# Patient Record
Sex: FEMALE | Race: WHITE | Employment: OTHER | URBAN - METROPOLITAN AREA
[De-identification: names, ages, dates, MRNs, and addresses within clinical notes are randomized per-mention and may not be internally consistent; named-entity substitution may affect disease eponyms.]

---

## 2017-01-11 ENCOUNTER — TELEPHONE (OUTPATIENT)
Dept: INTERNAL MEDICINE | Facility: CLINIC | Age: 77
End: 2017-01-11

## 2017-01-11 DIAGNOSIS — E78.5 HYPERLIPIDEMIA LDL GOAL <100: Primary | ICD-10-CM

## 2017-01-11 NOTE — TELEPHONE ENCOUNTER
Per 12/05/16 OV note: f/u with labs and appt in 3 months    Pt recently had all 3 medication rxs sent with refills recently. Doxazosin rx sent 12/05/16 to [facility], simvastatin sent 10/20/16 to [facility], Risperdal sent 10/17/16 to [facility].    Called pt, request she speak to [facility] and ask they transfer rxs. Pt frustrated by this response and anxious about it.     Called [facility], gave pharmacy contact information and pt contact information (pt new there). Pharm asks that pt bring in insurance card when picking up med.    Called pt, [facility] working on [facility] rx and to bring insurance card when picking up med.

## 2017-01-11 NOTE — TELEPHONE ENCOUNTER
Reason for Call:  Medication or medication refill:    Do you use a [facility]?  Name of the pharmacy and phone number for the current request: [facility]     Name of the medication requested: cymbastatin, doxazosin, risperidone    Other request: none    Can we leave a detailed message on this number? Yes     Phone number patient can be reached at: [phone]    Best Time: asap    Call taken on 1/11/2017 at 2:19 PM by Zena Soliman

## 2017-01-12 ENCOUNTER — TELEPHONE (OUTPATIENT)
Dept: INTERNAL MEDICINE | Facility: CLINIC | Age: 77
End: 2017-01-12

## 2017-01-12 DIAGNOSIS — F22: Primary | ICD-10-CM

## 2017-01-12 NOTE — TELEPHONE ENCOUNTER
Reason for Call:  Medication or medication refill:    Do you use a [facility]?  Name of the pharmacy and phone number for the current request:  [facility]    Name of the medication requested:respiridrone 90 day supply    Other request: has 2 left    Can we leave a detailed message on this number? YES    Phone number patient can be reached at: [phone]     Best Time: any    Call taken on 1/12/2017 at 1:31 PM by Poonam Vo

## 2017-01-12 NOTE — TELEPHONE ENCOUNTER
risperdal     Last Written Prescription Date: 10/17/16  Last Fill Quantity: 60, # refills: 1  Last Office Visit with [facility], [facility] or  [facility] prescribing provider: 12/5/16   Next 5 appointments (look out 90 days)     Mar 10, 2017  1:00 PM   SHORT with Nataele Owens MD   [facility] ([facility])    [address]  [facility] [address]   [phone]                   BP Readings from Last 3 Encounters:   12/05/16 156/87   11/22/16 140/70   10/25/16 158/68     Pulse Readings from Last 2 Encounters:   12/05/16 56   11/22/16 64     GLC       83   9/29/2016  WBC      6.0   9/27/2016  RBC     4.49   9/27/2016  HGB     13.7   9/27/2016  HCT     41.0   9/27/2016  No components found with this name: mct  MCV       91   9/27/2016  MCH     30.5   9/27/2016  MCHC     33.4   9/27/2016  RDW     12.3   9/27/2016  PLT      260   9/27/2016  CHOL      197   9/21/2016  HDL       66   9/21/2016  LDL      110   9/21/2016  TRIG      104   9/21/2016  CHOLHDLRATIO      2.8   7/15/2014    Labs showing if normal/abnormal  Lab Results   Component Value Date    GLC 83 09/29/2016     Lab Results   Component Value Date    CHOL 197 09/21/2016    TRIG 104 09/21/2016    HDL 66 09/21/2016    * 09/21/2016    VLDL 22 07/15/2014    CHOLHDLRATIO 2.8 07/15/2014

## 2017-01-13 RX ORDER — RISPERIDONE 0.5 MG/1
0.5 TABLET ORAL 2 TIMES DAILY
Qty: 60 TABLET | Refills: 1 | Status: SHIPPED | OUTPATIENT
Start: 2017-01-13 | End: 2017-03-08

## 2017-01-13 NOTE — TELEPHONE ENCOUNTER
Routing refill request to provider for review/approval because:  Med refilled by ER provider.    Please advise, thanks.

## 2017-01-31 ENCOUNTER — TELEPHONE (OUTPATIENT)
Dept: INTERNAL MEDICINE | Facility: CLINIC | Age: 77
End: 2017-01-31

## 2017-01-31 DIAGNOSIS — K26.5 PERFORATED DUODENAL ULCER (H): Primary | ICD-10-CM

## 2017-01-31 RX ORDER — PANTOPRAZOLE SODIUM 40 MG/1
40 TABLET, DELAYED RELEASE ORAL DAILY
Qty: 90 TABLET | Refills: 3 | Status: SHIPPED | OUTPATIENT
Start: 2017-01-31 | End: 2018-01-19

## 2017-01-31 NOTE — TELEPHONE ENCOUNTER
Reason for Call:  Medication or medication refill:    Do you use a [facility]?  Name of the pharmacy and phone number for the current request:  [facility] [phone]    Name of the medication requested: pantoprazole    Other request: This is [facility] time for [facility].  Pt only has a few pills left.    Can we leave a detailed message on this number? YES    Phone number patient can be reached at: Home number on file [phone] (home)    Best Time: any    Call taken on 1/31/2017 at 10:52 AM by Fariba Vallejo

## 2017-01-31 NOTE — TELEPHONE ENCOUNTER
Protonix      Last Written Prescription Date: 9/19/16  Last Fill Quantity: 90,  # refills: 0   Last Office Visit with [facility], [facility] or [facility] prescribing provider: 12/5/16                                         Next 5 appointments (look out 90 days)     Mar 10, 2017  1:00 PM   SHORT with Natalee Owens MD   [facility] ([facility]    [address]  [facility] [id number] [phone]

## 2017-02-28 ENCOUNTER — TELEPHONE (OUTPATIENT)
Dept: INTERNAL MEDICINE | Facility: CLINIC | Age: 77
End: 2017-02-28

## 2017-02-28 DIAGNOSIS — I73.9 PVD (PERIPHERAL VASCULAR DISEASE) (H): ICD-10-CM

## 2017-02-28 DIAGNOSIS — I77.810 AORTIC ROOT DILATATION (H): ICD-10-CM

## 2017-02-28 DIAGNOSIS — I10 ESSENTIAL HYPERTENSION WITH GOAL BLOOD PRESSURE LESS THAN 140/90: Chronic | ICD-10-CM

## 2017-02-28 DIAGNOSIS — I70.1 RENAL ARTERY STENOSIS (H): Chronic | ICD-10-CM

## 2017-02-28 NOTE — TELEPHONE ENCOUNTER
Reason for Call:  Medication or medication refill:    Do you use a [facility]?  Name of the pharmacy and phone number for the current request:  [facility]    Name of the medication requested: metropolol    Other request: Pt has 6 days left    Can we leave a detailed message on this number? YES    Phone number patient can be reached at: Home number on file [phone] (home)    Best Time: any    Call taken on 2/28/2017 at 10:36 AM by Fariba Vallejo

## 2017-03-01 RX ORDER — METOPROLOL TARTRATE 50 MG
50 TABLET ORAL 2 TIMES DAILY
Qty: 180 TABLET | Refills: 0 | Status: SHIPPED | OUTPATIENT
Start: 2017-03-01 | End: 2017-06-03

## 2017-03-01 NOTE — TELEPHONE ENCOUNTER
Metoprolol      Last Written Prescription Date: 12/05/16  Last Fill Quantity: 180, # refills: 1    Last Office Visit with [facility], [facility] or [facility] prescribing provider:  12/05/16   Future Office Visit:    Next 5 appointments (look out 90 days)     Mar 21, 2017  3:00 PM CDT   SHORT with Natalee Owens MD   [facility]    [address] [place]  [facility] [id number] [phone]                    BP Readings from Last 3 Encounters:   12/05/16 156/87   11/22/16 140/70   10/25/16 158/68     Medication is being filled for 1 time refill only due to:  upcoming appt     Called pt, notified rx sent.

## 2017-03-07 ENCOUNTER — TELEPHONE (OUTPATIENT)
Dept: INTERNAL MEDICINE | Facility: CLINIC | Age: 77
End: 2017-03-07

## 2017-03-07 DIAGNOSIS — F22: ICD-10-CM

## 2017-03-07 NOTE — TELEPHONE ENCOUNTER
Reason for Call:  Medication or medication refill:    Do you use a [facility]?  Name of the pharmacy and phone number for the current request:  [facility]    Name of the medication requested: risperidone    Other request: none    Can we leave a detailed message on this number? YES    Phone number patient can be reached at: Home number on file [phone] (home)    Best Time: any    Call taken on 3/7/2017 at 9:40 AM by Fariba Vallejo

## 2017-03-07 NOTE — TELEPHONE ENCOUNTER
Risperidone  Routing refill request to provider for review/approval because:  Drug not on the [facility] refill protocol

## 2017-03-08 RX ORDER — RISPERIDONE 0.5 MG/1
0.5 TABLET ORAL 2 TIMES DAILY
Qty: 60 TABLET | Refills: 1 | Status: SHIPPED | OUTPATIENT
Start: 2017-03-08 | End: 2017-05-08

## 2017-03-15 ENCOUNTER — TELEPHONE (OUTPATIENT)
Dept: INTERNAL MEDICINE | Facility: CLINIC | Age: 77
End: 2017-03-15

## 2017-03-15 NOTE — TELEPHONE ENCOUNTER
Reason for Call:  Medication or medication refill:    Do you use a [facility]?  Name of the pharmacy and phone number for the current request:  [facility]    Name of the medication requested: amlodipine, clopidogrel    Other request:     Can we leave a detailed message on this number? YES    Phone number patient can be reached at: Home number on file [phone] (home)    Best Time: any    Call taken on 3/15/2017 at 2:03 PM by Poonam Vo

## 2017-03-20 DIAGNOSIS — I73.9 PVD (PERIPHERAL VASCULAR DISEASE) (H): ICD-10-CM

## 2017-03-20 DIAGNOSIS — I77.810 AORTIC ROOT DILATATION (H): ICD-10-CM

## 2017-03-20 DIAGNOSIS — E78.5 HYPERLIPIDEMIA LDL GOAL <100: ICD-10-CM

## 2017-03-20 DIAGNOSIS — I10 ESSENTIAL HYPERTENSION WITH GOAL BLOOD PRESSURE LESS THAN 140/90: Primary | Chronic | ICD-10-CM

## 2017-03-20 DIAGNOSIS — I70.1 RENAL ARTERY STENOSIS (H): Chronic | ICD-10-CM

## 2017-03-20 DIAGNOSIS — F22: ICD-10-CM

## 2017-03-20 RX ORDER — AMLODIPINE BESYLATE 10 MG/1
10 TABLET ORAL DAILY
Qty: 90 TABLET | Refills: 0 | Status: SHIPPED | OUTPATIENT
Start: 2017-03-20 | End: 2017-05-30

## 2017-03-20 RX ORDER — SIMVASTATIN 40 MG
40 TABLET ORAL AT BEDTIME
Qty: 90 TABLET | Refills: 1 | Status: CANCELLED | OUTPATIENT
Start: 2017-03-20

## 2017-03-20 RX ORDER — CLOPIDOGREL BISULFATE 75 MG/1
75 TABLET ORAL DAILY
Qty: 90 TABLET | Refills: 0 | Status: SHIPPED | OUTPATIENT
Start: 2017-03-20 | End: 2017-06-19

## 2017-03-20 NOTE — TELEPHONE ENCOUNTER
Clopidogrel           Last Written Prescription Date: 12/5/16  Last Fill Quantity: 90, # refills: 1    Last Office Visit with [facility], [facility] or [facility] prescribing provider:  12/5/16   Future Office Visit:    Next 5 appointments (look out 90 days)     Mar 21, 2017  3:00 PM CDT   SHORT with Natalee Owens MD   [facility]    [address]  [facility] [address]   [phone]                   Lab Results   Component Value Date    WBC 6.0 09/27/2016     Lab Results   Component Value Date    RBC 4.49 09/27/2016     Lab Results   Component Value Date    HGB 13.7 09/27/2016     Lab Results   Component Value Date    HCT 41.0 09/27/2016     No components found for: MCT  Lab Results   Component Value Date    MCV 91 09/27/2016     Lab Results   Component Value Date    MCH 30.5 09/27/2016     Lab Results   Component Value Date    MCHC 33.4 09/27/2016     Lab Results   Component Value Date    RDW 12.3 09/27/2016     Lab Results   Component Value Date     09/27/2016     Lab Results   Component Value Date    AST 25 09/27/2016     Lab Results   Component Value Date    ALT 15 09/27/2016     Creatinine   Date Value Ref Range Status   09/29/2016 1.63 (H) 0.52 - 1.04 mg/dL Final   ]  Prescription approved per [facility] Refill Protocol. SUE Mallory R.N.

## 2017-03-20 NOTE — TELEPHONE ENCOUNTER
simvastin     Last Written Prescription Date: 10/20/16  Last Fill Quantity: 90, # refills: 1  Last Office Visit with [facility], [facility] or  [facility] prescribing provider: 12/5/16  Next 5 appointments (look out 90 days)     Mar 21, 2017  3:00 PM CDT   SHORT with Natalee Owens MD   [facility] ([facility])    [address]  [facility] [address]   [phone]                   Lab Results   Component Value Date    CHOL 197 09/21/2016     Lab Results   Component Value Date    HDL 66 09/21/2016     Lab Results   Component Value Date     09/21/2016     Lab Results   Component Value Date    TRIG 104 09/21/2016     Lab Results   Component Value Date    CHOLHDLRATIO 2.8 07/15/2014       Labs showing if normal/abnormal  Lab Results   Component Value Date    CHOL 197 09/21/2016    TRIG 104 09/21/2016    HDL 66 09/21/2016     (H) 09/21/2016    VLDL 22 07/15/2014    CHOLHDLRATIO 2.8 07/15/2014

## 2017-03-21 ENCOUNTER — OFFICE VISIT (OUTPATIENT)
Dept: INTERNAL MEDICINE | Facility: CLINIC | Age: 77
End: 2017-03-21
Payer: COMMERCIAL

## 2017-03-21 VITALS
OXYGEN SATURATION: 96 % | SYSTOLIC BLOOD PRESSURE: 136 MMHG | WEIGHT: 164 LBS | DIASTOLIC BLOOD PRESSURE: 72 MMHG | HEART RATE: 69 BPM | TEMPERATURE: 97.7 F | BODY MASS INDEX: 29.06 KG/M2 | HEIGHT: 63 IN | RESPIRATION RATE: 14 BRPM

## 2017-03-21 DIAGNOSIS — F33.3 SEVERE EPISODE OF RECURRENT MAJOR DEPRESSIVE DISORDER, WITH PSYCHOTIC FEATURES (H): Chronic | ICD-10-CM

## 2017-03-21 DIAGNOSIS — N18.30 CKD (CHRONIC KIDNEY DISEASE) STAGE 3, GFR 30-59 ML/MIN (H): ICD-10-CM

## 2017-03-21 DIAGNOSIS — I10 ESSENTIAL HYPERTENSION WITH GOAL BLOOD PRESSURE LESS THAN 140/90: Chronic | ICD-10-CM

## 2017-03-21 DIAGNOSIS — I70.1 RENAL ARTERY STENOSIS (H): Chronic | ICD-10-CM

## 2017-03-21 DIAGNOSIS — G47.00 PERSISTENT INSOMNIA: Primary | ICD-10-CM

## 2017-03-21 LAB
ALBUMIN SERPL-MCNC: 4 G/DL (ref 3.4–5)
ALP SERPL-CCNC: 54 U/L (ref 40–150)
ALT SERPL W P-5'-P-CCNC: 15 U/L (ref 0–50)
ANION GAP SERPL CALCULATED.3IONS-SCNC: 9 MMOL/L (ref 3–14)
AST SERPL W P-5'-P-CCNC: 17 U/L (ref 0–45)
BILIRUB SERPL-MCNC: 0.6 MG/DL (ref 0.2–1.3)
BUN SERPL-MCNC: 31 MG/DL (ref 7–30)
CALCIUM SERPL-MCNC: 9.5 MG/DL (ref 8.5–10.1)
CHLORIDE SERPL-SCNC: 107 MMOL/L (ref 94–109)
CO2 SERPL-SCNC: 26 MMOL/L (ref 20–32)
CREAT SERPL-MCNC: 1.52 MG/DL (ref 0.52–1.04)
GFR SERPL CREATININE-BSD FRML MDRD: 33 ML/MIN/1.7M2
GLUCOSE SERPL-MCNC: 117 MG/DL (ref 70–99)
POTASSIUM SERPL-SCNC: 4.4 MMOL/L (ref 3.4–5.3)
PROT SERPL-MCNC: 7.8 G/DL (ref 6.8–8.8)
SODIUM SERPL-SCNC: 142 MMOL/L (ref 133–144)

## 2017-03-21 PROCEDURE — 36415 COLL VENOUS BLD VENIPUNCTURE: CPT | Performed by: INTERNAL MEDICINE

## 2017-03-21 PROCEDURE — 80053 COMPREHEN METABOLIC PANEL: CPT | Performed by: INTERNAL MEDICINE

## 2017-03-21 PROCEDURE — 99214 OFFICE O/P EST MOD 30 MIN: CPT | Performed by: INTERNAL MEDICINE

## 2017-03-21 RX ORDER — TRAZODONE HYDROCHLORIDE 50 MG/1
50 TABLET, FILM COATED ORAL
Qty: 30 TABLET | Refills: 1 | Status: ON HOLD | OUTPATIENT
Start: 2017-03-21 | End: 2019-01-01

## 2017-03-21 NOTE — PROGRESS NOTES
Dr Greenberg's note      Patient's instructions / PLAN:                                                        Plan:  1. Trazodone 50 mg at bed time - for sleep  2. Continue the other meds, same doses for now.  3. Labs today   4. Follow up in 3 months         ASSESSMENT & PLAN:                                                      (G47.00) Persistent insomnia  (primary encounter diagnosis)  Comment:   Plan: traZODone (DESYREL) 50 MG tablet            (F33.3) DEPRESSION, Severe, recurrent, with psychotic features (H)  Comment: seems stable  Plan: Continue same meds, same doses for now     (I10) HTN, goal < 140/90 (H)  Comment: Controlled    Plan: Continue same meds, same doses for now     (I70.1) Renal artery stenosis (H)^^  Comment:   Plan: f/u with vascular clinic    (N18.3) CKD (chronic kidney disease) stage 3, GFR 30-59 ml/min  Comment:   Plan: Comprehensive metabolic panel               Chief complaint:                                                      Follow up chronic medical problems        SUBJECTIVE:                                                    Wendy Rocha is a 77 year old female who presents to clinic today for the following health issues:      She states she feels really well. She is back to her apartment.   She denies hearing voices.   Can't sleep very well. Can't stay asleep.   She feels bad about herself because she sees herself old. We discussed about this. Everybody gets old and a lot of people don't make it to her age.     Hypertension Follow-up      Outpatient blood pressures are not being checked.    Low Salt Diet: no added salt       Amount of exercise or physical activity: daily activities    Problems taking medications regularly: No    Medication side effects: none    Diet: low salt    Review Medications      Review of Systems:                                                      ROS: negative for fever, chills, cough, wheezes, chest pain, shortness of breath, vomiting, abdominal pain,  "leg swelling     A 10-point review of systems was obtained.  Those pertinent are above and in the in the Subjective section.  The rest of the systems are negative.           OBJECTIVE:             Physical exam:  Blood pressure 136/72, pulse 69, temperature 97.7  F (36.5  C), temperature source Oral, resp. rate 14, height 5' 3\" (1.6 m), weight 164 lb (74.4 kg), SpO2 96 %, not currently breastfeeding.   NAD, appears comfortable  Skin: no rashes   Chest: clear to auscultation bilaterally, good respiratory effort  Heart: S1 S2, RRR, no mgr appreciated  Abdomen: soft, not tender,   Extremities: no edema,   Neurologic: A, Ox3, no focal signs appreciated    PMHx: reviewed  Past Medical History   Diagnosis Date     Abnormal ECG      ARF (acute renal failure) (H) Aug 2010     Lisinopril was stopped at that time     Carotid stenosis      Carotid stenosis, bilateral      Carotid stenosis, bilateral      Vs Surgeon: dr Kush Mcmillan, Phone: [phone] fax: [phone]     CKD (chronic kidney disease) stage 3, GFR 30-59 ml/min      GERD (gastroesophageal reflux disease)      HTN, goal below 140/90      Hyperlipidemia LDL goal <100      Lung nodule may 2013     needs f/u may 2014     Muscle aches      Perforated duodenal ulcer (H) Aug 2010     Proteinuria      PVD (peripheral vascular disease) (H)      Renal artery stenosis (H)  April 2011     Right side     Vitamin D deficiency disease       PSHx: reviewed  Past Surgical History   Procedure Laterality Date     Arthroplasty knee       left     Laparoscopic tubal ligation       Eye surgery       cataracts        Meds: reviewed  Current Outpatient Prescriptions   Medication Sig Dispense Refill     clopidogrel (PLAVIX) 75 MG tablet Take 1 tablet (75 mg) by mouth daily 90 tablet 0     amLODIPine (NORVASC) 10 MG tablet Take 1 tablet (10 mg) by mouth daily 90 tablet 0     risperiDONE (RISPERDAL) 0.5 MG tablet Take 1 tablet (0.5 mg) by mouth 2 times daily 60 tablet 1     " metoprolol (LOPRESSOR) 50 MG tablet Take 1 tablet (50 mg) by mouth 2 times daily 180 tablet 0     pantoprazole (PROTONIX) 40 MG EC tablet Take 1 tablet (40 mg) by mouth daily Take 30-60 minutes before a meal. 90 tablet 3     doxazosin (CARDURA) 4 MG tablet Take 1 tablet (4 mg) by mouth At Bedtime 90 tablet 1     simvastatin (ZOCOR) 40 MG tablet Take 1 tablet (40 mg) by mouth At Bedtime 90 tablet 1     risperiDONE (RISPERDAL) 0.5 MG tablet Take 0.5-1 tablets (0.25-0.5 mg) by mouth 2 times daily as needed (delusional thinking) 30 tablet 1     cholecalciferol (VITAMIN D) 1000 UNIT tablet Take 6,000 Units by mouth daily        ASPIRIN NOT PRESCRIBED (INTENTIONAL) Antiplatelet medication not prescribed intentionally due to Plavix (Patient not taking: Reported on 3/21/2017) 0 each 0     ACE/ARB NOT PRESCRIBED, INTENTIONAL, ACE & ARB not prescribed due to Worsening renal function on ACE/ARB therapy (Patient not taking: Reported on 3/21/2017)       ASPIRIN NOT PRESCRIBED, INTENTIONAL, by Other route continuous prn Reported on 3/21/2017  0       Soc Hx: reviewed  Fam Hx: reviewed          Natalee Greenberg MD  Internal Medicine

## 2017-03-21 NOTE — PATIENT INSTRUCTIONS
Plan:  1. Trazodone 50 mg at bed time - for sleep  2. Continue the other meds, same doses for now.  3. Labs today   4. Follow up in 3 months

## 2017-03-21 NOTE — MR AVS SNAPSHOT
"              After Visit Summary   3/21/2017    Wendy Rocha    MRN: [id number]           Patient Information     Date Of Birth          1940        Visit Information        Provider Department      3/21/2017 3:00 PM Natalee Owens MD [facility]        Today's Diagnoses     Persistent insomnia    -  1    DEPRESSION, Severe, recurrent, with psychotic features (H)        CKD (chronic kidney disease) stage 3, GFR 30-59 ml/min        Renal artery stenosis (H)^^          Care Instructions    Plan:  1. Trazodone 50 mg at bed time - for sleep  2. Continue the other meds, same doses for now.  3. Labs today   4. Follow up in 3 months           Follow-ups after your visit        Who to contact     If you have questions or need follow up information about today's clinic visit or your schedule please contact [facility] directly at [phone].  Normal or non-critical lab and imaging results will be communicated to you by MyChart, letter or phone within 4 business days after the clinic has received the results. If you do not hear from us within 7 days, please contact the clinic through [employer] or phone. If you have a critical or abnormal lab result, we will notify you by phone as soon as possible.  Submit refill requests through [employer] or call your pharmacy and they will forward the refill request to us. Please allow 3 business days for your refill to be completed.          Additional Information About Your Visit        MyChart Information     [employer] lets you send messages to your doctor, view your test results, renew your prescriptions, schedule appointments and more. To sign up, go to [email or web address] . Click on \"Log in\" on the left side of the screen, which will take you to the Welcome page. Then click on \"Sign up Now\" on the right side of the page.     You will be asked to enter the access code listed below, as well as some personal information. Please " "follow the directions to create your username and password.     Your access code is: [email or web address]  Expires: 2017  3:29 PM     Your access code will  in 90 days. If you need help or a new code, please call your [facility] or [phone].        Care EveryWhere ID     This is your Care EveryWhere ID. This could be used by other organizations to access your [facility] medical records  [id number]        Your Vitals Were     Pulse Temperature Respirations Height Pulse Oximetry Breastfeeding?    69 97.7  F (36.5  C) (Oral) 14 5' 3\" (1.6 m) 96% No    BMI (Body Mass Index)                   29.05 kg/m2            Blood Pressure from Last 3 Encounters:   17 136/72   16 156/87   16 140/70    Weight from Last 3 Encounters:   17 164 lb (74.4 kg)   16 167 lb 14.4 oz (76.2 kg)   16 173 lb (78.5 kg)              We Performed the Following     Comprehensive metabolic panel          Today's Medication Changes          These changes are accurate as of: 3/21/17  3:29 PM.  If you have any questions, ask your nurse or doctor.               Start taking these medicines.        Dose/Directions    traZODone 50 MG tablet   Commonly known as:  DESYREL   Used for:  Persistent insomnia   Started by:  Natalee Owens MD        Dose:  50 mg   Take 1 tablet (50 mg) by mouth nightly as needed for sleep   Quantity:  30 tablet   Refills:  1            Where to get your medicines      These medications were sent to [facility] [address] [place] [address]     Phone:  [phone]     traZODone 50 MG tablet                Primary Care Provider Office Phone # Fax #    Natalee Owens [phone]       [facility] [address] [place] [address]        Thank you!     Thank you for choosing [facility]  for your care. Our goal is always to provide you " with excellent care. Hearing back from our patients is one way we can continue to improve our services. Please take a few minutes to complete the written survey that you may receive in the mail after your visit with us. Thank you!             Your Updated Medication List - Protect others around you: Learn how to safely use, store and throw away your medicines at www.disposemymeds.org.          This list is accurate as of: 3/21/17  3:29 PM.  Always use your most recent med list.                   Brand Name Dispense Instructions for use    ACE/ARB NOT PRESCRIBED (INTENTIONAL)      ACE & ARB not prescribed due to Worsening renal function on ACE/ARB therapy       amLODIPine 10 MG tablet    NORVASC    90 tablet    Take 1 tablet (10 mg) by mouth daily       ASPIRIN NOT PRESCRIBED    INTENTIONAL     by Other route continuous prn Reported on 3/21/2017       ASPIRIN NOT PRESCRIBED    INTENTIONAL    0 each    Antiplatelet medication not prescribed intentionally due to Plavix       cholecalciferol 1000 UNIT tablet    vitamin D     Take 6,000 Units by mouth daily       clopidogrel 75 MG tablet    PLAVIX    90 tablet    Take 1 tablet (75 mg) by mouth daily       doxazosin 4 MG tablet    CARDURA    90 tablet    Take 1 tablet (4 mg) by mouth At Bedtime       metoprolol 50 MG tablet    LOPRESSOR    180 tablet    Take 1 tablet (50 mg) by mouth 2 times daily       pantoprazole 40 MG EC tablet    PROTONIX    90 tablet    Take 1 tablet (40 mg) by mouth daily Take 30-60 minutes before a meal.       risperiDONE 0.5 MG tablet    risperDAL    60 tablet    Take 1 tablet (0.5 mg) by mouth 2 times daily       simvastatin 40 MG tablet    ZOCOR    90 tablet    Take 1 tablet (40 mg) by mouth At Bedtime       traZODone 50 MG tablet    DESYREL    30 tablet    Take 1 tablet (50 mg) by mouth nightly as needed for sleep

## 2017-03-21 NOTE — LETTER
[facility]  [address], [address]  [place] [address]  [phone]        March 23, 2017    Wendy Rocha  [address] [place] [address]            Dear Ms. Wendy Rocha:    The recent labs showed that the kidney numbers are in the same range as before.    Sincerely,    Natalee Greenberg MD  Internal Medicine

## 2017-03-21 NOTE — NURSING NOTE
"Chief Complaint   Patient presents with     Hypertension     Recheck Medication       Initial /72 (BP Location: Right arm, Patient Position: Chair, Cuff Size: Adult Large)  Pulse 69  Temp 97.7  F (36.5  C) (Oral)  Resp 14  Ht 5' 3\" (1.6 m)  Wt 164 lb (74.4 kg)  SpO2 96%  Breastfeeding? No  BMI 29.05 kg/m2 Estimated body mass index is 29.05 kg/(m^2) as calculated from the following:    Height as of this encounter: 5' 3\" (1.6 m).    Weight as of this encounter: 164 lb (74.4 kg).  Medication Reconciliation: complete   Mary BANUELOS      "

## 2017-04-10 DIAGNOSIS — E78.5 HYPERLIPIDEMIA LDL GOAL <100: ICD-10-CM

## 2017-04-10 RX ORDER — SIMVASTATIN 40 MG
TABLET ORAL
Qty: 90 TABLET | Refills: 0 | Status: SHIPPED | OUTPATIENT
Start: 2017-04-10 | End: 2017-06-22

## 2017-04-10 NOTE — TELEPHONE ENCOUNTER
Reason for Call:  Medication or medication refill:    Do you use a [facility]?  Name of the pharmacy and [facility] the [facility]    Name of the medication requested: simvastatin    Other request: none    Can we leave a detailed message on this number? YES    Phone number patient can be reached at: Home number on file [phone] (home)    Best Time: any    Call taken on 4/10/2017 at 9:54 AM by Poonam Vo

## 2017-04-10 NOTE — TELEPHONE ENCOUNTER
Simvastatin     Last Written Prescription Date: 10/20/16  Last Fill Quantity: 90, # refills: 1  [facility] or [facility] prescribing provider: 3/21/17       Lab Results   Component Value Date    CHOL 197 09/21/2016     Lab Results   Component Value Date    HDL 66 09/21/2016     Lab Results   Component Value Date     09/21/2016     Lab Results   Component Value Date    TRIG 104 09/21/2016     Lab Results   Component Value Date    CHOLHDLRATIO 2.8 07/15/2014       Labs showing if normal/abnormal  Lab Results   Component Value Date    CHOL 197 09/21/2016    TRIG 104 09/21/2016    HDL 66 09/21/2016     (H) 09/21/2016    VLDL 22 07/15/2014    CHOLHDLRATIO 2.8 07/15/2014     Routing refill request to provider for review/approval because:  Labs out of range:  LDL

## 2017-04-11 RX ORDER — SIMVASTATIN 40 MG
40 TABLET ORAL AT BEDTIME
Qty: 90 TABLET | Refills: 0 | Status: SHIPPED | OUTPATIENT
Start: 2017-04-11 | End: 2017-07-05

## 2017-05-08 DIAGNOSIS — F22: ICD-10-CM

## 2017-05-08 RX ORDER — RISPERIDONE 0.5 MG/1
0.5 TABLET ORAL 2 TIMES DAILY
Qty: 60 TABLET | Refills: 1 | Status: SHIPPED | OUTPATIENT
Start: 2017-05-08 | End: 2017-06-23

## 2017-05-08 NOTE — TELEPHONE ENCOUNTER
RISPERDAL      Last Written Prescription Date:  03/08/17  Last Fill Quantity: 60,   # refills: 1  Last Office Visit with [facility], [facility] or  [facility] prescribing provider: 03/21/17  Future Office visit:       Routing refill request to provider for review/approval because:  Drug not on the [facility] or [facility] refill protocol or controlled substance

## 2017-05-30 ENCOUNTER — TELEPHONE (OUTPATIENT)
Dept: INTERNAL MEDICINE | Facility: CLINIC | Age: 77
End: 2017-05-30

## 2017-05-30 DIAGNOSIS — I10 ESSENTIAL HYPERTENSION WITH GOAL BLOOD PRESSURE LESS THAN 140/90: Chronic | ICD-10-CM

## 2017-05-30 RX ORDER — AMLODIPINE BESYLATE 10 MG/1
10 TABLET ORAL DAILY
Qty: 90 TABLET | Refills: 0 | Status: SHIPPED | OUTPATIENT
Start: 2017-05-30 | End: 2017-09-15

## 2017-05-30 NOTE — TELEPHONE ENCOUNTER
Prescription approved per [facility] Refill Protocol. Left message on patient's voicemail that rx had been approved.  SUE Mallory R.N.

## 2017-05-30 NOTE — TELEPHONE ENCOUNTER
Reason for Call:  Medication or medication refill:Med Refill    Do you use a [facility]?  Name of the pharmacy and phone number for the [facility]    Name of the medication requested: amLODIPine (NORVASC) 10 MG tablet    Other request: Has enough pills until 6/4    Can we leave a detailed message on this number? YES    Phone number patient can be reached at: Home number on file [phone] (home)    Best Time: anytime    Call taken on 5/30/2017 at 10:57 AM by SHARMAINE MCFADDEN

## 2017-06-03 DIAGNOSIS — I77.810 AORTIC ROOT DILATATION (H): ICD-10-CM

## 2017-06-03 DIAGNOSIS — I10 ESSENTIAL HYPERTENSION WITH GOAL BLOOD PRESSURE LESS THAN 140/90: Chronic | ICD-10-CM

## 2017-06-03 DIAGNOSIS — I73.9 PVD (PERIPHERAL VASCULAR DISEASE) (H): ICD-10-CM

## 2017-06-03 DIAGNOSIS — I70.1 RENAL ARTERY STENOSIS (H): Chronic | ICD-10-CM

## 2017-06-05 RX ORDER — METOPROLOL TARTRATE 50 MG
TABLET ORAL
Qty: 180 TABLET | Refills: 0 | Status: SHIPPED | OUTPATIENT
Start: 2017-06-05 | End: 2017-08-24

## 2017-06-05 NOTE — TELEPHONE ENCOUNTER
Metoprolol      Last Written Prescription Date: 03/01/17  Last Fill Quantity: 180, # refills: 0    Last Office Visit with G, P or [facility] prescribing provider:  03/21/17   Future Office Visit:        BP Readings from Last 3 Encounters:   03/21/17 136/72   12/05/16 156/87   11/22/16 140/70

## 2017-06-06 ENCOUNTER — TELEPHONE (OUTPATIENT)
Dept: INTERNAL MEDICINE | Facility: CLINIC | Age: 77
End: 2017-06-06

## 2017-06-06 NOTE — TELEPHONE ENCOUNTER
Rx for Risperdal was sent to pharm on 5/8 for #60 x1 refill       Called pt-left message relaying above info and to call pharm

## 2017-06-06 NOTE — TELEPHONE ENCOUNTER
Reason for Call:  Medication or medication refill:    Do you use a [facility]?  Name of the pharmacy and phone number for the current request:  [facility]    Name of the medication requested: risperdal    Other request: none    Can we leave a detailed message on this number? YES    Phone number patient can be reached at: Home number on file [phone] (home)    Best Time: any    Call taken on 6/6/2017 at 8:08 AM by Fariba Vallejo

## 2017-06-19 ENCOUNTER — TELEPHONE (OUTPATIENT)
Dept: INTERNAL MEDICINE | Facility: CLINIC | Age: 77
End: 2017-06-19

## 2017-06-19 DIAGNOSIS — I73.9 PVD (PERIPHERAL VASCULAR DISEASE) (H): ICD-10-CM

## 2017-06-19 DIAGNOSIS — I77.810 AORTIC ROOT DILATATION (H): ICD-10-CM

## 2017-06-19 DIAGNOSIS — I70.1 RENAL ARTERY STENOSIS (H): Chronic | ICD-10-CM

## 2017-06-19 RX ORDER — CLOPIDOGREL BISULFATE 75 MG/1
75 TABLET ORAL DAILY
Qty: 90 TABLET | Refills: 0 | Status: SHIPPED | OUTPATIENT
Start: 2017-06-19 | End: 2017-09-10

## 2017-06-19 NOTE — TELEPHONE ENCOUNTER
Reason for Call:  Medication or medication refill:    Do you use a [facility]?  Name of the pharmacy and phone number for the current request: [facility] [address] [place] [address]    Name of the medication requested: clopidogrel (PLAVIX) 75 MG tablet    Other request:     Can we leave a detailed message on this number? YES    Phone number patient can be reached at: [phone]    Best Time:     Call taken on 6/19/2017 at 8:02 AM by Maria Isabel Landin

## 2017-06-22 ENCOUNTER — OFFICE VISIT (OUTPATIENT)
Dept: INTERNAL MEDICINE | Facility: CLINIC | Age: 77
End: 2017-06-22
Payer: COMMERCIAL

## 2017-06-22 VITALS
WEIGHT: 156.8 LBS | OXYGEN SATURATION: 96 % | TEMPERATURE: 98 F | SYSTOLIC BLOOD PRESSURE: 128 MMHG | BODY MASS INDEX: 27.78 KG/M2 | DIASTOLIC BLOOD PRESSURE: 74 MMHG | HEIGHT: 63 IN | HEART RATE: 68 BPM

## 2017-06-22 DIAGNOSIS — R41.3 MEMORY LOSS: ICD-10-CM

## 2017-06-22 DIAGNOSIS — F22: ICD-10-CM

## 2017-06-22 DIAGNOSIS — F33.3 SEVERE EPISODE OF RECURRENT MAJOR DEPRESSIVE DISORDER, WITH PSYCHOTIC FEATURES (H): Primary | Chronic | ICD-10-CM

## 2017-06-22 DIAGNOSIS — Z71.89 ADVANCED DIRECTIVES, COUNSELING/DISCUSSION: ICD-10-CM

## 2017-06-22 PROCEDURE — 99214 OFFICE O/P EST MOD 30 MIN: CPT | Performed by: INTERNAL MEDICINE

## 2017-06-22 RX ORDER — RISPERIDONE 0.25 MG/1
TABLET ORAL
Qty: 90 TABLET | Refills: 1 | Status: SHIPPED | OUTPATIENT
Start: 2017-06-22 | End: 2017-06-23

## 2017-06-22 RX ORDER — ACETAMINOPHEN 325 MG/1
650 TABLET ORAL EVERY 6 HOURS PRN
Qty: 100 TABLET | Refills: 0 | Status: ON HOLD | COMMUNITY
Start: 2017-06-22 | End: 2019-01-01

## 2017-06-22 ASSESSMENT — ANXIETY QUESTIONNAIRES
3. WORRYING TOO MUCH ABOUT DIFFERENT THINGS: NOT AT ALL
GAD7 TOTAL SCORE: 1
IF YOU CHECKED OFF ANY PROBLEMS ON THIS QUESTIONNAIRE, HOW DIFFICULT HAVE THESE PROBLEMS MADE IT FOR YOU TO DO YOUR WORK, TAKE CARE OF THINGS AT HOME, OR GET ALONG WITH OTHER PEOPLE: NOT DIFFICULT AT ALL
5. BEING SO RESTLESS THAT IT IS HARD TO SIT STILL: NOT AT ALL
2. NOT BEING ABLE TO STOP OR CONTROL WORRYING: NOT AT ALL
1. FEELING NERVOUS, ANXIOUS, OR ON EDGE: SEVERAL DAYS
6. BECOMING EASILY ANNOYED OR IRRITABLE: NOT AT ALL
7. FEELING AFRAID AS IF SOMETHING AWFUL MIGHT HAPPEN: NOT AT ALL

## 2017-06-22 ASSESSMENT — PATIENT HEALTH QUESTIONNAIRE - PHQ9: 5. POOR APPETITE OR OVEREATING: NOT AT ALL

## 2017-06-22 NOTE — MR AVS SNAPSHOT
After Visit Summary   6/22/2017    Wendy Rocha    MRN: [id number]           Patient Information     Date Of Birth          1940        Visit Information        Provider Department      6/22/2017 1:00 PM Natalee Owens MD [facility]        Today's Diagnoses     DEPRESSION, Severe, recurrent, with psychotic features (H)    -  1    Memory loss          Care Instructions    Plan:  1. Decrease Risperdal to 0.25 mg in am and 0.5 mg in pm   2. Make an appointment with Leslie [facility] - [phone]   3. Make an appointment with memory clinic    [facility] [phone]     4. Continue the other meds, same doses for now.  5. Brain mri - To schedule this test you may call Scheduling center at [phone]            Follow-ups after your visit        Additional Services     GERIATRICS REFERRAL       Your provider has referred you to: FMG: [facility] [phone]   [email or web address]    Please be aware that coverage of these services is subject to the terms and limitations of your health insurance plan.  Call member services at your health plan with any benefit or coverage questions.      Please bring the following with you to your appointment:    (1) Any X-Rays, CTs or MRIs which have been performed.  Contact the facility where they were done to arrange for  prior to your scheduled appointment.  Any new CT, MRI or other procedures ordered by your specialist must be performed at a [facility] facility or coordinated by your clinic's referral office.    (2) List of current medications   (3) This referral request   (4) Any documents/labs given to you for this referral                  Future tests that were ordered for you today     Open Future Orders        Priority Expected Expires Ordered    MR Brain w/o Contrast Routine  6/22/2018 6/22/2017            Who to contact     If you have  "questions or need follow up information about today's clinic visit or your schedule please contact [facility] directly at [phone].  Normal or non-critical lab and imaging results will be communicated to you by [employer], letter or phone within 4 business days after the clinic has received the results. If you do not hear from us within 7 days, please contact the clinic through [employer] or phone. If you have a critical or abnormal lab result, we will notify you by phone as soon as possible.  Submit refill requests through [employer] or call your pharmacy and they will forward the refill request to us. Please allow 3 business days for your refill to be completed.          Additional Information About Your Visit        [employer] Information     [employer] lets you send messages to your doctor, view your test results, renew your prescriptions, schedule appointments and more. To sign up, go to [email or web address] . Click on \"Log in\" on the left side of the screen, which will take you to the Welcome page. Then click on \"Sign up Now\" on the right side of the page.     You will be asked to enter the access code listed below, as well as some personal information. Please follow the directions to create your username and password.     Your access code is: [email or web address]  Expires: 2017  1:59 PM     Your access code will  in 90 days. If you need help or a new code, please call your [place] clinic or [phone].        Care EveryWhere ID     This is your Care EveryWhere ID. This could be used by other organizations to access your [place] medical records  [id number]        Your Vitals Were     Pulse Temperature Height Pulse Oximetry Breastfeeding? BMI (Body Mass Index)    68 98  F (36.7  C) (Oral) 5' 3\" (1.6 m) 96% No 27.78 kg/m2       Blood Pressure from Last 3 Encounters:   17 128/74   17 136/72   16 156/87    Weight from Last 3 Encounters:   17 156 lb 12.8 oz (71.1 kg) "   03/21/17 164 lb (74.4 kg)   12/05/16 167 lb 14.4 oz (76.2 kg)              We Performed the Following     GERIATRICS REFERRAL          Today's Medication Changes          These changes are accurate as of: 6/22/17  1:59 PM.  If you have any questions, ask your nurse or doctor.               Start taking these medicines.        Dose/Directions    order for DME   Used for:  Severe episode of recurrent major depressive disorder, with psychotic features (H), Memory loss   Started by:  Natalee Owens MD        Machine to dispense the medication   Quantity:  1 Device   Refills:  0         These medicines have changed or have updated prescriptions.        Dose/Directions    * risperiDONE 0.5 MG tablet   Commonly known as:  risperDAL   This may have changed:  Another medication with the same name was added. Make sure you understand how and when to take each.   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 Make sure you understand how and when to take each.   Used for:  Severe episode of recurrent major depressive disorder, with psychotic features (H)   Changed by:  Natalee Owens MD        1 tablet in am and 2 tabs in pm   Quantity:  90 tablet   Refills:  1       simvastatin 40 MG tablet   Commonly known as:  ZOCOR   This may have changed:  Another medication with the same name was removed. Continue taking this medication, and follow the directions you see here.   Used for:  Hyperlipidemia LDL goal <100   Changed by:  Natalee Owens MD        Dose:  40 mg   Take 1 tablet (40 mg) by  mouth At Bedtime   Quantity:  90 tablet   Refills:  0       * Notice:  This list has 2 medication(s) that are the same as other medications prescribed for you. Read the directions carefully, and ask your doctor or other care provider to review them with you.         Where to get your medicines      These medications were sent to [id number] [address] [facility]  [address] [facility] [address]     Phone:  [phone]     risperiDONE 0.25 MG tablet         Some of these will need a paper prescription and others can be bought over the counter.  Ask your nurse if you have questions.     Bring a paper prescription for each of these medications     order for DME                Primary Care Provider Office Phone # Fax #    Natalee Owens [phone]       [facility] [address] [facility] [address]        Equal Access to Services     FRANCIS SALAZAR : Hadii lara ku hadasho Soomaali, waaxda luqadaha, qaybta kaalmada adeegyada, waxay familiain hayaan jalyn nagel . So [facility] [phone].    ATENCIÓN: Si habla español, tiene a valencia disposición servicios gratuitos de asistencia lingüística. Marques al [phone].    We comply with applicable federal civil rights laws and [place] laws. We do not discriminate on the basis of race, color, national origin, age, disability sex, sexual orientation or gender identity.            Thank you!     Thank you for choosing [facility]  for your care. Our goal is always to provide you with excellent care. Hearing back from our patients is one way we can continue to improve our services. Please take a few minutes to complete the written survey that you may receive in the mail after your visit with us. Thank you!             Your Updated Medication List - Protect others around you: Learn how to safely use, store and throw away your medicines at www.disposemymeds.org.          This  list is accurate as of: 6/22/17  1:59 PM.  Always use your most recent med list.                   Brand Name Dispense Instructions for use Diagnosis    ACE/ARB NOT PRESCRIBED (INTENTIONAL)      ACE & ARB not prescribed due to Worsening renal function on ACE/ARB therapy    Renal artery stenosis (H), Essential hypertension with goal blood pressure less than 140/90       amLODIPine 10 MG tablet    NORVASC    90 tablet    Take 1 tablet (10 mg) by mouth daily    Essential hypertension with goal blood pressure less than 140/90       ASPIRIN NOT PRESCRIBED    INTENTIONAL     by Other route continuous prn Reported on 3/21/2017    HTN (hypertension), Hyperlipidemia LDL goal < 130       ASPIRIN NOT PRESCRIBED    INTENTIONAL    0 each    Antiplatelet medication not prescribed intentionally due to Plavix    Essential hypertension with goal blood pressure less than 140/90, PVD (peripheral vascular disease) (H), Aortic root dilatation (H), Renal artery stenosis (H)       cholecalciferol 1000 UNIT tablet    vitamin D     Take 6,000 Units by mouth daily        clopidogrel 75 MG tablet    PLAVIX    90 tablet    Take 1 tablet (75 mg) by mouth daily    PVD (peripheral vascular disease) (H), Aortic root dilatation (H), Renal artery stenosis (H)       doxazosin 4 MG tablet    CARDURA    90 tablet    Take 1 tablet (4 mg) by mouth At Bedtime    Essential hypertension with goal blood pressure less than 140/90, PVD (peripheral vascular disease) (H), Aortic root dilatation (H), Renal artery stenosis (H)       metoprolol 50 MG tablet    LOPRESSOR    180 tablet    TAKE ONE TABLET (50 MG) BY MOUTH TWICE DAILY    Essential hypertension with goal blood pressure less than 140/90, PVD (peripheral vascular disease) (H), Aortic root dilatation (H), Renal artery stenosis (H)       order for DME     1 Device    Machine to dispense the medication    Severe episode of recurrent major depressive disorder, with psychotic features (H), Memory loss        pantoprazole 40 MG EC tablet    PROTONIX    90 tablet    Take 1 tablet (40 mg) by mouth daily Take 30-60 minutes before a meal.    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)       * risperiDONE 0.25 MG tablet    risperDAL    90 tablet    1 tablet in am and 2 tabs in pm    Severe episode of recurrent major depressive disorder, with psychotic features (H)       simvastatin 40 MG tablet    ZOCOR    90 tablet    Take 1 tablet (40 mg) by mouth At Bedtime    Hyperlipidemia LDL goal <100       traZODone 50 MG tablet    DESYREL    30 tablet    Take 1 tablet (50 mg) by mouth nightly as needed for sleep    Persistent insomnia       TYLENOL 325 MG tablet   Generic drug:  acetaminophen     100 tablet    Take 2 tablets (650 mg) by mouth every 6 hours as needed for mild pain    Severe episode of recurrent major depressive disorder, with psychotic features (H), Memory loss       * Notice:  This list has 2 medication(s) that are the same as other medications prescribed for you. Read the directions carefully, and ask your doctor or other care provider to review them with you.

## 2017-06-22 NOTE — NURSING NOTE
"Chief Complaint   Patient presents with     Memory Loss     Daughter in room.        Initial There were no vitals taken for this visit. Estimated body mass index is 29.05 kg/(m^2) as calculated from the following:    Height as of 3/21/17: 5' 3\" (1.6 m).    Weight as of 3/21/17: 164 lb (74.4 kg).  Medication Reconciliation: complete    "

## 2017-06-22 NOTE — PROGRESS NOTES
Dr Greenberg's note      Patient's instructions / PLAN:                                                        Plan:  1. Decrease Risperdal to 0.25 mg in am and 0.5 mg in pm   2. Make an appointment with Leslie De La Torre - [phone]   3. Make an appointment with memory clinic  [facility] [phone]     4. Continue the other meds, same doses for now.  5. Brain mri - To schedule this test you may call Scheduling center at [phone]        ASSESSMENT & PLAN:                                                      Wendy had an episode of depression with psychosis fall 2016. She was given Risperdal since. She looks sedated to me today. 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)  Comment:   Plan: risperiDONE (RISPERDAL) 0.5 MG tablet            (R41.3) Memory loss  Comment:   Plan: acetaminophen (TYLENOL) 325 MG tablet, order         for DME, GERIATRICS REFERRAL, MR Brain w/o         Contrast            (Z71.89) Advanced directives, counseling/discussion  Comment:   Plan:        Chief complaint:                                                      Memory  Daughter is present     SUBJECTIVE:                                                    Wendy Rocha is a 77 year old female who presents to clinic today for the following health issues:    Patient and daughter are concerned that her memory is fading. She lives alone.     Daughter: Wendy has trouble figuring out what days or what time it is.  The daughter calls her every day to remind her to take the meds. It worked for a while. But 2 weeks ago she was confused and  "didn't know if she took the meds or not. She has a 30 day pill compartment. The daughter noticed the pills were not right for the few days. She couldn't tell if she missed the pills or she took more. Now the daughter waits on the phone while she takes the pills.     Usually Wendy has energy, talks fast, makes jokes. TOday she looks sedated.     Discussed advance directives      Review of Systems:                                                      ROS: negative for fever, chills, cough, wheezes, chest pain, shortness of breath, vomiting, abdominal pain, leg swelling     A 10-point review of systems was obtained.  Those pertinent are above and in the in the Subjective section.  The rest of the systems are negative.           OBJECTIVE:             Physical exam:  Blood pressure 128/74, pulse 68, temperature 98  F (36.7  C), temperature source Oral, height 5' 3\" (1.6 m), weight 156 lb 12.8 oz (71.1 kg), SpO2 96 %, not currently breastfeeding.   NAD, appears comfortable  Skin: no rashes   Neck: supple, no JVD, No thyroidmegaly. Lymph nodes nonpalpable cervical and supraclavicular.  Chest: clear to auscultation bilaterally, good respiratory effort  Heart: S1 S2, RRR, 2/6 systolic murmur  Abdomen: soft, not tender,  Extremities: no edema,  Neurologic: A, Ox3, no focal signs appreciated. Appears sedated     PMHx: reviewed  Past Medical History:   Diagnosis Date     Abnormal ECG      ARF (acute renal failure) (H) Aug 2010    Lisinopril was stopped at that time     Carotid stenosis      Carotid stenosis, bilateral      Carotid stenosis, bilateral     Vs Surgeon: dr Kush Mcmillan, Phone: [phone] fax: [phone]     CKD (chronic kidney disease) stage 3, GFR 30-59 ml/min      GERD (gastroesophageal reflux disease)      HTN, goal below 140/90      Hyperlipidemia LDL goal <100      Lung nodule may 2013    needs f/u may 2014     Muscle aches      Perforated duodenal ulcer (H) Aug 2010     Proteinuria      PVD " (peripheral vascular disease) (H)      Renal artery stenosis (H) US April 2011    Right side     Vitamin D deficiency disease       PSHx: reviewed  Past Surgical History:   Procedure Laterality Date     ARTHROPLASTY KNEE      left     EYE SURGERY      cataracts     LAPAROSCOPIC TUBAL LIGATION          Meds: reviewed  Current Outpatient Prescriptions   Medication Sig Dispense Refill     acetaminophen (TYLENOL) 325 MG tablet Take 2 tablets (650 mg) by mouth every 6 hours as needed for mild pain 100 tablet 0     clopidogrel (PLAVIX) 75 MG tablet Take 1 tablet (75 mg) by mouth daily 90 tablet 0     metoprolol (LOPRESSOR) 50 MG tablet TAKE ONE TABLET (50 MG) BY MOUTH TWICE DAILY 180 tablet 0     amLODIPine (NORVASC) 10 MG tablet Take 1 tablet (10 mg) by mouth daily 90 tablet 0     risperiDONE (RISPERDAL) 0.5 MG tablet Take 1 tablet (0.5 mg) by mouth 2 times daily 60 tablet 1     simvastatin (ZOCOR) 40 MG tablet Take 1 tablet (40 mg) by mouth At Bedtime 90 tablet 0     traZODone (DESYREL) 50 MG tablet Take 1 tablet (50 mg) by mouth nightly as needed for sleep 30 tablet 1     pantoprazole (PROTONIX) 40 MG EC tablet Take 1 tablet (40 mg) by mouth daily Take 30-60 minutes before a meal. 90 tablet 3     ASPIRIN NOT PRESCRIBED (INTENTIONAL) Antiplatelet medication not prescribed intentionally due to Plavix 0 each 0     ACE/ARB NOT PRESCRIBED, INTENTIONAL, ACE & ARB not prescribed due to Worsening renal function on ACE/ARB therapy       doxazosin (CARDURA) 4 MG tablet Take 1 tablet (4 mg) by mouth At Bedtime 90 tablet 1     cholecalciferol (VITAMIN D) 1000 UNIT tablet Take 6,000 Units by mouth daily        ASPIRIN NOT PRESCRIBED, INTENTIONAL, by Other route continuous prn Reported on 3/21/2017  0     [DISCONTINUED] simvastatin (ZOCOR) 40 MG tablet TAKE ONE TABLET BY MOUTH AT BEDTIME 90 tablet 0       Soc Hx: reviewed  Fam Hx: reviewed          Natalee Greenberg MD  Internal Medicine

## 2017-06-22 NOTE — PATIENT INSTRUCTIONS
Plan:  1. Decrease Risperdal to 0.25 mg in am and 0.5 mg in pm   2. Make an appointment with Leslie [facility] - [phone]   3. Make an appointment with memory clinic    [facility] [phone]     4. Continue the other meds, same doses for now.  5. Brain mri - To schedule this test you may call Scheduling center at [phone]

## 2017-06-23 ENCOUNTER — TELEPHONE (OUTPATIENT)
Dept: INTERNAL MEDICINE | Facility: CLINIC | Age: 77
End: 2017-06-23

## 2017-06-23 RX ORDER — RISPERIDONE 0.25 MG/1
TABLET ORAL
Qty: 30 TABLET | Refills: 1 | Status: SHIPPED | OUTPATIENT
Start: 2017-06-23 | End: 2017-08-08 | Stop reason: SINTOL

## 2017-06-23 RX ORDER — RISPERIDONE 0.5 MG/1
TABLET ORAL
Qty: 30 TABLET | Refills: 1 | Status: SHIPPED | OUTPATIENT
Start: 2017-06-23 | End: 2017-08-24 | Stop reason: DRUGHIGH

## 2017-06-23 ASSESSMENT — PATIENT HEALTH QUESTIONNAIRE - PHQ9: SUM OF ALL RESPONSES TO PHQ QUESTIONS 1-9: 2

## 2017-06-23 ASSESSMENT — ANXIETY QUESTIONNAIRES: GAD7 TOTAL SCORE: 1

## 2017-06-23 NOTE — TELEPHONE ENCOUNTER
"[facility] mg tab order.    \"Qty limits 60 per fill only.  Please send new rx for 0.5 mg with instructions to take 1/2 AM, 1 PM\".  SUE Mallory R.N.    "

## 2017-07-05 ENCOUNTER — TELEPHONE (OUTPATIENT)
Dept: INTERNAL MEDICINE | Facility: CLINIC | Age: 77
End: 2017-07-05

## 2017-07-05 DIAGNOSIS — I70.1 RENAL ARTERY STENOSIS (H): Chronic | ICD-10-CM

## 2017-07-05 DIAGNOSIS — Z86.39 HISTORY OF ELEVATED GLUCOSE: Primary | ICD-10-CM

## 2017-07-05 DIAGNOSIS — I77.810 AORTIC ROOT DILATATION (H): ICD-10-CM

## 2017-07-05 DIAGNOSIS — E78.5 HYPERLIPIDEMIA LDL GOAL <100: ICD-10-CM

## 2017-07-05 DIAGNOSIS — I73.9 PVD (PERIPHERAL VASCULAR DISEASE) (H): ICD-10-CM

## 2017-07-05 DIAGNOSIS — I10 ESSENTIAL HYPERTENSION WITH GOAL BLOOD PRESSURE LESS THAN 140/90: Chronic | ICD-10-CM

## 2017-07-05 RX ORDER — DOXAZOSIN 4 MG/1
4 TABLET ORAL AT BEDTIME
Qty: 90 TABLET | Refills: 0 | Status: SHIPPED | OUTPATIENT
Start: 2017-07-05 | End: 2017-10-10

## 2017-07-05 RX ORDER — SIMVASTATIN 40 MG
40 TABLET ORAL AT BEDTIME
Qty: 90 TABLET | Refills: 0 | Status: SHIPPED | OUTPATIENT
Start: 2017-07-05 | End: 2017-10-10

## 2017-07-05 NOTE — TELEPHONE ENCOUNTER
Reason for Call:  Medication or medication refill:    Do you use a [facility]?  Name of the pharmacy and phone number for the current request:  [facility]     Name of the medication requested: Simvastatin and doxazosin    Other request: none    Can we leave a detailed message on this number? YES    Phone number patient can be reached at: Other phone number: [phone]    Best Time: Has meds until Friday     Call taken on 7/5/2017 at 8:39 AM by Ana Lagunas

## 2017-07-05 NOTE — TELEPHONE ENCOUNTER
Simvastatin 40 mg       Last Written Prescription Date: 4/11/17  Last Fill Quantity: 90, # refills: 0    Last Office Visit with [facility] or [facility] prescribing provider:  6/22/17   Future Office Visit:    Next 5 appointments (look out 90 days)     Aug 08, 2017  9:45 AM CDT   [facility] with Leslie [facility]   [facility]    [address]  [facility] [address]   [phone]                  Cholesterol   Date Value Ref Range Status   09/21/2016 197 <200 mg/dL Final     HDL Cholesterol   Date Value Ref Range Status   09/21/2016 66 >49 mg/dL Final     LDL Cholesterol Calculated   Date Value Ref Range Status   09/21/2016 110 (H) <100 mg/dL Final     Comment:     Above desirable:  100-129 mg/dl   Borderline High:  130-159 mg/dL   High:             160-189 mg/dL   Very high:       >189 mg/dl       Triglycerides   Date Value Ref Range Status   09/21/2016 104 <150 mg/dL Final     Cholesterol/HDL Ratio   Date Value Ref Range Status   07/15/2014 2.8 0.0 - 5.0 Final     ALT   Date Value Ref Range Status   03/21/2017 15 0 - 50 U/L Final      Doxazosin 4 mg      Last Written Prescription Date: 12/5/16  Last Fill Quantity: 90, # refills: 1    Last Office Visit with [facility] or [facility] prescribing provider:  6/22/17   Future Office Visit:    Next 5 appointments (look out 90 days)     Aug 08, 2017  9:45 AM CDT   [facility] Visit with Leslie De [facility]re NP   [facility]    [address]  [facility] [address] [phone]                    BP Readings from Last 3 Encounters:   06/22/17 128/74   03/21/17 136/72   12/05/16 156/87       Medication is being filled for 1 time refill only due to:  Patient needs labs (lipids) around the last week of Sept. 2017.  SUE Mallory R.N.    Prescription approved per [facility] Refill Protocol. SUE Mallory R.N.

## 2017-07-06 ENCOUNTER — TELEPHONE (OUTPATIENT)
Dept: INTERNAL MEDICINE | Facility: CLINIC | Age: 77
End: 2017-07-06

## 2017-07-12 ENCOUNTER — TELEPHONE (OUTPATIENT)
Dept: INTERNAL MEDICINE | Facility: CLINIC | Age: 77
End: 2017-07-12

## 2017-07-12 ENCOUNTER — HOSPITAL ENCOUNTER (OUTPATIENT)
Dept: MRI IMAGING | Facility: CLINIC | Age: 77
Discharge: HOME | End: 2017-07-12
Attending: INTERNAL MEDICINE | Admitting: INTERNAL MEDICINE
Payer: MEDICARE

## 2017-07-12 DIAGNOSIS — R41.3 MEMORY LOSS: ICD-10-CM

## 2017-07-12 DIAGNOSIS — R41.3 MEMORY CHANGES: Primary | ICD-10-CM

## 2017-07-12 PROCEDURE — 70551 MRI BRAIN STEM W/O DYE: CPT

## 2017-07-12 NOTE — TELEPHONE ENCOUNTER
Tried calling daughter and unable to leave a message because the voicemail has not yet been set up.  Called patient's primary number and I got a busy signal.  Yancy Kline, VIN

## 2017-07-12 NOTE — TELEPHONE ENCOUNTER
Patient is to have MRI brain today and would like to get another referral to neurology as she was referred over a year ago for memory issues and pt never went.  At that time patient did not feel she needed to see neurology.     Call daughter (Eva Rocha [phone]) we have signed consent to communicate but the number listed is not longer her number.  Will come in to have mother sign new one.  SUE Mallory R.N.

## 2017-07-13 NOTE — TELEPHONE ENCOUNTER
NA on daughter's cell number and mailbox has not yet been set up to take messages.  SUE Mallory R.N.

## 2017-07-17 NOTE — TELEPHONE ENCOUNTER
Daughter Eva advised and given contact information for \[facility]"" [facility].  She will call back with appt information so that we can send pertinent records.  [facility] fax number is [phone].  SUE Mallory R.N.

## 2017-07-24 ENCOUNTER — TELEPHONE (OUTPATIENT)
Dept: FAMILY MEDICINE | Facility: CLINIC | Age: 77
End: 2017-07-24

## 2017-07-24 ENCOUNTER — OFFICE VISIT (OUTPATIENT)
Dept: NURSING | Facility: CLINIC | Age: 77
End: 2017-07-24

## 2017-07-24 DIAGNOSIS — Z53.9 DIAGNOSIS FOR ++++ WALK IN CLINIC ++++: Primary | ICD-10-CM

## 2017-07-24 NOTE — TELEPHONE ENCOUNTER
Patient has scheduled an appt to see Dr. Rosales in [place] office.  Fax pertinent records to them at [phone].  What records do you want faxed?  SUE Mallory R.N.

## 2017-07-24 NOTE — TELEPHONE ENCOUNTER
Pt's ex-, Prosper (see consent to communicate) walks-in. Requesting Neuro Referral and information for referral appt in 2 weeks. Gave him Neuro Referral. Relay information will shortly be faxed to Neuro Clinic for referral.    Faxed information Dr. Greenberg printed off to provided fax number.

## 2017-07-24 NOTE — TELEPHONE ENCOUNTER
Reason for Call:  Other mail    Detailed comments: the pt will be seeing Dr Plascencia for Memory care in Nov  And the daughter-in law Yancy Rocha needs a TENZIN mailed to her so she can sign it   And mail back. And please add Health Care Directive.    Mail to :  Evabeatrice Rocha  [address] [facility], [address]      Phone Number Patient can be reached at: [phone] Yancy    Best Time: any    Can we leave a detailed message on this number? YES    Call taken on 7/24/2017 at 3:06 PM by Sarahi Salas

## 2017-07-24 NOTE — MR AVS SNAPSHOT
After Visit Summary   7/24/2017    Wendy Rocha    MRN: [id number]           Patient Information     Date Of Birth          1940        Visit Information        Provider Department      7/24/2017 3:00 PM Rn, Ri [facility]        Today's Diagnoses     DIAGNOSIS FOR ++++ WALK IN CLINIC ++++    -  1       Follow-ups after your visit        Your next 10 appointments already scheduled     Aug 08, 2017  9:45 AM CDT   Return Visit with Leslie De La Torre NP   [facility] ([facility])    [address]  [address]  [facility] [id number] [phone]            Nov 01, 2017  1:45 PM CDT   Office Visit with Cesilia Plascencia,    [facility]    [address] [place] [id number] [phone]           Bring a current list of meds and any records pertaining to this visit.  For Physicals, please bring immunization records and any forms needing to be filled out.  Please arrive 10 minutes early to complete paperwork.              Who to contact     If you have questions or need follow up information about today's clinic visit or your schedule please contact [facility] directly at [phone].  Normal or non-critical lab and imaging results will be communicated to you by [employer], letter or phone within 4 business days after the clinic has received the results. If you do not hear from us within 7 days, please contact the clinic through [employer] or phone. If you have a critical or abnormal lab result, we will notify you by phone as soon as possible.  Submit refill requests through [employer] or call your pharmacy and they will forward the refill request to us. Please allow 3 business days for your refill to be completed.          Additional Information About Your Visit        [employer] Information     [employer] lets you send messages to your doctor, view your test results, renew your  "prescriptions, schedule appointments and more. To sign up, go to [email or web address] . Click on \"Log in\" on the left side of the screen, which will take you to the Welcome page. Then click on \"Sign up Now\" on the right side of the page.     You will be asked to enter the access code listed below, as well as some personal information. Please follow the directions to create your username and password.     Your access code is: [email or web address]  Expires: 2017  1:59 PM     Your access code will  in 90 days. If you need help or a new code, please call your [facility] clinic or [phone].        Care EveryWhere ID     This is your Care EveryWhere ID. This could be used by other organizations to access your [facility] medical records  [id number]         Blood Pressure from Last 3 Encounters:   17 128/74   17 136/72   16 156/87    Weight from Last 3 Encounters:   17 156 lb 12.8 oz (71.1 kg)   17 164 lb (74.4 kg)   16 167 lb 14.4 oz (76.2 kg)              Today, you had the following     No orders found for display       Primary Care Provider Office Phone # Fax #    Natalee Owens [phone]       [facility] [address] [place] [address]        Equal Access to Services     FRANCIS SALAZAR : Hadii lara ku hadasho Soomaali, waaxda luqadaha, qaybta kaalmada adeegyada, flash nagel . So [facility] [phone].    ATENCIÓN: Si habla español, tiene a valencia disposición servicios gratuitos de asistencia lingüística. Llzulay al [phone].    We comply with applicable federal civil rights laws and [place] laws. We do not discriminate on the basis of race, color, national origin, age, disability sex, sexual orientation or gender identity.            Thank you!     Thank you for choosing [facility]  for your care. Our goal is always to provide you with excellent care. Hearing back from our patients " is one way we can continue to improve our services. Please take a few minutes to complete the written survey that you may receive in the mail after your visit with us. Thank you!             Your Updated Medication List - Protect others around you: Learn how to safely use, store and throw away your medicines at www.disposemymeds.org.          This list is accurate as of: 7/24/17  3:14 PM.  Always use your most recent med list.                   Brand Name Dispense Instructions for use Diagnosis    ACE/ARB NOT PRESCRIBED (INTENTIONAL)      ACE & ARB not prescribed due to Worsening renal function on ACE/ARB therapy    Renal artery stenosis (H), Essential hypertension with goal blood pressure less than 140/90       amLODIPine 10 MG tablet    NORVASC    90 tablet    Take 1 tablet (10 mg) by mouth daily    Essential hypertension with goal blood pressure less than 140/90       ASPIRIN NOT PRESCRIBED    INTENTIONAL     by Other route continuous prn Reported on 3/21/2017    HTN (hypertension), Hyperlipidemia LDL goal < 130       ASPIRIN NOT PRESCRIBED    INTENTIONAL    0 each    Antiplatelet medication not prescribed intentionally due to Plavix    Essential hypertension with goal blood pressure less than 140/90, PVD (peripheral vascular disease) (H), Aortic root dilatation (H), Renal artery stenosis (H)       cholecalciferol 1000 UNIT tablet    vitamin D     Take 6,000 Units by mouth daily        clopidogrel 75 MG tablet    PLAVIX    90 tablet    Take 1 tablet (75 mg) by mouth daily    PVD (peripheral vascular disease) (H), Aortic root dilatation (H), Renal artery stenosis (H)       doxazosin 4 MG tablet    CARDURA    90 tablet    Take 1 tablet (4 mg) by mouth At Bedtime    Essential hypertension with goal blood pressure less than 140/90, PVD (peripheral vascular disease) (H), Aortic root dilatation (H), Renal artery stenosis (H)       metoprolol 50 MG tablet    LOPRESSOR    180 tablet    TAKE ONE TABLET (50 MG) BY MOUTH  TWICE DAILY    Essential hypertension with goal blood pressure less than 140/90, PVD (peripheral vascular disease) (H), Aortic root dilatation (H), Renal artery stenosis (H)       order for DME     1 Device    Machine to dispense the medication    Severe episode of recurrent major depressive disorder, with psychotic features (H), Memory loss       pantoprazole 40 MG EC tablet    PROTONIX    90 tablet    Take 1 tablet (40 mg) by mouth daily Take 30-60 minutes before a meal.    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)       simvastatin 40 MG tablet    ZOCOR    90 tablet    Take 1 tablet (40 mg) by mouth At Bedtime    Hyperlipidemia LDL goal <100       traZODone 50 MG tablet    DESYREL    30 tablet    Take 1 tablet (50 mg) by mouth nightly as needed for sleep    Persistent insomnia       TYLENOL 325 MG tablet   Generic drug:  acetaminophen     100 tablet    Take 2 tablets (650 mg) by mouth every 6 hours as needed for mild pain    Severe episode of recurrent major depressive disorder, with psychotic features (H), Memory loss       * Notice:  This list has 2 medication(s) that are the same as other medications prescribed for you. Read the directions carefully, and ask your doctor or other care provider to review them with you.

## 2017-07-25 NOTE — TELEPHONE ENCOUNTER
I mailed a TENZIN and a Health Care Directive to Eva Rocha at the address given.   Leslie Shirley MA

## 2017-07-26 ENCOUNTER — CARE COORDINATION (OUTPATIENT)
Dept: CARE COORDINATION | Facility: CLINIC | Age: 77
End: 2017-07-26

## 2017-07-26 NOTE — PROGRESS NOTES
Clinic Care Coordination Contact  [facility]/Voicemail    Referral Source: Care Team  Clinical Data: Care Coordinator Outreach  Outreach attempted x 1.  SW colleague had updated SW that pt's daughter-in-law (Yancy) wants to be part of pt's health information. TENZIN and HCD was mailed to pt's daughter (Eva). SW attempted to reach pt and Eva to discuss. Will await the information to be sent and received in the mail.    Plan: Care Coordinator will try to reach patient again in 3-5 business days.    ANA Bhandari, [facility]  Social Work - Care Coordinator  [facility] [place], [place], and [place]  Phone: [phone]

## 2017-08-02 ENCOUNTER — CARE COORDINATION (OUTPATIENT)
Dept: CARE COORDINATION | Facility: CLINIC | Age: 77
End: 2017-08-02

## 2017-08-02 NOTE — PROGRESS NOTES
Clinic Care Coordination Contact  [facility]/Voicemail    Referral Source: Care Team  Clinical Data: Care Coordinator Outreach  Outreach attempted x 2.  SW attempted to reach pt's daughter (Eva) to determine if she got the information that was sent for HCD and TENZIN for pt's daughter-in-law.    Plan:  Care Coordinator will chart review in 3-5 business days.    ANA Bhandari, [facility]  Social Work - Care Coordinator  [facility] [place], [place], and [place]  Phone: [phone]

## 2017-08-08 ENCOUNTER — OFFICE VISIT (OUTPATIENT)
Dept: PSYCHIATRY | Facility: CLINIC | Age: 77
End: 2017-08-08
Payer: COMMERCIAL

## 2017-08-08 VITALS
WEIGHT: 154 LBS | SYSTOLIC BLOOD PRESSURE: 90 MMHG | BODY MASS INDEX: 27.28 KG/M2 | DIASTOLIC BLOOD PRESSURE: 60 MMHG | OXYGEN SATURATION: 95 % | HEART RATE: 69 BPM

## 2017-08-08 DIAGNOSIS — F09 COGNITIVE DISORDER: Primary | ICD-10-CM

## 2017-08-08 PROCEDURE — 99214 OFFICE O/P EST MOD 30 MIN: CPT | Performed by: NURSE PRACTITIONER

## 2017-08-08 ASSESSMENT — PATIENT HEALTH QUESTIONNAIRE - PHQ9
SUM OF ALL RESPONSES TO PHQ QUESTIONS 1-9: 12
5. POOR APPETITE OR OVEREATING: NOT AT ALL

## 2017-08-08 ASSESSMENT — ANXIETY QUESTIONNAIRES
5. BEING SO RESTLESS THAT IT IS HARD TO SIT STILL: NOT AT ALL
3. WORRYING TOO MUCH ABOUT DIFFERENT THINGS: NOT AT ALL
6. BECOMING EASILY ANNOYED OR IRRITABLE: NOT AT ALL
2. NOT BEING ABLE TO STOP OR CONTROL WORRYING: SEVERAL DAYS
1. FEELING NERVOUS, ANXIOUS, OR ON EDGE: NOT AT ALL
7. FEELING AFRAID AS IF SOMETHING AWFUL MIGHT HAPPEN: NOT AT ALL
IF YOU CHECKED OFF ANY PROBLEMS ON THIS QUESTIONNAIRE, HOW DIFFICULT HAVE THESE PROBLEMS MADE IT FOR YOU TO DO YOUR WORK, TAKE CARE OF THINGS AT HOME, OR GET ALONG WITH OTHER PEOPLE: NOT DIFFICULT AT ALL
GAD7 TOTAL SCORE: 1

## 2017-08-08 NOTE — Clinical Note
Patient will be seeing neurology next week for memory concerns which seem to be her main concern. I reduced Risperdal (risperidone) more. More details in my note. Leslie

## 2017-08-08 NOTE — PROGRESS NOTES
"    Outpatient Psychiatric Progress Note    Name: Wendy Rocha   : 1940                    Primary Care Provider: Natalee Owens MD - last visit 2017  Therapist: None    PHQ-9 scores:  PHQ-9 SCORE 10/25/2016 2017 2017   Total Score 7 2 12       ALEX-7 scores:  ALEX-7 SCORE 2016   Total Score 1 1 1       Patient Identification:  Patient is a 77 year old year old,   White American female  who presents for return visit with me.  Patient is currently retired. Patient attended the session alone. Patient prefers to be called: \"Wendy\"    Interim History:    I last saw Wendy Rocha for outpatient psychiatry Consultation on 2016.     During that appointment, we Continue Risperdal (risperidone) 0.5 mg by mouth in AM and 0.5 mg by mouth in PM.    Continue Risperdal (risperidone) 0.25-0.5 mg up to 2 times per day as needed for delusional thinking.    Current medications include:   Current Outpatient Prescriptions   Medication Sig     simvastatin (ZOCOR) 40 MG tablet Take 1 tablet (40 mg) by mouth At Bedtime     doxazosin (CARDURA) 4 MG tablet Take 1 tablet (4 mg) by mouth At Bedtime     risperiDONE (RISPERDAL) 0.25 MG tablet 1 tab daily in am     risperiDONE (RISPERDAL) 0.5 MG tablet 1 tab daily in the evening     acetaminophen (TYLENOL) 325 MG tablet Take 2 tablets (650 mg) by mouth every 6 hours as needed for mild pain     order for DME Machine to dispense the medication     clopidogrel (PLAVIX) 75 MG tablet Take 1 tablet (75 mg) by mouth daily     metoprolol (LOPRESSOR) 50 MG tablet TAKE ONE TABLET (50 MG) BY MOUTH TWICE DAILY     amLODIPine (NORVASC) 10 MG tablet Take 1 tablet (10 mg) by mouth daily     traZODone (DESYREL) 50 MG tablet Take 1 tablet (50 mg) by mouth nightly as needed for sleep     pantoprazole (PROTONIX) 40 MG EC tablet Take 1 tablet (40 mg) by mouth daily Take 30-60 minutes before a meal.     ASPIRIN NOT PRESCRIBED (INTENTIONAL) " "Antiplatelet medication not prescribed intentionally due to Plavix     ACE/ARB NOT PRESCRIBED, INTENTIONAL, ACE & ARB not prescribed due to Worsening renal function on ACE/ARB therapy     cholecalciferol (VITAMIN D) 1000 UNIT tablet Take 6,000 Units by mouth daily      ASPIRIN NOT PRESCRIBED, INTENTIONAL, by Other route continuous prn Reported on 3/21/2017     No current facility-administered medications for this visit.        The [place] Prescription Monitoring Program has been reviewed and there are no concerns about diversionary activity for controlled substances at this time.      I was able to review most recent Primary Care Provider, specialty provider, and therapy visit notes that I have access to.     Patient met with her PCP recently due to memory loss and fatigue. PCP reduced her Risperdal (risperidone).     Wendy Rocha reports mood has been: \"I don't know why I am here... I have trouble with my memory\" reports mood has been \"good\"  Anxiety has been: \"no worries\"  Sleep has been: \"do not sleep well\" denies nightmares. Naps during the day. Patient reports she stopped taking the Desyrel/Olepto (trazodone) even though it helped  PHQ9 and GAD7 scores were reviewed today.   Medication side effects: Denies- feels Risperdal (risperidone) is \"working fine for me\".  Current stressors include: Financial Difficulties, Symptoms and Relationship Difficulties  Coping mechanisms and supports include: Family     Previous medication trials include but not limited to:  Risperdal (risperidone)  Seroquel (quetiapine)   Desyrel/Olepto (trazodone)     Past Medical History:   Diagnosis Date     Abnormal ECG      ARF (acute renal failure) (H) Aug 2010    Lisinopril was stopped at that time     Carotid stenosis      Carotid stenosis, bilateral      Carotid stenosis, bilateral     Vs Surgeon: dr Kush Mcmillan, Phone: [phone] fax: [phone]     CKD (chronic kidney disease) stage 3, GFR 30-59 ml/min      GERD " (gastroesophageal reflux disease)      HTN, goal below 140/90      Hyperlipidemia LDL goal <100      Lung nodule may 2013    needs f/u may 2014     Muscle aches      Perforated duodenal ulcer (H) Aug 2010     Proteinuria      PVD (peripheral vascular disease) (H)      Renal artery stenosis (H) [place] April 2011    Right side     Vitamin D deficiency disease       has a past medical history of Abnormal ECG; ARF (acute renal failure) (H) (Aug 2010); Carotid stenosis; Carotid stenosis, bilateral; Carotid stenosis, bilateral; CKD (chronic kidney disease) stage 3, GFR 30-59 ml/min; GERD (gastroesophageal reflux disease); HTN, goal below 140/90; Hyperlipidemia LDL goal <100; Lung nodule (may 2013); Muscle aches; Perforated duodenal ulcer (H) (Aug 2010); Proteinuria; PVD (peripheral vascular disease) (H); Renal artery stenosis (H) ( April 2011); and Vitamin D deficiency disease.    Social History:  Current Living situation: [place], [place] alone. Feels safe at home.  Current use of drugs or alcohol: Denies   Tobacco use: No  Caffeine: No  Recovery Programming Involvement: Not Applicable    Vital Signs:   BP 90/60 (BP Location: Right arm, Patient Position: Chair, Cuff Size: Adult Regular)  Pulse 69  Wt 154 lb (69.9 kg)  SpO2 95%  BMI 27.28 kg/m2    Labs:  Most recent laboratory results reviewed and the pertinent results include:   Office Visit on 03/21/2017   Component Date Value Ref Range Status     Sodium 03/21/2017 142  133 - 144 mmol/L Final     Potassium 03/21/2017 4.4  3.4 - 5.3 mmol/L Final     Chloride 03/21/2017 107  94 - 109 mmol/L Final     Carbon Dioxide 03/21/2017 26  20 - 32 mmol/L Final     Anion Gap 03/21/2017 9  3 - 14 mmol/L Final     Glucose 03/21/2017 117* 70 - 99 mg/dL Final     Urea Nitrogen 03/21/2017 31* 7 - 30 mg/dL Final     Creatinine 03/21/2017 1.52* 0.52 - 1.04 mg/dL Final     GFR Estimate 03/21/2017 33* >60 mL/min/1.7m2 Final    Non  GFR Calc     GFR Estimate If Black  "03/21/2017 40* >60 mL/min/1.7m2 Final    African American GFR Calc     Calcium 03/21/2017 9.5  8.5 - 10.1 mg/dL Final     Bilirubin Total 03/21/2017 0.6  0.2 - 1.3 mg/dL Final     Albumin 03/21/2017 4.0  3.4 - 5.0 g/dL Final     Protein Total 03/21/2017 7.8  6.8 - 8.8 g/dL Final     Alkaline Phosphatase 03/21/2017 54  40 - 150 U/L Final     ALT 03/21/2017 15  0 - 50 U/L Final     AST 03/21/2017 17  0 - 45 U/L Final          Review of Systems:  10 systems (general, cardiovascular, respiratory, eyes, ENT, endocrine, GI, , M/S, neurological) were reviewed. Most pertinent finding(s) is/are: dry mouth, fatigue, hard of hearing. The remaining systems are all unremarkable.    Mental Status Examination:  Appearance:  adequately groomed, normal weight, hard of hearing, wears makeup, presents to appointment alone  Attitude:  somewhat cooperative   Eye Contact:  fair and wears glasses  Gait and Station: Normal, No assistive Devices used and No dizziness or falls  Psychomotor Behavior:  no evidence of tardive dyskinesia, dystonia, or tics  Oriented to:  time, person, and place  Attention Span and Concentration:  Fair. requires redirection  Speech:  rambling, regular rate and regular rhythm  Mood:  \"good\"   Affect:  intensity is blunted  Associations:  no loose associations  Thought Process:  logical, clear, concrete  Thought Content:  no evidence of suicidal ideation or homicidal ideation, no evidence of psychotic thought, no auditory hallucinations present, no visual hallucinations present and Appropriate to Interview  Recent and Remote Memory:  Fair to poor. Not formally assessed. Low MOCA score of 22/30 during hospital stay last Fall.   Fund of Knowledge: low-normal  Insight:  limited  Judgment:  adequate for safety  Impulse Control:  adequate for safety     Suicide Risk Assessment:  Today Wendy Rocha reports memory concerns. In addition, there are notable risk factors for self-harm, including age and single status and " history of psychosis. However, risk is mitigated by commitment to family, sobriety, absence of past attempts, ability to volunteer a safety plan, future oriented, no access to firearms or weapons, denies suicidal intent or plan, no family history of suicide and denies homicidal ideation, intent, or plan. Therefore, based on all available evidence including the factors cited above, Wendy Rocha does not appear to be at imminent risk for self-harm, does not meet criteria for a 72-hr hold, and therefore remains appropriate for ongoing outpatient level of care.  A thorough assessment of risk factors related to suicide and self-harm have been reviewed and are noted above. Local community safety resources printed and reviewed for patient to use if needed. There was no deceit detected, and the patient presented in a manner that was believable.      DSM5  Diagnosis:  296.34 Major Depressive Disorder, Recurrent Episode, With psychotic features With anxious distress                        Rule out 300.02 (F41.1) Generalized Anxiety Disorder  Neurocognitive Disorders: Cognitive Disorder unspecified.                         Rule out Dementia.                         Rule out Delusional Disorder.    Medical comorbidities include:   Patient Active Problem List    Diagnosis Date Noted     CKD (chronic kidney disease) stage 3, GFR 30-59 ml/min      Priority: High     HTN, goal < 140/90 (H) 12/20/2016     Priority: Medium     Hallucinations 09/28/2016     Priority: Medium     Hyperparathyroidism (H) 09/22/2016     Priority: Medium     DEPRESSION, Severe, recurrent, with psychotic features (H) 07/21/2016     Priority: Medium     Carotid disease, bilateral (H)^^ 06/29/2016     Priority: Medium     Hyperlipidemia LDL goal <100      Priority: Medium     GERD (gastroesophageal reflux disease)      Priority: Medium     Vitamin D deficiency disease      Priority: Medium     Aspiration pneumonia (H) 05/27/2013     Priority: Medium      Fever 05/21/2013     Priority: Medium     Vitamin D deficiency      Priority: Medium     (Problem list name updated by automated process. Provider to review and confirm.)       Abnormal ECG      Priority: Medium     PVD (peripheral vascular disease) (H)      Priority: Medium     Muscle aches      Priority: Medium     Problem list name updated by automated process. Provider to review and confirm       Proteinuria      Priority: Medium     Renal artery stenosis (H)^^      Priority: Medium     Right side       Anxiety 07/08/2011     Priority: Low     Advanced directives, counseling/discussion 03/24/2011     Priority: Low     Discussed Advance Directive planning with patient; however, patient declined at this time.          Perforated duodenal ulcer (H) 08/01/2010     Priority: Low       Psychosocial & Contextual Factors:  Parent/Child Relationship Difficulties, Financial Difficulties, Relationship Difficulties, Phase of Life Difficulties and Medical Comorbidites       Assessment:  Wendy Rocha reports stable mood and reports fatigue.   Medication side effects and alternatives were reviewed.   Health promotion activities recommended and reviewed today.     Patient reports that she will be seeing a neurologist next week for memory concerns.   Recent MRI from Primary Care Provider.   Will reduce Risperdal (risperidone).   Poor historian today again.     Risks and benefits of medication reviewed today. The Black Box Warning for use of antipsychotics for older patients was discussed. The use of antipsychotics in elderly patients with dementia may lead to increased risk of cerebrovascular adverse events such as heart attack or stroke.    Treatment Plan:    Continue Risperdal (risperidone) 0.5 mg at bedtime.     Discontinue Risperdal (risperidone) in the AM.     Continue Desyrel/Olepto (trazodone) 50 mg by mouth daily at bedtime for sleep.     Continue all other medications as reviewed per electronic medical record today.      All questions addressed. Education and counseling completed regarding risks and benefits of medications and psychotherapy options.    Safety plan was reviewed. To the Emergency Department as needed or call after hours crisis line at [phone] or [phone].     To schedule individual or family therapy, call [facility] at [phone].     Schedule an appointment with me as needed.     Follow up with primary care provider as planned or for acute medical concerns.    My Practice Policy was reviewed and signed: YES     [employer] may be used to communicate with your provider, but this is not intended to be used for emergencies.    Administrative Billing:   Time spent with patient was 30 minutes and greater than 50% of time or 20 minutes was spent in counseling and coordination of care.    Patient Status:  The patient is being returned to the referring provider for ongoing care and medication prescribing.  The patient can be referred back to this service for further consultation as needed.    Signed:   Leslie De La Torre, PhD, APRN, CNP   Psychiatry

## 2017-08-08 NOTE — PATIENT INSTRUCTIONS
Treatment Plan:    Continue Risperdal (risperidone) 0.5 mg at bedtime.     Discontinue Risperdal (risperidone) in the AM.     Continue Desyrel/Olepto (trazodone) 50 mg by mouth daily at bedtime for sleep.     Continue all other medications as reviewed per electronic medical record today.     All questions addressed. Education and counseling completed regarding risks and benefits of medications and psychotherapy options.    Safety plan was reviewed. To the Emergency Department as needed or call after hours crisis line at [phone] or [phone].     To schedule individual or family therapy, call [facility] at [phone].     Schedule an appointment with me as needed.     Follow up with primary care provider as planned or for acute medical concerns.    My Practice Policy was reviewed and signed: YES     MyChart may be used to communicate with your provider, but this is not intended to be used for emergencies.

## 2017-08-08 NOTE — NURSING NOTE
"Chief Complaint   Patient presents with     RECHECK     pt concerned with not remembering things       Initial BP 90/60 (BP Location: Right arm, Patient Position: Chair, Cuff Size: Adult Regular)  Pulse 69  Wt 154 lb (69.9 kg)  SpO2 95%  BMI 27.28 kg/m2 Estimated body mass index is 27.28 kg/(m^2) as calculated from the following:    Height as of 6/22/17: 5' 3\" (1.6 m).    Weight as of this encounter: 154 lb (69.9 kg).  Medication Reconciliation: complete    "

## 2017-08-08 NOTE — MR AVS SNAPSHOT
After Visit Summary   8/8/2017    Wendy Rocha    MRN: [id number]           Patient Information     Date Of Birth          1940        Visit Information        Provider Department      8/8/2017 9:45 AM Leslie De La Torre NP [facility]        Care Instructions    Treatment Plan:    Continue Risperdal (risperidone) 0.5 mg at bedtime.     Discontinue Risperdal (risperidone) in the AM.     Continue Desyrel/Olepto (trazodone) 50 mg by mouth daily at bedtime for sleep.     Continue all other medications as reviewed per electronic medical record today.     All questions addressed. Education and counseling completed regarding risks and benefits of medications and psychotherapy options.    Safety plan was reviewed. To the Emergency Department as needed or call after hours crisis line at [phone] or [phone].     To schedule individual or family therapy, call [facility] at [phone].     Schedule an appointment with me as needed.     Follow up with primary care provider as planned or for acute medical concerns.    My Practice Policy was reviewed and signed: YES     [employer] may be used to communicate with your provider, but this is not intended to be used for emergencies.          Follow-ups after your visit        Follow-up notes from your care team     Return if symptoms worsen or fail to improve.      Your next 10 appointments already scheduled     Nov 01, 2017  1:45 PM CDT   [facility]encia[facility]    [address] [id number] [phone]           Bring a current list of meds and any records pertaining to this visit. For Physicals, please bring immunization records and any forms needing to be filled out. Please arrive 10 minutes early to complete paperwork.              Who to contact     If you have questions or need follow up information about today's clinic visit or your  "schedule please contact [facility] directly at [phone].  Normal or non-critical lab and imaging results will be communicated to you by MyChart, letter or phone within 4 business days after the clinic has received the results. If you do not hear from us within 7 days, please contact the clinic through [employer] or phone. If you have a critical or abnormal lab result, we will notify you by phone as soon as possible.  Submit refill requests through Hello[employer] or call your pharmacy and they will forward the refill request to us. Please allow 3 business days for your refill to be completed.          Additional Information About Your Visit        [employer] Information     Hello[employer]let lets you send messages to your doctor, view your test results, renew your prescriptions, schedule appointments and more. To sign up, go to [email or web address] . Click on \"Log in\" on the left side of the screen, which will take you to the Welcome page. Then click on \"Sign up Now\" on the right side of the page.     You will be asked to enter the access code listed below, as well as some personal information. Please follow the directions to create your username and password.     Your access code is: [email or web address]  Expires: 2017  1:59 PM     Your access code will  in 90 days. If you need help or a new code, please call your [facility] clinic or [phone].        Care EveryWhere ID     This is your Care EveryWhere ID. This could be used by other organizations to access your [facility] medical records  [id number]        Your Vitals Were     Pulse Pulse Oximetry BMI (Body Mass Index)             69 95% 27.28 kg/m2          Blood Pressure from Last 3 Encounters:   17 90/60   17 128/74   17 136/72    Weight from Last 3 Encounters:   17 154 lb (69.9 kg)   17 156 lb 12.8 oz (71.1 kg)   17 164 lb (74.4 kg)              Today, you had the following     No orders found for display       "   Today's Medication Changes          These changes are accurate as of: 8/8/17 10:15 AM.  If you have any questions, ask your nurse or doctor.               These medicines have changed or have updated prescriptions.        Dose/Directions    risperiDONE 0.5 MG tablet   Commonly known as:  risperDAL   This may have changed:  Another medication with the same name was removed. Continue taking this medication, and follow the directions you see here.   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 So [facility] [phone].    ATENCIÓN: Si habla español, tiene a valencia disposición servicios gratuitos de asistencia lingüística. Llame al [phone].    We comply with applicable federal civil rights laws and [place] laws. We do not discriminate on the basis of race, color, national origin, age, disability sex, sexual orientation or gender identity.            Thank you!     Thank you for choosing [facility]  for your care. Our goal is always to provide you with excellent care. Hearing back from our patients is one way we can continue to improve our services. Please take a few minutes to complete the written survey that you may receive in the mail after your visit with us. Thank you!             Your Updated Medication List - Protect others around you: Learn how to safely use, store and throw away your medicines at www.disposemymeds.org.          This list is accurate as of: 8/8/17  10:15 AM.  Always use your most recent med list.                   Brand Name Dispense Instructions for use Diagnosis    ACE/ARB NOT PRESCRIBED (INTENTIONAL)      ACE & ARB not prescribed due to Worsening renal function on ACE/ARB therapy    Renal artery stenosis (H), Essential hypertension with goal blood pressure less than 140/90       amLODIPine 10 MG tablet    NORVASC    90 tablet    Take 1 tablet (10 mg) by mouth daily    Essential hypertension with goal blood pressure less than 140/90       ASPIRIN NOT PRESCRIBED    INTENTIONAL     by Other route continuous prn Reported on 3/21/2017    HTN (hypertension), Hyperlipidemia LDL goal < 130       ASPIRIN NOT PRESCRIBED    INTENTIONAL    0 each    Antiplatelet medication not prescribed intentionally due to Plavix    Essential hypertension with goal blood pressure less than 140/90, PVD (peripheral vascular disease) (H), Aortic root dilatation (H), Renal artery stenosis (H)       cholecalciferol 1000 UNIT tablet    vitamin D     Take 6,000 Units by mouth daily        clopidogrel 75 MG tablet    PLAVIX    90 tablet    Take 1 tablet (75 mg) by mouth daily    PVD (peripheral vascular disease) (H), Aortic root dilatation (H), Renal artery stenosis (H)       doxazosin 4 MG tablet    CARDURA    90 tablet    Take 1 tablet (4 mg) by mouth At Bedtime    Essential hypertension with goal blood pressure less than 140/90, PVD (peripheral vascular disease) (H), Aortic root dilatation (H), Renal artery stenosis (H)       metoprolol 50 MG tablet    LOPRESSOR    180 tablet    TAKE ONE TABLET (50 MG) BY MOUTH TWICE DAILY    Essential hypertension with goal blood pressure less than 140/90, PVD (peripheral vascular disease) (H), Aortic root dilatation (H), Renal artery stenosis (H)       order for DME     1 Device    Machine to dispense the medication    Severe episode of recurrent major depressive disorder, with psychotic features (H), Memory loss       pantoprazole 40 MG EC tablet     PROTONIX    90 tablet    Take 1 tablet (40 mg) by mouth daily Take 30-60 minutes before a meal.    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

## 2017-08-09 ENCOUNTER — CARE COORDINATION (OUTPATIENT)
Dept: CARE COORDINATION | Facility: CLINIC | Age: 77
End: 2017-08-09

## 2017-08-09 ASSESSMENT — ANXIETY QUESTIONNAIRES: GAD7 TOTAL SCORE: 1

## 2017-08-09 NOTE — PROGRESS NOTES
Clinic Care Coordination Contact      Referral Source: Care Team  Clinical Data: Care Coordinator Outreach  Spoke to pt's daughter (Eva) who stated that it was not a good time to follow-up. Eva stated that she received the information from the memory clinic for the TENZIN and HCD. Eva stated that her brother (pt's son) called to let her know that they asked for the information. Eva stated that pt currently working on the document and that they hope to have it completed once family is able to help pt write it out. Eva indicated that they may be interested in a class to help them. SW will provide resources when Eva calls back.    Plan: Care Coordinator will try to reach patient again in two weeks.    ANA Bhandari, [facility]  Social Work - Care Coordinator  [facility]- [place], [place], and [place]  Phone: [phone]

## 2017-08-10 NOTE — PROGRESS NOTES
"Clinic Care Coordination Contact      Referral Source: Care Team  Clinical Data: Care Coordinator Outreach  Spoke to pt's daughter (Eva) who stated that she is \"busy\" and trying to figure things out. Eva stated that she wanted to know if pt's son and daughter-in-law are listed as decision makers. SW is not able to identify if they are as they were the ones who requested the information be sent to pt to complete. Eav stated that she would talk to pt about her HCD and listing them as contacts for her. SW indicated that pt could attend a HCD class (phone number given to Eva and SW indicated dates of the classes) and stated that the document has to be witnessed (by two individuals not listed as agents) or notarized.      Plan: Care Coordinator will try to reach patient again in two weeks.    ANA Bhandari, [facility]  Social Work - Care Coordinator  [place], [place], and [place]  Phone: [phone]          "

## 2017-08-13 DIAGNOSIS — I77.810 AORTIC ROOT DILATATION (H): ICD-10-CM

## 2017-08-13 DIAGNOSIS — I73.9 PVD (PERIPHERAL VASCULAR DISEASE) (H): ICD-10-CM

## 2017-08-13 DIAGNOSIS — I70.1 RENAL ARTERY STENOSIS (H): Chronic | ICD-10-CM

## 2017-08-13 DIAGNOSIS — I10 ESSENTIAL HYPERTENSION WITH GOAL BLOOD PRESSURE LESS THAN 140/90: Chronic | ICD-10-CM

## 2017-08-14 ENCOUNTER — TRANSFERRED RECORDS (OUTPATIENT)
Dept: HEALTH INFORMATION MANAGEMENT | Facility: CLINIC | Age: 77
End: 2017-08-14

## 2017-08-14 ENCOUNTER — NURSE TRIAGE (OUTPATIENT)
Dept: NURSING | Facility: CLINIC | Age: 77
End: 2017-08-14

## 2017-08-14 RX ORDER — METOPROLOL TARTRATE 50 MG
TABLET ORAL
Qty: 180 TABLET | Refills: 0 | OUTPATIENT
Start: 2017-08-14

## 2017-08-16 ENCOUNTER — TELEPHONE (OUTPATIENT)
Dept: INTERNAL MEDICINE | Facility: CLINIC | Age: 77
End: 2017-08-16

## 2017-08-16 NOTE — TELEPHONE ENCOUNTER
VIGNESHI:  Pts daughter, Eva calls.     She states pt saw Dr Henriquez yesterday, and was started on Aricept and Sinemet. The Sinemet is supposed to be TID. The Risperdal was decreased to half her dose.     She states the pt had some side effects today that she thinks are from one of the meds.     Had a loose stool in her pants. This is new. Has nausea, and also did not sleep last night, restless.     These can be side effects from the Aricept, and possibly the Sinemet.     Advised to call Neurology clinic and discuss with the Nurse there. Also advised to ask the Nurse if pt should taper up to TID on her Sinemet. Daughter agrees, as she didn't have the directions there with her.     Daughter is going to make sure that Neurology send records over.

## 2017-08-18 ENCOUNTER — TELEPHONE (OUTPATIENT)
Dept: NURSING | Facility: CLINIC | Age: 77
End: 2017-08-18

## 2017-08-18 ENCOUNTER — NURSE TRIAGE (OUTPATIENT)
Dept: NURSING | Facility: CLINIC | Age: 77
End: 2017-08-18

## 2017-08-18 DIAGNOSIS — Z79.899 POLYPHARMACY: Primary | ICD-10-CM

## 2017-08-18 NOTE — TELEPHONE ENCOUNTER
Wendy's Neurology clinic is:   [facility] (based in [place]), [place] clinic:  Fax:  [phone]. They need to get a list of her medications faxed over to them today. They prescribed some new meds yesterday.  Eva is concerned about compatibility of medications. I explained the pharmacy where she gets her meds from, as long as their list is up to date, will have warnings of compatabilities.   The neuro clinic wrote for:    Risperdal 0.25 mg for one month at bedtime then discontinue.  Eva is concerned about this because she thinks it's used for anxiety and Wendy needs something to treat anxiety.  Donepezil 10 mg at breakfast, 1/2 pill for one month then one pill in the evening.   Sinemet  three times a day, 8 a.m., 1 p.m., 5 p.m.  Please make the changes to the medication list in this chart.  Eva will be in and out today, taking her dad to appointments. Please leave a detailed message for Eva or her dad, Wendy's , about the status of this request.   Coleen Hanson RN-[facility]

## 2017-08-18 NOTE — TELEPHONE ENCOUNTER
Daughter Eva called again regarding med changes by neurologist and concern about decreasing Risperdal and stopping in one month.  Advised Eva that we do not yet have any neurology records as she had just been seen and that message would be forwarded to PCP.  SUE Mallory R.N.

## 2017-08-18 NOTE — TELEPHONE ENCOUNTER
Wendy's Neurology clinic is:   [facility] (based in [place]), [place] clinic:  Fax:  [phone]. They need to get a list of her medications faxed over to them today. They prescribed some new meds yesterday.  Eva is concerned about compatibility of medications. I explained the pharmacy where she gets her meds from, as long as their list is up to date, will have warnings of compatabilities.   The neuro clinic wrote for:    Risperdal 0.25 mg for one month at bedtime then discontinue.  Eva is concerned about this because she thinks it's used for anxiety and Wendy needs something to treat anxiety.  Donepezil 10 mg at breakfast, 1/2 pill for one month then one pill in the evening.   Sinemet  three times a day, 8 a.m., 1 p.m., 5 p.m.  Please make the changes to the medication list in this chart.  Eva will be in and out today, taking her dad to appointments. Please leave a detailed message for Eva or her dad, Wendy's , about the status of this request.   Coleen Hanson RN-[facility]

## 2017-08-22 ENCOUNTER — TRANSFERRED RECORDS (OUTPATIENT)
Dept: HEALTH INFORMATION MANAGEMENT | Facility: CLINIC | Age: 77
End: 2017-08-22

## 2017-08-23 ENCOUNTER — CARE COORDINATION (OUTPATIENT)
Dept: CARE COORDINATION | Facility: CLINIC | Age: 77
End: 2017-08-23

## 2017-08-23 RX ORDER — DONEPEZIL HYDROCHLORIDE 10 MG/1
10 TABLET, FILM COATED ORAL
COMMUNITY
Start: 2017-08-23 | End: 2017-10-16

## 2017-08-23 RX ORDER — BUSPIRONE HYDROCHLORIDE 5 MG/1
TABLET ORAL
COMMUNITY
Start: 2017-08-23 | End: 2017-10-16

## 2017-08-23 RX ORDER — CARBIDOPA AND LEVODOPA 25; 100 MG/1; MG/1
1 TABLET ORAL 3 TIMES DAILY
COMMUNITY
Start: 2017-08-23 | End: 2017-10-16

## 2017-08-23 NOTE — PROGRESS NOTES
Clinic Care Coordination Contact    Situation: Patient chart reviewed by care coordinator.    Background: SW had been asked to follow-up on HCD and TENZIN information that was mailed to pt.     Assessment: SW had spoken with pt's daughter (Eva) who indicated that she wanted to know why the pt's son and daughter-in-law were requesting the information. Eva was given HCD class dates and the phone number to call and get pt seen for a class. Eva stated that she would update SW as needed.    Plan/Recommendations: SW will plan to chart review in 3-5 business days.    ANA Bhandari, [facility]  Social Work - Care Coordinator  [facility]- [place], [place], and [place]  Phone: [phone]

## 2017-08-24 ENCOUNTER — OFFICE VISIT (OUTPATIENT)
Dept: PHARMACY | Facility: CLINIC | Age: 77
End: 2017-08-24
Payer: COMMERCIAL

## 2017-08-24 VITALS
WEIGHT: 145.5 LBS | SYSTOLIC BLOOD PRESSURE: 127 MMHG | DIASTOLIC BLOOD PRESSURE: 84 MMHG | HEART RATE: 51 BPM | BODY MASS INDEX: 25.77 KG/M2

## 2017-08-24 DIAGNOSIS — F41.9 ANXIETY: ICD-10-CM

## 2017-08-24 DIAGNOSIS — I73.9 PVD (PERIPHERAL VASCULAR DISEASE) (H): ICD-10-CM

## 2017-08-24 DIAGNOSIS — E78.5 HYPERLIPIDEMIA LDL GOAL <100: ICD-10-CM

## 2017-08-24 DIAGNOSIS — I10 ESSENTIAL HYPERTENSION WITH GOAL BLOOD PRESSURE LESS THAN 140/90: Chronic | ICD-10-CM

## 2017-08-24 DIAGNOSIS — I10 ESSENTIAL HYPERTENSION WITH GOAL BLOOD PRESSURE LESS THAN 140/90: ICD-10-CM

## 2017-08-24 DIAGNOSIS — I77.810 AORTIC ROOT DILATATION (H): ICD-10-CM

## 2017-08-24 DIAGNOSIS — I70.1 RENAL ARTERY STENOSIS (H): Chronic | ICD-10-CM

## 2017-08-24 DIAGNOSIS — G47.00 INSOMNIA, UNSPECIFIED TYPE: ICD-10-CM

## 2017-08-24 DIAGNOSIS — F33.3 SEVERE EPISODE OF RECURRENT MAJOR DEPRESSIVE DISORDER, WITH PSYCHOTIC FEATURES (H): Primary | Chronic | ICD-10-CM

## 2017-08-24 DIAGNOSIS — F09 COGNITIVE DISORDER: ICD-10-CM

## 2017-08-24 DIAGNOSIS — R25.1 TREMOR: ICD-10-CM

## 2017-08-24 DIAGNOSIS — K21.9 GASTROESOPHAGEAL REFLUX DISEASE WITHOUT ESOPHAGITIS: ICD-10-CM

## 2017-08-24 PROCEDURE — 99605 MTMS BY PHARM NP 15 MIN: CPT | Performed by: PHARMACIST

## 2017-08-24 PROCEDURE — 99607 MTMS BY PHARM ADDL 15 MIN: CPT | Performed by: PHARMACIST

## 2017-08-24 RX ORDER — METOPROLOL TARTRATE 50 MG
50 TABLET ORAL 2 TIMES DAILY
Qty: 180 TABLET | Refills: 0 | Status: SHIPPED | OUTPATIENT
Start: 2017-08-24 | End: 2017-09-12

## 2017-08-24 RX ORDER — RISPERIDONE 0.25 MG/1
0.25 TABLET ORAL DAILY
Start: 2017-08-24 | End: 2017-09-12

## 2017-08-24 RX ORDER — CHOLECALCIFEROL (VITAMIN D3) 50 MCG
3 TABLET ORAL DAILY
Qty: 30 TABLET | COMMUNITY
Start: 2017-08-24 | End: 2017-10-16

## 2017-08-24 NOTE — PROGRESS NOTES
SUBJECTIVE/OBJECTIVE:                                                    Wendy Rocha is a 76 year old female coming in for a follow-up visit for Medication Therapy Management.  She was referred to me from Dr. Greenberg.  Her daughter Eva joined us today  First visit in 2017     Chief Complaint: Follow up from our visit on 8/26/16.  Questions about recent med changes, challenges with schedule    Tobacco: No tobacco use   Alcohol: not currently using    Medication Adherence: no issues reported by patient and sets up own med boxes    Memory:  Staredt donepezil on 8/15 1/2 tablet at night after eating; with instructions to increase to 1 tablet after 1 month.  No side effects at this time; had some GI upset when first started but that has resolved.      Tremor:  Taking Sinemet 1/2 tablet TID until 8/28 with plan to increase to 1 tablet TID on the 29th.  Was instructed to take the Sinemet after food; having a hard time fitting this in with her other medications.  Recently started by Neurology.  No significant changes at this time.  No concerns with side effects.    Anxiety/Nighttime Hallucinations: Pt is currently taking buspirone 5 mg TID (started on 8/22).  Also new for patient.  She is also tapering down on risperidone, taking 0.25 mg HS until next Monday and then will discontinue as planned.  No significant change in symptoms with recent change.  Her daughter is concerned about her anxiety with change in risperidone.    Hypertension: Pt is current taking Metoprolol tartrate 50 mg BID, Amlodipine 10 mg daily and Doxazosin 4 mg 1/2 tablet HS.  Pt has not been checking blood pressure at home. Pt reports no side effects    GERD: Taking pantoprazole 40 mg daily. Pt reports no side effects. Medication has been working well.  H/o of ulcer per patient.    PVD:  Taking clopidogrel 75 mg daily.  No concerns with bruising or bleeding.      Insomnia: Current medications include: trazodone 50 mg. Pt reports no issues. Had  one night that she experienced nightmares but otherwise no concerns with sleep.    Hyperlipidemia: Current therapy includes simvastatin 40 mg once daily.  Pt reports no significant myalgias or other side effects.                Current labs include:  BP Readings from Last 3 Encounters:   08/08/17 90/60   06/22/17 128/74   03/21/17 136/72     No results found for this basename: a1c.  CHOL      170   7/15/2014  TRIG      112   7/15/2014  HDL       61   7/15/2014  LDL       86   7/15/2014    Liver Function Studies -   Recent Labs   Lab Test  01/05/16   1054   PROTTOTAL  7.3   ALBUMIN  4.0   BILITOTAL  0.6   ALKPHOS  63   AST  22   ALT  20       MICROL       66   1/27/2015  MICROALBUMIN    93.18   1/27/2015    Last Basic Metabolic Panel:  NA      141   1/5/2016   POTASSIUM      4.2   1/5/2016  CHLORIDE      106   1/5/2016  BUN       32   1/5/2016  CR     1.47   1/5/2016  GFR Estimate   Date Value Ref Range Status   03/21/2017 33 (L) >60 mL/min/1.7m2 Final     Comment:     Non  GFR Calc   09/29/2016 30 (L) >60 mL/min/1.7m2 Final     Comment:     Non  GFR Calc   09/27/2016 29 (L) >60 mL/min/1.7m2 Final     Comment:     Non  GFR Calc     GFR Estimate If Black   Date Value Ref Range Status   03/21/2017 40 (L) >60 mL/min/1.7m2 Final     Comment:      GFR Calc   09/29/2016 37 (L) >60 mL/min/1.7m2 Final     Comment:      GFR Calc   09/27/2016 35 (L) >60 mL/min/1.7m2 Final     Comment:      GFR Calc     TSH   Date Value Ref Range Status   09/21/2016 2.70 0.40 - 4.00 mU/L Final   ]  There were no vitals taken for this visit.    Most Recent Immunizations   Administered Date(s) Administered     Influenza (High Dose) 3 valent vaccine 09/11/2015     Influenza (IIV3) 11/19/2016     Pneumococcal (PCV 13) 09/11/2015     Pneumococcal 23 valent 09/03/2013     TD (ADULT, 7+) 08/14/2015     ASSESSMENT:                                                     Current medications were reviewed with her today.      Medication Adherence: no issues identified    Memory:  Donepezil may cause hypotension and pulse was low today.  Will recheck vitals prior to increasing to one tablet as instructed next month.    Tremor:  Improved.  Okay to take the Sinemet with the donepezil after evening meal/snack - this reduced administration times from QID to TID and helps simplify regimen.    Anxiety/Nighttime Hallucinations: reviewed risperidone taper/discontinue plan with patient today, all questions answered    Hypertension: see comment above regarding low pulse.  If pulse remains <60 at follow-up will decrease metoprolol before increasing donepezil dose    GERD: stable    PVD:  stable.      Insomnia: stable    Hyperlipidemia: stable       PLAN:                                                      1.  Risperdone 0.25 mg once daily, then stop on 8/27  2.  Okay to take both the Sinemet and donepezil after the evening meal/snack  3.  Recheck vitals before titrating donepezil      I spent 60 minutes with this patient today.  All changes were made via collaborative practice agreement with Natalee Owens. A copy of the visit note was provided to the patient's primary care provider.     Will follow up 9/12    The patient was given a summary of these recommendations as an after visit summary.    Emelyn Lott , Pharm D  [phone] (phone)  [phone] (pager)  Medication Therapy Management Pharmacist

## 2017-08-24 NOTE — PATIENT INSTRUCTIONS
Recommendations from today's MTM visit:                                                    MTM (medication therapy management) is a service provided by a clinical pharmacist designed to help you get the most of out of your medicines.   Today we reviewed what your medicines are for, how to know if they are working, that your medicines are safe and how to make your medicine regimen as easy as possible.     1. Recommended medication changes:  8/27 - last day of risperidone  8/28 - increase Sinemet to 1 tablet three times daily; move buspirone to bedtime dosing    2.  As of today, okay to take both the Sinemet and donepezil after the evening meal/snack      Next MTM visit: 9/12 at 10:30    To schedule another MTM appointment, please call the clinic directly or you may call the MTM scheduling line at [phone] or toll-free at [phone].     My Clinical Pharmacist's contact information:                                                      It was a pleasure seeing you today!  Please feel free to contact me with any questions or concerns you have.      Emelyn Lott , Pharm D  [phone] (phone)  [phone] (pager)  Medication Therapy Management Pharmacist     You may receive a survey about the MTM services you received.  I would appreciate your feedback to help me serve you better in the future. Please fill it out and return it when you can. Your comments will be anonymous.

## 2017-08-24 NOTE — MR AVS SNAPSHOT
After Visit Summary   8/24/2017    Wendy Rocha    MRN: [id number]           Patient Information     Date Of Birth          1940        Visit Information        Provider Department      8/24/2017 10:30 AM Emelyn Lott, [facility] MTM        Today's Diagnoses     DEPRESSION, Severe, recurrent, with psychotic features (H)    -  1    CKD (chronic kidney disease) stage 3, GFR 30-59 ml/min          Care Instructions    Recommendations from today's MTM visit:                                                    MTM (medication therapy management) is a service provided by a clinical pharmacist designed to help you get the most of out of your medicines.   Today we reviewed what your medicines are for, how to know if they are working, that your medicines are safe and how to make your medicine regimen as easy as possible.     1. Recommended medication changes:  8/27 - last day of risperidone  8/28 - increase Sinemet to 1 tablet three times daily; move buspirone to bedtime dosing    2.  As of today, okay to take both the Sinemet and donepezil after the evening meal/snack      Next MTM visit: 9/12 at 10:30    To schedule another MTM appointment, please call the clinic directly or you may call the MTM scheduling line at [phone] or toll-free at [phone].     My Clinical Pharmacist's contact information:                                                      It was a pleasure seeing you today!  Please feel free to contact me with any questions or concerns you have.      Emelyn Lott , Pharm D  [phone] (phone)  [phone] (pager)  Medication Therapy Management Pharmacist     You may receive a survey about the MTM services you received.  I would appreciate your feedback to help me serve you better in the future. Please fill it out and return it when you can. Your comments will be anonymous.                    Follow-ups after your visit        Your next 10 appointments already  "scheduled     Sep 12, 2017 10:30 AM CDT   SHORT with Emelyn Lott [facility] ([facility])    [address]  [address]  [facility] [address] [phone]            Nov 01, 2017  1:45 PM CDT   [facility]ilia [facility]ascencia[facility]    [address] [facility] [address] [phone]           Bring a current list of meds and any records pertaining to this visit. For Physicals, please bring immunization records and any forms needing to be filled out. Please arrive 10 minutes early to complete paperwork.              Who to contact     If you have questions or need follow up information about today's clinic visit or your schedule please contact [facility] directly at [phone].  Normal or non-critical lab and imaging results will be communicated to you by MyChart, letter or phone within 4 business days after the clinic has received the results. If you do not hear from us within 7 days, please contact the clinic through CDPhart or phone. If you have a critical or abnormal lab result, we will notify you by phone as soon as possible.  Submit refill requests through Blink or call your pharmacy and they will forward the refill request to us. Please allow 3 business days for your refill to be completed.          Additional Information About Your Visit        CDPharUsarium Information     Blink lets you send messages to your doctor, view your test results, renew your prescriptions, schedule appointments and more. To sign up, go to [email or web address] . Click on \"Log in\" on the left side of the screen, which will take you to the Welcome page. Then click on \"Sign up Now\" on the right side of the page.     You will be asked to enter the access code listed below, as well as some personal information. Please follow the directions to create your username and password.     Your access code is: " [email or web address]  Expires: 2017  1:59 PM     Your access code will  in 90 days. If you need help or a new code, please call your [place] clinic or [phone].        Care EveryWhere ID     This is your Care EveryWhere ID. This could be used by other organizations to access your [place] medical records  [id number]        Your Vitals Were     Pulse BMI (Body Mass Index)                51 25.77 kg/m2           Blood Pressure from Last 3 Encounters:   17 127/84   17 90/60   17 128/74    Weight from Last 3 Encounters:   17 145 lb 8 oz (66 kg)   17 154 lb (69.9 kg)   17 156 lb 12.8 oz (71.1 kg)              Today, you had the following     No orders found for display         Today's Medication Changes          These changes are accurate as of: 17 11:27 AM.  If you have any questions, ask your nurse or doctor.               These medicines have changed or have updated prescriptions.        Dose/Directions    risperiDONE 0.25 MG tablet   Commonly known as:  risperDAL   This may have changed:    - medication strength  - how much to take  - how to take this  - when to take this  - additional instructions   Used for:  Severe episode of recurrent major depressive disorder, with psychotic features (H)   Changed by:  Emelyn Lott RPH        Dose:  0.25 mg   Take 1 tablet (0.25 mg) by mouth daily   Refills:  0       vitamin D 2000 UNITS tablet   This may have changed:    - medication strength  - how much to take   Used for:  CKD (chronic kidney disease) stage 3, GFR 30-59 ml/min   Changed by:  Emelyn Lott RPH        Dose:  3 tablet   Take 3 tablets by mouth daily   Quantity:  30 tablet   Refills:  0            Where to get your medicines      Some of these will need a paper prescription and others can be bought over the counter.  Ask your nurse if you have questions.     You don't need a prescription for these medications     risperiDONE 0.25 MG tablet    vitamin D  2000 UNITS tablet                Primary Care Provider Office Phone # Fax #    Natalee Prachi Owens [phone]       [address] [facility] [address]        Equal Access to Services     FRANCIS SALAZAR : Hadsimone lara ku richardo Soallisonali, waaxda luqadaha, qaybta kaalmada adeegyada, flash sutherlandn jalyn spicer laAnnabellaperla garcia. So [facility] [phone].    ATENCIÓN: Si habla español, tiene a valencia disposición servicios gratuitos de asistencia lingüística. Llame al [phone].    We comply with applicable federal civil rights laws and [place] laws. We do not discriminate on the basis of race, color, national origin, age, disability sex, sexual orientation or gender identity.            Thank you!     Thank you for choosing [facility]  for your care. Our goal is always to provide you with excellent care. Hearing back from our patients is one way we can continue to improve our services. Please take a few minutes to complete the written survey that you may receive in the mail after your visit with us. Thank you!             Your Updated Medication List - Protect others around you: Learn how to safely use, store and throw away your medicines at www.disposemymeds.org.          This list is accurate as of: 8/24/17 11:27 AM.  Always use your most recent med list.                   Brand Name Dispense Instructions for use Diagnosis    ACE/ARB NOT PRESCRIBED (INTENTIONAL)      ACE & ARB not prescribed due to Worsening renal function on ACE/ARB therapy    Renal artery stenosis (H), Essential hypertension with goal blood pressure less than 140/90       amLODIPine 10 MG tablet    NORVASC    90 tablet    Take 1 tablet (10 mg) by mouth daily    Essential hypertension with goal blood pressure less than 140/90       ARICEPT 10 MG tablet   Generic drug:  donepezil      Take 5 mg by mouth        ASPIRIN NOT PRESCRIBED    INTENTIONAL    0 each    Antiplatelet medication not prescribed intentionally due to  Plavix    Essential hypertension with goal blood pressure less than 140/90, PVD (peripheral vascular disease) (H), Aortic root dilatation (H), Renal artery stenosis (H)       busPIRone 5 MG tablet    BUSPAR     5 mg three times daily for anxiety, per Dr. Rosales, neurology        clopidogrel 75 MG tablet    PLAVIX    90 tablet    Take 1 tablet (75 mg) by mouth daily    PVD (peripheral vascular disease) (H), Aortic root dilatation (H), Renal artery stenosis (H)       doxazosin 4 MG tablet    CARDURA    90 tablet    Take 1 tablet (4 mg) by mouth At Bedtime    Essential hypertension with goal blood pressure less than 140/90, PVD (peripheral vascular disease) (H), Aortic root dilatation (H), Renal artery stenosis (H)       metoprolol 50 MG tablet    LOPRESSOR    180 tablet    TAKE ONE TABLET (50 MG) BY MOUTH TWICE DAILY    Essential hypertension with goal blood pressure less than 140/90, PVD (peripheral vascular disease) (H), Aortic root dilatation (H), Renal artery stenosis (H)       order for DME     1 Device    Machine to dispense the medication    Severe episode of recurrent major depressive disorder, with psychotic features (H), Memory loss       pantoprazole 40 MG EC tablet    PROTONIX    90 tablet    Take 1 tablet (40 mg) by mouth daily Take 30-60 minutes before a meal.    Perforated duodenal ulcer (H)       risperiDONE 0.25 MG tablet    risperDAL     Take 1 tablet (0.25 mg) by mouth daily    Severe episode of recurrent major depressive disorder, with psychotic features (H)       simvastatin 40 MG tablet    ZOCOR    90 tablet    Take 1 tablet (40 mg) by mouth At Bedtime    Hyperlipidemia LDL goal <100       SINEMET  MG per tablet   Generic drug:  carbidopa-levodopa      Take 0.5 tablets by mouth 3 times daily Per neurology Dr. Rosales        traZODone 50 MG tablet    DESYREL    30 tablet    Take 1 tablet (50 mg) by mouth nightly as needed for sleep    Persistent insomnia       TYLENOL 325 MG tablet    Generic drug:  acetaminophen     100 tablet    Take 2 tablets (650 mg) by mouth every 6 hours as needed for mild pain    Severe episode of recurrent major depressive disorder, with psychotic features (H), Memory loss       vitamin D 2000 UNITS tablet     30 tablet    Take 3 tablets by mouth daily    CKD (chronic kidney disease) stage 3, GFR 30-59 ml/min

## 2017-08-24 NOTE — TELEPHONE ENCOUNTER
Metoprolol       Last Written Prescription Date: 6/5/17  Last Fill Quantity: 180, # refills: 0    Last Office Visit with [facility], [facility] or [facility] prescribing provider:  6/22/17   Future Office Visit:    Next 5 appointments (look out 90 days)     Sep 12, 2017 10:30 AM CDT   SHORT with Emelyn Lott RPH   [facility] ([facility])    [address]  [address]  [facility] [address] [phone]            Nov 01, 2017  1:45 PM CDT   Office Visit with Cesilia Plascencia DO   [facility]    [address] [facility] [id number] [phone]                    BP Readings from Last 3 Encounters:   08/24/17 127/84   08/08/17 90/60   06/22/17 128/74

## 2017-08-28 NOTE — PROGRESS NOTES
Clinic Care Coordination Contact    Situation: Patient chart reviewed by care coordinator.    Background: SW has been continuing to follow for potential needs.    Assessment: Pt has support from her daughter (Eva) who helps set-up pt's medications. SW spoke to MTM who saw pt and Eva last week. Pt has good family support.    Plan/Recommendations: SW will plan to chart review around 09/12/17 when pt will be seen next by MTM. Eva has  contact information for immediate concerns.    ANA Bhandari, [facility]  Social Work - Care Coordinator  [facility] [place], and [place]  Phone: [phone]

## 2017-09-10 DIAGNOSIS — I77.810 AORTIC ROOT DILATATION (H): ICD-10-CM

## 2017-09-10 DIAGNOSIS — I70.1 RENAL ARTERY STENOSIS (H): Chronic | ICD-10-CM

## 2017-09-10 DIAGNOSIS — I73.9 PVD (PERIPHERAL VASCULAR DISEASE) (H): ICD-10-CM

## 2017-09-11 RX ORDER — CLOPIDOGREL BISULFATE 75 MG/1
TABLET ORAL
Qty: 90 TABLET | Refills: 0 | Status: SHIPPED | OUTPATIENT
Start: 2017-09-11 | End: 2017-12-05

## 2017-09-11 NOTE — TELEPHONE ENCOUNTER
PLAVIX           Last Written Prescription Date: 06/19/17  Last Fill Quantity: 90, # refills: 0    Last Office Visit with G, P or  Health prescribing provider:  06/22/17   Future Office Visit:    Next 5 appointments (look out 90 days)     Sep 12, 2017 10:30 AM CDT   SHORT with Emelyn Lott [facility] ([facility])    [address]  [address]  [facility] [address] [phone]            Nov 01, 2017  1:45 PM CDT   [facility] Visit with Cesilia Plascencia,    [facility]    [address] [facility] [address] [phone]                   Lab Results   Component Value Date    WBC 6.0 09/27/2016     Lab Results   Component Value Date    RBC 4.49 09/27/2016     Lab Results   Component Value Date    HGB 13.7 09/27/2016     Lab Results   Component Value Date    HCT 41.0 09/27/2016     No components found for: MCT  Lab Results   Component Value Date    MCV 91 09/27/2016     Lab Results   Component Value Date    MCH 30.5 09/27/2016     Lab Results   Component Value Date    MCHC 33.4 09/27/2016     Lab Results   Component Value Date    RDW 12.3 09/27/2016     Lab Results   Component Value Date     09/27/2016     Lab Results   Component Value Date    AST 17 03/21/2017     Lab Results   Component Value Date    ALT 15 03/21/2017     Creatinine   Date Value Ref Range Status   03/21/2017 1.52 (H) 0.52 - 1.04 mg/dL Final   ]    Labs showing if normal/abnormal  Lab Results   Component Value Date    WBC 6.0 09/27/2016    RBC 4.49 09/27/2016    HGB 13.7 09/27/2016    HCT 41.0 09/27/2016    MCV 91 09/27/2016    MCH 30.5 09/27/2016    MCHC 33.4 09/27/2016    RDW 12.3 09/27/2016     09/27/2016    DTYP Automated Method 09/27/2016    NEUTROPHIL 64.1 09/27/2016    LYMPH 20.7 09/27/2016    MONOCYTE 10.4 09/27/2016    EOSINOPHIL 3.2 09/27/2016    BASOPHIL 1.3 09/27/2016    IG 0.3 09/27/2016    ANEU 3.8 09/27/2016    ALYM 1.2  09/27/2016    LYLA 0.6 09/27/2016    AEOS 0.2 09/27/2016    ABAS 0.1 09/27/2016    AIG 0.0 09/27/2016      Lab Results   Component Value Date    AST 17 03/21/2017    ALT 15 03/21/2017

## 2017-09-11 NOTE — TELEPHONE ENCOUNTER
"Last OV discussing med: 12/05/16    Per 03/23/17 letter: \"kidney numbers are in the same range as before\".    Medication is being filled for 1 time refill only due to:  due for labs         "

## 2017-09-12 ENCOUNTER — CARE COORDINATION (OUTPATIENT)
Dept: CARE COORDINATION | Facility: CLINIC | Age: 77
End: 2017-09-12

## 2017-09-12 ENCOUNTER — OFFICE VISIT (OUTPATIENT)
Dept: PHARMACY | Facility: CLINIC | Age: 77
End: 2017-09-12
Payer: COMMERCIAL

## 2017-09-12 VITALS — HEART RATE: 52 BPM | DIASTOLIC BLOOD PRESSURE: 73 MMHG | SYSTOLIC BLOOD PRESSURE: 123 MMHG

## 2017-09-12 DIAGNOSIS — F09 COGNITIVE DISORDER: Primary | ICD-10-CM

## 2017-09-12 DIAGNOSIS — G47.00 INSOMNIA, UNSPECIFIED TYPE: ICD-10-CM

## 2017-09-12 DIAGNOSIS — I10 ESSENTIAL HYPERTENSION WITH GOAL BLOOD PRESSURE LESS THAN 140/90: Chronic | ICD-10-CM

## 2017-09-12 DIAGNOSIS — F41.9 ANXIETY: ICD-10-CM

## 2017-09-12 DIAGNOSIS — R25.1 TREMOR: ICD-10-CM

## 2017-09-12 PROCEDURE — 99607 MTMS BY PHARM ADDL 15 MIN: CPT | Performed by: PHARMACIST

## 2017-09-12 PROCEDURE — 99606 MTMS BY PHARM EST 15 MIN: CPT | Performed by: PHARMACIST

## 2017-09-12 RX ORDER — METOPROLOL TARTRATE 50 MG
25 TABLET ORAL 2 TIMES DAILY
Qty: 90 TABLET | Refills: 0 | Status: SHIPPED | OUTPATIENT
Start: 2017-09-12 | End: 2018-02-20

## 2017-09-12 NOTE — PROGRESS NOTES
SUBJECTIVE/OBJECTIVE:                                                    Wendy Rocha is a 76 year old female coming in for a follow-up visit for Medication Therapy Management.  She was referred to me from Dr. Greenberg.  Her daughter Eva joined us today    Chief Complaint: Follow up from our visit on 8/24/17.  Had some GI upset - not lately.     Tobacco: No tobacco use   Alcohol: not currently using    Medication Adherence: no issues reported by patient and her daughter helps set up med boxes    Memory:  Stared donepezil on 8/15; daughter thinks she may have increased to 10 mg already, the plan was to continue on 5 mg until today so pulse could be rechecked.  No side effects at this time; had some GI upset when first started but that has resolved.  Reports memory has been stable.    Tremor:  Taking Sinemet 1 tablet TID. No visible tremor today. Wendy reports she is feeling well.  Daughter reports some shuffling yet when walking; she is doing PT to help.  No concerns with side effects.    Anxiety/Nighttime Hallucinations: Pt is currently taking buspirone 5 mg TID (started on 8/22).  Tapered off risperidone as recommended; last dose 8/27.   No significant change in symptoms with change.  Sleeping somewhat better.  A bad dream or two since then.      Hypertension: Pt is current taking Metoprolol tartrate 50 mg BID, Amlodipine 10 mg daily and Doxazosin 4 mg 1/2 tablet HS.  Pt has not been checking blood pressure at home. Pt reports no side effects    Insomnia: Current medications include: trazodone 50 mg - reports not taking recently. Pt reports no issues. Sleeping somewhat better.  A bad dream or two since then.      Current labs include:  BP Readings from Last 3 Encounters:   08/24/17 127/84   08/08/17 90/60   06/22/17 128/74     No results found for this basename: a1c.  CHOL      170   7/15/2014  TRIG      112   7/15/2014  HDL       61   7/15/2014  LDL       86   7/15/2014    Liver Function Studies -   Recent  Labs   Lab Test  01/05/16   1054   PROTTOTAL  7.3   ALBUMIN  4.0   BILITOTAL  0.6   ALKPHOS  63   AST  22   ALT  20       MICROL       66   1/27/2015  MICROALBUMIN    93.18   1/27/2015    Last Basic Metabolic Panel:  NA      141   1/5/2016   POTASSIUM      4.2   1/5/2016  CHLORIDE      106   1/5/2016  BUN       32   1/5/2016  CR     1.47   1/5/2016  GFR Estimate   Date Value Ref Range Status   03/21/2017 33 (L) >60 mL/min/1.7m2 Final     Comment:     Non  GFR Calc   09/29/2016 30 (L) >60 mL/min/1.7m2 Final     Comment:     Non  GFR Calc   09/27/2016 29 (L) >60 mL/min/1.7m2 Final     Comment:     Non  GFR Calc     GFR Estimate If Black   Date Value Ref Range Status   03/21/2017 40 (L) >60 mL/min/1.7m2 Final     Comment:      GFR Calc   09/29/2016 37 (L) >60 mL/min/1.7m2 Final     Comment:      GFR Calc   09/27/2016 35 (L) >60 mL/min/1.7m2 Final     Comment:      GFR Calc     TSH   Date Value Ref Range Status   09/21/2016 2.70 0.40 - 4.00 mU/L Final   ]  There were no vitals taken for this visit.    Most Recent Immunizations   Administered Date(s) Administered     Influenza (High Dose) 3 valent vaccine 09/11/2015     Influenza (IIV3) 11/19/2016     Pneumococcal (PCV 13) 09/11/2015     Pneumococcal 23 valent 09/03/2013     TD (ADULT, 7+) 08/14/2015     ASSESSMENT:                                                    Current medications were reviewed with her today.      Medication Adherence: no issues identified    Memory:  Donepezil 10 mg daily as prescribed.  She is scheduled to see Dr. Plascencia next month.    Tremor:  stable    Anxiety/Nighttime Hallucinations: stable    Hypertension: will decrease metoprolol to 25 mg twice daily due to bradycardia; increase in donepezil may increase that risk as well.    Insomnia: stable     PLAN:                                                      1.  Metoprolol 25 mg twice daily      I  spent 30 minutes with this patient today.  All changes were made via collaborative practice agreement with Natalee Owens. A copy of the visit note was provided to the patient's primary care provider.     Will follow up in 2 months    The patient was given a summary of these recommendations as an after visit summary.    Emelyn Lott , Pharm D  [phone] (phone)  [phone] (pager)  Medication Therapy Management Pharmacist

## 2017-09-12 NOTE — PROGRESS NOTES
Clinic Care Coordination Contact  [facility]/Voicemail    Referral Source: Care Team  Clinical Data: Care Coordinator Outreach  Outreach attempted x 1.  SW attempted to reach pt's daughter (Eva) without luck. Eva brought pt to her appointment with MTM today.    Plan: Care Coordinator will try to reach patient again in 3-4 weeks.    ANA Bhandari, [facility]  Social Work - Care Coordinator  [facility] [place] and [place]  Phone: [phone]

## 2017-09-12 NOTE — MR AVS SNAPSHOT
After Visit Summary   9/12/2017    Wendy Rocha    MRN: [id number]           Patient Information     Date Of Birth          1940        Visit Information        Provider Department      9/12/2017 10:30 AM Emelyn Lott, [facility]        Today's Diagnoses     HTN, goal < 140/90 (H)        PVD (peripheral vascular disease) (H)        Aortic root dilatation (H)        Renal artery stenosis (H)^^          Care Instructions    Recommendations from today's MTM visit:                                                      1. Metoprolol 25 mg (1/2 tablet) twice daily      Next MTM visit: 2 months    To schedule another MTM appointment, please call the clinic directly or you may call the MTM scheduling line at [phone] or toll-free at [phone].     My Clinical Pharmacist's contact information:                                                      It was a pleasure seeing you today!  Please feel free to contact me with any questions or concerns you have.      Emelyn Lott , Pharm D  [phone] (phone)  [phone] (pager)  Medication Therapy Management Pharmacist     You may receive a survey about the MTM services you received.  I would appreciate your feedback to help me serve you better in the future. Please fill it out and return it when you can. Your comments will be anonymous.                      Follow-ups after your visit        Your next 10 appointments already scheduled     Nov 01, 2017  1:45 PM CDT   Office Visit with Cesilia Plascencia,    [facility]    [address] [facility] [address] [address] [phone]           Bring a current list of meds and any records pertaining to this visit. For Physicals, please bring immunization records and any forms needing to be filled out. Please arrive 10 minutes early to complete paperwork.              Who to contact     If you have questions or need follow up information about today's  "clinic visit or your schedule please contact [facility] directly at [phone].  Normal or non-critical lab and imaging results will be communicated to you by MyChart, letter or phone within 4 business days after the clinic has received the results. If you do not hear from us within 7 days, please contact the clinic through [facility] or phone. If you have a critical or abnormal lab result, we will notify you by phone as soon as possible.  Submit refill requests through [facility] or call your pharmacy and they will forward the refill request to us. Please allow 3 business days for your refill to be completed.          Additional Information About Your Visit        MyChart Information     [employer] lets you send messages to your doctor, view your test results, renew your prescriptions, schedule appointments and more. To sign up, go to [email or web address] . Click on \"Log in\" on the left side of the screen, which will take you to the Welcome page. Then click on \"Sign up Now\" on the right side of the page.     You will be asked to enter the access code listed below, as well as some personal information. Please follow the directions to create your username and password.     Your access code is: [email or web address]  Expires: 2017  1:59 PM     Your access code will  in 90 days. If you need help or a new code, please call your [facility] clinic or [phone].        Care EveryWhere ID     This is your Care EveryWhere ID. This could be used by other organizations to access your [facility] medical records  [id number]        Your Vitals Were     Pulse                   52            Blood Pressure from Last 3 Encounters:   17 123/73   17 127/84   17 90/60    Weight from Last 3 Encounters:   17 145 lb 8 oz (66 kg)   17 154 lb (69.9 kg)   17 156 lb 12.8 oz (71.1 kg)              Today, you had the following     No orders found for display         Today's Medication Changes        "   These changes are accurate as of: 9/12/17 11:02 AM.  If you have any questions, ask your nurse or doctor.               These medicines have changed or have updated prescriptions.        Dose/Directions    metoprolol 50 MG tablet   Commonly known as:  LOPRESSOR   This may have changed:  how much to take   Used for:  Essential hypertension with goal blood pressure less than 140/90, PVD (peripheral vascular disease) (H), Aortic root dilatation (H), Renal artery stenosis (H)   Changed by:  Emelyn Lott, [facility]        Dose:  25 mg   Take 0.5 tablets (25 mg) by mouth 2 times daily   Quantity:  90 tablet   Refills:  0            Where to get your medicines      These medications were sent to [facility] [address] [place] [address]     Phone:  [phone]     metoprolol 50 MG tablet                Primary Care Provider Office Phone # Fax #    Natalee Owens [phone]       [address] [facility] [address]        Equal Access to Services     [facility]: Hadii aad ku hadasho Soomaali, waaxda luqadaha, qaybta kaalmada adeegyaclaire, flash nagel . So [facility] [phone].    ATENCIÓN: Si habla español, tiene a valencia disposición servicios gratuitos de asistencia lingüística. Marques al [phone].    We comply with applicable federal civil rights laws and [place] laws. We do not discriminate on the basis of race, color, national origin, age, disability sex, sexual orientation or gender identity.            Thank you!     Thank you for choosing [facility]  for your care. Our goal is always to provide you with excellent care. Hearing back from our patients is one way we can continue to improve our services. Please take a few minutes to complete the written survey that you may receive in the mail after your visit with us. Thank you!             Your Updated Medication List - Protect  others around you: Learn how to safely use, store and throw away your medicines at www.disposemymeds.org.          This list is accurate as of: 9/12/17 11:02 AM.  Always use your most recent med list.                   Brand Name Dispense Instructions for use Diagnosis    ACE/ARB NOT PRESCRIBED (INTENTIONAL)      ACE & ARB not prescribed due to Worsening renal function on ACE/ARB therapy    Renal artery stenosis (H), Essential hypertension with goal blood pressure less than 140/90       amLODIPine 10 MG tablet    NORVASC    90 tablet    Take 1 tablet (10 mg) by mouth daily    Essential hypertension with goal blood pressure less than 140/90       ARICEPT 10 MG tablet   Generic drug:  donepezil      Take 10 mg by mouth        ASPIRIN NOT PRESCRIBED    INTENTIONAL    0 each    Antiplatelet medication not prescribed intentionally due to Plavix    Essential hypertension with goal blood pressure less than 140/90, PVD (peripheral vascular disease) (H), Aortic root dilatation (H), Renal artery stenosis (H)       busPIRone 5 MG tablet    BUSPAR     5 mg three times daily for anxiety, per Dr. Rosales, neurology        clopidogrel 75 MG tablet    PLAVIX    90 tablet    TAKE ONE TABLET BY MOUTH ONCE DAILY    PVD (peripheral vascular disease) (H), Aortic root dilatation (H), Renal artery stenosis (H)       doxazosin 4 MG tablet    CARDURA    90 tablet    Take 1 tablet (4 mg) by mouth At Bedtime    Essential hypertension with goal blood pressure less than 140/90, PVD (peripheral vascular disease) (H), Aortic root dilatation (H), Renal artery stenosis (H)       metoprolol 50 MG tablet    LOPRESSOR    90 tablet    Take 0.5 tablets (25 mg) by mouth 2 times daily    Essential hypertension with goal blood pressure less than 140/90, PVD (peripheral vascular disease) (H), Aortic root dilatation (H), Renal artery stenosis (H)       order for DME     1 Device    Machine to dispense the medication    Severe episode of recurrent major  depressive disorder, with psychotic features (H), Memory loss       pantoprazole 40 MG EC tablet    PROTONIX    90 tablet    Take 1 tablet (40 mg) by mouth daily Take 30-60 minutes before a meal.    Perforated duodenal ulcer (H)       simvastatin 40 MG tablet    ZOCOR    90 tablet    Take 1 tablet (40 mg) by mouth At Bedtime    Hyperlipidemia LDL goal <100       SINEMET  MG per tablet   Generic drug:  carbidopa-levodopa      Take 1 tablet by mouth 3 times daily Per neurology Dr. Rosales        traZODone 50 MG tablet    DESYREL    30 tablet    Take 1 tablet (50 mg) by mouth nightly as needed for sleep    Persistent insomnia       TYLENOL 325 MG tablet   Generic drug:  acetaminophen     100 tablet    Take 2 tablets (650 mg) by mouth every 6 hours as needed for mild pain    Severe episode of recurrent major depressive disorder, with psychotic features (H), Memory loss       vitamin D 2000 UNITS tablet     30 tablet    Take 3 tablets by mouth daily

## 2017-09-15 DIAGNOSIS — I10 ESSENTIAL HYPERTENSION WITH GOAL BLOOD PRESSURE LESS THAN 140/90: Chronic | ICD-10-CM

## 2017-09-16 RX ORDER — AMLODIPINE BESYLATE 10 MG/1
TABLET ORAL
Qty: 90 TABLET | Refills: 0 | Status: SHIPPED | OUTPATIENT
Start: 2017-09-16 | End: 2017-12-05

## 2017-09-16 NOTE — TELEPHONE ENCOUNTER
amLODIPine (NORVASC) 10 MG tablet      Last Written Prescription Date: 05/30/17  Last Fill Quantity: 90, # refills: 0    Last Office Visit with [facility], [facility] or [facility] prescribing provider:  06/22/17   Future Office Visit:    Next 5 appointments (look out 90 days)     Nov 01, 2017  1:45 PM CDT   [facility] Cesilia [facility]ascencia DO   [facility]    [address] [facility] [address] [phone]            Nov 14, 2017 10:30 AM CST   SHORT with Emelyn Lott [facility] ([facility])    [address]  [address]  [facility] [address] [phone]                    BP Readings from Last 3 Encounters:   09/12/17 123/73   08/24/17 127/84   08/08/17 90/60

## 2017-09-18 ENCOUNTER — HOSPITAL ENCOUNTER (EMERGENCY)
Facility: CLINIC | Age: 77
Discharge: HOME | End: 2017-09-18
Attending: EMERGENCY MEDICINE | Admitting: EMERGENCY MEDICINE
Payer: MEDICARE

## 2017-09-18 VITALS
DIASTOLIC BLOOD PRESSURE: 89 MMHG | RESPIRATION RATE: 18 BRPM | WEIGHT: 140 LBS | HEART RATE: 75 BPM | SYSTOLIC BLOOD PRESSURE: 159 MMHG | OXYGEN SATURATION: 96 % | TEMPERATURE: 97.5 F | BODY MASS INDEX: 24.8 KG/M2

## 2017-09-18 DIAGNOSIS — F51.5 NIGHTMARES: ICD-10-CM

## 2017-09-18 DIAGNOSIS — N39.0 URINARY TRACT INFECTION IN ELDERLY PATIENT: Primary | ICD-10-CM

## 2017-09-18 LAB
ALBUMIN SERPL-MCNC: 3.7 G/DL (ref 3.4–5)
ALBUMIN UR-MCNC: 30 MG/DL
ALP SERPL-CCNC: 48 U/L (ref 40–150)
ALT SERPL W P-5'-P-CCNC: 7 U/L (ref 0–50)
ANION GAP SERPL CALCULATED.3IONS-SCNC: 11 MMOL/L (ref 3–14)
APPEARANCE UR: ABNORMAL
AST SERPL W P-5'-P-CCNC: 14 U/L (ref 0–45)
BACTERIA #/AREA URNS HPF: ABNORMAL /HPF
BASOPHILS # BLD AUTO: 0.1 10E9/L (ref 0–0.2)
BASOPHILS NFR BLD AUTO: 1.5 %
BILIRUB SERPL-MCNC: 1 MG/DL (ref 0.2–1.3)
BILIRUB UR QL STRIP: NEGATIVE
BUN SERPL-MCNC: 17 MG/DL (ref 7–30)
CALCIUM SERPL-MCNC: 9.2 MG/DL (ref 8.5–10.1)
CHLORIDE SERPL-SCNC: 105 MMOL/L (ref 94–109)
CO2 SERPL-SCNC: 23 MMOL/L (ref 20–32)
COLOR UR AUTO: YELLOW
CREAT SERPL-MCNC: 1.41 MG/DL (ref 0.52–1.04)
DIFFERENTIAL METHOD BLD: NORMAL
EOSINOPHIL # BLD AUTO: 0.2 10E9/L (ref 0–0.7)
EOSINOPHIL NFR BLD AUTO: 3.3 %
ERYTHROCYTE [DISTWIDTH] IN BLOOD BY AUTOMATED COUNT: 12.2 % (ref 10–15)
GFR SERPL CREATININE-BSD FRML MDRD: 36 ML/MIN/1.7M2
GLUCOSE SERPL-MCNC: 85 MG/DL (ref 70–99)
GLUCOSE UR STRIP-MCNC: NEGATIVE MG/DL
HCT VFR BLD AUTO: 39.3 % (ref 35–47)
HGB BLD-MCNC: 13.2 G/DL (ref 11.7–15.7)
HGB UR QL STRIP: ABNORMAL
IMM GRANULOCYTES # BLD: 0 10E9/L (ref 0–0.4)
IMM GRANULOCYTES NFR BLD: 0.2 %
KETONES UR STRIP-MCNC: NEGATIVE MG/DL
LEUKOCYTE ESTERASE UR QL STRIP: ABNORMAL
LYMPHOCYTES # BLD AUTO: 0.8 10E9/L (ref 0.8–5.3)
LYMPHOCYTES NFR BLD AUTO: 14.8 %
MCH RBC QN AUTO: 30.5 PG (ref 26.5–33)
MCHC RBC AUTO-ENTMCNC: 33.6 G/DL (ref 31.5–36.5)
MCV RBC AUTO: 91 FL (ref 78–100)
MONOCYTES # BLD AUTO: 0.6 10E9/L (ref 0–1.3)
MONOCYTES NFR BLD AUTO: 11 %
MUCOUS THREADS #/AREA URNS LPF: PRESENT /LPF
NEUTROPHILS # BLD AUTO: 3.6 10E9/L (ref 1.6–8.3)
NEUTROPHILS NFR BLD AUTO: 69.2 %
NITRATE UR QL: POSITIVE
NRBC # BLD AUTO: 0 10*3/UL
NRBC BLD AUTO-RTO: 0 /100
PH UR STRIP: 6 PH (ref 5–7)
PLATELET # BLD AUTO: 266 10E9/L (ref 150–450)
POTASSIUM SERPL-SCNC: 3.9 MMOL/L (ref 3.4–5.3)
PROT SERPL-MCNC: 7.2 G/DL (ref 6.8–8.8)
RBC # BLD AUTO: 4.33 10E12/L (ref 3.8–5.2)
RBC #/AREA URNS AUTO: 178 /HPF (ref 0–2)
SODIUM SERPL-SCNC: 139 MMOL/L (ref 133–144)
SOURCE: ABNORMAL
SP GR UR STRIP: 1.01 (ref 1–1.03)
SQUAMOUS #/AREA URNS AUTO: <1 /HPF (ref 0–1)
TSH SERPL DL<=0.005 MIU/L-ACNC: 2.67 MU/L (ref 0.4–4)
UROBILINOGEN UR STRIP-MCNC: 0 MG/DL (ref 0–2)
WBC # BLD AUTO: 5.2 10E9/L (ref 4–11)
WBC #/AREA URNS AUTO: 29 /HPF (ref 0–2)
WBC CLUMPS #/AREA URNS HPF: PRESENT /HPF

## 2017-09-18 PROCEDURE — 87186 SC STD MICRODIL/AGAR DIL: CPT | Performed by: EMERGENCY MEDICINE

## 2017-09-18 PROCEDURE — 85025 COMPLETE CBC W/AUTO DIFF WBC: CPT | Performed by: EMERGENCY MEDICINE

## 2017-09-18 PROCEDURE — 81001 URINALYSIS AUTO W/SCOPE: CPT | Performed by: EMERGENCY MEDICINE

## 2017-09-18 PROCEDURE — 84443 ASSAY THYROID STIM HORMONE: CPT | Performed by: EMERGENCY MEDICINE

## 2017-09-18 PROCEDURE — 80053 COMPREHEN METABOLIC PANEL: CPT | Performed by: EMERGENCY MEDICINE

## 2017-09-18 PROCEDURE — 87086 URINE CULTURE/COLONY COUNT: CPT | Performed by: EMERGENCY MEDICINE

## 2017-09-18 PROCEDURE — 36415 COLL VENOUS BLD VENIPUNCTURE: CPT | Performed by: EMERGENCY MEDICINE

## 2017-09-18 PROCEDURE — 99283 EMERGENCY DEPT VISIT LOW MDM: CPT

## 2017-09-18 PROCEDURE — 87088 URINE BACTERIA CULTURE: CPT | Performed by: EMERGENCY MEDICINE

## 2017-09-18 RX ORDER — CEPHALEXIN 500 MG/1
500 CAPSULE ORAL 3 TIMES DAILY
Qty: 30 CAPSULE | Refills: 0 | Status: SHIPPED | OUTPATIENT
Start: 2017-09-18 | End: 2017-09-19

## 2017-09-18 ASSESSMENT — ENCOUNTER SYMPTOMS
ABDOMINAL PAIN: 0
SLEEP DISTURBANCE: 1
HEADACHES: 0
BACK PAIN: 0
HALLUCINATIONS: 0
FEVER: 0
DYSURIA: 0
SHORTNESS OF BREATH: 0

## 2017-09-18 NOTE — ED PROVIDER NOTES
History     Chief Complaint:  Nightmares    HPI   Wendy Rocha is a 77 year old female who presents to the emergency department today for evaluation of nightmares. The patient reports terrible nightmares for the past 2 weeks that make it difficult for her to sleep. She reports several medication changes including halving her metoprolol dose and doubling her Aricept. Her daughter states that the Buspirone, Sinemet and Donepezil were first prescribed one month ago. Her daughter also notes issues with incontinence and is not sure how often the patient changes her pads, therefore she is concerned for a UTI as well. The patient denies any recent travel, fevers, chest pain, shortness of breath, abdominal pain, dysuria, back pain, headaches, vision changes or hallucinations.    Allergies:  Lisinopril      Medications:    amLODIPine (NORVASC) 10 MG tablet  metoprolol (LOPRESSOR) 50 MG tablet  clopidogrel (PLAVIX) 75 MG tablet  donepezil (ARICEPT) 10 MG tablet  carbidopa-levodopa (SINEMET)  MG per tablet  busPIRone (BUSPAR) 5 MG tablet  simvastatin (ZOCOR) 40 MG tablet  doxazosin (CARDURA) 4 MG tablet  traZODone (DESYREL) 50 MG tablet  pantoprazole (PROTONIX) 40 MG EC tablet    Past Medical History:    Abnormal ECG   ARF (acute renal failure) (H)   Carotid stenosis, bilateral   CKD (chronic kidney disease) stage 3, GFR 30-59 ml/min   GERD (gastroesophageal reflux disease)   HTN, goal below 140/90   Hyperlipidemia LDL goal <100   Lung nodule   Muscle aches   Perforated duodenal ulcer (H)   Proteinuria   PVD (peripheral vascular disease) (H)   Renal artery stenosis (H)   Vitamin D deficiency disease     Past Surgical History:    Left arthroplasty knee  Eye surgery for cataracts  Laparoscopic tubal ligation    Family History:    Parkinson's Disease  Heart Disease  Hypertension  Cerebrovascular Disease    Social History:  The patient was accompanied to the ED by her daughter.  Smoking Status: Former Smoker  Smokeless  Tobacco: Never Used  Alcohol Use: Yes   Marital Status:        Review of Systems   Constitutional: Negative for fever.   Eyes: Negative for visual disturbance.   Respiratory: Negative for shortness of breath.    Cardiovascular: Negative for chest pain.   Gastrointestinal: Negative for abdominal pain.   Genitourinary: Negative for dysuria.   Musculoskeletal: Negative for back pain.   Neurological: Negative for headaches.   Psychiatric/Behavioral: Positive for sleep disturbance (nightmares). Negative for hallucinations.   All other systems reviewed and are negative.    Physical Exam   First Vitals:  BP: 127/85  Pulse: 75  Temp: 97.5  F (36.4  C)  Resp: 18  Weight: 63.5 kg (140 lb)  SpO2: 97 %    Physical Exam  General: Alert, appears well-developed and well-nourished. Cooperative.     In no distress  HEENT:  Head:  Atraumatic  Ears:  External ears are normal  Mouth/Throat:  Oropharynx is without erythema or exudate and mucous membranes are dry.   Eyes:   Conjunctivae normal and EOM are normal. No scleral icterus.    Pupils are equal, round, and reactive to light.   Neck:   Normal range of motion. Neck supple.  CV:  Normal rate, regular rhythm, normal heart sounds and radial and dorsalis pedis pulses are 2+ and symmetric.  Systolic murmur 3/6.  Resp:  Breath sounds are clear bilaterally    Non-labored, no retractions or accessory muscle use  GI:  Abdomen is soft, no distension, no tenderness. No rebound or guarding.  MS:  Normal range of motion. No edema.    Normal strength in all 4 extremities.     Back atraumatic.    No CVA tenderness.  Skin:  Warm and dry.  No rash or lesions noted.  Neuro:   Alert. Normal strength.  Sensation intact in all 4 extremities. GCS: 15  Psych: Normal mood and affect.    Emergency Department Course     Laboratory:  Laboratory findings were communicated with the patient who voiced understanding of the findings.  CMP: Creatinine 1.41 (H), GFR 36 (L) o/w WNL  TSH: 2.67  CBC: AWNL.  (WBC 5.2, HGB 13.2, )   UA with Microscopic: slightly cloudy yellow urine, large blood, protein albumin 30, positive nitrite, small leukocyte esterase, WBC/HPF 29 (H), RBC/ H), WBC clumps present, many bacteria, mucous present o/w WNL  Urine Culture Aerobic Bacterial: Pending     Emergency Department Course:  Nursing notes and vitals reviewed.  0632: I performed an exam of the patient as documented above.  IV was inserted and blood was drawn for laboratory testing, results above.  The patient provided a urine sample here in the emergency department. This was sent for laboratory testing, findings above.   0816: I spoke with Pharmacy service regarding patient's presentation, findings, and plan of care.   0823: Patient rechecked and updated.   I discussed the treatment plan with the patient. They expressed understanding of this plan and consented to discharge. They will be discharged home with instructions for care and follow up. In addition, the patient will return to the emergency department if their symptoms persist, worsen, if new symptoms arise or if there is any concern.  All questions were answered.   I personally reviewed the laboratory results with the Patient and her daughter and answered all related questions prior to discharge.     Impression & Plan      Medical Decision Making:  Wendy Rocha is a 77 year old female with a complex past medical history who presents with concern for recurrent nightmares over the past 2 weeks. The patient has had recent changes in her medications over the last one month. She started on Donepezil on August 15 and has had that subsequently increased to 10 mg per day. She also started taking Buspirone for anxiety and night time hallucinations on August 22. She was previously on Resperidone with her last dose on August 27. There is a great note by Emelyn Foy in the patient's chart documenting these changes in her medications. On my review of her medication list,  it appears that Donepezil could have a slight effect or adverse reaction of hallucinations both visual and auditory and/or CNS effects. This recent increase from 5 mg to 10 mg could certainly be a cause of the patient's new onset nightmares over the last 2 weeks. I would like the patient to follow up with her primary care provider though we did do a small work up here to evaluate for renal dysfunction or liver dysfunction as well as possible infection as the cause of her nightmares. This work up has been significant for UTI. In discussion with pharmacist, we decided to treat with Keflex based on her prior history of UTIs.     Pt will f/u with PCP regarding urine culture results and sensitivities. Patient will follow up with primary care in the next 2 days for a recheck and discussion of her medication list and whether this could be contributing to his nightmares. The patient will return to the emergency department if she develops a fever or other concerning symptoms. The patient is vitally stable and afebrile. Both patient and daughter understood return precautions and follow up instructions. Discharged to home.     Critical Care time:  none    Diagnosis:    ICD-10-CM    1. Urinary tract infection in elderly patient N39.0    2. Nightmares F51.5      Disposition:  discharged to home with below prescription    Discharge Medications:  New Prescriptions    CEPHALEXIN (KEFLEX) 500 MG CAPSULE    Take 1 capsule (500 mg) by mouth 3 times daily for 10 days     Scribe Disclosure:  Lita HUTTON, am serving as a scribe at 6:37 AM on 9/18/2017 to document services personally performed by Arnol Birch MD based on my observations and the provider's statements to me.    9/18/2017   [facility] EMERGENCY DEPARTMENT       Arnol Birch MD  09/18/17 2822

## 2017-09-18 NOTE — ED AVS SNAPSHOT
[facility] Emergency Department    [address]    [facility] [address]    Phone:  [phone]    Fax:  [phone]                                       Wendy Rocha   MRN: [id number]    Department:  [facility] Emergency Department   Date of Visit:  9/18/2017           Patient Information     Date Of Birth          1940        Your diagnoses for this visit were:     Urinary tract infection in elderly patient     Nightmares        You were seen by Arnol Birch MD.      Follow-up Information     Follow up with Natalee Owens MD. Schedule an appointment as soon as possible for a visit in 2 days.    Specialty:  Internal Medicine    Why:  For re-check from today's ED visit    Contact information:    [address] [facility] [address]  [phone]          Follow up with [facility] Emergency Department.    Specialty:  EMERGENCY MEDICINE    Why:  If symptoms worsen    Contact information:    [facility] [id number] [phone]        Discharge Instructions       Discharge Instructions  Urinary Tract Infection  You or your child have been diagnosed with a urinary tract infection, or UTI. The urinary tract includes the kidneys (which make urine/pee), ureters (the tubes that carry urine/pee from the kidneys to the bladder), the bladder (which stores urine/pee), and urethra (the tube that carries urine/pee out of the bladder). Urinary tract infections occur when bacteria travel up the urethra into the bladder (bladder infection) and, in some cases, from there into the kidneys (kidney infection).  Generally, every Emergency Department visit should have a follow-up clinic visit with either a primary or a specialty clinic/provider. Please follow-up as instructed by your emergency provider today.  Return to the Emergency Department if:    You or your child have severe back pain.    You or your child are vomiting  (throwing up) so that you cannot take your medicine.    You or your child have a new fever (had not previously had a fever) over 101 F.    You or your child have confusion or are very weak, or feel very ill.    Your child seems much more ill, will not wake up, will not respond right, or is crying for a long time and will not calm down.    You or your child are showing signs of dehydration. These signs may include decreased urination (pee), dry mouth/gums/tongue, or decreased activity.    Follow-up with your provider:     Children under 24 months need to be seen by their regular provider within one week after a diagnosis of a UTI. It may be necessary to do some more tests to look at the child s kidney or bladder.    You should begin to feel better within 24 - 48 hours of starting your antibiotic; follow-up with your regular clinic/doctor/provider if this is not the case.    Treatment:     You will be treated with an antibiotic to kill the bacteria. We have to make an educated guess, based on what we know about common bacteria and antibiotics, as to which antibiotic will work for your infection. We will be correct most times but there will be some cases where the antibiotic chosen is not correct (see urine cultures below).    Take a pain medication such as acetaminophen (Tylenol ) or ibuprofen (Advil , Motrin , Nuprin ).    Phenazopyridine (Pyridium , Uristat ) is a prescription medication that numbs the bladder to reduce the burning pain of some UTIs.  The same medication is available in a non-prescription version (Azo-Standard , Urodol ). This medication will change the color of the urine and tears (usually blue or orange). If you wear contacts, do not wear them while taking this medication as they may be stained by the medication.    Urine Cultures:    If indicated, a urine culture may have been performed today. This test generally takes 24-48 hours to complete so the results are not known at this time. The results  "can confirm that an infection is present but also determine which antibiotic is effective for the specific bacteria that is causing the infection. If your urine culture shows that the antibiotic you were given today will not work to treat your infection, we will attempt to contact you to make arrangements to change the antibiotic. If the culture confirms that the antibiotic is effective for your infection, you will not be contacted. We often recommend follow-up with your regular physician/provider on the culture results regardless of this process.    Antibiotic Warning:     If you have been placed on antibiotics - watch for signs of allergic reaction.  These include rash, lip swelling, difficulty breathing, wheezing, and dizziness.  If you develop any of these symptoms, stop the antibiotic immediately and go to an emergency room or urgent care for evaluation.    Probiotics: If you have been given an antibiotic, you may want to also take a probiotic pill or eat yogurt with live cultures. Probiotics have \"good bacteria\" to help your intestines stay healthy. Studies have shown that probiotics help prevent diarrhea and other intestine problems (including C. diff infection) when you take antibiotics. You can buy these without a prescription in the pharmacy section of the store.   If you were given a prescription for medicine here today, be sure to read all of the information (including the package insert) that comes with your prescription.  This will include important information about the medicine, its side effects, and any warnings that you need to know about.  The pharmacist who fills the prescription can provide more information and answer questions you may have about the medicine.  If you have questions or concerns that the pharmacist cannot address, please call or return to the Emergency Department.   Remember that you can always come back to the Emergency Department if you are not able to see your regular provider " in the amount of time listed above, if you get any new symptoms, or if there is anything that worries you.      Future Appointments        Provider Department Dept Phone Center    11/1/2017 1:45 PM Cesilia Plascencia, [facility] [phone]     11/14/2017 10:30 AM Emelyn Lott [facility] [phone] [place]      24 Hour Appointment Hotline       To make an appointment at any [facility], call [phone] ([phone]). If you don't have a family doctor or clinic, we will help you find one. [facility] are conveniently located to serve the needs of you and your family.             Review of your medicines      START taking        Dose / Directions Last dose taken    cephALEXin 500 MG capsule   Commonly known as:  KEFLEX   Dose:  500 mg   Quantity:  30 capsule        Take 1 capsule (500 mg) by mouth 3 times daily for 10 days   Refills:  0          Our records show that you are taking the medicines listed below. If these are incorrect, please call your family doctor or clinic.        Dose / Directions Last dose taken    ACE/ARB NOT PRESCRIBED (INTENTIONAL)        ACE & ARB not prescribed due to Worsening renal function on ACE/ARB therapy   Refills:  0        amLODIPine 10 MG tablet   Commonly known as:  NORVASC   Quantity:  90 tablet        TAKE ONE TABLET BY MOUTH ONCE DAILY   Refills:  0        ARICEPT 10 MG tablet   Dose:  10 mg   Generic drug:  donepezil        Take 10 mg by mouth   Refills:  0        ASPIRIN NOT PRESCRIBED   Commonly known as:  INTENTIONAL   Quantity:  0 each        Antiplatelet medication not prescribed intentionally due to Plavix   Refills:  0        busPIRone 5 MG tablet   Commonly known as:  BUSPAR        5 mg three times daily for anxiety, per Dr. Rosales, neurology   Refills:  0        clopidogrel 75 MG tablet   Commonly known as:  PLAVIX   Quantity:  90 tablet        TAKE ONE TABLET BY MOUTH ONCE DAILY   Refills:  0        doxazosin 4 MG tablet    Commonly known as:  CARDURA   Dose:  4 mg   Quantity:  90 tablet        Take 1 tablet (4 mg) by mouth At Bedtime   Refills:  0        metoprolol 50 MG tablet   Commonly known as:  LOPRESSOR   Dose:  25 mg   Quantity:  90 tablet        Take 0.5 tablets (25 mg) by mouth 2 times daily   Refills:  0        order for DME   Quantity:  1 Device        Machine to dispense the medication   Refills:  0        pantoprazole 40 MG EC tablet   Commonly known as:  PROTONIX   Dose:  40 mg   Quantity:  90 tablet        Take 1 tablet (40 mg) by mouth daily Take 30-60 minutes before a meal.   Refills:  3        simvastatin 40 MG tablet   Commonly known as:  ZOCOR   Dose:  40 mg   Quantity:  90 tablet        Take 1 tablet (40 mg) by mouth At Bedtime   Refills:  0        SINEMET  MG per tablet   Dose:  1 tablet   Generic drug:  carbidopa-levodopa        Take 1 tablet by mouth 3 times daily Per neurology Dr. Rosales   Refills:  0        traZODone 50 MG tablet   Commonly known as:  DESYREL   Dose:  50 mg   Quantity:  30 tablet        Take 1 tablet (50 mg) by mouth nightly as needed for sleep   Refills:  1        TYLENOL 325 MG tablet   Dose:  650 mg   Quantity:  100 tablet   Generic drug:  acetaminophen        Take 2 tablets (650 mg) by mouth every 6 hours as needed for mild pain   Refills:  0        vitamin D 2000 UNITS tablet   Dose:  3 tablet   Quantity:  30 tablet        Take 3 tablets by mouth daily   Refills:  0                Prescriptions were sent or printed at these locations (1 Prescription)                   Other Prescriptions                Printed at Department/Unit printer (1 of 1)         cephALEXin (KEFLEX) 500 MG capsule                Procedures and tests performed during your visit     CBC with platelets differential    Comprehensive metabolic panel    TSH    UA with Microscopic    Urine Culture      Orders Needing Specimen Collection     None      Pending Results     Date and Time Order Name Status  Description    9/18/2017 0641 Urine Culture In process             Pending Culture Results     Date and Time Order Name Status Description    9/18/2017 0641 Urine Culture In process             Pending Results Instructions     If you had any lab results that were not finalized at the time of your Discharge, you can call the ED Lab Result RN at [phone]. You will be contacted by this team for any positive Lab results or changes in treatment. The nurses are available 7 days a week from 10A to 6:30P.  You can leave a message 24 hours per day and they will return your call.        Test Results From Your Hospital Stay        9/18/2017  7:20 AM      Component Results     Component Value Ref Range & Units Status    Color Urine Yellow  Final    Appearance Urine Slightly Cloudy  Final    Glucose Urine Negative NEG^Negative mg/dL Final    Bilirubin Urine Negative NEG^Negative Final    Ketones Urine Negative NEG^Negative mg/dL Final    Specific Gravity Urine 1.011 1.003 - 1.035 Final    Blood Urine Large (A) NEG^Negative Final    pH Urine 6.0 5.0 - 7.0 pH Final    Protein Albumin Urine 30 (A) NEG^Negative mg/dL Final    Urobilinogen mg/dL 0.0 0.0 - 2.0 mg/dL Final    Nitrite Urine Positive (A) NEG^Negative Final    Leukocyte Esterase Urine Small (A) NEG^Negative Final    Source Catheterized Urine  Final    WBC Urine 29 (H) 0 - 2 /HPF Final    RBC Urine 178 (H) 0 - 2 /HPF Final    WBC Clumps Present (A) NEG^Negative /HPF Final    Bacteria Urine Many (A) NEG^Negative /HPF Final    Squamous Epithelial /HPF Urine <1 0 - 1 /HPF Final    Mucous Urine Present (A) NEG^Negative /LPF Final         9/18/2017  6:59 AM         9/18/2017  7:30 AM      Component Results     Component Value Ref Range & Units Status    WBC 5.2 4.0 - 11.0 10e9/L Final    RBC Count 4.33 3.8 - 5.2 10e12/L Final    Hemoglobin 13.2 11.7 - 15.7 g/dL Final    Hematocrit 39.3 35.0 - 47.0 % Final    MCV 91 78 - 100 fl Final    MCH 30.5 26.5 - 33.0 pg Final    MCHC  33.6 31.5 - 36.5 g/dL Final    RDW 12.2 10.0 - 15.0 % Final    Platelet Count 266 150 - 450 10e9/L Final    Diff Method Automated Method  Final    % Neutrophils 69.2 % Final    % Lymphocytes 14.8 % Final    % Monocytes 11.0 % Final    % Eosinophils 3.3 % Final    % Basophils 1.5 % Final    % Immature Granulocytes 0.2 % Final    Nucleated RBCs 0 0 /100 Final    Absolute Neutrophil 3.6 1.6 - 8.3 10e9/L Final    Absolute Lymphocytes 0.8 0.8 - 5.3 10e9/L Final    Absolute Monocytes 0.6 0.0 - 1.3 10e9/L Final    Absolute Eosinophils 0.2 0.0 - 0.7 10e9/L Final    Absolute Basophils 0.1 0.0 - 0.2 10e9/L Final    Abs Immature Granulocytes 0.0 0 - 0.4 10e9/L Final    Absolute Nucleated RBC 0.0  Final         9/18/2017  7:56 AM      Component Results     Component Value Ref Range & Units Status    Sodium 139 133 - 144 mmol/L Final    Potassium 3.9 3.4 - 5.3 mmol/L Final    Chloride 105 94 - 109 mmol/L Final    Carbon Dioxide 23 20 - 32 mmol/L Final    Anion Gap 11 3 - 14 mmol/L Final    Glucose 85 70 - 99 mg/dL Final    Urea Nitrogen 17 7 - 30 mg/dL Final    Creatinine 1.41 (H) 0.52 - 1.04 mg/dL Final    GFR Estimate 36 (L) >60 mL/min/1.7m2 Final    Non  GFR Calc    GFR Estimate If Black 43 (L) >60 mL/min/1.7m2 Final    African American GFR Calc    Calcium 9.2 8.5 - 10.1 mg/dL Final    Bilirubin Total 1.0 0.2 - 1.3 mg/dL Final    Albumin 3.7 3.4 - 5.0 g/dL Final    Protein Total 7.2 6.8 - 8.8 g/dL Final    Alkaline Phosphatase 48 40 - 150 U/L Final    ALT 7 0 - 50 U/L Final    AST 14 0 - 45 U/L Final         9/18/2017  8:04 AM      Component Results     Component Value Ref Range & Units Status    TSH 2.67 0.40 - 4.00 mU/L Final                Clinical Quality Measure: Blood Pressure Screening     Your blood pressure was checked while you were in the emergency department today. The last reading we obtained was  BP: 155/84 . Please read the guidelines below about what these numbers mean and what you should do  "about them.  If your systolic blood pressure (the top number) is less than 120 and your diastolic blood pressure (the bottom number) is less than 80, then your blood pressure is normal. There is nothing more that you need to do about it.  If your systolic blood pressure (the top number) is 120-139 or your diastolic blood pressure (the bottom number) is 80-89, your blood pressure may be higher than it should be. You should have your blood pressure rechecked within a year by a primary care provider.  If your systolic blood pressure (the top number) is 140 or greater or your diastolic blood pressure (the bottom number) is 90 or greater, you may have high blood pressure. High blood pressure is treatable, but if left untreated over time it can put you at risk for heart attack, stroke, or kidney failure. You should have your blood pressure rechecked by a primary care provider within the next 4 weeks.  If your provider in the emergency department today gave you specific instructions to follow-up with your doctor or provider even sooner than that, you should follow that instruction and not wait for up to 4 weeks for your follow-up visit.        Thank you for choosing [facility]       Thank you for choosing [facility] for your care. Our goal is always to provide you with excellent care. Hearing back from our patients is one way we can continue to improve our services. Please take a few minutes to complete the written survey that you may receive in the mail after you visit with us. Thank you!        [employer] Information     Alexis Bittar lets you send messages to your doctor, view your test results, renew your prescriptions, schedule appointments and more. To sign up, go to [email or web address] . Click on \"Log in\" on the left side of the screen, which will take you to the Welcome page. Then click on \"Sign up Now\" on the right side of the page.     You will be asked to enter the access code listed below, as well as some personal " information. Please follow the directions to create your username and password.     Your access code is: [email or web address]  Expires: 2017  1:59 PM     Your access code will  in 90 days. If you need help or a new code, please call your [facility] clinic or [phone].        Care EveryWhere ID     This is your Care EveryWhere ID. This could be used by other organizations to access your [facility] medical records  [id number]        Equal Access to Services     BEVERLY SALAZAR : Hadsimone ramoso Soleidy, waaxda luqadaha, qaybta kaalmada adenaida, flash garcia. So [facility] [phone].    ATENCIÓN: Si habla español, tiene a valencia disposición servicios gratuitos de asistencia lingüística. Llame al [phone].    We comply with applicable federal civil rights laws and [place] laws. We do not discriminate on the basis of race, color, national origin, age, disability sex, sexual orientation or gender identity.            After Visit Summary       This is your record. Keep this with you and show to your community pharmacist(s) and doctor(s) at your next visit.

## 2017-09-18 NOTE — DISCHARGE INSTRUCTIONS
Discharge Instructions  Urinary Tract Infection  You or your child have been diagnosed with a urinary tract infection, or UTI. The urinary tract includes the kidneys (which make urine/pee), ureters (the tubes that carry urine/pee from the kidneys to the bladder), the bladder (which stores urine/pee), and urethra (the tube that carries urine/pee out of the bladder). Urinary tract infections occur when bacteria travel up the urethra into the bladder (bladder infection) and, in some cases, from there into the kidneys (kidney infection).  Generally, every Emergency Department visit should have a follow-up clinic visit with either a primary or a specialty clinic/provider. Please follow-up as instructed by your emergency provider today.  Return to the Emergency Department if:    You or your child have severe back pain.    You or your child are vomiting (throwing up) so that you cannot take your medicine.    You or your child have a new fever (had not previously had a fever) over 101 F.    You or your child have confusion or are very weak, or feel very ill.    Your child seems much more ill, will not wake up, will not respond right, or is crying for a long time and will not calm down.    You or your child are showing signs of dehydration. These signs may include decreased urination (pee), dry mouth/gums/tongue, or decreased activity.    Follow-up with your provider:     Children under 24 months need to be seen by their regular provider within one week after a diagnosis of a UTI. It may be necessary to do some more tests to look at the child s kidney or bladder.    You should begin to feel better within 24 - 48 hours of starting your antibiotic; follow-up with your regular clinic/doctor/provider if this is not the case.    Treatment:     You will be treated with an antibiotic to kill the bacteria. We have to make an educated guess, based on what we know about common bacteria and antibiotics, as to which antibiotic will work  "for your infection. We will be correct most times but there will be some cases where the antibiotic chosen is not correct (see urine cultures below).    Take a pain medication such as acetaminophen (Tylenol ) or ibuprofen (Advil , Motrin , Nuprin ).    Phenazopyridine (Pyridium , Uristat ) is a prescription medication that numbs the bladder to reduce the burning pain of some UTIs.  The same medication is available in a non-prescription version (Azo-Standard , Urodol ). This medication will change the color of the urine and tears (usually blue or orange). If you wear contacts, do not wear them while taking this medication as they may be stained by the medication.    Urine Cultures:    If indicated, a urine culture may have been performed today. This test generally takes 24-48 hours to complete so the results are not known at this time. The results can confirm that an infection is present but also determine which antibiotic is effective for the specific bacteria that is causing the infection. If your urine culture shows that the antibiotic you were given today will not work to treat your infection, we will attempt to contact you to make arrangements to change the antibiotic. If the culture confirms that the antibiotic is effective for your infection, you will not be contacted. We often recommend follow-up with your regular physician/provider on the culture results regardless of this process.    Antibiotic Warning:     If you have been placed on antibiotics - watch for signs of allergic reaction.  These include rash, lip swelling, difficulty breathing, wheezing, and dizziness.  If you develop any of these symptoms, stop the antibiotic immediately and go to an emergency room or urgent care for evaluation.    Probiotics: If you have been given an antibiotic, you may want to also take a probiotic pill or eat yogurt with live cultures. Probiotics have \"good bacteria\" to help your intestines stay healthy. Studies have shown " that probiotics help prevent diarrhea and other intestine problems (including C. diff infection) when you take antibiotics. You can buy these without a prescription in the pharmacy section of the store.   If you were given a prescription for medicine here today, be sure to read all of the information (including the package insert) that comes with your prescription.  This will include important information about the medicine, its side effects, and any warnings that you need to know about.  The pharmacist who fills the prescription can provide more information and answer questions you may have about the medicine.  If you have questions or concerns that the pharmacist cannot address, please call or return to the Emergency Department.   Remember that you can always come back to the Emergency Department if you are not able to see your regular provider in the amount of time listed above, if you get any new symptoms, or if there is anything that worries you.

## 2017-09-18 NOTE — ED NOTES
77-year-old female presents to the ER with complaints of nightmares that have been repeated over and over. Thinks it all started when she started on several new prescriptions.

## 2017-09-18 NOTE — ED AVS SNAPSHOT
[facility] Emergency Department    [address]    [facility] [address]    Phone:  [phone]    Fax:  [phone]                                       Wendy Rocha   MRN: [id number]    Department:  [facility] Emergency Department   Date of Visit:  9/18/2017           After Visit Summary Signature Page     I have received my discharge instructions, and my questions have been answered. I have discussed any challenges I see with this plan with the nurse or doctor.    ..........................................................................................................................................  Patient/Patient Representative Signature      ..........................................................................................................................................  Patient Representative Print Name and Relationship to Patient    ..................................................               ................................................  Date                                            Time    ..........................................................................................................................................  Reviewed by Signature/Title    ...................................................              ..............................................  Date                                                            Time

## 2017-09-19 ENCOUNTER — TELEPHONE (OUTPATIENT)
Dept: INTERNAL MEDICINE | Facility: CLINIC | Age: 77
End: 2017-09-19

## 2017-09-19 ENCOUNTER — OFFICE VISIT (OUTPATIENT)
Dept: INTERNAL MEDICINE | Facility: CLINIC | Age: 77
End: 2017-09-19
Payer: COMMERCIAL

## 2017-09-19 VITALS
BODY MASS INDEX: 25.23 KG/M2 | TEMPERATURE: 97.9 F | HEART RATE: 70 BPM | SYSTOLIC BLOOD PRESSURE: 134 MMHG | WEIGHT: 142.4 LBS | OXYGEN SATURATION: 95 % | HEIGHT: 63 IN | DIASTOLIC BLOOD PRESSURE: 76 MMHG

## 2017-09-19 DIAGNOSIS — R30.0 DYSURIA: Primary | ICD-10-CM

## 2017-09-19 DIAGNOSIS — F33.3 SEVERE EPISODE OF RECURRENT MAJOR DEPRESSIVE DISORDER, WITH PSYCHOTIC FEATURES (H): Chronic | ICD-10-CM

## 2017-09-19 DIAGNOSIS — N18.30 CKD (CHRONIC KIDNEY DISEASE) STAGE 3, GFR 30-59 ML/MIN (H): ICD-10-CM

## 2017-09-19 PROCEDURE — 99496 TRANSJ CARE MGMT HIGH F2F 7D: CPT | Performed by: NURSE PRACTITIONER

## 2017-09-19 RX ORDER — CIPROFLOXACIN 250 MG/1
250 TABLET, FILM COATED ORAL 2 TIMES DAILY
Qty: 14 TABLET | Refills: 0 | Status: SHIPPED | OUTPATIENT
Start: 2017-09-19 | End: 2017-09-21

## 2017-09-19 NOTE — MR AVS SNAPSHOT
After Visit Summary   9/19/2017    Wendy Rocha    MRN: [id number]           Patient Information     Date Of Birth          1940        Visit Information        Provider Department      9/19/2017 3:40 PM Callie Calderón APRN CNP [facility]        Today's Diagnoses     Dysuria    -  1    DEPRESSION, Severe, recurrent, with psychotic features (H)        CKD (chronic kidney disease) stage 3, GFR 30-59 ml/min          Care Instructions    Stop keflex     Cipro twice daily for 7 days     Keep fluids up   Try to aim for 8 ounces in 2 hours for a few days     Follow up with any questions or concerns.             Follow-ups after your visit        Your next 10 appointments already scheduled     Nov 01, 2017  1:45 PM CDT   Office Visit with Cesilia Plascencia,    [facility]    [address] [facility] [facility] [address] [phone]           Bring a current list of meds and any records pertaining to this visit. For Physicals, please bring immunization records and any forms needing to be filled out. Please arrive 10 minutes early to complete paperwork.            Nov 14, 2017 10:30 AM CST   SHORT with Emelyn Lott [facility] ([facility])    [address]  [address]  [facility] [address] [phone]              Who to contact     If you have questions or need follow up information about today's clinic visit or your schedule please contact [facility] directly at [phone].  Normal or non-critical lab and imaging results will be communicated to you by MyChart, letter or phone within 4 business days after the clinic has received the results. If you do not hear from us within 7 days, please contact the clinic through [employer] crowdmarketing[employer] or phone. If you have a critical or abnormal lab result, we will notify you by phone as soon as possible.  Submit refill requests through [employer] or  "call your pharmacy and they will forward the refill request to us. Please allow 3 business days for your refill to be completed.          Additional Information About Your Visit        MyChart Information     [employer] lets you send messages to your doctor, view your test results, renew your prescriptions, schedule appointments and more. To sign up, go to [email or web address] . Click on \"Log in\" on the left side of the screen, which will take you to the Welcome page. Then click on \"Sign up Now\" on the right side of the page.     You will be asked to enter the access code listed below, as well as some personal information. Please follow the directions to create your username and password.     Your access code is: [email or web address]  Expires: 2017  1:59 PM     Your access code will  in 90 days. If you need help or a new code, please call your [facility] clinic or [phone].        Care EveryWhere ID     This is your Care EveryWhere ID. This could be used by other organizations to access your [facility] medical records  [id number]        Your Vitals Were     Pulse Temperature Height Pulse Oximetry Breastfeeding? BMI (Body Mass Index)    70 97.9  F (36.6  C) (Oral) 5' 3\" (1.6 m) 95% No 25.23 kg/m2       Blood Pressure from Last 3 Encounters:   17 134/76   17 159/89   17 123/73    Weight from Last 3 Encounters:   17 142 lb 6.4 oz (64.6 kg)   17 140 lb (63.5 kg)   17 145 lb 8 oz (66 kg)              We Performed the Following     DEPRESSION ACTION PLAN (DAP)          Today's Medication Changes          These changes are accurate as of: 17  4:14 PM.  If you have any questions, ask your nurse or doctor.               Start taking these medicines.        Dose/Directions    ciprofloxacin 250 MG tablet   Commonly known as:  CIPRO   Used for:  Dysuria, CKD (chronic kidney disease) stage 3, GFR 30-59 ml/min   Started by:  Callie Calderón APRN CNP        Dose:  250 mg   Take 1 " tablet (250 mg) by mouth 2 times daily   Quantity:  14 tablet   Refills:  0         Stop taking these medicines if you haven't already. Please contact your care team if you have questions.     cephALEXin 500 MG capsule   Commonly known as:  KEFLEX   Stopped by:  Callie Calderón APRN CNP                Where to get your medicines      These medications were sent to [facility] [address] [place], [place] [id number] [address] [place] [address]  [address] [place] [address]     Phone:  [phone]     ciprofloxacin 250 MG tablet                Primary Care Provider Office Phone # Fax #    Natalee Owens [phone]       [address] [facility] [address]        Equal Access to Services     [facility] : Hadii aad ku hadasho Soallisonali, waaxda luqadaha, qaybta kaalmada adeegyada, waxay familiain geoavnni nagel . So [facility] [phone].    ATENCIÓN: Si habla español, tiene a valencia disposición servicios gratuitos de asistencia lingüística. [facility] [phone].    We comply with applicable federal civil rights laws and [place] laws. We do not discriminate on the basis of race, color, national origin, age, disability sex, sexual orientation or gender identity.            Thank you!     Thank you for choosing [facility]  for your care. Our goal is always to provide you with excellent care. Hearing back from our patients is one way we can continue to improve our services. Please take a few minutes to complete the written survey that you may receive in the mail after your visit with us. Thank you!             Your Updated Medication List - Protect others around you: Learn how to safely use, store and throw away your medicines at www.disposemymeds.org.          This list is accurate as of: 9/19/17  4:14 PM.  Always use your most recent med list.                   Brand Name Dispense Instructions for use Diagnosis    ACE/ARB NOT PRESCRIBED (INTENTIONAL)       ACE & ARB not prescribed due to Worsening renal function on ACE/ARB therapy    Renal artery stenosis (H), Essential hypertension with goal blood pressure less than 140/90       amLODIPine 10 MG tablet    NORVASC    90 tablet    TAKE ONE TABLET BY MOUTH ONCE DAILY    Essential hypertension with goal blood pressure less than 140/90       ARICEPT 10 MG tablet   Generic drug:  donepezil      Take 10 mg by mouth        ASPIRIN NOT PRESCRIBED    INTENTIONAL    0 each    Antiplatelet medication not prescribed intentionally due to Plavix    Essential hypertension with goal blood pressure less than 140/90, PVD (peripheral vascular disease) (H), Aortic root dilatation (H), Renal artery stenosis (H)       busPIRone 5 MG tablet    BUSPAR     5 mg three times daily for anxiety, per Dr. Rosales, neurology        ciprofloxacin 250 MG tablet    CIPRO    14 tablet    Take 1 tablet (250 mg) by mouth 2 times daily    Dysuria, CKD (chronic kidney disease) stage 3, GFR 30-59 ml/min       clopidogrel 75 MG tablet    PLAVIX    90 tablet    TAKE ONE TABLET BY MOUTH ONCE DAILY    PVD (peripheral vascular disease) (H), Aortic root dilatation (H), Renal artery stenosis (H)       doxazosin 4 MG tablet    CARDURA    90 tablet    Take 1 tablet (4 mg) by mouth At Bedtime    Essential hypertension with goal blood pressure less than 140/90, PVD (peripheral vascular disease) (H), Aortic root dilatation (H), Renal artery stenosis (H)       metoprolol 50 MG tablet    LOPRESSOR    90 tablet    Take 0.5 tablets (25 mg) by mouth 2 times daily    Essential hypertension with goal blood pressure less than 140/90       order for DME     1 Device    Machine to dispense the medication    Severe episode of recurrent major depressive disorder, with psychotic features (H), Memory loss       pantoprazole 40 MG EC tablet    PROTONIX    90 tablet    Take 1 tablet (40 mg) by mouth daily Take 30-60 minutes before a meal.    Perforated duodenal ulcer (H)        simvastatin 40 MG tablet    ZOCOR    90 tablet    Take 1 tablet (40 mg) by mouth At Bedtime    Hyperlipidemia LDL goal <100       SINEMET  MG per tablet   Generic drug:  carbidopa-levodopa      Take 1 tablet by mouth 3 times daily Per neurology Dr. Rosales        traZODone 50 MG tablet    DESYREL    30 tablet    Take 1 tablet (50 mg) by mouth nightly as needed for sleep    Persistent insomnia       TYLENOL 325 MG tablet   Generic drug:  acetaminophen     100 tablet    Take 2 tablets (650 mg) by mouth every 6 hours as needed for mild pain    Severe episode of recurrent major depressive disorder, with psychotic features (H), Memory loss       vitamin D 2000 UNITS tablet     30 tablet    Take 3 tablets by mouth daily

## 2017-09-19 NOTE — NURSING NOTE
"Chief Complaint   Patient presents with     ER F/U     Anorexia       Initial /76 (BP Location: Left arm, Patient Position: Chair, Cuff Size: Adult Large)  Pulse 70  Temp 97.9  F (36.6  C) (Oral)  Ht 5' 3\" (1.6 m)  Wt 142 lb 6.4 oz (64.6 kg)  SpO2 95%  Breastfeeding? No  BMI 25.23 kg/m2 Estimated body mass index is 25.23 kg/(m^2) as calculated from the following:    Height as of this encounter: 5' 3\" (1.6 m).    Weight as of this encounter: 142 lb 6.4 oz (64.6 kg).  Medication Reconciliation: complete   Klaudia Holguin CMA      "

## 2017-09-19 NOTE — PATIENT INSTRUCTIONS
Stop keflex     Cipro twice daily for 7 days     Keep fluids up   Try to aim for 8 ounces in 2 hours for a few days     Follow up with any questions or concerns.

## 2017-09-19 NOTE — TELEPHONE ENCOUNTER
Call from daughter stating that her Mom was seen in ED for a UTI. States she has taken 3 doses of Keflex and gets severe diarrhea every time she takes the medication. Pt has not taken it yet today due to this. Advised to continue to hold medication until seen for f/u this afternoon.

## 2017-09-19 NOTE — PROGRESS NOTES
Clinic Care Coordination Contact  [facility]/Voicemail    Referral Source: Care Team  Clinical Data: Care Coordinator Outreach  Outreach attempted x 2.  Left message on voicemail for pt with call back information and requested return call. Pt was seen by provider at the [facility]. No current clinic care coordination needs indicated.    Plan:  Care Coordinator will try to reach patient again in 2-3 weeks.    ANA Bhandari, [facility]  Social Work - Care Coordinator  [facility], [place]onaldo Bojorquez  Phone: [phone]

## 2017-09-19 NOTE — PROGRESS NOTES
*ER F/U-Pt is somewhat confused about the nightmares. When she was in the ED yesterday, she reported that she was having them very frequently and that it was a very big problem for her. When asked today, she reports she has not had them for a while since d/c one of her medications (the only one she has d/c recently was the Risperdal when she met with Emelyn Lott. Aricept was also increased at that same OV).  *Anorexia-Daughter reports that her mother has not been eating very well for a while (not sure if this was since before or after her recent med changes).      SUBJECTIVE:   Wendy Rocha is a 77 year old female who presents to clinic today for the following health issues:      ED/UC Followup:    Facility:  [facility]   Date of visit: 9/18/17  Reason for visit: UTI and nightmares  Current Status: Has been getting very severe diarrhea after every dose of the Cephalexin, has not taken any more today. Would like to try something different?     In past culture was sensitive to cephalosporin, macrobid and cipro   She ahs had cipro in past   Instructed on side effect - if pain in foot ankle or any new pain to stop medication and call   Daughter and patient agreed to this                Problem list and histories reviewed & adjusted, as indicated.  Additional history:     Patient Active Problem List   Diagnosis     Advanced directives, counseling/discussion     Perforated duodenal ulcer (H)     Renal artery stenosis (H)^^     Proteinuria     Anxiety     Muscle aches     CKD (chronic kidney disease) stage 3, GFR 30-59 ml/min     Abnormal ECG     PVD (peripheral vascular disease) (H)     Vitamin D deficiency     Fever     Aspiration pneumonia (H)     Vitamin D deficiency disease     GERD (gastroesophageal reflux disease)     Hyperlipidemia LDL goal <100     Carotid disease, bilateral (H)^^     DEPRESSION, Severe, recurrent, with psychotic features (H)     Hyperparathyroidism (H)     Hallucinations     HTN,  "goal < 140/90 (H)     Cognitive disorder     Past Surgical History:   Procedure Laterality Date     ARTHROPLASTY KNEE      left     EYE SURGERY      cataracts     LAPAROSCOPIC TUBAL LIGATION         Social History   Substance Use Topics     Smoking status: Former Smoker     Quit date: 2010     Smokeless tobacco: Never Used     Alcohol use Yes      Comment: 1 x per year on a holidy     Family History   Problem Relation Age of Onset     Neurologic Disorder Brother 70     Parkinson's     HEART DISEASE Mother       90yo      Hypertension Father       48yo MVA     Family History Negative Sister      CEREBROVASCULAR DISEASE Sister      Has had 2      Family History Negative Sister      Family History Negative Son      Family History Negative Son      Family History Negative Daughter      Family History Negative Daughter      Family History Negative Daughter              Reviewed and updated as needed this visit by clinical staffTobacco  Med Hx  Surg Hx  Fam Hx  Soc Hx      Reviewed and updated as needed this visit by Provider         ROS:  Constitutional, HEENT, cardiovascular, pulmonary, GI, , musculoskeletal, neuro, skin, endocrine and psych systems are negative, except as otherwise noted.      OBJECTIVE:   /76 (BP Location: Left arm, Patient Position: Chair, Cuff Size: Adult Large)  Pulse 70  Temp 97.9  F (36.6  C) (Oral)  Ht 5' 3\" (1.6 m)  Wt 142 lb 6.4 oz (64.6 kg)  SpO2 95%  Breastfeeding? No  BMI 25.23 kg/m2  Body mass index is 25.23 kg/(m^2).  GENERAL: alert and no distress  RESP: lungs clear to auscultation - no rales, rhonchi or wheezes  CV: regular rate and rhythm  ABDOMEN: soft, nontender, no hepatosplenomegaly, no masses and bowel sounds normal  No CVA tenderness   PSYCH: mentation appears normal, affect normal/bright  Daughter answers questions for her - states she forgets things     Diagnostic Test Results:  Reviewed ER notes and results     ASSESSMENT/PLAN: "             1. Dysuria    - ciprofloxacin (CIPRO) 250 MG tablet; Take 1 tablet (250 mg) by mouth 2 times daily  Dispense: 14 tablet; Refill: 0    2. DEPRESSION, Severe, recurrent, with psychotic features (H)    - DEPRESSION ACTION PLAN (DAP)    3. CKD (chronic kidney disease) stage 3, GFR 30-59 ml/min    - ciprofloxacin (CIPRO) 250 MG tablet; Take 1 tablet (250 mg) by mouth 2 times daily  Dispense: 14 tablet; Refill: 0    Patient Instructions   Stop keflex     Cipro twice daily for 7 days     Keep fluids up   Try to aim for 8 ounces in 2 hours for a few days     Follow up with any questions or concerns.           YAZAN Elliott [facility]

## 2017-09-19 NOTE — LETTER
My Depression Action Plan  Name: Wendy Rocha   Date of Birth 1940  Date: 9/19/2017    My doctor: Natalee Owens   My clinic: [place] [address]  [facility] [address]  [phone]          GREEN    ZONE   Good Control    What it looks like:     Things are going generally well. You have normal up s and down s. You may even feel depressed from time to time, but bad moods usually last less than a day.   What you need to do:  1. Continue to care for yourself (see self care plan)  2. Check your depression survival kit and update it as needed  3. Follow your physician s recommendations including any medication.  4. Do not stop taking medication unless you consult with your physician first.           YELLOW         ZONE Getting Worse    What it looks like:     Depression is starting to interfere with your life.     It may be hard to get out of bed; you may be starting to isolate yourself from others.    Symptoms of depression are starting to last most all day and this has happened for several days.     You may have suicidal thoughts but they are not constant.   What you need to do:     1. Call your care team, your response to treatment will improve if you keep your care team informed of your progress. Yellow periods are signs an adjustment may need to be made.     2. Continue your self-care, even if you have to fake it!    3. Talk to someone in your support network    4. Open up your depression survival kit           RED    ZONE Medical Alert - Get Help    What it looks like:     Depression is seriously interfering with your life.     You may experience these or other symptoms: You can t get out of bed most days, can t work or engage in other necessary activities, you have trouble taking care of basic hygiene, or basic responsibilities, thoughts of suicide or death that will not go away, self-injurious behavior.     What you need to do:  1. Call your  care team and request a same-day appointment. If they are not available (weekends or after hours) call your local crisis line, emergency room or 911.      Electronically signed by: Klaudia Holguin, September 19, 2017    Depression Self Care Plan / Survival Kit    Self-Care for Depression  Here s the deal. Your body and mind are really not as separate as most people think.  What you do and think affects how you feel and how you feel influences what you do and think. This means if you do things that people who feel good do, it will help you feel better.  Sometimes this is all it takes.  There is also a place for medication and therapy depending on how severe your depression is, so be sure to consult with your medical provider and/ or Behavioral Health Consultant if your symptoms are worsening or not improving.     In order to better manage my stress, I will:    Exercise  Get some form of exercise, every day. This will help reduce pain and release endorphins, the  feel good  chemicals in your brain. This is almost as good as taking antidepressants!  This is not the same as joining a gym and then never going! (they count on that by the way ) It can be as simple as just going for a walk or doing some gardening, anything that will get you moving.      Hygiene   Maintain good hygiene (Get out of bed in the morning, Make your bed, Brush your teeth, Take a shower, and Get dressed like you were going to work, even if you are unemployed).  If your clothes don't fit try to get ones that do.    Diet  I will strive to eat foods that are good for me, drink plenty of water, and avoid excessive sugar, caffeine, alcohol, and other mood-altering substances.  Some foods that are helpful in depression are: complex carbohydrates, B vitamins, flaxseed, fish or fish oil, fresh fruits and vegetables.    Psychotherapy  I agree to participate in Individual Therapy (if recommended).    Medication  If prescribed medications, I agree to take  them.  Missing doses can result in serious side effects.  I understand that drinking alcohol, or other illicit drug use, may cause potential side effects.  I will not stop my medication abruptly without first discussing it with my provider.    Staying Connected With Others  I will stay in touch with my friends, family members, and my primary care provider/team.    Use your imagination  Be creative.  We all have a creative side; it doesn t matter if it s oil painting, sand castles, or mud pies! This will also kick up the endorphins.    Witness Beauty  (AKA stop and smell the roses) Take a look outside, even in mid-winter. Notice colors, textures. Watch the squirrels and birds.     Service to others  Be of service to others.  There is always someone else in need.  By helping others we can  get out of ourselves  and remember the really important things.  This also provides opportunities for practicing all the other parts of the program.    Humor  Laugh and be silly!  Adjust your TV habits for less news and crime-drama and more comedy.    Control your stress  Try breathing deep, massage therapy, biofeedback, and meditation. Find time to relax each day.     My support system    Clinic Contact:  Phone number:    Contact 1:  Phone number:    Contact 2:  Phone number:    Mormon/:  Phone number:    Therapist:  Phone number:    Local crisis center:    Phone number:    Other community support:  Phone number:

## 2017-09-21 ENCOUNTER — TELEPHONE (OUTPATIENT)
Dept: EMERGENCY MEDICINE | Facility: CLINIC | Age: 77
End: 2017-09-21

## 2017-09-21 DIAGNOSIS — R30.0 DYSURIA: Primary | ICD-10-CM

## 2017-09-21 LAB
BACTERIA SPEC CULT: ABNORMAL
BACTERIA SPEC CULT: ABNORMAL
Lab: ABNORMAL
SPECIMEN SOURCE: ABNORMAL

## 2017-09-21 RX ORDER — CEFDINIR 300 MG/1
300 CAPSULE ORAL DAILY
Qty: 10 CAPSULE | Refills: 0 | Status: SHIPPED | OUTPATIENT
Start: 2017-09-21 | End: 2017-10-03

## 2017-09-21 NOTE — TELEPHONE ENCOUNTER
[facility] Department Lab result notification:    [place] ED lab result protocol used  Urine Culture    Reason for call  Notify of lab results, assess symptoms,  review ED providers recommendations/discharge instructions (if necessary) and advise per ED lab result f/u protocol    Lab Result  Final Urine Culture Report on 9/21/17.  [place] ED discharge antibiotic's: Cephalexin (Keflex) 500 mg capsule was change because of diarrhea to ciprofloxacin (CIPRO) 250 MG tablet, BID for 7 days by clinic provider Callie Calderón APRN CNP.  Bacteria #1: >100,000 colonies/mL Escherichia coli is [RESISTANT] to Ciprofloxacin   Bacteria #2: Aerococcus urinae  is [NOT TESTED] to Ciprofloxacin   Recommendations in treatment per  ED Lab result protocol.  Information table from ED Provider visit on 9/18/17  Symptoms reported at ED visit (Chief complaint, HPI) Chief Complaint:  Nightmares     HPI   Wendy Rocha is a 77 year old female who presents to the emergency department today for evaluation of nightmares. The patient reports terrible nightmares for the past 2 weeks that make it difficult for her to sleep. She reports several medication changes including halving her metoprolol dose and doubling her Aricept. Her daughter states that the Buspirone, Sinemet and Donepezil were first prescribed one month ago. Her daughter also notes issues with incontinence and is not sure how often the patient changes her pads, therefore she is concerned for a UTI as well. The patient denies any recent travel, fevers, chest pain, shortness of breath, abdominal pain, dysuria, back pain, headaches, vision changes or hallucinations.   ED providers Impression and Plan (applicable information) Medical Decision Making:  Wendy Rocha is a 77 year old female with a complex past medical history who presents with concern for recurrent nightmares over the past 2 weeks. The patient has had recent changes in her medications over the last  one month. She started on Donepezil on August 15 and has had that subsequently increased to 10 mg per day. She also started taking Buspirone for anxiety and night time hallucinations on August 22. She was previously on Resperidone with her last dose on August 27. There is a great note by Emelyn Foy in the patient's chart documenting these changes in her medications. On my review of her medication list, it appears that Donepezil could have a slight effect or adverse reaction of hallucinations both visual and auditory and/or CNS effects. This recent increase from 5 mg to 10 mg could certainly be a cause of the patient's new onset nightmares over the last 2 weeks. I would like the patient to follow up with her primary care provider though we did do a small work up here to evaluate for renal dysfunction or liver dysfunction as well as possible infection as the cause of her nightmares. This work up has been significant for UTI. In discussion with pharmacist, we decided to treat with Keflex based on her prior history of UTIs.      Pt will f/u with PCP regarding urine culture results and sensitivities. Patient will follow up with primary care in the next 2 days for a recheck and discussion of her medication list and whether this could be contributing to his nightmares. The patient will return to the emergency department if she develops a fever or other concerning symptoms. The patient is vitally stable and afebrile. Both patient and daughter understood return precautions and follow up instructions. Discharged to home.    Miscellaneous information 1. Urinary tract infection in elderly patient N39.0     2. Nightmares F51.5         RN Assessment (Patient s current Symptoms), include time called.  [Insert Left message here if message left]  2:35 pm Patient says she really likes the new antibiotic Cipro, as all the diarrhea has gone away and she slept well last night.    RN Recommendations/Instructions per [facility] lab  result protocol  I told the patient that Cipro was not able to treat the bacteria in her final urine culture and it would need to be switched. Patient would like her clinic providers to decide which antibiotic to switch her to at this point. I sent FYI to the Care Team for both Callie Calderón CNP and Dr Greenberg and will also called the clinic to make sure they get the information to the providers today.   Please Contact your PCP clinic or return to the Emergency department if your:    Symptoms return.    Symptoms do not improve after 3 days on antibiotic.    Symptoms do not resolve after completing antibiotic.    Symptoms worsen or other concerning symptom's.    PCP follow-up Questions asked: YES       [RN Name]  Maria Eugenia Booker RN  [facility] RN  Lung Nodule and ED Lab Result F/u RN  Epic pool (ED late result f/u RN): [id number]  # [phone]    Copy of Lab result   Component Collected Lab   Specimen Description 09/18/2017  6:55    Catheterized Urine   Special Requests 09/18/2017  6:55 AM 73   Specimen received in preservative   Culture Micro (Abnormal) 09/18/2017  6:55    >100,000 colonies/mL   Escherichia coli      Culture Micro (Abnormal) 09/18/2017  6:55    50,000 to 100,000 colonies/mL   Aerococcus urinae   Identification obtained by MALDI-TOF mass spectrometry research use only database. Test   characteristics determined and verified by the Infect[facility] Diagnostic Laboratory   ([facility]) [place], [place].      Culture & Susceptibility   AEROCOCCUS URINAE   Antibiotic Interpretation Sensitivity Unit Method Status   CEFOTAXIME Sensitive <=0.25 ug/mL ANDREAS Final   CEFTRIAXONE Sensitive <=0.25 ug/mL ANDREAS Final   LEVOFLOXACIN Sensitive <=0.25 ug/mL ANDREAS Final   PENICILLIN Sensitive <=0.03 ug/mL ANDREAS Final   TETRACYCLINE Sensitive <=0.5 ug/mL ANDREAS Final   Trimethoprim/Sulfa Sensitive <0.25/4.75 ug/mL ANDREAS Final   VANCOMYCIN Sensitive 0.25 ug/mL ANDREAS Final         ESCHERICHIA COLI   Antibiotic  Interpretation Sensitivity Unit Method Status   AMPICILLIN Resistant >=32 ug/mL ANDREAS Final   AMPICILLIN/SULBACTAM Sensitive 4 ug/mL ANDREAS Final   CEFAZOLIN Sensitive <=4 ug/mL ANDREAS Final   Comment: Cefazolin ANDREAS breakpoints are for the treatment of uncomplicated urinary tract   infections.  For the treatment of systemic infections, please contact the   laboratory for additional testing.   CEFEPIME Sensitive <=1 ug/mL ANDREAS Final   CEFOXITIN Sensitive <=4 ug/mL ANDREAS Final   CEFTAZIDIME Sensitive <=1 ug/mL ANDREAS Final   CEFTRIAXONE Sensitive <=1 ug/mL ANDREAS Final   CIPROFLOXACIN Resistant >=4 ug/mL ANDREAS Final   GENTAMICIN Sensitive <=1 ug/mL ANDREAS Final   LEVOFLOXACIN Resistant >=8 ug/mL ANDREAS Final   NITROFURANTOIN Sensitive <=16 ug/mL ANDREAS Final   Piperacillin/Tazo Sensitive <=4 ug/mL ANDREAS Final   TOBRAMYCIN Sensitive <=1 ug/mL ANDREAS Final   Trimethoprim/Sulfa Resistant >=16/304 ug/mL ANDREAS Final

## 2017-09-21 NOTE — TELEPHONE ENCOUNTER
Let's have her stop cipro and try omnicef once daily for 10 days as one bacteria in urine resistant to cipro

## 2017-09-22 NOTE — TELEPHONE ENCOUNTER
"Pt's daughter (Eva) called-Stated someone called her yesterday regarding pt's UA results, and about changing abx. Relayed below info. Eva stated \"when was someone going to call her mother about this?\" Relayed we received info late in the day yesterday, but I would have called her this am (when I saw message). Eva wanted to know if Callie left anymore information besides below info. Relayed no, but relayed that many people are resistant to certain abx's, and abx have to be changed. Eva verbalized understanding.    Also Eva stated pt took her Cipro this am, so when should she pt start the new abx? Tomorrow?   "

## 2017-09-22 NOTE — TELEPHONE ENCOUNTER
One bacteria was sensitive to cipro, so that she took today not a problem  Other was resistant  Both bacteria sensitive to new antibiotic   She can start new one when she gets it   It is also once a day because of her age and kidney function

## 2017-09-25 ENCOUNTER — TELEPHONE (OUTPATIENT)
Dept: INTERNAL MEDICINE | Facility: CLINIC | Age: 77
End: 2017-09-25

## 2017-09-25 NOTE — TELEPHONE ENCOUNTER
Daughter Eva left message on triage voicemail asking for a return call regarding mother's recent UTI and decreased appetite.  Didn't give any more information so called and left message for Eva to return call for more information.  SUE Mallory R.N.

## 2017-09-25 NOTE — TELEPHONE ENCOUNTER
Spoke with Eva, pt is on her 3rd antibiotic to treat UTI.  Advised Eva that infection and certainly antibiotics can cause a loss of appetite.  Encouraged Eva to ask pt what sounds good to her and try to offer her foods she likes or requests.  Also could try [place] Instant Breakfast or Ensure/Boost type products.  Eva will try to encourage pt to eat better and will make appt for pt to be seen if still has poor appetite after completing antibiotics.  SUE Mallory R.N.

## 2017-09-28 ENCOUNTER — HOSPITAL ENCOUNTER (EMERGENCY)
Facility: CLINIC | Age: 77
Discharge: HOME | End: 2017-09-29
Attending: EMERGENCY MEDICINE | Admitting: EMERGENCY MEDICINE
Payer: MEDICARE

## 2017-09-28 DIAGNOSIS — R53.1 WEAKNESS GENERALIZED: ICD-10-CM

## 2017-09-28 DIAGNOSIS — N39.0 ACUTE UTI: ICD-10-CM

## 2017-09-28 LAB
ALBUMIN SERPL-MCNC: 3.8 G/DL (ref 3.4–5)
ALP SERPL-CCNC: 47 U/L (ref 40–150)
ALT SERPL W P-5'-P-CCNC: <6 U/L (ref 0–50)
ANION GAP SERPL CALCULATED.3IONS-SCNC: 9 MMOL/L (ref 3–14)
AST SERPL W P-5'-P-CCNC: 13 U/L (ref 0–45)
BASOPHILS # BLD AUTO: 0.1 10E9/L (ref 0–0.2)
BASOPHILS NFR BLD AUTO: 0.8 %
BILIRUB SERPL-MCNC: 0.8 MG/DL (ref 0.2–1.3)
BUN SERPL-MCNC: 23 MG/DL (ref 7–30)
CALCIUM SERPL-MCNC: 9 MG/DL (ref 8.5–10.1)
CHLORIDE SERPL-SCNC: 103 MMOL/L (ref 94–109)
CO2 SERPL-SCNC: 25 MMOL/L (ref 20–32)
CREAT SERPL-MCNC: 1.48 MG/DL (ref 0.52–1.04)
DIFFERENTIAL METHOD BLD: ABNORMAL
EOSINOPHIL # BLD AUTO: 0.1 10E9/L (ref 0–0.7)
EOSINOPHIL NFR BLD AUTO: 1.2 %
ERYTHROCYTE [DISTWIDTH] IN BLOOD BY AUTOMATED COUNT: 12.5 % (ref 10–15)
GFR SERPL CREATININE-BSD FRML MDRD: 34 ML/MIN/1.7M2
GLUCOSE SERPL-MCNC: 112 MG/DL (ref 70–99)
HCT VFR BLD AUTO: 40.5 % (ref 35–47)
HGB BLD-MCNC: 13.8 G/DL (ref 11.7–15.7)
IMM GRANULOCYTES # BLD: 0 10E9/L (ref 0–0.4)
IMM GRANULOCYTES NFR BLD: 0.6 %
LACTATE BLD-SCNC: 1.2 MMOL/L (ref 0.7–2)
LYMPHOCYTES # BLD AUTO: 0.7 10E9/L (ref 0.8–5.3)
LYMPHOCYTES NFR BLD AUTO: 10.6 %
MCH RBC QN AUTO: 30.7 PG (ref 26.5–33)
MCHC RBC AUTO-ENTMCNC: 34.1 G/DL (ref 31.5–36.5)
MCV RBC AUTO: 90 FL (ref 78–100)
MONOCYTES # BLD AUTO: 0.5 10E9/L (ref 0–1.3)
MONOCYTES NFR BLD AUTO: 8.1 %
NEUTROPHILS # BLD AUTO: 5.2 10E9/L (ref 1.6–8.3)
NEUTROPHILS NFR BLD AUTO: 78.7 %
NRBC # BLD AUTO: 0 10*3/UL
NRBC BLD AUTO-RTO: 0 /100
PLATELET # BLD AUTO: 245 10E9/L (ref 150–450)
POTASSIUM SERPL-SCNC: 3.4 MMOL/L (ref 3.4–5.3)
PROT SERPL-MCNC: 7.3 G/DL (ref 6.8–8.8)
RBC # BLD AUTO: 4.49 10E12/L (ref 3.8–5.2)
SODIUM SERPL-SCNC: 137 MMOL/L (ref 133–144)
TROPONIN I SERPL-MCNC: <0.015 UG/L (ref 0–0.04)
WBC # BLD AUTO: 6.6 10E9/L (ref 4–11)

## 2017-09-28 PROCEDURE — 93005 ELECTROCARDIOGRAM TRACING: CPT

## 2017-09-28 PROCEDURE — 83605 ASSAY OF LACTIC ACID: CPT | Performed by: EMERGENCY MEDICINE

## 2017-09-28 PROCEDURE — 99284 EMERGENCY DEPT VISIT MOD MDM: CPT | Mod: 25

## 2017-09-28 PROCEDURE — 85025 COMPLETE CBC W/AUTO DIFF WBC: CPT | Performed by: EMERGENCY MEDICINE

## 2017-09-28 PROCEDURE — 80053 COMPREHEN METABOLIC PANEL: CPT | Performed by: EMERGENCY MEDICINE

## 2017-09-28 PROCEDURE — 84484 ASSAY OF TROPONIN QUANT: CPT | Performed by: EMERGENCY MEDICINE

## 2017-09-28 PROCEDURE — 25000128 H RX IP 250 OP 636: Performed by: EMERGENCY MEDICINE

## 2017-09-28 PROCEDURE — 96361 HYDRATE IV INFUSION ADD-ON: CPT

## 2017-09-28 PROCEDURE — 96374 THER/PROPH/DIAG INJ IV PUSH: CPT

## 2017-09-28 RX ORDER — ONDANSETRON 2 MG/ML
4 INJECTION INTRAMUSCULAR; INTRAVENOUS EVERY 30 MIN PRN
Status: DISCONTINUED | OUTPATIENT
Start: 2017-09-28 | End: 2017-09-29 | Stop reason: HOSPADM

## 2017-09-28 RX ORDER — SODIUM CHLORIDE 9 MG/ML
1000 INJECTION, SOLUTION INTRAVENOUS CONTINUOUS
Status: DISCONTINUED | OUTPATIENT
Start: 2017-09-28 | End: 2017-09-29 | Stop reason: HOSPADM

## 2017-09-28 RX ADMIN — SODIUM CHLORIDE 1000 ML: 9 INJECTION, SOLUTION INTRAVENOUS at 23:08

## 2017-09-28 RX ADMIN — ONDANSETRON 4 MG: 2 INJECTION INTRAMUSCULAR; INTRAVENOUS at 23:08

## 2017-09-28 ASSESSMENT — ENCOUNTER SYMPTOMS
FREQUENCY: 0
FEVER: 0
WEAKNESS: 1
DYSURIA: 0
FATIGUE: 1
DIARRHEA: 0
NAUSEA: 1

## 2017-09-28 NOTE — ED AVS SNAPSHOT
[facility] Emergency Department    [address]    [facility] [address]    Phone:  [phone]    Fax:  [phone]                                       Wendy Rocha   MRN: [id number]    Department:  [facility] Emergency Department   Date of Visit:  9/28/2017           After Visit Summary Signature Page     I have received my discharge instructions, and my questions have been answered. I have discussed any challenges I see with this plan with the nurse or doctor.    ..........................................................................................................................................  Patient/Patient Representative Signature      ..........................................................................................................................................  Patient Representative Print Name and Relationship to Patient    ..................................................               ................................................  Date                                            Time    ..........................................................................................................................................  Reviewed by Signature/Title    ...................................................              ..............................................  Date                                                            Time

## 2017-09-28 NOTE — ED AVS SNAPSHOT
[facility] Emergency Department    [address]    [facility] [address]    Phone:  [phone]    Fax:  [phone]                                       Wendy Rocha   MRN: [id number]    Department:  [facility] Emergency Department   Date of Visit:  9/28/2017           Patient Information     Date Of Birth          1940        Your diagnoses for this visit were:     Weakness generalized     Acute UTI        You were seen by Jose Fuchs MD.      Follow-up Information     Follow up with Natalee Owens MD. Call in 2 days.    Specialty:  Internal Medicine    Contact information:    [address]  [facility] [address]  [phone]          Discharge Instructions         Weakness (Uncertain Cause)  Based on your exam today, the exact cause of your weakness is not certain. However, your weakness does not seem to be a sign of a serious illness at this time. Keep an eye on your symptoms and get medical advice as instructed below.  Home care    Rest at home today. Do not over-exert yourself.    Take any medicine as prescribed.    For the next few days, drink extra fluids (unless your healthcare provider wants you to restrict fluids for other reasons). Do not skip meals.  Follow-up care  Follow up with your healthcare provider or as advised.  When to seek medical advice  Call your healthcare provider for any of the following    Worsening of your symptoms    Symptoms don't start getting better within 2 days    Fever of 100.4  F (38  C) or higher, or as directed by your healthcare provider     Call 911  Get emergency medical care for any of these:    Chest, arm, neck, jaw or upper back pain    Trouble breathing    Numbness or weakness of the face, one arm or one leg    Slurred speech, confusion, trouble speaking, walking or seeing    Blood in vomit or stool (black or red color)    Loss of consciousness  Date Last Reviewed: 6/10/2015    [id number] [employer]. [address] [place] [address], [place] [address]. All rights reserved. This information is not intended as a substitute for professional medical care. Always follow your healthcare professional's instructions.      Discharge Instructions  Urinary Tract Infection  You have urinary tract infection, or UTI. The urinary tract includes the kidneys (which make urine), ureters (the tubes that carry urine from the kidneys to the bladder), the bladder (which stores urine), and urethra (the tube that carries urine out of the bladder).  Urinary tract infections occur when bacteria travel up the urethra into the bladder. We suspect a UTI based on chemical and microscopic findings in your urine, but if there is a question about your findings, we will do a culture to see if bacteria grow. A urine culture takes several days. You should always follow-up with your primary physician to find out about results of your culture if one was done.   Return to the Emergency Department if:    You have severe back pain.    You are vomiting so that you can t take your medicine, or have signs of dehydration (such as urinating less than 3 times per day).    You have fever over 101.5 degrees F.    You have significant confusion or are very weak, or feel very ill.    Your child seems much more ill, won t wake up, won t respond right, or is crying for a long time and won t calm down.    Your child is showing signs of dehydration, Signs of dehydration can be:  o Your infant has had no wet diapers in 4-5 hours.  o Your older child has not passed urine in 6-8 hours.  o Your infant or child starts to have dry mouth and lips, or no saliva or tears.    Follow-up with your doctor:     Children under 24 months need to be seen by their regular doctor within one week after a diagnosis of a UTI. It may be necessary to do some imaging tests to look at the child s kidney or bladder.    You should begin to feel better within 24 - 48 hours of  "starting your antibiotic.  If you do not, you need to be seen again.      Treatment:     You will be treated with an antibiotic to kill the bacteria. We have to make an educated guess as to which antibiotic will work for your infection. In most healthy people, we can guess right almost all of the time. Sometimes a culture is done to show which antibiotics will work. This usually takes 2-3 days. When the culture is done, we may have to contact you to put you on a different antibiotic.    Take a pain medication such as Tylenol  (acetaminophen), Advil  (ibuprofen), Nuprin  (ibuprofen), or Aleve  (naproxen). If you have been given a narcotic such as Vicodin  (hydrocodone with acetaminophen), Percocet  (oxycodone with acetaminophen), or codeine, do not drive for four hours after you have taken it. If the narcotic contains Tylenol  (acetaminophen), do not take Tylenol  with it. All narcotics will cause constipation, so eat a high fiber diet.      Pyridium  (phenazopyridine) or Uristat  (phenazopyridine) is a prescription medication that numbs the bladder to reduce the burning pain of some UTIs.  The same medication is available in a non-prescription version called Azo-Standard  (phenazopyridine), Urodol  (phenazopyridine), or other brand names. This medication will change the color of the urine and tears (usually blue or orange). If you wear contacts, do not wear them while taking this medication as they may be stained by the medication.    Antibiotic Warning:     If you have been placed on antibiotics - watch for signs of allergic reaction.  These include rash, lip swelling, difficulty breathing, wheezing, and dizziness.  If you develop any of these symptoms, stop the antibiotic immediately and go to an emergency room or urgent care for evaluation.    Probiotics: If you have been given an antibiotic, you may want to also take a probiotic pill or eat yogurt with live cultures. Probiotics have \"good bacteria\" to help your " intestines stay healthy. Studies have shown that probiotics help prevent diarrhea and other intestine problems (including C. diff infection) when you take antibiotics. You can buy these without a prescription in the pharmacy section of the store.   If you were given a prescription for medicine here today, be sure to read all of the information (including the package insert) that comes with your prescription.  This will include important information about the medicine, its side effects, and any warnings that you need to know about.  The pharmacist who fills the prescription can provide more information and answer questions you may have about the medicine.  If you have questions or concerns that the pharmacist cannot address, please call or return to the Emergency Department.   Opioid Medication Information    Pain medications are among the most commonly prescribed medicines, so we are including this information for all our patients. If you did not receive pain medication or get a prescription for pain medicine, you can ignore it.     You may have been given a prescription for an opioid (narcotic) pain medicine and/or have received a pain medicine while here in the Emergency Department. These medicines can make you drowsy or impaired. You must not drive, operate dangerous equipment, or engage in any other dangerous activities while taking these medications. If you drive while taking these medications, you could be arrested for DUI, or driving under the influence. Do not drink any alcohol while you are taking these medications.     Opioid pain medications can cause addiction. If you have a history of chemical dependency of any type, you are at a higher risk of becoming addicted to pain medications.  Only take these prescribed medications to treat your pain when all other options have been tried. Take it for as short a time and as few doses as possible. Store your pain pills in a secure place, as they are frequently  stolen and provide a dangerous opportunity for children or visitors in your house to start abusing these powerful medications. We will not replace any lost or stolen medicine.  As soon as your pain is better, you should flush all your remaining medication.     Many prescription pain medications contain Tylenol  (acetaminophen), including Vicodin , Tylenol #3 , Norco , Lortab , and Percocet .  You should not take any extra pills of Tylenol  if you are using these prescription medications or you can get very sick.  Do not ever take more than 3000 mg of acetaminophen in any 24 hour period.    All opioids tend to cause constipation. Drink plenty of water and eat foods that have a lot of fiber, such as fruits, vegetables, prune juice, apple juice and high fiber cereal.  Take a laxative if you don t move your bowels at least every other day. Miralax , Milk of Magnesia, Colace , or Senna  can be used to keep you regular.      Remember that you can always come back to the Emergency Department if you are not able to see your regular doctor in the amount of time listed above, if you get any new symptoms, or if there is anything that worries you.        Future Appointments        Provider Department Dept Phone Center    10/2/2017 10:00 AM Emelyn Lott [facility] [phone] [place]    10/3/2017 10:40 AM Natalee Owens MD [facility] [phone] [place]    11/1/2017 1:45 PM Cesilia Plascencia DO [facility] [phone]     11/14/2017 10:30 AM Emelyn Lott [facility] [phone] [place]      24 Hour Appointment Hotline       To make an appointment at any [facility], call [phone] ([phone]). If you don't have a family doctor or clinic, we will help you find one. [facility] are conveniently located to serve the needs of you and your family.             Review of your medicines      START taking        Dose / Directions Last dose taken     ondansetron 4 MG ODT tab   Commonly known as:  ZOFRAN ODT   Dose:  4 mg   Quantity:  10 tablet        Take 1 tablet (4 mg) by mouth every 8 hours as needed for nausea   Refills:  0          Our records show that you are taking the medicines listed below. If these are incorrect, please call your family doctor or clinic.        Dose / Directions Last dose taken    ACE/ARB NOT PRESCRIBED (INTENTIONAL)        ACE & ARB not prescribed due to Worsening renal function on ACE/ARB therapy   Refills:  0        amLODIPine 10 MG tablet   Commonly known as:  NORVASC   Quantity:  90 tablet        TAKE ONE TABLET BY MOUTH ONCE DAILY   Refills:  0        ARICEPT 10 MG tablet   Dose:  10 mg   Generic drug:  donepezil        Take 10 mg by mouth   Refills:  0        ASPIRIN NOT PRESCRIBED   Commonly known as:  INTENTIONAL   Quantity:  0 each        Antiplatelet medication not prescribed intentionally due to Plavix   Refills:  0        busPIRone 5 MG tablet   Commonly known as:  BUSPAR        5 mg three times daily for anxiety, per Dr. Rosales, neurology   Refills:  0        clopidogrel 75 MG tablet   Commonly known as:  PLAVIX   Quantity:  90 tablet        TAKE ONE TABLET BY MOUTH ONCE DAILY   Refills:  0        doxazosin 4 MG tablet   Commonly known as:  CARDURA   Dose:  4 mg   Quantity:  90 tablet        Take 1 tablet (4 mg) by mouth At Bedtime   Refills:  0        metoprolol 50 MG tablet   Commonly known as:  LOPRESSOR   Dose:  25 mg   Quantity:  90 tablet        Take 0.5 tablets (25 mg) by mouth 2 times daily   Refills:  0        order for DME   Quantity:  1 Device        Machine to dispense the medication   Refills:  0        pantoprazole 40 MG EC tablet   Commonly known as:  PROTONIX   Dose:  40 mg   Quantity:  90 tablet        Take 1 tablet (40 mg) by mouth daily Take 30-60 minutes before a meal.   Refills:  3        simvastatin 40 MG tablet   Commonly known as:  ZOCOR   Dose:  40 mg   Quantity:  90 tablet        Take 1 tablet  (40 mg) by mouth At Bedtime   Refills:  0        SINEMET  MG per tablet   Dose:  1 tablet   Generic drug:  carbidopa-levodopa        Take 1 tablet by mouth 3 times daily Per neurology Dr. Rosales   Refills:  0        traZODone 50 MG tablet   Commonly known as:  DESYREL   Dose:  50 mg   Quantity:  30 tablet        Take 1 tablet (50 mg) by mouth nightly as needed for sleep   Refills:  1        TYLENOL 325 MG tablet   Dose:  650 mg   Quantity:  100 tablet   Generic drug:  acetaminophen        Take 2 tablets (650 mg) by mouth every 6 hours as needed for mild pain   Refills:  0        vitamin D 2000 UNITS tablet   Dose:  3 tablet   Quantity:  30 tablet        Take 3 tablets by mouth daily   Refills:  0          ASK your doctor about these medications        Dose / Directions Last dose taken    * cefdinir 300 MG capsule   Commonly known as:  OMNICEF   Dose:  300 mg   What changed:  Another medication with the same name was added. Make sure you understand how and when to take each.   Quantity:  10 capsule   Ask about: Which instructions should I use?        Take 1 capsule (300 mg) by mouth daily   Refills:  0        * cefdinir 300 MG capsule   Commonly known as:  OMNICEF   Dose:  300 mg   What changed:  You were already taking a medication with the same name, and this prescription was added. Make sure you understand how and when to take each.   Quantity:  14 capsule   Ask about: Which instructions should I use?        Take 1 capsule (300 mg) by mouth 2 times daily for 7 days   Refills:  0        * Notice:  This list has 2 medication(s) that are the same as other medications prescribed for you. Read the directions carefully, and ask your doctor or other care provider to review them with you.            Prescriptions were sent or printed at these locations (2 Prescriptions)                   Other Prescriptions                Printed at Department/Unit printer (2 of 2)         ondansetron (ZOFRAN ODT) 4 MG ODT tab                cefdinir (OMNICEF) 300 MG capsule                Procedures and tests performed during your visit     CBC with platelets differential    Comprehensive metabolic panel    EKG 12-lead, tracing only    Lactic acid whole blood    Non indwelling bladder catheter (Quick cath)    Troponin I    UA with Microscopic    Urine Culture Aerobic Bacterial      Orders Needing Specimen Collection     None      Pending Results     Date and Time Order Name Status Description    9/29/2017 0158 Urine Culture Aerobic Bacterial In process     9/28/2017 2240 EKG 12-lead, tracing only Preliminary             Pending Culture Results     Date and Time Order Name Status Description    9/29/2017 0158 Urine Culture Aerobic Bacterial In process             Pending Results Instructions     If you had any lab results that were not finalized at the time of your Discharge, you can call the ED Lab Result RN at [phone]. You will be contacted by this team for any positive Lab results or changes in treatment. The nurses are available 7 days a week from 10A to 6:30P.  You can leave a message 24 hours per day and they will return your call.        Test Results From Your Hospital Stay        9/28/2017 10:51 PM      Component Results     Component Value Ref Range & Units Status    WBC 6.6 4.0 - 11.0 10e9/L Final    RBC Count 4.49 3.8 - 5.2 10e12/L Final    Hemoglobin 13.8 11.7 - 15.7 g/dL Final    Hematocrit 40.5 35.0 - 47.0 % Final    MCV 90 78 - 100 fl Final    MCH 30.7 26.5 - 33.0 pg Final    MCHC 34.1 31.5 - 36.5 g/dL Final    RDW 12.5 10.0 - 15.0 % Final    Platelet Count 245 150 - 450 10e9/L Final    Diff Method Automated Method  Final    % Neutrophils 78.7 % Final    % Lymphocytes 10.6 % Final    % Monocytes 8.1 % Final    % Eosinophils 1.2 % Final    % Basophils 0.8 % Final    % Immature Granulocytes 0.6 % Final    Nucleated RBCs 0 0 /100 Final    Absolute Neutrophil 5.2 1.6 - 8.3 10e9/L Final    Absolute Lymphocytes 0.7 (L) 0.8 - 5.3  10e9/L Final    Absolute Monocytes 0.5 0.0 - 1.3 10e9/L Final    Absolute Eosinophils 0.1 0.0 - 0.7 10e9/L Final    Absolute Basophils 0.1 0.0 - 0.2 10e9/L Final    Abs Immature Granulocytes 0.0 0 - 0.4 10e9/L Final    Absolute Nucleated RBC 0.0  Final         9/28/2017 10:51 PM      Component Results     Component Value Ref Range & Units Status    Lactic Acid 1.2 0.7 - 2.0 mmol/L Final         9/28/2017 11:25 PM      Component Results     Component Value Ref Range & Units Status    Sodium 137 133 - 144 mmol/L Final    Potassium 3.4 3.4 - 5.3 mmol/L Final    Chloride 103 94 - 109 mmol/L Final    Carbon Dioxide 25 20 - 32 mmol/L Final    Anion Gap 9 3 - 14 mmol/L Final    Glucose 112 (H) 70 - 99 mg/dL Final    Urea Nitrogen 23 7 - 30 mg/dL Final    Creatinine 1.48 (H) 0.52 - 1.04 mg/dL Final    GFR Estimate 34 (L) >60 mL/min/1.7m2 Final    Non  GFR Calc    GFR Estimate If Black 41 (L) >60 mL/min/1.7m2 Final    African American GFR Calc    Calcium 9.0 8.5 - 10.1 mg/dL Final    Bilirubin Total 0.8 0.2 - 1.3 mg/dL Final    Albumin 3.8 3.4 - 5.0 g/dL Final    Protein Total 7.3 6.8 - 8.8 g/dL Final    Alkaline Phosphatase 47 40 - 150 U/L Final    ALT <6 0 - 50 U/L Final    AST 13 0 - 45 U/L Final         9/28/2017 11:14 PM      Component Results     Component Value Ref Range & Units Status    Troponin I ES <0.015 0.000 - 0.045 ug/L Final    The 99th percentile for upper reference range is 0.045 ug/L.  Troponin values   in the range of 0.045 - 0.120 ug/L may be associated with risks of adverse   clinical events.           9/29/2017  1:53 AM      Component Results     Component Value Ref Range & Units Status    Color Urine Yellow  Final    Appearance Urine Slightly Cloudy  Final    Glucose Urine Negative NEG^Negative mg/dL Final    Bilirubin Urine Negative NEG^Negative Final    Ketones Urine 5 (A) NEG^Negative mg/dL Final    Specific Gravity Urine 1.012 1.003 - 1.035 Final    Blood Urine Small (A)  NEG^Negative Final    pH Urine 6.0 5.0 - 7.0 pH Final    Protein Albumin Urine 30 (A) NEG^Negative mg/dL Final    Urobilinogen mg/dL 0.0 0.0 - 2.0 mg/dL Final    Nitrite Urine Positive (A) NEG^Negative Final    Leukocyte Esterase Urine Moderate (A) NEG^Negative Final    Source Catheterized Urine  Final    WBC Urine 90 (H) 0 - 2 /HPF Final    RBC Urine 4 (H) 0 - 2 /HPF Final    Bacteria Urine Many (A) NEG^Negative /HPF Final    Mucous Urine Present (A) NEG^Negative /LPF Final         9/29/2017  2:08 AM                Clinical Quality Measure: Blood Pressure Screening     Your blood pressure was checked while you were in the emergency department today. The last reading we obtained was  BP: 150/84 . Please read the guidelines below about what these numbers mean and what you should do about them.  If your systolic blood pressure (the top number) is less than 120 and your diastolic blood pressure (the bottom number) is less than 80, then your blood pressure is normal. There is nothing more that you need to do about it.  If your systolic blood pressure (the top number) is 120-139 or your diastolic blood pressure (the bottom number) is 80-89, your blood pressure may be higher than it should be. You should have your blood pressure rechecked within a year by a primary care provider.  If your systolic blood pressure (the top number) is 140 or greater or your diastolic blood pressure (the bottom number) is 90 or greater, you may have high blood pressure. High blood pressure is treatable, but if left untreated over time it can put you at risk for heart attack, stroke, or kidney failure. You should have your blood pressure rechecked by a primary care provider within the next 4 weeks.  If your provider in the emergency department today gave you specific instructions to follow-up with your doctor or provider even sooner than that, you should follow that instruction and not wait for up to 4 weeks for your follow-up visit.       "  Thank you for choosing [facility]       Thank you for choosing [facility] for your care. Our goal is always to provide you with excellent care. Hearing back from our patients is one way we can continue to improve our services. Please take a few minutes to complete the written survey that you may receive in the mail after you visit with us. Thank you!        [employer] Information     [employer] lets you send messages to your doctor, view your test results, renew your prescriptions, schedule appointments and more. To sign up, go to [email or web address] . Click on \"Log in\" on the left side of the screen, which will take you to the Welcome page. Then click on \"Sign up Now\" on the right side of the page.     You will be asked to enter the access code listed below, as well as some personal information. Please follow the directions to create your username and password.     Your access code is: [email or web address]  Expires: 2017  2:22 AM     Your access code will  in 90 days. If you need help or a new code, please call your [facility] clinic or [phone].        Care EveryWhere ID     This is your Care EveryWhere ID. This could be used by other organizations to access your [facility] medical records  [id number]        Equal Access to Services     FRANCIS SALAZAR : Charlie ramoso Trell, waaxda luqadaha, qaybta kaalmada ademaxwellyada, flash garcia. So [facility] [phone].    ATENCIÓN: Si habla español, tiene a valencia disposición servicios gratuitos de asistencia lingüística. Llame al [phone].    We comply with applicable federal civil rights laws and [place] laws. We do not discriminate on the basis of race, color, national origin, age, disability sex, sexual orientation or gender identity.            After Visit Summary       This is your record. Keep this with you and show to your community pharmacist(s) and doctor(s) at your next visit.                  "

## 2017-09-29 ENCOUNTER — CARE COORDINATION (OUTPATIENT)
Dept: CARE COORDINATION | Facility: CLINIC | Age: 77
End: 2017-09-29

## 2017-09-29 VITALS
RESPIRATION RATE: 16 BRPM | WEIGHT: 142 LBS | TEMPERATURE: 97.6 F | BODY MASS INDEX: 25.16 KG/M2 | DIASTOLIC BLOOD PRESSURE: 87 MMHG | OXYGEN SATURATION: 95 % | HEIGHT: 63 IN | SYSTOLIC BLOOD PRESSURE: 141 MMHG

## 2017-09-29 LAB
ALBUMIN UR-MCNC: 30 MG/DL
APPEARANCE UR: ABNORMAL
BACTERIA #/AREA URNS HPF: ABNORMAL /HPF
BILIRUB UR QL STRIP: NEGATIVE
COLOR UR AUTO: YELLOW
GLUCOSE UR STRIP-MCNC: NEGATIVE MG/DL
HGB UR QL STRIP: ABNORMAL
INTERPRETATION ECG - MUSE: NORMAL
KETONES UR STRIP-MCNC: 5 MG/DL
LEUKOCYTE ESTERASE UR QL STRIP: ABNORMAL
MUCOUS THREADS #/AREA URNS LPF: PRESENT /LPF
NITRATE UR QL: POSITIVE
PH UR STRIP: 6 PH (ref 5–7)
RBC #/AREA URNS AUTO: 4 /HPF (ref 0–2)
SOURCE: ABNORMAL
SP GR UR STRIP: 1.01 (ref 1–1.03)
UROBILINOGEN UR STRIP-MCNC: 0 MG/DL (ref 0–2)
WBC #/AREA URNS AUTO: 90 /HPF (ref 0–2)

## 2017-09-29 PROCEDURE — 87088 URINE BACTERIA CULTURE: CPT | Performed by: EMERGENCY MEDICINE

## 2017-09-29 PROCEDURE — 87086 URINE CULTURE/COLONY COUNT: CPT | Performed by: EMERGENCY MEDICINE

## 2017-09-29 PROCEDURE — 81001 URINALYSIS AUTO W/SCOPE: CPT | Performed by: EMERGENCY MEDICINE

## 2017-09-29 PROCEDURE — 87186 SC STD MICRODIL/AGAR DIL: CPT | Performed by: EMERGENCY MEDICINE

## 2017-09-29 RX ORDER — CEFDINIR 300 MG/1
300 CAPSULE ORAL 2 TIMES DAILY
Qty: 14 CAPSULE | Refills: 0 | Status: SHIPPED | OUTPATIENT
Start: 2017-09-29 | End: 2017-10-03

## 2017-09-29 RX ORDER — ONDANSETRON 4 MG/1
4 TABLET, ORALLY DISINTEGRATING ORAL EVERY 8 HOURS PRN
Qty: 10 TABLET | Refills: 0 | Status: SHIPPED | OUTPATIENT
Start: 2017-09-29 | End: 2017-10-02

## 2017-09-29 NOTE — ED PROVIDER NOTES
History     Chief Complaint:  Generalized weakness     HPI   Wendy Rocha is a 77 year old female with a complicated past medical who presents with generalized weakness and loss of appetite for the past few days. The patient is currently dealing with a UTI that was diagnosed on 9/18, and according to her daughter who is with her she is currently trying her third antibiotic to cure it (Cefdinir day 7). The patient reports a loss of appetite that is secondary to nausea. She complains of weakness and fatigue as well. She denies any pain, fever, chest pain, shortness of breath, diarrhea.     Allergies:  Lisinopril     Medications:    Cefdinir (Day 7)   Amlodipine   Metoprolol  Plavix  Donepezil   Sinemet   Buspirone   Zocor   Trazodone   Protonix     Past Medical History:    Acute renal failure   Anxiety   Carotid stenosis   Chronic kidney disease  GERD  Hyperlipidemia  Hypertension   Lung nodule   Muscle aches  Perforated duodenal ulcer   Proteinuria  Peripheral vascular disease  Renal artery stenosis     Past Surgical History:    Left knee arthroplasty   Cataract surgery   Tubal ligation     Family History:    Parkinson's disease--Brother   Heart disease--Mother   Hypertension--Father   Cerebrovascular disease--Sister     Social History:  Marital Status:    Presents to the ED with daughters   Tobacco Use: Former smoker, quit 2010  Alcohol Use: Rare   PCP: Natalee Owens     Review of Systems   Constitutional: Positive for fatigue. Negative for fever.   Gastrointestinal: Positive for nausea. Negative for diarrhea.   Genitourinary: Negative for dysuria, frequency and urgency.   Neurological: Positive for weakness.   All other systems reviewed and are negative.    Physical Exam     Patient Vitals for the past 24 hrs:   BP Temp Temp src Heart Rate Resp SpO2 Height Weight   09/29/17 0233 - - - - - 95 % - -   09/29/17 0232 141/87 - - - - - - -   09/29/17 0200 150/84 - - - - 97 % - -   09/29/17  "0140 160/78 - - - - 93 % - -   09/29/17 0000 154/86 - - - - 98 % - -   09/28/17 2337 - - - - - 98 % - -   09/28/17 2320 - - - - - 96 % - -   09/28/17 2319 (!) 166/98 - - - - - - -   09/28/17 2224 159/87 - - - - - - -   09/28/17 2213 - 97.6  F (36.4  C) Temporal 59 16 96 % 1.6 m (5' 3\") 64.4 kg (142 lb)     Physical Exam  Constitutional:  Oriented to person, place, and time. Well-appearing.  HENT:   Head:    Normocephalic.   Mouth/Throat:   Oropharynx is clear and moist.   Eyes:    EOM are normal. Pupils are equal, round, and reactive to light.   Neck:    Neck supple.   Cardiovascular:  Normal rate, regular rhythm and normal heart sounds.      Exam reveals no gallop and no friction rub.       No murmur heard.  Pulmonary/Chest:  Effort normal and breath sounds normal.      No respiratory distress. No wheezes. No rales.      No reproducible chest wall pain.  Abdominal:   Soft. No distension. No tenderness. No rebound and no guarding.   Musculoskeletal:  Normal range of motion. 2+ distal equal pulses. No leg calf tenderness, swelling or edema.   Neurological:   Alert and oriented to person, place, and time.           Moves all 4 extremities spontaneously.  Cranial nerves grossly intact.  Skin:    No rash noted. No pallor.      Emergency Department Course   ECG:  Indication: Weakness   Time: 2255  Rate 53 bpm. NM interval 164. QRS duration 104. QT/QTc 494/463.   Sinus bradycardia. Inferior infarct, age undetermined.   No acute ST changes.  Read time: 2257     Laboratory:  Blood:  CMP: Creatinine 1.48, GFR 34, otherwise WNL  CBC:  WBC 6.6, HGB 13.8, , otherwise WNL  Lactic acid: 1.2  Troponin: <0.015    Urine:  Color Urine (no units)   Date Value   09/29/2017 Yellow     Appearance Urine (no units)   Date Value   09/29/2017 Slightly Cloudy     Glucose Urine (mg/dL)   Date Value   09/29/2017 Negative     Bilirubin Urine (no units)   Date Value   09/29/2017 Negative     Ketones Urine (mg/dL)   Date Value   09/29/2017 " 5 (A)     Specific Gravity Urine (no units)   Date Value   09/29/2017 1.012     pH Urine (pH)   Date Value   09/29/2017 6.0     Protein Albumin Urine (mg/dL)   Date Value   09/29/2017 30 (A)     Nitrite Urine (no units)   Date Value   09/29/2017 Positive (A)     Leukocyte Esterase Urine (no units)   Date Value   09/29/2017 Moderate (A)   Urine Culture: pending     Interventions:  (2308) Normal Saline, 1 liter, IV bolus  (2308) Zofran, 4 mg, IV injection    Emergency Department Course:  Nursing notes and vitals reviewed.  (2232) I entered the room with my scribe.   I performed an exam of the patient as documented above.  A peripheral IV was established. Blood was drawn from the patient. This was sent for laboratory testing, findings above.    EKG was done, interpretation as above.   Urine sample was obtained via straight catheterization and sent for laboratory analysis, findings above.   Findings and plan explained to the patient. Patient discharged home with instructions regarding supportive care, medications, and reasons to return. The importance of close follow-up was reviewed. The patient was prescribed Zofran, Omnicef.    I personally reviewed the laboratory results with the patient and daughters and answered all related questions prior to discharge.      Impression & Plan      Medical Decision Making:  Wendy Rocha is a 77 year old female who presents with generalized weakness. Differential includes urinary tract infection, ACS equivalent, dehydration, electrolyte abnormality, sepsis, or other causes. She did have the above workup as seen. The workup thus far is non-specific. UA unfortunately does show continued urinary tract infection. I did review her urine culture which was sensitive to Omnicef and unfortunately there is not a good alternative antibiotic to place the patient on. Urine culture has been repeated. She is currently on a 10 day course and I will add on another week of this at which point she  was told to follow up with her primary care physician and will get a call back if her culture be not sensitive to this. I see no reason for admission and/or IV antibiotics at this time as she has no elevated white blood cell count, lactic acidosis, or fever. She was told to return for any worsening symptoms or concerns.     Critical Care time:  none    Diagnosis:    ICD-10-CM   1. Weakness generalized R53.1   2. Acute UTI N39.0     Disposition:  discharged to home    Discharge Medications:  Discharge Medication List as of 9/29/2017  2:26 AM      START taking these medications    Details   ondansetron (ZOFRAN ODT) 4 MG ODT tab Take 1 tablet (4 mg) by mouth every 8 hours as needed for nausea, Disp-10 tablet, R-0, Local Print      !! cefdinir (OMNICEF) 300 MG capsule Take 1 capsule (300 mg) by mouth 2 times daily for 7 days, Disp-14 capsule, R-0, Local Print       !! - Potential duplicate medications found. Please discuss with provider.        Scribe disclosure:  I, Yeyo Alegria, am serving as a scribe on 9/28/2017 at 10:32 PM to personally document services performed by Dr. Fuchs based on my observations and the provider's statements to me.   [facility] EMERGENCY DEPARTMENT       Jose Fuchs MD  09/29/17 0502

## 2017-09-29 NOTE — PROGRESS NOTES
"Clinic Care Coordination Contact      Referral Source: Care Team  Clinical Data: Care Coordinator Outreach  Spoke to pt's daughter (Eva) who stated that pt was resting after being in the ED most of the night. Eva stated that pt is not eating much or drinking much for fluids. Eva is concerned that the medications are causing this as a side effect. Eva stated that pt ate a whole piece of white bread, Cheerios, and Activa yogurt this morning. Eva stated that pt not drinking much water or 7up. Eva stated that last evening pt drank a whole glass of water in the ED and that she \"has not done that in awhile\". Eva would like PCP and MTM to be aware of her concerns. SW sent a message to both of them.    Plan: Care Coordinator will try to reach patient again in 5-7 business days.    ANA Bhandari, [facility]  Social Work - Care Coordinator  [facility] [place], [place], and [place]  Phone: [phone]        "

## 2017-09-29 NOTE — ED NOTES
Patient with history of incontinence. Patient attempted to void on own at bedside commode but was unable to. Daughter at bedside.

## 2017-09-29 NOTE — ED NOTES
"A&Ox4. ABC's intact. Pt c/o generalized weakness, decreased appetite and feeling \"sick to my stomach.\" Pt denies pain. Is on Omnicef for UTI.    "

## 2017-09-29 NOTE — DISCHARGE INSTRUCTIONS
Weakness (Uncertain Cause)  Based on your exam today, the exact cause of your weakness is not certain. However, your weakness does not seem to be a sign of a serious illness at this time. Keep an eye on your symptoms and get medical advice as instructed below.  Home care    Rest at home today. Do not over-exert yourself.    Take any medicine as prescribed.    For the next few days, drink extra fluids (unless your healthcare provider wants you to restrict fluids for other reasons). Do not skip meals.  Follow-up care  Follow up with your healthcare provider or as advised.  When to seek medical advice  Call your healthcare provider for any of the following    Worsening of your symptoms    Symptoms don't start getting better within 2 days    Fever of 100.4  F (38  C) or higher, or as directed by your healthcare provider     Call 911  Get emergency medical care for any of these:    Chest, arm, neck, jaw or upper back pain    Trouble breathing    Numbness or weakness of the face, one arm or one leg    Slurred speech, confusion, trouble speaking, walking or seeing    Blood in vomit or stool (black or red color)    Loss of consciousness  Date Last Reviewed: 6/10/2015    [id number] [employer]. [address] [place] [address]. All rights reserved. This information is not intended as a substitute for professional medical care. Always follow your healthcare professional's instructions.      Discharge Instructions  Urinary Tract Infection  You have urinary tract infection, or UTI. The urinary tract includes the kidneys (which make urine), ureters (the tubes that carry urine from the kidneys to the bladder), the bladder (which stores urine), and urethra (the tube that carries urine out of the bladder).  Urinary tract infections occur when bacteria travel up the urethra into the bladder. We suspect a UTI based on chemical and microscopic findings in your urine, but if there is a question about your  findings, we will do a culture to see if bacteria grow. A urine culture takes several days. You should always follow-up with your primary physician to find out about results of your culture if one was done.   Return to the Emergency Department if:    You have severe back pain.    You are vomiting so that you can t take your medicine, or have signs of dehydration (such as urinating less than 3 times per day).    You have fever over 101.5 degrees F.    You have significant confusion or are very weak, or feel very ill.    Your child seems much more ill, won t wake up, won t respond right, or is crying for a long time and won t calm down.    Your child is showing signs of dehydration, Signs of dehydration can be:  o Your infant has had no wet diapers in 4-5 hours.  o Your older child has not passed urine in 6-8 hours.  o Your infant or child starts to have dry mouth and lips, or no saliva or tears.    Follow-up with your doctor:     Children under 24 months need to be seen by their regular doctor within one week after a diagnosis of a UTI. It may be necessary to do some imaging tests to look at the child s kidney or bladder.    You should begin to feel better within 24 - 48 hours of starting your antibiotic.  If you do not, you need to be seen again.      Treatment:     You will be treated with an antibiotic to kill the bacteria. We have to make an educated guess as to which antibiotic will work for your infection. In most healthy people, we can guess right almost all of the time. Sometimes a culture is done to show which antibiotics will work. This usually takes 2-3 days. When the culture is done, we may have to contact you to put you on a different antibiotic.    Take a pain medication such as Tylenol  (acetaminophen), Advil  (ibuprofen), Nuprin  (ibuprofen), or Aleve  (naproxen). If you have been given a narcotic such as Vicodin  (hydrocodone with acetaminophen), Percocet  (oxycodone with acetaminophen), or codeine,  "do not drive for four hours after you have taken it. If the narcotic contains Tylenol  (acetaminophen), do not take Tylenol  with it. All narcotics will cause constipation, so eat a high fiber diet.      Pyridium  (phenazopyridine) or Uristat  (phenazopyridine) is a prescription medication that numbs the bladder to reduce the burning pain of some UTIs.  The same medication is available in a non-prescription version called Azo-Standard  (phenazopyridine), Urodol  (phenazopyridine), or other brand names. This medication will change the color of the urine and tears (usually blue or orange). If you wear contacts, do not wear them while taking this medication as they may be stained by the medication.    Antibiotic Warning:     If you have been placed on antibiotics - watch for signs of allergic reaction.  These include rash, lip swelling, difficulty breathing, wheezing, and dizziness.  If you develop any of these symptoms, stop the antibiotic immediately and go to an emergency room or urgent care for evaluation.    Probiotics: If you have been given an antibiotic, you may want to also take a probiotic pill or eat yogurt with live cultures. Probiotics have \"good bacteria\" to help your intestines stay healthy. Studies have shown that probiotics help prevent diarrhea and other intestine problems (including C. diff infection) when you take antibiotics. You can buy these without a prescription in the pharmacy section of the store.   If you were given a prescription for medicine here today, be sure to read all of the information (including the package insert) that comes with your prescription.  This will include important information about the medicine, its side effects, and any warnings that you need to know about.  The pharmacist who fills the prescription can provide more information and answer questions you may have about the medicine.  If you have questions or concerns that the pharmacist cannot address, please call or " return to the Emergency Department.   Opioid Medication Information    Pain medications are among the most commonly prescribed medicines, so we are including this information for all our patients. If you did not receive pain medication or get a prescription for pain medicine, you can ignore it.     You may have been given a prescription for an opioid (narcotic) pain medicine and/or have received a pain medicine while here in the Emergency Department. These medicines can make you drowsy or impaired. You must not drive, operate dangerous equipment, or engage in any other dangerous activities while taking these medications. If you drive while taking these medications, you could be arrested for DUI, or driving under the influence. Do not drink any alcohol while you are taking these medications.     Opioid pain medications can cause addiction. If you have a history of chemical dependency of any type, you are at a higher risk of becoming addicted to pain medications.  Only take these prescribed medications to treat your pain when all other options have been tried. Take it for as short a time and as few doses as possible. Store your pain pills in a secure place, as they are frequently stolen and provide a dangerous opportunity for children or visitors in your house to start abusing these powerful medications. We will not replace any lost or stolen medicine.  As soon as your pain is better, you should flush all your remaining medication.     Many prescription pain medications contain Tylenol  (acetaminophen), including Vicodin , Tylenol #3 , Norco , Lortab , and Percocet .  You should not take any extra pills of Tylenol  if you are using these prescription medications or you can get very sick.  Do not ever take more than 3000 mg of acetaminophen in any 24 hour period.    All opioids tend to cause constipation. Drink plenty of water and eat foods that have a lot of fiber, such as fruits, vegetables, prune juice, apple juice  and high fiber cereal.  Take a laxative if you don t move your bowels at least every other day. Miralax , Milk of Magnesia, Colace , or Senna  can be used to keep you regular.      Remember that you can always come back to the Emergency Department if you are not able to see your regular doctor in the amount of time listed above, if you get any new symptoms, or if there is anything that worries you.

## 2017-10-02 ENCOUNTER — TELEPHONE (OUTPATIENT)
Dept: EMERGENCY MEDICINE | Facility: CLINIC | Age: 77
End: 2017-10-02

## 2017-10-02 ENCOUNTER — TELEPHONE (OUTPATIENT)
Dept: INTERNAL MEDICINE | Facility: CLINIC | Age: 77
End: 2017-10-02

## 2017-10-02 DIAGNOSIS — N30.01 ACUTE CYSTITIS WITH HEMATURIA: Primary | ICD-10-CM

## 2017-10-02 LAB
BACTERIA SPEC CULT: ABNORMAL
Lab: ABNORMAL
SPECIMEN SOURCE: ABNORMAL

## 2017-10-02 RX ORDER — SULFAMETHOXAZOLE AND TRIMETHOPRIM 400; 80 MG/1; MG/1
1 TABLET ORAL 2 TIMES DAILY
Qty: 14 TABLET | Refills: 0 | Status: SHIPPED | OUTPATIENT
Start: 2017-10-02 | End: 2017-10-09

## 2017-10-02 NOTE — TELEPHONE ENCOUNTER
[facility] Emergency Department Lab result notification [Adult-Female]    [facility] ED lab result protocol used  Urine Culture Protcol    Reason for call  Notify of lab results, assess symptoms,  review ED providers recommendations/discharge instructions (if necessary) and advise per ED lab result f/u protocol    Lab Result (including Rx patient on, if applicable)  Final Urine Culture Report on 10/02/2017  [facility] ED discharge antibiotic: Cefdinir (Omnicef) 300 mg capsule, 1 capsule (300 mg) by mouth 2 times daily for 7 days  #1. Bacteria, >100,000 colonies/mL Escherichia coli ESBL, is RESISTANT to ED discharge antibiotic.   Change in treatment as per [facility] ED Lab result protocol.  Information table from ED Provider visit on 09/28/2017  ED diagnosis Weakness generalized, Acute UTI   ED provider Jose Fuchs MD   Symptoms reported at ED visit (Chief complaint, HPI)  a 77 year old female with a complicated past medical who presents with generalized weakness and loss of appetite for the past few days. The patient is currently dealing with a UTI that was diagnosed on 9/18, and according to her daughter who is with her she is currently trying her third antibiotic to cure it (Cefdinir day 7). The patient reports a loss of appetite that is secondary to nausea. She complains of weakness and fatigue as well. She denies any pain, fever, chest pain, shortness of breath, diarrhea   ED providers Impression and Plan (applicable information) a 77 year old female who presents with generalized weakness. Differential includes urinary tract infection, ACS equivalent, dehydration, electrolyte abnormality, sepsis, or other causes. She did have the above workup as seen. The workup thus far is non-specific. UA unfortunately does show continued urinary tract infection. I did review her urine culture which was sensitive to Omnicef and unfortunately there is not a good alternative antibiotic to place the patient on. Urine culture  has been repeated. She is currently on a 10 day course and I will add on another week of this at which point she was told to follow up with her primary care physician and will get a call back if her culture be not sensitive to this. I see no reason for admission and/or IV antibiotics at this time as she has no elevated white blood cell count, lactic acidosis, or fever. She was told to return for any worsening symptoms or concerns   Significant Medical hx, if applicable Reviewed   Coumadin/Warfarin [Yes /No] no   Creatinine Level (mg/dl) 1.48   Creatinine clearance (ml/min), if applicable 31.05   Allergies Lisinopril   Weight, if applicable 64.4 kg      RN Assessment (Patient s current Symptoms), include time called.  [Insert Left message here if message left]  At 1338 Pt requests RN discuss with Dr. Greenberg's office and have them deal with the infection.  Per request RN contacted [facility] to discuss with Dr. Dionicio Esquivel's RN.      [facility] Provider Name & Recommendations (included time consulted)  At 1300 consulted Dr. Roberts,  ED provider, who recommended after asking what Cr/CL is based on calculator 31, okay to go with Bactrim DS BID for 7 days Bactrim DS and pt can discontinue the Cefdinir.    Cr/Cl calculations copied from the calculator in EPIC: (([phone])*64.4)*(0.70+(1 *0.15))/(1.48*72)*(1 *(3-1 )/2) = 31.049  At 1320 consulted pharmacist at [facility] who recommended Not use bactrim every few days to have cr, checked and potassium,  I would go with lower dose. Monitor K+ and Creatinine levels.   At 1330 discussed pharmacy recommendation with Dr. Roberts who decided to lower the dose with Bactrim DS QD for 7 days.     At  1345 discussed with Fariba Greenberg's nurse who agreed to assume responsibility for this result and have pcp address.        No medication was ordered by this RN and pt did not let this RN even get that far in our discussion.    PCP follow-up Questions asked:  YES       Sonam Seymour, RN  [facility] RN  Lung Nodule and ED Lab Result F/u RN  Epic pool (ED late result f/u RN): [id number]  [facility] INCIDENTAL RADIOLOGY F/U NURSES: [id number]  # [phone]    Copy of Lab result   Exam Information   Exam Date Exam Time Accession # Results    9/29/17  1:35 AM [id number]    Component Results   Component Collected Lab   Specimen Description 09/29/2017  1:35    Catheterized Urine   Special Requests 09/29/2017  1:35 AM 75   Specimen received in preservative   Culture Micro (Abnormal) 09/29/2017  1:35    >100,000 colonies/mL   Escherichia coli ESBL   ESBL (extended beta lactamase) producing organisms require contact precautions.      Culture & Susceptibility   ESCHERICHIA COLI ESBL   Antibiotic Interpretation Sensitivity Unit Method Status   AMIKACIN Sensitive <=2 ug/mL ANDREAS Final   AMPICILLIN Resistant >=32 ug/mL ANDREAS Final   AMPICILLIN/SULBACTAM Resistant >=32 ug/mL ANDREAS Final   CEFAZOLIN Resistant >=64 ug/mL ANDREAS Final   Comment: Cefazolin ANDREAS breakpoints are for the treatment of uncomplicated urinary tract   infections.  For the treatment of systemic infections, please contact the   laboratory for additional testing.   CEFEPIME Resistant >=64 ug/mL ANDREAS Final   CEFOXITIN Intermediate 16 ug/mL ANDREAS Final   CEFTAZIDIME Resistant >=64 ug/mL ANDREAS Final   CEFTRIAXONE Resistant >=64 ug/mL ANDREAS Final   CIPROFLOXACIN Resistant >=4 ug/mL ANDREAS Final   ERTAPENEM Sensitive 0.064 ug/mL ANDREAS Final   GENTAMICIN Sensitive <=1 ug/mL ANDREAS Final   LEVOFLOXACIN Resistant >=8 ug/mL ANDREAS Final   MEROPENEM Sensitive <=0.25 ug/mL ANDREAS Final   NITROFURANTOIN Sensitive 32 ug/mL ANDREAS Final   Piperacillin/Tazo Intermediate 64 ug/mL ANDREAS Final   TOBRAMYCIN Sensitive <=1 ug/mL ANDREAS Final   Trimethoprim/Sulfa Sensitive <=1/19 ug/mL ANDREAS Final

## 2017-10-02 NOTE — TELEPHONE ENCOUNTER
Call received from ED. States she called pt to change abx based on UC results. Pt only wants PCP to address. Please advise.

## 2017-10-02 NOTE — ED PROVIDER NOTES
Notified by micro lab of UCx resistant to the cefdinir the patient is taking for her UTI.     ESBL is sensitive to Bactrim. CrCl 31.     Will discontinue cefdinir and initiate 7 day course of Bactrim DS BID.     Sully Roberts MD  10/02/17 1302    ADDENDUM 1333    Patient's creatinine clearance actually 27. Will do 50% dose (single strength).     Sully Roberts MD  10/02/17 4412

## 2017-10-03 ENCOUNTER — OFFICE VISIT (OUTPATIENT)
Dept: INTERNAL MEDICINE | Facility: CLINIC | Age: 77
End: 2017-10-03
Payer: COMMERCIAL

## 2017-10-03 VITALS
WEIGHT: 140.9 LBS | SYSTOLIC BLOOD PRESSURE: 118 MMHG | TEMPERATURE: 97.7 F | HEART RATE: 67 BPM | HEIGHT: 63 IN | DIASTOLIC BLOOD PRESSURE: 76 MMHG | BODY MASS INDEX: 24.96 KG/M2 | OXYGEN SATURATION: 96 %

## 2017-10-03 DIAGNOSIS — F33.3 SEVERE EPISODE OF RECURRENT MAJOR DEPRESSIVE DISORDER, WITH PSYCHOTIC FEATURES (H): Chronic | ICD-10-CM

## 2017-10-03 DIAGNOSIS — N30.01 ACUTE CYSTITIS WITH HEMATURIA: Primary | ICD-10-CM

## 2017-10-03 PROCEDURE — 99214 OFFICE O/P EST MOD 30 MIN: CPT | Performed by: INTERNAL MEDICINE

## 2017-10-03 NOTE — PATIENT INSTRUCTIONS
Plan:  1. Take the antibiotic I prescribed yesterday  2. Follow up in about 2 weeks  3. Make sure you drink plenty of water  4. Continue same meds, same doses for now

## 2017-10-03 NOTE — TELEPHONE ENCOUNTER
Called pt, left detailed vm (per consent to communicate) relaying MD message below. Instructed pt to call clinic if questions/concerns.

## 2017-10-03 NOTE — MR AVS SNAPSHOT
After Visit Summary   10/3/2017    Wendy Rocha    MRN: [id number]           Patient Information     Date Of Birth          1940        Visit Information        Provider Department      10/3/2017 10:40 AM Natalee Owens MD [facility]        Care Instructions    Plan:  1. Take the antibiotic I prescribed yesterday  2. Follow up in about 2 weeks  3. Make sure you drink plenty of water  4. Continue same meds, same doses for now          Follow-ups after your visit        Your next 10 appointments already scheduled     Oct 10, 2017 10:30 AM CDT   SHORT with Emelyn Lott [facility] ([facility])    [address] [place] [address] [phone]            Nov 01, 2017  1:45 PM CDT   Office Visit with Cesilia Plascencia,    [facility]    [address] [place] [address] [phone]           Bring a current list of meds and any records pertaining to this visit. For Physicals, please bring immunization records and any forms needing to be filled out. Please arrive 10 minutes early to complete paperwork.            Nov 14, 2017 10:30 AM CST   SHORT with Emelyn Lott [facility] ([facility])    [address] [place] [id number] [phone]              Who to contact     If you have questions or need follow up information about today's clinic visit or your schedule please contact [facility] directly at [phone].  Normal or non-critical lab and imaging results will be communicated to you by MyChart, letter or phone within 4 business days after the clinic has received the results. If you do not hear from us within 7 days, please contact the clinic through MyChart or phone. If you have a critical or abnormal lab result, we will notify you by phone as soon as  "possible.  Submit refill requests through [employer] or call your pharmacy and they will forward the refill request to us. Please allow 3 business days for your refill to be completed.          Additional Information About Your Visit        [email or web address] Information     [employer] lets you send messages to your doctor, view your test results, renew your prescriptions, schedule appointments and more. To sign up, go to [email or web address] . Click on \"Log in\" on the left side of the screen, which will take you to the Welcome page. Then click on \"Sign up Now\" on the right side of the page.     You will be asked to enter the access code listed below, as well as some personal information. Please follow the directions to create your username and password.     Your access code is: [email or web address]  Expires: 2017  2:22 AM     Your access code will  in 90 days. If you need help or a new code, please call your [place] clinic or [phone].        Care EveryWhere ID     This is your Care EveryWhere ID. This could be used by other organizations to access your [place] medical records  [id number]        Your Vitals Were     Pulse Temperature Height Pulse Oximetry Breastfeeding? BMI (Body Mass Index)    67 97.7  F (36.5  C) (Oral) 5' 3\" (1.6 m) 96% No 24.96 kg/m2       Blood Pressure from Last 3 Encounters:   10/03/17 118/76   17 141/87   17 134/76    Weight from Last 3 Encounters:   10/03/17 140 lb 14.4 oz (63.9 kg)   17 142 lb (64.4 kg)   17 142 lb 6.4 oz (64.6 kg)              Today, you had the following     No orders found for display       Primary Care Provider Office Phone # Fax #    Natalee Owens [phone]       [address] [facility] [address]        Equal Access to Services     FRANCIS SALAZAR AH: Charlie Cai, cindy gilmore, flash chacon la'aan ah. [facility] [phone].    ATENCIÓN: Si " deborah spencer, tiene a valencia disposición servicios gratuitos de asistencia lingüística. Marques laboy [phone].    We comply with applicable federal civil rights laws and [place] laws. We do not discriminate on the basis of race, color, national origin, age, disability, sex, sexual orientation, or gender identity.            Thank you!     Thank you for choosing [facility]  for your care. Our goal is always to provide you with excellent care. Hearing back from our patients is one way we can continue to improve our services. Please take a few minutes to complete the written survey that you may receive in the mail after your visit with us. Thank you!             Your Updated Medication List - Protect others around you: Learn how to safely use, store and throw away your medicines at www.disposemymeds.org.          This list is accurate as of: 10/3/17 11:23 AM.  Always use your most recent med list.                   Brand Name Dispense Instructions for use Diagnosis    ACE/ARB NOT PRESCRIBED (INTENTIONAL)      ACE & ARB not prescribed due to Worsening renal function on ACE/ARB therapy    Renal artery stenosis (H), Essential hypertension with goal blood pressure less than 140/90       amLODIPine 10 MG tablet    NORVASC    90 tablet    TAKE ONE TABLET BY MOUTH ONCE DAILY    Essential hypertension with goal blood pressure less than 140/90       ARICEPT 10 MG tablet   Generic drug:  donepezil      Take 10 mg by mouth        ASPIRIN NOT PRESCRIBED    INTENTIONAL    0 each    Antiplatelet medication not prescribed intentionally due to Plavix    Essential hypertension with goal blood pressure less than 140/90, PVD (peripheral vascular disease) (H), Aortic root dilatation (H), Renal artery stenosis (H)       busPIRone 5 MG tablet    BUSPAR     5 mg three times daily for anxiety, per Dr. Rosales, neurology        clopidogrel 75 MG tablet    PLAVIX    90 tablet    TAKE ONE TABLET BY MOUTH ONCE DAILY    PVD  (peripheral vascular disease) (H), Aortic root dilatation (H), Renal artery stenosis (H)       doxazosin 4 MG tablet    CARDURA    90 tablet    Take 1 tablet (4 mg) by mouth At Bedtime    Essential hypertension with goal blood pressure less than 140/90, PVD (peripheral vascular disease) (H), Aortic root dilatation (H), Renal artery stenosis (H)       metoprolol 50 MG tablet    LOPRESSOR    90 tablet    Take 0.5 tablets (25 mg) by mouth 2 times daily    Essential hypertension with goal blood pressure less than 140/90       order for [facility]     1 Device    Machine to dispense the medication    Severe episode of recurrent major depressive disorder, with psychotic features (H), Memory loss       pantoprazole 40 MG EC tablet    PROTONIX    90 tablet    Take 1 tablet (40 mg) by mouth daily Take 30-60 minutes before a meal.    Perforated duodenal ulcer (H)       simvastatin 40 MG tablet    ZOCOR    90 tablet    Take 1 tablet (40 mg) by mouth At Bedtime    Hyperlipidemia LDL goal <100       SINEMET  MG per tablet   Generic drug:  carbidopa-levodopa      Take 1 tablet by mouth 3 times daily Per neurology Dr. Rosales        sulfamethoxazole-trimethoprim 400-80 MG per tablet    BACTRIM/SEPTRA    14 tablet    Take 1 tablet by mouth 2 times daily    Acute cystitis with hematuria       traZODone 50 MG tablet    DESYREL    30 tablet    Take 1 tablet (50 mg) by mouth nightly as needed for sleep    Persistent insomnia       TYLENOL 325 MG tablet   Generic drug:  acetaminophen     100 tablet    Take 2 tablets (650 mg) by mouth every 6 hours as needed for mild pain    Severe episode of recurrent major depressive disorder, with psychotic features (H), Memory loss       vitamin D 2000 UNITS tablet     30 tablet    Take 3 tablets by mouth daily

## 2017-10-03 NOTE — NURSING NOTE
"Chief Complaint   Patient presents with     ER F/U       Initial /76 (BP Location: Right arm, Patient Position: Chair, Cuff Size: Adult Regular)  Pulse 67  Temp 97.7  F (36.5  C) (Oral)  Ht 5' 3\" (1.6 m)  Wt 140 lb 14.4 oz (63.9 kg)  SpO2 96%  Breastfeeding? No  BMI 24.96 kg/m2 Estimated body mass index is 24.96 kg/(m^2) as calculated from the following:    Height as of this encounter: 5' 3\" (1.6 m).    Weight as of this encounter: 140 lb 14.4 oz (63.9 kg).  Medication Reconciliation: complete   Klaudia Holguin CMA      "

## 2017-10-03 NOTE — PROGRESS NOTES
Dr Greenberg's note      Patient's instructions / PLAN:                                                        Plan:  1. Take the antibiotic I prescribed yesterday  2. Follow up in about 2 weeks  3. Make sure you drink plenty of water  4. Continue same meds, same doses for now  5.      ASSESSMENT & PLAN:                                                      (N30.01) Acute cystitis with hematuria  (primary encounter diagnosis)  Comment:   Plan: Antibiotics are changed to Bactrim    (F33.3) DEPRESSION, Severe, recurrent, with psychotic features (H)  Comment: Worse, I possible in the infection context  Plan: f/u        Chief complaint:                                                      UTI     SUBJECTIVE:   Wendy Rocha is a 77 year old female who presents to clinic today for the following health issues:    She was evaluated in the emergency room, diagnosed with UTI and she was prescribed Omnicef twice a day.  The emergency room, hard that the recent urine culture is resistant to Omnicef and she needs a different antibiotic.  The patient did not trust their recommendation and asked my opinion.  I reviewed the urine culture.  I sent Bactrim prescription yesterday to the pharmacy, adjusted for the CKD.  She did not have time to fill the prescription.     Hard to drink plenty of water because of urine incontinence     Daughter with a lot of questions about why different antibiotics. The UC Sept 18 was diff from Sept 29.  So far she had two urine infections this year.    Mrs Nguyen doesn't like that her daughter asks so many questions.  Her daughter questions are appropriate and her daughter is helping with her care.    Depression with psychotic features    Voices : the day before yesterday heard voices with some HA. She was able to lay on the L side and the HA and voices disappear. She has different stories about voices. Told MA different version.       ED/UC Followup:    Facility:  [facility]  Date of  "visit: 9/28/17  Reason for visit:   1. Weakness generalized R53.1   2. Acute UTI N39.0    Current Status: See above.         Review of Systems:                                                      ROS: negative for fever, chills, cough, wheezes, chest pain, shortness of breath, vomiting, abdominal pain, leg swelling     A 10-point review of systems was obtained.  Those pertinent are above and in the in the Subjective section.  The rest of the systems are negative.           OBJECTIVE:             Physical exam:  Blood pressure 118/76, pulse 67, temperature 97.7  F (36.5  C), temperature source Oral, height 5' 3\" (1.6 m), weight 140 lb 14.4 oz (63.9 kg), SpO2 96 %, not currently breastfeeding.   NAD, appears comfortable, she looks depressed or sedated  Skin: no rashes   Neck: supple, no JVD, No thyroidmegaly. Lymph nodes nonpalpable cervical and supraclavicular.  Chest: clear to auscultation bilaterally, good respiratory effort  Heart: S1 S2, RRR, no mgr appreciated  Abdomen: soft, not tender,   Extremities: no edema,   Neurologic: A, Ox3, no focal signs appreciated  Good eye contact. Speech with low volume, pattern, tone.  Standard dressed appearance. Mood  Depressed. Mrs Nguyen used to be very jovial, talking a lot.  Today she is quiet    PMHx: reviewed  Past Medical History:   Diagnosis Date     Abnormal ECG      ARF (acute renal failure) (H) Aug 2010    Lisinopril was stopped at that time     Carotid stenosis      Carotid stenosis, bilateral      Carotid stenosis, bilateral     Vs Surgeon: dr Kush Mcmillan, Phone: [phone] fax: [phone]     CKD (chronic kidney disease) stage 3, GFR 30-59 ml/min      GERD (gastroesophageal reflux disease)      HTN, goal below 140/90      Hyperlipidemia LDL goal <100      Lung nodule may 2013    needs f/u may 2014     Muscle aches      Perforated duodenal ulcer (H) Aug 2010     Proteinuria      PVD (peripheral vascular disease) (H)      Renal artery stenosis (H) US " April 2011    Right side     Vitamin D deficiency disease       PSHx: reviewed  Past Surgical History:   Procedure Laterality Date     ARTHROPLASTY KNEE      left     EYE SURGERY      cataracts     LAPAROSCOPIC TUBAL LIGATION          Meds: reviewed  Current Outpatient Prescriptions   Medication Sig Dispense Refill     cefdinir (OMNICEF) 300 MG capsule Take 1 capsule (300 mg) by mouth 2 times daily for 7 days 14 capsule 0     amLODIPine (NORVASC) 10 MG tablet TAKE ONE TABLET BY MOUTH ONCE DAILY 90 tablet 0     metoprolol (LOPRESSOR) 50 MG tablet Take 0.5 tablets (25 mg) by mouth 2 times daily 90 tablet 0     clopidogrel (PLAVIX) 75 MG tablet TAKE ONE TABLET BY MOUTH ONCE DAILY 90 tablet 0     Cholecalciferol (VITAMIN D) 2000 UNITS tablet Take 3 tablets by mouth daily 30 tablet      donepezil (ARICEPT) 10 MG tablet Take 10 mg by mouth        carbidopa-levodopa (SINEMET)  MG per tablet Take 1 tablet by mouth 3 times daily Per neurology Dr. Rosales       busPIRone (BUSPAR) 5 MG tablet 5 mg three times daily for anxiety, per Dr. Rosales, neurology       simvastatin (ZOCOR) 40 MG tablet Take 1 tablet (40 mg) by mouth At Bedtime 90 tablet 0     doxazosin (CARDURA) 4 MG tablet Take 1 tablet (4 mg) by mouth At Bedtime 90 tablet 0     acetaminophen (TYLENOL) 325 MG tablet Take 2 tablets (650 mg) by mouth every 6 hours as needed for mild pain 100 tablet 0     order for DME Machine to dispense the medication 1 Device 0     traZODone (DESYREL) 50 MG tablet Take 1 tablet (50 mg) by mouth nightly as needed for sleep 30 tablet 1     pantoprazole (PROTONIX) 40 MG EC tablet Take 1 tablet (40 mg) by mouth daily Take 30-60 minutes before a meal. 90 tablet 3     ASPIRIN NOT PRESCRIBED (INTENTIONAL) Antiplatelet medication not prescribed intentionally due to Plavix 0 each 0     ACE/ARB NOT PRESCRIBED, INTENTIONAL, ACE & ARB not prescribed due to Worsening renal function on ACE/ARB therapy       sulfamethoxazole-trimethoprim  (BACTRIM/SEPTRA) 400-80 MG per tablet Take 1 tablet by mouth 2 times daily 14 tablet 0       Soc Hx: reviewed  Fam Hx: reviewed          Natalee Greenberg MD  Internal Medicine

## 2017-10-04 ENCOUNTER — TELEPHONE (OUTPATIENT)
Dept: INTERNAL MEDICINE | Facility: CLINIC | Age: 77
End: 2017-10-04

## 2017-10-04 NOTE — TELEPHONE ENCOUNTER
Try home number first as Eva is at patient's apartment for the day.  If Eva is not there then call the mobile number which is Eva's home number. OK to leave detailed message on mobile voicemail only.      Wendy Rocha is a 77 year old female whose daughter Eva calls with report of patient developing nausea, vomiting and diarrhea.    NURSING ASSESSMENT:  The nausea began 8-9  hours ago then early this morning she developed vomiting and diarrhea but is unable to state how many episodes she has had..    Pain scale (0-10): 0/10  Other symptoms associated with the nausea: vomiting and diarrhea. (For females ask about pregnancy) Pregnant DOES NOT APPLY.  Allergies:   Allergies   Allergen Reactions     Lisinopril      She developed ARF       MEDICATIONS;   Taking medication(s) as prescribed? Yes, she has been but is refusing to take any more of the Bactrim.  Taking over the counter medication(s)? No  Any medication side effects? GI  Upset, vomiting and diarrhea with Bactrim   Any barriers to taking medication(s) as prescribed?  No  Medication(s) improving/managing symptoms?  no  Medication reconciliation completed: No    NURSING PLAN: Routed to provider Yes    RECOMMENDED DISPOSITION:  Home care advice - Will hold Bactrim until hearing back from clinic.  Daughter will stay with pt today and monitor number of stools/emesis.  BRAT diet if vomiting and diarrhea continue after stopping Bactrim.  Liquids/electrolyte beverage  Will comply with recommendation: Yes  If further questions/concerns or if symptoms do not improve, worsen or new symptoms develop, call your PCP or [facility] as soon as possible.      Guideline used:  Home care recommendations provided per nursing knowledge      Mariangel Mallory RN

## 2017-10-04 NOTE — TELEPHONE ENCOUNTER
"Daughter calling with update:  States the vomiting is more \"mucousy\".  Has anti-nausea med at home (unsure of name) that pt has been taking today.  "

## 2017-10-05 ENCOUNTER — HOSPITAL ENCOUNTER (EMERGENCY)
Facility: CLINIC | Age: 77
Discharge: HOME | End: 2017-10-05
Attending: EMERGENCY MEDICINE | Admitting: EMERGENCY MEDICINE
Payer: MEDICARE

## 2017-10-05 ENCOUNTER — NURSE TRIAGE (OUTPATIENT)
Dept: NURSING | Facility: CLINIC | Age: 77
End: 2017-10-05

## 2017-10-05 VITALS
HEART RATE: 62 BPM | TEMPERATURE: 98 F | BODY MASS INDEX: 24.8 KG/M2 | RESPIRATION RATE: 16 BRPM | DIASTOLIC BLOOD PRESSURE: 84 MMHG | SYSTOLIC BLOOD PRESSURE: 149 MMHG | WEIGHT: 140 LBS | OXYGEN SATURATION: 92 % | HEIGHT: 63 IN

## 2017-10-05 DIAGNOSIS — A49.9 ESBL (EXTENDED SPECTRUM BETA-LACTAMASE) PRODUCING BACTERIA INFECTION: ICD-10-CM

## 2017-10-05 DIAGNOSIS — Z16.12 ESBL (EXTENDED SPECTRUM BETA-LACTAMASE) PRODUCING BACTERIA INFECTION: ICD-10-CM

## 2017-10-05 DIAGNOSIS — R19.7 DIARRHEA, UNSPECIFIED TYPE: ICD-10-CM

## 2017-10-05 DIAGNOSIS — N39.0 URINARY TRACT INFECTION: Primary | ICD-10-CM

## 2017-10-05 LAB
ALBUMIN UR-MCNC: 30 MG/DL
ANION GAP SERPL CALCULATED.3IONS-SCNC: 6 MMOL/L (ref 3–14)
APPEARANCE UR: ABNORMAL
BACTERIA #/AREA URNS HPF: ABNORMAL /HPF
BASOPHILS # BLD AUTO: 0 10E9/L (ref 0–0.2)
BASOPHILS NFR BLD AUTO: 0.7 %
BILIRUB UR QL STRIP: NEGATIVE
BUN SERPL-MCNC: 21 MG/DL (ref 7–30)
CALCIUM SERPL-MCNC: 9.2 MG/DL (ref 8.5–10.1)
CHLORIDE SERPL-SCNC: 102 MMOL/L (ref 94–109)
CO2 SERPL-SCNC: 25 MMOL/L (ref 20–32)
COLOR UR AUTO: YELLOW
CREAT SERPL-MCNC: 1.52 MG/DL (ref 0.52–1.04)
DIFFERENTIAL METHOD BLD: ABNORMAL
EOSINOPHIL # BLD AUTO: 0 10E9/L (ref 0–0.7)
EOSINOPHIL NFR BLD AUTO: 0.9 %
ERYTHROCYTE [DISTWIDTH] IN BLOOD BY AUTOMATED COUNT: 13 % (ref 10–15)
GFR SERPL CREATININE-BSD FRML MDRD: 33 ML/MIN/1.7M2
GLUCOSE SERPL-MCNC: 105 MG/DL (ref 70–99)
GLUCOSE UR STRIP-MCNC: NEGATIVE MG/DL
HCT VFR BLD AUTO: 38.5 % (ref 35–47)
HGB BLD-MCNC: 13.1 G/DL (ref 11.7–15.7)
HGB UR QL STRIP: ABNORMAL
IMM GRANULOCYTES # BLD: 0.1 10E9/L (ref 0–0.4)
IMM GRANULOCYTES NFR BLD: 1.1 %
KETONES UR STRIP-MCNC: 5 MG/DL
LEUKOCYTE ESTERASE UR QL STRIP: ABNORMAL
LYMPHOCYTES # BLD AUTO: 0.2 10E9/L (ref 0.8–5.3)
LYMPHOCYTES NFR BLD AUTO: 5.1 %
MCH RBC QN AUTO: 30.8 PG (ref 26.5–33)
MCHC RBC AUTO-ENTMCNC: 34 G/DL (ref 31.5–36.5)
MCV RBC AUTO: 91 FL (ref 78–100)
MONOCYTES # BLD AUTO: 0.3 10E9/L (ref 0–1.3)
MONOCYTES NFR BLD AUTO: 7.2 %
MUCOUS THREADS #/AREA URNS LPF: PRESENT /LPF
NEUTROPHILS # BLD AUTO: 3.8 10E9/L (ref 1.6–8.3)
NEUTROPHILS NFR BLD AUTO: 85 %
NITRATE UR QL: POSITIVE
NRBC # BLD AUTO: 0 10*3/UL
NRBC BLD AUTO-RTO: 0 /100
PH UR STRIP: 5 PH (ref 5–7)
PLATELET # BLD AUTO: 200 10E9/L (ref 150–450)
POTASSIUM SERPL-SCNC: 3.8 MMOL/L (ref 3.4–5.3)
RBC # BLD AUTO: 4.25 10E12/L (ref 3.8–5.2)
RBC #/AREA URNS AUTO: 72 /HPF (ref 0–2)
SODIUM SERPL-SCNC: 133 MMOL/L (ref 133–144)
SOURCE: ABNORMAL
SP GR UR STRIP: 1.01 (ref 1–1.03)
SQUAMOUS #/AREA URNS AUTO: 3 /HPF (ref 0–1)
UROBILINOGEN UR STRIP-MCNC: 0 MG/DL (ref 0–2)
WBC # BLD AUTO: 4.5 10E9/L (ref 4–11)
WBC #/AREA URNS AUTO: 5 /HPF (ref 0–2)

## 2017-10-05 PROCEDURE — 25000128 H RX IP 250 OP 636: Performed by: EMERGENCY MEDICINE

## 2017-10-05 PROCEDURE — 81001 URINALYSIS AUTO W/SCOPE: CPT | Performed by: PHYSICIAN ASSISTANT

## 2017-10-05 PROCEDURE — 85025 COMPLETE CBC W/AUTO DIFF WBC: CPT | Performed by: EMERGENCY MEDICINE

## 2017-10-05 PROCEDURE — 99283 EMERGENCY DEPT VISIT LOW MDM: CPT | Mod: 25

## 2017-10-05 PROCEDURE — 80048 BASIC METABOLIC PNL TOTAL CA: CPT | Performed by: EMERGENCY MEDICINE

## 2017-10-05 PROCEDURE — 96360 HYDRATION IV INFUSION INIT: CPT

## 2017-10-05 PROCEDURE — 96361 HYDRATE IV INFUSION ADD-ON: CPT

## 2017-10-05 PROCEDURE — 25000132 ZZH RX MED GY IP 250 OP 250 PS 637: Mod: GY | Performed by: EMERGENCY MEDICINE

## 2017-10-05 RX ORDER — SACCHAROMYCES BOULARDII 250 MG
250 CAPSULE ORAL 2 TIMES DAILY
Qty: 14 CAPSULE | Refills: 0 | Status: SHIPPED | OUTPATIENT
Start: 2017-10-05 | End: 2017-10-12

## 2017-10-05 RX ORDER — GRANULES FOR ORAL 3 G/1
3 POWDER ORAL ONCE
Status: COMPLETED | OUTPATIENT
Start: 2017-10-05 | End: 2017-10-05

## 2017-10-05 RX ORDER — GRANULES FOR ORAL 3 G/1
3 POWDER ORAL ONCE
Qty: 2 PACKET | Refills: 0 | Status: SHIPPED | OUTPATIENT
Start: 2017-10-05 | End: 2017-10-05

## 2017-10-05 RX ADMIN — SODIUM CHLORIDE 1000 ML: 9 INJECTION, SOLUTION INTRAVENOUS at 14:36

## 2017-10-05 RX ADMIN — SODIUM CHLORIDE 500 ML: 9 INJECTION, SOLUTION INTRAVENOUS at 16:04

## 2017-10-05 RX ADMIN — FOSFOMYCIN TROMETHAMINE 3 G: 3 POWDER ORAL at 16:04

## 2017-10-05 ASSESSMENT — ENCOUNTER SYMPTOMS
BLOOD IN STOOL: 0
DIARRHEA: 1
FEVER: 1

## 2017-10-05 NOTE — ED NOTES
Pt attempted to leave stool sample, but only able to leave ua. Pt and pt's daughter instructed on proper stool collection and given take home kit. Verbalized understanding. No further questions.

## 2017-10-05 NOTE — ED NOTES
Diarrhea for the last 3 days.  Is currently on Bactrim for UTI which is her 4th antibiotic for the UTI.  Patient alert and oriented x3.  Airway, breathing and circulation intact.

## 2017-10-05 NOTE — ED AVS SNAPSHOT
[facility] Emergency Department    [address]    [facility] [address]    Phone:  [phone]    Fax:  [phone]                                       Wendy Rocha   MRN: [id number]    Department:  [facility] Emergency Department   Date of Visit:  10/5/2017           After Visit Summary Signature Page     I have received my discharge instructions, and my questions have been answered. I have discussed any challenges I see with this plan with the nurse or doctor.    ..........................................................................................................................................  Patient/Patient Representative Signature      ..........................................................................................................................................  Patient Representative Print Name and Relationship to Patient    ..................................................               ................................................  Date                                            Time    ..........................................................................................................................................  Reviewed by Signature/Title    ...................................................              ..............................................  Date                                                            Time

## 2017-10-05 NOTE — TELEPHONE ENCOUNTER
Pt's daughter called again-thought she was suppose to hear back yesterday. Relayed [employer] is out on Wednesdays, but is in today. Daughter is at pt's home

## 2017-10-05 NOTE — ED AVS SNAPSHOT
" [facility] Emergency Department    [address] [facility] [place] [address]    Phone:  [phone]    Fax:  [phone]                                       Wendy Rocha   MRN: [id number]    Department:  [facility] Emergency Department   Date of Visit:  10/5/2017           Patient Information     Date Of Birth          1940        Your diagnoses for this visit were:     Urinary tract infection     ESBL (extended spectrum beta-lactamase) producing bacteria infection UTI    Diarrhea, unspecified type        You were seen by Moustapha Murrell MD.      Follow-up Information     Follow up with Natalee Owens MD In 3 days.    Specialty:  Internal Medicine    Contact information:    [address] [facility] [address]  [phone]          Follow up with [facility] Emergency Department.    Specialty:  EMERGENCY MEDICINE    Why:  If symptoms worsen    Contact information:    [address] [facility] [id number] [phone]        Discharge Instructions          * BLADDER INFECTION,Female (Adult)    A bladder infection (\"cystitis\" or \"UTI\") usually causes a constant urge to urinate and a burning when passing urine. Urine may be cloudy, smelly or dark. There may be pain in the lower abdomen. A bladder infection occurs when bacteria from the vaginal area enter the bladder opening (urethra). This can occur from sexual intercourse, wearing tight clothing, dehydration and other factors.  HOME CARE:  1. Drink lots of fluids (at least 6-8 glasses a day, unless you must restrict fluids for other medical reasons). This will force the medicine into your urinary system and flush the bacteria out of your body. Cranberry juice has been shown to help clear out the bacteria.  2. Avoid sexual intercourse until your symptoms are gone.  3. A bladder infection is treated with antibiotics. You may also be given Pyridium (generic = " phenazopyridine) to reduce the burning sensation. This medicine will cause your urine to become a bright orange color. The orange urine may stain clothing. You may wear a pad or panty-liner to protect clothing.  PREVENTING FUTURE INFECTIONS:  1. Always wipe from front to back after a bowel movement.  2. Keep the genital area clean and dry.  3. Drink plenty of fluids each day to avoid dehydration.  4. Urinate right after intercourse to flush out the bladder.  5. Wear cotton underwear and cotton-lined panty hose; avoid tight-fitting pants.  6. If you are on birth control pills and are having frequent bladder infections, discuss with your doctor.  FOLLOW UP: Return to this facility or see your doctor if ALL symptoms are not gone after three days of treatment.  GET PROMPT MEDICAL ATTENTION if any of the following occur:    Fever over 101 F (38.3 C)    No improvement by the third day of treatment    Increasing back or abdominal pain    Repeated vomiting; unable to keep medicine down    Weakness, dizziness or fainting    Vaginal discharge    Pain, redness or swelling in the labia (outer vaginal area)    7493-8962 [employer], [address] [place] [address]. All rights reserved. This information is not intended as a substitute for professional medical care. Always follow your healthcare professional's instructions.      Treating Diarrhea    Diarrhea happens when you have loose, watery, or frequent bowel movements. It is a common problem with many causes. Most cases of diarrhea clear up on their own. But certain cases may need treatment. Be sure to see your healthcare provider if your symptoms do not improve within a few days.  Getting relief  Treatment of diarrhea depends on its cause. Diarrhea caused by bacterial or parasite infection is often treated with antibiotics. Diarrhea caused by other factors, such as a stomach virus, often improves with simple home treatment. The tips below may also help  relieve your symptoms.    Drink plenty of fluids. This helps prevent too much fluid loss (dehydration). Water, clear soups, and electrolyte solutions are good choices. Avoid alcohol, coffee, tea, and milk. These can irritate your intestines and make symptoms worse.    Suck on ice chips if drinking makes you queasy.    Return to your normal diet slowly. You may want to eat bland foods at first, such as rice and toast. Also, you may need to avoid certain foods for a while, such as dairy products. These can make symptoms worse. Ask your healthcare provider if there are any other foods you should avoid.    If you were prescribed antibiotics, take them as directed.    Do not take anti-diarrhea medicines without asking your healthcare provider first.  Call your healthcare provider   Call your healthcare provider if you have any of the following:     A fever of 100.4 F (38.0 C) or higher, or as directed by your healthcare provider    Severe pain    Worsening diarrhea or diarrhea for more than 2 days    Bloody vomit or stool    Signs of dehydration (dizziness, dry mouth and tongue, rapid pulse, dark urine)  Date Last Reviewed: 7/1/2016 2000-2017 [employer]. [address] [place] [address]. All rights reserved. This information is not intended as a substitute for professional medical care. Always follow your healthcare professional's instructions.          Future Appointments        Provider Department Dept Phone Center    10/10/2017 10:30 AM Emelyn Lott [facility] [phone] [place]    10/19/2017 2:20 PM Natalee Owens MD [facility] [phone] [place]    11/1/2017 1:45 PM Cesilia Plascencia DO [facility] [phone]     11/14/2017 10:30 AM Emelyn Lott [facility] [phone] [place]      24 Hour Appointment Hotline       To make an appointment at any [facility], call [phone] ([phone]). If you don't have a  family doctor or clinic, we will help you find one. [facility] are conveniently located to serve the needs of you and your family.          ED Discharge Orders     Clostridium difficile toxin B PCR           Enteric Bacteria and Virus Panel by [facility] Stool                    Review of your medicines      START taking        Dose / Directions Last dose taken    fosfomycin 3 G Packet   Commonly known as:  MONUROL   Dose:  3 g   Quantity:  2 packet        Take 1 packet (3 g) by mouth once for 1 dose Dissolve the contents of 1 packet in 3-4 ounces of water and take on 10/7, repeat on 10/9.   Refills:  0        saccharomyces boulardii 250 MG capsule   Commonly known as:  FLORASTOR   Dose:  250 mg   Quantity:  14 capsule        Take 1 capsule (250 mg) by mouth 2 times daily for 7 days   Refills:  0          Our records show that you are taking the medicines listed below. If these are incorrect, please call your family doctor or clinic.        Dose / Directions Last dose taken    ACE/ARB NOT PRESCRIBED (INTENTIONAL)        ACE & ARB not prescribed due to Worsening renal function on ACE/ARB therapy   Refills:  0        amLODIPine 10 MG tablet   Commonly known as:  NORVASC   Quantity:  90 tablet        TAKE ONE TABLET BY MOUTH ONCE DAILY   Refills:  0        ARICEPT 10 MG tablet   Dose:  10 mg   Generic drug:  donepezil        Take 10 mg by mouth   Refills:  0        ASPIRIN NOT PRESCRIBED   Commonly known as:  INTENTIONAL   Quantity:  0 each        Antiplatelet medication not prescribed intentionally due to Plavix   Refills:  0        busPIRone 5 MG tablet   Commonly known as:  BUSPAR        5 mg three times daily for anxiety, per Dr. Rosales, neurology   Refills:  0        clopidogrel 75 MG tablet   Commonly known as:  PLAVIX   Quantity:  90 tablet        TAKE ONE TABLET BY MOUTH ONCE DAILY   Refills:  0        doxazosin 4 MG tablet   Commonly known as:  CARDURA   Dose:  4 mg   Quantity:  90 tablet        Take 1 tablet  (4 mg) by mouth At Bedtime   Refills:  0        metoprolol 50 MG tablet   Commonly known as:  LOPRESSOR   Dose:  25 mg   Quantity:  90 tablet        Take 0.5 tablets (25 mg) by mouth 2 times daily   Refills:  0        order for DME   Quantity:  1 Device        Machine to dispense the medication   Refills:  0        pantoprazole 40 MG EC tablet   Commonly known as:  PROTONIX   Dose:  40 mg   Quantity:  90 tablet        Take 1 tablet (40 mg) by mouth daily Take 30-60 minutes before a meal.   Refills:  3        simvastatin 40 MG tablet   Commonly known as:  ZOCOR   Dose:  40 mg   Quantity:  90 tablet        Take 1 tablet (40 mg) by mouth At Bedtime   Refills:  0        SINEMET  MG per tablet   Dose:  1 tablet   Generic drug:  carbidopa-levodopa        Take 1 tablet by mouth 3 times daily Per neurology Dr. Rosales   Refills:  0        sulfamethoxazole-trimethoprim 400-80 MG per tablet   Commonly known as:  BACTRIM/SEPTRA   Dose:  1 tablet   Quantity:  14 tablet        Take 1 tablet by mouth 2 times daily   Refills:  0        traZODone 50 MG tablet   Commonly known as:  DESYREL   Dose:  50 mg   Quantity:  30 tablet        Take 1 tablet (50 mg) by mouth nightly as needed for sleep   Refills:  1        TYLENOL 325 MG tablet   Dose:  650 mg   Quantity:  100 tablet   Generic drug:  acetaminophen        Take 2 tablets (650 mg) by mouth every 6 hours as needed for mild pain   Refills:  0        vitamin D 2000 UNITS tablet   Dose:  3 tablet   Quantity:  30 tablet        Take 3 tablets by mouth daily   Refills:  0                Prescriptions were sent or printed at these locations (2 Prescriptions)                   Other Prescriptions                Printed at Department/Unit printer (2 of 2)         saccharomyces boulardii (FLORASTOR) 250 MG capsule               fosfomycin (MONUROL) 3 G Packet                Procedures and tests performed during your visit     Basic metabolic panel (BMP)    CBC + differential    IV  access      Orders Needing Specimen Collection     Ordered          10/05/17 1334  UA with Microscopic - STAT, Prio: STAT, Needs to be Collected     Scheduled Task Status   10/05/17 1335 Collect UA with Microscopic Open   Order Class:  PCU Collect                10/05/17 1400  Enteric Bacteria and Virus Panel by [facility] Stool - STAT, Prio: STAT, Needs to be Collected     Scheduled Task Status   10/05/17 1401 Collect Enteric Bacteria and Virus Panel by [facility]AD[facility] Stool Open   Order Class:  PCU Collect                10/05/17 1400  Clostridium difficile toxin B PCR - STAT, Prio: STAT, Needs to be Collected     Scheduled Task Status   10/05/17 1401 Collect Clostridium difficile toxin B PCR Open   Order Class:  PCU Collect                  Pending Results     No orders found from 10/3/2017 to 10/6/2017.            Pending Culture Results     No orders found from 10/3/2017 to 10/6/2017.            Pending Results Instructions     If you had any lab results that were not finalized at the time of your Discharge, you can call the ED Lab Result RN at [phone]. You will be contacted by this team for any positive Lab results or changes in treatment. The nurses are available 7 days a week from 10A to 6:30P.  You can leave a message 24 hours per day and they will return your call.        Test Results From Your Hospital Stay        10/5/2017  2:33 PM      Component Results     Component Value Ref Range & Units Status    WBC 4.5 4.0 - 11.0 10e9/L Final    RBC Count 4.25 3.8 - 5.2 10e12/L Final    Hemoglobin 13.1 11.7 - 15.7 g/dL Final    Hematocrit 38.5 35.0 - 47.0 % Final    MCV 91 78 - 100 fl Final    MCH 30.8 26.5 - 33.0 pg Final    MCHC 34.0 31.5 - 36.5 g/dL Final    RDW 13.0 10.0 - 15.0 % Final    Platelet Count 200 150 - 450 10e9/L Final    Diff Method Automated Method  Final    % Neutrophils 85.0 % Final    % Lymphocytes 5.1 % Final    % Monocytes 7.2 % Final    % Eosinophils 0.9 % Final    % Basophils 0.7 % Final    % Immature  Granulocytes 1.1 % Final    Nucleated RBCs 0 0 /100 Final    Absolute Neutrophil 3.8 1.6 - 8.3 10e9/L Final    Absolute Lymphocytes 0.2 (L) 0.8 - 5.3 10e9/L Final    Absolute Monocytes 0.3 0.0 - 1.3 10e9/L Final    Absolute Eosinophils 0.0 0.0 - 0.7 10e9/L Final    Absolute Basophils 0.0 0.0 - 0.2 10e9/L Final    Abs Immature Granulocytes 0.1 0 - 0.4 10e9/L Final    Absolute Nucleated RBC 0.0  Final         10/5/2017  2:54 PM      Component Results     Component Value Ref Range & Units Status    Sodium 133 133 - 144 mmol/L Final    Potassium 3.8 3.4 - 5.3 mmol/L Final    Chloride 102 94 - 109 mmol/L Final    Carbon Dioxide 25 20 - 32 mmol/L Final    Anion Gap 6 3 - 14 mmol/L Final    Glucose 105 (H) 70 - 99 mg/dL Final    Urea Nitrogen 21 7 - 30 mg/dL Final    Creatinine 1.52 (H) 0.52 - 1.04 mg/dL Final    GFR Estimate 33 (L) >60 mL/min/1.7m2 Final    Non  GFR Calc    GFR Estimate If Black 40 (L) >60 mL/min/1.7m2 Final    African American GFR Calc    Calcium 9.2 8.5 - 10.1 mg/dL Final                Clinical Quality Measure: Blood Pressure Screening     Your blood pressure was checked while you were in the emergency department today. The last reading we obtained was  BP: 156/88 . Please read the guidelines below about what these numbers mean and what you should do about them.  If your systolic blood pressure (the top number) is less than 120 and your diastolic blood pressure (the bottom number) is less than 80, then your blood pressure is normal. There is nothing more that you need to do about it.  If your systolic blood pressure (the top number) is 120-139 or your diastolic blood pressure (the bottom number) is 80-89, your blood pressure may be higher than it should be. You should have your blood pressure rechecked within a year by a primary care provider.  If your systolic blood pressure (the top number) is 140 or greater or your diastolic blood pressure (the bottom number) is 90 or greater, you  "may have high blood pressure. High blood pressure is treatable, but if left untreated over time it can put you at risk for heart attack, stroke, or kidney failure. You should have your blood pressure rechecked by a primary care provider within the next 4 weeks.  If your provider in the emergency department today gave you specific instructions to follow-up with your doctor or provider even sooner than that, you should follow that instruction and not wait for up to 4 weeks for your follow-up visit.        Thank you for choosing [facility]       Thank you for choosing [facility] for your care. Our goal is always to provide you with excellent care. Hearing back from our patients is one way we can continue to improve our services. Please take a few minutes to complete the written survey that you may receive in the mail after you visit with us. Thank you!        [employer] Information     The [employer] lets you send messages to your doctor, view your test results, renew your prescriptions, schedule appointments and more. To sign up, go to [email or web address] . Click on \"Log in\" on the left side of the screen, which will take you to the Welcome page. Then click on \"Sign up Now\" on the right side of the page.     You will be asked to enter the access code listed below, as well as some personal information. Please follow the directions to create your username and password.     Your access code is: [email or web address]  Expires: 2017  2:22 AM     Your access code will  in 90 days. If you need help or a new code, please call your [facility] clinic or [phone].        Care EveryWhere ID     This is your Care EveryWhere ID. This could be used by other organizations to access your [facility] medical records  [id number]        Equal Access to Services     FRANCIS SALAZAR : cindy Mittal qaybta kaalmada adeegyada, waxay idiin hayaan adeeg kharash la'aan ah. So [facility] [phone].    ATENCIÓN: Si habla " español, tiene a valencia disposición servicios gratuitos de asistencia lingüística. Marques al [phone].    We comply with applicable federal civil rights laws and [place] laws. We do not discriminate on the basis of race, color, national origin, age, disability, sex, sexual orientation, or gender identity.            After Visit Summary       This is your record. Keep this with you and show to your community pharmacist(s) and doctor(s) at your next visit.

## 2017-10-05 NOTE — TELEPHONE ENCOUNTER
Sorry for the symptoms.  Probably related with meds for UTI    The patient in ER while I am typing this.

## 2017-10-05 NOTE — DISCHARGE INSTRUCTIONS
"   * BLADDER INFECTION,Female (Adult)    A bladder infection (\"cystitis\" or \"UTI\") usually causes a constant urge to urinate and a burning when passing urine. Urine may be cloudy, smelly or dark. There may be pain in the lower abdomen. A bladder infection occurs when bacteria from the vaginal area enter the bladder opening (urethra). This can occur from sexual intercourse, wearing tight clothing, dehydration and other factors.  HOME CARE:  1. Drink lots of fluids (at least 6-8 glasses a day, unless you must restrict fluids for other medical reasons). This will force the medicine into your urinary system and flush the bacteria out of your body. Cranberry juice has been shown to help clear out the bacteria.  2. Avoid sexual intercourse until your symptoms are gone.  3. A bladder infection is treated with antibiotics. You may also be given Pyridium (generic = phenazopyridine) to reduce the burning sensation. This medicine will cause your urine to become a bright orange color. The orange urine may stain clothing. You may wear a pad or panty-liner to protect clothing.  PREVENTING FUTURE INFECTIONS:  1. Always wipe from front to back after a bowel movement.  2. Keep the genital area clean and dry.  3. Drink plenty of fluids each day to avoid dehydration.  4. Urinate right after intercourse to flush out the bladder.  5. Wear cotton underwear and cotton-lined panty hose; avoid tight-fitting pants.  6. If you are on birth control pills and are having frequent bladder infections, discuss with your doctor.  FOLLOW UP: Return to this facility or see your doctor if ALL symptoms are not gone after three days of treatment.  GET PROMPT MEDICAL ATTENTION if any of the following occur:    Fever over 101 F (38.3 C)    No improvement by the third day of treatment    Increasing back or abdominal pain    Repeated vomiting; unable to keep medicine down    Weakness, dizziness or fainting    Vaginal discharge    Pain, redness or swelling in " the labia (outer vaginal area)    5037-4435 [employer]), [address] [place] [address], [place] [address]. All rights reserved. This information is not intended as a substitute for professional medical care. Always follow your healthcare professional's instructions.      Treating Diarrhea    Diarrhea happens when you have loose, watery, or frequent bowel movements. It is a common problem with many causes. Most cases of diarrhea clear up on their own. But certain cases may need treatment. Be sure to see your healthcare provider if your symptoms do not improve within a few days.  Getting relief  Treatment of diarrhea depends on its cause. Diarrhea caused by bacterial or parasite infection is often treated with antibiotics. Diarrhea caused by other factors, such as a stomach virus, often improves with simple home treatment. The tips below may also help relieve your symptoms.    Drink plenty of fluids. This helps prevent too much fluid loss (dehydration). Water, clear soups, and electrolyte solutions are good choices. Avoid alcohol, coffee, tea, and milk. These can irritate your intestines and make symptoms worse.    Suck on ice chips if drinking makes you queasy.    Return to your normal diet slowly. You may want to eat bland foods at first, such as rice and toast. Also, you may need to avoid certain foods for a while, such as dairy products. These can make symptoms worse. Ask your healthcare provider if there are any other foods you should avoid.    If you were prescribed antibiotics, take them as directed.    Do not take anti-diarrhea medicines without asking your healthcare provider first.  Call your healthcare provider   Call your healthcare provider if you have any of the following:     A fever of 100.4 F (38.0 C) or higher, or as directed by your healthcare provider    Severe pain    Worsening diarrhea or diarrhea for more than 2 days    Bloody vomit or stool    Signs of dehydration (dizziness, dry mouth and  tongue, rapid pulse, dark urine)  Date Last Reviewed: 7/1/2016 2000-2017 [employer], [employer]. [address] [place] [address], [place] [address]. All rights reserved. This information is not intended as a substitute for professional medical care. Always follow your healthcare professional's instructions.

## 2017-10-05 NOTE — ED PROVIDER NOTES
History     Chief Complaint:  Diarrhea    HPI   Wendy Rocha is a 77 year old female, currently being treated for a UTI and on Bactrim with a complicated medical history as noted below, who presents with her daughter to the emergency department for evaluation of diarrhea for the last three days. The patient was recently diagnosed on 9/28 with a UTI, and initially was given cephalexin, which unfortunately gave the patient diarrhea, so she was switched to Cipro, which according to the patient's nurse practitioner the bacteria was resistant to and attempted to use Omnicef, however the bacteria was also resistant to the medication as well. The patient was then prescribed Bactrim 2 days ago, which she was taking until she was beginning to experience diarrhea once again. The patient's daughter had called her PCP clinic which they recommended that she stop the medication, but the patient continued to experience diarrhea. The patient's daughter noted the patient has had a decrease in appetite over the last month and all she had to eat today was toast and orange juice. The patient notes her diarrhea is a combination of watery substance and looser stool. The patient reports experiencing a subjective fever, but denies any blood in the stool.    Allergies:  Lisinopril     Medications:    Bactrim  Norvasc  Metoprolol  Plavix  Vitamin D  Aricept  Sinemet  Buspar  Zocor  Cardura  Tylenol  Desyrel  Protonix  Aspirin     Past Medical History:    Abnormal ECG  ARF  Carotid stenosis, bilateral  CKD  GERD  Hypertension  Hyperlipidemia  Lung nodule  Muscle aches  Perforated duodenal ulcer  Proteinuria  Peripheral vascular disease  Renal artery stenosis  Vitamin D deficiency disease    Past Surgical History:    Arthoplasty knee - left  Eye surgery - cataracts  Laparoscopic tubal ligation    Family History:    Parkinson's disease  Heart disease  Hypertension  Cerebrovascular disease    Social History:  The patient was accompanied to  "the ED by daughter.  Smoking Status: Former  Smokeless Tobacco: No  Alcohol Use: Yes   Marital Status:   [4]     Review of Systems   Constitutional: Positive for fever (Subjective).   Gastrointestinal: Positive for diarrhea. Negative for blood in stool.   All other systems reviewed and are negative.    Physical Exam   Vitals:  Patient Vitals for the past 24 hrs:   BP Temp Temp src Pulse Heart Rate Resp SpO2 Height Weight   10/05/17 1700 149/84 - - - - - 92 % - -   10/05/17 1645 163/63 - - - - - 92 % - -   10/05/17 1630 162/88 - - - - - 90 % - -   10/05/17 1515 156/88 - - 62 - 16 95 % - -   10/05/17 1500 157/81 - - - - - 95 % - -   10/05/17 1437 146/83 - - 56 56 - 96 % - -   10/05/17 1336 147/76 98  F (36.7  C) Oral 62 - 20 96 % 1.6 m (5' 3\") 63.5 kg (140 lb)      Physical Exam  Vital signs and nursing notes reviewed.     Constitutional: laying on gurney appears comfortable  HENT: Oropharynx is clear and mildly dry.   Eyes: Conjunctivae are normal bilaterally. Pupils equal  Neck: normal range of motion  Cardiovascular: Normal rate, regular rhythm, normal heart sounds.   Pulmonary/Chest: Effort normal and breath sounds normal. No respiratory distress.   Abdominal: Soft. Bowel sounds are normal. No tenderness to palpation. No rebound or guarding. No distension.   Musculoskeletal: No joint swelling or edema.   Neurological: Alert and oriented. No focal weakness  Skin: Skin is warm and dry. No rash noted.   Psych: normal affect   Emergency Department Course     Laboratory:  Laboratory findings were communicated with the patient and family who voiced understanding of the findings.  CBC: AWNL (WBC 4.5, HGB 13.1, )  BMP: Glucose 105 (H), Creatinine 1.52 (H), GFR Estimate 33 (L) o/w WNL    UA with Microscopic: In process    Interventions:  1436 0.9% NaCl Bolus 1000 mL IV  1604 0.9% NaCl Bolus 500 mL IV  1604 Monurol 3 g PO     Emergency Department Course:  Nursing notes and vitals reviewed.  I performed an " exam of the patient as documented above.   IV was inserted and blood was drawn for laboratory testing, results above.   The patient provided a urine sample here in the emergency department. This was sent for laboratory testing, findings above.     I discussed the treatment plan with the patient. They expressed understanding of this plan and consented to discharge. They will be discharged home with instructions for care and follow up. In addition, the patient will return to the emergency department if their symptoms persist, worsen, if new symptoms arise or if there is any concern.  All questions were answered.     I personally reviewed the laboratory results with the Patient and daughter and answered all related questions prior to discharge.    Impression & Plan      Medical Decision Making:  Patient is a 77 year old female who presents with symptoms of diarrhea and being currently treated for a UTI. Patient states that she thinks her medication she is taking is causing more diarrhea and she has poor appetite. She has had no vomiting or obvious fevers. Her abdominal exam unremarkable here in the emergency department. Lab tests were obtained which showed no abnormalities, specifically worsening renal function or signs of dehydration. She was given IV fluids. She was not able to provide a stool sample here in the emergency department, therefore I recommended collecting a sample at home and bringing it back to lab for evaluation for other possible infectious causes of her diarrhea. The diarrhea could be very well be side effect of her antibiotic medication. I gave her a dose of Fosfomycin here in the emergency department as she is shown to have a ESBL UTI.  She will continue this on two more doses as an outpatient and will follow up with her primary care physician as needed. Patient and family member understand the plan and she will be discharged home.      Diagnosis:    ICD-10-CM   1. Urinary tract infection N39.0    2. ESBL (extended spectrum beta-lactamase) producing bacteria infection A49.9    Z16.12      3. Diarrhea, unspecified type R19.7            Disposition:   Discharged.    Discharge Medications:  New Prescriptions    FOSFOMYCIN (MONUROL) 3 G PACKET    Take 1 packet (3 g) by mouth once for 1 dose Dissolve the contents of 1 packet in 3-4 ounces of water and take on 10/7, repeat on 10/9.    SACCHAROMYCES BOULARDII (FLORASTOR) 250 MG CAPSULE    Take 1 capsule (250 mg) by mouth 2 times daily for 7 days       Scribe Disclosure:  IAmelie, am serving as a scribe at 2:02 PM on 10/5/2017 to document services personally performed by Moustapha Murrell MD, based on my observations and the provider's statements to me.    10/5/2017   [facility] EMERGENCY DEPARTMENT       Moustapha Murrell MD  10/05/17 3567

## 2017-10-06 ENCOUNTER — CARE COORDINATION (OUTPATIENT)
Dept: CARE COORDINATION | Facility: CLINIC | Age: 77
End: 2017-10-06

## 2017-10-06 ENCOUNTER — TELEPHONE (OUTPATIENT)
Dept: INTERNAL MEDICINE | Facility: CLINIC | Age: 77
End: 2017-10-06

## 2017-10-06 ENCOUNTER — NURSE TRIAGE (OUTPATIENT)
Dept: NURSING | Facility: CLINIC | Age: 77
End: 2017-10-06

## 2017-10-06 ENCOUNTER — HOSPITAL ENCOUNTER (OUTPATIENT)
Dept: LAB | Facility: CLINIC | Age: 77
Discharge: HOME | End: 2017-10-06
Attending: EMERGENCY MEDICINE | Admitting: EMERGENCY MEDICINE
Payer: MEDICARE

## 2017-10-06 DIAGNOSIS — R19.7 DIARRHEA, UNSPECIFIED TYPE: ICD-10-CM

## 2017-10-06 LAB
C DIFF TOX B STL QL: NEGATIVE
SPECIMEN SOURCE: NORMAL

## 2017-10-06 PROCEDURE — 87493 C DIFF AMPLIFIED PROBE: CPT | Performed by: EMERGENCY MEDICINE

## 2017-10-06 NOTE — TELEPHONE ENCOUNTER
"Caller is daughter  With  Questions following ED visit or diarrhea secondary to UTI; Asks where to take stool sample and advised,   as it is labeled to take to  [facility] ED .  Has questions regarding \"diaper rash\" where patient's skin is red and irritated from frequent stools;Advised  In hygiene and use of barrier cream like \"butt past\"  obtainable  OTC based on prior nursing knowledge  Advised to call if develops skin breakdown or any other new or worsening symptoms.   Additional Information    Negative: Mild localized rash (all triage questions negative)    Negative: Pimples (all triage questions negative)    Protocols used: RASH OR REDNESS - LOCALIZED-ADULT-  Reema Olsen RN  [facility]    "

## 2017-10-06 NOTE — TELEPHONE ENCOUNTER
Pt's daughter Eva poole.  Took pt to ER 10-5-17 (see report).  Gave pt 1 dose of Fosfomycin (for UTI) in ER.    C/o diarrhea last noc after home from ER.  Daughter thinking could be from Fosfomycin.  And states Fosfomycin too expensive and not covered by insur.  Asking if pt should go back on Bactrim.    Only has 3 days of Bactrim left (10/6, 10/7, and 10/8).  Does pt need to start over?  Either way, daughter states pt needs Bactrim faxed to pharm.     (Pt has a f/u appt scheduled 10-9-17.)    Please advise, thanks.

## 2017-10-06 NOTE — TELEPHONE ENCOUNTER
Daughter Eva was the caller. Two issues:  1) Florastor could not be filled at the pharmacy Eva took the Rx to as they do not carry that. Eva doesn't want to address this any further at this time.  She will call back later to tell us which pharmacy she'd like the Rx sent to.  2) The Monurol prescribed, costs too much. Wondering if something different can be prescribed.  I called  ER. Per Dr Murrell, Wendy can either fill the Monurol or go back to Bactrim.  I gave this information to Eva.  Angie CONRAD RN [facility]

## 2017-10-06 NOTE — TELEPHONE ENCOUNTER
"Daughter, Eva called back again.  Her concerns were, if Wendy is going to restart the Bactrim, when, since she was given a dose of the Monural at the hospital earlier today, when should the Bactrim be given again.  Unable to get a stool sample even though Wendy has had three watery stools since she returned home around 6pm tonight. Wendy isn't making it to the toilet on time.  Eva will continue to try to get a stool sample. She was going to give up on this and had already thrown the \"hat\" away. Once I explained the importance of getting the sample she stated she'd pull the \"hat\" still in the plastic bag out of the garbage.   Angie CONRAD RN [facility]     "

## 2017-10-06 NOTE — PROGRESS NOTES
Clinic Care Coordination Contact    Situation: Patient chart reviewed by care coordinator.    Background: SW following for potential needs.     Assessment: Pt has been seen in the ED twice in the past week. Pt's daughter (Eva) has been involved with pt's cares and has been communicating with PCP clinic. Pt has support from Eva at home.     Plan/Recommendations: SW will plan to chart review after pt's visit with PCP on 10/09/17.    ANA Bhandari, [facility]  Social Work - Care Coordinator  [facility]- [place], [place], and [place]  Phone: [phone]

## 2017-10-09 ENCOUNTER — OFFICE VISIT (OUTPATIENT)
Dept: INTERNAL MEDICINE | Facility: CLINIC | Age: 77
End: 2017-10-09
Payer: COMMERCIAL

## 2017-10-09 VITALS
WEIGHT: 139.8 LBS | TEMPERATURE: 98.2 F | OXYGEN SATURATION: 96 % | SYSTOLIC BLOOD PRESSURE: 108 MMHG | HEIGHT: 63 IN | HEART RATE: 76 BPM | DIASTOLIC BLOOD PRESSURE: 70 MMHG | BODY MASS INDEX: 24.77 KG/M2

## 2017-10-09 DIAGNOSIS — N30.01 ACUTE CYSTITIS WITH HEMATURIA: Primary | ICD-10-CM

## 2017-10-09 DIAGNOSIS — K52.9 GASTROENTERITIS: ICD-10-CM

## 2017-10-09 PROCEDURE — 99214 OFFICE O/P EST MOD 30 MIN: CPT | Performed by: INTERNAL MEDICINE

## 2017-10-09 RX ORDER — SULFAMETHOXAZOLE AND TRIMETHOPRIM 400; 80 MG/1; MG/1
1 TABLET ORAL 2 TIMES DAILY
Qty: 10 TABLET | Refills: 0 | Status: SHIPPED | OUTPATIENT
Start: 2017-10-09 | End: 2017-10-16

## 2017-10-09 NOTE — PROGRESS NOTES
Dr Greenberg's note      Patient's instructions / PLAN:                                                        Plan:  1. Continue Bactrim and Florastor  2. Keep the follow appointment on Oct 19  3.        ASSESSMENT & PLAN:                                                      Complex PMHx: PVD ( renal artery and carotid - decided against surgery), HTN, CKD, HLipidemia, Hyperparathyroidism developed Depression with psychotic features in 2016   Since she developed the depression her general health declined and now she is depended on her daughter help.     (K52.9) Gastroenteritis  (primary encounter diagnosis)  Comment: I think she had gastroenteritis and the symptoms were not related with Bactrim since she tolerates it well now   Plan: encourage for fluids: gatorade, brat diet     (N30.01) Acute cystitis with hematuria  Comment: finish 7-8 days of bactrim   Plan: sulfamethoxazole-trimethoprim (BACTRIM/SEPTRA)         400-80 MG per tablet               Chief complaint:                                                      N/V/D  UTI    SUBJECTIVE:   Wendy Rocha is a 77 year old female who presents to clinic today for the following health issues:    9/28/2017 in ER for UTI - Omnicef was prescribed. UC came back resistant.   10/3/2017 - dr Greenberg OV - changed abx to Bactrim   10/4/2017 - she started vomiting + diarrhea  10/5/2017 - in ER she was changed from Bactrim to Fosfomycin and probiotics. She couldn't afford the Rx ( $ 200)  10/7/2017 she resumed the Bactrim. She feels better, no vomiting or Diarrhea. Mild nausea   Today: she feels better, less tired   .C Diff negative.       ED/UC Followup:    [facility]  Date of visit: 10/05/17  Reason for visit: UTI  Current Status: better            Review of Systems:                                                      ROS: negative for fever, chills, cough, wheezes, chest pain, shortness of breath, vomiting, abdominal pain, leg swelling     A 10-point review of systems  "was obtained.  Those pertinent are above and in the in the Subjective section.  The rest of the systems are negative.           OBJECTIVE:             Physical exam:  Blood pressure 108/70, pulse 76, temperature 98.2  F (36.8  C), temperature source Oral, height 5' 3\" (1.6 m), weight 139 lb 12.8 oz (63.4 kg), SpO2 96 %, not currently breastfeeding.   NAD, appears comfortable. She looks much better today with more energy and she talks more  Skin: no rashes   Chest: clear to auscultation bilaterally, good respiratory effort  Heart: S1 S2, RRR, no mgr appreciated  Abdomen: soft, not tender, no hepatosplenomegaly or masses appreciated, no abdominal bruit, present bowel sounds  Extremities: no edema,  Neurologic: A, Ox3, no focal signs appreciated    PMHx: reviewed  Past Medical History:   Diagnosis Date     Abnormal ECG      ARF (acute renal failure) (H) Aug 2010    Lisinopril was stopped at that time     Carotid stenosis      Carotid stenosis, bilateral      Carotid stenosis, bilateral     Vs Surgeon: dr Kush Mcmillan, Phone: [phone] fax: [phone]     CKD (chronic kidney disease) stage 3, GFR 30-59 ml/min      GERD (gastroesophageal reflux disease)      HTN, goal below 140/90      Hyperlipidemia LDL goal <100      Lung nodule may 2013    needs f/u may 2014     Muscle aches      Perforated duodenal ulcer (H) Aug 2010     Proteinuria      PVD (peripheral vascular disease) (H)      Renal artery stenosis (H)  April 2011    Right side     Vitamin D deficiency disease       PSHx: reviewed  Past Surgical History:   Procedure Laterality Date     ARTHROPLASTY KNEE      left     EYE SURGERY      cataracts     LAPAROSCOPIC TUBAL LIGATION          Meds: reviewed  Current Outpatient Prescriptions   Medication Sig Dispense Refill     saccharomyces boulardii (FLORASTOR) 250 MG capsule Take 1 capsule (250 mg) by mouth 2 times daily for 7 days 14 capsule 0     sulfamethoxazole-trimethoprim (BACTRIM/SEPTRA) 400-80 MG per " tablet Take 1 tablet by mouth 2 times daily 14 tablet 0     amLODIPine (NORVASC) 10 MG tablet TAKE ONE TABLET BY MOUTH ONCE DAILY 90 tablet 0     metoprolol (LOPRESSOR) 50 MG tablet Take 0.5 tablets (25 mg) by mouth 2 times daily 90 tablet 0     clopidogrel (PLAVIX) 75 MG tablet TAKE ONE TABLET BY MOUTH ONCE DAILY 90 tablet 0     Cholecalciferol (VITAMIN D) 2000 UNITS tablet Take 3 tablets by mouth daily 30 tablet      donepezil (ARICEPT) 10 MG tablet Take 10 mg by mouth        carbidopa-levodopa (SINEMET)  MG per tablet Take 1 tablet by mouth 3 times daily Per neurology Dr. Rosales       busPIRone (BUSPAR) 5 MG tablet 5 mg three times daily for anxiety, per Dr. Rosales, neurology       simvastatin (ZOCOR) 40 MG tablet Take 1 tablet (40 mg) by mouth At Bedtime 90 tablet 0     doxazosin (CARDURA) 4 MG tablet Take 1 tablet (4 mg) by mouth At Bedtime 90 tablet 0     pantoprazole (PROTONIX) 40 MG EC tablet Take 1 tablet (40 mg) by mouth daily Take 30-60 minutes before a meal. 90 tablet 3     acetaminophen (TYLENOL) 325 MG tablet Take 2 tablets (650 mg) by mouth every 6 hours as needed for mild pain 100 tablet 0     order for DME Machine to dispense the medication 1 Device 0     traZODone (DESYREL) 50 MG tablet Take 1 tablet (50 mg) by mouth nightly as needed for sleep 30 tablet 1     ASPIRIN NOT PRESCRIBED (INTENTIONAL) Antiplatelet medication not prescribed intentionally due to Plavix 0 each 0     ACE/ARB NOT PRESCRIBED, INTENTIONAL, ACE & ARB not prescribed due to Worsening renal function on ACE/ARB therapy         Soc Hx: reviewed  Fam Hx: reviewed          Natalee Greenberg MD  Internal Medicine

## 2017-10-09 NOTE — PROGRESS NOTES
Clinic Care Coordination Contact    Situation: Patient chart reviewed by care coordinator.    Background: SW following for potential needs.    Assessment: Pt was seen by PCP today. No current clinic care coordination needs indicated. Pt has support from her family.     Plan/Recommendations: SW will chart review again in 7-10 business days. Pt's daughter (Eva) has SW contact information for immediate concerns.     ANA Bhandari, [facility]  Social Work - Care Coordinator  [facility]- [place], [place], and [place]  Phone: [phone]

## 2017-10-09 NOTE — MR AVS SNAPSHOT
After Visit Summary   10/9/2017    Wendy Rocha    MRN: [id number]           Patient Information     Date Of Birth          1940        Visit Information        Provider Department      10/9/2017 12:00 PM Natalee Owens MD [facility]        Today's Diagnoses     Acute cystitis with hematuria          Care Instructions    Plan:  1. Continue Bactrim and Florastor  2. Keep the follow appointment on Oct 19          Follow-ups after your visit        Your next 10 appointments already scheduled     Oct 19, 2017  2:20 PM CDT   SHORT with Natalee Owens MD   [facility] ([facility])    [address] [place] [id number] [phone]            Nov 01, 2017  1:45 PM CDT   Office Visit with Cesilia Plascencia DO   [facility]    [address] [facility] [address] [phone]           Bring a current list of meds and any records pertaining to this visit. For Physicals, please bring immunization records and any forms needing to be filled out. Please arrive 10 minutes early to complete paperwork.            Nov 14, 2017 10:30 AM CST   SHORT with Emelyn Lott [facility] ([facility]    [address]  [address]  [facility] [address] [phone]              Who to contact     If you have questions or need follow up information about today's clinic visit or your schedule please contact [facility] directly at [phone].  Normal or non-critical lab and imaging results will be communicated to you by MyChart, letter or phone within 4 business days after the clinic has received the results. If you do not hear from us within 7 days, please contact the clinic through MyChart or phone. If you have a critical or abnormal lab result, we will notify you by phone as soon as possible.  Submit refill  "requests through [employer] or call your pharmacy and they will forward the refill request to us. Please allow 3 business days for your refill to be completed.          Additional Information About Your Visit        [employer] Information     [employer] lets you send messages to your doctor, view your test results, renew your prescriptions, schedule appointments and more. To sign up, go to [email or web address] . Click on \"Log in\" on the left side of the screen, which will take you to the Welcome page. Then click on \"Sign up Now\" on the right side of the page.     You will be asked to enter the access code listed below, as well as some personal information. Please follow the directions to create your username and password.     Your access code is: [email or web address]  Expires: 2017  2:22 AM     Your access code will  in 90 days. If you need help or a new code, please call your [place] clinic or [phone].        Care EveryWhere ID     This is your Care EveryWhere ID. This could be used by other organizations to access your [place] medical records  [id number]        Your Vitals Were     Pulse Temperature Height Pulse Oximetry BMI (Body Mass Index)       76 98.2  F (36.8  C) (Oral) 5' 3\" (1.6 m) 96% 24.76 kg/m2        Blood Pressure from Last 3 Encounters:   10/09/17 108/70   10/05/17 149/84   10/03/17 118/76    Weight from Last 3 Encounters:   10/09/17 139 lb 12.8 oz (63.4 kg)   10/05/17 140 lb (63.5 kg)   10/03/17 140 lb 14.4 oz (63.9 kg)              Today, you had the following     No orders found for display         Where to get your medicines      These medications were sent to [facility] [address] [place] [address] [address] [place] [id number]     Phone:  [phone]     sulfamethoxazole-trimethoprim 400-80 MG per tablet          Primary Care Provider Office Phone # Fax #    Natalee Owens [phone]       [address] " [facility] [address]        Equal Access to Services     FRANCIS SALAZAR : Hadii aad ku hadmichaelgalo Soleidy, waaxda luqadaha, qaybta kaalmaflash mendoza. So [facility] [phone].    ATENCIÓN: Si habla español, tiene a valencia disposición servicios gratuitos de asistencia lingüística. Llame al [phone].    We comply with applicable federal civil rights laws and [place] laws. We do not discriminate on the basis of race, color, national origin, age, disability, sex, sexual orientation, or gender identity.            Thank you!     Thank you for choosing [facility]  for your care. Our goal is always to provide you with excellent care. Hearing back from our patients is one way we can continue to improve our services. Please take a few minutes to complete the written survey that you may receive in the mail after your visit with us. Thank you!             Your Updated Medication List - Protect others around you: Learn how to safely use, store and throw away your medicines at www.disposemymeds.org.          This list is accurate as of: 10/9/17 12:46 PM.  Always use your most recent med list.                   Brand Name Dispense Instructions for use Diagnosis    ACE/ARB NOT PRESCRIBED (INTENTIONAL)      ACE & ARB not prescribed due to Worsening renal function on ACE/ARB therapy    Renal artery stenosis (H), Essential hypertension with goal blood pressure less than 140/90       amLODIPine 10 MG tablet    NORVASC    90 tablet    TAKE ONE TABLET BY MOUTH ONCE DAILY    Essential hypertension with goal blood pressure less than 140/90       ARICEPT 10 MG tablet   Generic drug:  donepezil      Take 10 mg by mouth        ASPIRIN NOT PRESCRIBED    INTENTIONAL    0 each    Antiplatelet medication not prescribed intentionally due to Plavix    Essential hypertension with goal blood pressure less than 140/90, PVD (peripheral vascular disease) (H), Aortic root dilatation (H), Renal  artery stenosis (H)       busPIRone 5 MG tablet    BUSPAR     5 mg three times daily for anxiety, per Dr. Rosales, neurology        clopidogrel 75 MG tablet    PLAVIX    90 tablet    TAKE ONE TABLET BY MOUTH ONCE DAILY    PVD (peripheral vascular disease) (H), Aortic root dilatation (H), Renal artery stenosis (H)       doxazosin 4 MG tablet    CARDURA    90 tablet    Take 1 tablet (4 mg) by mouth At Bedtime    Essential hypertension with goal blood pressure less than 140/90, PVD (peripheral vascular disease) (H), Aortic root dilatation (H), Renal artery stenosis (H)       metoprolol 50 MG tablet    LOPRESSOR    90 tablet    Take 0.5 tablets (25 mg) by mouth 2 times daily    Essential hypertension with goal blood pressure less than 140/90       order for DME     1 Device    Machine to dispense the medication    Severe episode of recurrent major depressive disorder, with psychotic features (H), Memory loss       pantoprazole 40 MG EC tablet    PROTONIX    90 tablet    Take 1 tablet (40 mg) by mouth daily Take 30-60 minutes before a meal.    Perforated duodenal ulcer (H)       saccharomyces boulardii 250 MG capsule    FLORASTOR    14 capsule    Take 1 capsule (250 mg) by mouth 2 times daily for 7 days        simvastatin 40 MG tablet    ZOCOR    90 tablet    Take 1 tablet (40 mg) by mouth At Bedtime    Hyperlipidemia LDL goal <100       SINEMET  MG per tablet   Generic drug:  carbidopa-levodopa      Take 1 tablet by mouth 3 times daily Per neurology Dr. Rosales        sulfamethoxazole-trimethoprim 400-80 MG per tablet    BACTRIM/SEPTRA    10 tablet    Take 1 tablet by mouth 2 times daily    Acute cystitis with hematuria       traZODone 50 MG tablet    DESYREL    30 tablet    Take 1 tablet (50 mg) by mouth nightly as needed for sleep    Persistent insomnia       TYLENOL 325 MG tablet   Generic drug:  acetaminophen     100 tablet    Take 2 tablets (650 mg) by mouth every 6 hours as needed for mild pain    Severe  episode of recurrent major depressive disorder, with psychotic features (H), Memory loss       vitamin D 2000 UNITS tablet     30 tablet    Take 3 tablets by mouth daily

## 2017-10-09 NOTE — NURSING NOTE
"Chief Complaint   Patient presents with     Hospital F/U       Initial /70 (BP Location: Right arm, Patient Position: Sitting, Cuff Size: Adult Small)  Pulse 76  Temp 98.2  F (36.8  C) (Oral)  Ht 5' 3\" (1.6 m)  Wt 139 lb 12.8 oz (63.4 kg)  SpO2 96%  BMI 24.76 kg/m2 Estimated body mass index is 24.76 kg/(m^2) as calculated from the following:    Height as of this encounter: 5' 3\" (1.6 m).    Weight as of this encounter: 139 lb 12.8 oz (63.4 kg).  Medication Reconciliation: complete    "

## 2017-10-10 DIAGNOSIS — I10 ESSENTIAL HYPERTENSION WITH GOAL BLOOD PRESSURE LESS THAN 140/90: Chronic | ICD-10-CM

## 2017-10-10 DIAGNOSIS — I77.810 AORTIC ROOT DILATATION (H): ICD-10-CM

## 2017-10-10 DIAGNOSIS — I70.1 RENAL ARTERY STENOSIS (H): Chronic | ICD-10-CM

## 2017-10-10 DIAGNOSIS — I73.9 PVD (PERIPHERAL VASCULAR DISEASE) (H): ICD-10-CM

## 2017-10-10 DIAGNOSIS — E78.5 HYPERLIPIDEMIA LDL GOAL <100: ICD-10-CM

## 2017-10-10 RX ORDER — SIMVASTATIN 40 MG
40 TABLET ORAL AT BEDTIME
Qty: 90 TABLET | Refills: 0 | Status: SHIPPED | OUTPATIENT
Start: 2017-10-10 | End: 2018-01-08

## 2017-10-10 RX ORDER — DOXAZOSIN 4 MG/1
4 TABLET ORAL AT BEDTIME
Qty: 90 TABLET | Refills: 0 | Status: SHIPPED | OUTPATIENT
Start: 2017-10-10 | End: 2018-01-01

## 2017-10-10 NOTE — TELEPHONE ENCOUNTER
Dgtr calls with questions about Metoprolol dosing.  The bottle they have at home says to take a whole 50mg tablet and Emelyn Lott decreased her dose on 9/12/17 to 1/2 dose two times daily.  She says the bottle was actually picked up in August, so it was before the dose was decreased.      She was advised that a new rx has been sent in for the new dose, but to get the correct refill when they need it she should call the pharmacy in person.    She is going to be bringing Wendy in for labs on 10/13/17 @ 9am so she'll be up to date on labs for her Simvastatin.

## 2017-10-10 NOTE — TELEPHONE ENCOUNTER
Simvastatin refilled after speaking with daughter.  She will bring pt in for fasting labs in the next 6 days.  SUE Mallory R.N.

## 2017-10-10 NOTE — TELEPHONE ENCOUNTER
Doxazosin       Last Written Prescription Date: 7/5/17  Last Fill Quantity: 90, # refills: 0    Last Office Visit with G, P or [facility] prescribing provider:  10/9/17   Future Office Visit:    Next 5 appointments (look out 90 days)     Oct 19, 2017  2:20 PM CDT   [facility]    [address] [place] [id number] [phone]            Nov 01, 2017  1:45 PM CDT   Office Visit with Cesilia Plascencia DO   [facility]    [address] [address] [phone]            Nov 14, 2017 10:30 AM CST   SHORT with Emelyn Lott RPH   [facility] ([facility])    [address]  [address]  [facility] [id number] [phone]                    BP Readings from Last 3 Encounters:   10/09/17 108/70   10/05/17 149/84   10/03/17 118/76

## 2017-10-11 ENCOUNTER — TELEPHONE (OUTPATIENT)
Dept: INTERNAL MEDICINE | Facility: CLINIC | Age: 77
End: 2017-10-11

## 2017-10-11 ENCOUNTER — NURSE TRIAGE (OUTPATIENT)
Dept: NURSING | Facility: CLINIC | Age: 77
End: 2017-10-11

## 2017-10-11 DIAGNOSIS — R53.81 PHYSICAL DECONDITIONING: Primary | ICD-10-CM

## 2017-10-11 NOTE — TELEPHONE ENCOUNTER
Patient's daughter called. Was asking if patient could use Tums or Ant-acids when patient is done with the Zofran. Writer educated on all three medications. Daughter stated that if patient can not tolerate with out Zofran then she will call back for further refills.

## 2017-10-12 ENCOUNTER — CARE COORDINATION (OUTPATIENT)
Dept: CARE COORDINATION | Facility: CLINIC | Age: 77
End: 2017-10-12

## 2017-10-12 ENCOUNTER — TELEPHONE (OUTPATIENT)
Dept: PHARMACY | Facility: CLINIC | Age: 77
End: 2017-10-12

## 2017-10-12 NOTE — TELEPHONE ENCOUNTER
Home Health care would not help, because they can't provide 24 h care  Needs referral to  and look for placement     Please ask  to review this

## 2017-10-12 NOTE — TELEPHONE ENCOUNTER
"Please call to see if patient can be worked in tomorrow to see Dr. Greenberg or Friday with Dr. Davison. Patient is scheduled to see Dr. Greenberg on 10/19 but daughter doesn't think they can wait any longer. Per Eva, the daughter, \"at 730 this evening, I called to see how she was doing and to make sure she had a snack before her 8pm pills. She has a lot of pills to take. It s getting worse. She has memory problems. She spilled her pills this morning. She may have taken more than what she needed to. I only found out about it a little over an hr ago. She was afraid to tell me and afraid that I would be furious. I wouldn't have been furious but I could have fixed matters this morning when it happened. We re dealing with something, we need to consult with one of those two doctors (Dionicio or Laney) asap. She still has medical issues. She's not eating while trying to fight off her UTI. We need to figure out how to pare down these pills. It s just getting to a serious level. This poor women can t go on with this. What can we do to do to get her better physically and what are our options to handle pills better? There s an order open for a pill dispensing machine. We want to get in in the next 2 days. Can we get another health care directive form? Me and my family keep forgetting about it, but don t want to let it go too much longer. We need to cover as much ground as we can and figure out what s going on with her and what to do. I did not feel right directing her to take pills tonight because she could have taken them already and she doesn't remember if she took them or not. The pill box is labelled 1-31. The number 11 pills - box slipped out of her hands and it s possible that two compartments fell open. She said they fell all over. She may have taken too many pills at one time today. She's supposed to take her pills at 8am, 1pm, 5pm and 8pm.\"    Sent to [facility] for triage. Please call to discuss if she can be worked " in tomorrow or Friday.     Ainsley GARCIA  Central Scheduler

## 2017-10-12 NOTE — TELEPHONE ENCOUNTER
Daughter concerned that patient had taken all her pills before she should have today; had no pills left for 2000 ; is fine at present; review of medications reveals that most are  once daily and the patient I alert at present.  Daughter  has concerns that she is unable to care for self and has requested an appointment earlier that scheduled has been routed to   Triage protocol reviewed with daughter  Advised to have family meeting with   arranged through  PCP to explore sources of assistance with care for mother in home.     Additional Information    Caller has medication question, adult has minor symptoms, caller declines triage, and triager answers question    Protocols used: MEDICATION QUESTION CALL-ADULT-

## 2017-10-12 NOTE — TELEPHONE ENCOUNTER
Clinic Action Needed:Yes;  referral  Reason for Call: family assistance needed in planning care  Patient Recommendations/Teaching:Referred to clinic - within 24 hours  Teaching per [facility] guidelines.  Routed to: PCP and clinic staff    Daughter  Eva  expressed family's fears for patien'ts safety and ability to remain in home  and care for self; resides with her  who is fine and independent but does not  do wife's cares. . Daughter feels patient is more confused and not capable of ADL's and family needs  assistance with coping and decision making.      Thank you  Reema Olsen RN  [facility]

## 2017-10-12 NOTE — PROGRESS NOTES
Clinic Care Coordination Contact      Referral Source: Care Team  Clinical Data: Care Coordinator Outreach  Spoke to pt's daughter (Eva) who stated that she is staying with pt and plans to stay there until 10/16/17 when pt will see PCP and MTM. Eva stated that pt is not sure if she took her medications or spilled them. Eva stated that she fills the medications box for pt. Eva stated that pt had a PT at the Neurologist appointment and spit up there. Eva stated that pt has two other daughters that Eva has asked them to attend the appointments on 10/16/17. Eva stated that she would like to wait to make decisions on next steps for pt until 10/16/17. SW asked Eva numerous times if placement needs to take place prior to Monday (10/16/17). Eva stated that she would like her sister's and pt's ex-spouse involved with decision making. Eva stated that she is worried about finances and private paying for services. Eva has the contact information for [facility]  ([phone]) to ask about getting an elderly waiver assessment. Eva stated that pt does not wash her hair (monthly when she get it done). Eva stated that she wants pt to complete the HCD.     Plan: Care Coordinator will follow-up on 10/16/17. Eva has SW contact information.     ANA Bhandari, [facility]  Social Work - Care Coordinator  [facility] [place], and [place]  Phone: [phone]

## 2017-10-12 NOTE — TELEPHONE ENCOUNTER
Eva called concerned about Mom's adherence to medications at home.  She will be here Monday to see Dr. Greenberg, will schedule to see her after that apt and see if Betty can join us.  She will ask her mom to stay with her for the weekend.    Emelyn Lott , Pharm D  [phone] (phone)  [phone] (pager)  Medication Therapy Management Pharmacist

## 2017-10-16 ENCOUNTER — OFFICE VISIT (OUTPATIENT)
Dept: PHARMACY | Facility: CLINIC | Age: 77
End: 2017-10-16
Payer: COMMERCIAL

## 2017-10-16 ENCOUNTER — OFFICE VISIT (OUTPATIENT)
Dept: INTERNAL MEDICINE | Facility: CLINIC | Age: 77
End: 2017-10-16
Payer: COMMERCIAL

## 2017-10-16 ENCOUNTER — CARE COORDINATION (OUTPATIENT)
Dept: CARE COORDINATION | Facility: CLINIC | Age: 77
End: 2017-10-16

## 2017-10-16 VITALS
SYSTOLIC BLOOD PRESSURE: 110 MMHG | DIASTOLIC BLOOD PRESSURE: 78 MMHG | HEIGHT: 63 IN | HEART RATE: 75 BPM | BODY MASS INDEX: 24.24 KG/M2 | TEMPERATURE: 98.2 F | WEIGHT: 136.8 LBS | OXYGEN SATURATION: 96 %

## 2017-10-16 DIAGNOSIS — R11.0 NAUSEA: ICD-10-CM

## 2017-10-16 DIAGNOSIS — E55.9 VITAMIN D DEFICIENCY: ICD-10-CM

## 2017-10-16 DIAGNOSIS — F41.9 ANXIETY: ICD-10-CM

## 2017-10-16 DIAGNOSIS — R25.1 TREMOR: ICD-10-CM

## 2017-10-16 DIAGNOSIS — R11.0 NAUSEA: Primary | ICD-10-CM

## 2017-10-16 DIAGNOSIS — F33.3 SEVERE EPISODE OF RECURRENT MAJOR DEPRESSIVE DISORDER, WITH PSYCHOTIC FEATURES (H): Primary | Chronic | ICD-10-CM

## 2017-10-16 DIAGNOSIS — F09 COGNITIVE DISORDER: ICD-10-CM

## 2017-10-16 DIAGNOSIS — G47.00 INSOMNIA, UNSPECIFIED TYPE: ICD-10-CM

## 2017-10-16 DIAGNOSIS — I10 ESSENTIAL HYPERTENSION WITH GOAL BLOOD PRESSURE LESS THAN 140/90: ICD-10-CM

## 2017-10-16 PROCEDURE — 99607 MTMS BY PHARM ADDL 15 MIN: CPT | Performed by: PHARMACIST

## 2017-10-16 PROCEDURE — 99606 MTMS BY PHARM EST 15 MIN: CPT | Performed by: PHARMACIST

## 2017-10-16 PROCEDURE — 99214 OFFICE O/P EST MOD 30 MIN: CPT | Performed by: INTERNAL MEDICINE

## 2017-10-16 RX ORDER — BUSPIRONE HYDROCHLORIDE 5 MG/1
TABLET ORAL
Qty: 90 TABLET
Start: 2017-10-16 | End: 2018-01-19

## 2017-10-16 RX ORDER — ONDANSETRON 8 MG/1
8 TABLET, FILM COATED ORAL EVERY 8 HOURS PRN
Qty: 20 TABLET | Refills: 0 | Status: SHIPPED | OUTPATIENT
Start: 2017-10-16

## 2017-10-16 NOTE — MR AVS SNAPSHOT
After Visit Summary   10/16/2017    Wendy Rocha    MRN: [id number]           Patient Information     Date Of Birth          1940        Visit Information        Provider Department      10/16/2017 12:20 PM Natalee Owens MD [facility]        Today's Diagnoses     Nausea    -  1       Follow-ups after your visit        Your next 10 appointments already scheduled     Nov 01, 2017  1:45 PM CDT   [facility]    [address] [facility] [id number] [phone]           Bring a current list of meds and any records pertaining to this visit. For Physicals, please bring immunization records and any forms needing to be filled out. Please arrive 10 minutes early to complete paperwork.            Nov 14, 2017 10:30 AM CST   SHORT with Emelyn Lott RPH   [facility] ([facility])    [address]  [address]  [facility] [address] [phone]              Who to contact     If you have questions or need follow up information about today's clinic visit or your schedule please contact [facility] directly at [phone].  Normal or non-critical lab and imaging results will be communicated to you by BeeBillionhart, letter or phone within 4 business days after the clinic has received the results. If you do not hear from us within 7 days, please contact the clinic through BeeBillionhart or phone. If you have a critical or abnormal lab result, we will notify you by phone as soon as possible.  Submit refill requests through Optovue or call your pharmacy and they will forward the refill request to us. Please allow 3 business days for your refill to be completed.          Additional Information About Your Visit        BeeBillionharNinsight Broadcast Information     Optovue lets you send messages to your doctor, view your test results, renew your prescriptions, schedule  "appointments and more. To sign up, go to [email or web address] . Click on \"Log in\" on the left side of the screen, which will take you to the Welcome page. Then click on \"Sign up Now\" on the right side of the page.     You will be asked to enter the access code listed below, as well as some personal information. Please follow the directions to create your username and password.     Your access code is: [email or web address]  Expires: 2017  2:22 AM     Your access code will  in 90 days. If you need help or a new code, please call your [place] clinic or [phone].        Care EveryWhere ID     This is your Care EveryWhere ID. This could be used by other organizations to access your [place] medical records  [id number]        Your Vitals Were     Pulse Temperature Height Pulse Oximetry Breastfeeding? BMI (Body Mass Index)    75 98.2  F (36.8  C) (Oral) 5' 3\" (1.6 m) 96% No 24.23 kg/m2       Blood Pressure from Last 3 Encounters:   10/19/17 130/88   10/16/17 110/78   10/09/17 108/70    Weight from Last 3 Encounters:   10/19/17 135 lb 3.2 oz (61.3 kg)   10/16/17 136 lb 12.8 oz (62.1 kg)   10/09/17 139 lb 12.8 oz (63.4 kg)              Today, you had the following     No orders found for display         Today's Medication Changes          These changes are accurate as of: 10/16/17 11:59 PM.  If you have any questions, ask your nurse or doctor.               Start taking these medicines.        Dose/Directions    ondansetron 8 MG tablet   Commonly known as:  ZOFRAN   Used for:  Nausea   Started by:  Natalee Owens MD        Dose:  8 mg   Take 1 tablet (8 mg) by mouth every 8 hours as needed for nausea   Quantity:  20 tablet   Refills:  0         These medicines have changed or have updated prescriptions.        Dose/Directions    busPIRone 5 MG tablet   Commonly known as:  BUSPAR   This may have changed:  additional instructions   Used for:  Severe episode of recurrent major depressive disorder, " with psychotic features (H)   Changed by:  Emelyn Lott, RPH        5 mg two times daily for anxiety, per Dr. Rosales, neurology   Quantity:  90 tablet   Refills:  0            Where to get your medicines      These medications were sent to [id number] [address] [facility]  [address] [facility] [address]     Phone:  [phone]     ondansetron 8 MG tablet         Some of these will need a paper prescription and others can be bought over the counter.  Ask your nurse if you have questions.     You don't need a prescription for these medications     busPIRone 5 MG tablet                Primary Care Provider Office Phone # Fax #    Natalee Owens [phone]       [address]  [facility] [address]        Equal Access to Services     FRANCIS SALAZAR : Hadii lara angel hadasho Soomaali, waaxda luqadaha, qaybta kaalmada adeegyada, flash nagel . So [facility] [phone].    ATENCIÓN: Si habla español, tiene a valencia disposición servicios gratuitos de asistencia lingüística. Marques al [phone].    We comply with applicable federal civil rights laws and [place] laws. We do not discriminate on the basis of race, color, national origin, age, disability, sex, sexual orientation, or gender identity.            Thank you!     Thank you for choosing [facility]  for your care. Our goal is always to provide you with excellent care. Hearing back from our patients is one way we can continue to improve our services. Please take a few minutes to complete the written survey that you may receive in the mail after your visit with us. Thank you!             Your Updated Medication List - Protect others around you: Learn how to safely use, store and throw away your medicines at www.disposemymeds.org.          This list is accurate as of: 10/16/17 11:59 PM.  Always use your most recent med list.                   Brand  Name Dispense Instructions for use Diagnosis    ACE/ARB/ARNI NOT PRESCRIBED (INTENTIONAL)      ACE & ARB not prescribed due to Worsening renal function on ACE/ARB therapy    Renal artery stenosis (H), Essential hypertension with goal blood pressure less than 140/90       amLODIPine 10 MG tablet    NORVASC    90 tablet    TAKE ONE TABLET BY MOUTH ONCE DAILY    Essential hypertension with goal blood pressure less than 140/90       ASPIRIN NOT PRESCRIBED    INTENTIONAL    0 each    Antiplatelet medication not prescribed intentionally due to Plavix    Essential hypertension with goal blood pressure less than 140/90, PVD (peripheral vascular disease) (H), Aortic root dilatation (H), Renal artery stenosis (H)       busPIRone 5 MG tablet    BUSPAR    90 tablet    5 mg two times daily for anxiety, per Dr. Rosales, neurology    Severe episode of recurrent major depressive disorder, with psychotic features (H)       clopidogrel 75 MG tablet    PLAVIX    90 tablet    TAKE ONE TABLET BY MOUTH ONCE DAILY    PVD (peripheral vascular disease) (H), Aortic root dilatation (H), Renal artery stenosis (H)       doxazosin 4 MG tablet    CARDURA    90 tablet    Take 1 tablet (4 mg) by mouth At Bedtime    Essential hypertension with goal blood pressure less than 140/90, PVD (peripheral vascular disease) (H), Aortic root dilatation (H), Renal artery stenosis (H)       metoprolol 50 MG tablet    LOPRESSOR    90 tablet    Take 0.5 tablets (25 mg) by mouth 2 times daily    Essential hypertension with goal blood pressure less than 140/90       ondansetron 8 MG tablet    ZOFRAN    20 tablet    Take 1 tablet (8 mg) by mouth every 8 hours as needed for nausea    Nausea       order for DME     1 Device    Machine to dispense the medication    Severe episode of recurrent major depressive disorder, with psychotic features (H), Memory loss       pantoprazole 40 MG EC tablet    PROTONIX    90 tablet    Take 1 tablet (40 mg) by mouth daily Take 30-60  minutes before a meal.    Perforated duodenal ulcer (H)       simvastatin 40 MG tablet    ZOCOR    90 tablet    Take 1 tablet (40 mg) by mouth At Bedtime    Hyperlipidemia LDL goal <100       traZODone 50 MG tablet    DESYREL    30 tablet    Take 1 tablet (50 mg) by mouth nightly as needed for sleep    Persistent insomnia       TYLENOL 325 MG tablet   Generic drug:  acetaminophen     100 tablet    Take 2 tablets (650 mg) by mouth every 6 hours as needed for mild pain    Severe episode of recurrent major depressive disorder, with psychotic features (H), Memory loss

## 2017-10-16 NOTE — PATIENT INSTRUCTIONS
Recommendations from today's MTM visit:                                                      1. Reduce buspirone to 1 tablet in the morning and 1 tablet at bedtime    2. Stop taking vitamin D, Sinemet (carbidopa-levodopa) and donepezil    3. Follow-up with Dr. Greenberg on Thursday as planned      Next MTM visit: 1 month    To schedule another MTM appointment, please call the clinic directly or you may call the MTM scheduling line at [phone] or toll-free at [phone].     My Clinical Pharmacist's contact information:                                                      It was a pleasure seeing you today!  Please feel free to contact me with any questions or concerns you have.      Emelyn Lott , Pharm D  [phone] (phone)  [phone] (pager)  Medication Therapy Management Pharmacist     You may receive a survey about the MTM services you received.  I would appreciate your feedback to help me serve you better in the future. Please fill it out and return it when you can. Your comments will be anonymous.

## 2017-10-16 NOTE — NURSING NOTE
"Chief Complaint   Patient presents with     RECHECK     Imm/Inj     Recheck Medication       Initial /78 (BP Location: Right arm, Patient Position: Chair, Cuff Size: Adult Regular)  Pulse 75  Temp 98.2  F (36.8  C) (Oral)  Ht 5' 3\" (1.6 m)  Wt 136 lb 12.8 oz (62.1 kg)  SpO2 96%  Breastfeeding? No  BMI 24.23 kg/m2 Estimated body mass index is 24.23 kg/(m^2) as calculated from the following:    Height as of this encounter: 5' 3\" (1.6 m).    Weight as of this encounter: 136 lb 12.8 oz (62.1 kg).  Medication Reconciliation: complete   Klaudia Holguin CMA      "

## 2017-10-16 NOTE — MR AVS SNAPSHOT
After Visit Summary   10/16/2017    Wendy Rocha    MRN: [id number]           Patient Information     Date Of Birth          1940        Visit Information        Provider Department      10/16/2017 1:00 PM Emelyn Lott, [facility]        Today's Diagnoses     DEPRESSION, Severe, recurrent, with psychotic features (H)    -  1      Care Instructions    Recommendations from today's MTM visit:                                                      1. Reduce buspirone to 1 tablet in the morning and 1 tablet at bedtime    2. Stop taking vitamin D, Sinemet (carbidopa-levodopa) and donepezil    3. Follow-up with Dr. Greenberg on Thursday as planned      Next MTM visit: 1 month    To schedule another MTM appointment, please call the clinic directly or you may call the MTM scheduling line at [phone] or toll-free at [phone].     My Clinical Pharmacist's contact information:                                                      It was a pleasure seeing you today!  Please feel free to contact me with any questions or concerns you have.      Emelyn Lott , Pharm D  [phone] (phone)  [phone] (pager)  Medication Therapy Management Pharmacist     You may receive a survey about the MTM services you received.  I would appreciate your feedback to help me serve you better in the future. Please fill it out and return it when you can. Your comments will be anonymous.                      Follow-ups after your visit        Your next 10 appointments already scheduled     Oct 19, 2017  2:20 PM CDT   SHORT with Natalee Owens MD   [facility]    [address]  [facility] [address] [phone]            Nov 01, 2017  1:45 PM CDT   Office Visit with Cesilia Plascencia,    [facility] ([facility])    [address]  [facility] [address] [phone]           Bring a current list of  "meds and any records pertaining to this visit. For Physicals, please bring immunization records and any forms needing to be filled out. Please arrive 10 minutes early to complete paperwork.            2017 10:30 AM JUANA   SHORT with Emelyn Lott [facility] ([facility]    [address]  [address]  [facility] [id number] [phone]              Who to contact     If you have questions or need follow up information about today's clinic visit or your schedule please contact [facility] directly at [phone].  Normal or non-critical lab and imaging results will be communicated to you by PlayyOnhart, letter or phone within 4 business days after the clinic has received the results. If you do not hear from us within 7 days, please contact the clinic through PlayyOnhart or phone. If you have a critical or abnormal lab result, we will notify you by phone as soon as possible.  Submit refill requests through NoFlo or call your pharmacy and they will forward the refill request to us. Please allow 3 business days for your refill to be completed.          Additional Information About Your Visit        MyChar"[employer] Information     NoFlo lets you send messages to your doctor, view your test results, renew your prescriptions, schedule appointments and more. To sign up, go to [email or web address] . Click on \"Log in\" on the left side of the screen, which will take you to the Welcome page. Then click on \"Sign up Now\" on the right side of the page.     You will be asked to enter the access code listed below, as well as some personal information. Please follow the directions to create your username and password.     Your access code is: [email or web address]  Expires: 2017  2:22 AM     Your access code will  in 90 days. If you need help or a new code, please call your [place] clinic or [phone].        Care EveryWhere ID     This is your Care EveryWhere ID. This " could be used by other organizations to access your [place] medical records  [id number]         Blood Pressure from Last 3 Encounters:   10/16/17 110/78   10/09/17 108/70   10/05/17 149/84    Weight from Last 3 Encounters:   10/16/17 136 lb 12.8 oz (62.1 kg)   10/09/17 139 lb 12.8 oz (63.4 kg)   10/05/17 140 lb (63.5 kg)              Today, you had the following     No orders found for display         Today's Medication Changes          These changes are accurate as of: 10/16/17  1:36 PM.  If you have any questions, ask your nurse or doctor.               Start taking these medicines.        Dose/Directions    ondansetron 8 MG tablet   Commonly known as:  ZOFRAN   Used for:  Nausea   Started by:  Natalee Owens MD        Dose:  8 mg   Take 1 tablet (8 mg) by mouth every 8 hours as needed for nausea   Quantity:  20 tablet   Refills:  0         These medicines have changed or have updated prescriptions.        Dose/Directions    busPIRone 5 MG tablet   Commonly known as:  BUSPAR   This may have changed:  additional instructions   Used for:  Severe episode of recurrent major depressive disorder, with psychotic features (H)   Changed by:  Emelyn Lott, RPH        5 mg two times daily for anxiety, per Dr. Rosales, neurology   Quantity:  90 tablet   Refills:  0            Where to get your medicines      These medications were sent to [facility] [address] [place] [address]     Phone:  [phone]     ondansetron 8 MG tablet         Some of these will need a paper prescription and others can be bought over the counter.  Ask your nurse if you have questions.     You don't need a prescription for these medications     busPIRone 5 MG tablet                Primary Care Provider Office Phone # Fax #    Natalee Owens [phone]       [address]  [facility] [address]        Equal Access to  Services     [facility]: Hadii aad ku hadmichaelgalo Soleidy, waaxda luqadaha, qaybta kaalmada adeegkeri, flash nagel . So [facility] [phone].    ATENCIÓN: Si habla español, tiene a valencia disposición servicios gratuitos de asistencia lingüística. Llame al [phone].    We comply with applicable federal civil rights laws and [place] laws. We do not discriminate on the basis of race, color, national origin, age, disability, sex, sexual orientation, or gender identity.            Thank you!     Thank you for choosing [facility]  for your care. Our goal is always to provide you with excellent care. Hearing back from our patients is one way we can continue to improve our services. Please take a few minutes to complete the written survey that you may receive in the mail after your visit with us. Thank you!             Your Updated Medication List - Protect others around you: Learn how to safely use, store and throw away your medicines at www.disposemymeds.org.          This list is accurate as of: 10/16/17  1:36 PM.  Always use your most recent med list.                   Brand Name Dispense Instructions for use Diagnosis    ACE/ARB NOT PRESCRIBED (INTENTIONAL)      ACE & ARB not prescribed due to Worsening renal function on ACE/ARB therapy    Renal artery stenosis (H), Essential hypertension with goal blood pressure less than 140/90       amLODIPine 10 MG tablet    NORVASC    90 tablet    TAKE ONE TABLET BY MOUTH ONCE DAILY    Essential hypertension with goal blood pressure less than 140/90       ASPIRIN NOT PRESCRIBED    INTENTIONAL    0 each    Antiplatelet medication not prescribed intentionally due to Plavix    Essential hypertension with goal blood pressure less than 140/90, PVD (peripheral vascular disease) (H), Aortic root dilatation (H), Renal artery stenosis (H)       busPIRone 5 MG tablet    BUSPAR    90 tablet    5 mg two times daily for anxiety, per Dr. Rosales, neurology     Severe episode of recurrent major depressive disorder, with psychotic features (H)       clopidogrel 75 MG tablet    PLAVIX    90 tablet    TAKE ONE TABLET BY MOUTH ONCE DAILY    PVD (peripheral vascular disease) (H), Aortic root dilatation (H), Renal artery stenosis (H)       doxazosin 4 MG tablet    CARDURA    90 tablet    Take 1 tablet (4 mg) by mouth At Bedtime    Essential hypertension with goal blood pressure less than 140/90, PVD (peripheral vascular disease) (H), Aortic root dilatation (H), Renal artery stenosis (H)       metoprolol 50 MG tablet    LOPRESSOR    90 tablet    Take 0.5 tablets (25 mg) by mouth 2 times daily    Essential hypertension with goal blood pressure less than 140/90       ondansetron 8 MG tablet    ZOFRAN    20 tablet    Take 1 tablet (8 mg) by mouth every 8 hours as needed for nausea    Nausea       order for [employer]     1 Device    Machine to dispense the medication    Severe episode of recurrent major depressive disorder, with psychotic features (H), Memory loss       pantoprazole 40 MG EC tablet    PROTONIX    90 tablet    Take 1 tablet (40 mg) by mouth daily Take 30-60 minutes before a meal.    Perforated duodenal ulcer (H)       simvastatin 40 MG tablet    ZOCOR    90 tablet    Take 1 tablet (40 mg) by mouth At Bedtime    Hyperlipidemia LDL goal <100       traZODone 50 MG tablet    DESYREL    30 tablet    Take 1 tablet (50 mg) by mouth nightly as needed for sleep    Persistent insomnia       TYLENOL 325 MG tablet   Generic drug:  acetaminophen     100 tablet    Take 2 tablets (650 mg) by mouth every 6 hours as needed for mild pain    Severe episode of recurrent major depressive disorder, with psychotic features (H), Memory loss

## 2017-10-16 NOTE — PROGRESS NOTES
SUBJECTIVE/OBJECTIVE:                                                    Wendy Rocha is a 76 year old female coming in for a follow-up visit for Medication Therapy Management.  She was referred to me from Dr. Greenberg.  Her three daughters joined us today.    Chief Complaint: Follow up from our visit on 8/24/17.  Had some GI upset - not today though.     Tobacco: No tobacco use   Alcohol: not currently using    Medication Adherence: No issues reported by patient and her daughter Eva helps set up daily med boxes.    Memory:  Started donepezil on 8/15 and taking 10 mg daily.  Pt is experiencing GI upset (see below).    Tremor:  Taking Sinemet 1 tablet TID. No visible tremor today. Concerns with GI upset.      Anxiety/Nighttime Hallucinations: Pt is currently taking buspirone 5 mg TID (started on 8/22).  Tapered off risperidone as recommended; last dose 8/27.   No significant change in symptoms with change.      Hypertension: Pt is current taking Metoprolol tartrate 25 mg BID, Amlodipine 10 mg daily and Doxazosin 4 mg tablet HS.  Pt has not been checking blood pressure at home. Pt reports no side effects    Insomnia: Current medications include: trazodone 50 mg - reports not taking recently. . Daughter Eva is reluctant to having the Pt take trazodone each night as she feels like that may make things worse.    Nausea:  Having BM twice daily that she reports as regular stools.  Feeling full so not eating much. Only ate a small amount this morning as she was no longer hungry.  No real GI pain.  Wakes up in the middle of the night with dry heaves.  Met with Dr. Greenberg prior to our visit today.    Supplements:  Taking vitamin D 6000 units daily.  Vit D level 59 from 2015.    Current labs include:  BP Readings from Last 3 Encounters:   10/16/17 110/78   10/09/17 108/70   10/05/17 149/84     No results found for this basename: a1c.  CHOL      170   7/15/2014  TRIG      112   7/15/2014  HDL       61    7/15/2014  LDL       86   7/15/2014    Liver Function Studies -   Recent Labs   Lab Test  01/05/16   1054   PROTTOTAL  7.3   ALBUMIN  4.0   BILITOTAL  0.6   ALKPHOS  63   AST  22   ALT  20       MICROL       66   1/27/2015  MICROALBUMIN    93.18   1/27/2015    Last Basic Metabolic Panel:  NA      141   1/5/2016   POTASSIUM      4.2   1/5/2016  CHLORIDE      106   1/5/2016  BUN       32   1/5/2016  CR     1.47   1/5/2016  GFR Estimate   Date Value Ref Range Status   10/05/2017 33 (L) >60 mL/min/1.7m2 Final     Comment:     Non  GFR Calc   09/28/2017 34 (L) >60 mL/min/1.7m2 Final     Comment:     Non  GFR Calc   09/18/2017 36 (L) >60 mL/min/1.7m2 Final     Comment:     Non  GFR Calc     GFR Estimate If Black   Date Value Ref Range Status   10/05/2017 40 (L) >60 mL/min/1.7m2 Final     Comment:      GFR Calc   09/28/2017 41 (L) >60 mL/min/1.7m2 Final     Comment:      GFR Calc   09/18/2017 43 (L) >60 mL/min/1.7m2 Final     Comment:      GFR Calc     TSH   Date Value Ref Range Status   09/18/2017 2.67 0.40 - 4.00 mU/L Final   ]      Most Recent Immunizations   Administered Date(s) Administered     Influenza (High Dose) 3 valent vaccine 09/11/2015     Influenza (IIV3) 11/19/2016     Pneumococcal (PCV 13) 09/11/2015     Pneumococcal 23 valent 09/03/2013     TD (ADULT, 7+) 08/14/2015     ASSESSMENT:                                                    Current medications were reviewed with her today.      Medication Adherence: No issues identified.    Memory:  Will hold donepezil as discussed with Dr. Greenberg    Tremor:  Hold Sinemet as discussed with Dr. Greenberg    Anxiety/Nighttime Hallucinations: reduced buspirone to BID.    Hypertension: stable    Insomnia: agree with prn use of trazodone    Supplements: hold vitamin D    Nausea:  Per Dr. Greenberg, will hold carbidopa-levodopa, donepezil and vitamin D.  Reduce buspirone to BID.   Follow-up on Thurs.  Pt given Zofran to use as needed.     PLAN:                                                      1. Hold Sinemet, vitamin D, and donepezil for the next three days per Dr. Greenberg  2. Reduce buspirone to 5 mg by mouth twice daily  3. Initiate ondansetron 8 mg by mouth every eight hours as needed for nausea      I spent 30 minutes with this patient today.  All changes were made via collaborative practice agreement with Natalee Owens. A copy of the visit note was provided to the patient's primary care provider.     Will follow up in 1 months    The patient was given a summary of these recommendations as an after visit summary.    Emelyn Lott , Pharm D  [phone] (phone)  [phone] (pager)  Medication Therapy Management Pharmacist

## 2017-10-16 NOTE — PROGRESS NOTES
"Dr Greenberg's note      Patient's instructions / PLAN:                                                        Plan:  1. Ondansetron 8 mg 3 times a day - take it 1/2 hour before meals for 3 days then take it as needed  2.  Stop Sinemet, Aricept, decreas BuSpar  I talked to Emelyn Lott, Pharm D,  and she will review the medications again with the patient and the family      ASSESSMENT & PLAN:                                                      (R11.0) Nausea  (primary encounter diagnosis)  Comment: Also possibly related with the new medications: Sinemet, Aricept, BuSpar  Plan: ondansetron (ZOFRAN) 8 MG tablet               Chief complaint:                                                      Not feeling well   3 daughters are here today     SUBJECTIVE:   Wendy Rocha is a 77 year old female who presents to clinic today for the following health issues:        *RECHECK-LOV 10/9/17. Will be meeting with Emelyn Lott and possibly Betty (ANA) after today's appointment. She finished Bactrim and Florastor yesterday. Still has frequent upset stomach, feels like burping a lot. Not throwing up, but she often just wants to lay down and is still not eating much \"depends on the day\". Pt's family is also wondering if they can have any needed labs done today? Otherwise she has another lab appointment on 10/19/17.  *Imm/Inj-Flu shot?   *Recheck Medication-Pt's family is wondering if it's possible that her Sinemet could be contributing to her stomach issues?     Daughter calls her and wait until Wendy takes her pill. When she called at 8:00 pm she didn't have any meds to take.     Confusion about the meds.     Appetite is poor   Nausea, dry heaves,   She feels full after 1/2 sandwich        Review of Systems:                                                      ROS: negative for fever, chills, cough, wheezes, chest pain, shortness of breath, vomiting, abdominal pain, leg swelling     A 10-point review of systems was obtained.  " "Those pertinent are above and in the in the Subjective section.  The rest of the systems are negative.           OBJECTIVE:             Physical exam:  Blood pressure 110/78, pulse 75, temperature 98.2  F (36.8  C), temperature source Oral, height 5' 3\" (1.6 m), weight 136 lb 12.8 oz (62.1 kg), SpO2 96 %, not currently breastfeeding.   NAD, appears comfortable.  She looks much better compared with last appointment October 9  Skin: no rashes   Chest: clear to auscultation bilaterally, good respiratory effort  Heart: S1 S2, RRR, no mgr appreciated  Abdomen: soft, not tender,  Extremities: no edema,   Neurologic: A, Ox3, no focal signs appreciated    PMHx: reviewed  Past Medical History:   Diagnosis Date     Abnormal ECG      ARF (acute renal failure) (H) Aug 2010    Lisinopril was stopped at that time     Carotid stenosis      Carotid stenosis, bilateral      Carotid stenosis, bilateral     Vs Surgeon: dr Kush Mcmillan, Phone: [phone] fax: [phone]     CKD (chronic kidney disease) stage 3, GFR 30-59 ml/min      GERD (gastroesophageal reflux disease)      HTN, goal below 140/90      Hyperlipidemia LDL goal <100      Lung nodule may 2013    needs f/u may 2014     Muscle aches      Perforated duodenal ulcer (H) Aug 2010     Proteinuria      PVD (peripheral vascular disease) (H)      Renal artery stenosis (H)  April 2011    Right side     Vitamin D deficiency disease       PSHx: reviewed  Past Surgical History:   Procedure Laterality Date     ARTHROPLASTY KNEE      left     EYE SURGERY      cataracts     LAPAROSCOPIC TUBAL LIGATION          Meds: reviewed  Current Outpatient Prescriptions   Medication Sig Dispense Refill     doxazosin (CARDURA) 4 MG tablet Take 1 tablet (4 mg) by mouth At Bedtime 90 tablet 0     simvastatin (ZOCOR) 40 MG tablet Take 1 tablet (40 mg) by mouth At Bedtime 90 tablet 0     amLODIPine (NORVASC) 10 MG tablet TAKE ONE TABLET BY MOUTH ONCE DAILY 90 tablet 0     metoprolol " (LOPRESSOR) 50 MG tablet Take 0.5 tablets (25 mg) by mouth 2 times daily 90 tablet 0     clopidogrel (PLAVIX) 75 MG tablet TAKE ONE TABLET BY MOUTH ONCE DAILY 90 tablet 0     Cholecalciferol (VITAMIN D) 2000 UNITS tablet Take 3 tablets by mouth daily 30 tablet      donepezil (ARICEPT) 10 MG tablet Take 10 mg by mouth        carbidopa-levodopa (SINEMET)  MG per tablet Take 1 tablet by mouth 3 times daily Per neurology Dr. Rosales       busPIRone (BUSPAR) 5 MG tablet 5 mg three times daily for anxiety, per Dr. Rosales, neurology       acetaminophen (TYLENOL) 325 MG tablet Take 2 tablets (650 mg) by mouth every 6 hours as needed for mild pain 100 tablet 0     order for DME Machine to dispense the medication 1 Device 0     traZODone (DESYREL) 50 MG tablet Take 1 tablet (50 mg) by mouth nightly as needed for sleep 30 tablet 1     pantoprazole (PROTONIX) 40 MG EC tablet Take 1 tablet (40 mg) by mouth daily Take 30-60 minutes before a meal. 90 tablet 3     ASPIRIN NOT PRESCRIBED (INTENTIONAL) Antiplatelet medication not prescribed intentionally due to Plavix 0 each 0     ACE/ARB NOT PRESCRIBED, INTENTIONAL, ACE & ARB not prescribed due to Worsening renal function on ACE/ARB therapy         Soc Hx: reviewed  Fam Hx: reviewed          Natalee Greenberg MD  Internal Medicine

## 2017-10-16 NOTE — PROGRESS NOTES
Clinic Care Coordination Contact      Referral Source: Care Team  Clinical Data: Care Coordinator Outreach  Met with pt and pt's daughters (Eva, Manju, and Elizabeth) who stated that they are concerned about pt needing services at home. Pt stated that she would be open to getting homemaking services. Eva spoke about feeling overwhelmed with having to stay with pt. SW mentioned that pt should get an Elderly Waiver assessment. Manju stated that she would call to get an assessment scheduled for pt. SW provided pt's family with the following resources: Senior Linkage Line, [facility], [facility], and the [facility] Housing Guide. Pt signed a consent to communicate for Manju Velasquez Ginny, and Prosper which will be scanned into pt's chart.     Plan:  Care Coordinator will try to reach patient again in 5-7 business days. Eva Nunes have  contact information.    ANA Bhandari, [facility]  Social Work - Care Coordinator  [facility]- [place], [place], and [place]  Phone: [phone]

## 2017-10-19 ENCOUNTER — OFFICE VISIT (OUTPATIENT)
Dept: INTERNAL MEDICINE | Facility: CLINIC | Age: 77
End: 2017-10-19
Payer: COMMERCIAL

## 2017-10-19 VITALS
OXYGEN SATURATION: 96 % | DIASTOLIC BLOOD PRESSURE: 88 MMHG | HEIGHT: 63 IN | SYSTOLIC BLOOD PRESSURE: 130 MMHG | HEART RATE: 78 BPM | TEMPERATURE: 97.8 F | WEIGHT: 135.2 LBS | BODY MASS INDEX: 23.96 KG/M2

## 2017-10-19 DIAGNOSIS — Z23 NEED FOR PROPHYLACTIC VACCINATION AND INOCULATION AGAINST INFLUENZA: Primary | ICD-10-CM

## 2017-10-19 DIAGNOSIS — R11.0 NAUSEA: ICD-10-CM

## 2017-10-19 PROCEDURE — 90662 IIV NO PRSV INCREASED AG IM: CPT | Performed by: INTERNAL MEDICINE

## 2017-10-19 PROCEDURE — G0008 ADMIN INFLUENZA VIRUS VAC: HCPCS | Performed by: INTERNAL MEDICINE

## 2017-10-19 PROCEDURE — 99213 OFFICE O/P EST LOW 20 MIN: CPT | Mod: 25 | Performed by: INTERNAL MEDICINE

## 2017-10-19 NOTE — PROGRESS NOTES
"Dr Greenberg's note        ASSESSMENT & PLAN:                                                      (R11.0) Nausea  Comment: likely sec meds. Improving   Plan: obs     (Z23) Need for prophylactic vaccination and inoculation against influenza  (primary encounter diagnosis)  Comment:   Plan: FLU VACCINE, INCREASED ANTIGEN, PRESV FREE, AGE        65+ [66358], Vaccine Administration, Initial         [06176], ADMIN INFLUENZA (For MEDICARE Patients        ONLY) []                   Chief complaint:                                                        Follow up chronic medical problems     SUBJECTIVE:   Wendy Rocha is a 77 year old female who presents to clinic today for the following health issues:    Nausea/poor appetite  Last week we stopped Sinemet, IV septic and we reduced the BuSpar dose.  She feels much better, though nausea improved and now she has appetite.  On exam she looks much better, hip..    Daughters working with SS for placement.     Recent UTI. Finished Abx on 10/14. She doesn't want to repeat UA today    Exam: she looks much better    Doesn't want to schedule f/u appointment. We will see her when she needs      Review of Systems:                                                      ROS: negative for fever, chills, cough, wheezes, chest pain, shortness of breath, vomiting, abdominal pain, leg swelling     A 10-point review of systems was obtained.  Those pertinent are above and in the in the Subjective section.  The rest of the systems are negative.           OBJECTIVE:             Physical exam:  Blood pressure 130/88, pulse 78, temperature 97.8  F (36.6  C), temperature source Oral, height 5' 3\" (1.6 m), weight 135 lb 3.2 oz (61.3 kg), SpO2 96 %, not currently breastfeeding.   NAD, appears comfortable  Skin: no rashes   Chest: clear to auscultation bilaterally, good respiratory effort  Heart: S1 S2, RRR, no mgr appreciated  Abdomen: soft, not tender,  Extremities: no edema,   Neurologic: A, Ox3, " no focal signs appreciated    PMHx: reviewed  Past Medical History:   Diagnosis Date     Abnormal ECG      ARF (acute renal failure) (H) Aug 2010    Lisinopril was stopped at that time     Carotid stenosis      Carotid stenosis, bilateral      Carotid stenosis, bilateral     Vs Surgeon: dr Kush Mcmillan, Phone: [phone] fax: [phone]     CKD (chronic kidney disease) stage 3, GFR 30-59 ml/min      GERD (gastroesophageal reflux disease)      HTN, goal below 140/90      Hyperlipidemia LDL goal <100      Lung nodule may 2013    needs f/u may 2014     Muscle aches      Perforated duodenal ulcer (H) Aug 2010     Proteinuria      PVD (peripheral vascular disease) (H)      Renal artery stenosis (H)  April 2011    Right side     Vitamin D deficiency disease       PSHx: reviewed  Past Surgical History:   Procedure Laterality Date     ARTHROPLASTY KNEE      left     EYE SURGERY      cataracts     LAPAROSCOPIC TUBAL LIGATION          Meds: reviewed  Current Outpatient Prescriptions   Medication Sig Dispense Refill     ondansetron (ZOFRAN) 8 MG tablet Take 1 tablet (8 mg) by mouth every 8 hours as needed for nausea 20 tablet 0     busPIRone (BUSPAR) 5 MG tablet 5 mg two times daily for anxiety, per Dr. Rosales, neurology 90 tablet      doxazosin (CARDURA) 4 MG tablet Take 1 tablet (4 mg) by mouth At Bedtime 90 tablet 0     simvastatin (ZOCOR) 40 MG tablet Take 1 tablet (40 mg) by mouth At Bedtime 90 tablet 0     amLODIPine (NORVASC) 10 MG tablet TAKE ONE TABLET BY MOUTH ONCE DAILY 90 tablet 0     metoprolol (LOPRESSOR) 50 MG tablet Take 0.5 tablets (25 mg) by mouth 2 times daily 90 tablet 0     clopidogrel (PLAVIX) 75 MG tablet TAKE ONE TABLET BY MOUTH ONCE DAILY 90 tablet 0     acetaminophen (TYLENOL) 325 MG tablet Take 2 tablets (650 mg) by mouth every 6 hours as needed for mild pain 100 tablet 0     order for DME Machine to dispense the medication 1 Device 0     traZODone (DESYREL) 50 MG tablet Take 1 tablet  (50 mg) by mouth nightly as needed for sleep 30 tablet 1     pantoprazole (PROTONIX) 40 MG EC tablet Take 1 tablet (40 mg) by mouth daily Take 30-60 minutes before a meal. 90 tablet 3     ASPIRIN NOT PRESCRIBED (INTENTIONAL) Antiplatelet medication not prescribed intentionally due to Plavix 0 each 0     ACE/ARB NOT PRESCRIBED, INTENTIONAL, ACE & ARB not prescribed due to Worsening renal function on ACE/ARB therapy         Soc Hx: reviewed  Fam Hx: reviewed          Natalee Greenberg MD  Internal Medicine

## 2017-10-19 NOTE — MR AVS SNAPSHOT
After Visit Summary   10/19/2017    Wendy Rocha    MRN: [id number]           Patient Information     Date Of Birth          1940        Visit Information        Provider Department      10/19/2017 2:20 PM Natalee Owens MD [facility]        Today's Diagnoses     Need for prophylactic vaccination and inoculation against influenza    -  1    Nausea           Follow-ups after your visit        Your next 10 appointments already scheduled     Nov 01, 2017  1:45 PM CDT   Office Visit with Cesilia Plascencia DO   [facility])    [address] [facility] [address] [facility] [id number] [phone]           Bring a current list of meds and any records pertaining to this visit. For Physicals, please bring immunization records and any forms needing to be filled out. Please arrive 10 minutes early to complete paperwork.            Nov 14, 2017 10:30 AM CST   SHORT with Emelyn Lott RPH   [facility] ([facility])    [address]  [address]  [facility] [address] [phone]              Who to contact     If you have questions or need follow up information about today's clinic visit or your schedule please contact [facility] directly at [phone].  Normal or non-critical lab and imaging results will be communicated to you by CITIAhart, letter or phone within 4 business days after the clinic has received the results. If you do not hear from us within 7 days, please contact the clinic through CITIAhart or phone. If you have a critical or abnormal lab result, we will notify you by phone as soon as possible.  Submit refill requests through [facility]House or call your pharmacy and they will forward the refill request to us. Please allow 3 business days for your refill to be completed.          Additional Information About Your Visit        CrowdyHouse Information     [facility]House lets you send messages to  "your doctor, view your test results, renew your prescriptions, schedule appointments and more. To sign up, go to [email or web address] . Click on \"Log in\" on the left side of the screen, which will take you to the Welcome page. Then click on \"Sign up Now\" on the right side of the page.     You will be asked to enter the access code listed below, as well as some personal information. Please follow the directions to create your username and password.     Your access code is: [email or web address]  Expires: 2017  2:22 AM     Your access code will  in 90 days. If you need help or a new code, please call your [facility] clinic or [phone].        Care EveryWhere ID     This is your Care EveryWhere ID. This could be used by other organizations to access your [facility] medical records  [id number]        Your Vitals Were     Pulse Temperature Height Pulse Oximetry Breastfeeding? BMI (Body Mass Index)    78 97.8  F (36.6  C) (Oral) 5' 3\" (1.6 m) 96% No 23.95 kg/m2       Blood Pressure from Last 3 Encounters:   10/19/17 130/88   10/16/17 110/78   10/09/17 108/70    Weight from Last 3 Encounters:   10/19/17 135 lb 3.2 oz (61.3 kg)   10/16/17 136 lb 12.8 oz (62.1 kg)   10/09/17 139 lb 12.8 oz (63.4 kg)              We Performed the Following     ADMIN INFLUENZA (For MEDICARE Patients ONLY) []     FLU VACCINE, INCREASED ANTIGEN, PRESV FREE, AGE 65+ [71251]     Vaccine Administration, Initial [[id number]]        Primary Care Provider Office Phone # Fax #    Natalee Owens [phone]       [address] [facility] [address] [address]        Equal Access to Services     FRANCIS SALAZAR : Charlie Cai, cindy gilmore, flash chacon. [facility] [phone].    ATENCIÓN: Si habla español, tiene a valencia disposición servicios gratuitos de asistencia lingüística. Llame al [phone].    We comply with applicable federal civil rights " laws and [place] laws. We do not discriminate on the basis of race, color, national origin, age, disability, sex, sexual orientation, or gender identity.            Thank you!     Thank you for choosing [facility]  for your care. Our goal is always to provide you with excellent care. Hearing back from our patients is one way we can continue to improve our services. Please take a few minutes to complete the written survey that you may receive in the mail after your visit with us. Thank you!             Your Updated Medication List - Protect others around you: Learn how to safely use, store and throw away your medicines at www.disposemymeds.org.          This list is accurate as of: 10/19/17 11:59 PM.  Always use your most recent med list.                   Brand Name Dispense Instructions for use Diagnosis    ACE/ARB/ARNI NOT PRESCRIBED (INTENTIONAL)      ACE & ARB not prescribed due to Worsening renal function on ACE/ARB therapy    Renal artery stenosis (H), Essential hypertension with goal blood pressure less than 140/90       amLODIPine 10 MG tablet    NORVASC    90 tablet    TAKE ONE TABLET BY MOUTH ONCE DAILY    Essential hypertension with goal blood pressure less than 140/90       ASPIRIN NOT PRESCRIBED    INTENTIONAL    0 each    Antiplatelet medication not prescribed intentionally due to Plavix    Essential hypertension with goal blood pressure less than 140/90, PVD (peripheral vascular disease) (H), Aortic root dilatation (H), Renal artery stenosis (H)       busPIRone 5 MG tablet    BUSPAR    90 tablet    5 mg two times daily for anxiety, per Dr. Rosales, neurology    Severe episode of recurrent major depressive disorder, with psychotic features (H)       clopidogrel 75 MG tablet    PLAVIX    90 tablet    TAKE ONE TABLET BY MOUTH ONCE DAILY    PVD (peripheral vascular disease) (H), Aortic root dilatation (H), Renal artery stenosis (H)       doxazosin 4 MG tablet    CARDURA    90 tablet     Take 1 tablet (4 mg) by mouth At Bedtime    Essential hypertension with goal blood pressure less than 140/90, PVD (peripheral vascular disease) (H), Aortic root dilatation (H), Renal artery stenosis (H)       metoprolol 50 MG tablet    LOPRESSOR    90 tablet    Take 0.5 tablets (25 mg) by mouth 2 times daily    Essential hypertension with goal blood pressure less than 140/90       ondansetron 8 MG tablet    ZOFRAN    20 tablet    Take 1 tablet (8 mg) by mouth every 8 hours as needed for nausea    Nausea       order for [employer]     1 Device    Machine to dispense the medication    Severe episode of recurrent major depressive disorder, with psychotic features (H), Memory loss       pantoprazole 40 MG EC tablet    PROTONIX    90 tablet    Take 1 tablet (40 mg) by mouth daily Take 30-60 minutes before a meal.    Perforated duodenal ulcer (H)       simvastatin 40 MG tablet    ZOCOR    90 tablet    Take 1 tablet (40 mg) by mouth At Bedtime    Hyperlipidemia LDL goal <100       traZODone 50 MG tablet    DESYREL    30 tablet    Take 1 tablet (50 mg) by mouth nightly as needed for sleep    Persistent insomnia       TYLENOL 325 MG tablet   Generic drug:  acetaminophen     100 tablet    Take 2 tablets (650 mg) by mouth every 6 hours as needed for mild pain    Severe episode of recurrent major depressive disorder, with psychotic features (H), Memory loss

## 2017-10-19 NOTE — NURSING NOTE
"Chief Complaint   Patient presents with     RECHECK     Imm/Inj       Initial /88 (BP Location: Left arm, Patient Position: Chair, Cuff Size: Adult Regular)  Pulse 78  Temp 97.8  F (36.6  C) (Oral)  Ht 5' 3\" (1.6 m)  Wt 135 lb 3.2 oz (61.3 kg)  SpO2 96%  Breastfeeding? No  BMI 23.95 kg/m2 Estimated body mass index is 23.95 kg/(m^2) as calculated from the following:    Height as of this encounter: 5' 3\" (1.6 m).    Weight as of this encounter: 135 lb 3.2 oz (61.3 kg).  Medication Reconciliation: complete   Klaudia Holguin CMA      "

## 2017-10-19 NOTE — PROGRESS NOTES

## 2017-10-25 ENCOUNTER — CARE COORDINATION (OUTPATIENT)
Dept: CARE COORDINATION | Facility: CLINIC | Age: 77
End: 2017-10-25

## 2017-10-25 NOTE — PROGRESS NOTES
Clinic Care Coordination Contact  RUST/Voicemail    Referral Source: Care Team  Clinical Data: Care Coordinator Outreach  Outreach attempted x 1.  Left message on voicemail for pt's daughter (Manju) with call back information and requested return call.    Plan:  Care Coordinator will try to reach patient again in 7-10 business days.    ANA Bhandari, [facility]  Social Work - Care Coordinator  [facility] [place], [place], and [place]  Phone: [phone]

## 2017-11-03 NOTE — PROGRESS NOTES
Clinic Care Coordination Contact  [facility]/Voicemail    Referral Source: Care Team  Clinical Data: Care Coordinator Outreach  Outreach attempted x 2.  Left message on voicemail for pt's daughter (Manju) with call back information and requested return call.    Plan: Care Coordinator will try to reach Manju or Eva again in 7-10 business days.    ANA Bhandari, [facility]  Social Work - Care Coordinator  [facility]- [place] [facility] [place]  Phone: [phone]

## 2017-11-14 ENCOUNTER — OFFICE VISIT (OUTPATIENT)
Dept: PHARMACY | Facility: CLINIC | Age: 77
End: 2017-11-14
Payer: COMMERCIAL

## 2017-11-14 VITALS
HEART RATE: 56 BPM | BODY MASS INDEX: 24.96 KG/M2 | DIASTOLIC BLOOD PRESSURE: 66 MMHG | SYSTOLIC BLOOD PRESSURE: 110 MMHG | WEIGHT: 140.9 LBS

## 2017-11-14 DIAGNOSIS — R11.0 NAUSEA: ICD-10-CM

## 2017-11-14 DIAGNOSIS — E78.5 HYPERLIPIDEMIA LDL GOAL <100: ICD-10-CM

## 2017-11-14 DIAGNOSIS — R25.1 TREMOR: ICD-10-CM

## 2017-11-14 DIAGNOSIS — I10 ESSENTIAL HYPERTENSION WITH GOAL BLOOD PRESSURE LESS THAN 140/90: ICD-10-CM

## 2017-11-14 DIAGNOSIS — F09 COGNITIVE DISORDER: Primary | ICD-10-CM

## 2017-11-14 DIAGNOSIS — F41.9 ANXIETY: ICD-10-CM

## 2017-11-14 DIAGNOSIS — E55.9 VITAMIN D DEFICIENCY: ICD-10-CM

## 2017-11-14 DIAGNOSIS — G47.00 INSOMNIA, UNSPECIFIED TYPE: ICD-10-CM

## 2017-11-14 PROCEDURE — 99606 MTMS BY PHARM EST 15 MIN: CPT | Performed by: PHARMACIST

## 2017-11-14 PROCEDURE — 99607 MTMS BY PHARM ADDL 15 MIN: CPT | Performed by: PHARMACIST

## 2017-11-14 NOTE — MR AVS SNAPSHOT
After Visit Summary   11/14/2017    Wendy Rocha    MRN: [id number]           Patient Information     Date Of Birth          1940        Visit Information        Provider Department      11/14/2017 10:30 AM Emelyn Lott, [facility]        Care Instructions    Recommendations from today's MTM visit:                                                      1. Continue current medications - no changes today    2.  Due for labs - schedule appointment in the morning so you can come without eating    Next MTM visit: 6 months    To schedule another MTM appointment, please call the clinic directly or you may call the MTM scheduling line at [phone] or toll-free at [phone].     My Clinical Pharmacist's contact information:                                                      It was a pleasure seeing you today!  Please feel free to contact me with any questions or concerns you have.      Emelyn Lott , Pharm D  [phone] (phone)  [phone] (pager)  Medication Therapy Management Pharmacist     You may receive a survey about the MT services you received.  I would appreciate your feedback to help me serve you better in the future. Please fill it out and return it when you can. Your comments will be anonymous.                      Follow-ups after your visit        Who to contact     If you have questions or need follow up information about today's clinic visit or your schedule please contact [facility] directly at [phone].  Normal or non-critical lab and imaging results will be communicated to you by MyChart, letter or phone within 4 business days after the clinic has received the results. If you do not hear from us within 7 days, please contact the clinic through MyChart or phone. If you have a critical or abnormal lab result, we will notify you by phone as soon as possible.  Submit refill requests through [employer] or call your pharmacy and they will forward the  "refill request to us. Please allow 3 business days for your refill to be completed.          Additional Information About Your Visit        MyChart Information     [employer] lets you send messages to your doctor, view your test results, renew your prescriptions, schedule appointments and more. To sign up, go to [email or web address] . Click on \"Log in\" on the left side of the screen, which will take you to the Welcome page. Then click on \"Sign up Now\" on the right side of the page.     You will be asked to enter the access code listed below, as well as some personal information. Please follow the directions to create your username and password.     Your access code is: [email or web address]  Expires: 2017  1:22 AM     Your access code will  in 90 days. If you need help or a new code, please call your [place] clinic or [phone].        Care EveryWhere ID     This is your Care EveryWhere ID. This could be used by other organizations to access your [place] medical records  [id number]        Your Vitals Were     Pulse BMI (Body Mass Index)                56 24.96 kg/m2           Blood Pressure from Last 3 Encounters:   17 110/66   10/19/17 130/88   10/16/17 110/78    Weight from Last 3 Encounters:   17 140 lb 14.4 oz (63.9 kg)   10/19/17 135 lb 3.2 oz (61.3 kg)   10/16/17 136 lb 12.8 oz (62.1 kg)              Today, you had the following     No orders found for display       Primary Care Provider Office Phone # Fax #    Natalee Owens [phone]       [address] [facility] [address]        Equal Access to Services     [facility] : Charlie Cai, cindy gilmore, minoo stephenson, flash garcia. So [facility] [phone].    ATENCIÓN: Si habla español, tiene a valencia disposición servicios gratuitos de asistencia lingüística. Llzulay al [phone].    We comply with applicable federal civil rights laws and " [place] laws. We do not discriminate on the basis of race, color, national origin, age, disability, sex, sexual orientation, or gender identity.            Thank you!     Thank you for choosing [facility]  for your care. Our goal is always to provide you with excellent care. Hearing back from our patients is one way we can continue to improve our services. Please take a few minutes to complete the written survey that you may receive in the mail after your visit with us. Thank you!             Your Updated Medication List - Protect others around you: Learn how to safely use, store and throw away your medicines at www.disposemymeds.org.          This list is accurate as of: 11/14/17 11:04 AM.  Always use your most recent med list.                   Brand Name Dispense Instructions for use Diagnosis    ACE/ARB/ARNI NOT PRESCRIBED (INTENTIONAL)      ACE & ARB not prescribed due to Worsening renal function on ACE/ARB therapy    Renal artery stenosis (H), Essential hypertension with goal blood pressure less than 140/90       amLODIPine 10 MG tablet    NORVASC    90 tablet    TAKE ONE TABLET BY MOUTH ONCE DAILY    Essential hypertension with goal blood pressure less than 140/90       ASPIRIN NOT PRESCRIBED    INTENTIONAL    0 each    Antiplatelet medication not prescribed intentionally due to Plavix    Essential hypertension with goal blood pressure less than 140/90, PVD (peripheral vascular disease) (H), Aortic root dilatation (H), Renal artery stenosis (H)       busPIRone 5 MG tablet    BUSPAR    90 tablet    5 mg two times daily for anxiety, per Dr. Rosales, neurology    Severe episode of recurrent major depressive disorder, with psychotic features (H)       clopidogrel 75 MG tablet    PLAVIX    90 tablet    TAKE ONE TABLET BY MOUTH ONCE DAILY    PVD (peripheral vascular disease) (H), Aortic root dilatation (H), Renal artery stenosis (H)       doxazosin 4 MG tablet    CARDURA    90 tablet    Take 1 tablet (4  mg) by mouth At Bedtime    Essential hypertension with goal blood pressure less than 140/90, PVD (peripheral vascular disease) (H), Aortic root dilatation (H), Renal artery stenosis (H)       metoprolol 50 MG tablet    LOPRESSOR    90 tablet    Take 0.5 tablets (25 mg) by mouth 2 times daily    Essential hypertension with goal blood pressure less than 140/90       ondansetron 8 MG tablet    ZOFRAN    20 tablet    Take 1 tablet (8 mg) by mouth every 8 hours as needed for nausea    Nausea       order for DME     1 Device    Machine to dispense the medication    Severe episode of recurrent major depressive disorder, with psychotic features (H), Memory loss       pantoprazole 40 MG EC tablet    PROTONIX    90 tablet    Take 1 tablet (40 mg) by mouth daily Take 30-60 minutes before a meal.    Perforated duodenal ulcer (H)       simvastatin 40 MG tablet    ZOCOR    90 tablet    Take 1 tablet (40 mg) by mouth At Bedtime    Hyperlipidemia LDL goal <100       traZODone 50 MG tablet    DESYREL    30 tablet    Take 1 tablet (50 mg) by mouth nightly as needed for sleep    Persistent insomnia       TYLENOL 325 MG tablet   Generic drug:  acetaminophen     100 tablet    Take 2 tablets (650 mg) by mouth every 6 hours as needed for mild pain    Severe episode of recurrent major depressive disorder, with psychotic features (H), Memory loss

## 2017-11-14 NOTE — PROGRESS NOTES
SUBJECTIVE/OBJECTIVE:                                                    Wendy Rocha is a 76 year old female coming in for a follow-up visit for Medication Therapy Management.  She was referred to me from Dr. Greenberg.  Her daughter joined us today.    Chief Complaint: Follow up from our visit on 10/16/17.  Follow-up on med changes    Tobacco: No tobacco use   Alcohol: not currently using  Coffee: maybe 2 cups per day    Medication Adherence: Her daughter Eva helps set up daily med boxes.    Memory:  Stopped donepezil 10/16/17 - Gi symptoms resolved, sleeping better, appetite has improved.  Patient and daughter has not noticed any change in memory since stopping.      Tremor:  Stopped Sinemet  - as noted above, improvement in GI symptoms, sleep and appetite.  No concerns with shakiness/tremor at this time.       Anxiety/Nighttime Hallucinations: Pt is currently taking buspirone 5 mg BID.  Tapered off risperidone as recommended; last dose 8/27.   No significant change in symptoms with change.      Hypertension: Pt is current taking Metoprolol tartrate 25 mg BID, Amlodipine 10 mg daily and Doxazosin 4 mg tablet HS.  Pt has not been checking blood pressure at home. Pt reports no side effects    Hyperlipidemia: Current therapy includes simvastatin 40 mg once daily.  Pt reports no significant myalgias or other side effects.     Insomnia: Current medications include: trazodone 50 mg - reports not taking recently. Reports sleeping really well.      Nausea:  Has ondansetron to use as needed. Not needed often.       Supplements:  No vitamins at this time. Vit D level 59 from 2015.    Current labs include:  BP Readings from Last 3 Encounters:   11/14/17 110/66   10/19/17 130/88   10/16/17 110/78     Liver Function Studies -   Recent Labs   Lab Test  01/05/16   1054   PROTTOTAL  7.3   ALBUMIN  4.0   BILITOTAL  0.6   ALKPHOS  63   AST  22   ALT  20       GFR Estimate   Date Value Ref Range Status   10/05/2017 33 (L) >60  mL/min/1.7m2 Final     Comment:     Non  GFR Calc   09/28/2017 34 (L) >60 mL/min/1.7m2 Final     Comment:     Non  GFR Calc   09/18/2017 36 (L) >60 mL/min/1.7m2 Final     Comment:     Non  GFR Calc     GFR Estimate If Black   Date Value Ref Range Status   10/05/2017 40 (L) >60 mL/min/1.7m2 Final     Comment:      GFR Calc   09/28/2017 41 (L) >60 mL/min/1.7m2 Final     Comment:      GFR Calc   09/18/2017 43 (L) >60 mL/min/1.7m2 Final     Comment:      GFR Calc     TSH   Date Value Ref Range Status   09/18/2017 2.67 0.40 - 4.00 mU/L Final   ]      Most Recent Immunizations   Administered Date(s) Administered     Influenza (High Dose) 3 valent vaccine 10/19/2017     Influenza (IIV3) 11/19/2016     Pneumococcal (PCV 13) 09/11/2015     Pneumococcal 23 valent 09/03/2013     TD (ADULT, 7+) 08/14/2015     ASSESSMENT:                                                    Current medications were reviewed with her today.      Medication Adherence: No issues identified.    Memory:  stable    Tremor:  stable    Anxiety/Nighttime Hallucinations: improved.  Continue with current regimen    Hypertension: stable    Hyperlipidemia: due for labs    Insomnia: stable    Nausea:  resolved    Supplements:  stable    PLAN:                                                      1. Continue current regimen  2.  Due for labs      I spent 30 minutes with this patient today.  All changes were made via collaborative practice agreement with Natalee Owens. A copy of the visit note was provided to the patient's primary care provider.     Will follow up in 6 months    The patient was given a summary of these recommendations as an after visit summary.    Emelyn Lott , Pharm D  [phone] (phone)  [phone] (pager)  Medication Therapy Management Pharmacist

## 2017-11-14 NOTE — PATIENT INSTRUCTIONS
Recommendations from today's MTM visit:                                                      1. Continue current medications - no changes today    2.  Due for labs - schedule appointment in the morning so you can come without eating    Next MTM visit: 6 months    To schedule another MTM appointment, please call the clinic directly or you may call the MTM scheduling line at [phone] or toll-free at [phone].     My Clinical Pharmacist's contact information:                                                      It was a pleasure seeing you today!  Please feel free to contact me with any questions or concerns you have.      Emelyn Lott , Pharm D  [phone] (phone)  [phone] (pager)  Medication Therapy Management Pharmacist     You may receive a survey about the MTM services you received.  I would appreciate your feedback to help me serve you better in the future. Please fill it out and return it when you can. Your comments will be anonymous.

## 2017-11-15 ENCOUNTER — CARE COORDINATION (OUTPATIENT)
Dept: CARE COORDINATION | Facility: CLINIC | Age: 77
End: 2017-11-15

## 2017-11-15 NOTE — PROGRESS NOTES
Clinic Care Coordination Contact  [facility]/Voicemail    Referral Source: Care Team  Clinical Data: Care Coordinator Outreach  Outreach attempted x 3.  Left message on voicemail for pt's daughter (Eva) with call back information and requested return call.    Plan: Care Coordinator will sent a [facility] letter in the next 1-2 business days.    ANA Bhandari, [facility]  Social Work - Care Coordinator  [facility] [place] [facility] [place]  Phone: [phone]

## 2017-11-20 NOTE — PROGRESS NOTES
"Clinic Care Coordination Contact      Referral Source: Care Team  Clinical Data: Care Coordinator Outreach  Spoke to pt's daughter (Eva) who stated that her sister (Manju) has the information that SW provided to family. Eva stated that pt is \"doing well\" since her medication changes about 2 months ago. Eva stated that pt gets reminders from family to take her medications. Eva stated that she will talk with her family on Thanksgiving (11/23/17) to determine if they need other resources for pt. Eva stated that Manju talked to [facility] and they were looking into an elderly waiver assessment.    Plan:  Care Coordinator will try to reach patient again in 10-14 business days. Eva and Manju have SW contact information for immediate concerns.     ANA Bhandari, [facility]  Social Work - Care Coordinator  [facility] [place], [place], and [place]  Phone: [phone]          "

## 2017-12-05 DIAGNOSIS — I10 ESSENTIAL HYPERTENSION WITH GOAL BLOOD PRESSURE LESS THAN 140/90: Chronic | ICD-10-CM

## 2017-12-05 DIAGNOSIS — I73.9 PVD (PERIPHERAL VASCULAR DISEASE) (H): ICD-10-CM

## 2017-12-05 DIAGNOSIS — I70.1 RENAL ARTERY STENOSIS (H): Chronic | ICD-10-CM

## 2017-12-05 DIAGNOSIS — I77.810 AORTIC ROOT DILATATION (H): ICD-10-CM

## 2017-12-06 ENCOUNTER — CARE COORDINATION (OUTPATIENT)
Dept: CARE COORDINATION | Facility: CLINIC | Age: 77
End: 2017-12-06

## 2017-12-06 NOTE — PROGRESS NOTES
Clinic Care Coordination Contact  [facility]/Voicemail    Referral Source: Care Team  Clinical Data: Care Coordinator Outreach  Outreach attempted x 1.  Left message on voicemail for pt's daughter (Eva) with call back information and requested return call.    Plan:  Care Coordinator will try to reach patient again in 3-4 weeks.    ANA Bhandari, [facility]  Social Work - Care Coordinator  [facility] [place], [place], and [place]  Phone: [phone]

## 2017-12-09 RX ORDER — AMLODIPINE BESYLATE 10 MG/1
TABLET ORAL
Qty: 90 TABLET | Refills: 0 | Status: SHIPPED | OUTPATIENT
Start: 2017-12-09 | End: 2018-02-26

## 2017-12-09 RX ORDER — CLOPIDOGREL BISULFATE 75 MG/1
75 TABLET ORAL DAILY
Qty: 90 TABLET | Refills: 0 | Status: SHIPPED | OUTPATIENT
Start: 2017-12-09 | End: 2018-02-26

## 2017-12-15 ENCOUNTER — TELEPHONE (OUTPATIENT)
Dept: INTERNAL MEDICINE | Facility: CLINIC | Age: 77
End: 2017-12-15

## 2017-12-15 NOTE — TELEPHONE ENCOUNTER
Call received from daughter stating that they picked up 2 rxs for pt and there was a note on them for pt to call office. Per note, pt due for labs. Updated daughter. She will speak with her Mom and call back to schedule.

## 2017-12-15 NOTE — TELEPHONE ENCOUNTER
Requested Prescriptions   Signed Prescriptions Disp Refills     amLODIPine (NORVASC) 10 MG tablet 90 tablet 0     Sig: TAKE ONE TABLET BY MOUTH ONCE DAILY    Calcium Channel Blockers Protocol  Failed    12/5/2017  8:08 AM       Failed - Normal serum creatinine on file in past 12 months    Recent Labs   Lab Test  10/05/17   1420   CR  1.52*            Passed - Blood pressure under 140/90    BP Readings from Last 3 Encounters:   11/14/17 110/66   10/19/17 130/88   10/16/17 110/78                Passed - Recent or future visit with authorizing provider    Patient had office visit in the last year or has a visit in the next 30 days with authorizing provider.  See chart review.              Passed - Patient is age 18 or older       Passed - No active pregnancy on record       Passed - No positive pregnancy test in past 12 months        clopidogrel (PLAVIX) 75 MG tablet 90 tablet 0     Sig: Take 1 tablet (75 mg) by mouth daily    Plavix Passed    12/5/2017  8:08 AM       Passed - No active PPI on record unless is Protonix       Passed - Normal HGB on file in past 12 months    Recent Labs   Lab Test  10/05/17   1420   HGB  13.1              Passed - Normal Platelets on file in past 12 months    Recent Labs   Lab Test  10/05/17   1420   PLT  200              Passed - Recent or future visit with authorizing provider's specialty    Patient had office visit in the last year or has a visit in the next 30 days with authorizing provider.  See chart review.              Passed - Patient is age 18 or older       Passed - No active pregnancy on record       Passed - No positive pregnancy test in past 12 months

## 2017-12-27 ENCOUNTER — CARE COORDINATION (OUTPATIENT)
Dept: CARE COORDINATION | Facility: CLINIC | Age: 77
End: 2017-12-27

## 2017-12-27 NOTE — PROGRESS NOTES
"Clinic Care Coordination Contact      Referral Source: Care Team  Clinical Data: Care Coordinator Outreach  Spoke to pt's daughter (Eva) who stated that pt is \"doing better\". Eva stated that with pt's medication changes that pt is cognitively doing better. Eva stated that she will call the [facility] to schedule lab work for pt. vEa indicated that ps is more coherent, not driving (family drives her), and is not needing RN or aide follow-up.    Plan: Care Coordinator will try to reach patient again in one month.    ANA Bhandari, [facility]  Social Work - Care Coordinator  [facility], [place]  Phone: [phone]      "

## 2018-01-01 ENCOUNTER — TELEPHONE (OUTPATIENT)
Dept: PHARMACY | Facility: CLINIC | Age: 78
End: 2018-01-01

## 2018-01-01 ENCOUNTER — OFFICE VISIT (OUTPATIENT)
Dept: INTERNAL MEDICINE | Facility: CLINIC | Age: 78
End: 2018-01-01
Payer: COMMERCIAL

## 2018-01-01 ENCOUNTER — NURSE TRIAGE (OUTPATIENT)
Dept: NURSING | Facility: CLINIC | Age: 78
End: 2018-01-01

## 2018-01-01 VITALS
BODY MASS INDEX: 28.16 KG/M2 | HEART RATE: 72 BPM | OXYGEN SATURATION: 89 % | RESPIRATION RATE: 16 BRPM | TEMPERATURE: 97.5 F | DIASTOLIC BLOOD PRESSURE: 78 MMHG | HEIGHT: 63 IN | WEIGHT: 158.9 LBS | SYSTOLIC BLOOD PRESSURE: 144 MMHG

## 2018-01-01 DIAGNOSIS — I73.9 PVD (PERIPHERAL VASCULAR DISEASE) (H): ICD-10-CM

## 2018-01-01 DIAGNOSIS — I77.810 AORTIC ROOT DILATATION (H): ICD-10-CM

## 2018-01-01 DIAGNOSIS — I10 ESSENTIAL HYPERTENSION WITH GOAL BLOOD PRESSURE LESS THAN 140/90: Chronic | ICD-10-CM

## 2018-01-01 DIAGNOSIS — I70.1 RENAL ARTERY STENOSIS (H): Chronic | ICD-10-CM

## 2018-01-01 DIAGNOSIS — F33.3 SEVERE EPISODE OF RECURRENT MAJOR DEPRESSIVE DISORDER, WITH PSYCHOTIC FEATURES (H): Chronic | ICD-10-CM

## 2018-01-01 DIAGNOSIS — K26.5 PERFORATED DUODENAL ULCER (H): ICD-10-CM

## 2018-01-01 DIAGNOSIS — L30.8 OTHER ECZEMA: Primary | ICD-10-CM

## 2018-01-01 PROCEDURE — 99214 OFFICE O/P EST MOD 30 MIN: CPT | Performed by: INTERNAL MEDICINE

## 2018-01-01 RX ORDER — CLOPIDOGREL BISULFATE 75 MG/1
TABLET ORAL
Qty: 90 TABLET | Refills: 0 | Status: SHIPPED | OUTPATIENT
Start: 2018-01-01

## 2018-01-01 RX ORDER — CLOPIDOGREL BISULFATE 75 MG/1
TABLET ORAL
Qty: 90 TABLET | Refills: 0 | Status: SHIPPED | OUTPATIENT
Start: 2018-01-01 | End: 2018-01-01

## 2018-01-01 RX ORDER — AMLODIPINE BESYLATE 10 MG/1
TABLET ORAL
Qty: 90 TABLET | Refills: 0 | Status: ON HOLD | OUTPATIENT
Start: 2018-01-01 | End: 2019-01-01

## 2018-01-01 RX ORDER — BUSPIRONE HYDROCHLORIDE 5 MG/1
TABLET ORAL
Qty: 180 TABLET | Refills: 0 | Status: ON HOLD | OUTPATIENT
Start: 2018-01-01 | End: 2019-01-01

## 2018-01-01 RX ORDER — BUSPIRONE HYDROCHLORIDE 5 MG/1
TABLET ORAL
Qty: 180 TABLET | Refills: 0 | Status: SHIPPED | OUTPATIENT
Start: 2018-01-01 | End: 2018-01-01

## 2018-01-01 RX ORDER — AMLODIPINE BESYLATE 10 MG/1
TABLET ORAL
Qty: 90 TABLET | Refills: 0 | Status: SHIPPED | OUTPATIENT
Start: 2018-01-01 | End: 2018-01-01

## 2018-01-01 RX ORDER — DOXAZOSIN 4 MG/1
TABLET ORAL
Qty: 90 TABLET | Refills: 0 | Status: ON HOLD | OUTPATIENT
Start: 2018-01-01 | End: 2019-01-01

## 2018-01-01 RX ORDER — PANTOPRAZOLE SODIUM 40 MG/1
TABLET, DELAYED RELEASE ORAL
Qty: 90 TABLET | Refills: 2 | Status: SHIPPED | OUTPATIENT
Start: 2018-01-01

## 2018-01-01 RX ORDER — DOXAZOSIN 4 MG/1
TABLET ORAL
Qty: 90 TABLET | Refills: 0 | Status: SHIPPED | OUTPATIENT
Start: 2018-01-01 | End: 2018-01-01

## 2018-01-01 RX ORDER — METOPROLOL TARTRATE 50 MG
TABLET ORAL
Qty: 180 TABLET | Refills: 0 | Status: ON HOLD | OUTPATIENT
Start: 2018-01-01 | End: 2019-01-01

## 2018-01-01 ASSESSMENT — PATIENT HEALTH QUESTIONNAIRE - PHQ9: SUM OF ALL RESPONSES TO PHQ QUESTIONS 1-9: 0

## 2018-01-01 NOTE — TELEPHONE ENCOUNTER
"Daughter has general questions regarding upcoming  Fasting labs needed; Advised of labs needed per  EMR.  Advised that  Notation added to lab appointment to accommodate patient's need for a commode \"hat\" to  Collect urine specimen.  Additional Information    Question about upcoming scheduled test, no triage required and triager able to answer question    Protocols used: INFORMATION ONLY CALL-ADULT-  Reema Olsen RN  [facility]    "

## 2018-01-05 ENCOUNTER — NURSE TRIAGE (OUTPATIENT)
Dept: NURSING | Facility: CLINIC | Age: 78
End: 2018-01-05

## 2018-01-05 DIAGNOSIS — I10 ESSENTIAL HYPERTENSION WITH GOAL BLOOD PRESSURE LESS THAN 140/90: Chronic | ICD-10-CM

## 2018-01-05 DIAGNOSIS — Z86.39 HISTORY OF ELEVATED GLUCOSE: ICD-10-CM

## 2018-01-05 DIAGNOSIS — E78.5 HYPERLIPIDEMIA LDL GOAL <100: ICD-10-CM

## 2018-01-05 LAB — HBA1C MFR BLD: 5.3 % (ref 4.3–6)

## 2018-01-05 PROCEDURE — 80061 LIPID PANEL: CPT | Performed by: INTERNAL MEDICINE

## 2018-01-05 PROCEDURE — 80053 COMPREHEN METABOLIC PANEL: CPT | Performed by: INTERNAL MEDICINE

## 2018-01-05 PROCEDURE — 83036 HEMOGLOBIN GLYCOSYLATED A1C: CPT | Performed by: INTERNAL MEDICINE

## 2018-01-05 PROCEDURE — 82043 UR ALBUMIN QUANTITATIVE: CPT | Performed by: INTERNAL MEDICINE

## 2018-01-05 PROCEDURE — 36415 COLL VENOUS BLD VENIPUNCTURE: CPT | Performed by: INTERNAL MEDICINE

## 2018-01-05 RX ORDER — DOXAZOSIN 4 MG/1
TABLET ORAL
Qty: 90 TABLET | Refills: 2 | Status: SHIPPED | OUTPATIENT
Start: 2018-01-05 | End: 2018-01-01

## 2018-01-06 LAB
ALBUMIN SERPL-MCNC: 4 G/DL (ref 3.4–5)
ALP SERPL-CCNC: 67 U/L (ref 40–150)
ALT SERPL W P-5'-P-CCNC: 26 U/L (ref 0–50)
ANION GAP SERPL CALCULATED.3IONS-SCNC: 10 MMOL/L (ref 3–14)
AST SERPL W P-5'-P-CCNC: 22 U/L (ref 0–45)
BILIRUB SERPL-MCNC: 0.7 MG/DL (ref 0.2–1.3)
BUN SERPL-MCNC: 28 MG/DL (ref 7–30)
CALCIUM SERPL-MCNC: 9 MG/DL (ref 8.5–10.1)
CHLORIDE SERPL-SCNC: 108 MMOL/L (ref 94–109)
CHOLEST SERPL-MCNC: 152 MG/DL
CO2 SERPL-SCNC: 21 MMOL/L (ref 20–32)
CREAT SERPL-MCNC: 1.41 MG/DL (ref 0.52–1.04)
CREAT UR-MCNC: 58 MG/DL
GFR SERPL CREATININE-BSD FRML MDRD: 36 ML/MIN/1.7M2
GLUCOSE SERPL-MCNC: 83 MG/DL (ref 70–99)
HDLC SERPL-MCNC: 64 MG/DL
LDLC SERPL CALC-MCNC: 60 MG/DL
MICROALBUMIN UR-MCNC: 315 MG/L
MICROALBUMIN/CREAT UR: 545.93 MG/G CR (ref 0–25)
NONHDLC SERPL-MCNC: 88 MG/DL
POTASSIUM SERPL-SCNC: 4.2 MMOL/L (ref 3.4–5.3)
PROT SERPL-MCNC: 7.2 G/DL (ref 6.8–8.8)
SODIUM SERPL-SCNC: 139 MMOL/L (ref 133–144)
TRIGL SERPL-MCNC: 141 MG/DL

## 2018-01-07 ENCOUNTER — NURSE TRIAGE (OUTPATIENT)
Dept: NURSING | Facility: CLINIC | Age: 78
End: 2018-01-07

## 2018-01-07 NOTE — TELEPHONE ENCOUNTER
Daughter Eva called asking if Wendy should be continuing her simvastatin as Eva understands that lab results may indicate simvastatin is no longer appropriate. Kidney function has stayed stable over the past nine months. Eva will call the clinic tomorrow about Wendy's lab results and continuing with her current meds.  Angie CONRAD RN [facility]

## 2018-01-08 DIAGNOSIS — E78.5 HYPERLIPIDEMIA LDL GOAL <100: ICD-10-CM

## 2018-01-08 RX ORDER — SIMVASTATIN 40 MG
40 TABLET ORAL AT BEDTIME
Qty: 90 TABLET | Refills: 3 | Status: ON HOLD | OUTPATIENT
Start: 2018-01-08 | End: 2019-01-01

## 2018-01-08 NOTE — TELEPHONE ENCOUNTER
Requested Prescriptions   Pending Prescriptions Disp Refills     simvastatin (ZOCOR) 40 MG tablet 90 tablet 0     Sig: Take 1 tablet (40 mg) by mouth At Bedtime    Statins Protocol Passed    1/8/2018  9:26 AM       Passed - LDL on file in past 12 months    Recent Labs   Lab Test  01/05/18   0915   LDL  60            Passed - No abnormal creatine kinase in past 12 months    No lab results found.         Passed - Recent or future visit with authorizing provider    Patient had office visit in the last year or has a visit in the next 30 days with authorizing provider.  See chart review.              Passed - Patient is age 18 or older       Passed - No active pregnancy on record       Passed - No positive pregnancy test in past 12 months        Prescription approved per [facility] Refill Protocol. SUE Mallory R.N.

## 2018-01-19 DIAGNOSIS — K26.5 PERFORATED DUODENAL ULCER (H): ICD-10-CM

## 2018-01-19 DIAGNOSIS — F33.3 SEVERE EPISODE OF RECURRENT MAJOR DEPRESSIVE DISORDER, WITH PSYCHOTIC FEATURES (H): Chronic | ICD-10-CM

## 2018-01-19 RX ORDER — PANTOPRAZOLE SODIUM 40 MG/1
TABLET, DELAYED RELEASE ORAL
Qty: 90 TABLET | Refills: 2 | Status: SHIPPED | OUTPATIENT
Start: 2018-01-19 | End: 2018-01-01

## 2018-01-19 NOTE — TELEPHONE ENCOUNTER
"Daughter asking for refill on     Requested Prescriptions   Pending Prescriptions Disp Refills     pantoprazole (PROTONIX) 40 MG EC tablet [Pharmacy Med Name: PANTOPRAZOLE SOD 40MG TAB] 90 tablet 3     Sig: TAKE ONE TABLET (40 MG) BY MOUTH ONCE DAILY TAKE  30-60  MINUTES  BEFORE  A  MEAL    PPI Protocol Passed    2018  7:53 AM       Passed - Not on Clopidogrel (unless Pantoprazole ordered)       Passed - No diagnosis of osteoporosis on record       Passed - Recent or future visit with authorizing provider's specialty    Patient had office visit in the last year or has a visit in the next 30 days with authorizing provider.  See \"Patient Info\" tab in inbasket, or \"Choose Columns\" in Meds & Orders section of the refill encounter.            Passed - Patient is age 18 or older       Passed - No active pregnacy on record       Passed - No positive pregnancy test in past 12 months   Prescription approved per [facility] Refill Protocol.         busPIRone (BUSPAR) 5 MG tablet 90 tablet      Si mg two times daily for anxiety, per Dr. Rosales, neurology    There is no refill protocol information for this order      Routing refill request to provider for review/approval because:  Prescribed by Dr. Rosales.   Patient does not intend to go back to Neurologist. Asking if provider can take on RX for patient. Patient will be out of this medication on .             "

## 2018-01-19 NOTE — TELEPHONE ENCOUNTER
Unable to fill as RN as there is an alert regarding a contraindication with use of Buspirone in this patient.  See .      Patient will be out of this medication over the weekend.  SUE Mallory R.N.

## 2018-01-20 RX ORDER — BUSPIRONE HYDROCHLORIDE 5 MG/1
TABLET ORAL
Qty: 60 TABLET | Refills: 0 | Status: SHIPPED | OUTPATIENT
Start: 2018-01-20 | End: 2018-02-18

## 2018-01-20 NOTE — TELEPHONE ENCOUNTER
Clinic Action Needed:Yes, please return call    Reason for Call: Daughter Eva is calling to check on status of refill of Buspar.  Please return call on Monday to discuss further, thank you.     Routed to: DEXTER Sidhu, RN  [facility]

## 2018-01-23 ENCOUNTER — TRANSFERRED RECORDS (OUTPATIENT)
Dept: HEALTH INFORMATION MANAGEMENT | Facility: CLINIC | Age: 78
End: 2018-01-23

## 2018-01-24 ENCOUNTER — CARE COORDINATION (OUTPATIENT)
Dept: CARE COORDINATION | Facility: CLINIC | Age: 78
End: 2018-01-24

## 2018-01-24 NOTE — PROGRESS NOTES
Clinic Care Coordination Contact  [facility]/Voicemail    Referral Source: Care Team  Clinical Data: Care Coordinator Outreach  Outreach attempted x 1.  Left message on voicemail with call back information and requested return call. SW has spoken to pt's daughter (Eva) in the recent past who had indicated that pt was doing better cognitively.    Plan: Care Coordinator will try to reach patient again in 10-14 business days.    ANA Bhandari, [facility]  Social Work - Care Coordinator  [facility] [place], [place], and [place]  Phone: [phone]

## 2018-02-05 DIAGNOSIS — I70.1 RENAL ARTERY STENOSIS (H): Chronic | ICD-10-CM

## 2018-02-05 DIAGNOSIS — I77.810 AORTIC ROOT DILATATION (H): ICD-10-CM

## 2018-02-05 DIAGNOSIS — I73.9 PVD (PERIPHERAL VASCULAR DISEASE) (H): ICD-10-CM

## 2018-02-05 DIAGNOSIS — I10 ESSENTIAL HYPERTENSION WITH GOAL BLOOD PRESSURE LESS THAN 140/90: Chronic | ICD-10-CM

## 2018-02-06 RX ORDER — METOPROLOL TARTRATE 50 MG
TABLET ORAL
Qty: 180 TABLET | Refills: 0 | Status: SHIPPED | OUTPATIENT
Start: 2018-02-06 | End: 2018-02-20

## 2018-02-06 NOTE — TELEPHONE ENCOUNTER
"Requested Prescriptions   Pending Prescriptions Disp Refills     metoprolol tartrate (LOPRESSOR) 50 MG tablet [Pharmacy Med Name: METOPROLOL TART 50MG TAB] 180 tablet 0     Sig: TAKE ONE TABLET BY MOUTH TWICE DAILY    Beta-Blockers Protocol Passed    2/5/2018  5:30 AM       Passed - Blood pressure under 140/90    BP Readings from Last 3 Encounters:   11/14/17 110/66   10/19/17 130/88   10/16/17 110/78                Passed - Patient is age 6 or older       Passed - Recent or future visit with authorizing provider's specialty    Patient had office visit in the last year or has a visit in the next 30 days with authorizing provider.  See \"Patient Info\" tab in inbasket, or \"Choose Columns\" in Meds & Orders section of the refill encounter.             Routing refill request to provider for review/approval because:  A break in medication  Last written 9/12/17 for 90 tablets.          "

## 2018-02-12 ENCOUNTER — CARE COORDINATION (OUTPATIENT)
Dept: CARE COORDINATION | Facility: CLINIC | Age: 78
End: 2018-02-12

## 2018-02-12 NOTE — PROGRESS NOTES
Clinic Care Coordination Contact  [facility]/Voicemail    Referral Source: Care Team  Clinical Data: Care Coordinator Outreach  Outreach attempted x 2.  Left message on voicemail for pt's daughter (Eva) with call back information and requested return call.    Plan: Care Coordinator will try to reach patient again in 3-4 weeks.    ANA Bhandari, [facility]  Social Work - Care Coordinator  [facility] [place] [place], [place], and [place]  Phone: [phone]

## 2018-02-13 ENCOUNTER — TELEPHONE (OUTPATIENT)
Dept: INTERNAL MEDICINE | Facility: CLINIC | Age: 78
End: 2018-02-13

## 2018-02-18 DIAGNOSIS — F33.3 SEVERE EPISODE OF RECURRENT MAJOR DEPRESSIVE DISORDER, WITH PSYCHOTIC FEATURES (H): Chronic | ICD-10-CM

## 2018-02-20 ENCOUNTER — TELEPHONE (OUTPATIENT)
Dept: INTERNAL MEDICINE | Facility: CLINIC | Age: 78
End: 2018-02-20

## 2018-02-20 DIAGNOSIS — I73.9 PVD (PERIPHERAL VASCULAR DISEASE) (H): ICD-10-CM

## 2018-02-20 DIAGNOSIS — I70.1 RENAL ARTERY STENOSIS (H): Chronic | ICD-10-CM

## 2018-02-20 DIAGNOSIS — I77.810 AORTIC ROOT DILATATION (H): ICD-10-CM

## 2018-02-20 DIAGNOSIS — I10 ESSENTIAL HYPERTENSION WITH GOAL BLOOD PRESSURE LESS THAN 140/90: Chronic | ICD-10-CM

## 2018-02-20 RX ORDER — METOPROLOL TARTRATE 50 MG
25 TABLET ORAL 2 TIMES DAILY
Qty: 90 TABLET | Refills: 0 | Status: ON HOLD | COMMUNITY
Start: 2018-02-20 | End: 2019-01-01

## 2018-02-20 RX ORDER — BUSPIRONE HYDROCHLORIDE 5 MG/1
TABLET ORAL
Qty: 180 TABLET | Refills: 0 | Status: SHIPPED | OUTPATIENT
Start: 2018-02-20 | End: 2018-01-01

## 2018-02-20 NOTE — TELEPHONE ENCOUNTER
Pt's daughter Eva calls. States she picked up prescription for Metoprolol 50mg and noticed the dose is back to 1 tab daily. She states dose was changed to 25mg BID after seeing FELIX Dunaway last fall. She does not mind splitting the pills in half, but wants to make sure we have the correct dose listed for pt.     Reviewed chart. Confirmed dose was updated following appt with Emelyn on 10/16/17. Informed Eva that when pharmacy requested refill this month, they sent the request for old dose of Metoprolol. As a result, the old dose of Metoprolol 50 mg BID was ordered for pt instead of the lower dose of Metoprolol 25 mg BID. Will update order in EPIC for pt, recommended she write on the bottle to call our office when due for next refill to request the lower dose instead of calling prescription number to pharmacy.

## 2018-02-20 NOTE — TELEPHONE ENCOUNTER
Refill request for Buspirone.  RN unable to refill because of an alert regarding contraindication with use of Buspirone in this pt.  Last OV 10/19/17, last filled 1/20/18.    Daughter Eva asking to fill asap as she is going over this morning to line up pills and pt is out.  Routing to Callie Greenberg out of office today.

## 2018-02-26 DIAGNOSIS — I10 ESSENTIAL HYPERTENSION WITH GOAL BLOOD PRESSURE LESS THAN 140/90: Chronic | ICD-10-CM

## 2018-02-26 DIAGNOSIS — I70.1 RENAL ARTERY STENOSIS (H): Chronic | ICD-10-CM

## 2018-02-26 DIAGNOSIS — I73.9 PVD (PERIPHERAL VASCULAR DISEASE) (H): ICD-10-CM

## 2018-02-26 DIAGNOSIS — I77.810 AORTIC ROOT DILATATION (H): ICD-10-CM

## 2018-03-01 RX ORDER — CLOPIDOGREL BISULFATE 75 MG/1
TABLET ORAL
Qty: 90 TABLET | Refills: 0 | Status: SHIPPED | OUTPATIENT
Start: 2018-03-01 | End: 2018-01-01

## 2018-03-02 RX ORDER — AMLODIPINE BESYLATE 10 MG/1
TABLET ORAL
Qty: 90 TABLET | Refills: 0 | Status: SHIPPED | OUTPATIENT
Start: 2018-03-02 | End: 2018-01-01

## 2018-03-02 NOTE — TELEPHONE ENCOUNTER
"Requested Prescriptions   Pending Prescriptions Disp Refills     clopidogrel (PLAVIX) 75 MG tablet [Pharmacy Med Name: CLOPIDOGREL 75MG    TAB] 90 tablet 0     Sig: TAKE ONE TABLET BY MOUTH ONCE DAILY    Plavix Passed    3/1/2018  6:05 PM       Passed - No active PPI on record unless is Protonix       Passed - Normal HGB on file in past 12 months    Recent Labs   Lab Test  10/05/17   1420   HGB  13.1              Passed - Normal Platelets on file in past 12 months    Recent Labs   Lab Test  10/05/17   1420   PLT  200              Passed - Recent or future visit with authorizing provider's specialty    Patient had office visit in the last year or has a visit in the next 30 days with authorizing provider.  See \"Patient Info\" tab in inbasket, or \"Choose Columns\" in Meds & Orders section of the refill encounter.            Passed - Patient is age 18 or older       Passed - No active pregnancy on record       Passed - No positive pregnancy test in past 12 months        amLODIPine (NORVASC) 10 MG tablet [Pharmacy Med Name: AMLODIPINE 10MG TAB] 90 tablet 0     Sig: TAKE ONE TABLET BY MOUTH ONCE DAILY    Calcium Channel Blockers Protocol  Failed    3/1/2018  6:05 PM       Failed - Normal serum creatinine on file in past 12 months    Recent Labs   Lab Test  01/05/18   0915   CR  1.41*            Passed - Blood pressure under 140/90 in past 12 months    BP Readings from Last 3 Encounters:   11/14/17 110/66   10/19/17 130/88   10/16/17 110/78                Passed - Recent or future visit with authorizing provider    Patient had office visit in the last year or has a visit in the next 30 days with authorizing provider.  See \"Patient Info\" tab in inbasket, or \"Choose Columns\" in Meds & Orders section of the refill encounter.            Passed - Patient is age 18 or older       Passed - No active pregnancy on record       Passed - No positive pregnancy test in past 12 months        Plavix-Prescription approved per [facility] Refill " Protocol.  Amlodipine-Routing refill request to provider for review/approval because:  Labs out of range:

## 2018-03-05 ENCOUNTER — CARE COORDINATION (OUTPATIENT)
Dept: CARE COORDINATION | Facility: CLINIC | Age: 78
End: 2018-03-05

## 2018-03-05 NOTE — PROGRESS NOTES
Clinic Care Coordination Contact    Situation: Patient chart reviewed by care coordinator.    Background: SW has been following for potential needs.    Assessment: SW has attempted to reach pt and/or pt's family multiple times without luck.     Plan/Recommendations: SW will close pt to clinic care coordination. Please re-consult for future needs.    ANA Bhandari, [facility]  Social Work - Care Coordinator  [facility] [place]  Phone: [phone]

## 2018-05-08 NOTE — TELEPHONE ENCOUNTER
"  Past due for appt       Requested Prescriptions   Pending Prescriptions Disp Refills     busPIRone (BUSPAR) 5 MG tablet [Pharmacy Med Name: BusPIRone 5MG       TAB] 180 tablet 0     Sig: TAKE 1 TABLET BY MOUTH TWICE DAILY FOR  ANXIETY    Atypical Antidepressants Protocol Failed    5/6/2018  7:49 AM       Failed - Patient has PHQ-9 score less than 5 in past 6 months.    Please review last PHQ-9 score.          Failed - Recent (6 mo) or future (30 days) visit within the authorizing provider's specialty    Patient had office visit in the last 6 months or has a visit in the next 30 days with authorizing provider or within the authorizing provider's specialty.  See \"Patient Info\" tab in inbasket, or \"Choose Columns\" in Meds & Orders section of the refill encounter.           Passed - Patient is age 18 or older       Passed - No active pregnancy on record       Passed - No positive pregnancy test in past 12 mos          "

## 2018-05-08 NOTE — TELEPHONE ENCOUNTER
Called patient to schedule MTM f/u - pt did not answer. Left VM and CB#.     Tri Burk, PharmD  Pharmaceutical Care Resident   Pager: [phone]

## 2018-06-05 NOTE — TELEPHONE ENCOUNTER
"Requested Prescriptions   Pending Prescriptions Disp Refills     clopidogrel (PLAVIX) 75 MG tablet [Pharmacy Med Name: CLOPIDOGREL 75MG    TAB] 90 tablet 0     Sig: TAKE 1 TABLET BY MOUTH ONCE DAILY    Plavix Passed    5/31/2018  7:16 PM       Passed - No active PPI on record unless is Protonix       Passed - Normal HGB on file in past 12 months    Recent Labs   Lab Test  10/05/17   1420   HGB  13.1              Passed - Normal Platelets on file in past 12 months    Recent Labs   Lab Test  10/05/17   1420   PLT  200              Passed - Recent (12 mo) or future (30 days) visit within the authorizing provider's specialty    Patient had office visit in the last 12 months or has a visit in the next 30 days with authorizing provider or within the authorizing provider's specialty.  See \"Patient Info\" tab in inbasket, or \"Choose Columns\" in Meds & Orders section of the refill encounter.           Passed - Patient is age 18 or older       Passed - No active pregnancy on record       Passed - No positive pregnancy test in past 12 months     Prescription approved per [facility] Refill Protocol.       amLODIPine (NORVASC) 10 MG tablet [Pharmacy Med Name: AMLODIPINE 10MG TAB] 90 tablet 0     Sig: TAKE 1 TABLET BY MOUTH ONCE DAILY    Calcium Channel Blockers Protocol  Failed    5/31/2018  7:16 PM       Failed - Normal serum creatinine on file in past 12 months    Recent Labs   Lab Test  01/05/18   0915   CR  1.41*            Passed - Blood pressure under 140/90 in past 12 months    BP Readings from Last 3 Encounters:   11/14/17 110/66   10/19/17 130/88   10/16/17 110/78                Passed - Recent (12 mo) or future (30 days) visit within the authorizing provider's specialty    Patient had office visit in the last 12 months or has a visit in the next 30 days with authorizing provider or within the authorizing provider's specialty.  See \"Patient Info\" tab in inbasket, or \"Choose Columns\" in Meds & Orders section of the refill " encounter.           Passed - Patient is age 18 or older       Passed - No active pregnancy on record       Passed - No positive pregnancy test in past 12 months      Routing refill request to provider for review/approval because:  Labs out of range:  Cr

## 2018-06-18 NOTE — PATIENT INSTRUCTIONS
Plan:  1. cerave cream twice a day - over the counter  2. 10 min after the cerave cream apply hydrocortisone cream   3. At night cover the forearm with plastic folia as we talked.

## 2018-06-18 NOTE — NURSING NOTE
"/78 (BP Location: Right arm, Patient Position: Sitting, Cuff Size: Adult Large)  Pulse 72  Temp 97.5  F (36.4  C) (Oral)  Resp 16  Ht 5' 3\" (1.6 m)  Wt 158 lb 14.4 oz (72.1 kg)  SpO2 (!) 89%  BMI 28.15 kg/m2     SOLA Kelley  "

## 2018-06-18 NOTE — MR AVS SNAPSHOT
"              After Visit Summary   6/18/2018    Wendy Rocha    MRN: [id number]           Patient Information     Date Of Birth          1940        Visit Information        Provider Department      6/18/2018 12:20 PM Natalee Owens MD [facility]        Today's Diagnoses     Other eczema    -  1      Care Instructions    Plan:  1. cerave cream twice a day - over the counter  2. 10 min after the cerave cream apply hydrocortisone cream   3. At night cover the forearm with plastic folia as we talked.           Follow-ups after your visit        Who to contact     If you have questions or need follow up information about today's clinic visit or your schedule please contact [facility] directly at [phone].  Normal or non-critical lab and imaging results will be communicated to you by MyChart, letter or phone within 4 business days after the clinic has received the results. If you do not hear from us within 7 days, please contact the clinic through MyChart or phone. If you have a critical or abnormal lab result, we will notify you by phone as soon as possible.  Submit refill requests through [employer]ymotion or call your pharmacy and they will forward the refill request to us. Please allow 3 business days for your refill to be completed.          Additional Information About Your Visit        [employer] Information     [employer] lets you send messages to your doctor, view your test results, renew your prescriptions, schedule appointments and more. To sign up, go to [email or web address] . Click on \"Log in\" on the left side of the screen, which will take you to the Welcome page. Then click on \"Sign up Now\" on the right side of the page.     You will be asked to enter the access code listed below, as well as some personal information. Please follow the directions to create your username and password.     Your access code is: [email or web address]  Expires: 9/16/2018 12:39 PM   " "  Your access code will  in 90 days. If you need help or a new code, please call your [facility] clinic or [phone].        Care EveryWhere ID     This is your Care EveryWhere ID. This could be used by other organizations to access your [facility] medical records  [id number]        Your Vitals Were     Pulse Temperature Respirations Height Pulse Oximetry BMI (Body Mass Index)    72 97.5  F (36.4  C) (Oral) 16 5' 3\" (1.6 m) 89% 28.15 kg/m2       Blood Pressure from Last 3 Encounters:   18 144/78   17 110/66   10/19/17 130/88    Weight from Last 3 Encounters:   18 158 lb 14.4 oz (72.1 kg)   17 140 lb 14.4 oz (63.9 kg)   10/19/17 135 lb 3.2 oz (61.3 kg)              Today, you had the following     No orders found for display         Today's Medication Changes          These changes are accurate as of 18 12:39 PM.  If you have any questions, ask your nurse or doctor.               Start taking these medicines.        Dose/Directions    Hydrocortisone Acetate 2.5 % Crea   Used for:  Other eczema   Started by:  Natalee Owens MD        Dose:  1 Application   Externally apply 1 Application topically 2 times daily Apply to both arms twice a day   Quantity:  28.4 g   Refills:  1            Where to get your medicines      These medications were sent to [facility] [address] [place] [address]     Phone:  [phone]     Hydrocortisone Acetate 2.5 % Crea                Primary Care Provider Office Phone # Fax #    Natalee Owens [phone]       [address] [place] [address]        Equal Access to Services     [facility] MARIE AH: Charlie ramoso Soleidy, waaxda luqadaha, qaybta kaalmada adeegyada, flash garcia. So [facility] [phone].    ATENCIÓN: Si habla español, tiene a valencia disposición servicios gratuitos de asistencia lingüística. " Marques laboy [phone].    We comply with applicable federal civil rights laws and [place] laws. We do not discriminate on the basis of race, color, national origin, age, disability, sex, sexual orientation, or gender identity.            Thank you!     Thank you for choosing [facility]  for your care. Our goal is always to provide you with excellent care. Hearing back from our patients is one way we can continue to improve our services. Please take a few minutes to complete the written survey that you may receive in the mail after your visit with us. Thank you!             Your Updated Medication List - Protect others around you: Learn how to safely use, store and throw away your medicines at www.disposemymeds.org.          This list is accurate as of 6/18/18 12:39 PM.  Always use your most recent med list.                   Brand Name Dispense Instructions for use Diagnosis    ACE/ARB/ARNI NOT PRESCRIBED (INTENTIONAL)      ACE & ARB not prescribed due to Worsening renal function on ACE/ARB therapy    Renal artery stenosis (H), Essential hypertension with goal blood pressure less than 140/90       amLODIPine 10 MG tablet    NORVASC    90 tablet    TAKE 1 TABLET BY MOUTH ONCE DAILY    Essential hypertension with goal blood pressure less than 140/90       ASPIRIN NOT PRESCRIBED    INTENTIONAL    0 each    Antiplatelet medication not prescribed intentionally due to Plavix    Essential hypertension with goal blood pressure less than 140/90, PVD (peripheral vascular disease) (H), Aortic root dilatation (H), Renal artery stenosis (H)       busPIRone 5 MG tablet    BUSPAR    180 tablet    TAKE 1 TABLET BY MOUTH TWICE DAILY FOR  ANXIETY    Severe episode of recurrent major depressive disorder, with psychotic features (H)       clopidogrel 75 MG tablet    PLAVIX    90 tablet    TAKE 1 TABLET BY MOUTH ONCE DAILY    PVD (peripheral vascular disease) (H), Aortic root dilatation (H), Renal artery stenosis (H)        doxazosin 4 MG tablet    CARDURA    90 tablet    TAKE ONE TABLET BY MOUTH AT BEDTIME    Essential hypertension with goal blood pressure less than 140/90, PVD (peripheral vascular disease) (H), Aortic root dilatation (H), Renal artery stenosis (H)       Hydrocortisone Acetate 2.5 % Crea     28.4 g    Externally apply 1 Application topically 2 times daily Apply to both arms twice a day    Other eczema       metoprolol tartrate 50 MG tablet    LOPRESSOR    90 tablet    Take 0.5 tablets (25 mg) by mouth 2 times daily    Essential hypertension with goal blood pressure less than 140/90, PVD (peripheral vascular disease) (H), Aortic root dilatation (H), Renal artery stenosis (H)       ondansetron 8 MG tablet    ZOFRAN    20 tablet    Take 1 tablet (8 mg) by mouth every 8 hours as needed for nausea    Nausea       order for [employer]     1 Device    Machine to dispense the medication    Severe episode of recurrent major depressive disorder, with psychotic features (H), Memory loss       pantoprazole 40 MG EC tablet    PROTONIX    90 tablet    TAKE ONE TABLET (40 MG) BY MOUTH ONCE DAILY TAKE  30-60  MINUTES  BEFORE  A  MEAL    Perforated duodenal ulcer (H)       simvastatin 40 MG tablet    ZOCOR    90 tablet    Take 1 tablet (40 mg) by mouth At Bedtime    Hyperlipidemia LDL goal <100       traZODone 50 MG tablet    DESYREL    30 tablet    Take 1 tablet (50 mg) by mouth nightly as needed for sleep    Persistent insomnia       TYLENOL 325 MG tablet   Generic drug:  acetaminophen     100 tablet    Take 2 tablets (650 mg) by mouth every 6 hours as needed for mild pain    Severe episode of recurrent major depressive disorder, with psychotic features (H), Memory loss

## 2018-06-18 NOTE — PROGRESS NOTES
"Patient's instructions / PLAN:                                                        Plan:  1. cerave cream twice a day - over the counter  2. 10 min after the cerave cream apply hydrocortisone cream   3. At night cover the forearm with plastic folia as we talked.       ASSESSMENT & PLAN:                                                      (L30.8) Other eczema  (primary encounter diagnosis)  Comment:   Plan: Hydrocortisone Acetate 2.5 % CREA               Chief Complaint:                                                      Rash on both forearms      SUBJECTIVE:                                                    History of present illness     --For a week or more, getting worse  --Itching, but she does not scratch it during the day  --She believes she has crutches at night  --No rashes in a different part of her body  -- Denies no new detergents, new soaps, new lotion        ROS:                                                      ROS: negative for fever, chills, cough, wheezes, chest pain, shortness of breath, vomiting, abdominal pain, leg swelling     OBJECTIVE:                                                    Physical Exam :    Blood pressure 144/78, pulse 72, temperature 97.5  F (36.4  C), temperature source Oral, resp. rate 16, height 5' 3\" (1.6 m), weight 158 lb 14.4 oz (72.1 kg), SpO2 (!) 89 %, not currently breastfeeding.   NAD, appears comfortable  Skin: Both forearms with continuous rash, some scratch marks and some fine cracks.  It looks like eczema   Chest: clear to auscultation bilaterally, good respiratory effort  Heart: S1 S2, RRR, no mgr appreciated  Abdomen: soft, not tender,   Extremities: no edema,   Neurologic: A, Ox3, no focal signs appreciated    PMHx: reviewed  Past Medical History:   Diagnosis Date     Abnormal ECG      ARF (acute renal failure) (H) Aug 2010    Lisinopril was stopped at that time     Carotid stenosis      Carotid stenosis, bilateral      Carotid stenosis, bilateral     " Vs Surgeon: dr Kush Mcmillan, Phone: [phone] fax: [phone]     CKD (chronic kidney disease) stage 3, GFR 30-59 ml/min      GERD (gastroesophageal reflux disease)      HTN, goal below 140/90      Hyperlipidemia LDL goal <100      Lung nodule may 2013    needs f/u may 2014     Muscle aches      Perforated duodenal ulcer (H) Aug 2010     Proteinuria      PVD (peripheral vascular disease) (H)      Renal artery stenosis (H)  April 2011    Right side     Vitamin D deficiency disease       PSHx: reviewed  Past Surgical History:   Procedure Laterality Date     ARTHROPLASTY KNEE      left     EYE SURGERY      cataracts     LAPAROSCOPIC TUBAL LIGATION          Meds: reviewed  Current Outpatient Prescriptions   Medication Sig Dispense Refill     ACE/ARB NOT PRESCRIBED, INTENTIONAL, ACE & ARB not prescribed due to Worsening renal function on ACE/ARB therapy       acetaminophen (TYLENOL) 325 MG tablet Take 2 tablets (650 mg) by mouth every 6 hours as needed for mild pain 100 tablet 0     amLODIPine (NORVASC) 10 MG tablet TAKE 1 TABLET BY MOUTH ONCE DAILY 90 tablet 0     ASPIRIN NOT PRESCRIBED (INTENTIONAL) Antiplatelet medication not prescribed intentionally due to Plavix 0 each 0     busPIRone (BUSPAR) 5 MG tablet TAKE 1 TABLET BY MOUTH TWICE DAILY FOR  ANXIETY 180 tablet 0     clopidogrel (PLAVIX) 75 MG tablet TAKE 1 TABLET BY MOUTH ONCE DAILY 90 tablet 0     doxazosin (CARDURA) 4 MG tablet TAKE ONE TABLET BY MOUTH AT BEDTIME 90 tablet 2     metoprolol tartrate (LOPRESSOR) 50 MG tablet Take 0.5 tablets (25 mg) by mouth 2 times daily 90 tablet 0     ondansetron (ZOFRAN) 8 MG tablet Take 1 tablet (8 mg) by mouth every 8 hours as needed for nausea 20 tablet 0     order for DME Machine to dispense the medication 1 Device 0     pantoprazole (PROTONIX) 40 MG EC tablet TAKE ONE TABLET (40 MG) BY MOUTH ONCE DAILY TAKE  30-60  MINUTES  BEFORE  A  MEAL 90 tablet 2     simvastatin (ZOCOR) 40 MG tablet Take 1 tablet (40  mg) by mouth At Bedtime 90 tablet 3     traZODone (DESYREL) 50 MG tablet Take 1 tablet (50 mg) by mouth nightly as needed for sleep 30 tablet 1       Soc Hx: reviewed  Fam Hx: reviewed          Natalee Greenberg MD  Internal Medicine

## 2018-07-19 NOTE — TELEPHONE ENCOUNTER
Called patient to follow-up. Left message for patient to call back.    Have not been able to connect with patient after multiple attempts.  No further attempts will be made at this time.  I would be happy to assist in the care of this patient in the future if needed.    Emelyn Lott , Pharm D  [phone] (phone)  [phone] (pager)  Medication Therapy Management Pharmacist

## 2018-07-31 NOTE — TELEPHONE ENCOUNTER
"Requested Prescriptions   Pending Prescriptions Disp Refills     busPIRone (BUSPAR) 5 MG tablet [Pharmacy Med Name: BusPIRone 5MG       TAB]  Last Written Prescription Date:  5/8/2018  Last Fill Quantity: 180,  # refills: 0   Last office visit: 6/18/2018 with prescribing provider:     Future Office Visit:   180 tablet 0     Sig: TAKE 1 TABLET BY MOUTH TWICE DAILY FOR ANXIETY    Atypical Antidepressants Protocol Passed    7/29/2018  2:27 PM       Passed - Patient has PHQ-9 score less than 5 in past 6 months.    Please review last PHQ-9 score.          Passed - Patient is age 18 or older       Passed - No active pregnancy on record       Passed - No positive pregnancy test in past 12 mos       Passed - Recent (6 mo) or future (30 days) visit within the authorizing provider's specialty    Patient had office visit in the last 6 months or has a visit in the next 30 days with authorizing provider or within the authorizing provider's specialty.  See \"Patient Info\" tab in inbasket, or \"Choose Columns\" in Meds & Orders section of the refill encounter.            metoprolol tartrate (LOPRESSOR) 50 MG tablet [Pharmacy Med Name: METOPROLOL TART 50MG TAB] 180 tablet 0     Sig: TAKE ONE TABLET BY MOUTH TWICE DAILY    Beta-Blockers Protocol Failed    7/29/2018  2:27 PM       Failed - Blood pressure under 140/90 in past 12 months    BP Readings from Last 3 Encounters:   06/18/18 144/78   11/14/17 110/66   10/19/17 130/88                Passed - Patient is age 6 or older       Passed - Recent (12 mo) or future (30 days) visit within the authorizing provider's specialty    Patient had office visit in the last 12 months or has a visit in the next 30 days with authorizing provider or within the authorizing provider's specialty.  See \"Patient Info\" tab in inbasket, or \"Choose Columns\" in Meds & Orders section of the refill encounter.            "

## 2018-08-27 NOTE — TELEPHONE ENCOUNTER
"Requested Prescriptions   Pending Prescriptions Disp Refills     amLODIPine (NORVASC) 10 MG tablet [Pharmacy Med Name: AMLODIPINE 10MG TAB] 90 tablet 0    Last Written Prescription Date:  06/05/2018  Last Fill Quantity: 90  # refills: 0   Last office visit: 6/18/2018 with prescribing provider:    Future Office Visit:    Sig: TAKE 1 TABLET BY MOUTH ONCE DAILY    Calcium Channel Blockers Protocol  Failed    8/26/2018  3:26 PM       Failed - Blood pressure under 140/90 in past 12 months    BP Readings from Last 3 Encounters:   06/18/18 144/78   11/14/17 110/66   10/19/17 130/88                Failed - Normal serum creatinine on file in past 12 months    Recent Labs   Lab Test  01/05/18   0915   06/17/16   1440   CR  1.41*   < >   --    CREAT   --    --   1.8*    < > = values in this interval not displayed.            Passed - Recent (12 mo) or future (30 days) visit within the authorizing provider's specialty    Patient had office visit in the last 12 months or has a visit in the next 30 days with authorizing provider or within the authorizing provider's specialty.  See \"Patient Info\" tab in inbasket, or \"Choose Columns\" in Meds & Orders section of the refill encounter.           Passed - Patient is age 18 or older       Passed - No active pregnancy on record       Passed - No positive pregnancy test in past 12 months        clopidogrel (PLAVIX) 75 MG tablet [Pharmacy Med Name: CLOPIDOGREL 75MG    TAB] 90 tablet 0    Last Written Prescription Date:  06/05/2018  Last Fill Quantity: 90,  # refills: 0   Last office visit: 6/18/2018 with prescribing provider:     Future Office Visit:   Sig: TAKE 1 TABLET BY MOUTH ONCE DAILY    Plavix Passed    8/26/2018  3:26 PM       Passed - No active PPI on record unless is Protonix       Passed - Normal HGB on file in past 12 months    Recent Labs   Lab Test  10/05/17   1420   HGB  13.1              Passed - Normal Platelets on file in past 12 months    Recent Labs   Lab Test  " "10/05/17   1420   PLT  200              Passed - Recent (12 mo) or future (30 days) visit within the authorizing provider's specialty    Patient had office visit in the last 12 months or has a visit in the next 30 days with authorizing provider or within the authorizing provider's specialty.  See \"Patient Info\" tab in inbasket, or \"Choose Columns\" in Meds & Orders section of the refill encounter.           Passed - Patient is age 18 or older       Passed - No active pregnancy on record       Passed - No positive pregnancy test in past 12 months        "

## 2018-08-29 NOTE — TELEPHONE ENCOUNTER
Routing refill request to provider for review/approval because:  Labs out of range:  BP  Riya Ashley RN, BSN

## 2018-09-24 NOTE — TELEPHONE ENCOUNTER
"Requested Prescriptions   Pending Prescriptions Disp Refills     doxazosin (CARDURA) 4 MG tablet [Pharmacy Med Name: DOXAZOSIN 4MG       TAB] 90 tablet 2    Last Written Prescription Date:  01/05/2018  Last Fill Quantity: 90,  # refills: 2   Last office visit: 6/18/2018 with prescribing provider:     Future Office Visit:   Sig: TAKE ONE TABLET BY MOUTH AT BEDTIME    Alpha Blockers Failed    9/23/2018  6:45 AM       Failed - Blood pressure under 140/90 in past 12 months    BP Readings from Last 3 Encounters:   06/18/18 144/78   11/14/17 110/66   10/19/17 130/88                Passed - Recent (12 mo) or future (30 days) visit within the authorizing provider's specialty    Patient had office visit in the last 12 months or has a visit in the next 30 days with authorizing provider or within the authorizing provider's specialty.  See \"Patient Info\" tab in inbasket, or \"Choose Columns\" in Meds & Orders section of the refill encounter.           Passed - Patient does not have Tadalafil, Vardenafil, or Sildenafil on their medication list       Passed - Patient is 18 years of age or older       Passed - No active pregnancy on record       Passed - No positive pregnancy test in past 12 months        "

## 2018-10-16 NOTE — TELEPHONE ENCOUNTER
"Requested Prescriptions   Pending Prescriptions Disp Refills     pantoprazole (PROTONIX) 40 MG EC tablet [Pharmacy Med Name: PANTOPRAZOLE SOD 40MG TAB]  Last Written Prescription Date:  1/19/2018  Last Fill Quantity: 90,  # refills: 2   Last office visit: 6/18/2018 with prescribing provider:     Future Office Visit:   90 tablet 2     Sig: TAKE ONE TABLET BY MOUTH ONCE DAILY 30-60  MINUTES  BEFORE  A  MEAL    PPI Protocol Passed    10/16/2018  5:38 AM       Passed - Not on Clopidogrel (unless Pantoprazole ordered)       Passed - No diagnosis of osteoporosis on record       Passed - Recent (12 mo) or future (30 days) visit within the authorizing provider's specialty    Patient had office visit in the last 12 months or has a visit in the next 30 days with authorizing provider or within the authorizing provider's specialty.  See \"Patient Info\" tab in inbasket, or \"Choose Columns\" in Meds & Orders section of the refill encounter.           Passed - Patient is age 18 or older       Passed - No active pregnacy on record       Passed - No positive pregnancy test in past 12 months        "

## 2018-10-29 NOTE — TELEPHONE ENCOUNTER
"Requested Prescriptions   Pending Prescriptions Disp Refills     busPIRone (BUSPAR) 5 MG tablet [Pharmacy Med Name: BusPIRone 5MG       TAB]  Last Written Prescription Date:  8/2/2018  Last Fill Quantity: 180,  # refills: 0   Last office visit: 6/18/2018 with prescribing provider:     Future Office Visit:   180 tablet 0     Sig: TAKE 1 TABLET BY MOUTH TWICE DAILY FOR ANXIETY    Atypical Antidepressants Protocol Passed    10/28/2018  8:56 AM       Passed - Patient has PHQ-9 score less than 5 in past 6 months.    Please review last PHQ-9 score.          Passed - Patient is age 18 or older       Passed - No active pregnancy on record       Passed - No positive pregnancy test in past 12 mos       Passed - Recent (6 mo) or future (30 days) visit within the authorizing provider's specialty    Patient had office visit in the last 6 months or has a visit in the next 30 days with authorizing provider or within the authorizing provider's specialty.  See \"Patient Info\" tab in inbasket, or \"Choose Columns\" in Meds & Orders section of the refill encounter.            "

## 2018-10-30 NOTE — TELEPHONE ENCOUNTER
Routing refill request to provider for review/approval because:  Patient needs to be seen because:  Overdue for depression follow up/Annual exam

## 2018-12-04 NOTE — TELEPHONE ENCOUNTER
"Requested Prescriptions   Pending Prescriptions Disp Refills     doxazosin (CARDURA) 4 MG tablet [Pharmacy Med Name: DOXAZOSIN 4MG       TAB]  Last Written Prescription Date:  8/30/2018  Last Fill Quantity: 90,  # refills: 0   Last office visit: 6/18/2018 with prescribing provider:     Future Office Visit:   90 tablet 0     Sig: TAKE 1 TABLET BY MOUTH AT BEDTIME    Alpha Blockers Failed    12/2/2018 10:29 AM       Failed - Blood pressure under 140/90 in past 12 months    BP Readings from Last 3 Encounters:   06/18/18 144/78   11/14/17 110/66   10/19/17 130/88                Passed - Recent (12 mo) or future (30 days) visit within the authorizing provider's specialty    Patient had office visit in the last 12 months or has a visit in the next 30 days with authorizing provider or within the authorizing provider's specialty.  See \"Patient Info\" tab in inbasket, or \"Choose Columns\" in Meds & Orders section of the refill encounter.             Passed - Patient does not have Tadalafil, Vardenafil, or Sildenafil on their medication list       Passed - Patient is 18 years of age or older       Passed - No active pregnancy on record       Passed - No positive pregnancy test in past 12 months        clopidogrel (PLAVIX) 75 MG tablet [Pharmacy Med Name: CLOPIDOGREL 75MG    TAB]  Last Written Prescription Date:  8/30/2018  Last Fill Quantity: 90,  # refills: 0   Last office visit: 6/18/2018 with prescribing provider:     Future Office Visit:   90 tablet 0     Sig: TAKE 1 TABLET BY MOUTH ONCE DAILY    Plavix Failed    12/2/2018 10:29 AM       Failed - Normal HGB on file in past 12 months    Recent Labs   Lab Test  10/05/17   1420   HGB  13.1              Failed - Normal Platelets on file in past 12 months    Recent Labs   Lab Test  10/05/17   1420   PLT  200              Passed - No active PPI on record unless is Protonix       Passed - Recent (12 mo) or future (30 days) visit within the authorizing provider's specialty    " "Patient had office visit in the last 12 months or has a visit in the next 30 days with authorizing provider or within the authorizing provider's specialty.  See \"Patient Info\" tab in inbasket, or \"Choose Columns\" in Meds & Orders section of the refill encounter.             Passed - Patient is age 18 or older       Passed - No active pregnancy on record       Passed - No positive pregnancy test in past 12 months        amLODIPine (NORVASC) 10 MG tablet [Pharmacy Med Name: AMLODIPINE 10MG TAB]  Last Written Prescription Date:  8/30/2018  Last Fill Quantity: 90,  # refills: 0   Last office visit: 6/18/2018 with prescribing provider:     Future Office Visit:   90 tablet 0     Sig: TAKE 1 TABLET BY MOUTH ONCE DAILY    Calcium Channel Blockers Protocol  Failed    12/2/2018 10:29 AM       Failed - Blood pressure under 140/90 in past 12 months    BP Readings from Last 3 Encounters:   06/18/18 144/78   11/14/17 110/66   10/19/17 130/88                Failed - Normal serum creatinine on file in past 12 months    Recent Labs   Lab Test  01/05/18   0915   06/17/16   1440   CR  1.41*   < >   --    CREAT   --    --   1.8*    < > = values in this interval not displayed.            Passed - Recent (12 mo) or future (30 days) visit within the authorizing provider's specialty    Patient had office visit in the last 12 months or has a visit in the next 30 days with authorizing provider or within the authorizing provider's specialty.  See \"Patient Info\" tab in inbasket, or \"Choose Columns\" in Meds & Orders section of the refill encounter.             Passed - Patient is age 18 or older       Passed - No active pregnancy on record       Passed - No positive pregnancy test in past 12 months        "

## 2019-01-01 ENCOUNTER — APPOINTMENT (OUTPATIENT)
Dept: GENERAL RADIOLOGY | Facility: CLINIC | Age: 79
DRG: 003 | End: 2019-01-01
Attending: THORACIC SURGERY (CARDIOTHORACIC VASCULAR SURGERY)
Payer: COMMERCIAL

## 2019-01-01 ENCOUNTER — ANESTHESIA EVENT (OUTPATIENT)
Dept: SURGERY | Facility: CLINIC | Age: 79
DRG: 003 | End: 2019-01-01
Payer: COMMERCIAL

## 2019-01-01 ENCOUNTER — APPOINTMENT (OUTPATIENT)
Dept: CARDIOLOGY | Facility: CLINIC | Age: 79
DRG: 003 | End: 2019-01-01
Attending: THORACIC SURGERY (CARDIOTHORACIC VASCULAR SURGERY)
Payer: COMMERCIAL

## 2019-01-01 ENCOUNTER — ANESTHESIA (OUTPATIENT)
Dept: SURGERY | Facility: CLINIC | Age: 79
DRG: 003 | End: 2019-01-01
Payer: COMMERCIAL

## 2019-01-01 ENCOUNTER — APPOINTMENT (OUTPATIENT)
Dept: ULTRASOUND IMAGING | Facility: CLINIC | Age: 79
DRG: 003 | End: 2019-01-01
Attending: THORACIC SURGERY (CARDIOTHORACIC VASCULAR SURGERY)
Payer: COMMERCIAL

## 2019-01-01 ENCOUNTER — APPOINTMENT (OUTPATIENT)
Dept: GENERAL RADIOLOGY | Facility: CLINIC | Age: 79
End: 2019-01-01
Payer: COMMERCIAL

## 2019-01-01 ENCOUNTER — APPOINTMENT (OUTPATIENT)
Dept: CT IMAGING | Facility: CLINIC | Age: 79
DRG: 003 | End: 2019-01-01
Attending: THORACIC SURGERY (CARDIOTHORACIC VASCULAR SURGERY)
Payer: COMMERCIAL

## 2019-01-01 ENCOUNTER — ALLIED HEALTH/NURSE VISIT (OUTPATIENT)
Dept: NEUROLOGY | Facility: CLINIC | Age: 79
DRG: 003 | End: 2019-01-01
Attending: PSYCHIATRY & NEUROLOGY
Payer: COMMERCIAL

## 2019-01-01 ENCOUNTER — APPOINTMENT (OUTPATIENT)
Dept: OCCUPATIONAL THERAPY | Facility: CLINIC | Age: 79
DRG: 003 | End: 2019-01-01
Attending: THORACIC SURGERY (CARDIOTHORACIC VASCULAR SURGERY)
Payer: COMMERCIAL

## 2019-01-01 ENCOUNTER — HOSPITAL ENCOUNTER (EMERGENCY)
Facility: CLINIC | Age: 79
Discharge: ANOTHER HOSPITAL | End: 2019-01-12
Attending: EMERGENCY MEDICINE | Admitting: EMERGENCY MEDICINE
Payer: COMMERCIAL

## 2019-01-01 ENCOUNTER — APPOINTMENT (OUTPATIENT)
Dept: INTERVENTIONAL RADIOLOGY/VASCULAR | Facility: CLINIC | Age: 79
DRG: 003 | End: 2019-01-01
Attending: THORACIC SURGERY (CARDIOTHORACIC VASCULAR SURGERY)
Payer: COMMERCIAL

## 2019-01-01 ENCOUNTER — APPOINTMENT (OUTPATIENT)
Dept: MRI IMAGING | Facility: CLINIC | Age: 79
DRG: 003 | End: 2019-01-01
Attending: THORACIC SURGERY (CARDIOTHORACIC VASCULAR SURGERY)
Payer: COMMERCIAL

## 2019-01-01 ENCOUNTER — CARE COORDINATION (OUTPATIENT)
Dept: CARDIOLOGY | Facility: CLINIC | Age: 79
End: 2019-01-01

## 2019-01-01 ENCOUNTER — HOSPITAL ENCOUNTER (INPATIENT)
Facility: CLINIC | Age: 79
LOS: 23 days | Discharge: NURSING, REHAB OR LONG-TERM CARE | DRG: 003 | End: 2019-02-07
Attending: THORACIC SURGERY (CARDIOTHORACIC VASCULAR SURGERY) | Admitting: SURGERY
Payer: COMMERCIAL

## 2019-01-01 ENCOUNTER — APPOINTMENT (OUTPATIENT)
Dept: CT IMAGING | Facility: CLINIC | Age: 79
End: 2019-01-01
Payer: COMMERCIAL

## 2019-01-01 VITALS
SYSTOLIC BLOOD PRESSURE: 151 MMHG | OXYGEN SATURATION: 93 % | BODY MASS INDEX: 29.37 KG/M2 | TEMPERATURE: 97.8 F | WEIGHT: 165.79 LBS | RESPIRATION RATE: 15 BRPM | HEART RATE: 83 BPM | DIASTOLIC BLOOD PRESSURE: 96 MMHG

## 2019-01-01 VITALS
SYSTOLIC BLOOD PRESSURE: 161 MMHG | OXYGEN SATURATION: 94 % | DIASTOLIC BLOOD PRESSURE: 134 MMHG | TEMPERATURE: 98.5 F | WEIGHT: 162.2 LBS | HEIGHT: 63 IN | BODY MASS INDEX: 28.74 KG/M2 | RESPIRATION RATE: 17 BRPM | HEART RATE: 62 BPM

## 2019-01-01 DIAGNOSIS — J96.01 ACUTE RESPIRATORY FAILURE WITH HYPOXIA (H): ICD-10-CM

## 2019-01-01 DIAGNOSIS — N17.9 ACUTE RENAL FAILURE SUPERIMPOSED ON CHRONIC KIDNEY DISEASE, UNSPECIFIED CKD STAGE, UNSPECIFIED ACUTE RENAL FAILURE TYPE: ICD-10-CM

## 2019-01-01 DIAGNOSIS — N17.9 ACUTE KIDNEY INJURY (H): Primary | ICD-10-CM

## 2019-01-01 DIAGNOSIS — R06.02 SOB (SHORTNESS OF BREATH): ICD-10-CM

## 2019-01-01 DIAGNOSIS — N18.9 ACUTE RENAL FAILURE SUPERIMPOSED ON CHRONIC KIDNEY DISEASE, UNSPECIFIED CKD STAGE, UNSPECIFIED ACUTE RENAL FAILURE TYPE: ICD-10-CM

## 2019-01-01 DIAGNOSIS — N18.30 CKD (CHRONIC KIDNEY DISEASE) STAGE 3, GFR 30-59 ML/MIN (H): ICD-10-CM

## 2019-01-01 DIAGNOSIS — I48.91 ATRIAL FIBRILLATION, UNSPECIFIED TYPE (H): ICD-10-CM

## 2019-01-01 DIAGNOSIS — I24.9 ACUTE CORONARY SYNDROME (H): ICD-10-CM

## 2019-01-01 DIAGNOSIS — R41.82 ALTERED MENTAL STATUS: Primary | ICD-10-CM

## 2019-01-01 DIAGNOSIS — R40.1 STUPOR: Primary | ICD-10-CM

## 2019-01-01 DIAGNOSIS — I21.4 NSTEMI (NON-ST ELEVATED MYOCARDIAL INFARCTION) (H): ICD-10-CM

## 2019-01-01 DIAGNOSIS — I50.9 CONGESTIVE HEART FAILURE, UNSPECIFIED HF CHRONICITY, UNSPECIFIED HEART FAILURE TYPE (H): ICD-10-CM

## 2019-01-01 DIAGNOSIS — R25.9 ABNORMAL INVOLUNTARY MOVEMENT: ICD-10-CM

## 2019-01-01 DIAGNOSIS — N39.0 URINARY TRACT INFECTION WITHOUT HEMATURIA, SITE UNSPECIFIED: ICD-10-CM

## 2019-01-01 LAB
ABO + RH BLD: NORMAL
ACETAMINOPHEN QUAL: ABNORMAL
ALBUMIN SERPL-MCNC: 1.6 G/DL (ref 3.4–5)
ALBUMIN SERPL-MCNC: 1.8 G/DL (ref 3.4–5)
ALBUMIN SERPL-MCNC: 1.9 G/DL (ref 3.4–5)
ALBUMIN SERPL-MCNC: 1.9 G/DL (ref 3.4–5)
ALBUMIN SERPL-MCNC: 2 G/DL (ref 3.4–5)
ALBUMIN SERPL-MCNC: 2 G/DL (ref 3.4–5)
ALBUMIN SERPL-MCNC: 2.1 G/DL (ref 3.4–5)
ALBUMIN SERPL-MCNC: 2.2 G/DL (ref 3.4–5)
ALBUMIN SERPL-MCNC: 2.3 G/DL (ref 3.4–5)
ALBUMIN SERPL-MCNC: 2.7 G/DL (ref 3.4–5)
ALBUMIN SERPL-MCNC: 2.7 G/DL (ref 3.4–5)
ALBUMIN SERPL-MCNC: 2.8 G/DL (ref 3.4–5)
ALBUMIN SERPL-MCNC: 2.9 G/DL (ref 3.4–5)
ALBUMIN SERPL-MCNC: 3.6 G/DL (ref 3.4–5)
ALBUMIN UR-MCNC: 10 MG/DL
ALBUMIN UR-MCNC: 100 MG/DL
ALBUMIN UR-MCNC: 100 MG/DL
ALP SERPL-CCNC: 33 U/L (ref 40–150)
ALP SERPL-CCNC: 40 U/L (ref 40–150)
ALP SERPL-CCNC: 41 U/L (ref 40–150)
ALP SERPL-CCNC: 44 U/L (ref 40–150)
ALP SERPL-CCNC: 49 U/L (ref 40–150)
ALP SERPL-CCNC: 49 U/L (ref 40–150)
ALP SERPL-CCNC: 55 U/L (ref 40–150)
ALP SERPL-CCNC: 55 U/L (ref 40–150)
ALP SERPL-CCNC: 56 U/L (ref 40–150)
ALP SERPL-CCNC: 57 U/L (ref 40–150)
ALP SERPL-CCNC: 58 U/L (ref 40–150)
ALP SERPL-CCNC: 58 U/L (ref 40–150)
ALP SERPL-CCNC: 71 U/L (ref 40–150)
ALP SERPL-CCNC: 76 U/L (ref 40–150)
ALP SERPL-CCNC: 76 U/L (ref 40–150)
ALT SERPL W P-5'-P-CCNC: 10 U/L (ref 0–50)
ALT SERPL W P-5'-P-CCNC: 14 U/L (ref 0–50)
ALT SERPL W P-5'-P-CCNC: 19 U/L (ref 0–50)
ALT SERPL W P-5'-P-CCNC: 20 U/L (ref 0–50)
ALT SERPL W P-5'-P-CCNC: 21 U/L (ref 0–50)
ALT SERPL W P-5'-P-CCNC: 22 U/L (ref 0–50)
ALT SERPL W P-5'-P-CCNC: 23 U/L (ref 0–50)
ALT SERPL W P-5'-P-CCNC: 23 U/L (ref 0–50)
ALT SERPL W P-5'-P-CCNC: 24 U/L (ref 0–50)
ALT SERPL W P-5'-P-CCNC: 25 U/L (ref 0–50)
ALT SERPL W P-5'-P-CCNC: 25 U/L (ref 0–50)
ALT SERPL W P-5'-P-CCNC: 28 U/L (ref 0–50)
ALT SERPL W P-5'-P-CCNC: 30 U/L (ref 0–50)
ALT SERPL W P-5'-P-CCNC: 35 U/L (ref 0–50)
ALT SERPL W P-5'-P-CCNC: 38 U/L (ref 0–50)
ALT SERPL W P-5'-P-CCNC: 41 U/L (ref 0–50)
ALT SERPL W P-5'-P-CCNC: 44 U/L (ref 0–50)
ALT SERPL W P-5'-P-CCNC: 49 U/L (ref 0–50)
ALT SERPL W P-5'-P-CCNC: 58 U/L (ref 0–50)
ALT SERPL W P-5'-P-CCNC: 63 U/L (ref 0–50)
ALT SERPL W P-5'-P-CCNC: 63 U/L (ref 0–50)
AMANTADINE: ABNORMAL
AMITRIPTYLINE QUAL: ABNORMAL
AMOXAPINE: ABNORMAL
AMPHETAMINES QUAL: ABNORMAL
ANGLE RATE OF CLOT GROWTH: 75.2 DEG (ref 59–74)
ANGLE RATE OF CLOT STRENGTH: 69.8 DEGREES (ref 53–72)
ANION GAP SERPL CALCULATED.3IONS-SCNC: 10 MMOL/L (ref 3–14)
ANION GAP SERPL CALCULATED.3IONS-SCNC: 11 MMOL/L (ref 3–14)
ANION GAP SERPL CALCULATED.3IONS-SCNC: 12 MMOL/L (ref 3–14)
ANION GAP SERPL CALCULATED.3IONS-SCNC: 13 MMOL/L (ref 3–14)
ANION GAP SERPL CALCULATED.3IONS-SCNC: 14 MMOL/L (ref 3–14)
ANION GAP SERPL CALCULATED.3IONS-SCNC: 15 MMOL/L (ref 3–14)
ANION GAP SERPL CALCULATED.3IONS-SCNC: 16 MMOL/L (ref 3–14)
ANION GAP SERPL CALCULATED.3IONS-SCNC: 17 MMOL/L (ref 3–14)
ANION GAP SERPL CALCULATED.3IONS-SCNC: 18 MMOL/L (ref 3–14)
ANION GAP SERPL CALCULATED.3IONS-SCNC: 18 MMOL/L (ref 3–14)
ANION GAP SERPL CALCULATED.3IONS-SCNC: 20 MMOL/L (ref 3–14)
ANION GAP SERPL CALCULATED.3IONS-SCNC: 7 MMOL/L (ref 3–14)
ANION GAP SERPL CALCULATED.3IONS-SCNC: 8 MMOL/L (ref 3–14)
ANION GAP SERPL CALCULATED.3IONS-SCNC: 9 MMOL/L (ref 3–14)
APPEARANCE UR: ABNORMAL
APPEARANCE UR: CLEAR
APPEARANCE UR: CLEAR
APTT PPP: 102 SEC (ref 22–37)
APTT PPP: 29 SEC (ref 22–37)
APTT PPP: 31 SEC (ref 22–37)
APTT PPP: 33 SEC (ref 22–37)
APTT PPP: 36 SEC (ref 22–37)
APTT PPP: 36 SEC (ref 22–37)
APTT PPP: 39 SEC (ref 22–37)
APTT PPP: 39 SEC (ref 22–37)
APTT PPP: 57 SEC (ref 22–37)
AST SERPL W P-5'-P-CCNC: 103 U/L (ref 0–45)
AST SERPL W P-5'-P-CCNC: 118 U/L (ref 0–45)
AST SERPL W P-5'-P-CCNC: 155 U/L (ref 0–45)
AST SERPL W P-5'-P-CCNC: 208 U/L (ref 0–45)
AST SERPL W P-5'-P-CCNC: 260 U/L (ref 0–45)
AST SERPL W P-5'-P-CCNC: 269 U/L (ref 0–45)
AST SERPL W P-5'-P-CCNC: 278 U/L (ref 0–45)
AST SERPL W P-5'-P-CCNC: 30 U/L (ref 0–45)
AST SERPL W P-5'-P-CCNC: 34 U/L (ref 0–45)
AST SERPL W P-5'-P-CCNC: 39 U/L (ref 0–45)
AST SERPL W P-5'-P-CCNC: 42 U/L (ref 0–45)
AST SERPL W P-5'-P-CCNC: 46 U/L (ref 0–45)
AST SERPL W P-5'-P-CCNC: 50 U/L (ref 0–45)
AST SERPL W P-5'-P-CCNC: 52 U/L (ref 0–45)
AST SERPL W P-5'-P-CCNC: 55 U/L (ref 0–45)
AST SERPL W P-5'-P-CCNC: 57 U/L (ref 0–45)
AST SERPL W P-5'-P-CCNC: 61 U/L (ref 0–45)
AST SERPL W P-5'-P-CCNC: 62 U/L (ref 0–45)
AST SERPL W P-5'-P-CCNC: 73 U/L (ref 0–45)
AST SERPL W P-5'-P-CCNC: 82 U/L (ref 0–45)
AST SERPL W P-5'-P-CCNC: 83 U/L (ref 0–45)
ATROPINE: ABNORMAL
BACTERIA #/AREA URNS HPF: ABNORMAL /HPF
BACTERIA #/AREA URNS HPF: ABNORMAL /HPF
BACTERIA SPEC CULT: ABNORMAL
BACTERIA SPEC CULT: NO GROWTH
BACTERIA SPEC CULT: NO GROWTH
BASE DEFICIT BLDA-SCNC: 0.1 MMOL/L
BASE DEFICIT BLDA-SCNC: 0.1 MMOL/L
BASE DEFICIT BLDA-SCNC: 0.6 MMOL/L
BASE DEFICIT BLDA-SCNC: 0.7 MMOL/L
BASE DEFICIT BLDA-SCNC: 1.2 MMOL/L
BASE DEFICIT BLDA-SCNC: 1.4 MMOL/L
BASE DEFICIT BLDA-SCNC: 1.7 MMOL/L
BASE DEFICIT BLDA-SCNC: 2.3 MMOL/L
BASE DEFICIT BLDA-SCNC: 2.4 MMOL/L
BASE DEFICIT BLDA-SCNC: 2.5 MMOL/L
BASE DEFICIT BLDA-SCNC: 2.6 MMOL/L
BASE DEFICIT BLDA-SCNC: 3.2 MMOL/L
BASE DEFICIT BLDA-SCNC: 3.4 MMOL/L
BASE DEFICIT BLDA-SCNC: 3.4 MMOL/L
BASE DEFICIT BLDA-SCNC: 3.5 MMOL/L
BASE DEFICIT BLDA-SCNC: 4.1 MMOL/L
BASE DEFICIT BLDA-SCNC: 4.3 MMOL/L
BASE DEFICIT BLDA-SCNC: 4.3 MMOL/L
BASE DEFICIT BLDA-SCNC: 4.4 MMOL/L
BASE DEFICIT BLDA-SCNC: 5.6 MMOL/L
BASE DEFICIT BLDA-SCNC: 5.9 MMOL/L
BASE DEFICIT BLDA-SCNC: 6 MMOL/L
BASE DEFICIT BLDA-SCNC: 6.9 MMOL/L
BASE DEFICIT BLDA-SCNC: 9.8 MMOL/L
BASE DEFICIT BLDV-SCNC: 0.3 MMOL/L
BASE DEFICIT BLDV-SCNC: 0.9 MMOL/L
BASE DEFICIT BLDV-SCNC: 1 MMOL/L
BASE DEFICIT BLDV-SCNC: 1 MMOL/L
BASE DEFICIT BLDV-SCNC: 1.1 MMOL/L
BASE DEFICIT BLDV-SCNC: 1.7 MMOL/L
BASE DEFICIT BLDV-SCNC: 2.1 MMOL/L
BASE DEFICIT BLDV-SCNC: 2.2 MMOL/L
BASE DEFICIT BLDV-SCNC: 2.4 MMOL/L
BASE DEFICIT BLDV-SCNC: 2.6 MMOL/L
BASE DEFICIT BLDV-SCNC: 3.4 MMOL/L
BASE DEFICIT BLDV-SCNC: 3.8 MMOL/L
BASE DEFICIT BLDV-SCNC: 4.3 MMOL/L
BASE DEFICIT BLDV-SCNC: 4.3 MMOL/L
BASE DEFICIT BLDV-SCNC: 4.5 MMOL/L
BASE DEFICIT BLDV-SCNC: 5 MMOL/L
BASE DEFICIT BLDV-SCNC: 5.1 MMOL/L
BASE DEFICIT BLDV-SCNC: 5.2 MMOL/L
BASE DEFICIT BLDV-SCNC: 5.7 MMOL/L
BASE DEFICIT BLDV-SCNC: 8.5 MMOL/L
BASE EXCESS BLDA CALC-SCNC: 0 MMOL/L
BASE EXCESS BLDA CALC-SCNC: 0.2 MMOL/L
BASE EXCESS BLDA CALC-SCNC: 0.5 MMOL/L
BASE EXCESS BLDA CALC-SCNC: 0.9 MMOL/L
BASE EXCESS BLDA CALC-SCNC: 1.1 MMOL/L
BASE EXCESS BLDA CALC-SCNC: 1.1 MMOL/L
BASE EXCESS BLDA CALC-SCNC: 1.8 MMOL/L
BASE EXCESS BLDA CALC-SCNC: 2.2 MMOL/L
BASE EXCESS BLDA CALC-SCNC: 4.1 MMOL/L
BASE EXCESS BLDA CALC-SCNC: 4.5 MMOL/L
BASE EXCESS BLDV CALC-SCNC: 0 MMOL/L
BASE EXCESS BLDV CALC-SCNC: 0.4 MMOL/L
BASE EXCESS BLDV CALC-SCNC: 0.7 MMOL/L
BASE EXCESS BLDV CALC-SCNC: 1.4 MMOL/L
BASOPHILS # BLD AUTO: 0 10E9/L (ref 0–0.2)
BASOPHILS # BLD AUTO: 0.1 10E9/L (ref 0–0.2)
BASOPHILS # BLD AUTO: 0.1 10E9/L (ref 0–0.2)
BASOPHILS NFR BLD AUTO: 0.1 %
BASOPHILS NFR BLD AUTO: 0.2 %
BASOPHILS NFR BLD AUTO: 0.3 %
BASOPHILS NFR BLD AUTO: 0.8 %
BASOPHILS NFR BLD AUTO: 0.9 %
BENZODIAZ UR QL: POSITIVE
BILIRUB DIRECT SERPL-MCNC: 0.1 MG/DL (ref 0–0.2)
BILIRUB SERPL-MCNC: 0.3 MG/DL (ref 0.2–1.3)
BILIRUB SERPL-MCNC: 0.5 MG/DL (ref 0.2–1.3)
BILIRUB SERPL-MCNC: 0.6 MG/DL (ref 0.2–1.3)
BILIRUB SERPL-MCNC: 0.6 MG/DL (ref 0.2–1.3)
BILIRUB SERPL-MCNC: 0.7 MG/DL (ref 0.2–1.3)
BILIRUB SERPL-MCNC: 0.8 MG/DL (ref 0.2–1.3)
BILIRUB SERPL-MCNC: 0.9 MG/DL (ref 0.2–1.3)
BILIRUB SERPL-MCNC: 1 MG/DL (ref 0.2–1.3)
BILIRUB SERPL-MCNC: 1 MG/DL (ref 0.2–1.3)
BILIRUB SERPL-MCNC: 1.2 MG/DL (ref 0.2–1.3)
BILIRUB SERPL-MCNC: 1.2 MG/DL (ref 0.2–1.3)
BILIRUB UR QL STRIP: NEGATIVE
BLD GP AB SCN SERPL QL: NORMAL
BLD PROD TYP BPU: NORMAL
BLD UNIT ID BPU: 0
BLOOD BANK CMNT PATIENT-IMP: NORMAL
BLOOD PRODUCT CODE: NORMAL
BPU ID: NORMAL
BUN SERPL-MCNC: 101 MG/DL (ref 7–30)
BUN SERPL-MCNC: 110 MG/DL (ref 7–30)
BUN SERPL-MCNC: 118 MG/DL (ref 7–30)
BUN SERPL-MCNC: 128 MG/DL (ref 7–30)
BUN SERPL-MCNC: 26 MG/DL (ref 7–30)
BUN SERPL-MCNC: 27 MG/DL (ref 7–30)
BUN SERPL-MCNC: 29 MG/DL (ref 7–30)
BUN SERPL-MCNC: 30 MG/DL (ref 7–30)
BUN SERPL-MCNC: 32 MG/DL (ref 7–30)
BUN SERPL-MCNC: 33 MG/DL (ref 7–30)
BUN SERPL-MCNC: 34 MG/DL (ref 7–30)
BUN SERPL-MCNC: 35 MG/DL (ref 7–30)
BUN SERPL-MCNC: 37 MG/DL (ref 7–30)
BUN SERPL-MCNC: 38 MG/DL (ref 7–30)
BUN SERPL-MCNC: 42 MG/DL (ref 7–30)
BUN SERPL-MCNC: 43 MG/DL (ref 7–30)
BUN SERPL-MCNC: 45 MG/DL (ref 7–30)
BUN SERPL-MCNC: 45 MG/DL (ref 7–30)
BUN SERPL-MCNC: 46 MG/DL (ref 7–30)
BUN SERPL-MCNC: 47 MG/DL (ref 7–30)
BUN SERPL-MCNC: 48 MG/DL (ref 7–30)
BUN SERPL-MCNC: 49 MG/DL (ref 7–30)
BUN SERPL-MCNC: 50 MG/DL (ref 7–30)
BUN SERPL-MCNC: 51 MG/DL (ref 7–30)
BUN SERPL-MCNC: 51 MG/DL (ref 7–30)
BUN SERPL-MCNC: 52 MG/DL (ref 7–30)
BUN SERPL-MCNC: 54 MG/DL (ref 7–30)
BUN SERPL-MCNC: 56 MG/DL (ref 7–30)
BUN SERPL-MCNC: 59 MG/DL (ref 7–30)
BUN SERPL-MCNC: 60 MG/DL (ref 7–30)
BUN SERPL-MCNC: 60 MG/DL (ref 7–30)
BUN SERPL-MCNC: 64 MG/DL (ref 7–30)
BUN SERPL-MCNC: 69 MG/DL (ref 7–30)
BUN SERPL-MCNC: 69 MG/DL (ref 7–30)
BUN SERPL-MCNC: 71 MG/DL (ref 7–30)
BUN SERPL-MCNC: 73 MG/DL (ref 7–30)
BUN SERPL-MCNC: 74 MG/DL (ref 7–30)
BUN SERPL-MCNC: 80 MG/DL (ref 7–30)
BUN SERPL-MCNC: 80 MG/DL (ref 7–30)
BUN SERPL-MCNC: 85 MG/DL (ref 7–30)
BUN SERPL-MCNC: 86 MG/DL (ref 7–30)
BUN SERPL-MCNC: 90 MG/DL (ref 7–30)
BUN SERPL-MCNC: 92 MG/DL (ref 7–30)
BUN SERPL-MCNC: 96 MG/DL (ref 7–30)
CA-I BLD-MCNC: 3.8 MG/DL (ref 4.4–5.2)
CA-I BLD-MCNC: 3.9 MG/DL (ref 4.4–5.2)
CA-I BLD-MCNC: 4 MG/DL (ref 4.4–5.2)
CA-I BLD-MCNC: 4.2 MG/DL (ref 4.4–5.2)
CA-I BLD-MCNC: 4.3 MG/DL (ref 4.4–5.2)
CA-I BLD-MCNC: 4.4 MG/DL (ref 4.4–5.2)
CA-I BLD-MCNC: 4.5 MG/DL (ref 4.4–5.2)
CA-I BLD-MCNC: 4.6 MG/DL (ref 4.4–5.2)
CA-I BLD-MCNC: 4.8 MG/DL (ref 4.4–5.2)
CA-I BLD-MCNC: 4.8 MG/DL (ref 4.4–5.2)
CA-I BLD-MCNC: 4.9 MG/DL (ref 4.4–5.2)
CA-I BLD-MCNC: 4.9 MG/DL (ref 4.4–5.2)
CA-I BLD-MCNC: 5.6 MG/DL (ref 4.4–5.2)
CA-I BLD-MCNC: 6.7 MG/DL (ref 4.4–5.2)
CA-I SERPL ISE-MCNC: 4.2 MG/DL (ref 4.4–5.2)
CA-I SERPL ISE-MCNC: 4.3 MG/DL (ref 4.4–5.2)
CA-I SERPL ISE-MCNC: 4.7 MG/DL (ref 4.4–5.2)
CAFFEINE QUAL: ABNORMAL
CALCIUM SERPL-MCNC: 11.5 MG/DL (ref 8.5–10.1)
CALCIUM SERPL-MCNC: 7 MG/DL (ref 8.5–10.1)
CALCIUM SERPL-MCNC: 7 MG/DL (ref 8.5–10.1)
CALCIUM SERPL-MCNC: 7.1 MG/DL (ref 8.5–10.1)
CALCIUM SERPL-MCNC: 7.2 MG/DL (ref 8.5–10.1)
CALCIUM SERPL-MCNC: 7.3 MG/DL (ref 8.5–10.1)
CALCIUM SERPL-MCNC: 7.3 MG/DL (ref 8.5–10.1)
CALCIUM SERPL-MCNC: 7.4 MG/DL (ref 8.5–10.1)
CALCIUM SERPL-MCNC: 7.5 MG/DL (ref 8.5–10.1)
CALCIUM SERPL-MCNC: 7.5 MG/DL (ref 8.5–10.1)
CALCIUM SERPL-MCNC: 7.6 MG/DL (ref 8.5–10.1)
CALCIUM SERPL-MCNC: 7.7 MG/DL (ref 8.5–10.1)
CALCIUM SERPL-MCNC: 7.8 MG/DL (ref 8.5–10.1)
CALCIUM SERPL-MCNC: 7.9 MG/DL (ref 8.5–10.1)
CALCIUM SERPL-MCNC: 8.1 MG/DL (ref 8.5–10.1)
CALCIUM SERPL-MCNC: 8.1 MG/DL (ref 8.5–10.1)
CALCIUM SERPL-MCNC: 8.2 MG/DL (ref 8.5–10.1)
CALCIUM SERPL-MCNC: 8.4 MG/DL (ref 8.5–10.1)
CALCIUM SERPL-MCNC: 8.4 MG/DL (ref 8.5–10.1)
CALCIUM SERPL-MCNC: 8.8 MG/DL (ref 8.5–10.1)
CALCIUM SERPL-MCNC: 8.8 MG/DL (ref 8.5–10.1)
CALCIUM SERPL-MCNC: 9.1 MG/DL (ref 8.5–10.1)
CALCIUM SERPL-MCNC: 9.3 MG/DL (ref 8.5–10.1)
CANNABINOIDS UR QL SCN: NEGATIVE
CARBAMAZEPINE QUAL: ABNORMAL
CHLORIDE SERPL-SCNC: 100 MMOL/L (ref 94–109)
CHLORIDE SERPL-SCNC: 100 MMOL/L (ref 94–109)
CHLORIDE SERPL-SCNC: 101 MMOL/L (ref 94–109)
CHLORIDE SERPL-SCNC: 102 MMOL/L (ref 94–109)
CHLORIDE SERPL-SCNC: 103 MMOL/L (ref 94–109)
CHLORIDE SERPL-SCNC: 104 MMOL/L (ref 94–109)
CHLORIDE SERPL-SCNC: 105 MMOL/L (ref 94–109)
CHLORIDE SERPL-SCNC: 106 MMOL/L (ref 94–109)
CHLORIDE SERPL-SCNC: 107 MMOL/L (ref 94–109)
CHLORIDE SERPL-SCNC: 108 MMOL/L (ref 94–109)
CHLORIDE SERPL-SCNC: 109 MMOL/L (ref 94–109)
CHLORIDE SERPL-SCNC: 110 MMOL/L (ref 94–109)
CHLORIDE SERPL-SCNC: 112 MMOL/L (ref 94–109)
CHLORIDE SERPL-SCNC: 112 MMOL/L (ref 94–109)
CHLORIDE SERPL-SCNC: 94 MMOL/L (ref 94–109)
CHLORIDE SERPL-SCNC: 96 MMOL/L (ref 94–109)
CHLORIDE SERPL-SCNC: 97 MMOL/L (ref 94–109)
CHLORIDE SERPL-SCNC: 97 MMOL/L (ref 94–109)
CHLORIDE SERPL-SCNC: 98 MMOL/L (ref 94–109)
CHLORPHENIRAMINE: ABNORMAL
CHLORPROMAZINE: ABNORMAL
CI HYPERCOAGULATION INDEX: 3.4 RATIO (ref 0–3)
CI HYPOCOAGULATION INDEX: 0.7 RATIO (ref 0–3)
CITALOPRAM QUAL: ABNORMAL
CK SERPL-CCNC: 50 U/L (ref 30–225)
CLOMIPRAMINE QUAL: ABNORMAL
CLOT LYSIS 30M P MA LENFR BLD TEG: 0 % (ref 0–8)
CLOT STRENGTH BLD TEG: 16.8 KD/SC (ref 5.3–13.2)
CO2 BLDCOV-SCNC: 18 MMOL/L (ref 21–28)
CO2 SERPL-SCNC: 18 MMOL/L (ref 20–32)
CO2 SERPL-SCNC: 18 MMOL/L (ref 20–32)
CO2 SERPL-SCNC: 19 MMOL/L (ref 20–32)
CO2 SERPL-SCNC: 20 MMOL/L (ref 20–32)
CO2 SERPL-SCNC: 21 MMOL/L (ref 20–32)
CO2 SERPL-SCNC: 22 MMOL/L (ref 20–32)
CO2 SERPL-SCNC: 23 MMOL/L (ref 20–32)
CO2 SERPL-SCNC: 24 MMOL/L (ref 20–32)
CO2 SERPL-SCNC: 25 MMOL/L (ref 20–32)
CO2 SERPL-SCNC: 26 MMOL/L (ref 20–32)
CO2 SERPL-SCNC: 26 MMOL/L (ref 20–32)
COCAINE QUAL: ABNORMAL
COCAINE UR QL: NEGATIVE
CODEINE QUAL: ABNORMAL
COLOR UR AUTO: ABNORMAL
COLOR UR AUTO: YELLOW
COLOR UR AUTO: YELLOW
CREAT BLD-MCNC: 2.1 MG/DL (ref 0.52–1.04)
CREAT SERPL-MCNC: 1.42 MG/DL (ref 0.52–1.04)
CREAT SERPL-MCNC: 1.56 MG/DL (ref 0.52–1.04)
CREAT SERPL-MCNC: 1.6 MG/DL (ref 0.52–1.04)
CREAT SERPL-MCNC: 1.7 MG/DL (ref 0.52–1.04)
CREAT SERPL-MCNC: 1.74 MG/DL (ref 0.52–1.04)
CREAT SERPL-MCNC: 1.78 MG/DL (ref 0.52–1.04)
CREAT SERPL-MCNC: 1.87 MG/DL (ref 0.52–1.04)
CREAT SERPL-MCNC: 2.07 MG/DL (ref 0.52–1.04)
CREAT SERPL-MCNC: 2.11 MG/DL (ref 0.52–1.04)
CREAT SERPL-MCNC: 2.12 MG/DL (ref 0.52–1.04)
CREAT SERPL-MCNC: 2.18 MG/DL (ref 0.52–1.04)
CREAT SERPL-MCNC: 2.2 MG/DL (ref 0.52–1.04)
CREAT SERPL-MCNC: 2.21 MG/DL (ref 0.52–1.04)
CREAT SERPL-MCNC: 2.24 MG/DL (ref 0.52–1.04)
CREAT SERPL-MCNC: 2.26 MG/DL (ref 0.52–1.04)
CREAT SERPL-MCNC: 2.28 MG/DL (ref 0.52–1.04)
CREAT SERPL-MCNC: 2.41 MG/DL (ref 0.52–1.04)
CREAT SERPL-MCNC: 2.47 MG/DL (ref 0.52–1.04)
CREAT SERPL-MCNC: 2.5 MG/DL (ref 0.52–1.04)
CREAT SERPL-MCNC: 2.55 MG/DL (ref 0.52–1.04)
CREAT SERPL-MCNC: 2.58 MG/DL (ref 0.52–1.04)
CREAT SERPL-MCNC: 2.69 MG/DL (ref 0.52–1.04)
CREAT SERPL-MCNC: 2.87 MG/DL (ref 0.52–1.04)
CREAT SERPL-MCNC: 2.92 MG/DL (ref 0.52–1.04)
CREAT SERPL-MCNC: 3.06 MG/DL (ref 0.52–1.04)
CREAT SERPL-MCNC: 3.1 MG/DL (ref 0.52–1.04)
CREAT SERPL-MCNC: 3.27 MG/DL (ref 0.52–1.04)
CREAT SERPL-MCNC: 3.37 MG/DL (ref 0.52–1.04)
CREAT SERPL-MCNC: 3.41 MG/DL (ref 0.52–1.04)
CREAT SERPL-MCNC: 3.42 MG/DL (ref 0.52–1.04)
CREAT SERPL-MCNC: 3.53 MG/DL (ref 0.52–1.04)
CREAT SERPL-MCNC: 3.81 MG/DL (ref 0.52–1.04)
CREAT SERPL-MCNC: 3.86 MG/DL (ref 0.52–1.04)
CREAT SERPL-MCNC: 4.03 MG/DL (ref 0.52–1.04)
CREAT SERPL-MCNC: 4.07 MG/DL (ref 0.52–1.04)
CREAT SERPL-MCNC: 4.35 MG/DL (ref 0.52–1.04)
CREAT SERPL-MCNC: 4.45 MG/DL (ref 0.52–1.04)
CREAT SERPL-MCNC: 4.5 MG/DL (ref 0.52–1.04)
CREAT SERPL-MCNC: 4.5 MG/DL (ref 0.52–1.04)
CREAT SERPL-MCNC: 4.63 MG/DL (ref 0.52–1.04)
CREAT SERPL-MCNC: 4.64 MG/DL (ref 0.52–1.04)
CREAT SERPL-MCNC: 4.66 MG/DL (ref 0.52–1.04)
CREAT SERPL-MCNC: 5.17 MG/DL (ref 0.52–1.04)
CREAT SERPL-MCNC: 5.55 MG/DL (ref 0.52–1.04)
CREAT SERPL-MCNC: 5.64 MG/DL (ref 0.52–1.04)
CREAT SERPL-MCNC: 5.82 MG/DL (ref 0.52–1.04)
CREAT SERPL-MCNC: 5.92 MG/DL (ref 0.52–1.04)
CREAT SERPL-MCNC: 6.11 MG/DL (ref 0.52–1.04)
CREAT SERPL-MCNC: 6.78 MG/DL (ref 0.52–1.04)
CREAT SERPL-MCNC: 6.93 MG/DL (ref 0.52–1.04)
CREAT SERPL-MCNC: 7.17 MG/DL (ref 0.52–1.04)
CREAT SERPL-MCNC: 7.67 MG/DL (ref 0.52–1.04)
D DIMER PPP FEU-MCNC: 1.3 UG/ML FEU (ref 0–0.5)
DESIPRAMINE QUAL: ABNORMAL
DEXTROMETHORPHAN: ABNORMAL
DIFFERENTIAL METHOD BLD: ABNORMAL
DIPHENHYDRAMINE: ABNORMAL
DOXEPIN/METABOLITE: ABNORMAL
DOXYLAMINE: ABNORMAL
EOSINOPHIL # BLD AUTO: 0 10E9/L (ref 0–0.7)
EOSINOPHIL # BLD AUTO: 0.1 10E9/L (ref 0–0.7)
EOSINOPHIL # BLD AUTO: 0.3 10E9/L (ref 0–0.7)
EOSINOPHIL # BLD AUTO: 0.3 10E9/L (ref 0–0.7)
EOSINOPHIL # BLD AUTO: 0.4 10E9/L (ref 0–0.7)
EOSINOPHIL # BLD AUTO: 0.5 10E9/L (ref 0–0.7)
EOSINOPHIL NFR BLD AUTO: 0.1 %
EOSINOPHIL NFR BLD AUTO: 0.1 %
EOSINOPHIL NFR BLD AUTO: 0.3 %
EOSINOPHIL NFR BLD AUTO: 0.4 %
EOSINOPHIL NFR BLD AUTO: 0.5 %
EOSINOPHIL NFR BLD AUTO: 0.7 %
EOSINOPHIL NFR BLD AUTO: 1.2 %
EOSINOPHIL NFR BLD AUTO: 2.2 %
EOSINOPHIL NFR BLD AUTO: 2.5 %
EOSINOPHIL NFR BLD AUTO: 2.7 %
EOSINOPHIL NFR BLD AUTO: 3.3 %
EOSINOPHIL NFR BLD AUTO: 3.3 %
EOSINOPHIL NFR BLD AUTO: 3.4 %
EOSINOPHIL NFR BLD AUTO: 3.5 %
EOSINOPHIL NFR BLD AUTO: 3.7 %
EOSINOPHIL NFR BLD AUTO: 4.2 %
EPHEDRINE OR PSEUDO: ABNORMAL
ERYTHROCYTE [DISTWIDTH] IN BLOOD BY AUTOMATED COUNT: 13 % (ref 10–15)
ERYTHROCYTE [DISTWIDTH] IN BLOOD BY AUTOMATED COUNT: 13.1 % (ref 10–15)
ERYTHROCYTE [DISTWIDTH] IN BLOOD BY AUTOMATED COUNT: 13.1 % (ref 10–15)
ERYTHROCYTE [DISTWIDTH] IN BLOOD BY AUTOMATED COUNT: 13.2 % (ref 10–15)
ERYTHROCYTE [DISTWIDTH] IN BLOOD BY AUTOMATED COUNT: 14.2 % (ref 10–15)
ERYTHROCYTE [DISTWIDTH] IN BLOOD BY AUTOMATED COUNT: 14.4 % (ref 10–15)
ERYTHROCYTE [DISTWIDTH] IN BLOOD BY AUTOMATED COUNT: 14.4 % (ref 10–15)
ERYTHROCYTE [DISTWIDTH] IN BLOOD BY AUTOMATED COUNT: 14.5 % (ref 10–15)
ERYTHROCYTE [DISTWIDTH] IN BLOOD BY AUTOMATED COUNT: 15 % (ref 10–15)
ERYTHROCYTE [DISTWIDTH] IN BLOOD BY AUTOMATED COUNT: 15 % (ref 10–15)
ERYTHROCYTE [DISTWIDTH] IN BLOOD BY AUTOMATED COUNT: 15.1 % (ref 10–15)
ERYTHROCYTE [DISTWIDTH] IN BLOOD BY AUTOMATED COUNT: 15.3 % (ref 10–15)
ERYTHROCYTE [DISTWIDTH] IN BLOOD BY AUTOMATED COUNT: 15.3 % (ref 10–15)
ERYTHROCYTE [DISTWIDTH] IN BLOOD BY AUTOMATED COUNT: 15.4 % (ref 10–15)
ERYTHROCYTE [DISTWIDTH] IN BLOOD BY AUTOMATED COUNT: 15.6 % (ref 10–15)
ERYTHROCYTE [DISTWIDTH] IN BLOOD BY AUTOMATED COUNT: 15.7 % (ref 10–15)
ERYTHROCYTE [DISTWIDTH] IN BLOOD BY AUTOMATED COUNT: 15.7 % (ref 10–15)
ERYTHROCYTE [DISTWIDTH] IN BLOOD BY AUTOMATED COUNT: 15.8 % (ref 10–15)
ERYTHROCYTE [DISTWIDTH] IN BLOOD BY AUTOMATED COUNT: 15.9 % (ref 10–15)
ERYTHROCYTE [DISTWIDTH] IN BLOOD BY AUTOMATED COUNT: 16 % (ref 10–15)
ERYTHROCYTE [DISTWIDTH] IN BLOOD BY AUTOMATED COUNT: 16 % (ref 10–15)
ERYTHROCYTE [DISTWIDTH] IN BLOOD BY AUTOMATED COUNT: 16.1 % (ref 10–15)
ERYTHROCYTE [DISTWIDTH] IN BLOOD BY AUTOMATED COUNT: 16.1 % (ref 10–15)
ERYTHROCYTE [DISTWIDTH] IN BLOOD BY AUTOMATED COUNT: 16.2 % (ref 10–15)
ERYTHROCYTE [DISTWIDTH] IN BLOOD BY AUTOMATED COUNT: 16.3 % (ref 10–15)
ERYTHROCYTE [DISTWIDTH] IN BLOOD BY AUTOMATED COUNT: 16.4 % (ref 10–15)
ERYTHROCYTE [DISTWIDTH] IN BLOOD BY AUTOMATED COUNT: 16.5 % (ref 10–15)
ERYTHROCYTE [DISTWIDTH] IN BLOOD BY AUTOMATED COUNT: 16.6 % (ref 10–15)
ERYTHROCYTE [DISTWIDTH] IN BLOOD BY AUTOMATED COUNT: 16.6 % (ref 10–15)
ERYTHROCYTE [DISTWIDTH] IN BLOOD BY AUTOMATED COUNT: 16.9 % (ref 10–15)
ERYTHROCYTE [DISTWIDTH] IN BLOOD BY AUTOMATED COUNT: 17.5 % (ref 10–15)
ERYTHROCYTE [DISTWIDTH] IN BLOOD BY AUTOMATED COUNT: 17.7 % (ref 10–15)
FENTANYL QUAL: ABNORMAL
FERRITIN SERPL-MCNC: 1283 NG/ML (ref 8–252)
FIBRINOGEN PPP-MCNC: 286 MG/DL (ref 200–420)
FIBRINOGEN PPP-MCNC: 286 MG/DL (ref 200–420)
FIBRINOGEN PPP-MCNC: 374 MG/DL (ref 200–420)
FIBRINOGEN PPP-MCNC: 379 MG/DL (ref 200–420)
FLUOXETINE AND METAB: ABNORMAL
G ACTUAL CLOT STRENGTH: 9.8 KD/SC (ref 4.5–11)
GFR SERPL CREATININE-BSD FRML MDRD: 10 ML/MIN/{1.73_M2}
GFR SERPL CREATININE-BSD FRML MDRD: 12 ML/MIN/{1.73_M2}
GFR SERPL CREATININE-BSD FRML MDRD: 13 ML/MIN/{1.73_M2}
GFR SERPL CREATININE-BSD FRML MDRD: 13 ML/MIN/{1.73_M2}
GFR SERPL CREATININE-BSD FRML MDRD: 14 ML/MIN/{1.73_M2}
GFR SERPL CREATININE-BSD FRML MDRD: 14 ML/MIN/{1.73_M2}
GFR SERPL CREATININE-BSD FRML MDRD: 15 ML/MIN/{1.73_M2}
GFR SERPL CREATININE-BSD FRML MDRD: 16 ML/MIN/{1.73_M2}
GFR SERPL CREATININE-BSD FRML MDRD: 17 ML/MIN/{1.73_M2}
GFR SERPL CREATININE-BSD FRML MDRD: 18 ML/MIN/{1.73_M2}
GFR SERPL CREATININE-BSD FRML MDRD: 18 ML/MIN/{1.73_M2}
GFR SERPL CREATININE-BSD FRML MDRD: 20 ML/MIN/{1.73_M2}
GFR SERPL CREATININE-BSD FRML MDRD: 21 ML/MIN/{1.73_M2}
GFR SERPL CREATININE-BSD FRML MDRD: 22 ML/MIN/{1.73_M2}
GFR SERPL CREATININE-BSD FRML MDRD: 23 ML/MIN/{1.73_M2}
GFR SERPL CREATININE-BSD FRML MDRD: 25 ML/MIN/{1.73_M2}
GFR SERPL CREATININE-BSD FRML MDRD: 26 ML/MIN/{1.73_M2}
GFR SERPL CREATININE-BSD FRML MDRD: 27 ML/MIN/{1.73_M2}
GFR SERPL CREATININE-BSD FRML MDRD: 28 ML/MIN/{1.73_M2}
GFR SERPL CREATININE-BSD FRML MDRD: 30 ML/MIN/{1.73_M2}
GFR SERPL CREATININE-BSD FRML MDRD: 31 ML/MIN/{1.73_M2}
GFR SERPL CREATININE-BSD FRML MDRD: 35 ML/MIN/{1.73_M2}
GFR SERPL CREATININE-BSD FRML MDRD: 5 ML/MIN/{1.73_M2}
GFR SERPL CREATININE-BSD FRML MDRD: 6 ML/MIN/{1.73_M2}
GFR SERPL CREATININE-BSD FRML MDRD: 7 ML/MIN/{1.73_M2}
GFR SERPL CREATININE-BSD FRML MDRD: 8 ML/MIN/{1.73_M2}
GFR SERPL CREATININE-BSD FRML MDRD: 9 ML/MIN/{1.73_M2}
GLUCOSE BLD-MCNC: 103 MG/DL (ref 70–99)
GLUCOSE BLD-MCNC: 106 MG/DL (ref 70–99)
GLUCOSE BLD-MCNC: 107 MG/DL (ref 70–99)
GLUCOSE BLD-MCNC: 108 MG/DL (ref 70–99)
GLUCOSE BLD-MCNC: 113 MG/DL (ref 70–99)
GLUCOSE BLD-MCNC: 132 MG/DL (ref 70–99)
GLUCOSE BLD-MCNC: 158 MG/DL (ref 70–99)
GLUCOSE BLD-MCNC: 159 MG/DL (ref 70–99)
GLUCOSE BLDC GLUCOMTR-MCNC: 100 MG/DL (ref 70–99)
GLUCOSE BLDC GLUCOMTR-MCNC: 101 MG/DL (ref 70–99)
GLUCOSE BLDC GLUCOMTR-MCNC: 104 MG/DL (ref 70–99)
GLUCOSE BLDC GLUCOMTR-MCNC: 105 MG/DL (ref 70–99)
GLUCOSE BLDC GLUCOMTR-MCNC: 106 MG/DL (ref 70–99)
GLUCOSE BLDC GLUCOMTR-MCNC: 107 MG/DL (ref 70–99)
GLUCOSE BLDC GLUCOMTR-MCNC: 108 MG/DL (ref 70–99)
GLUCOSE BLDC GLUCOMTR-MCNC: 109 MG/DL (ref 70–99)
GLUCOSE BLDC GLUCOMTR-MCNC: 110 MG/DL (ref 70–99)
GLUCOSE BLDC GLUCOMTR-MCNC: 111 MG/DL (ref 70–99)
GLUCOSE BLDC GLUCOMTR-MCNC: 112 MG/DL (ref 70–99)
GLUCOSE BLDC GLUCOMTR-MCNC: 113 MG/DL (ref 70–99)
GLUCOSE BLDC GLUCOMTR-MCNC: 116 MG/DL (ref 70–99)
GLUCOSE BLDC GLUCOMTR-MCNC: 117 MG/DL (ref 70–99)
GLUCOSE BLDC GLUCOMTR-MCNC: 118 MG/DL (ref 70–99)
GLUCOSE BLDC GLUCOMTR-MCNC: 119 MG/DL (ref 70–99)
GLUCOSE BLDC GLUCOMTR-MCNC: 120 MG/DL (ref 70–99)
GLUCOSE BLDC GLUCOMTR-MCNC: 122 MG/DL (ref 70–99)
GLUCOSE BLDC GLUCOMTR-MCNC: 123 MG/DL (ref 70–99)
GLUCOSE BLDC GLUCOMTR-MCNC: 124 MG/DL (ref 70–99)
GLUCOSE BLDC GLUCOMTR-MCNC: 125 MG/DL (ref 70–99)
GLUCOSE BLDC GLUCOMTR-MCNC: 126 MG/DL (ref 70–99)
GLUCOSE BLDC GLUCOMTR-MCNC: 127 MG/DL (ref 70–99)
GLUCOSE BLDC GLUCOMTR-MCNC: 128 MG/DL (ref 70–99)
GLUCOSE BLDC GLUCOMTR-MCNC: 129 MG/DL (ref 70–99)
GLUCOSE BLDC GLUCOMTR-MCNC: 130 MG/DL (ref 70–99)
GLUCOSE BLDC GLUCOMTR-MCNC: 131 MG/DL (ref 70–99)
GLUCOSE BLDC GLUCOMTR-MCNC: 131 MG/DL (ref 70–99)
GLUCOSE BLDC GLUCOMTR-MCNC: 132 MG/DL (ref 70–99)
GLUCOSE BLDC GLUCOMTR-MCNC: 133 MG/DL (ref 70–99)
GLUCOSE BLDC GLUCOMTR-MCNC: 133 MG/DL (ref 70–99)
GLUCOSE BLDC GLUCOMTR-MCNC: 134 MG/DL (ref 70–99)
GLUCOSE BLDC GLUCOMTR-MCNC: 135 MG/DL (ref 70–99)
GLUCOSE BLDC GLUCOMTR-MCNC: 136 MG/DL (ref 70–99)
GLUCOSE BLDC GLUCOMTR-MCNC: 137 MG/DL (ref 70–99)
GLUCOSE BLDC GLUCOMTR-MCNC: 137 MG/DL (ref 70–99)
GLUCOSE BLDC GLUCOMTR-MCNC: 138 MG/DL (ref 70–99)
GLUCOSE BLDC GLUCOMTR-MCNC: 138 MG/DL (ref 70–99)
GLUCOSE BLDC GLUCOMTR-MCNC: 139 MG/DL (ref 70–99)
GLUCOSE BLDC GLUCOMTR-MCNC: 140 MG/DL (ref 70–99)
GLUCOSE BLDC GLUCOMTR-MCNC: 141 MG/DL (ref 70–99)
GLUCOSE BLDC GLUCOMTR-MCNC: 142 MG/DL (ref 70–99)
GLUCOSE BLDC GLUCOMTR-MCNC: 143 MG/DL (ref 70–99)
GLUCOSE BLDC GLUCOMTR-MCNC: 144 MG/DL (ref 70–99)
GLUCOSE BLDC GLUCOMTR-MCNC: 145 MG/DL (ref 70–99)
GLUCOSE BLDC GLUCOMTR-MCNC: 146 MG/DL (ref 70–99)
GLUCOSE BLDC GLUCOMTR-MCNC: 147 MG/DL (ref 70–99)
GLUCOSE BLDC GLUCOMTR-MCNC: 150 MG/DL (ref 70–99)
GLUCOSE BLDC GLUCOMTR-MCNC: 154 MG/DL (ref 70–99)
GLUCOSE BLDC GLUCOMTR-MCNC: 158 MG/DL (ref 70–99)
GLUCOSE BLDC GLUCOMTR-MCNC: 159 MG/DL (ref 70–99)
GLUCOSE BLDC GLUCOMTR-MCNC: 160 MG/DL (ref 70–99)
GLUCOSE BLDC GLUCOMTR-MCNC: 162 MG/DL (ref 70–99)
GLUCOSE BLDC GLUCOMTR-MCNC: 163 MG/DL (ref 70–99)
GLUCOSE BLDC GLUCOMTR-MCNC: 168 MG/DL (ref 70–99)
GLUCOSE BLDC GLUCOMTR-MCNC: 169 MG/DL (ref 70–99)
GLUCOSE BLDC GLUCOMTR-MCNC: 172 MG/DL (ref 70–99)
GLUCOSE BLDC GLUCOMTR-MCNC: 176 MG/DL (ref 70–99)
GLUCOSE BLDC GLUCOMTR-MCNC: 177 MG/DL (ref 70–99)
GLUCOSE BLDC GLUCOMTR-MCNC: 184 MG/DL (ref 70–99)
GLUCOSE BLDC GLUCOMTR-MCNC: 185 MG/DL (ref 70–99)
GLUCOSE BLDC GLUCOMTR-MCNC: 193 MG/DL (ref 70–99)
GLUCOSE BLDC GLUCOMTR-MCNC: 242 MG/DL (ref 70–99)
GLUCOSE BLDC GLUCOMTR-MCNC: 257 MG/DL (ref 70–99)
GLUCOSE BLDC GLUCOMTR-MCNC: 73 MG/DL (ref 70–99)
GLUCOSE BLDC GLUCOMTR-MCNC: 78 MG/DL (ref 70–99)
GLUCOSE BLDC GLUCOMTR-MCNC: 80 MG/DL (ref 70–99)
GLUCOSE BLDC GLUCOMTR-MCNC: 81 MG/DL (ref 70–99)
GLUCOSE BLDC GLUCOMTR-MCNC: 83 MG/DL (ref 70–99)
GLUCOSE BLDC GLUCOMTR-MCNC: 85 MG/DL (ref 70–99)
GLUCOSE BLDC GLUCOMTR-MCNC: 87 MG/DL (ref 70–99)
GLUCOSE BLDC GLUCOMTR-MCNC: 88 MG/DL (ref 70–99)
GLUCOSE BLDC GLUCOMTR-MCNC: 89 MG/DL (ref 70–99)
GLUCOSE BLDC GLUCOMTR-MCNC: 89 MG/DL (ref 70–99)
GLUCOSE BLDC GLUCOMTR-MCNC: 91 MG/DL (ref 70–99)
GLUCOSE BLDC GLUCOMTR-MCNC: 93 MG/DL (ref 70–99)
GLUCOSE BLDC GLUCOMTR-MCNC: 94 MG/DL (ref 70–99)
GLUCOSE BLDC GLUCOMTR-MCNC: 95 MG/DL (ref 70–99)
GLUCOSE BLDC GLUCOMTR-MCNC: 96 MG/DL (ref 70–99)
GLUCOSE BLDC GLUCOMTR-MCNC: 97 MG/DL (ref 70–99)
GLUCOSE BLDC GLUCOMTR-MCNC: 97 MG/DL (ref 70–99)
GLUCOSE BLDC GLUCOMTR-MCNC: 98 MG/DL (ref 70–99)
GLUCOSE BLDC GLUCOMTR-MCNC: 98 MG/DL (ref 70–99)
GLUCOSE BLDC GLUCOMTR-MCNC: 99 MG/DL (ref 70–99)
GLUCOSE BLDC GLUCOMTR-MCNC: 99 MG/DL (ref 70–99)
GLUCOSE SERPL-MCNC: 100 MG/DL (ref 70–99)
GLUCOSE SERPL-MCNC: 102 MG/DL (ref 70–99)
GLUCOSE SERPL-MCNC: 103 MG/DL (ref 70–99)
GLUCOSE SERPL-MCNC: 103 MG/DL (ref 70–99)
GLUCOSE SERPL-MCNC: 104 MG/DL (ref 70–99)
GLUCOSE SERPL-MCNC: 105 MG/DL (ref 70–99)
GLUCOSE SERPL-MCNC: 106 MG/DL (ref 70–99)
GLUCOSE SERPL-MCNC: 108 MG/DL (ref 70–99)
GLUCOSE SERPL-MCNC: 108 MG/DL (ref 70–99)
GLUCOSE SERPL-MCNC: 111 MG/DL (ref 70–99)
GLUCOSE SERPL-MCNC: 111 MG/DL (ref 70–99)
GLUCOSE SERPL-MCNC: 114 MG/DL (ref 70–99)
GLUCOSE SERPL-MCNC: 115 MG/DL (ref 70–99)
GLUCOSE SERPL-MCNC: 117 MG/DL (ref 70–99)
GLUCOSE SERPL-MCNC: 119 MG/DL (ref 70–99)
GLUCOSE SERPL-MCNC: 121 MG/DL (ref 70–99)
GLUCOSE SERPL-MCNC: 122 MG/DL (ref 70–99)
GLUCOSE SERPL-MCNC: 123 MG/DL (ref 70–99)
GLUCOSE SERPL-MCNC: 124 MG/DL (ref 70–99)
GLUCOSE SERPL-MCNC: 125 MG/DL (ref 70–99)
GLUCOSE SERPL-MCNC: 126 MG/DL (ref 70–99)
GLUCOSE SERPL-MCNC: 127 MG/DL (ref 70–99)
GLUCOSE SERPL-MCNC: 127 MG/DL (ref 70–99)
GLUCOSE SERPL-MCNC: 128 MG/DL (ref 70–99)
GLUCOSE SERPL-MCNC: 128 MG/DL (ref 70–99)
GLUCOSE SERPL-MCNC: 129 MG/DL (ref 70–99)
GLUCOSE SERPL-MCNC: 130 MG/DL (ref 70–99)
GLUCOSE SERPL-MCNC: 134 MG/DL (ref 70–99)
GLUCOSE SERPL-MCNC: 134 MG/DL (ref 70–99)
GLUCOSE SERPL-MCNC: 135 MG/DL (ref 70–99)
GLUCOSE SERPL-MCNC: 135 MG/DL (ref 70–99)
GLUCOSE SERPL-MCNC: 136 MG/DL (ref 70–99)
GLUCOSE SERPL-MCNC: 137 MG/DL (ref 70–99)
GLUCOSE SERPL-MCNC: 139 MG/DL (ref 70–99)
GLUCOSE SERPL-MCNC: 144 MG/DL (ref 70–99)
GLUCOSE SERPL-MCNC: 146 MG/DL (ref 70–99)
GLUCOSE SERPL-MCNC: 147 MG/DL (ref 70–99)
GLUCOSE SERPL-MCNC: 150 MG/DL (ref 70–99)
GLUCOSE SERPL-MCNC: 150 MG/DL (ref 70–99)
GLUCOSE SERPL-MCNC: 154 MG/DL (ref 70–99)
GLUCOSE SERPL-MCNC: 166 MG/DL (ref 70–99)
GLUCOSE SERPL-MCNC: 184 MG/DL (ref 70–99)
GLUCOSE SERPL-MCNC: 236 MG/DL (ref 70–99)
GLUCOSE SERPL-MCNC: 253 MG/DL (ref 70–99)
GLUCOSE SERPL-MCNC: 76 MG/DL (ref 70–99)
GLUCOSE SERPL-MCNC: 99 MG/DL (ref 70–99)
GLUCOSE UR STRIP-MCNC: 30 MG/DL
GLUCOSE UR STRIP-MCNC: NEGATIVE MG/DL
GLUCOSE UR STRIP-MCNC: NEGATIVE MG/DL
GRAM STN SPEC: ABNORMAL
GRAM STN SPEC: ABNORMAL
GRAM STN SPEC: NORMAL
GRAM STN SPEC: NORMAL
HBA1C MFR BLD: 6.5 % (ref 0–5.6)
HBA1C MFR BLD: 7.1 % (ref 0–5.6)
HBV SURFACE AB SERPL IA-ACNC: 2.51 M[IU]/ML
HBV SURFACE AG SERPL QL IA: NONREACTIVE
HCO3 BLD-SCNC: 18 MMOL/L (ref 21–28)
HCO3 BLD-SCNC: 19 MMOL/L (ref 21–28)
HCO3 BLD-SCNC: 20 MMOL/L (ref 21–28)
HCO3 BLD-SCNC: 22 MMOL/L (ref 21–28)
HCO3 BLD-SCNC: 23 MMOL/L (ref 21–28)
HCO3 BLD-SCNC: 24 MMOL/L (ref 21–28)
HCO3 BLD-SCNC: 25 MMOL/L (ref 21–28)
HCO3 BLD-SCNC: 26 MMOL/L (ref 21–28)
HCO3 BLD-SCNC: 27 MMOL/L (ref 21–28)
HCO3 BLDV-SCNC: 16 MMOL/L (ref 21–28)
HCO3 BLDV-SCNC: 19 MMOL/L (ref 21–28)
HCO3 BLDV-SCNC: 21 MMOL/L (ref 21–28)
HCO3 BLDV-SCNC: 22 MMOL/L (ref 21–28)
HCO3 BLDV-SCNC: 23 MMOL/L (ref 21–28)
HCO3 BLDV-SCNC: 24 MMOL/L (ref 21–28)
HCO3 BLDV-SCNC: 25 MMOL/L (ref 21–28)
HCO3 BLDV-SCNC: 26 MMOL/L (ref 21–28)
HCO3 BLDV-SCNC: 26 MMOL/L (ref 21–28)
HCO3 BLDV-SCNC: 27 MMOL/L (ref 21–28)
HCT VFR BLD AUTO: 17.8 % (ref 35–47)
HCT VFR BLD AUTO: 20.2 % (ref 35–47)
HCT VFR BLD AUTO: 20.5 % (ref 35–47)
HCT VFR BLD AUTO: 22.1 % (ref 35–47)
HCT VFR BLD AUTO: 22.3 % (ref 35–47)
HCT VFR BLD AUTO: 22.5 % (ref 35–47)
HCT VFR BLD AUTO: 23.9 % (ref 35–47)
HCT VFR BLD AUTO: 24 % (ref 35–47)
HCT VFR BLD AUTO: 24.3 % (ref 35–47)
HCT VFR BLD AUTO: 24.8 % (ref 35–47)
HCT VFR BLD AUTO: 25 % (ref 35–47)
HCT VFR BLD AUTO: 25.2 % (ref 35–47)
HCT VFR BLD AUTO: 25.3 % (ref 35–47)
HCT VFR BLD AUTO: 25.9 % (ref 35–47)
HCT VFR BLD AUTO: 26.1 % (ref 35–47)
HCT VFR BLD AUTO: 26.1 % (ref 35–47)
HCT VFR BLD AUTO: 26.2 % (ref 35–47)
HCT VFR BLD AUTO: 26.2 % (ref 35–47)
HCT VFR BLD AUTO: 26.5 % (ref 35–47)
HCT VFR BLD AUTO: 26.6 % (ref 35–47)
HCT VFR BLD AUTO: 26.8 % (ref 35–47)
HCT VFR BLD AUTO: 26.9 % (ref 35–47)
HCT VFR BLD AUTO: 27 % (ref 35–47)
HCT VFR BLD AUTO: 27 % (ref 35–47)
HCT VFR BLD AUTO: 27.1 % (ref 35–47)
HCT VFR BLD AUTO: 27.3 % (ref 35–47)
HCT VFR BLD AUTO: 27.4 % (ref 35–47)
HCT VFR BLD AUTO: 27.4 % (ref 35–47)
HCT VFR BLD AUTO: 27.5 % (ref 35–47)
HCT VFR BLD AUTO: 27.6 % (ref 35–47)
HCT VFR BLD AUTO: 27.8 % (ref 35–47)
HCT VFR BLD AUTO: 27.9 % (ref 35–47)
HCT VFR BLD AUTO: 28.1 % (ref 35–47)
HCT VFR BLD AUTO: 28.4 % (ref 35–47)
HCT VFR BLD AUTO: 28.8 % (ref 35–47)
HCT VFR BLD AUTO: 29 % (ref 35–47)
HCT VFR BLD AUTO: 29.7 % (ref 35–47)
HCT VFR BLD AUTO: 30 % (ref 35–47)
HCT VFR BLD AUTO: 30.1 % (ref 35–47)
HCT VFR BLD AUTO: 30.7 % (ref 35–47)
HCT VFR BLD AUTO: 31.9 % (ref 35–47)
HCT VFR BLD AUTO: 36.3 % (ref 35–47)
HCT VFR BLD AUTO: 36.8 % (ref 35–47)
HGB BLD-MCNC: 10 G/DL (ref 11.7–15.7)
HGB BLD-MCNC: 10.2 G/DL (ref 11.7–15.7)
HGB BLD-MCNC: 10.3 G/DL (ref 11.7–15.7)
HGB BLD-MCNC: 10.4 G/DL (ref 11.7–15.7)
HGB BLD-MCNC: 11.9 G/DL (ref 11.7–15.7)
HGB BLD-MCNC: 12.1 G/DL (ref 11.7–15.7)
HGB BLD-MCNC: 5.7 G/DL (ref 11.7–15.7)
HGB BLD-MCNC: 6.2 G/DL (ref 11.7–15.7)
HGB BLD-MCNC: 6.5 G/DL (ref 11.7–15.7)
HGB BLD-MCNC: 6.6 G/DL (ref 11.7–15.7)
HGB BLD-MCNC: 6.7 G/DL (ref 11.7–15.7)
HGB BLD-MCNC: 7.1 G/DL (ref 11.7–15.7)
HGB BLD-MCNC: 7.3 G/DL (ref 11.7–15.7)
HGB BLD-MCNC: 7.4 G/DL (ref 11.7–15.7)
HGB BLD-MCNC: 7.5 G/DL (ref 11.7–15.7)
HGB BLD-MCNC: 7.6 G/DL (ref 11.7–15.7)
HGB BLD-MCNC: 7.7 G/DL (ref 11.7–15.7)
HGB BLD-MCNC: 7.8 G/DL (ref 11.7–15.7)
HGB BLD-MCNC: 7.9 G/DL (ref 11.7–15.7)
HGB BLD-MCNC: 7.9 G/DL (ref 11.7–15.7)
HGB BLD-MCNC: 8 G/DL (ref 11.7–15.7)
HGB BLD-MCNC: 8.1 G/DL (ref 11.7–15.7)
HGB BLD-MCNC: 8.2 G/DL (ref 11.7–15.7)
HGB BLD-MCNC: 8.4 G/DL (ref 11.7–15.7)
HGB BLD-MCNC: 8.5 G/DL (ref 11.7–15.7)
HGB BLD-MCNC: 8.6 G/DL (ref 11.7–15.7)
HGB BLD-MCNC: 8.7 G/DL (ref 11.7–15.7)
HGB BLD-MCNC: 8.8 G/DL (ref 11.7–15.7)
HGB BLD-MCNC: 8.9 G/DL (ref 11.7–15.7)
HGB BLD-MCNC: 9 G/DL (ref 11.7–15.7)
HGB BLD-MCNC: 9.3 G/DL (ref 11.7–15.7)
HGB BLD-MCNC: 9.3 G/DL (ref 11.7–15.7)
HGB BLD-MCNC: 9.4 G/DL (ref 11.7–15.7)
HGB BLD-MCNC: 9.4 G/DL (ref 11.7–15.7)
HGB BLD-MCNC: 9.6 G/DL (ref 11.7–15.7)
HGB BLD-MCNC: 9.6 G/DL (ref 11.7–15.7)
HGB BLD-MCNC: 9.7 G/DL (ref 11.7–15.7)
HGB BLD-MCNC: 9.9 G/DL (ref 11.7–15.7)
HGB UR QL STRIP: ABNORMAL
HGB UR QL STRIP: NEGATIVE
HGB UR QL STRIP: NEGATIVE
HYALINE CASTS #/AREA URNS LPF: 12 /LPF (ref 0–2)
HYALINE CASTS #/AREA URNS LPF: 8 /LPF (ref 0–2)
HYDROCODONE QUAL: ABNORMAL
HYDROMORPHONE QUAL: ABNORMAL
IBUPROFEN QUAL: ABNORMAL
IMIPRAMINE QUAL: ABNORMAL
IMM GRANULOCYTES # BLD: 0 10E9/L (ref 0–0.4)
IMM GRANULOCYTES # BLD: 0 10E9/L (ref 0–0.4)
IMM GRANULOCYTES # BLD: 0.2 10E9/L (ref 0–0.4)
IMM GRANULOCYTES # BLD: 0.3 10E9/L (ref 0–0.4)
IMM GRANULOCYTES # BLD: 0.4 10E9/L (ref 0–0.4)
IMM GRANULOCYTES # BLD: 0.5 10E9/L (ref 0–0.4)
IMM GRANULOCYTES # BLD: 0.5 10E9/L (ref 0–0.4)
IMM GRANULOCYTES NFR BLD: 0.3 %
IMM GRANULOCYTES NFR BLD: 0.3 %
IMM GRANULOCYTES NFR BLD: 1.5 %
IMM GRANULOCYTES NFR BLD: 1.7 %
IMM GRANULOCYTES NFR BLD: 1.9 %
IMM GRANULOCYTES NFR BLD: 2 %
IMM GRANULOCYTES NFR BLD: 2.1 %
IMM GRANULOCYTES NFR BLD: 2.2 %
IMM GRANULOCYTES NFR BLD: 2.2 %
IMM GRANULOCYTES NFR BLD: 2.4 %
IMM GRANULOCYTES NFR BLD: 2.5 %
IMM GRANULOCYTES NFR BLD: 2.7 %
IMM GRANULOCYTES NFR BLD: 2.7 %
IMM GRANULOCYTES NFR BLD: 3.6 %
INR PPP: 0.93 (ref 0.86–1.14)
INR PPP: 1.27 (ref 0.86–1.14)
INR PPP: 1.47 (ref 0.86–1.14)
INR PPP: 1.55 (ref 0.86–1.14)
INR PPP: 1.58 (ref 0.86–1.14)
INR PPP: 1.61 (ref 0.86–1.14)
INR PPP: 1.64 (ref 0.86–1.14)
INR PPP: 1.85 (ref 0.86–1.14)
INR PPP: 3.63 (ref 0.86–1.14)
INR PPP: 4 (ref 0.86–1.14)
INTERPRETATION ECG - MUSE: NORMAL
IRON SATN MFR SERPL: 16 % (ref 15–46)
IRON SERPL-MCNC: 41 UG/DL (ref 35–180)
K TIME TO SPEC CLOT STRENGTH: 1.1 MIN (ref 1–3)
K TIME TO SPEC CLOT STRENGTH: 1.2 MINUTE (ref 1–3)
KETONES UR STRIP-MCNC: 5 MG/DL
KETONES UR STRIP-MCNC: 5 MG/DL
KETONES UR STRIP-MCNC: NEGATIVE MG/DL
LACTATE BLD-SCNC: 0.6 MMOL/L (ref 0.7–2)
LACTATE BLD-SCNC: 0.7 MMOL/L (ref 0.7–2)
LACTATE BLD-SCNC: 0.8 MMOL/L (ref 0.7–2)
LACTATE BLD-SCNC: 1 MMOL/L (ref 0.7–2)
LACTATE BLD-SCNC: 1.2 MMOL/L (ref 0.7–2)
LACTATE BLD-SCNC: 1.2 MMOL/L (ref 0.7–2)
LACTATE BLD-SCNC: 1.2 MMOL/L (ref 0.7–2.1)
LACTATE BLD-SCNC: 1.3 MMOL/L (ref 0.7–2)
LACTATE BLD-SCNC: 1.6 MMOL/L (ref 0.7–2)
LACTATE BLD-SCNC: 1.7 MMOL/L (ref 0.7–2)
LACTATE BLD-SCNC: 10 MMOL/L (ref 0.7–2)
LACTATE BLD-SCNC: 2.1 MMOL/L (ref 0.7–2)
LACTATE BLD-SCNC: 2.2 MMOL/L (ref 0.7–2)
LACTATE BLD-SCNC: 2.3 MMOL/L (ref 0.7–2)
LACTATE BLD-SCNC: 2.7 MMOL/L (ref 0.7–2)
LACTATE BLD-SCNC: 2.7 MMOL/L (ref 0.7–2)
LACTATE BLD-SCNC: 2.9 MMOL/L (ref 0.7–2)
LACTATE BLD-SCNC: 4.5 MMOL/L (ref 0.7–2)
LACTATE BLD-SCNC: 4.8 MMOL/L (ref 0.7–2)
LACTATE BLD-SCNC: 4.9 MMOL/L (ref 0.7–2)
LACTATE BLD-SCNC: 5.4 MMOL/L (ref 0.7–2)
LACTATE BLD-SCNC: 6.3 MMOL/L (ref 0.7–2)
LACTATE BLD-SCNC: 9.9 MMOL/L (ref 0.7–2)
LAMOTRIGINE QUAL: ABNORMAL
LEUKOCYTE ESTERASE UR QL STRIP: ABNORMAL
LEUKOCYTE ESTERASE UR QL STRIP: ABNORMAL
LEUKOCYTE ESTERASE UR QL STRIP: NEGATIVE
LIPASE SERPL-CCNC: 74 U/L (ref 73–393)
LMWH PPP CHRO-ACNC: 0.21 IU/ML
LMWH PPP CHRO-ACNC: 0.21 IU/ML
LMWH PPP CHRO-ACNC: 0.33 IU/ML
LMWH PPP CHRO-ACNC: 0.55 IU/ML
LMWH PPP CHRO-ACNC: 0.58 IU/ML
LMWH PPP CHRO-ACNC: <0.1 IU/ML
LOXAPINE: ABNORMAL
LY30 LYSIS AT 30 MINUTES: 0 % (ref 0–8)
LY60 LYSIS AT 60 MINUTES: 0.6 % (ref 0–15)
LY60 LYSIS AT 60 MINUTES: 0.8 % (ref 0–15)
LYMPHOCYTES # BLD AUTO: 0.5 10E9/L (ref 0.8–5.3)
LYMPHOCYTES # BLD AUTO: 0.6 10E9/L (ref 0.8–5.3)
LYMPHOCYTES # BLD AUTO: 0.7 10E9/L (ref 0.8–5.3)
LYMPHOCYTES # BLD AUTO: 0.8 10E9/L (ref 0.8–5.3)
LYMPHOCYTES # BLD AUTO: 0.9 10E9/L (ref 0.8–5.3)
LYMPHOCYTES # BLD AUTO: 0.9 10E9/L (ref 0.8–5.3)
LYMPHOCYTES # BLD AUTO: 1 10E9/L (ref 0.8–5.3)
LYMPHOCYTES # BLD AUTO: 1.7 10E9/L (ref 0.8–5.3)
LYMPHOCYTES NFR BLD AUTO: 11.2 %
LYMPHOCYTES NFR BLD AUTO: 4.9 %
LYMPHOCYTES NFR BLD AUTO: 5 %
LYMPHOCYTES NFR BLD AUTO: 5 %
LYMPHOCYTES NFR BLD AUTO: 5.1 %
LYMPHOCYTES NFR BLD AUTO: 5.9 %
LYMPHOCYTES NFR BLD AUTO: 6.2 %
LYMPHOCYTES NFR BLD AUTO: 6.2 %
LYMPHOCYTES NFR BLD AUTO: 6.4 %
LYMPHOCYTES NFR BLD AUTO: 6.5 %
LYMPHOCYTES NFR BLD AUTO: 6.5 %
LYMPHOCYTES NFR BLD AUTO: 6.7 %
LYMPHOCYTES NFR BLD AUTO: 7.1 %
LYMPHOCYTES NFR BLD AUTO: 7.8 %
LYMPHOCYTES NFR BLD AUTO: 8.2 %
LYMPHOCYTES NFR BLD AUTO: 8.2 %
Lab: ABNORMAL
Lab: ABNORMAL
Lab: NORMAL
Lab: NORMAL
MA MAXIMUM CLOT STRENGTH: 66.1 MM (ref 50–70)
MA MAXIMUM CLOT STRENGTH: 77.1 MM (ref 55–74)
MAGNESIUM SERPL-MCNC: 1.7 MG/DL (ref 1.6–2.3)
MAGNESIUM SERPL-MCNC: 1.9 MG/DL (ref 1.6–2.3)
MAGNESIUM SERPL-MCNC: 2 MG/DL (ref 1.6–2.3)
MAGNESIUM SERPL-MCNC: 2 MG/DL (ref 1.6–2.3)
MAGNESIUM SERPL-MCNC: 2.1 MG/DL (ref 1.6–2.3)
MAGNESIUM SERPL-MCNC: 2.2 MG/DL (ref 1.6–2.3)
MAGNESIUM SERPL-MCNC: 2.3 MG/DL (ref 1.6–2.3)
MAGNESIUM SERPL-MCNC: 2.4 MG/DL (ref 1.6–2.3)
MAGNESIUM SERPL-MCNC: 2.4 MG/DL (ref 1.6–2.3)
MAGNESIUM SERPL-MCNC: 2.5 MG/DL (ref 1.6–2.3)
MAGNESIUM SERPL-MCNC: 2.6 MG/DL (ref 1.6–2.3)
MAGNESIUM SERPL-MCNC: 2.6 MG/DL (ref 1.6–2.3)
MAGNESIUM SERPL-MCNC: 2.7 MG/DL (ref 1.6–2.3)
MAGNESIUM SERPL-MCNC: 2.8 MG/DL (ref 1.6–2.3)
MAGNESIUM SERPL-MCNC: 2.9 MG/DL (ref 1.6–2.3)
MAGNESIUM SERPL-MCNC: 2.9 MG/DL (ref 1.6–2.3)
MAGNESIUM SERPL-MCNC: 3 MG/DL (ref 1.6–2.3)
MAGNESIUM SERPL-MCNC: 3.1 MG/DL (ref 1.6–2.3)
MAGNESIUM SERPL-MCNC: 3.2 MG/DL (ref 1.6–2.3)
MAPROTYLINE: ABNORMAL
MCH RBC QN AUTO: 28.1 PG (ref 26.5–33)
MCH RBC QN AUTO: 28.2 PG (ref 26.5–33)
MCH RBC QN AUTO: 28.3 PG (ref 26.5–33)
MCH RBC QN AUTO: 28.5 PG (ref 26.5–33)
MCH RBC QN AUTO: 28.5 PG (ref 26.5–33)
MCH RBC QN AUTO: 28.6 PG (ref 26.5–33)
MCH RBC QN AUTO: 28.7 PG (ref 26.5–33)
MCH RBC QN AUTO: 28.8 PG (ref 26.5–33)
MCH RBC QN AUTO: 28.9 PG (ref 26.5–33)
MCH RBC QN AUTO: 29 PG (ref 26.5–33)
MCH RBC QN AUTO: 29.1 PG (ref 26.5–33)
MCH RBC QN AUTO: 29.2 PG (ref 26.5–33)
MCH RBC QN AUTO: 29.3 PG (ref 26.5–33)
MCH RBC QN AUTO: 29.5 PG (ref 26.5–33)
MCH RBC QN AUTO: 29.6 PG (ref 26.5–33)
MCH RBC QN AUTO: 29.7 PG (ref 26.5–33)
MCH RBC QN AUTO: 29.8 PG (ref 26.5–33)
MCH RBC QN AUTO: 29.9 PG (ref 26.5–33)
MCH RBC QN AUTO: 30 PG (ref 26.5–33)
MCH RBC QN AUTO: 30.6 PG (ref 26.5–33)
MCH RBC QN AUTO: 30.7 PG (ref 26.5–33)
MCHC RBC AUTO-ENTMCNC: 31.3 G/DL (ref 31.5–36.5)
MCHC RBC AUTO-ENTMCNC: 31.7 G/DL (ref 31.5–36.5)
MCHC RBC AUTO-ENTMCNC: 31.8 G/DL (ref 31.5–36.5)
MCHC RBC AUTO-ENTMCNC: 31.8 G/DL (ref 31.5–36.5)
MCHC RBC AUTO-ENTMCNC: 31.9 G/DL (ref 31.5–36.5)
MCHC RBC AUTO-ENTMCNC: 32 G/DL (ref 31.5–36.5)
MCHC RBC AUTO-ENTMCNC: 32.1 G/DL (ref 31.5–36.5)
MCHC RBC AUTO-ENTMCNC: 32.2 G/DL (ref 31.5–36.5)
MCHC RBC AUTO-ENTMCNC: 32.2 G/DL (ref 31.5–36.5)
MCHC RBC AUTO-ENTMCNC: 32.3 G/DL (ref 31.5–36.5)
MCHC RBC AUTO-ENTMCNC: 32.3 G/DL (ref 31.5–36.5)
MCHC RBC AUTO-ENTMCNC: 32.4 G/DL (ref 31.5–36.5)
MCHC RBC AUTO-ENTMCNC: 32.5 G/DL (ref 31.5–36.5)
MCHC RBC AUTO-ENTMCNC: 32.6 G/DL (ref 31.5–36.5)
MCHC RBC AUTO-ENTMCNC: 32.7 G/DL (ref 31.5–36.5)
MCHC RBC AUTO-ENTMCNC: 32.7 G/DL (ref 31.5–36.5)
MCHC RBC AUTO-ENTMCNC: 32.8 G/DL (ref 31.5–36.5)
MCHC RBC AUTO-ENTMCNC: 32.9 G/DL (ref 31.5–36.5)
MCHC RBC AUTO-ENTMCNC: 32.9 G/DL (ref 31.5–36.5)
MCHC RBC AUTO-ENTMCNC: 33 G/DL (ref 31.5–36.5)
MCHC RBC AUTO-ENTMCNC: 33 G/DL (ref 31.5–36.5)
MCHC RBC AUTO-ENTMCNC: 33.1 G/DL (ref 31.5–36.5)
MCHC RBC AUTO-ENTMCNC: 33.1 G/DL (ref 31.5–36.5)
MCHC RBC AUTO-ENTMCNC: 33.2 G/DL (ref 31.5–36.5)
MCHC RBC AUTO-ENTMCNC: 33.2 G/DL (ref 31.5–36.5)
MCHC RBC AUTO-ENTMCNC: 33.3 G/DL (ref 31.5–36.5)
MCHC RBC AUTO-ENTMCNC: 33.5 G/DL (ref 31.5–36.5)
MCHC RBC AUTO-ENTMCNC: 33.7 G/DL (ref 31.5–36.5)
MCHC RBC AUTO-ENTMCNC: 34 G/DL (ref 31.5–36.5)
MCHC RBC AUTO-ENTMCNC: 34 G/DL (ref 31.5–36.5)
MCV RBC AUTO: 86 FL (ref 78–100)
MCV RBC AUTO: 86 FL (ref 78–100)
MCV RBC AUTO: 87 FL (ref 78–100)
MCV RBC AUTO: 87 FL (ref 78–100)
MCV RBC AUTO: 88 FL (ref 78–100)
MCV RBC AUTO: 89 FL (ref 78–100)
MCV RBC AUTO: 90 FL (ref 78–100)
MCV RBC AUTO: 91 FL (ref 78–100)
MCV RBC AUTO: 92 FL (ref 78–100)
MCV RBC AUTO: 93 FL (ref 78–100)
MDMA QUAL: ABNORMAL
MEPERIDINE QUAL: ABNORMAL
METHAMPHETAMINE: ABNORMAL
METHODONE QUAL: ABNORMAL
MONOCYTES # BLD AUTO: 0.3 10E9/L (ref 0–1.3)
MONOCYTES # BLD AUTO: 0.4 10E9/L (ref 0–1.3)
MONOCYTES # BLD AUTO: 0.5 10E9/L (ref 0–1.3)
MONOCYTES # BLD AUTO: 0.6 10E9/L (ref 0–1.3)
MONOCYTES # BLD AUTO: 0.7 10E9/L (ref 0–1.3)
MONOCYTES # BLD AUTO: 0.8 10E9/L (ref 0–1.3)
MONOCYTES NFR BLD AUTO: 2.2 %
MONOCYTES NFR BLD AUTO: 4.4 %
MONOCYTES NFR BLD AUTO: 4.7 %
MONOCYTES NFR BLD AUTO: 5 %
MONOCYTES NFR BLD AUTO: 5 %
MONOCYTES NFR BLD AUTO: 5.3 %
MONOCYTES NFR BLD AUTO: 5.3 %
MONOCYTES NFR BLD AUTO: 5.4 %
MONOCYTES NFR BLD AUTO: 5.4 %
MONOCYTES NFR BLD AUTO: 5.6 %
MONOCYTES NFR BLD AUTO: 5.7 %
MONOCYTES NFR BLD AUTO: 5.8 %
MONOCYTES NFR BLD AUTO: 6.1 %
MONOCYTES NFR BLD AUTO: 6.2 %
MONOCYTES NFR BLD AUTO: 6.4 %
MONOCYTES NFR BLD AUTO: 6.5 %
MORPHINE QUAL: ABNORMAL
MRSA DNA SPEC QL NAA+PROBE: NEGATIVE
MUCOUS THREADS #/AREA URNS LPF: PRESENT /LPF
MUCOUS THREADS #/AREA URNS LPF: PRESENT /LPF
NEUTROPHILS # BLD AUTO: 10.3 10E9/L (ref 1.6–8.3)
NEUTROPHILS # BLD AUTO: 10.5 10E9/L (ref 1.6–8.3)
NEUTROPHILS # BLD AUTO: 10.8 10E9/L (ref 1.6–8.3)
NEUTROPHILS # BLD AUTO: 11.1 10E9/L (ref 1.6–8.3)
NEUTROPHILS # BLD AUTO: 11.2 10E9/L (ref 1.6–8.3)
NEUTROPHILS # BLD AUTO: 11.4 10E9/L (ref 1.6–8.3)
NEUTROPHILS # BLD AUTO: 11.9 10E9/L (ref 1.6–8.3)
NEUTROPHILS # BLD AUTO: 12.8 10E9/L (ref 1.6–8.3)
NEUTROPHILS # BLD AUTO: 14.9 10E9/L (ref 1.6–8.3)
NEUTROPHILS # BLD AUTO: 7.5 10E9/L (ref 1.6–8.3)
NEUTROPHILS # BLD AUTO: 8.2 10E9/L (ref 1.6–8.3)
NEUTROPHILS # BLD AUTO: 8.4 10E9/L (ref 1.6–8.3)
NEUTROPHILS # BLD AUTO: 9 10E9/L (ref 1.6–8.3)
NEUTROPHILS # BLD AUTO: 9.2 10E9/L (ref 1.6–8.3)
NEUTROPHILS # BLD AUTO: 9.5 10E9/L (ref 1.6–8.3)
NEUTROPHILS # BLD AUTO: 9.6 10E9/L (ref 1.6–8.3)
NEUTROPHILS NFR BLD AUTO: 80 %
NEUTROPHILS NFR BLD AUTO: 81.1 %
NEUTROPHILS NFR BLD AUTO: 81.1 %
NEUTROPHILS NFR BLD AUTO: 81.2 %
NEUTROPHILS NFR BLD AUTO: 82.5 %
NEUTROPHILS NFR BLD AUTO: 82.7 %
NEUTROPHILS NFR BLD AUTO: 82.8 %
NEUTROPHILS NFR BLD AUTO: 84 %
NEUTROPHILS NFR BLD AUTO: 84.2 %
NEUTROPHILS NFR BLD AUTO: 84.6 %
NEUTROPHILS NFR BLD AUTO: 84.7 %
NEUTROPHILS NFR BLD AUTO: 85 %
NEUTROPHILS NFR BLD AUTO: 85.3 %
NEUTROPHILS NFR BLD AUTO: 86.3 %
NEUTROPHILS NFR BLD AUTO: 86.5 %
NEUTROPHILS NFR BLD AUTO: 87 %
NICOTINE: ABNORMAL
NITRATE UR QL: NEGATIVE
NORTRIPTYLINE QUAL: ABNORMAL
NRBC # BLD AUTO: 0 10*3/UL
NRBC BLD AUTO-RTO: 0 /100
NT-PROBNP SERPL-MCNC: ABNORMAL PG/ML (ref 0–1800)
NUM BPU REQUESTED: 1
NUM BPU REQUESTED: 2
NUM BPU REQUESTED: 6
NUM BPU REQUESTED: 8
O2/TOTAL GAS SETTING VFR VENT: 100 %
O2/TOTAL GAS SETTING VFR VENT: 40 %
O2/TOTAL GAS SETTING VFR VENT: 50 %
O2/TOTAL GAS SETTING VFR VENT: 60 %
O2/TOTAL GAS SETTING VFR VENT: 65 %
O2/TOTAL GAS SETTING VFR VENT: 70 %
O2/TOTAL GAS SETTING VFR VENT: 80 %
O2/TOTAL GAS SETTING VFR VENT: ABNORMAL %
OLANZAPINE QUAL: ABNORMAL
OPIATES UR QL SCN: NEGATIVE
OXYCODONE QUAL: ABNORMAL
OXYHGB MFR BLD: 88 % (ref 92–100)
OXYHGB MFR BLD: 89 % (ref 92–100)
OXYHGB MFR BLD: 92 % (ref 92–100)
OXYHGB MFR BLD: 92 % (ref 92–100)
OXYHGB MFR BLD: 93 % (ref 92–100)
OXYHGB MFR BLD: 94 % (ref 92–100)
OXYHGB MFR BLD: 95 % (ref 92–100)
OXYHGB MFR BLD: 96 % (ref 92–100)
OXYHGB MFR BLD: 97 % (ref 92–100)
OXYHGB MFR BLD: 98 % (ref 92–100)
OXYHGB MFR BLDV: 10 %
OXYHGB MFR BLDV: 41 %
OXYHGB MFR BLDV: 46 %
OXYHGB MFR BLDV: 46 %
OXYHGB MFR BLDV: 49 %
OXYHGB MFR BLDV: 51 %
OXYHGB MFR BLDV: 54 %
OXYHGB MFR BLDV: 57 %
OXYHGB MFR BLDV: 58 %
OXYHGB MFR BLDV: 58 %
OXYHGB MFR BLDV: 60 %
OXYHGB MFR BLDV: 62 %
OXYHGB MFR BLDV: 62 %
OXYHGB MFR BLDV: 63 %
OXYHGB MFR BLDV: 63 %
OXYHGB MFR BLDV: 66 %
OXYHGB MFR BLDV: 73 %
OXYHGB MFR BLDV: 90 %
PCO2 BLD: 28 MM HG (ref 35–45)
PCO2 BLD: 29 MM HG (ref 35–45)
PCO2 BLD: 29 MM HG (ref 35–45)
PCO2 BLD: 31 MM HG (ref 35–45)
PCO2 BLD: 31 MM HG (ref 35–45)
PCO2 BLD: 32 MM HG (ref 35–45)
PCO2 BLD: 32 MM HG (ref 35–45)
PCO2 BLD: 33 MM HG (ref 35–45)
PCO2 BLD: 33 MM HG (ref 35–45)
PCO2 BLD: 34 MM HG (ref 35–45)
PCO2 BLD: 34 MM HG (ref 35–45)
PCO2 BLD: 35 MM HG (ref 35–45)
PCO2 BLD: 36 MM HG (ref 35–45)
PCO2 BLD: 36 MM HG (ref 35–45)
PCO2 BLD: 37 MM HG (ref 35–45)
PCO2 BLD: 37 MM HG (ref 35–45)
PCO2 BLD: 38 MM HG (ref 35–45)
PCO2 BLD: 39 MM HG (ref 35–45)
PCO2 BLD: 39 MM HG (ref 35–45)
PCO2 BLD: 40 MM HG (ref 35–45)
PCO2 BLD: 40 MM HG (ref 35–45)
PCO2 BLD: 41 MM HG (ref 35–45)
PCO2 BLD: 42 MM HG (ref 35–45)
PCO2 BLD: 43 MM HG (ref 35–45)
PCO2 BLD: 43 MM HG (ref 35–45)
PCO2 BLD: 44 MM HG (ref 35–45)
PCO2 BLD: 44 MM HG (ref 35–45)
PCO2 BLD: 47 MM HG (ref 35–45)
PCO2 BLD: 48 MM HG (ref 35–45)
PCO2 BLDV: 30 MM HG (ref 40–50)
PCO2 BLDV: 30 MM HG (ref 40–50)
PCO2 BLDV: 31 MM HG (ref 40–50)
PCO2 BLDV: 36 MM HG (ref 40–50)
PCO2 BLDV: 37 MM HG (ref 40–50)
PCO2 BLDV: 38 MM HG (ref 40–50)
PCO2 BLDV: 38 MM HG (ref 40–50)
PCO2 BLDV: 39 MM HG (ref 40–50)
PCO2 BLDV: 41 MM HG (ref 40–50)
PCO2 BLDV: 41 MM HG (ref 40–50)
PCO2 BLDV: 42 MM HG (ref 40–50)
PCO2 BLDV: 43 MM HG (ref 40–50)
PCO2 BLDV: 44 MM HG (ref 40–50)
PCO2 BLDV: 44 MM HG (ref 40–50)
PCO2 BLDV: 45 MM HG (ref 40–50)
PCO2 BLDV: 46 MM HG (ref 40–50)
PCO2 BLDV: 56 MM HG (ref 40–50)
PCO2 BLDV: 62 MM HG (ref 40–50)
PCO2 BLDV: 75 MM HG (ref 40–50)
PENTAZOCINE: ABNORMAL
PH BLD: 7.19 PH (ref 7.35–7.45)
PH BLD: 7.3 PH (ref 7.35–7.45)
PH BLD: 7.31 PH (ref 7.35–7.45)
PH BLD: 7.31 PH (ref 7.35–7.45)
PH BLD: 7.32 PH (ref 7.35–7.45)
PH BLD: 7.33 PH (ref 7.35–7.45)
PH BLD: 7.34 PH (ref 7.35–7.45)
PH BLD: 7.35 PH (ref 7.35–7.45)
PH BLD: 7.37 PH (ref 7.35–7.45)
PH BLD: 7.38 PH (ref 7.35–7.45)
PH BLD: 7.38 PH (ref 7.35–7.45)
PH BLD: 7.39 PH (ref 7.35–7.45)
PH BLD: 7.39 PH (ref 7.35–7.45)
PH BLD: 7.4 PH (ref 7.35–7.45)
PH BLD: 7.41 PH (ref 7.35–7.45)
PH BLD: 7.41 PH (ref 7.35–7.45)
PH BLD: 7.42 PH (ref 7.35–7.45)
PH BLD: 7.42 PH (ref 7.35–7.45)
PH BLD: 7.43 PH (ref 7.35–7.45)
PH BLD: 7.45 PH (ref 7.35–7.45)
PH BLD: 7.47 PH (ref 7.35–7.45)
PH BLD: 7.51 PH (ref 7.35–7.45)
PH BLD: 7.51 PH (ref 7.35–7.45)
PH BLD: 7.52 PH (ref 7.35–7.45)
PH BLD: 7.52 PH (ref 7.35–7.45)
PH BLDV: 7.1 PH (ref 7.32–7.43)
PH BLDV: 7.19 PH (ref 7.32–7.43)
PH BLDV: 7.26 PH (ref 7.32–7.43)
PH BLDV: 7.28 PH (ref 7.32–7.43)
PH BLDV: 7.28 PH (ref 7.32–7.43)
PH BLDV: 7.32 PH (ref 7.32–7.43)
PH BLDV: 7.32 PH (ref 7.32–7.43)
PH BLDV: 7.33 PH (ref 7.32–7.43)
PH BLDV: 7.34 PH (ref 7.32–7.43)
PH BLDV: 7.34 PH (ref 7.32–7.43)
PH BLDV: 7.36 PH (ref 7.32–7.43)
PH BLDV: 7.37 PH (ref 7.32–7.43)
PH BLDV: 7.38 PH (ref 7.32–7.43)
PH BLDV: 7.39 PH (ref 7.32–7.43)
PH BLDV: 7.39 PH (ref 7.32–7.43)
PH BLDV: 7.4 PH (ref 7.32–7.43)
PH BLDV: 7.41 PH (ref 7.32–7.43)
PH BLDV: 7.41 PH (ref 7.32–7.43)
PH UR STRIP: 5 PH (ref 5–7)
PH UR STRIP: 5.5 PH (ref 5–7)
PH UR STRIP: 6 PH (ref 5–7)
PHENCYCLIDINE QUAL: ABNORMAL
PHENMETRAZINE: ABNORMAL
PHENTERMINE: ABNORMAL
PHENYLBUTAZONE: ABNORMAL
PHENYLPROPANOLAMINE: ABNORMAL
PHOSPHATE SERPL-MCNC: 10.6 MG/DL (ref 2.5–4.5)
PHOSPHATE SERPL-MCNC: 4.4 MG/DL (ref 2.5–4.5)
PHOSPHATE SERPL-MCNC: 4.4 MG/DL (ref 2.5–4.5)
PHOSPHATE SERPL-MCNC: 4.5 MG/DL (ref 2.5–4.5)
PHOSPHATE SERPL-MCNC: 4.6 MG/DL (ref 2.5–4.5)
PHOSPHATE SERPL-MCNC: 4.8 MG/DL (ref 2.5–4.5)
PHOSPHATE SERPL-MCNC: 5 MG/DL (ref 2.5–4.5)
PHOSPHATE SERPL-MCNC: 5.1 MG/DL (ref 2.5–4.5)
PHOSPHATE SERPL-MCNC: 5.3 MG/DL (ref 2.5–4.5)
PHOSPHATE SERPL-MCNC: 5.5 MG/DL (ref 2.5–4.5)
PHOSPHATE SERPL-MCNC: 5.6 MG/DL (ref 2.5–4.5)
PHOSPHATE SERPL-MCNC: 5.7 MG/DL (ref 2.5–4.5)
PHOSPHATE SERPL-MCNC: 6 MG/DL (ref 2.5–4.5)
PHOSPHATE SERPL-MCNC: 6.2 MG/DL (ref 2.5–4.5)
PHOSPHATE SERPL-MCNC: 6.3 MG/DL (ref 2.5–4.5)
PHOSPHATE SERPL-MCNC: 6.4 MG/DL (ref 2.5–4.5)
PHOSPHATE SERPL-MCNC: 6.4 MG/DL (ref 2.5–4.5)
PHOSPHATE SERPL-MCNC: 6.5 MG/DL (ref 2.5–4.5)
PHOSPHATE SERPL-MCNC: 6.6 MG/DL (ref 2.5–4.5)
PHOSPHATE SERPL-MCNC: 6.7 MG/DL (ref 2.5–4.5)
PHOSPHATE SERPL-MCNC: 6.9 MG/DL (ref 2.5–4.5)
PHOSPHATE SERPL-MCNC: 7 MG/DL (ref 2.5–4.5)
PHOSPHATE SERPL-MCNC: 7.1 MG/DL (ref 2.5–4.5)
PHOSPHATE SERPL-MCNC: 7.1 MG/DL (ref 2.5–4.5)
PHOSPHATE SERPL-MCNC: 7.3 MG/DL (ref 2.5–4.5)
PHOSPHATE SERPL-MCNC: 7.4 MG/DL (ref 2.5–4.5)
PHOSPHATE SERPL-MCNC: 7.7 MG/DL (ref 2.5–4.5)
PHOSPHATE SERPL-MCNC: 7.7 MG/DL (ref 2.5–4.5)
PHOSPHATE SERPL-MCNC: 8 MG/DL (ref 2.5–4.5)
PHOSPHATE SERPL-MCNC: 8.3 MG/DL (ref 2.5–4.5)
PHOSPHATE SERPL-MCNC: 8.8 MG/DL (ref 2.5–4.5)
PHOSPHATE SERPL-MCNC: 8.8 MG/DL (ref 2.5–4.5)
PLATELET # BLD AUTO: 101 10E9/L (ref 150–450)
PLATELET # BLD AUTO: 102 10E9/L (ref 150–450)
PLATELET # BLD AUTO: 105 10E9/L (ref 150–450)
PLATELET # BLD AUTO: 106 10E9/L (ref 150–450)
PLATELET # BLD AUTO: 107 10E9/L (ref 150–450)
PLATELET # BLD AUTO: 110 10E9/L (ref 150–450)
PLATELET # BLD AUTO: 119 10E9/L (ref 150–450)
PLATELET # BLD AUTO: 121 10E9/L (ref 150–450)
PLATELET # BLD AUTO: 124 10E9/L (ref 150–450)
PLATELET # BLD AUTO: 130 10E9/L (ref 150–450)
PLATELET # BLD AUTO: 134 10E9/L (ref 150–450)
PLATELET # BLD AUTO: 142 10E9/L (ref 150–450)
PLATELET # BLD AUTO: 142 10E9/L (ref 150–450)
PLATELET # BLD AUTO: 145 10E9/L (ref 150–450)
PLATELET # BLD AUTO: 146 10E9/L (ref 150–450)
PLATELET # BLD AUTO: 148 10E9/L (ref 150–450)
PLATELET # BLD AUTO: 149 10E9/L (ref 150–450)
PLATELET # BLD AUTO: 151 10E9/L (ref 150–450)
PLATELET # BLD AUTO: 152 10E9/L (ref 150–450)
PLATELET # BLD AUTO: 155 10E9/L (ref 150–450)
PLATELET # BLD AUTO: 156 10E9/L (ref 150–450)
PLATELET # BLD AUTO: 156 10E9/L (ref 150–450)
PLATELET # BLD AUTO: 157 10E9/L (ref 150–450)
PLATELET # BLD AUTO: 158 10E9/L (ref 150–450)
PLATELET # BLD AUTO: 160 10E9/L (ref 150–450)
PLATELET # BLD AUTO: 161 10E9/L (ref 150–450)
PLATELET # BLD AUTO: 163 10E9/L (ref 150–450)
PLATELET # BLD AUTO: 165 10E9/L (ref 150–450)
PLATELET # BLD AUTO: 165 10E9/L (ref 150–450)
PLATELET # BLD AUTO: 168 10E9/L (ref 150–450)
PLATELET # BLD AUTO: 171 10E9/L (ref 150–450)
PLATELET # BLD AUTO: 172 10E9/L (ref 150–450)
PLATELET # BLD AUTO: 173 10E9/L (ref 150–450)
PLATELET # BLD AUTO: 173 10E9/L (ref 150–450)
PLATELET # BLD AUTO: 176 10E9/L (ref 150–450)
PLATELET # BLD AUTO: 179 10E9/L (ref 150–450)
PLATELET # BLD AUTO: 180 10E9/L (ref 150–450)
PLATELET # BLD AUTO: 180 10E9/L (ref 150–450)
PLATELET # BLD AUTO: 190 10E9/L (ref 150–450)
PLATELET # BLD AUTO: 198 10E9/L (ref 150–450)
PLATELET # BLD AUTO: 220 10E9/L (ref 150–450)
PLATELET # BLD AUTO: 232 10E9/L (ref 150–450)
PLATELET # BLD AUTO: 240 10E9/L (ref 150–450)
PLATELET # BLD AUTO: 246 10E9/L (ref 150–450)
PLATELET # BLD AUTO: 273 10E9/L (ref 150–450)
PLATELET # BLD AUTO: 347 10E9/L (ref 150–450)
PLATELET # BLD AUTO: 348 10E9/L (ref 150–450)
PLATELET # BLD AUTO: 352 10E9/L (ref 150–450)
PLATELET # BLD AUTO: 357 10E9/L (ref 150–450)
PLATELET # BLD AUTO: 362 10E9/L (ref 150–450)
PLATELET # BLD AUTO: 386 10E9/L (ref 150–450)
PLATELET # BLD AUTO: 388 10E9/L (ref 150–450)
PLATELET # BLD AUTO: 90 10E9/L (ref 150–450)
PLATELET # BLD AUTO: 99 10E9/L (ref 150–450)
PO2 BLD: 101 MM HG (ref 80–105)
PO2 BLD: 103 MM HG (ref 80–105)
PO2 BLD: 109 MM HG (ref 80–105)
PO2 BLD: 109 MM HG (ref 80–105)
PO2 BLD: 120 MM HG (ref 80–105)
PO2 BLD: 121 MM HG (ref 80–105)
PO2 BLD: 125 MM HG (ref 80–105)
PO2 BLD: 160 MM HG (ref 80–105)
PO2 BLD: 203 MM HG (ref 80–105)
PO2 BLD: 210 MM HG (ref 80–105)
PO2 BLD: 361 MM HG (ref 80–105)
PO2 BLD: 381 MM HG (ref 80–105)
PO2 BLD: 457 MM HG (ref 80–105)
PO2 BLD: 61 MM HG (ref 80–105)
PO2 BLD: 62 MM HG (ref 80–105)
PO2 BLD: 65 MM HG (ref 80–105)
PO2 BLD: 66 MM HG (ref 80–105)
PO2 BLD: 68 MM HG (ref 80–105)
PO2 BLD: 69 MM HG (ref 80–105)
PO2 BLD: 70 MM HG (ref 80–105)
PO2 BLD: 74 MM HG (ref 80–105)
PO2 BLD: 75 MM HG (ref 80–105)
PO2 BLD: 79 MM HG (ref 80–105)
PO2 BLD: 79 MM HG (ref 80–105)
PO2 BLD: 80 MM HG (ref 80–105)
PO2 BLD: 81 MM HG (ref 80–105)
PO2 BLD: 83 MM HG (ref 80–105)
PO2 BLD: 87 MM HG (ref 80–105)
PO2 BLD: 89 MM HG (ref 80–105)
PO2 BLD: 92 MM HG (ref 80–105)
PO2 BLD: 94 MM HG (ref 80–105)
PO2 BLD: 97 MM HG (ref 80–105)
PO2 BLDV: 15 MM HG (ref 25–47)
PO2 BLDV: 25 MM HG (ref 25–47)
PO2 BLDV: 26 MM HG (ref 25–47)
PO2 BLDV: 28 MM HG (ref 25–47)
PO2 BLDV: 29 MM HG (ref 25–47)
PO2 BLDV: 30 MM HG (ref 25–47)
PO2 BLDV: 33 MM HG (ref 25–47)
PO2 BLDV: 34 MM HG (ref 25–47)
PO2 BLDV: 34 MM HG (ref 25–47)
PO2 BLDV: 35 MM HG (ref 25–47)
PO2 BLDV: 36 MM HG (ref 25–47)
PO2 BLDV: 36 MM HG (ref 25–47)
PO2 BLDV: 37 MM HG (ref 25–47)
PO2 BLDV: 39 MM HG (ref 25–47)
PO2 BLDV: 43 MM HG (ref 25–47)
PO2 BLDV: 55 MM HG (ref 25–47)
PO2 BLDV: 68 MM HG (ref 25–47)
POTASSIUM BLD-SCNC: 3.1 MMOL/L (ref 3.4–5.3)
POTASSIUM BLD-SCNC: 3.4 MMOL/L (ref 3.4–5.3)
POTASSIUM BLD-SCNC: 3.5 MMOL/L (ref 3.4–5.3)
POTASSIUM BLD-SCNC: 3.5 MMOL/L (ref 3.4–5.3)
POTASSIUM BLD-SCNC: 3.8 MMOL/L (ref 3.4–5.3)
POTASSIUM BLD-SCNC: 3.8 MMOL/L (ref 3.4–5.3)
POTASSIUM BLD-SCNC: 3.9 MMOL/L (ref 3.4–5.3)
POTASSIUM BLD-SCNC: 3.9 MMOL/L (ref 3.4–5.3)
POTASSIUM BLD-SCNC: 5 MMOL/L (ref 3.4–5.3)
POTASSIUM BLD-SCNC: 5.2 MMOL/L (ref 3.4–5.3)
POTASSIUM SERPL-SCNC: 3.2 MMOL/L (ref 3.4–5.3)
POTASSIUM SERPL-SCNC: 3.3 MMOL/L (ref 3.4–5.3)
POTASSIUM SERPL-SCNC: 3.3 MMOL/L (ref 3.4–5.3)
POTASSIUM SERPL-SCNC: 3.4 MMOL/L (ref 3.4–5.3)
POTASSIUM SERPL-SCNC: 3.5 MMOL/L (ref 3.4–5.3)
POTASSIUM SERPL-SCNC: 3.5 MMOL/L (ref 3.4–5.3)
POTASSIUM SERPL-SCNC: 3.6 MMOL/L (ref 3.4–5.3)
POTASSIUM SERPL-SCNC: 3.7 MMOL/L (ref 3.4–5.3)
POTASSIUM SERPL-SCNC: 3.8 MMOL/L (ref 3.4–5.3)
POTASSIUM SERPL-SCNC: 3.9 MMOL/L (ref 3.4–5.3)
POTASSIUM SERPL-SCNC: 4 MMOL/L (ref 3.4–5.3)
POTASSIUM SERPL-SCNC: 4.1 MMOL/L (ref 3.4–5.3)
POTASSIUM SERPL-SCNC: 4.1 MMOL/L (ref 3.4–5.3)
POTASSIUM SERPL-SCNC: 4.2 MMOL/L (ref 3.4–5.3)
POTASSIUM SERPL-SCNC: 4.3 MMOL/L (ref 3.4–5.3)
POTASSIUM SERPL-SCNC: 4.4 MMOL/L (ref 3.4–5.3)
POTASSIUM SERPL-SCNC: 4.5 MMOL/L (ref 3.4–5.3)
POTASSIUM SERPL-SCNC: 4.5 MMOL/L (ref 3.4–5.3)
POTASSIUM SERPL-SCNC: 4.6 MMOL/L (ref 3.4–5.3)
POTASSIUM SERPL-SCNC: 4.7 MMOL/L (ref 3.4–5.3)
POTASSIUM SERPL-SCNC: 4.8 MMOL/L (ref 3.4–5.3)
POTASSIUM SERPL-SCNC: 4.8 MMOL/L (ref 3.4–5.3)
POTASSIUM SERPL-SCNC: 4.9 MMOL/L (ref 3.4–5.3)
PROPOXPHENE QUAL: ABNORMAL
PROPRANOLOL QUAL: ABNORMAL
PROT SERPL-MCNC: 3.5 G/DL (ref 6.8–8.8)
PROT SERPL-MCNC: 4.9 G/DL (ref 6.8–8.8)
PROT SERPL-MCNC: 5 G/DL (ref 6.8–8.8)
PROT SERPL-MCNC: 5.1 G/DL (ref 6.8–8.8)
PROT SERPL-MCNC: 5.2 G/DL (ref 6.8–8.8)
PROT SERPL-MCNC: 5.3 G/DL (ref 6.8–8.8)
PROT SERPL-MCNC: 5.6 G/DL (ref 6.8–8.8)
PROT SERPL-MCNC: 7.4 G/DL (ref 6.8–8.8)
PYRILAMINE: ABNORMAL
R TIME UNTIL CLOT FORMS: 5.5 MIN (ref 4–9)
R TIME UNTIL CLOT FORMS: 8.9 MINUTE (ref 5–10)
RADIOLOGIST FLAGS: ABNORMAL
RBC # BLD AUTO: 1.95 10E12/L (ref 3.8–5.2)
RBC # BLD AUTO: 2.23 10E12/L (ref 3.8–5.2)
RBC # BLD AUTO: 2.25 10E12/L (ref 3.8–5.2)
RBC # BLD AUTO: 2.46 10E12/L (ref 3.8–5.2)
RBC # BLD AUTO: 2.47 10E12/L (ref 3.8–5.2)
RBC # BLD AUTO: 2.53 10E12/L (ref 3.8–5.2)
RBC # BLD AUTO: 2.64 10E12/L (ref 3.8–5.2)
RBC # BLD AUTO: 2.67 10E12/L (ref 3.8–5.2)
RBC # BLD AUTO: 2.67 10E12/L (ref 3.8–5.2)
RBC # BLD AUTO: 2.73 10E12/L (ref 3.8–5.2)
RBC # BLD AUTO: 2.74 10E12/L (ref 3.8–5.2)
RBC # BLD AUTO: 2.75 10E12/L (ref 3.8–5.2)
RBC # BLD AUTO: 2.81 10E12/L (ref 3.8–5.2)
RBC # BLD AUTO: 2.81 10E12/L (ref 3.8–5.2)
RBC # BLD AUTO: 2.84 10E12/L (ref 3.8–5.2)
RBC # BLD AUTO: 2.85 10E12/L (ref 3.8–5.2)
RBC # BLD AUTO: 2.88 10E12/L (ref 3.8–5.2)
RBC # BLD AUTO: 2.92 10E12/L (ref 3.8–5.2)
RBC # BLD AUTO: 2.92 10E12/L (ref 3.8–5.2)
RBC # BLD AUTO: 2.94 10E12/L (ref 3.8–5.2)
RBC # BLD AUTO: 2.94 10E12/L (ref 3.8–5.2)
RBC # BLD AUTO: 2.97 10E12/L (ref 3.8–5.2)
RBC # BLD AUTO: 2.98 10E12/L (ref 3.8–5.2)
RBC # BLD AUTO: 3 10E12/L (ref 3.8–5.2)
RBC # BLD AUTO: 3.01 10E12/L (ref 3.8–5.2)
RBC # BLD AUTO: 3.04 10E12/L (ref 3.8–5.2)
RBC # BLD AUTO: 3.04 10E12/L (ref 3.8–5.2)
RBC # BLD AUTO: 3.05 10E12/L (ref 3.8–5.2)
RBC # BLD AUTO: 3.05 10E12/L (ref 3.8–5.2)
RBC # BLD AUTO: 3.06 10E12/L (ref 3.8–5.2)
RBC # BLD AUTO: 3.07 10E12/L (ref 3.8–5.2)
RBC # BLD AUTO: 3.08 10E12/L (ref 3.8–5.2)
RBC # BLD AUTO: 3.09 10E12/L (ref 3.8–5.2)
RBC # BLD AUTO: 3.11 10E12/L (ref 3.8–5.2)
RBC # BLD AUTO: 3.16 10E12/L (ref 3.8–5.2)
RBC # BLD AUTO: 3.17 10E12/L (ref 3.8–5.2)
RBC # BLD AUTO: 3.2 10E12/L (ref 3.8–5.2)
RBC # BLD AUTO: 3.23 10E12/L (ref 3.8–5.2)
RBC # BLD AUTO: 3.27 10E12/L (ref 3.8–5.2)
RBC # BLD AUTO: 3.31 10E12/L (ref 3.8–5.2)
RBC # BLD AUTO: 3.42 10E12/L (ref 3.8–5.2)
RBC # BLD AUTO: 3.44 10E12/L (ref 3.8–5.2)
RBC # BLD AUTO: 3.5 10E12/L (ref 3.8–5.2)
RBC # BLD AUTO: 3.5 10E12/L (ref 3.8–5.2)
RBC # BLD AUTO: 3.51 10E12/L (ref 3.8–5.2)
RBC # BLD AUTO: 3.54 10E12/L (ref 3.8–5.2)
RBC # BLD AUTO: 3.89 10E12/L (ref 3.8–5.2)
RBC # BLD AUTO: 3.94 10E12/L (ref 3.8–5.2)
RBC #/AREA URNS AUTO: 1 /HPF (ref 0–2)
RBC #/AREA URNS AUTO: 1 /HPF (ref 0–2)
RBC #/AREA URNS AUTO: 4 /HPF (ref 0–2)
SALICYLATE QUAL: ABNORMAL
SAO2 % BLDV FROM PO2: 67 %
SODIUM BLD-SCNC: 126 MMOL/L (ref 133–144)
SODIUM BLD-SCNC: 137 MMOL/L (ref 133–144)
SODIUM BLD-SCNC: 139 MMOL/L (ref 133–144)
SODIUM BLD-SCNC: 140 MMOL/L (ref 133–144)
SODIUM BLD-SCNC: 141 MMOL/L (ref 133–144)
SODIUM BLD-SCNC: 142 MMOL/L (ref 133–144)
SODIUM BLD-SCNC: 143 MMOL/L (ref 133–144)
SODIUM SERPL-SCNC: 130 MMOL/L (ref 133–144)
SODIUM SERPL-SCNC: 131 MMOL/L (ref 133–144)
SODIUM SERPL-SCNC: 131 MMOL/L (ref 133–144)
SODIUM SERPL-SCNC: 133 MMOL/L (ref 133–144)
SODIUM SERPL-SCNC: 134 MMOL/L (ref 133–144)
SODIUM SERPL-SCNC: 134 MMOL/L (ref 133–144)
SODIUM SERPL-SCNC: 135 MMOL/L (ref 133–144)
SODIUM SERPL-SCNC: 136 MMOL/L (ref 133–144)
SODIUM SERPL-SCNC: 137 MMOL/L (ref 133–144)
SODIUM SERPL-SCNC: 138 MMOL/L (ref 133–144)
SODIUM SERPL-SCNC: 138 MMOL/L (ref 133–144)
SODIUM SERPL-SCNC: 139 MMOL/L (ref 133–144)
SODIUM SERPL-SCNC: 140 MMOL/L (ref 133–144)
SODIUM SERPL-SCNC: 141 MMOL/L (ref 133–144)
SODIUM SERPL-SCNC: 141 MMOL/L (ref 133–144)
SODIUM SERPL-SCNC: 142 MMOL/L (ref 133–144)
SODIUM SERPL-SCNC: 143 MMOL/L (ref 133–144)
SODIUM SERPL-SCNC: 144 MMOL/L (ref 133–144)
SODIUM SERPL-SCNC: 147 MMOL/L (ref 133–144)
SOURCE: ABNORMAL
SP GR UR STRIP: 1.01 (ref 1–1.03)
SP GR UR STRIP: 1.02 (ref 1–1.03)
SP GR UR STRIP: 1.02 (ref 1–1.03)
SPECIMEN EXP DATE BLD: NORMAL
SPECIMEN SOURCE: ABNORMAL
SPECIMEN SOURCE: NORMAL
SQUAMOUS #/AREA URNS AUTO: 1 /HPF (ref 0–1)
SQUAMOUS #/AREA URNS AUTO: <1 /HPF (ref 0–1)
THEOBROMINE: ABNORMAL
TIBC SERPL-MCNC: 264 UG/DL (ref 240–430)
TOPIRAMATE QUAL: ABNORMAL
TRANS CELLS #/AREA URNS HPF: <1 /HPF (ref 0–1)
TRANS CELLS #/AREA URNS HPF: <1 /HPF (ref 0–1)
TRANSFUSION STATUS PATIENT QL: NORMAL
TRIGL SERPL-MCNC: 150 MG/DL
TRIGL SERPL-MCNC: 89 MG/DL
TRIMIPRAMINE QUAL: ABNORMAL
TROPONIN I BLD-MCNC: 0.11 UG/L (ref 0–0.08)
TROPONIN I SERPL-MCNC: 0.8 UG/L (ref 0–0.04)
TROPONIN I SERPL-MCNC: 1.59 UG/L (ref 0–0.04)
TROPONIN I SERPL-MCNC: 1.77 UG/L (ref 0–0.04)
TSH SERPL DL<=0.005 MIU/L-ACNC: 3.83 MU/L (ref 0.4–4)
UROBILINOGEN UR STRIP-MCNC: 0 MG/DL (ref 0–2)
UROBILINOGEN UR STRIP-MCNC: NORMAL MG/DL (ref 0–2)
UROBILINOGEN UR STRIP-MCNC: NORMAL MG/DL (ref 0–2)
VENLAFAXINE QUAL: ABNORMAL
WBC # BLD AUTO: 10 10E9/L (ref 4–11)
WBC # BLD AUTO: 10.1 10E9/L (ref 4–11)
WBC # BLD AUTO: 10.3 10E9/L (ref 4–11)
WBC # BLD AUTO: 10.4 10E9/L (ref 4–11)
WBC # BLD AUTO: 10.5 10E9/L (ref 4–11)
WBC # BLD AUTO: 10.5 10E9/L (ref 4–11)
WBC # BLD AUTO: 10.8 10E9/L (ref 4–11)
WBC # BLD AUTO: 10.9 10E9/L (ref 4–11)
WBC # BLD AUTO: 11.1 10E9/L (ref 4–11)
WBC # BLD AUTO: 11.1 10E9/L (ref 4–11)
WBC # BLD AUTO: 11.3 10E9/L (ref 4–11)
WBC # BLD AUTO: 11.5 10E9/L (ref 4–11)
WBC # BLD AUTO: 11.7 10E9/L (ref 4–11)
WBC # BLD AUTO: 11.7 10E9/L (ref 4–11)
WBC # BLD AUTO: 11.9 10E9/L (ref 4–11)
WBC # BLD AUTO: 12.1 10E9/L (ref 4–11)
WBC # BLD AUTO: 12.4 10E9/L (ref 4–11)
WBC # BLD AUTO: 12.5 10E9/L (ref 4–11)
WBC # BLD AUTO: 12.8 10E9/L (ref 4–11)
WBC # BLD AUTO: 12.9 10E9/L (ref 4–11)
WBC # BLD AUTO: 13.1 10E9/L (ref 4–11)
WBC # BLD AUTO: 13.2 10E9/L (ref 4–11)
WBC # BLD AUTO: 13.2 10E9/L (ref 4–11)
WBC # BLD AUTO: 13.4 10E9/L (ref 4–11)
WBC # BLD AUTO: 14.1 10E9/L (ref 4–11)
WBC # BLD AUTO: 15.1 10E9/L (ref 4–11)
WBC # BLD AUTO: 16.2 10E9/L (ref 4–11)
WBC # BLD AUTO: 17.2 10E9/L (ref 4–11)
WBC # BLD AUTO: 5.8 10E9/L (ref 4–11)
WBC # BLD AUTO: 6.2 10E9/L (ref 4–11)
WBC # BLD AUTO: 7.2 10E9/L (ref 4–11)
WBC # BLD AUTO: 7.5 10E9/L (ref 4–11)
WBC # BLD AUTO: 7.6 10E9/L (ref 4–11)
WBC # BLD AUTO: 7.7 10E9/L (ref 4–11)
WBC # BLD AUTO: 7.8 10E9/L (ref 4–11)
WBC # BLD AUTO: 7.9 10E9/L (ref 4–11)
WBC # BLD AUTO: 8 10E9/L (ref 4–11)
WBC # BLD AUTO: 8.3 10E9/L (ref 4–11)
WBC # BLD AUTO: 8.7 10E9/L (ref 4–11)
WBC # BLD AUTO: 8.9 10E9/L (ref 4–11)
WBC # BLD AUTO: 9 10E9/L (ref 4–11)
WBC # BLD AUTO: 9.1 10E9/L (ref 4–11)
WBC # BLD AUTO: 9.3 10E9/L (ref 4–11)
WBC # BLD AUTO: 9.4 10E9/L (ref 4–11)
WBC # BLD AUTO: 9.5 10E9/L (ref 4–11)
WBC # BLD AUTO: 9.5 10E9/L (ref 4–11)
WBC #/AREA URNS AUTO: 1 /HPF (ref 0–5)
WBC #/AREA URNS AUTO: 69 /HPF (ref 0–5)
WBC #/AREA URNS AUTO: 9 /HPF (ref 0–5)

## 2019-01-01 PROCEDURE — 84295 ASSAY OF SERUM SODIUM: CPT | Performed by: THORACIC SURGERY (CARDIOTHORACIC VASCULAR SURGERY)

## 2019-01-01 PROCEDURE — C9113 INJ PANTOPRAZOLE SODIUM, VIA: HCPCS | Performed by: ANESTHESIOLOGY

## 2019-01-01 PROCEDURE — 25000125 ZZHC RX 250: Performed by: STUDENT IN AN ORGANIZED HEALTH CARE EDUCATION/TRAINING PROGRAM

## 2019-01-01 PROCEDURE — 40000275 ZZH STATISTIC RCP TIME EA 10 MIN

## 2019-01-01 PROCEDURE — 86900 BLOOD TYPING SEROLOGIC ABO: CPT | Performed by: SURGERY

## 2019-01-01 PROCEDURE — 40000076 ZZH STATISTIC IABP MONITORING

## 2019-01-01 PROCEDURE — 84100 ASSAY OF PHOSPHORUS: CPT | Performed by: SURGERY

## 2019-01-01 PROCEDURE — 93005 ELECTROCARDIOGRAM TRACING: CPT

## 2019-01-01 PROCEDURE — 25000132 ZZH RX MED GY IP 250 OP 250 PS 637: Performed by: SURGERY

## 2019-01-01 PROCEDURE — 25000132 ZZH RX MED GY IP 250 OP 250 PS 637: Performed by: STUDENT IN AN ORGANIZED HEALTH CARE EDUCATION/TRAINING PROGRAM

## 2019-01-01 PROCEDURE — 25000128 H RX IP 250 OP 636: Performed by: SURGERY

## 2019-01-01 PROCEDURE — 86850 RBC ANTIBODY SCREEN: CPT | Performed by: SURGERY

## 2019-01-01 PROCEDURE — 85025 COMPLETE CBC W/AUTO DIFF WBC: CPT | Performed by: EMERGENCY MEDICINE

## 2019-01-01 PROCEDURE — 27210432 ZZH NUTRITION PRODUCT RENAL BASIC LITER

## 2019-01-01 PROCEDURE — 85027 COMPLETE CBC AUTOMATED: CPT | Performed by: PHYSICIAN ASSISTANT

## 2019-01-01 PROCEDURE — 40000133 ZZH STATISTIC OT WARD VISIT: Performed by: OCCUPATIONAL THERAPIST

## 2019-01-01 PROCEDURE — 80053 COMPREHEN METABOLIC PANEL: CPT | Performed by: INTERNAL MEDICINE

## 2019-01-01 PROCEDURE — 25000555 ZZHC RX FACTOR IP 250 OP 636: Performed by: STUDENT IN AN ORGANIZED HEALTH CARE EDUCATION/TRAINING PROGRAM

## 2019-01-01 PROCEDURE — 74018 RADEX ABDOMEN 1 VIEW: CPT

## 2019-01-01 PROCEDURE — 96375 TX/PRO/DX INJ NEW DRUG ADDON: CPT

## 2019-01-01 PROCEDURE — 85027 COMPLETE CBC AUTOMATED: CPT | Performed by: THORACIC SURGERY (CARDIOTHORACIC VASCULAR SURGERY)

## 2019-01-01 PROCEDURE — 83735 ASSAY OF MAGNESIUM: CPT | Performed by: SURGERY

## 2019-01-01 PROCEDURE — 82803 BLOOD GASES ANY COMBINATION: CPT | Performed by: SURGERY

## 2019-01-01 PROCEDURE — C9113 INJ PANTOPRAZOLE SODIUM, VIA: HCPCS | Performed by: STUDENT IN AN ORGANIZED HEALTH CARE EDUCATION/TRAINING PROGRAM

## 2019-01-01 PROCEDURE — 93010 ELECTROCARDIOGRAM REPORT: CPT | Performed by: INTERNAL MEDICINE

## 2019-01-01 PROCEDURE — 84100 ASSAY OF PHOSPHORUS: CPT | Performed by: STUDENT IN AN ORGANIZED HEALTH CARE EDUCATION/TRAINING PROGRAM

## 2019-01-01 PROCEDURE — 94002 VENT MGMT INPAT INIT DAY: CPT

## 2019-01-01 PROCEDURE — 20000004 ZZH R&B ICU UMMC

## 2019-01-01 PROCEDURE — 40000281 ZZH STATISTIC TRANSPORT TIME EA 15 MIN

## 2019-01-01 PROCEDURE — 93306 TTE W/DOPPLER COMPLETE: CPT | Mod: 26 | Performed by: INTERNAL MEDICINE

## 2019-01-01 PROCEDURE — 82805 BLOOD GASES W/O2 SATURATION: CPT | Performed by: THORACIC SURGERY (CARDIOTHORACIC VASCULAR SURGERY)

## 2019-01-01 PROCEDURE — 25000125 ZZHC RX 250: Performed by: PHYSICIAN ASSISTANT

## 2019-01-01 PROCEDURE — 82330 ASSAY OF CALCIUM: CPT | Performed by: THORACIC SURGERY (CARDIOTHORACIC VASCULAR SURGERY)

## 2019-01-01 PROCEDURE — 84132 ASSAY OF SERUM POTASSIUM: CPT | Performed by: SURGERY

## 2019-01-01 PROCEDURE — 82330 ASSAY OF CALCIUM: CPT | Performed by: SURGERY

## 2019-01-01 PROCEDURE — 87040 BLOOD CULTURE FOR BACTERIA: CPT | Performed by: EMERGENCY MEDICINE

## 2019-01-01 PROCEDURE — 84100 ASSAY OF PHOSPHORUS: CPT | Performed by: INTERNAL MEDICINE

## 2019-01-01 PROCEDURE — 85610 PROTHROMBIN TIME: CPT | Performed by: SURGERY

## 2019-01-01 PROCEDURE — 27210794 ZZH OR GENERAL SUPPLY STERILE: Performed by: SURGERY

## 2019-01-01 PROCEDURE — C9113 INJ PANTOPRAZOLE SODIUM, VIA: HCPCS | Performed by: THORACIC SURGERY (CARDIOTHORACIC VASCULAR SURGERY)

## 2019-01-01 PROCEDURE — 80048 BASIC METABOLIC PNL TOTAL CA: CPT | Performed by: STUDENT IN AN ORGANIZED HEALTH CARE EDUCATION/TRAINING PROGRAM

## 2019-01-01 PROCEDURE — 80053 COMPREHEN METABOLIC PANEL: CPT | Performed by: STUDENT IN AN ORGANIZED HEALTH CARE EDUCATION/TRAINING PROGRAM

## 2019-01-01 PROCEDURE — 25000125 ZZHC RX 250: Performed by: NURSE ANESTHETIST, CERTIFIED REGISTERED

## 2019-01-01 PROCEDURE — 83605 ASSAY OF LACTIC ACID: CPT

## 2019-01-01 PROCEDURE — 85027 COMPLETE CBC AUTOMATED: CPT | Performed by: STUDENT IN AN ORGANIZED HEALTH CARE EDUCATION/TRAINING PROGRAM

## 2019-01-01 PROCEDURE — 25000132 ZZH RX MED GY IP 250 OP 250 PS 637: Performed by: ANESTHESIOLOGY

## 2019-01-01 PROCEDURE — 84100 ASSAY OF PHOSPHORUS: CPT | Performed by: ANESTHESIOLOGY

## 2019-01-01 PROCEDURE — 90937 HEMODIALYSIS REPEATED EVAL: CPT

## 2019-01-01 PROCEDURE — 25000125 ZZHC RX 250

## 2019-01-01 PROCEDURE — 27210908 ZZH NEEDLE CR4

## 2019-01-01 PROCEDURE — 90947 DIALYSIS REPEATED EVAL: CPT

## 2019-01-01 PROCEDURE — 25000128 H RX IP 250 OP 636: Performed by: PHYSICIAN ASSISTANT

## 2019-01-01 PROCEDURE — 25000128 H RX IP 250 OP 636: Performed by: STUDENT IN AN ORGANIZED HEALTH CARE EDUCATION/TRAINING PROGRAM

## 2019-01-01 PROCEDURE — 85027 COMPLETE CBC AUTOMATED: CPT | Performed by: ANESTHESIOLOGY

## 2019-01-01 PROCEDURE — 99291 CRITICAL CARE FIRST HOUR: CPT | Mod: GC | Performed by: INTERNAL MEDICINE

## 2019-01-01 PROCEDURE — 85027 COMPLETE CBC AUTOMATED: CPT | Performed by: SURGERY

## 2019-01-01 PROCEDURE — 25000132 ZZH RX MED GY IP 250 OP 250 PS 637: Performed by: PHYSICIAN ASSISTANT

## 2019-01-01 PROCEDURE — 87186 SC STD MICRODIL/AGAR DIL: CPT | Performed by: EMERGENCY MEDICINE

## 2019-01-01 PROCEDURE — 97110 THERAPEUTIC EXERCISES: CPT | Mod: GO | Performed by: OCCUPATIONAL THERAPIST

## 2019-01-01 PROCEDURE — 00000146 ZZHCL STATISTIC GLUCOSE BY METER IP

## 2019-01-01 PROCEDURE — 25000125 ZZHC RX 250: Performed by: SURGERY

## 2019-01-01 PROCEDURE — 81001 URINALYSIS AUTO W/SCOPE: CPT | Performed by: STUDENT IN AN ORGANIZED HEALTH CARE EDUCATION/TRAINING PROGRAM

## 2019-01-01 PROCEDURE — 41000019 ZZH PERA-PERFUSION EACH ADDTL 15 MIN: Performed by: SURGERY

## 2019-01-01 PROCEDURE — 25000125 ZZHC RX 250: Performed by: THORACIC SURGERY (CARDIOTHORACIC VASCULAR SURGERY)

## 2019-01-01 PROCEDURE — 80048 BASIC METABOLIC PNL TOTAL CA: CPT | Performed by: THORACIC SURGERY (CARDIOTHORACIC VASCULAR SURGERY)

## 2019-01-01 PROCEDURE — 87641 MR-STAPH DNA AMP PROBE: CPT | Performed by: THORACIC SURGERY (CARDIOTHORACIC VASCULAR SURGERY)

## 2019-01-01 PROCEDURE — 99291 CRITICAL CARE FIRST HOUR: CPT | Mod: GC | Performed by: NURSE PRACTITIONER

## 2019-01-01 PROCEDURE — 25000128 H RX IP 250 OP 636: Performed by: NURSE ANESTHETIST, CERTIFIED REGISTERED

## 2019-01-01 PROCEDURE — 36415 COLL VENOUS BLD VENIPUNCTURE: CPT | Performed by: THORACIC SURGERY (CARDIOTHORACIC VASCULAR SURGERY)

## 2019-01-01 PROCEDURE — 87086 URINE CULTURE/COLONY COUNT: CPT | Performed by: THORACIC SURGERY (CARDIOTHORACIC VASCULAR SURGERY)

## 2019-01-01 PROCEDURE — 0DH64UZ INSERTION OF FEEDING DEVICE INTO STOMACH, PERCUTANEOUS ENDOSCOPIC APPROACH: ICD-10-PCS | Performed by: SURGERY

## 2019-01-01 PROCEDURE — 95951 ZZHC EEG VIDEO < 12 HR: CPT | Mod: 52,ZF

## 2019-01-01 PROCEDURE — 40000344 ZZHCL STATISTIC THAWING COMPONENT: Performed by: STUDENT IN AN ORGANIZED HEALTH CARE EDUCATION/TRAINING PROGRAM

## 2019-01-01 PROCEDURE — 99291 CRITICAL CARE FIRST HOUR: CPT | Mod: GC | Performed by: ANESTHESIOLOGY

## 2019-01-01 PROCEDURE — 83540 ASSAY OF IRON: CPT | Performed by: STUDENT IN AN ORGANIZED HEALTH CARE EDUCATION/TRAINING PROGRAM

## 2019-01-01 PROCEDURE — 94003 VENT MGMT INPAT SUBQ DAY: CPT

## 2019-01-01 PROCEDURE — 40000986 XR ABDOMEN PORT 1 VW

## 2019-01-01 PROCEDURE — 93005 ELECTROCARDIOGRAM TRACING: CPT | Mod: 76

## 2019-01-01 PROCEDURE — 27210447 ZZH PACK CELL SAVER CSP: Performed by: SURGERY

## 2019-01-01 PROCEDURE — 25000128 H RX IP 250 OP 636: Performed by: CLINICAL NURSE SPECIALIST

## 2019-01-01 PROCEDURE — 25000128 H RX IP 250 OP 636: Performed by: THORACIC SURGERY (CARDIOTHORACIC VASCULAR SURGERY)

## 2019-01-01 PROCEDURE — 71046 X-RAY EXAM CHEST 2 VIEWS: CPT

## 2019-01-01 PROCEDURE — 25000125 ZZHC RX 250: Performed by: INTERNAL MEDICINE

## 2019-01-01 PROCEDURE — 83735 ASSAY OF MAGNESIUM: CPT | Performed by: PHYSICIAN ASSISTANT

## 2019-01-01 PROCEDURE — 25000128 H RX IP 250 OP 636

## 2019-01-01 PROCEDURE — 86923 COMPATIBILITY TEST ELECTRIC: CPT | Performed by: SURGERY

## 2019-01-01 PROCEDURE — 85025 COMPLETE CBC W/AUTO DIFF WBC: CPT | Performed by: INTERNAL MEDICINE

## 2019-01-01 PROCEDURE — 36000051 ZZH SURGERY LEVEL 2 1ST 30 MIN - UMMC: Performed by: SURGERY

## 2019-01-01 PROCEDURE — 40000196 ZZH STATISTIC RAPCV CVP MONITORING

## 2019-01-01 PROCEDURE — 25000132 ZZH RX MED GY IP 250 OP 250 PS 637: Performed by: NURSE PRACTITIONER

## 2019-01-01 PROCEDURE — 83605 ASSAY OF LACTIC ACID: CPT | Performed by: SURGERY

## 2019-01-01 PROCEDURE — 97165 OT EVAL LOW COMPLEX 30 MIN: CPT | Mod: GO | Performed by: OCCUPATIONAL THERAPIST

## 2019-01-01 PROCEDURE — 40000561 ZZH STATISTIC CODE BLUE NO ACCESS REQUIRED

## 2019-01-01 PROCEDURE — 85379 FIBRIN DEGRADATION QUANT: CPT | Performed by: EMERGENCY MEDICINE

## 2019-01-01 PROCEDURE — 40000014 ZZH STATISTIC ARTERIAL MONITORING DAILY

## 2019-01-01 PROCEDURE — 84484 ASSAY OF TROPONIN QUANT: CPT

## 2019-01-01 PROCEDURE — 99233 SBSQ HOSP IP/OBS HIGH 50: CPT | Mod: GC | Performed by: ANESTHESIOLOGY

## 2019-01-01 PROCEDURE — 82565 ASSAY OF CREATININE: CPT | Mod: 91

## 2019-01-01 PROCEDURE — 87086 URINE CULTURE/COLONY COUNT: CPT | Performed by: EMERGENCY MEDICINE

## 2019-01-01 PROCEDURE — 3E043XZ INTRODUCTION OF VASOPRESSOR INTO CENTRAL VEIN, PERCUTANEOUS APPROACH: ICD-10-PCS | Performed by: THORACIC SURGERY (CARDIOTHORACIC VASCULAR SURGERY)

## 2019-01-01 PROCEDURE — 87106 FUNGI IDENTIFICATION YEAST: CPT | Performed by: SURGERY

## 2019-01-01 PROCEDURE — 93970 EXTREMITY STUDY: CPT

## 2019-01-01 PROCEDURE — 85730 THROMBOPLASTIN TIME PARTIAL: CPT | Performed by: SURGERY

## 2019-01-01 PROCEDURE — 0B110F4 BYPASS TRACHEA TO CUTANEOUS WITH TRACHEOSTOMY DEVICE, OPEN APPROACH: ICD-10-PCS | Performed by: SURGERY

## 2019-01-01 PROCEDURE — 87340 HEPATITIS B SURFACE AG IA: CPT | Performed by: GENERAL ACUTE CARE HOSPITAL

## 2019-01-01 PROCEDURE — 25800025 ZZH RX 258: Performed by: STUDENT IN AN ORGANIZED HEALTH CARE EDUCATION/TRAINING PROGRAM

## 2019-01-01 PROCEDURE — 80053 COMPREHEN METABOLIC PANEL: CPT | Performed by: SURGERY

## 2019-01-01 PROCEDURE — 70450 CT HEAD/BRAIN W/O DYE: CPT

## 2019-01-01 PROCEDURE — 85027 COMPLETE CBC AUTOMATED: CPT | Performed by: INTERNAL MEDICINE

## 2019-01-01 PROCEDURE — 5A1955Z RESPIRATORY VENTILATION, GREATER THAN 96 CONSECUTIVE HOURS: ICD-10-PCS | Performed by: THORACIC SURGERY (CARDIOTHORACIC VASCULAR SURGERY)

## 2019-01-01 PROCEDURE — 80307 DRUG TEST PRSMV CHEM ANLYZR: CPT | Performed by: STUDENT IN AN ORGANIZED HEALTH CARE EDUCATION/TRAINING PROGRAM

## 2019-01-01 PROCEDURE — 83735 ASSAY OF MAGNESIUM: CPT | Performed by: ANESTHESIOLOGY

## 2019-01-01 PROCEDURE — 93503 INSERT/PLACE HEART CATHETER: CPT

## 2019-01-01 PROCEDURE — 25000128 H RX IP 250 OP 636: Performed by: ANESTHESIOLOGY

## 2019-01-01 PROCEDURE — 85018 HEMOGLOBIN: CPT | Performed by: SURGERY

## 2019-01-01 PROCEDURE — 71045 X-RAY EXAM CHEST 1 VIEW: CPT

## 2019-01-01 PROCEDURE — 84132 ASSAY OF SERUM POTASSIUM: CPT

## 2019-01-01 PROCEDURE — 85610 PROTHROMBIN TIME: CPT | Performed by: INTERNAL MEDICINE

## 2019-01-01 PROCEDURE — 83605 ASSAY OF LACTIC ACID: CPT | Performed by: THORACIC SURGERY (CARDIOTHORACIC VASCULAR SURGERY)

## 2019-01-01 PROCEDURE — 99221 1ST HOSP IP/OBS SF/LOW 40: CPT | Mod: GC | Performed by: SURGERY

## 2019-01-01 PROCEDURE — 82805 BLOOD GASES W/O2 SATURATION: CPT | Performed by: SURGERY

## 2019-01-01 PROCEDURE — 84484 ASSAY OF TROPONIN QUANT: CPT | Performed by: SURGERY

## 2019-01-01 PROCEDURE — 85520 HEPARIN ASSAY: CPT | Performed by: THORACIC SURGERY (CARDIOTHORACIC VASCULAR SURGERY)

## 2019-01-01 PROCEDURE — 83735 ASSAY OF MAGNESIUM: CPT | Performed by: STUDENT IN AN ORGANIZED HEALTH CARE EDUCATION/TRAINING PROGRAM

## 2019-01-01 PROCEDURE — 80053 COMPREHEN METABOLIC PANEL: CPT | Performed by: THORACIC SURGERY (CARDIOTHORACIC VASCULAR SURGERY)

## 2019-01-01 PROCEDURE — 36415 COLL VENOUS BLD VENIPUNCTURE: CPT | Performed by: EMERGENCY MEDICINE

## 2019-01-01 PROCEDURE — 76770 US EXAM ABDO BACK WALL COMP: CPT

## 2019-01-01 PROCEDURE — 25000128 H RX IP 250 OP 636: Performed by: NURSE PRACTITIONER

## 2019-01-01 PROCEDURE — 84100 ASSAY OF PHOSPHORUS: CPT | Performed by: THORACIC SURGERY (CARDIOTHORACIC VASCULAR SURGERY)

## 2019-01-01 PROCEDURE — 83735 ASSAY OF MAGNESIUM: CPT | Performed by: INTERNAL MEDICINE

## 2019-01-01 PROCEDURE — 94660 CPAP INITIATION&MGMT: CPT

## 2019-01-01 PROCEDURE — 25000565 ZZH ISOFLURANE, EA 15 MIN: Performed by: SURGERY

## 2019-01-01 PROCEDURE — P9059 PLASMA, FRZ BETWEEN 8-24HOUR: HCPCS | Performed by: STUDENT IN AN ORGANIZED HEALTH CARE EDUCATION/TRAINING PROGRAM

## 2019-01-01 PROCEDURE — 83690 ASSAY OF LIPASE: CPT | Performed by: EMERGENCY MEDICINE

## 2019-01-01 PROCEDURE — 25000128 H RX IP 250 OP 636: Performed by: GENERAL ACUTE CARE HOSPITAL

## 2019-01-01 PROCEDURE — 82728 ASSAY OF FERRITIN: CPT | Performed by: STUDENT IN AN ORGANIZED HEALTH CARE EDUCATION/TRAINING PROGRAM

## 2019-01-01 PROCEDURE — 021109W BYPASS CORONARY ARTERY, TWO ARTERIES FROM AORTA WITH AUTOLOGOUS VENOUS TISSUE, OPEN APPROACH: ICD-10-PCS | Performed by: SURGERY

## 2019-01-01 PROCEDURE — 84100 ASSAY OF PHOSPHORUS: CPT | Performed by: PHYSICIAN ASSISTANT

## 2019-01-01 PROCEDURE — C1769 GUIDE WIRE: HCPCS

## 2019-01-01 PROCEDURE — 82803 BLOOD GASES ANY COMBINATION: CPT

## 2019-01-01 PROCEDURE — 40000047 ZZH STATISTIC CTO2 CONT OXYGEN TECH TIME EA 90 MIN

## 2019-01-01 PROCEDURE — 93880 EXTRACRANIAL BILAT STUDY: CPT

## 2019-01-01 PROCEDURE — 87205 SMEAR GRAM STAIN: CPT | Performed by: SURGERY

## 2019-01-01 PROCEDURE — 85610 PROTHROMBIN TIME: CPT | Performed by: THORACIC SURGERY (CARDIOTHORACIC VASCULAR SURGERY)

## 2019-01-01 PROCEDURE — 87205 SMEAR GRAM STAIN: CPT | Performed by: ANESTHESIOLOGY

## 2019-01-01 PROCEDURE — 83880 ASSAY OF NATRIURETIC PEPTIDE: CPT | Performed by: EMERGENCY MEDICINE

## 2019-01-01 PROCEDURE — 85396 CLOTTING ASSAY WHOLE BLOOD: CPT | Performed by: THORACIC SURGERY (CARDIOTHORACIC VASCULAR SURGERY)

## 2019-01-01 PROCEDURE — 83735 ASSAY OF MAGNESIUM: CPT | Performed by: THORACIC SURGERY (CARDIOTHORACIC VASCULAR SURGERY)

## 2019-01-01 PROCEDURE — C1750 CATH, HEMODIALYSIS,LONG-TERM: HCPCS

## 2019-01-01 PROCEDURE — 85730 THROMBOPLASTIN TIME PARTIAL: CPT | Performed by: THORACIC SURGERY (CARDIOTHORACIC VASCULAR SURGERY)

## 2019-01-01 PROCEDURE — 40000986 XR CHEST PORT 1 VW

## 2019-01-01 PROCEDURE — 82805 BLOOD GASES W/O2 SATURATION: CPT | Performed by: STUDENT IN AN ORGANIZED HEALTH CARE EDUCATION/TRAINING PROGRAM

## 2019-01-01 PROCEDURE — 25000555 ZZHC RX FACTOR IP 250 OP 636: Performed by: THORACIC SURGERY (CARDIOTHORACIC VASCULAR SURGERY)

## 2019-01-01 PROCEDURE — 25000132 ZZH RX MED GY IP 250 OP 250 PS 637: Performed by: THORACIC SURGERY (CARDIOTHORACIC VASCULAR SURGERY)

## 2019-01-01 PROCEDURE — 87088 URINE BACTERIA CULTURE: CPT | Performed by: THORACIC SURGERY (CARDIOTHORACIC VASCULAR SURGERY)

## 2019-01-01 PROCEDURE — 83550 IRON BINDING TEST: CPT | Performed by: STUDENT IN AN ORGANIZED HEALTH CARE EDUCATION/TRAINING PROGRAM

## 2019-01-01 PROCEDURE — 80048 BASIC METABOLIC PNL TOTAL CA: CPT | Performed by: SURGERY

## 2019-01-01 PROCEDURE — 5A1D70Z PERFORMANCE OF URINARY FILTRATION, INTERMITTENT, LESS THAN 6 HOURS PER DAY: ICD-10-PCS | Performed by: THORACIC SURGERY (CARDIOTHORACIC VASCULAR SURGERY)

## 2019-01-01 PROCEDURE — 86901 BLOOD TYPING SEROLOGIC RH(D): CPT | Performed by: THORACIC SURGERY (CARDIOTHORACIC VASCULAR SURGERY)

## 2019-01-01 PROCEDURE — 80048 BASIC METABOLIC PNL TOTAL CA: CPT | Performed by: NURSE PRACTITIONER

## 2019-01-01 PROCEDURE — 25000125 ZZHC RX 250: Performed by: ANESTHESIOLOGY

## 2019-01-01 PROCEDURE — 27210195 ZZH KIT POWER PICC DOUBLE LUMEN

## 2019-01-01 PROCEDURE — 83605 ASSAY OF LACTIC ACID: CPT | Performed by: STUDENT IN AN ORGANIZED HEALTH CARE EDUCATION/TRAINING PROGRAM

## 2019-01-01 PROCEDURE — 71045 X-RAY EXAM CHEST 1 VIEW: CPT | Mod: 76

## 2019-01-01 PROCEDURE — 95951 ZZHC EEG VIDEO EACH 24 HR: CPT | Mod: ZF

## 2019-01-01 PROCEDURE — 86901 BLOOD TYPING SEROLOGIC RH(D): CPT | Performed by: SURGERY

## 2019-01-01 PROCEDURE — 86900 BLOOD TYPING SEROLOGIC ABO: CPT | Performed by: THORACIC SURGERY (CARDIOTHORACIC VASCULAR SURGERY)

## 2019-01-01 PROCEDURE — 82803 BLOOD GASES ANY COMBINATION: CPT | Performed by: THORACIC SURGERY (CARDIOTHORACIC VASCULAR SURGERY)

## 2019-01-01 PROCEDURE — 96365 THER/PROPH/DIAG IV INF INIT: CPT

## 2019-01-01 PROCEDURE — 97530 THERAPEUTIC ACTIVITIES: CPT | Mod: GO | Performed by: OCCUPATIONAL THERAPIST

## 2019-01-01 PROCEDURE — 85384 FIBRINOGEN ACTIVITY: CPT | Performed by: THORACIC SURGERY (CARDIOTHORACIC VASCULAR SURGERY)

## 2019-01-01 PROCEDURE — 95822 EEG COMA OR SLEEP ONLY: CPT | Mod: ZF

## 2019-01-01 PROCEDURE — 36000076 ZZH SURGERY LEVEL 6 EA 15 ADDTL MIN - UMMC: Performed by: SURGERY

## 2019-01-01 PROCEDURE — 76937 US GUIDE VASCULAR ACCESS: CPT

## 2019-01-01 PROCEDURE — 85520 HEPARIN ASSAY: CPT | Performed by: STUDENT IN AN ORGANIZED HEALTH CARE EDUCATION/TRAINING PROGRAM

## 2019-01-01 PROCEDURE — 36000053 ZZH SURGERY LEVEL 2 EA 15 ADDTL MIN - UMMC: Performed by: SURGERY

## 2019-01-01 PROCEDURE — 37000008 ZZH ANESTHESIA TECHNICAL FEE, 1ST 30 MIN: Performed by: SURGERY

## 2019-01-01 PROCEDURE — 25000128 H RX IP 250 OP 636: Performed by: INTERNAL MEDICINE

## 2019-01-01 PROCEDURE — 87088 URINE BACTERIA CULTURE: CPT | Performed by: EMERGENCY MEDICINE

## 2019-01-01 PROCEDURE — 27210738 ZZH ACCESSORY CR2

## 2019-01-01 PROCEDURE — 81001 URINALYSIS AUTO W/SCOPE: CPT | Performed by: THORACIC SURGERY (CARDIOTHORACIC VASCULAR SURGERY)

## 2019-01-01 PROCEDURE — 82330 ASSAY OF CALCIUM: CPT | Performed by: INTERNAL MEDICINE

## 2019-01-01 PROCEDURE — 85384 FIBRINOGEN ACTIVITY: CPT | Performed by: SURGERY

## 2019-01-01 PROCEDURE — 85610 PROTHROMBIN TIME: CPT | Performed by: STUDENT IN AN ORGANIZED HEALTH CARE EDUCATION/TRAINING PROGRAM

## 2019-01-01 PROCEDURE — 84478 ASSAY OF TRIGLYCERIDES: CPT | Performed by: SURGERY

## 2019-01-01 PROCEDURE — 40000048 ZZH STATISTIC DAILY SWAN MONITORING

## 2019-01-01 PROCEDURE — 5A1221Z PERFORMANCE OF CARDIAC OUTPUT, CONTINUOUS: ICD-10-PCS | Performed by: SURGERY

## 2019-01-01 PROCEDURE — 80048 BASIC METABOLIC PNL TOTAL CA: CPT | Performed by: PHYSICIAN ASSISTANT

## 2019-01-01 PROCEDURE — P9041 ALBUMIN (HUMAN),5%, 50ML: HCPCS

## 2019-01-01 PROCEDURE — 99223 1ST HOSP IP/OBS HIGH 75: CPT | Performed by: NURSE PRACTITIONER

## 2019-01-01 PROCEDURE — 82803 BLOOD GASES ANY COMBINATION: CPT | Performed by: STUDENT IN AN ORGANIZED HEALTH CARE EDUCATION/TRAINING PROGRAM

## 2019-01-01 PROCEDURE — 84132 ASSAY OF SERUM POTASSIUM: CPT | Performed by: NURSE PRACTITIONER

## 2019-01-01 PROCEDURE — 84295 ASSAY OF SERUM SODIUM: CPT

## 2019-01-01 PROCEDURE — 27210904 ZZH KIT CR6

## 2019-01-01 PROCEDURE — 84132 ASSAY OF SERUM POTASSIUM: CPT | Performed by: ANESTHESIOLOGY

## 2019-01-01 PROCEDURE — 83605 ASSAY OF LACTIC ACID: CPT | Performed by: ANESTHESIOLOGY

## 2019-01-01 PROCEDURE — 36569 INSJ PICC 5 YR+ W/O IMAGING: CPT

## 2019-01-01 PROCEDURE — 70551 MRI BRAIN STEM W/O DYE: CPT

## 2019-01-01 PROCEDURE — 83036 HEMOGLOBIN GLYCOSYLATED A1C: CPT | Performed by: PHYSICIAN ASSISTANT

## 2019-01-01 PROCEDURE — 25000566 ZZH SEVOFLURANE, EA 15 MIN: Performed by: SURGERY

## 2019-01-01 PROCEDURE — P9016 RBC LEUKOCYTES REDUCED: HCPCS | Performed by: SURGERY

## 2019-01-01 PROCEDURE — 84132 ASSAY OF SERUM POTASSIUM: CPT | Performed by: THORACIC SURGERY (CARDIOTHORACIC VASCULAR SURGERY)

## 2019-01-01 PROCEDURE — 27210136 ZZH KIT CATH ARTERIAL EXT SUPPLY

## 2019-01-01 PROCEDURE — 71045 X-RAY EXAM CHEST 1 VIEW: CPT | Mod: 77

## 2019-01-01 PROCEDURE — 36558 INSERT TUNNELED CV CATH: CPT | Mod: LT

## 2019-01-01 PROCEDURE — 86706 HEP B SURFACE ANTIBODY: CPT | Performed by: GENERAL ACUTE CARE HOSPITAL

## 2019-01-01 PROCEDURE — 40000264 ECHOCARDIOGRAM COMPLETE

## 2019-01-01 PROCEDURE — 40000671 ZZH STATISTIC ANESTHESIA CASE

## 2019-01-01 PROCEDURE — 25000125 ZZHC RX 250: Performed by: EMERGENCY MEDICINE

## 2019-01-01 PROCEDURE — 5A1D90Z PERFORMANCE OF URINARY FILTRATION, CONTINUOUS, GREATER THAN 18 HOURS PER DAY: ICD-10-PCS | Performed by: THORACIC SURGERY (CARDIOTHORACIC VASCULAR SURGERY)

## 2019-01-01 PROCEDURE — 25000132 ZZH RX MED GY IP 250 OP 250 PS 637: Performed by: EMERGENCY MEDICINE

## 2019-01-01 PROCEDURE — 96376 TX/PRO/DX INJ SAME DRUG ADON: CPT

## 2019-01-01 PROCEDURE — 87640 STAPH A DNA AMP PROBE: CPT | Performed by: THORACIC SURGERY (CARDIOTHORACIC VASCULAR SURGERY)

## 2019-01-01 PROCEDURE — 84443 ASSAY THYROID STIM HORMONE: CPT | Performed by: EMERGENCY MEDICINE

## 2019-01-01 PROCEDURE — 25800025 ZZH RX 258: Performed by: THORACIC SURGERY (CARDIOTHORACIC VASCULAR SURGERY)

## 2019-01-01 PROCEDURE — 85730 THROMBOPLASTIN TIME PARTIAL: CPT | Performed by: STUDENT IN AN ORGANIZED HEALTH CARE EDUCATION/TRAINING PROGRAM

## 2019-01-01 PROCEDURE — 40000922 ZZH STATISTIC RCP TIME PACU VENT EA 10 MIN

## 2019-01-01 PROCEDURE — 87070 CULTURE OTHR SPECIMN AEROBIC: CPT | Performed by: ANESTHESIOLOGY

## 2019-01-01 PROCEDURE — 87077 CULTURE AEROBIC IDENTIFY: CPT | Performed by: SURGERY

## 2019-01-01 PROCEDURE — 25000125 ZZHC RX 250: Performed by: NURSE PRACTITIONER

## 2019-01-01 PROCEDURE — 44500 INTRO GASTROINTESTINAL TUBE: CPT | Performed by: DIETITIAN, REGISTERED

## 2019-01-01 PROCEDURE — 40000344 ZZHCL STATISTIC THAWING COMPONENT: Performed by: THORACIC SURGERY (CARDIOTHORACIC VASCULAR SURGERY)

## 2019-01-01 PROCEDURE — 25000131 ZZH RX MED GY IP 250 OP 636 PS 637: Performed by: SURGERY

## 2019-01-01 PROCEDURE — 84132 ASSAY OF SERUM POTASSIUM: CPT | Performed by: INTERNAL MEDICINE

## 2019-01-01 PROCEDURE — P9037 PLATE PHERES LEUKOREDU IRRAD: HCPCS | Performed by: THORACIC SURGERY (CARDIOTHORACIC VASCULAR SURGERY)

## 2019-01-01 PROCEDURE — 96368 THER/DIAG CONCURRENT INF: CPT

## 2019-01-01 PROCEDURE — 82550 ASSAY OF CK (CPK): CPT | Performed by: SURGERY

## 2019-01-01 PROCEDURE — 25000128 H RX IP 250 OP 636: Performed by: EMERGENCY MEDICINE

## 2019-01-01 PROCEDURE — 37000009 ZZH ANESTHESIA TECHNICAL FEE, EACH ADDTL 15 MIN: Performed by: SURGERY

## 2019-01-01 PROCEDURE — 40000358 ZZHCL STATISTIC DRUG SCREEN MULTIPLE (METRO): Performed by: STUDENT IN AN ORGANIZED HEALTH CARE EDUCATION/TRAINING PROGRAM

## 2019-01-01 PROCEDURE — 85018 HEMOGLOBIN: CPT | Performed by: THORACIC SURGERY (CARDIOTHORACIC VASCULAR SURGERY)

## 2019-01-01 PROCEDURE — 84478 ASSAY OF TRIGLYCERIDES: CPT | Performed by: PHYSICIAN ASSISTANT

## 2019-01-01 PROCEDURE — 82330 ASSAY OF CALCIUM: CPT

## 2019-01-01 PROCEDURE — 84295 ASSAY OF SERUM SODIUM: CPT | Performed by: STUDENT IN AN ORGANIZED HEALTH CARE EDUCATION/TRAINING PROGRAM

## 2019-01-01 PROCEDURE — 25000131 ZZH RX MED GY IP 250 OP 636 PS 637: Performed by: STUDENT IN AN ORGANIZED HEALTH CARE EDUCATION/TRAINING PROGRAM

## 2019-01-01 PROCEDURE — 82810 BLOOD GASES O2 SAT ONLY: CPT | Performed by: SURGERY

## 2019-01-01 PROCEDURE — 82947 ASSAY GLUCOSE BLOOD QUANT: CPT

## 2019-01-01 PROCEDURE — 80048 BASIC METABOLIC PNL TOTAL CA: CPT | Performed by: ANESTHESIOLOGY

## 2019-01-01 PROCEDURE — 33968 REMOVE AORTIC ASSIST DEVICE: CPT

## 2019-01-01 PROCEDURE — 80069 RENAL FUNCTION PANEL: CPT | Performed by: SURGERY

## 2019-01-01 PROCEDURE — 94681 O2 UPTK CO2 OUTP % O2 XTRC: CPT

## 2019-01-01 PROCEDURE — 87077 CULTURE AEROBIC IDENTIFY: CPT | Performed by: ANESTHESIOLOGY

## 2019-01-01 PROCEDURE — 85049 AUTOMATED PLATELET COUNT: CPT | Performed by: THORACIC SURGERY (CARDIOTHORACIC VASCULAR SURGERY)

## 2019-01-01 PROCEDURE — 80076 HEPATIC FUNCTION PANEL: CPT | Performed by: SURGERY

## 2019-01-01 PROCEDURE — 02100Z9 BYPASS CORONARY ARTERY, ONE ARTERY FROM LEFT INTERNAL MAMMARY, OPEN APPROACH: ICD-10-PCS | Performed by: SURGERY

## 2019-01-01 PROCEDURE — 82330 ASSAY OF CALCIUM: CPT | Performed by: STUDENT IN AN ORGANIZED HEALTH CARE EDUCATION/TRAINING PROGRAM

## 2019-01-01 PROCEDURE — 80053 COMPREHEN METABOLIC PANEL: CPT | Performed by: EMERGENCY MEDICINE

## 2019-01-01 PROCEDURE — 87186 SC STD MICRODIL/AGAR DIL: CPT | Performed by: THORACIC SURGERY (CARDIOTHORACIC VASCULAR SURGERY)

## 2019-01-01 PROCEDURE — 40000893 ZZH STATISTIC PT IP EVAL DEFER

## 2019-01-01 PROCEDURE — P9041 ALBUMIN (HUMAN),5%, 50ML: HCPCS | Performed by: THORACIC SURGERY (CARDIOTHORACIC VASCULAR SURGERY)

## 2019-01-01 PROCEDURE — 80048 BASIC METABOLIC PNL TOTAL CA: CPT | Performed by: EMERGENCY MEDICINE

## 2019-01-01 PROCEDURE — 5A1935Z RESPIRATORY VENTILATION, LESS THAN 24 CONSECUTIVE HOURS: ICD-10-PCS | Performed by: INTERNAL MEDICINE

## 2019-01-01 PROCEDURE — 84484 ASSAY OF TROPONIN QUANT: CPT | Performed by: EMERGENCY MEDICINE

## 2019-01-01 PROCEDURE — 06BQ0ZZ EXCISION OF LEFT SAPHENOUS VEIN, OPEN APPROACH: ICD-10-PCS | Performed by: SURGERY

## 2019-01-01 PROCEDURE — 85520 HEPARIN ASSAY: CPT | Performed by: SURGERY

## 2019-01-01 PROCEDURE — 99285 EMERGENCY DEPT VISIT HI MDM: CPT | Mod: 25

## 2019-01-01 PROCEDURE — 82947 ASSAY GLUCOSE BLOOD QUANT: CPT | Performed by: THORACIC SURGERY (CARDIOTHORACIC VASCULAR SURGERY)

## 2019-01-01 PROCEDURE — P9041 ALBUMIN (HUMAN),5%, 50ML: HCPCS | Performed by: SURGERY

## 2019-01-01 PROCEDURE — 87106 FUNGI IDENTIFICATION YEAST: CPT | Performed by: ANESTHESIOLOGY

## 2019-01-01 PROCEDURE — 36000074 ZZH SURGERY LEVEL 6 1ST 30 MIN - UMMC: Performed by: SURGERY

## 2019-01-01 PROCEDURE — 87070 CULTURE OTHR SPECIMN AEROBIC: CPT | Performed by: SURGERY

## 2019-01-01 PROCEDURE — 41000018 ZZH PER-PERFUSION 1ST 30 MIN: Performed by: SURGERY

## 2019-01-01 PROCEDURE — 25500064 ZZH RX 255 OP 636: Performed by: SURGERY

## 2019-01-01 PROCEDURE — 27210995 ZZH RX 272: Performed by: SURGERY

## 2019-01-01 PROCEDURE — P9041 ALBUMIN (HUMAN),5%, 50ML: HCPCS | Performed by: STUDENT IN AN ORGANIZED HEALTH CARE EDUCATION/TRAINING PROGRAM

## 2019-01-01 PROCEDURE — 02H633Z INSERTION OF INFUSION DEVICE INTO RIGHT ATRIUM, PERCUTANEOUS APPROACH: ICD-10-PCS | Performed by: PHYSICIAN ASSISTANT

## 2019-01-01 PROCEDURE — 81001 URINALYSIS AUTO W/SCOPE: CPT | Performed by: EMERGENCY MEDICINE

## 2019-01-01 PROCEDURE — 27210460 ZZH PUMP APP ADULT PERFUSION: Performed by: SURGERY

## 2019-01-01 PROCEDURE — 84295 ASSAY OF SERUM SODIUM: CPT | Performed by: ANESTHESIOLOGY

## 2019-01-01 PROCEDURE — 27210732 ZZH ACCESSORY CR1

## 2019-01-01 PROCEDURE — 74176 CT ABD & PELVIS W/O CONTRAST: CPT

## 2019-01-01 PROCEDURE — 40000556 ZZH STATISTIC PERIPHERAL IV START W US GUIDANCE

## 2019-01-01 PROCEDURE — P9037 PLATE PHERES LEUKOREDU IRRAD: HCPCS | Performed by: STUDENT IN AN ORGANIZED HEALTH CARE EDUCATION/TRAINING PROGRAM

## 2019-01-01 RX ORDER — DEXMEDETOMIDINE HYDROCHLORIDE 4 UG/ML
0.2-0.7 INJECTION, SOLUTION INTRAVENOUS CONTINUOUS
Status: DISCONTINUED | OUTPATIENT
Start: 2019-01-01 | End: 2019-01-01 | Stop reason: CLARIF

## 2019-01-01 RX ORDER — CALCIUM CARBONATE 500 MG/1
1000 TABLET, CHEWABLE ORAL 4 TIMES DAILY PRN
Status: DISCONTINUED | OUTPATIENT
Start: 2019-01-01 | End: 2019-01-01

## 2019-01-01 RX ORDER — DIPHENHYDRAMINE HCL 12.5MG/5ML
12.5 LIQUID (ML) ORAL EVERY 6 HOURS PRN
Status: DISCONTINUED | OUTPATIENT
Start: 2019-01-01 | End: 2019-01-01

## 2019-01-01 RX ORDER — CALCIUM CHLORIDE 100 MG/ML
INJECTION INTRAVENOUS; INTRAVENTRICULAR PRN
Status: DISCONTINUED | OUTPATIENT
Start: 2019-01-01 | End: 2019-01-01

## 2019-01-01 RX ORDER — DIPHENHYDRAMINE HYDROCHLORIDE 50 MG/ML
12.5 INJECTION INTRAMUSCULAR; INTRAVENOUS EVERY 6 HOURS PRN
Status: DISCONTINUED | OUTPATIENT
Start: 2019-01-01 | End: 2019-01-01 | Stop reason: HOSPADM

## 2019-01-01 RX ORDER — ASPIRIN 81 MG/1
81 TABLET, CHEWABLE ORAL DAILY
Status: DISCONTINUED | OUTPATIENT
Start: 2019-01-01 | End: 2019-01-01

## 2019-01-01 RX ORDER — METOPROLOL TARTRATE 1 MG/ML
5 INJECTION, SOLUTION INTRAVENOUS ONCE
Status: COMPLETED | OUTPATIENT
Start: 2019-01-01 | End: 2019-01-01

## 2019-01-01 RX ORDER — POLYETHYLENE GLYCOL 3350 17 G/17G
17 POWDER, FOR SOLUTION ORAL DAILY
Qty: 30 PACKET | Refills: 0 | DISCHARGE
Start: 2019-01-01 | End: 2019-01-01

## 2019-01-01 RX ORDER — PROPOFOL 10 MG/ML
5-75 INJECTION, EMULSION INTRAVENOUS CONTINUOUS
Status: DISCONTINUED | OUTPATIENT
Start: 2019-01-01 | End: 2019-01-01

## 2019-01-01 RX ORDER — ALBUMIN, HUMAN INJ 5% 5 %
25 SOLUTION INTRAVENOUS ONCE
Status: COMPLETED | OUTPATIENT
Start: 2019-01-01 | End: 2019-01-01

## 2019-01-01 RX ORDER — HYDRALAZINE HYDROCHLORIDE 25 MG/1
25 TABLET, FILM COATED ORAL EVERY 6 HOURS
Qty: 120 TABLET | Refills: 0 | DISCHARGE
Start: 2019-01-01 | End: 2019-01-01

## 2019-01-01 RX ORDER — ACETAMINOPHEN 325 MG/1
650 TABLET ORAL EVERY 4 HOURS PRN
DISCHARGE
Start: 2019-01-01 | End: 2019-01-01

## 2019-01-01 RX ORDER — HEPARIN SODIUM 5000 [USP'U]/.5ML
5000 INJECTION, SOLUTION INTRAVENOUS; SUBCUTANEOUS EVERY 8 HOURS SCHEDULED
Status: DISCONTINUED | OUTPATIENT
Start: 2019-01-01 | End: 2019-01-01

## 2019-01-01 RX ORDER — HYDRALAZINE HYDROCHLORIDE 20 MG/ML
10 INJECTION INTRAMUSCULAR; INTRAVENOUS EVERY 6 HOURS PRN
Status: DISCONTINUED | OUTPATIENT
Start: 2019-01-01 | End: 2019-01-01

## 2019-01-01 RX ORDER — LEVOFLOXACIN 5 MG/ML
500 INJECTION, SOLUTION INTRAVENOUS
Status: COMPLETED | OUTPATIENT
Start: 2019-01-01 | End: 2019-01-01

## 2019-01-01 RX ORDER — MUPIROCIN 20 MG/G
0.5 OINTMENT TOPICAL 2 TIMES DAILY
Status: DISCONTINUED | OUTPATIENT
Start: 2019-01-01 | End: 2019-01-01 | Stop reason: CLARIF

## 2019-01-01 RX ORDER — HYDRALAZINE HYDROCHLORIDE 25 MG/1
25 TABLET, FILM COATED ORAL EVERY 6 HOURS
Status: DISCONTINUED | OUTPATIENT
Start: 2019-01-01 | End: 2019-01-01 | Stop reason: HOSPADM

## 2019-01-01 RX ORDER — LABETALOL HYDROCHLORIDE 5 MG/ML
10-20 INJECTION, SOLUTION INTRAVENOUS EVERY 4 HOURS PRN
Status: DISCONTINUED | OUTPATIENT
Start: 2019-01-01 | End: 2019-01-01

## 2019-01-01 RX ORDER — ACETAMINOPHEN 325 MG/1
650 TABLET ORAL EVERY 4 HOURS PRN
Status: DISCONTINUED | OUTPATIENT
Start: 2019-01-01 | End: 2019-01-01 | Stop reason: HOSPADM

## 2019-01-01 RX ORDER — AMIODARONE HYDROCHLORIDE 200 MG/1
200 TABLET ORAL DAILY
Qty: 30 TABLET | Refills: 0 | Status: SHIPPED | OUTPATIENT
Start: 2019-01-01 | End: 2019-01-01

## 2019-01-01 RX ORDER — DEXMEDETOMIDINE HYDROCHLORIDE 4 UG/ML
.5-1.2 INJECTION, SOLUTION INTRAVENOUS CONTINUOUS
Status: DISCONTINUED | OUTPATIENT
Start: 2019-01-01 | End: 2019-01-01

## 2019-01-01 RX ORDER — MUPIROCIN 20 MG/G
0.5 OINTMENT TOPICAL 2 TIMES DAILY
Status: DISCONTINUED | OUTPATIENT
Start: 2019-01-01 | End: 2019-01-01

## 2019-01-01 RX ORDER — AMIODARONE HYDROCHLORIDE 200 MG/1
400 TABLET ORAL 2 TIMES DAILY
Status: DISCONTINUED | OUTPATIENT
Start: 2019-01-01 | End: 2019-01-01 | Stop reason: DRUGHIGH

## 2019-01-01 RX ORDER — PANTOPRAZOLE SODIUM 40 MG/1
40 TABLET, DELAYED RELEASE ORAL
Status: DISCONTINUED | OUTPATIENT
Start: 2019-01-01 | End: 2019-01-01

## 2019-01-01 RX ORDER — HYDRALAZINE HYDROCHLORIDE 20 MG/ML
5 INJECTION INTRAMUSCULAR; INTRAVENOUS EVERY 4 HOURS PRN
Status: DISCONTINUED | OUTPATIENT
Start: 2019-01-01 | End: 2019-01-01

## 2019-01-01 RX ORDER — NICOTINE POLACRILEX 4 MG
15-30 LOZENGE BUCCAL
Status: DISCONTINUED | OUTPATIENT
Start: 2019-01-01 | End: 2019-01-01

## 2019-01-01 RX ORDER — CEFAZOLIN SODIUM 500 MG/2.2ML
500 INJECTION, POWDER, FOR SOLUTION INTRAMUSCULAR; INTRAVENOUS
Status: COMPLETED | OUTPATIENT
Start: 2019-01-01 | End: 2019-01-01

## 2019-01-01 RX ORDER — DIPHENHYDRAMINE HYDROCHLORIDE 50 MG/ML
12.5 INJECTION INTRAMUSCULAR; INTRAVENOUS EVERY 6 HOURS PRN
Status: DISCONTINUED | OUTPATIENT
Start: 2019-01-01 | End: 2019-01-01

## 2019-01-01 RX ORDER — NITROGLYCERIN 10 MG/100ML
INJECTION INTRAVENOUS PRN
Status: DISCONTINUED | OUTPATIENT
Start: 2019-01-01 | End: 2019-01-01

## 2019-01-01 RX ORDER — FAMOTIDINE 10 MG
20 TABLET ORAL 2 TIMES DAILY
Status: DISCONTINUED | OUTPATIENT
Start: 2019-01-01 | End: 2019-01-01

## 2019-01-01 RX ORDER — BUMETANIDE 0.25 MG/ML
4 INJECTION INTRAMUSCULAR; INTRAVENOUS ONCE
Status: COMPLETED | OUTPATIENT
Start: 2019-01-01 | End: 2019-01-01

## 2019-01-01 RX ORDER — ASPIRIN 81 MG/1
81 TABLET, CHEWABLE ORAL DAILY
Qty: 30 TABLET | Refills: 0 | Status: SHIPPED | OUTPATIENT
Start: 2019-01-01 | End: 2019-01-01

## 2019-01-01 RX ORDER — CEFTRIAXONE 1 G/1
1 INJECTION, POWDER, FOR SOLUTION INTRAMUSCULAR; INTRAVENOUS ONCE
Status: COMPLETED | OUTPATIENT
Start: 2019-01-01 | End: 2019-01-01

## 2019-01-01 RX ORDER — IPRATROPIUM BROMIDE AND ALBUTEROL SULFATE 2.5; .5 MG/3ML; MG/3ML
3 SOLUTION RESPIRATORY (INHALATION) EVERY 4 HOURS PRN
DISCHARGE
Start: 2019-01-01 | End: 2019-01-01

## 2019-01-01 RX ORDER — HYDRALAZINE HYDROCHLORIDE 20 MG/ML
10 INJECTION INTRAMUSCULAR; INTRAVENOUS EVERY 30 MIN PRN
Status: DISCONTINUED | OUTPATIENT
Start: 2019-01-01 | End: 2019-01-01

## 2019-01-01 RX ORDER — QUETIAPINE FUMARATE 25 MG/1
25 TABLET, FILM COATED ORAL ONCE
Status: DISCONTINUED | OUTPATIENT
Start: 2019-01-01 | End: 2019-01-01

## 2019-01-01 RX ORDER — METOPROLOL TARTRATE 1 MG/ML
5 INJECTION, SOLUTION INTRAVENOUS EVERY 5 MIN PRN
Status: DISCONTINUED | OUTPATIENT
Start: 2019-01-01 | End: 2019-01-01

## 2019-01-01 RX ORDER — MAGNESIUM SULFATE HEPTAHYDRATE 40 MG/ML
2 INJECTION, SOLUTION INTRAVENOUS DAILY PRN
Status: DISCONTINUED | OUTPATIENT
Start: 2019-01-01 | End: 2019-01-01 | Stop reason: HOSPADM

## 2019-01-01 RX ORDER — ASPIRIN 81 MG/1
81 TABLET, CHEWABLE ORAL DAILY
Status: DISCONTINUED | OUTPATIENT
Start: 2019-01-01 | End: 2019-01-01 | Stop reason: HOSPADM

## 2019-01-01 RX ORDER — ALBUMIN, HUMAN INJ 5% 5 %
500-1000 SOLUTION INTRAVENOUS
Status: COMPLETED | OUTPATIENT
Start: 2019-01-01 | End: 2019-01-01

## 2019-01-01 RX ORDER — FUROSEMIDE 10 MG/ML
60 INJECTION INTRAMUSCULAR; INTRAVENOUS ONCE
Status: DISCONTINUED | OUTPATIENT
Start: 2019-01-01 | End: 2019-01-01

## 2019-01-01 RX ORDER — SODIUM CHLORIDE 9 MG/ML
INJECTION, SOLUTION INTRAVENOUS CONTINUOUS
Status: DISCONTINUED | OUTPATIENT
Start: 2019-01-01 | End: 2019-01-01

## 2019-01-01 RX ORDER — PROPOFOL 10 MG/ML
INJECTION, EMULSION INTRAVENOUS
Status: COMPLETED
Start: 2019-01-01 | End: 2019-01-01

## 2019-01-01 RX ORDER — FENTANYL CITRATE 50 UG/ML
INJECTION, SOLUTION INTRAMUSCULAR; INTRAVENOUS PRN
Status: DISCONTINUED | OUTPATIENT
Start: 2019-01-01 | End: 2019-01-01

## 2019-01-01 RX ORDER — ONDANSETRON 4 MG/1
4 TABLET, ORALLY DISINTEGRATING ORAL EVERY 6 HOURS PRN
Status: DISCONTINUED | OUTPATIENT
Start: 2019-01-01 | End: 2019-01-01

## 2019-01-01 RX ORDER — LABETALOL HYDROCHLORIDE 5 MG/ML
10 INJECTION, SOLUTION INTRAVENOUS
Status: DISCONTINUED | OUTPATIENT
Start: 2019-01-01 | End: 2019-01-01

## 2019-01-01 RX ORDER — MAGNESIUM SULFATE HEPTAHYDRATE 40 MG/ML
2 INJECTION, SOLUTION INTRAVENOUS EVERY 6 HOURS PRN
Status: DISCONTINUED | OUTPATIENT
Start: 2019-01-01 | End: 2019-01-01

## 2019-01-01 RX ORDER — METOPROLOL TARTRATE 25 MG/1
25 TABLET, FILM COATED ORAL 3 TIMES DAILY
DISCHARGE
Start: 2019-01-01 | End: 2019-01-01

## 2019-01-01 RX ORDER — ONDANSETRON 4 MG/1
4 TABLET, ORALLY DISINTEGRATING ORAL EVERY 6 HOURS PRN
Status: DISCONTINUED | OUTPATIENT
Start: 2019-01-01 | End: 2019-01-01 | Stop reason: HOSPADM

## 2019-01-01 RX ORDER — POTASSIUM CHLORIDE 29.8 MG/ML
20 INJECTION INTRAVENOUS
Status: DISCONTINUED | OUTPATIENT
Start: 2019-01-01 | End: 2019-01-01

## 2019-01-01 RX ORDER — QUETIAPINE FUMARATE 50 MG/1
50 TABLET, FILM COATED ORAL AT BEDTIME
Status: DISCONTINUED | OUTPATIENT
Start: 2019-01-01 | End: 2019-01-01

## 2019-01-01 RX ORDER — CEFTRIAXONE 1 G/1
1 INJECTION, POWDER, FOR SOLUTION INTRAMUSCULAR; INTRAVENOUS EVERY 24 HOURS
Status: DISCONTINUED | OUTPATIENT
Start: 2019-01-01 | End: 2019-01-01

## 2019-01-01 RX ORDER — LABETALOL HYDROCHLORIDE 5 MG/ML
10 INJECTION, SOLUTION INTRAVENOUS ONCE
Status: COMPLETED | OUTPATIENT
Start: 2019-01-01 | End: 2019-01-01

## 2019-01-01 RX ORDER — HEPARIN SODIUM,PORCINE 10 UNIT/ML
5-10 VIAL (ML) INTRAVENOUS
Status: DISCONTINUED | OUTPATIENT
Start: 2019-01-01 | End: 2019-01-01 | Stop reason: HOSPADM

## 2019-01-01 RX ORDER — HEPARIN SODIUM 1000 [USP'U]/ML
3 INJECTION, SOLUTION INTRAVENOUS; SUBCUTANEOUS ONCE
Status: COMPLETED | OUTPATIENT
Start: 2019-01-01 | End: 2019-01-01

## 2019-01-01 RX ORDER — ESMOLOL HYDROCHLORIDE 20 MG/ML
50-300 INJECTION, SOLUTION INTRAVENOUS CONTINUOUS
Status: DISCONTINUED | OUTPATIENT
Start: 2019-01-01 | End: 2019-01-01

## 2019-01-01 RX ORDER — POTASSIUM CL/LIDO/0.9 % NACL 10MEQ/0.1L
10 INTRAVENOUS SOLUTION, PIGGYBACK (ML) INTRAVENOUS
Status: DISCONTINUED | OUTPATIENT
Start: 2019-01-01 | End: 2019-01-01

## 2019-01-01 RX ORDER — NITROGLYCERIN 20 MG/100ML
INJECTION INTRAVENOUS CONTINUOUS PRN
Status: DISCONTINUED | OUTPATIENT
Start: 2019-01-01 | End: 2019-01-01

## 2019-01-01 RX ORDER — METOPROLOL TARTRATE 1 MG/ML
5 INJECTION, SOLUTION INTRAVENOUS EVERY 4 HOURS PRN
Status: DISCONTINUED | OUTPATIENT
Start: 2019-01-01 | End: 2019-01-01

## 2019-01-01 RX ORDER — AMIODARONE HYDROCHLORIDE 200 MG/1
400 TABLET ORAL 2 TIMES DAILY
Status: DISCONTINUED | OUTPATIENT
Start: 2019-01-01 | End: 2019-01-01

## 2019-01-01 RX ORDER — HEPARIN SODIUM,PORCINE 10 UNIT/ML
2-5 VIAL (ML) INTRAVENOUS
Status: DISCONTINUED | OUTPATIENT
Start: 2019-01-01 | End: 2019-01-01

## 2019-01-01 RX ORDER — LIDOCAINE HYDROCHLORIDE AND EPINEPHRINE 10; 10 MG/ML; UG/ML
INJECTION, SOLUTION INFILTRATION; PERINEURAL PRN
Status: DISCONTINUED | OUTPATIENT
Start: 2019-01-01 | End: 2019-01-01 | Stop reason: HOSPADM

## 2019-01-01 RX ORDER — AMINO AC/PROTEIN HYDR/WHEY PRO 10G-100/30
1 LIQUID (ML) ORAL DAILY
Status: DISCONTINUED | OUTPATIENT
Start: 2019-01-01 | End: 2019-01-01

## 2019-01-01 RX ORDER — HEPARIN SODIUM 1000 [USP'U]/ML
INJECTION, SOLUTION INTRAVENOUS; SUBCUTANEOUS PRN
Status: DISCONTINUED | OUTPATIENT
Start: 2019-01-01 | End: 2019-01-01

## 2019-01-01 RX ORDER — B COMPLEX C NO.10/FOLIC ACID 900MCG/5ML
5 LIQUID (ML) ORAL DAILY
Status: DISCONTINUED | OUTPATIENT
Start: 2019-01-01 | End: 2019-01-01 | Stop reason: HOSPADM

## 2019-01-01 RX ORDER — NALOXONE HYDROCHLORIDE 0.4 MG/ML
.1-.4 INJECTION, SOLUTION INTRAMUSCULAR; INTRAVENOUS; SUBCUTANEOUS
Status: DISCONTINUED | OUTPATIENT
Start: 2019-01-01 | End: 2019-01-01

## 2019-01-01 RX ORDER — CEFAZOLIN SODIUM 1 G/3ML
INJECTION, POWDER, FOR SOLUTION INTRAMUSCULAR; INTRAVENOUS PRN
Status: DISCONTINUED | OUTPATIENT
Start: 2019-01-01 | End: 2019-01-01

## 2019-01-01 RX ORDER — LIDOCAINE 40 MG/G
CREAM TOPICAL
Status: DISCONTINUED | OUTPATIENT
Start: 2019-01-01 | End: 2019-01-01

## 2019-01-01 RX ORDER — BUSPIRONE HYDROCHLORIDE 5 MG/1
5 TABLET ORAL 2 TIMES DAILY
Status: DISCONTINUED | OUTPATIENT
Start: 2019-01-01 | End: 2019-01-01

## 2019-01-01 RX ORDER — DEXTROSE MONOHYDRATE 25 G/50ML
25-50 INJECTION, SOLUTION INTRAVENOUS
Status: DISCONTINUED | OUTPATIENT
Start: 2019-01-01 | End: 2019-01-01

## 2019-01-01 RX ORDER — POTASSIUM CHLORIDE 29.8 MG/ML
20 INJECTION INTRAVENOUS EVERY 6 HOURS PRN
Status: DISCONTINUED | OUTPATIENT
Start: 2019-01-01 | End: 2019-01-01

## 2019-01-01 RX ORDER — CEFAZOLIN SODIUM 2 G/100ML
2 INJECTION, SOLUTION INTRAVENOUS EVERY 8 HOURS
Status: DISCONTINUED | OUTPATIENT
Start: 2019-01-01 | End: 2019-01-01

## 2019-01-01 RX ORDER — SODIUM CHLORIDE 9 MG/ML
INJECTION, SOLUTION INTRAVENOUS CONTINUOUS PRN
Status: DISCONTINUED | OUTPATIENT
Start: 2019-01-01 | End: 2019-01-01

## 2019-01-01 RX ORDER — POTASSIUM CHLORIDE 1.5 G/1.58G
20-40 POWDER, FOR SOLUTION ORAL
Status: DISCONTINUED | OUTPATIENT
Start: 2019-01-01 | End: 2019-01-01

## 2019-01-01 RX ORDER — FENTANYL CITRATE 50 UG/ML
INJECTION, SOLUTION INTRAMUSCULAR; INTRAVENOUS
Status: COMPLETED
Start: 2019-01-01 | End: 2019-01-01

## 2019-01-01 RX ORDER — PROPOFOL 10 MG/ML
5-75 INJECTION, EMULSION INTRAVENOUS CONTINUOUS
Status: DISCONTINUED | OUTPATIENT
Start: 2019-01-01 | End: 2019-01-01 | Stop reason: CLARIF

## 2019-01-01 RX ORDER — DIPHENHYDRAMINE HCL 12.5MG/5ML
12.5 LIQUID (ML) ORAL EVERY 6 HOURS PRN
Status: DISCONTINUED | OUTPATIENT
Start: 2019-01-01 | End: 2019-01-01 | Stop reason: HOSPADM

## 2019-01-01 RX ORDER — METOPROLOL TARTRATE 25 MG/1
25 TABLET, FILM COATED ORAL 3 TIMES DAILY
Status: DISCONTINUED | OUTPATIENT
Start: 2019-01-01 | End: 2019-01-01

## 2019-01-01 RX ORDER — QUETIAPINE FUMARATE 25 MG/1
25 TABLET, FILM COATED ORAL ONCE
Status: COMPLETED | OUTPATIENT
Start: 2019-01-01 | End: 2019-01-01

## 2019-01-01 RX ORDER — AMIODARONE HYDROCHLORIDE 200 MG/1
200 TABLET ORAL DAILY
Status: DISCONTINUED | OUTPATIENT
Start: 2019-01-01 | End: 2019-01-01 | Stop reason: HOSPADM

## 2019-01-01 RX ORDER — ATORVASTATIN CALCIUM 40 MG/1
40 TABLET, FILM COATED ORAL EVERY EVENING
Qty: 30 TABLET | Refills: 0 | DISCHARGE
Start: 2019-01-01 | End: 2019-01-01

## 2019-01-01 RX ORDER — IPRATROPIUM BROMIDE AND ALBUTEROL SULFATE 2.5; .5 MG/3ML; MG/3ML
3 SOLUTION RESPIRATORY (INHALATION) ONCE
Status: DISCONTINUED | OUTPATIENT
Start: 2019-01-01 | End: 2019-01-01

## 2019-01-01 RX ORDER — HYDRALAZINE HYDROCHLORIDE 20 MG/ML
5-10 INJECTION INTRAMUSCULAR; INTRAVENOUS EVERY 4 HOURS PRN
Status: DISCONTINUED | OUTPATIENT
Start: 2019-01-01 | End: 2019-01-01 | Stop reason: HOSPADM

## 2019-01-01 RX ORDER — ALBUTEROL SULFATE 90 UG/1
6 AEROSOL, METERED RESPIRATORY (INHALATION) EVERY 4 HOURS PRN
Status: DISCONTINUED | OUTPATIENT
Start: 2019-01-01 | End: 2019-01-01 | Stop reason: HOSPADM

## 2019-01-01 RX ORDER — DEXTROSE MONOHYDRATE, SODIUM CHLORIDE, AND POTASSIUM CHLORIDE 50; 1.49; 4.5 G/1000ML; G/1000ML; G/1000ML
INJECTION, SOLUTION INTRAVENOUS CONTINUOUS
Status: DISCONTINUED | OUTPATIENT
Start: 2019-01-01 | End: 2019-01-01

## 2019-01-01 RX ORDER — CEFAZOLIN SODIUM 1 G/3ML
1 INJECTION, POWDER, FOR SOLUTION INTRAMUSCULAR; INTRAVENOUS SEE ADMIN INSTRUCTIONS
Status: DISCONTINUED | OUTPATIENT
Start: 2019-01-01 | End: 2019-01-01 | Stop reason: HOSPADM

## 2019-01-01 RX ORDER — FENTANYL CITRATE 50 UG/ML
50 INJECTION, SOLUTION INTRAMUSCULAR; INTRAVENOUS
Status: COMPLETED | OUTPATIENT
Start: 2019-01-01 | End: 2019-01-01

## 2019-01-01 RX ORDER — HEPARIN SODIUM 1000 [USP'U]/ML
INJECTION, SOLUTION INTRAVENOUS; SUBCUTANEOUS PRN
Status: DISCONTINUED | OUTPATIENT
Start: 2019-01-01 | End: 2019-01-01 | Stop reason: HOSPADM

## 2019-01-01 RX ORDER — CEFAZOLIN SODIUM 2 G/100ML
2 INJECTION, SOLUTION INTRAVENOUS EVERY 12 HOURS
Status: DISCONTINUED | OUTPATIENT
Start: 2019-01-01 | End: 2019-01-01

## 2019-01-01 RX ORDER — LABETALOL HYDROCHLORIDE 5 MG/ML
10-20 INJECTION, SOLUTION INTRAVENOUS EVERY 4 HOURS PRN
Status: DISCONTINUED | OUTPATIENT
Start: 2019-01-01 | End: 2019-01-01 | Stop reason: HOSPADM

## 2019-01-01 RX ORDER — FUROSEMIDE 10 MG/ML
60 INJECTION INTRAMUSCULAR; INTRAVENOUS ONCE
Status: COMPLETED | OUTPATIENT
Start: 2019-01-01 | End: 2019-01-01

## 2019-01-01 RX ORDER — SIMVASTATIN 40 MG
40 TABLET ORAL AT BEDTIME
Status: DISCONTINUED | OUTPATIENT
Start: 2019-01-01 | End: 2019-01-01

## 2019-01-01 RX ORDER — PROTAMINE SULFATE 10 MG/ML
INJECTION, SOLUTION INTRAVENOUS PRN
Status: DISCONTINUED | OUTPATIENT
Start: 2019-01-01 | End: 2019-01-01

## 2019-01-01 RX ORDER — HEPARIN SODIUM 10000 [USP'U]/100ML
0-3500 INJECTION, SOLUTION INTRAVENOUS CONTINUOUS
Status: DISCONTINUED | OUTPATIENT
Start: 2019-01-01 | End: 2019-01-01 | Stop reason: CLARIF

## 2019-01-01 RX ORDER — FENTANYL CITRATE 50 UG/ML
50-100 INJECTION, SOLUTION INTRAMUSCULAR; INTRAVENOUS
Status: DISCONTINUED | OUTPATIENT
Start: 2019-01-01 | End: 2019-01-01 | Stop reason: CLARIF

## 2019-01-01 RX ORDER — AMINO AC/PROTEIN HYDR/WHEY PRO 10G-100/30
1 LIQUID (ML) ORAL 2 TIMES DAILY
Qty: 60 PACKET | Refills: 0 | Status: SHIPPED | OUTPATIENT
Start: 2019-01-01 | End: 2019-01-01

## 2019-01-01 RX ORDER — POLYETHYLENE GLYCOL 3350 17 G/17G
17 POWDER, FOR SOLUTION ORAL DAILY
Status: DISCONTINUED | OUTPATIENT
Start: 2019-01-01 | End: 2019-01-01 | Stop reason: HOSPADM

## 2019-01-01 RX ORDER — ALBUMIN, HUMAN INJ 5% 5 %
SOLUTION INTRAVENOUS
Status: COMPLETED
Start: 2019-01-01 | End: 2019-01-01

## 2019-01-01 RX ORDER — OXYCODONE HYDROCHLORIDE 5 MG/1
5 TABLET ORAL EVERY 4 HOURS PRN
Qty: 30 TABLET | Refills: 0 | Status: SHIPPED | OUTPATIENT
Start: 2019-01-01 | End: 2019-01-01

## 2019-01-01 RX ORDER — FENTANYL CITRATE 50 UG/ML
INJECTION, SOLUTION INTRAMUSCULAR; INTRAVENOUS
Status: DISCONTINUED
Start: 2019-01-01 | End: 2019-01-01

## 2019-01-01 RX ORDER — METOPROLOL TARTRATE 1 MG/ML
INJECTION, SOLUTION INTRAVENOUS
Status: COMPLETED
Start: 2019-01-01 | End: 2019-01-01

## 2019-01-01 RX ORDER — MAGNESIUM SULFATE HEPTAHYDRATE 40 MG/ML
4 INJECTION, SOLUTION INTRAVENOUS EVERY 4 HOURS PRN
Status: DISCONTINUED | OUTPATIENT
Start: 2019-01-01 | End: 2019-01-01 | Stop reason: HOSPADM

## 2019-01-01 RX ORDER — MEPERIDINE HYDROCHLORIDE 50 MG/ML
12.5-25 INJECTION INTRAMUSCULAR; INTRAVENOUS; SUBCUTANEOUS
Status: DISCONTINUED | OUTPATIENT
Start: 2019-01-01 | End: 2019-01-01 | Stop reason: CLARIF

## 2019-01-01 RX ORDER — LEVOFLOXACIN 5 MG/ML
750 INJECTION, SOLUTION INTRAVENOUS EVERY 24 HOURS
Status: COMPLETED | OUTPATIENT
Start: 2019-01-01 | End: 2019-01-01

## 2019-01-01 RX ORDER — CEFAZOLIN SODIUM 2 G/100ML
2 INJECTION, SOLUTION INTRAVENOUS
Status: DISCONTINUED | OUTPATIENT
Start: 2019-01-01 | End: 2019-01-01 | Stop reason: HOSPADM

## 2019-01-01 RX ORDER — PROTAMINE SULFATE 10 MG/ML
50 INJECTION, SOLUTION INTRAVENOUS ONCE
Status: COMPLETED | OUTPATIENT
Start: 2019-01-01 | End: 2019-01-01

## 2019-01-01 RX ORDER — ATORVASTATIN CALCIUM 40 MG/1
40 TABLET, FILM COATED ORAL AT BEDTIME
Status: DISCONTINUED | OUTPATIENT
Start: 2019-01-01 | End: 2019-01-01

## 2019-01-01 RX ORDER — METOPROLOL TARTRATE 25 MG/1
25 TABLET, FILM COATED ORAL
Status: DISCONTINUED | OUTPATIENT
Start: 2019-01-01 | End: 2019-01-01 | Stop reason: HOSPADM

## 2019-01-01 RX ORDER — NITROGLYCERIN 20 MG/100ML
.07-.1 INJECTION INTRAVENOUS CONTINUOUS
Status: DISCONTINUED | OUTPATIENT
Start: 2019-01-01 | End: 2019-01-01 | Stop reason: CLARIF

## 2019-01-01 RX ORDER — AMOXICILLIN 250 MG
2 CAPSULE ORAL 2 TIMES DAILY
Status: DISCONTINUED | OUTPATIENT
Start: 2019-01-01 | End: 2019-01-01 | Stop reason: HOSPADM

## 2019-01-01 RX ORDER — HEPARIN SODIUM 1000 [USP'U]/ML
3 INJECTION, SOLUTION INTRAVENOUS; SUBCUTANEOUS ONCE
Status: DISCONTINUED | OUTPATIENT
Start: 2019-01-01 | End: 2019-01-01

## 2019-01-01 RX ORDER — ALBUTEROL SULFATE 90 UG/1
6 AEROSOL, METERED RESPIRATORY (INHALATION) EVERY 4 HOURS PRN
DISCHARGE
Start: 2019-01-01 | End: 2019-01-01

## 2019-01-01 RX ORDER — HYDROMORPHONE HYDROCHLORIDE 1 MG/ML
.3-.5 INJECTION, SOLUTION INTRAMUSCULAR; INTRAVENOUS; SUBCUTANEOUS
Status: DISCONTINUED | OUTPATIENT
Start: 2019-01-01 | End: 2019-01-01

## 2019-01-01 RX ORDER — ONDANSETRON 2 MG/ML
4 INJECTION INTRAMUSCULAR; INTRAVENOUS EVERY 6 HOURS PRN
Status: DISCONTINUED | OUTPATIENT
Start: 2019-01-01 | End: 2019-01-01

## 2019-01-01 RX ORDER — LIDOCAINE HYDROCHLORIDE 40 MG/ML
SOLUTION TOPICAL
Status: COMPLETED
Start: 2019-01-01 | End: 2019-01-01

## 2019-01-01 RX ORDER — OXYCODONE HYDROCHLORIDE 5 MG/1
5 TABLET ORAL EVERY 4 HOURS PRN
Qty: 30 TABLET | Refills: 0 | Status: SHIPPED | DISCHARGE
Start: 2019-01-01 | End: 2019-01-01

## 2019-01-01 RX ORDER — CEFAZOLIN SODIUM 2 G/100ML
2 INJECTION, SOLUTION INTRAVENOUS
Status: COMPLETED | OUTPATIENT
Start: 2019-01-01 | End: 2019-01-01

## 2019-01-01 RX ORDER — ONDANSETRON 2 MG/ML
4 INJECTION INTRAMUSCULAR; INTRAVENOUS EVERY 6 HOURS PRN
Status: DISCONTINUED | OUTPATIENT
Start: 2019-01-01 | End: 2019-01-01 | Stop reason: HOSPADM

## 2019-01-01 RX ORDER — LABETALOL HYDROCHLORIDE 5 MG/ML
10 INJECTION, SOLUTION INTRAVENOUS EVERY 6 HOURS
Status: DISCONTINUED | OUTPATIENT
Start: 2019-01-01 | End: 2019-01-01

## 2019-01-01 RX ORDER — CEFAZOLIN SODIUM 1 G/3ML
1 INJECTION, POWDER, FOR SOLUTION INTRAMUSCULAR; INTRAVENOUS EVERY 24 HOURS
Status: DISCONTINUED | OUTPATIENT
Start: 2019-01-01 | End: 2019-01-01

## 2019-01-01 RX ORDER — POTASSIUM CHLORIDE 750 MG/1
20-40 TABLET, EXTENDED RELEASE ORAL
Status: DISCONTINUED | OUTPATIENT
Start: 2019-01-01 | End: 2019-01-01

## 2019-01-01 RX ORDER — BISACODYL 10 MG
10 SUPPOSITORY, RECTAL RECTAL DAILY PRN
Status: DISCONTINUED | OUTPATIENT
Start: 2019-01-01 | End: 2019-01-01 | Stop reason: HOSPADM

## 2019-01-01 RX ORDER — FUROSEMIDE 10 MG/ML
40 INJECTION INTRAMUSCULAR; INTRAVENOUS ONCE
Status: COMPLETED | OUTPATIENT
Start: 2019-01-01 | End: 2019-01-01

## 2019-01-01 RX ORDER — NALOXONE HYDROCHLORIDE 0.4 MG/ML
.1-.4 INJECTION, SOLUTION INTRAMUSCULAR; INTRAVENOUS; SUBCUTANEOUS
Status: DISCONTINUED | OUTPATIENT
Start: 2019-01-01 | End: 2019-01-01 | Stop reason: HOSPADM

## 2019-01-01 RX ORDER — METOPROLOL TARTRATE 25 MG/1
25 TABLET, FILM COATED ORAL
Status: DISCONTINUED | OUTPATIENT
Start: 2019-01-01 | End: 2019-01-01

## 2019-01-01 RX ORDER — POTASSIUM CHLORIDE 1.5 G/1.58G
20 POWDER, FOR SOLUTION ORAL ONCE
Status: COMPLETED | OUTPATIENT
Start: 2019-01-01 | End: 2019-01-01

## 2019-01-01 RX ORDER — POTASSIUM CHLORIDE 29.8 MG/ML
20 INJECTION INTRAVENOUS ONCE
Status: COMPLETED | OUTPATIENT
Start: 2019-01-01 | End: 2019-01-01

## 2019-01-01 RX ORDER — AMLODIPINE BESYLATE 5 MG/1
5 TABLET ORAL DAILY
Status: DISCONTINUED | OUTPATIENT
Start: 2019-01-01 | End: 2019-01-01

## 2019-01-01 RX ORDER — LIDOCAINE 40 MG/G
CREAM TOPICAL
Status: DISCONTINUED | OUTPATIENT
Start: 2019-01-01 | End: 2019-01-01 | Stop reason: HOSPADM

## 2019-01-01 RX ORDER — ASPIRIN 81 MG/1
324 TABLET, CHEWABLE ORAL ONCE
Status: COMPLETED | OUTPATIENT
Start: 2019-01-01 | End: 2019-01-01

## 2019-01-01 RX ORDER — IPRATROPIUM BROMIDE AND ALBUTEROL SULFATE 2.5; .5 MG/3ML; MG/3ML
3 SOLUTION RESPIRATORY (INHALATION) EVERY 4 HOURS PRN
Status: DISCONTINUED | OUTPATIENT
Start: 2019-01-01 | End: 2019-01-01 | Stop reason: HOSPADM

## 2019-01-01 RX ORDER — HEPARIN SODIUM 5000 [USP'U]/.5ML
5000 INJECTION, SOLUTION INTRAVENOUS; SUBCUTANEOUS EVERY 8 HOURS
Qty: 45 ML | Refills: 0 | DISCHARGE
Start: 2019-01-01 | End: 2019-01-01

## 2019-01-01 RX ORDER — POTASSIUM CHLORIDE 7.45 MG/ML
10 INJECTION INTRAVENOUS
Status: DISCONTINUED | OUTPATIENT
Start: 2019-01-01 | End: 2019-01-01

## 2019-01-01 RX ORDER — ASPIRIN 325 MG
325 TABLET ORAL DAILY
Status: DISCONTINUED | OUTPATIENT
Start: 2019-01-01 | End: 2019-01-01

## 2019-01-01 RX ORDER — BUMETANIDE 0.25 MG/ML
2 INJECTION INTRAMUSCULAR; INTRAVENOUS ONCE
Status: DISCONTINUED | OUTPATIENT
Start: 2019-01-01 | End: 2019-01-01

## 2019-01-01 RX ORDER — LIDOCAINE HYDROCHLORIDE 10 MG/ML
INJECTION, SOLUTION EPIDURAL; INFILTRATION; INTRACAUDAL; PERINEURAL
Status: COMPLETED
Start: 2019-01-01 | End: 2019-01-01

## 2019-01-01 RX ORDER — AMOXICILLIN 250 MG
1 CAPSULE ORAL 2 TIMES DAILY
Status: DISCONTINUED | OUTPATIENT
Start: 2019-01-01 | End: 2019-01-01 | Stop reason: HOSPADM

## 2019-01-01 RX ORDER — MUPIROCIN 20 MG/G
1 OINTMENT TOPICAL 2 TIMES DAILY
Status: DISCONTINUED | OUTPATIENT
Start: 2019-01-01 | End: 2019-01-01 | Stop reason: HOSPADM

## 2019-01-01 RX ORDER — HEPARIN SODIUM 5000 [USP'U]/.5ML
5000 INJECTION, SOLUTION INTRAVENOUS; SUBCUTANEOUS EVERY 8 HOURS SCHEDULED
Status: DISCONTINUED | OUTPATIENT
Start: 2019-01-01 | End: 2019-01-01 | Stop reason: HOSPADM

## 2019-01-01 RX ORDER — OXYCODONE HYDROCHLORIDE 5 MG/1
5 TABLET ORAL EVERY 4 HOURS PRN
Status: DISCONTINUED | OUTPATIENT
Start: 2019-01-01 | End: 2019-01-01 | Stop reason: HOSPADM

## 2019-01-01 RX ORDER — LIDOCAINE HYDROCHLORIDE 10 MG/ML
15 INJECTION, SOLUTION EPIDURAL; INFILTRATION; INTRACAUDAL; PERINEURAL ONCE
Status: COMPLETED | OUTPATIENT
Start: 2019-01-01 | End: 2019-01-01

## 2019-01-01 RX ORDER — HYDRALAZINE HYDROCHLORIDE 20 MG/ML
INJECTION INTRAMUSCULAR; INTRAVENOUS
Status: COMPLETED
Start: 2019-01-01 | End: 2019-01-01

## 2019-01-01 RX ORDER — LEVOFLOXACIN 5 MG/ML
500 INJECTION, SOLUTION INTRAVENOUS
Status: DISCONTINUED | OUTPATIENT
Start: 2019-01-01 | End: 2019-01-01

## 2019-01-01 RX ORDER — AMOXICILLIN 250 MG
1 CAPSULE ORAL 2 TIMES DAILY
Status: DISCONTINUED | OUTPATIENT
Start: 2019-01-01 | End: 2019-01-01

## 2019-01-01 RX ORDER — CEPHALEXIN 250 MG/5ML
250 POWDER, FOR SUSPENSION ORAL 2 TIMES DAILY
Status: DISCONTINUED | OUTPATIENT
Start: 2019-01-01 | End: 2019-01-01 | Stop reason: HOSPADM

## 2019-01-01 RX ORDER — HEPARIN SODIUM 10000 [USP'U]/100ML
0-3500 INJECTION, SOLUTION INTRAVENOUS CONTINUOUS
Status: DISCONTINUED | OUTPATIENT
Start: 2019-01-01 | End: 2019-01-01

## 2019-01-01 RX ORDER — METOPROLOL TARTRATE 25 MG/1
25 TABLET, FILM COATED ORAL 2 TIMES DAILY
Status: DISCONTINUED | OUTPATIENT
Start: 2019-01-01 | End: 2019-01-01

## 2019-01-01 RX ORDER — ESMOLOL HYDROCHLORIDE 20 MG/ML
0-250 INJECTION, SOLUTION INTRAVENOUS CONTINUOUS
Status: DISCONTINUED | OUTPATIENT
Start: 2019-01-01 | End: 2019-01-01

## 2019-01-01 RX ORDER — METOPROLOL TARTRATE 1 MG/ML
5 INJECTION, SOLUTION INTRAVENOUS EVERY 5 MIN PRN
Status: COMPLETED | OUTPATIENT
Start: 2019-01-01 | End: 2019-01-01

## 2019-01-01 RX ORDER — LIDOCAINE HYDROCHLORIDE 10 MG/ML
INJECTION, SOLUTION INFILTRATION; PERINEURAL PRN
Status: DISCONTINUED | OUTPATIENT
Start: 2019-01-01 | End: 2019-01-01 | Stop reason: HOSPADM

## 2019-01-01 RX ORDER — MAGNESIUM SULFATE HEPTAHYDRATE 40 MG/ML
4 INJECTION, SOLUTION INTRAVENOUS EVERY 4 HOURS PRN
Status: DISCONTINUED | OUTPATIENT
Start: 2019-01-01 | End: 2019-01-01

## 2019-01-01 RX ORDER — POLYETHYLENE GLYCOL 3350 17 G/17G
17 POWDER, FOR SOLUTION ORAL DAILY
Status: DISCONTINUED | OUTPATIENT
Start: 2019-01-01 | End: 2019-01-01

## 2019-01-01 RX ORDER — HEPARIN SODIUM,PORCINE 10 UNIT/ML
5-10 VIAL (ML) INTRAVENOUS EVERY 24 HOURS
Status: DISCONTINUED | OUTPATIENT
Start: 2019-01-01 | End: 2019-01-01 | Stop reason: HOSPADM

## 2019-01-01 RX ORDER — CEPHALEXIN 250 MG/5ML
250 POWDER, FOR SUSPENSION ORAL 2 TIMES DAILY
Qty: 70 ML | Refills: 0 | DISCHARGE
Start: 2019-01-01 | End: 2019-01-01

## 2019-01-01 RX ORDER — NICOTINE POLACRILEX 4 MG
15-30 LOZENGE BUCCAL
Status: DISCONTINUED | OUTPATIENT
Start: 2019-01-01 | End: 2019-01-01 | Stop reason: HOSPADM

## 2019-01-01 RX ORDER — AMLODIPINE BESYLATE 5 MG/1
10 TABLET ORAL DAILY
Status: DISCONTINUED | OUTPATIENT
Start: 2019-01-01 | End: 2019-01-01 | Stop reason: HOSPADM

## 2019-01-01 RX ORDER — AMOXICILLIN 250 MG
2 CAPSULE ORAL 2 TIMES DAILY
Status: DISCONTINUED | OUTPATIENT
Start: 2019-01-01 | End: 2019-01-01

## 2019-01-01 RX ORDER — HEPARIN SODIUM 10000 [USP'U]/100ML
0-3500 INJECTION, SOLUTION INTRAVENOUS
Status: DISCONTINUED | OUTPATIENT
Start: 2019-01-01 | End: 2019-01-01

## 2019-01-01 RX ORDER — PAPAVERINE HYDROCHLORIDE 30 MG/ML
INJECTION INTRAMUSCULAR; INTRAVENOUS PRN
Status: DISCONTINUED | OUTPATIENT
Start: 2019-01-01 | End: 2019-01-01 | Stop reason: HOSPADM

## 2019-01-01 RX ORDER — DEXTROSE MONOHYDRATE 25 G/50ML
25-50 INJECTION, SOLUTION INTRAVENOUS
Status: DISCONTINUED | OUTPATIENT
Start: 2019-01-01 | End: 2019-01-01 | Stop reason: HOSPADM

## 2019-01-01 RX ORDER — AMOXICILLIN 250 MG
1 CAPSULE ORAL 2 TIMES DAILY
Qty: 60 TABLET | Refills: 0 | DISCHARGE
Start: 2019-01-01 | End: 2019-01-01

## 2019-01-01 RX ORDER — ATORVASTATIN CALCIUM 40 MG/1
40 TABLET, FILM COATED ORAL EVERY EVENING
Status: DISCONTINUED | OUTPATIENT
Start: 2019-01-01 | End: 2019-01-01 | Stop reason: HOSPADM

## 2019-01-01 RX ORDER — AMINO AC/PROTEIN HYDR/WHEY PRO 10G-100/30
1 LIQUID (ML) ORAL 2 TIMES DAILY
Status: DISCONTINUED | OUTPATIENT
Start: 2019-01-01 | End: 2019-01-01 | Stop reason: HOSPADM

## 2019-01-01 RX ORDER — FENTANYL CITRATE 50 UG/ML
50 INJECTION, SOLUTION INTRAMUSCULAR; INTRAVENOUS 2 TIMES DAILY PRN
Status: COMPLETED | OUTPATIENT
Start: 2019-01-01 | End: 2019-01-01

## 2019-01-01 RX ADMIN — NOREPINEPHRINE BITARTRATE 0.03 MCG/KG/MIN: 1 INJECTION INTRAVENOUS at 23:47

## 2019-01-01 RX ADMIN — METOPROLOL TARTRATE 25 MG: 25 TABLET ORAL at 20:26

## 2019-01-01 RX ADMIN — PANTOPRAZOLE SODIUM 40 MG: 40 INJECTION, POWDER, FOR SOLUTION INTRAVENOUS at 19:44

## 2019-01-01 RX ADMIN — MAGNESIUM SULFATE HEPTAHYDRATE 2 G: 40 INJECTION, SOLUTION INTRAVENOUS at 06:06

## 2019-01-01 RX ADMIN — Medication 1 PACKET: at 09:03

## 2019-01-01 RX ADMIN — SENNOSIDES AND DOCUSATE SODIUM 1 TABLET: 8.6; 5 TABLET ORAL at 08:03

## 2019-01-01 RX ADMIN — HYDRALAZINE HYDROCHLORIDE 5 MG: 20 INJECTION INTRAMUSCULAR; INTRAVENOUS at 17:18

## 2019-01-01 RX ADMIN — Medication: at 14:49

## 2019-01-01 RX ADMIN — METOPROLOL TARTRATE 25 MG: 25 TABLET ORAL at 19:44

## 2019-01-01 RX ADMIN — AMLODIPINE BESYLATE 10 MG: 5 TABLET ORAL at 08:09

## 2019-01-01 RX ADMIN — HYDRALAZINE HYDROCHLORIDE 10 MG: 20 INJECTION INTRAMUSCULAR; INTRAVENOUS at 06:32

## 2019-01-01 RX ADMIN — Medication 5 ML: at 08:36

## 2019-01-01 RX ADMIN — ESMOLOL HYDROCHLORIDE IN SODIUM CHLORIDE 150 MCG/KG/MIN: 20 INJECTION INTRAVENOUS at 15:21

## 2019-01-01 RX ADMIN — ATORVASTATIN CALCIUM 40 MG: 40 TABLET, FILM COATED ORAL at 19:31

## 2019-01-01 RX ADMIN — Medication 1 MCG/KG/HR: at 15:14

## 2019-01-01 RX ADMIN — FUROSEMIDE 40 MG: 10 INJECTION, SOLUTION INTRAMUSCULAR; INTRAVENOUS at 23:16

## 2019-01-01 RX ADMIN — ATORVASTATIN CALCIUM 40 MG: 40 TABLET, FILM COATED ORAL at 19:45

## 2019-01-01 RX ADMIN — BUSPIRONE HYDROCHLORIDE 5 MG: 5 TABLET ORAL at 19:35

## 2019-01-01 RX ADMIN — SODIUM CHLORIDE: 9 INJECTION, SOLUTION INTRAVENOUS at 13:32

## 2019-01-01 RX ADMIN — MULTIVITAMIN 15 ML: LIQUID ORAL at 15:17

## 2019-01-01 RX ADMIN — HYDRALAZINE HYDROCHLORIDE 10 MG: 20 INJECTION INTRAMUSCULAR; INTRAVENOUS at 20:18

## 2019-01-01 RX ADMIN — Medication 0.8 MCG/KG/HR: at 23:23

## 2019-01-01 RX ADMIN — SENNOSIDES AND DOCUSATE SODIUM 2 TABLET: 8.6; 5 TABLET ORAL at 20:07

## 2019-01-01 RX ADMIN — ASPIRIN 81 MG CHEWABLE TABLET 81 MG: 81 TABLET CHEWABLE at 08:40

## 2019-01-01 RX ADMIN — BUSPIRONE HYDROCHLORIDE 5 MG: 5 TABLET ORAL at 19:51

## 2019-01-01 RX ADMIN — PANTOPRAZOLE SODIUM 40 MG: 40 INJECTION, POWDER, FOR SOLUTION INTRAVENOUS at 21:10

## 2019-01-01 RX ADMIN — Medication 0.8 MCG/KG/HR: at 05:39

## 2019-01-01 RX ADMIN — QUETIAPINE 50 MG: 50 TABLET ORAL at 22:07

## 2019-01-01 RX ADMIN — Medication 400 MG: at 08:21

## 2019-01-01 RX ADMIN — HYDRALAZINE HYDROCHLORIDE 10 MG: 20 INJECTION INTRAMUSCULAR; INTRAVENOUS at 07:55

## 2019-01-01 RX ADMIN — LEVOFLOXACIN 500 MG: 5 INJECTION, SOLUTION INTRAVENOUS at 16:17

## 2019-01-01 RX ADMIN — Medication 5 ML: at 08:19

## 2019-01-01 RX ADMIN — PANTOPRAZOLE SODIUM 40 MG: 40 INJECTION, POWDER, FOR SOLUTION INTRAVENOUS at 08:02

## 2019-01-01 RX ADMIN — Medication 10 ML: at 19:53

## 2019-01-01 RX ADMIN — Medication 5000 UNITS: at 05:44

## 2019-01-01 RX ADMIN — CALCIUM CHLORIDE, MAGNESIUM CHLORIDE, SODIUM CHLORIDE, SODIUM BICARBONATE, POTASSIUM CHLORIDE AND SODIUM PHOSPHATE DIBASIC DIHYDRATE 2.78 ML/KG/HR: 3.68; 3.05; 6.34; 3.09; .314; .187 INJECTION INTRAVENOUS at 16:56

## 2019-01-01 RX ADMIN — METOPROLOL TARTRATE 5 MG: 5 INJECTION, SOLUTION INTRAVENOUS at 06:17

## 2019-01-01 RX ADMIN — ACETAMINOPHEN 650 MG: 325 TABLET, FILM COATED ORAL at 02:27

## 2019-01-01 RX ADMIN — ASPIRIN 81 MG CHEWABLE TABLET 81 MG: 81 TABLET CHEWABLE at 08:32

## 2019-01-01 RX ADMIN — Medication 1 PACKET: at 08:26

## 2019-01-01 RX ADMIN — SENNOSIDES AND DOCUSATE SODIUM 1 TABLET: 8.6; 5 TABLET ORAL at 21:09

## 2019-01-01 RX ADMIN — ATORVASTATIN CALCIUM 40 MG: 40 TABLET, FILM COATED ORAL at 20:52

## 2019-01-01 RX ADMIN — INSULIN ASPART 1 UNITS: 100 INJECTION, SOLUTION INTRAVENOUS; SUBCUTANEOUS at 04:38

## 2019-01-01 RX ADMIN — ATORVASTATIN CALCIUM 40 MG: 40 TABLET, FILM COATED ORAL at 20:10

## 2019-01-01 RX ADMIN — AMIODARONE HYDROCHLORIDE 400 MG: 200 TABLET ORAL at 08:40

## 2019-01-01 RX ADMIN — INSULIN ASPART 1 UNITS: 100 INJECTION, SOLUTION INTRAVENOUS; SUBCUTANEOUS at 23:45

## 2019-01-01 RX ADMIN — POLYETHYLENE GLYCOL 3350 17 G: 17 POWDER, FOR SOLUTION ORAL at 07:40

## 2019-01-01 RX ADMIN — METOPROLOL TARTRATE 25 MG: 25 TABLET, FILM COATED ORAL at 09:22

## 2019-01-01 RX ADMIN — ATORVASTATIN CALCIUM 40 MG: 40 TABLET, FILM COATED ORAL at 19:38

## 2019-01-01 RX ADMIN — ESMOLOL HYDROCHLORIDE IN SODIUM CHLORIDE 300 MCG/KG/MIN: 20 INJECTION INTRAVENOUS at 14:04

## 2019-01-01 RX ADMIN — BUSPIRONE HYDROCHLORIDE 5 MG: 5 TABLET ORAL at 20:26

## 2019-01-01 RX ADMIN — PROPOFOL 10 MCG/KG/MIN: 10 INJECTION, EMULSION INTRAVENOUS at 13:17

## 2019-01-01 RX ADMIN — AMIODARONE HYDROCHLORIDE 400 MG: 200 TABLET ORAL at 07:40

## 2019-01-01 RX ADMIN — AMIODARONE HYDROCHLORIDE 1 MG/MIN: 50 INJECTION, SOLUTION INTRAVENOUS at 10:17

## 2019-01-01 RX ADMIN — CALCIUM CHLORIDE, MAGNESIUM CHLORIDE, SODIUM CHLORIDE, SODIUM BICARBONATE, POTASSIUM CHLORIDE AND SODIUM PHOSPHATE DIBASIC DIHYDRATE 2.78 ML/KG/HR: 3.68; 3.05; 6.34; 3.09; .314; .187 INJECTION INTRAVENOUS at 03:41

## 2019-01-01 RX ADMIN — BUSPIRONE HYDROCHLORIDE 5 MG: 5 TABLET ORAL at 20:51

## 2019-01-01 RX ADMIN — Medication 1 PACKET: at 07:56

## 2019-01-01 RX ADMIN — ESMOLOL HYDROCHLORIDE IN SODIUM CHLORIDE 250 MCG/KG/MIN: 20 INJECTION INTRAVENOUS at 00:43

## 2019-01-01 RX ADMIN — PROPOFOL 20 MCG/KG/MIN: 10 INJECTION, EMULSION INTRAVENOUS at 21:41

## 2019-01-01 RX ADMIN — Medication 5000 UNITS: at 14:05

## 2019-01-01 RX ADMIN — CALCIUM CHLORIDE, MAGNESIUM CHLORIDE, SODIUM CHLORIDE, SODIUM BICARBONATE, POTASSIUM CHLORIDE AND SODIUM PHOSPHATE DIBASIC DIHYDRATE 12.5 ML/KG/HR: 3.68; 3.05; 6.34; 3.09; .314; .187 INJECTION INTRAVENOUS at 03:41

## 2019-01-01 RX ADMIN — PANTOPRAZOLE SODIUM 40 MG: 40 TABLET, DELAYED RELEASE ORAL at 09:16

## 2019-01-01 RX ADMIN — Medication 10 MG: at 22:11

## 2019-01-01 RX ADMIN — ESMOLOL HYDROCHLORIDE IN SODIUM CHLORIDE 250 MCG/KG/MIN: 20 INJECTION INTRAVENOUS at 06:11

## 2019-01-01 RX ADMIN — FENTANYL CITRATE 100 MCG: 50 INJECTION INTRAMUSCULAR; INTRAVENOUS at 03:16

## 2019-01-01 RX ADMIN — OXYCODONE HYDROCHLORIDE 5 MG: 5 TABLET ORAL at 04:50

## 2019-01-01 RX ADMIN — Medication 1 PACKET: at 08:40

## 2019-01-01 RX ADMIN — Medication: at 16:45

## 2019-01-01 RX ADMIN — METOPROLOL TARTRATE 5 MG: 5 INJECTION, SOLUTION INTRAVENOUS at 15:24

## 2019-01-01 RX ADMIN — PANTOPRAZOLE SODIUM 40 MG: 40 INJECTION, POWDER, FOR SOLUTION INTRAVENOUS at 19:39

## 2019-01-01 RX ADMIN — CALCIUM CHLORIDE, MAGNESIUM CHLORIDE, SODIUM CHLORIDE, SODIUM BICARBONATE, POTASSIUM CHLORIDE AND SODIUM PHOSPHATE DIBASIC DIHYDRATE 12.5 ML/KG/HR: 3.68; 3.05; 6.34; 3.09; .314; .187 INJECTION INTRAVENOUS at 21:57

## 2019-01-01 RX ADMIN — ESMOLOL HYDROCHLORIDE 250 MCG/KG/MIN: 20 INJECTION INTRAVENOUS at 20:46

## 2019-01-01 RX ADMIN — HYDRALAZINE HYDROCHLORIDE 25 MG: 25 TABLET, FILM COATED ORAL at 17:51

## 2019-01-01 RX ADMIN — SODIUM CHLORIDE 250 ML: 9 INJECTION, SOLUTION INTRAVENOUS at 11:43

## 2019-01-01 RX ADMIN — CALCIUM GLUCONATE 2 G: 98 INJECTION, SOLUTION INTRAVENOUS at 12:23

## 2019-01-01 RX ADMIN — POTASSIUM CHLORIDE 20 MEQ: 1.5 POWDER, FOR SOLUTION ORAL at 01:41

## 2019-01-01 RX ADMIN — ONDANSETRON 4 MG: 2 INJECTION INTRAMUSCULAR; INTRAVENOUS at 00:43

## 2019-01-01 RX ADMIN — CALCIUM CHLORIDE, MAGNESIUM CHLORIDE, SODIUM CHLORIDE, SODIUM BICARBONATE, POTASSIUM CHLORIDE AND SODIUM PHOSPHATE DIBASIC DIHYDRATE 12.5 ML/KG/HR: 3.68; 3.05; 6.34; 3.09; .314; .187 INJECTION INTRAVENOUS at 15:23

## 2019-01-01 RX ADMIN — HYDRALAZINE HYDROCHLORIDE 10 MG: 20 INJECTION INTRAMUSCULAR; INTRAVENOUS at 08:14

## 2019-01-01 RX ADMIN — LIDOCAINE HYDROCHLORIDE 150 MG: 10 INJECTION, SOLUTION EPIDURAL; INFILTRATION; INTRACAUDAL; PERINEURAL at 02:39

## 2019-01-01 RX ADMIN — Medication 12.5 MG: at 13:44

## 2019-01-01 RX ADMIN — SODIUM CHLORIDE 250 ML: 9 INJECTION, SOLUTION INTRAVENOUS at 15:33

## 2019-01-01 RX ADMIN — POTASSIUM CHLORIDE 20 MEQ: 29.8 INJECTION, SOLUTION INTRAVENOUS at 18:00

## 2019-01-01 RX ADMIN — Medication 10 MG: at 13:53

## 2019-01-01 RX ADMIN — PANTOPRAZOLE SODIUM 40 MG: 40 INJECTION, POWDER, FOR SOLUTION INTRAVENOUS at 19:31

## 2019-01-01 RX ADMIN — MULTIVITAMIN 15 ML: LIQUID ORAL at 08:02

## 2019-01-01 RX ADMIN — MUPIROCIN 0.5 G: 20 OINTMENT TOPICAL at 08:17

## 2019-01-01 RX ADMIN — INSULIN ASPART 1 UNITS: 100 INJECTION, SOLUTION INTRAVENOUS; SUBCUTANEOUS at 08:03

## 2019-01-01 RX ADMIN — PANTOPRAZOLE SODIUM 40 MG: 40 INJECTION, POWDER, FOR SOLUTION INTRAVENOUS at 20:43

## 2019-01-01 RX ADMIN — QUETIAPINE 50 MG: 50 TABLET ORAL at 22:26

## 2019-01-01 RX ADMIN — Medication 10 MG: at 17:57

## 2019-01-01 RX ADMIN — SODIUM NITROPRUSSIDE 0.25 MCG/KG/MIN: 25 INJECTION INTRAVENOUS at 02:04

## 2019-01-01 RX ADMIN — BUSPIRONE HYDROCHLORIDE 5 MG: 5 TABLET ORAL at 09:02

## 2019-01-01 RX ADMIN — CALCIUM CHLORIDE 1 G: 100 INJECTION, SOLUTION INTRAVENOUS at 12:37

## 2019-01-01 RX ADMIN — ASPIRIN 81 MG CHEWABLE TABLET 81 MG: 81 TABLET CHEWABLE at 08:37

## 2019-01-01 RX ADMIN — OXYCODONE HYDROCHLORIDE 5 MG: 5 TABLET ORAL at 13:44

## 2019-01-01 RX ADMIN — INSULIN ASPART 1 UNITS: 100 INJECTION, SOLUTION INTRAVENOUS; SUBCUTANEOUS at 20:08

## 2019-01-01 RX ADMIN — PANTOPRAZOLE SODIUM 40 MG: 40 INJECTION, POWDER, FOR SOLUTION INTRAVENOUS at 08:27

## 2019-01-01 RX ADMIN — Medication 1 PACKET: at 20:33

## 2019-01-01 RX ADMIN — ASPIRIN 325 MG ORAL TABLET 325 MG: 325 PILL ORAL at 09:03

## 2019-01-01 RX ADMIN — HEPARIN SODIUM 3000 UNITS: 1000 INJECTION, SOLUTION INTRAVENOUS; SUBCUTANEOUS at 09:53

## 2019-01-01 RX ADMIN — SODIUM CHLORIDE 250 ML: 9 INJECTION, SOLUTION INTRAVENOUS at 10:45

## 2019-01-01 RX ADMIN — POTASSIUM CHLORIDE, DEXTROSE MONOHYDRATE AND SODIUM CHLORIDE: 150; 5; 450 INJECTION, SOLUTION INTRAVENOUS at 21:41

## 2019-01-01 RX ADMIN — ASPIRIN 325 MG ORAL TABLET 325 MG: 325 PILL ORAL at 10:28

## 2019-01-01 RX ADMIN — Medication 1 PACKET: at 08:04

## 2019-01-01 RX ADMIN — Medication 0.9 MCG/KG/HR: at 17:58

## 2019-01-01 RX ADMIN — ONDANSETRON 4 MG: 2 INJECTION INTRAMUSCULAR; INTRAVENOUS at 17:59

## 2019-01-01 RX ADMIN — HYDRALAZINE HYDROCHLORIDE 5 MG: 20 INJECTION INTRAMUSCULAR; INTRAVENOUS at 13:38

## 2019-01-01 RX ADMIN — INSULIN ASPART 1 UNITS: 100 INJECTION, SOLUTION INTRAVENOUS; SUBCUTANEOUS at 04:22

## 2019-01-01 RX ADMIN — METOPROLOL TARTRATE 5 MG: 1 INJECTION, SOLUTION INTRAVENOUS at 21:18

## 2019-01-01 RX ADMIN — DOCUSATE SODIUM 286 ML: 50 LIQUID ORAL at 13:16

## 2019-01-01 RX ADMIN — METOPROLOL TARTRATE 5 MG: 5 INJECTION, SOLUTION INTRAVENOUS at 16:10

## 2019-01-01 RX ADMIN — ACETAMINOPHEN 650 MG: 325 TABLET, FILM COATED ORAL at 09:31

## 2019-01-01 RX ADMIN — POLYETHYLENE GLYCOL 3350 17 G: 17 POWDER, FOR SOLUTION ORAL at 07:54

## 2019-01-01 RX ADMIN — SENNOSIDES AND DOCUSATE SODIUM 2 TABLET: 8.6; 5 TABLET ORAL at 08:40

## 2019-01-01 RX ADMIN — PROPOFOL 15 MCG/KG/MIN: 10 INJECTION, EMULSION INTRAVENOUS at 22:40

## 2019-01-01 RX ADMIN — Medication 0.4 MCG/KG/HR: at 14:48

## 2019-01-01 RX ADMIN — SODIUM CHLORIDE 300 ML: 9 INJECTION, SOLUTION INTRAVENOUS at 11:42

## 2019-01-01 RX ADMIN — LIDOCAINE HYDROCHLORIDE 15 ML: 10 INJECTION, SOLUTION EPIDURAL; INFILTRATION; INTRACAUDAL; PERINEURAL at 09:52

## 2019-01-01 RX ADMIN — Medication 5000 UNITS: at 05:15

## 2019-01-01 RX ADMIN — AMIODARONE HYDROCHLORIDE 400 MG: 200 TABLET ORAL at 08:02

## 2019-01-01 RX ADMIN — PANTOPRAZOLE SODIUM 40 MG: 40 INJECTION, POWDER, FOR SOLUTION INTRAVENOUS at 20:10

## 2019-01-01 RX ADMIN — SODIUM CHLORIDE 250 ML: 9 INJECTION, SOLUTION INTRAVENOUS at 14:48

## 2019-01-01 RX ADMIN — QUETIAPINE 50 MG: 50 TABLET ORAL at 22:50

## 2019-01-01 RX ADMIN — Medication: at 10:45

## 2019-01-01 RX ADMIN — ESMOLOL HYDROCHLORIDE 250 MCG/KG/MIN: 20 INJECTION INTRAVENOUS at 18:01

## 2019-01-01 RX ADMIN — SENNOSIDES AND DOCUSATE SODIUM 2 TABLET: 8.6; 5 TABLET ORAL at 20:44

## 2019-01-01 RX ADMIN — OXYCODONE HYDROCHLORIDE 5 MG: 5 TABLET ORAL at 08:02

## 2019-01-01 RX ADMIN — Medication 3650 UNITS: at 23:17

## 2019-01-01 RX ADMIN — PROPOFOL 25 MCG/KG/MIN: 10 INJECTION, EMULSION INTRAVENOUS at 22:03

## 2019-01-01 RX ADMIN — Medication 0.8 MCG/KG/HR: at 14:48

## 2019-01-01 RX ADMIN — SODIUM CHLORIDE 300 ML: 9 INJECTION, SOLUTION INTRAVENOUS at 15:33

## 2019-01-01 RX ADMIN — SODIUM CHLORIDE 250 ML: 9 INJECTION, SOLUTION INTRAVENOUS at 11:20

## 2019-01-01 RX ADMIN — BUSPIRONE HYDROCHLORIDE 5 MG: 5 TABLET ORAL at 08:03

## 2019-01-01 RX ADMIN — Medication 5000 UNITS: at 06:19

## 2019-01-01 RX ADMIN — PROPOFOL 55 MCG/KG/MIN: 10 INJECTION, EMULSION INTRAVENOUS at 01:42

## 2019-01-01 RX ADMIN — Medication 1.2 MCG/KG/HR: at 01:43

## 2019-01-01 RX ADMIN — PROPOFOL 30 MCG/KG/MIN: 10 INJECTION, EMULSION INTRAVENOUS at 03:11

## 2019-01-01 RX ADMIN — AMIODARONE HYDROCHLORIDE 400 MG: 200 TABLET ORAL at 19:44

## 2019-01-01 RX ADMIN — OXYCODONE HYDROCHLORIDE 5 MG: 5 TABLET ORAL at 19:45

## 2019-01-01 RX ADMIN — ATORVASTATIN CALCIUM 40 MG: 40 TABLET, FILM COATED ORAL at 19:51

## 2019-01-01 RX ADMIN — Medication 0.8 MCG/KG/HR: at 12:21

## 2019-01-01 RX ADMIN — HYDRALAZINE HYDROCHLORIDE 5 MG: 20 INJECTION INTRAMUSCULAR; INTRAVENOUS at 20:39

## 2019-01-01 RX ADMIN — Medication 1 PACKET: at 19:51

## 2019-01-01 RX ADMIN — BUSPIRONE HYDROCHLORIDE 5 MG: 5 TABLET ORAL at 09:22

## 2019-01-01 RX ADMIN — Medication 100 MCG/HR: at 00:24

## 2019-01-01 RX ADMIN — INSULIN ASPART 1 UNITS: 100 INJECTION, SOLUTION INTRAVENOUS; SUBCUTANEOUS at 16:20

## 2019-01-01 RX ADMIN — ATORVASTATIN CALCIUM 40 MG: 40 TABLET, FILM COATED ORAL at 19:57

## 2019-01-01 RX ADMIN — Medication 5000 UNITS: at 15:24

## 2019-01-01 RX ADMIN — QUETIAPINE 50 MG: 50 TABLET ORAL at 22:03

## 2019-01-01 RX ADMIN — VASOPRESSIN 1 UNITS/HR: 20 INJECTION INTRAVENOUS at 23:33

## 2019-01-01 RX ADMIN — SENNOSIDES AND DOCUSATE SODIUM 2 TABLET: 8.6; 5 TABLET ORAL at 20:39

## 2019-01-01 RX ADMIN — ATORVASTATIN CALCIUM 40 MG: 40 TABLET, FILM COATED ORAL at 20:07

## 2019-01-01 RX ADMIN — FUROSEMIDE 60 MG: 10 INJECTION, SOLUTION INTRAVENOUS at 16:57

## 2019-01-01 RX ADMIN — Medication 0.8 MCG/KG/HR: at 17:19

## 2019-01-01 RX ADMIN — Medication 1.2 MCG/KG/HR: at 20:59

## 2019-01-01 RX ADMIN — Medication 5000 UNITS: at 22:39

## 2019-01-01 RX ADMIN — CALCIUM CHLORIDE, MAGNESIUM CHLORIDE, SODIUM CHLORIDE, SODIUM BICARBONATE, POTASSIUM CHLORIDE AND SODIUM PHOSPHATE DIBASIC DIHYDRATE 12.5 ML/KG/HR: 3.68; 3.05; 6.34; 3.09; .314; .187 INJECTION INTRAVENOUS at 16:18

## 2019-01-01 RX ADMIN — Medication 100 MCG/HR: at 02:14

## 2019-01-01 RX ADMIN — MUPIROCIN 0.5 G: 20 OINTMENT TOPICAL at 22:27

## 2019-01-01 RX ADMIN — PANTOPRAZOLE SODIUM 40 MG: 40 INJECTION, POWDER, FOR SOLUTION INTRAVENOUS at 08:03

## 2019-01-01 RX ADMIN — INSULIN ASPART 1 UNITS: 100 INJECTION, SOLUTION INTRAVENOUS; SUBCUTANEOUS at 08:56

## 2019-01-01 RX ADMIN — POLYETHYLENE GLYCOL 3350 17 G: 17 POWDER, FOR SOLUTION ORAL at 08:03

## 2019-01-01 RX ADMIN — Medication 10 MG: at 03:38

## 2019-01-01 RX ADMIN — ESMOLOL HYDROCHLORIDE IN SODIUM CHLORIDE 250 MCG/KG/MIN: 20 INJECTION INTRAVENOUS at 01:50

## 2019-01-01 RX ADMIN — POLYETHYLENE GLYCOL 3350 17 G: 17 POWDER, FOR SOLUTION ORAL at 08:02

## 2019-01-01 RX ADMIN — ACETAMINOPHEN 650 MG: 325 TABLET, FILM COATED ORAL at 20:39

## 2019-01-01 RX ADMIN — DOCUSATE SODIUM 286 ML: 50 LIQUID ORAL at 21:36

## 2019-01-01 RX ADMIN — SENNOSIDES AND DOCUSATE SODIUM 2 TABLET: 8.6; 5 TABLET ORAL at 07:40

## 2019-01-01 RX ADMIN — ROCURONIUM BROMIDE 50 MG: 10 INJECTION INTRAVENOUS at 13:35

## 2019-01-01 RX ADMIN — PANTOPRAZOLE SODIUM 40 MG: 40 INJECTION, POWDER, FOR SOLUTION INTRAVENOUS at 08:14

## 2019-01-01 RX ADMIN — Medication 10 MG: at 18:37

## 2019-01-01 RX ADMIN — QUETIAPINE 50 MG: 50 TABLET ORAL at 22:40

## 2019-01-01 RX ADMIN — Medication 5 ML: at 08:40

## 2019-01-01 RX ADMIN — ACETAMINOPHEN 650 MG: 325 TABLET, FILM COATED ORAL at 05:24

## 2019-01-01 RX ADMIN — BUSPIRONE HYDROCHLORIDE 5 MG: 5 TABLET ORAL at 19:38

## 2019-01-01 RX ADMIN — Medication 10 MG: at 16:44

## 2019-01-01 RX ADMIN — BUSPIRONE HYDROCHLORIDE 5 MG: 5 TABLET ORAL at 20:44

## 2019-01-01 RX ADMIN — HYDRALAZINE HYDROCHLORIDE 5 MG: 20 INJECTION INTRAMUSCULAR; INTRAVENOUS at 00:08

## 2019-01-01 RX ADMIN — Medication: at 00:59

## 2019-01-01 RX ADMIN — Medication 400 MG: at 08:36

## 2019-01-01 RX ADMIN — BUSPIRONE HYDROCHLORIDE 5 MG: 5 TABLET ORAL at 20:33

## 2019-01-01 RX ADMIN — HYDRALAZINE HYDROCHLORIDE 5 MG: 20 INJECTION INTRAMUSCULAR; INTRAVENOUS at 02:17

## 2019-01-01 RX ADMIN — BUMETANIDE 4 MG: 0.25 INJECTION INTRAMUSCULAR; INTRAVENOUS at 08:14

## 2019-01-01 RX ADMIN — Medication 50 MCG/HR: at 06:12

## 2019-01-01 RX ADMIN — MULTIVITAMIN 15 ML: LIQUID ORAL at 08:51

## 2019-01-01 RX ADMIN — HUMAN INSULIN 1 UNITS/HR: 100 INJECTION, SOLUTION SUBCUTANEOUS at 22:29

## 2019-01-01 RX ADMIN — Medication 5 ML: at 18:00

## 2019-01-01 RX ADMIN — Medication 1.2 MCG/KG/HR: at 06:34

## 2019-01-01 RX ADMIN — INSULIN ASPART 1 UNITS: 100 INJECTION, SOLUTION INTRAVENOUS; SUBCUTANEOUS at 23:41

## 2019-01-01 RX ADMIN — ATORVASTATIN CALCIUM 40 MG: 40 TABLET, FILM COATED ORAL at 19:35

## 2019-01-01 RX ADMIN — ONDANSETRON 4 MG: 2 INJECTION INTRAMUSCULAR; INTRAVENOUS at 14:48

## 2019-01-01 RX ADMIN — Medication 12.5 MG: at 20:43

## 2019-01-01 RX ADMIN — Medication 1.2 MCG/KG/HR: at 00:19

## 2019-01-01 RX ADMIN — LABETALOL HYDROCHLORIDE 10 MG: 5 INJECTION, SOLUTION INTRAVENOUS at 14:44

## 2019-01-01 RX ADMIN — POTASSIUM CHLORIDE 20 MEQ: 1.5 POWDER, FOR SOLUTION ORAL at 18:08

## 2019-01-01 RX ADMIN — BUSPIRONE HYDROCHLORIDE 5 MG: 5 TABLET ORAL at 20:52

## 2019-01-01 RX ADMIN — HYDRALAZINE HYDROCHLORIDE 10 MG: 20 INJECTION INTRAMUSCULAR; INTRAVENOUS at 16:42

## 2019-01-01 RX ADMIN — ESMOLOL HYDROCHLORIDE IN SODIUM CHLORIDE 200 MCG/KG/MIN: 20 INJECTION INTRAVENOUS at 04:45

## 2019-01-01 RX ADMIN — BUSPIRONE HYDROCHLORIDE 5 MG: 5 TABLET ORAL at 07:29

## 2019-01-01 RX ADMIN — PANTOPRAZOLE SODIUM 40 MG: 40 INJECTION, POWDER, FOR SOLUTION INTRAVENOUS at 07:54

## 2019-01-01 RX ADMIN — OXYCODONE HYDROCHLORIDE 5 MG: 5 TABLET ORAL at 11:36

## 2019-01-01 RX ADMIN — Medication 12.5 MG: at 10:31

## 2019-01-01 RX ADMIN — Medication 1 MCG/KG/HR: at 08:05

## 2019-01-01 RX ADMIN — ESMOLOL HYDROCHLORIDE IN SODIUM CHLORIDE 250 MCG/KG/MIN: 20 INJECTION INTRAVENOUS at 02:35

## 2019-01-01 RX ADMIN — ATORVASTATIN CALCIUM 40 MG: 40 TABLET, FILM COATED ORAL at 20:39

## 2019-01-01 RX ADMIN — Medication 10 MG: at 19:45

## 2019-01-01 RX ADMIN — CALCIUM CHLORIDE, MAGNESIUM CHLORIDE, SODIUM CHLORIDE, SODIUM BICARBONATE, POTASSIUM CHLORIDE AND SODIUM PHOSPHATE DIBASIC DIHYDRATE 12.5 ML/KG/HR: 3.68; 3.05; 6.34; 3.09; .314; .187 INJECTION INTRAVENOUS at 16:20

## 2019-01-01 RX ADMIN — Medication 5000 UNITS: at 14:49

## 2019-01-01 RX ADMIN — ESMOLOL HYDROCHLORIDE IN SODIUM CHLORIDE 250 MCG/KG/MIN: 20 INJECTION INTRAVENOUS at 00:21

## 2019-01-01 RX ADMIN — Medication 0.5 MCG/KG/HR: at 02:46

## 2019-01-01 RX ADMIN — ESMOLOL HYDROCHLORIDE IN SODIUM CHLORIDE 150 MCG/KG/MIN: 20 INJECTION INTRAVENOUS at 06:44

## 2019-01-01 RX ADMIN — Medication 50 MCG/HR: at 02:58

## 2019-01-01 RX ADMIN — Medication 1 PACKET: at 19:57

## 2019-01-01 RX ADMIN — Medication 5000 UNITS: at 05:24

## 2019-01-01 RX ADMIN — METOPROLOL TARTRATE 25 MG: 25 TABLET, FILM COATED ORAL at 08:37

## 2019-01-01 RX ADMIN — Medication 5000 UNITS: at 14:08

## 2019-01-01 RX ADMIN — OXYCODONE HYDROCHLORIDE 5 MG: 5 TABLET ORAL at 15:31

## 2019-01-01 RX ADMIN — PROPOFOL 20 MCG/KG/MIN: 10 INJECTION, EMULSION INTRAVENOUS at 06:51

## 2019-01-01 RX ADMIN — INSULIN ASPART 1 UNITS: 100 INJECTION, SOLUTION INTRAVENOUS; SUBCUTANEOUS at 15:14

## 2019-01-01 RX ADMIN — PANTOPRAZOLE SODIUM 40 MG: 40 INJECTION, POWDER, FOR SOLUTION INTRAVENOUS at 08:31

## 2019-01-01 RX ADMIN — ESMOLOL HYDROCHLORIDE IN SODIUM CHLORIDE 200 MCG/KG/MIN: 20 INJECTION INTRAVENOUS at 18:39

## 2019-01-01 RX ADMIN — BUSPIRONE HYDROCHLORIDE 5 MG: 5 TABLET ORAL at 08:21

## 2019-01-01 RX ADMIN — ATORVASTATIN CALCIUM 40 MG: 40 TABLET, FILM COATED ORAL at 20:31

## 2019-01-01 RX ADMIN — CALCIUM CHLORIDE 1 G: 100 INJECTION, SOLUTION INTRAVENOUS at 06:27

## 2019-01-01 RX ADMIN — PANTOPRAZOLE SODIUM 40 MG: 40 INJECTION, POWDER, FOR SOLUTION INTRAVENOUS at 08:05

## 2019-01-01 RX ADMIN — HYDRALAZINE HYDROCHLORIDE 10 MG: 20 INJECTION INTRAMUSCULAR; INTRAVENOUS at 13:01

## 2019-01-01 RX ADMIN — ESMOLOL HYDROCHLORIDE IN SODIUM CHLORIDE 250 MCG/KG/MIN: 20 INJECTION INTRAVENOUS at 19:47

## 2019-01-01 RX ADMIN — FENTANYL CITRATE 50 MCG: 50 INJECTION, SOLUTION INTRAMUSCULAR; INTRAVENOUS at 15:14

## 2019-01-01 RX ADMIN — POLYETHYLENE GLYCOL 3350 17 G: 17 POWDER, FOR SOLUTION ORAL at 08:08

## 2019-01-01 RX ADMIN — INSULIN ASPART 1 UNITS: 100 INJECTION, SOLUTION INTRAVENOUS; SUBCUTANEOUS at 20:13

## 2019-01-01 RX ADMIN — HYDRALAZINE HYDROCHLORIDE 5 MG: 20 INJECTION INTRAMUSCULAR; INTRAVENOUS at 21:03

## 2019-01-01 RX ADMIN — HYDRALAZINE HYDROCHLORIDE 10 MG: 20 INJECTION INTRAMUSCULAR; INTRAVENOUS at 04:50

## 2019-01-01 RX ADMIN — MULTIVITAMIN 15 ML: LIQUID ORAL at 10:28

## 2019-01-01 RX ADMIN — HYDRALAZINE HYDROCHLORIDE 5 MG: 20 INJECTION INTRAMUSCULAR; INTRAVENOUS at 14:26

## 2019-01-01 RX ADMIN — HYDRALAZINE HYDROCHLORIDE 10 MG: 20 INJECTION INTRAMUSCULAR; INTRAVENOUS at 07:01

## 2019-01-01 RX ADMIN — ESMOLOL HYDROCHLORIDE IN SODIUM CHLORIDE 250 MCG/KG/MIN: 20 INJECTION INTRAVENOUS at 23:09

## 2019-01-01 RX ADMIN — OXYCODONE HYDROCHLORIDE 5 MG: 5 TABLET ORAL at 00:13

## 2019-01-01 RX ADMIN — ASPIRIN 81 MG CHEWABLE TABLET 81 MG: 81 TABLET CHEWABLE at 08:09

## 2019-01-01 RX ADMIN — PROPOFOL 15 MCG/KG/MIN: 10 INJECTION, EMULSION INTRAVENOUS at 22:11

## 2019-01-01 RX ADMIN — PROPOFOL: 10 INJECTION, EMULSION INTRAVENOUS at 23:43

## 2019-01-01 RX ADMIN — BUSPIRONE HYDROCHLORIDE 5 MG: 5 TABLET ORAL at 19:31

## 2019-01-01 RX ADMIN — ASPIRIN 325 MG ORAL TABLET 325 MG: 325 PILL ORAL at 07:54

## 2019-01-01 RX ADMIN — OXYCODONE HYDROCHLORIDE 5 MG: 5 TABLET ORAL at 16:10

## 2019-01-01 RX ADMIN — METOROPROLOL TARTRATE 5 MG: 5 INJECTION, SOLUTION INTRAVENOUS at 19:35

## 2019-01-01 RX ADMIN — Medication 5 ML: at 08:04

## 2019-01-01 RX ADMIN — Medication 0.2 MCG/KG/HR: at 13:06

## 2019-01-01 RX ADMIN — BUMETANIDE 4 MG: 0.25 INJECTION INTRAMUSCULAR; INTRAVENOUS at 10:36

## 2019-01-01 RX ADMIN — Medication 100 MCG/HR: at 19:33

## 2019-01-01 RX ADMIN — ACETAMINOPHEN 650 MG: 325 TABLET, FILM COATED ORAL at 21:50

## 2019-01-01 RX ADMIN — CALCIUM CHLORIDE, MAGNESIUM CHLORIDE, SODIUM CHLORIDE, SODIUM BICARBONATE, POTASSIUM CHLORIDE AND SODIUM PHOSPHATE DIBASIC DIHYDRATE 12.5 ML/KG/HR: 3.68; 3.05; 6.34; 3.09; .314; .187 INJECTION INTRAVENOUS at 16:21

## 2019-01-01 RX ADMIN — POLYETHYLENE GLYCOL 3350 17 G: 17 POWDER, FOR SOLUTION ORAL at 08:17

## 2019-01-01 RX ADMIN — POLYETHYLENE GLYCOL 3350 17 G: 17 POWDER, FOR SOLUTION ORAL at 08:05

## 2019-01-01 RX ADMIN — ROCURONIUM BROMIDE 50 MG: 10 INJECTION INTRAVENOUS at 13:42

## 2019-01-01 RX ADMIN — ESMOLOL HYDROCHLORIDE IN SODIUM CHLORIDE 250 MCG/KG/MIN: 20 INJECTION INTRAVENOUS at 17:51

## 2019-01-01 RX ADMIN — PANTOPRAZOLE SODIUM 40 MG: 40 INJECTION, POWDER, FOR SOLUTION INTRAVENOUS at 19:51

## 2019-01-01 RX ADMIN — BUSPIRONE HYDROCHLORIDE 5 MG: 5 TABLET ORAL at 20:40

## 2019-01-01 RX ADMIN — BUSPIRONE HYDROCHLORIDE 5 MG: 5 TABLET ORAL at 22:39

## 2019-01-01 RX ADMIN — PROPOFOL 20 MCG/KG/MIN: 10 INJECTION, EMULSION INTRAVENOUS at 01:20

## 2019-01-01 RX ADMIN — Medication 0.8 MCG/KG/HR: at 04:40

## 2019-01-01 RX ADMIN — ATORVASTATIN CALCIUM 40 MG: 40 TABLET, FILM COATED ORAL at 20:40

## 2019-01-01 RX ADMIN — Medication 1 PACKET: at 20:08

## 2019-01-01 RX ADMIN — AMINOCAPROIC ACID 1 G/HR: 250 INJECTION, SOLUTION INTRAVENOUS at 15:22

## 2019-01-01 RX ADMIN — QUETIAPINE 50 MG: 50 TABLET ORAL at 22:14

## 2019-01-01 RX ADMIN — SODIUM CHLORIDE 250 ML: 9 INJECTION, SOLUTION INTRAVENOUS at 10:25

## 2019-01-01 RX ADMIN — METOPROLOL TARTRATE 25 MG: 25 TABLET, FILM COATED ORAL at 15:11

## 2019-01-01 RX ADMIN — Medication 1 PACKET: at 08:21

## 2019-01-01 RX ADMIN — Medication 10 MG: at 13:56

## 2019-01-01 RX ADMIN — ASPIRIN 81 MG CHEWABLE TABLET 81 MG: 81 TABLET CHEWABLE at 08:18

## 2019-01-01 RX ADMIN — Medication: at 10:25

## 2019-01-01 RX ADMIN — BISACODYL 10 MG: 10 SUPPOSITORY RECTAL at 22:14

## 2019-01-01 RX ADMIN — MULTIVITAMIN 15 ML: LIQUID ORAL at 09:02

## 2019-01-01 RX ADMIN — SENNOSIDES AND DOCUSATE SODIUM 1 TABLET: 8.6; 5 TABLET ORAL at 19:45

## 2019-01-01 RX ADMIN — Medication 1 PACKET: at 08:31

## 2019-01-01 RX ADMIN — ACETAMINOPHEN 650 MG: 325 TABLET, FILM COATED ORAL at 04:51

## 2019-01-01 RX ADMIN — ASPIRIN 81 MG CHEWABLE TABLET 81 MG: 81 TABLET CHEWABLE at 08:05

## 2019-01-01 RX ADMIN — CALCIUM CHLORIDE, MAGNESIUM CHLORIDE, SODIUM CHLORIDE, SODIUM BICARBONATE, POTASSIUM CHLORIDE AND SODIUM PHOSPHATE DIBASIC DIHYDRATE 12.5 ML/KG/HR: 3.68; 3.05; 6.34; 3.09; .314; .187 INJECTION INTRAVENOUS at 19:46

## 2019-01-01 RX ADMIN — ACETAMINOPHEN 650 MG: 325 TABLET, FILM COATED ORAL at 10:28

## 2019-01-01 RX ADMIN — SODIUM CHLORIDE 300 ML: 9 INJECTION, SOLUTION INTRAVENOUS at 14:01

## 2019-01-01 RX ADMIN — ESMOLOL HYDROCHLORIDE IN SODIUM CHLORIDE 250 MCG/KG/MIN: 20 INJECTION INTRAVENOUS at 02:48

## 2019-01-01 RX ADMIN — Medication 1 PACKET: at 09:24

## 2019-01-01 RX ADMIN — POTASSIUM CHLORIDE 20 MEQ: 1.5 POWDER, FOR SOLUTION ORAL at 12:31

## 2019-01-01 RX ADMIN — CALCIUM CHLORIDE, MAGNESIUM CHLORIDE, SODIUM CHLORIDE, SODIUM BICARBONATE, POTASSIUM CHLORIDE AND SODIUM PHOSPHATE DIBASIC DIHYDRATE 12.5 ML/KG/HR: 3.68; 3.05; 6.34; 3.09; .314; .187 INJECTION INTRAVENOUS at 11:07

## 2019-01-01 RX ADMIN — LIDOCAINE HYDROCHLORIDE 5 ML: 20 SOLUTION ORAL; TOPICAL at 14:38

## 2019-01-01 RX ADMIN — SUGAMMADEX 200 MG: 100 INJECTION, SOLUTION INTRAVENOUS at 14:30

## 2019-01-01 RX ADMIN — Medication: at 15:34

## 2019-01-01 RX ADMIN — Medication 5000 UNITS: at 21:13

## 2019-01-01 RX ADMIN — METOPROLOL TARTRATE 25 MG: 25 TABLET, FILM COATED ORAL at 21:50

## 2019-01-01 RX ADMIN — BUSPIRONE HYDROCHLORIDE 5 MG: 5 TABLET ORAL at 20:22

## 2019-01-01 RX ADMIN — FENTANYL CITRATE 50 MCG: 50 INJECTION, SOLUTION INTRAMUSCULAR; INTRAVENOUS at 20:00

## 2019-01-01 RX ADMIN — CALCIUM CHLORIDE, MAGNESIUM CHLORIDE, SODIUM CHLORIDE, SODIUM BICARBONATE, POTASSIUM CHLORIDE AND SODIUM PHOSPHATE DIBASIC DIHYDRATE 2.78 ML/KG/HR: 3.68; 3.05; 6.34; 3.09; .314; .187 INJECTION INTRAVENOUS at 16:21

## 2019-01-01 RX ADMIN — Medication 12.5 MG: at 08:32

## 2019-01-01 RX ADMIN — HYDROMORPHONE HYDROCHLORIDE 0.5 MG: 1 INJECTION, SOLUTION INTRAMUSCULAR; INTRAVENOUS; SUBCUTANEOUS at 04:21

## 2019-01-01 RX ADMIN — BUSPIRONE HYDROCHLORIDE 5 MG: 5 TABLET ORAL at 21:09

## 2019-01-01 RX ADMIN — Medication 0.8 MCG/KG/HR: at 01:47

## 2019-01-01 RX ADMIN — Medication 1 PACKET: at 08:36

## 2019-01-01 RX ADMIN — ESMOLOL HYDROCHLORIDE IN SODIUM CHLORIDE 200 MCG/KG/MIN: 20 INJECTION INTRAVENOUS at 07:50

## 2019-01-01 RX ADMIN — CEFAZOLIN SODIUM 1 G: 2 INJECTION, SOLUTION INTRAVENOUS at 16:40

## 2019-01-01 RX ADMIN — LIDOCAINE HYDROCHLORIDE: 40 SOLUTION TOPICAL at 03:47

## 2019-01-01 RX ADMIN — BUSPIRONE HYDROCHLORIDE 5 MG: 5 TABLET ORAL at 08:09

## 2019-01-01 RX ADMIN — Medication 100 MCG/HR: at 18:27

## 2019-01-01 RX ADMIN — Medication 1 PACKET: at 20:40

## 2019-01-01 RX ADMIN — Medication 10 MG: at 11:34

## 2019-01-01 RX ADMIN — ACETAMINOPHEN 650 MG: 325 TABLET, FILM COATED ORAL at 01:23

## 2019-01-01 RX ADMIN — ESMOLOL HYDROCHLORIDE IN SODIUM CHLORIDE 250 MCG/KG/MIN: 20 INJECTION INTRAVENOUS at 12:02

## 2019-01-01 RX ADMIN — INSULIN ASPART 1 UNITS: 100 INJECTION, SOLUTION INTRAVENOUS; SUBCUTANEOUS at 13:26

## 2019-01-01 RX ADMIN — Medication 5000 UNITS: at 23:26

## 2019-01-01 RX ADMIN — METOPROLOL TARTRATE 25 MG: 25 TABLET, FILM COATED ORAL at 08:13

## 2019-01-01 RX ADMIN — Medication 5000 UNITS: at 07:38

## 2019-01-01 RX ADMIN — CEFTRIAXONE SODIUM 1 G: 1 INJECTION, POWDER, FOR SOLUTION INTRAMUSCULAR; INTRAVENOUS at 22:52

## 2019-01-01 RX ADMIN — AMIODARONE HYDROCHLORIDE 400 MG: 200 TABLET ORAL at 20:39

## 2019-01-01 RX ADMIN — HYDRALAZINE HYDROCHLORIDE 5 MG: 20 INJECTION INTRAMUSCULAR; INTRAVENOUS at 23:39

## 2019-01-01 RX ADMIN — Medication 1.2 MCG/KG/HR: at 18:35

## 2019-01-01 RX ADMIN — CALCIUM CHLORIDE, MAGNESIUM CHLORIDE, SODIUM CHLORIDE, SODIUM BICARBONATE, POTASSIUM CHLORIDE AND SODIUM PHOSPHATE DIBASIC DIHYDRATE 12.5 ML/KG/HR: 3.68; 3.05; 6.34; 3.09; .314; .187 INJECTION INTRAVENOUS at 04:26

## 2019-01-01 RX ADMIN — POLYETHYLENE GLYCOL 3350 17 G: 17 POWDER, FOR SOLUTION ORAL at 09:24

## 2019-01-01 RX ADMIN — CEFAZOLIN 2 G: 1 INJECTION, POWDER, FOR SOLUTION INTRAMUSCULAR; INTRAVENOUS at 13:00

## 2019-01-01 RX ADMIN — BUSPIRONE HYDROCHLORIDE 5 MG: 5 TABLET ORAL at 20:10

## 2019-01-01 RX ADMIN — Medication 5000 UNITS: at 21:42

## 2019-01-01 RX ADMIN — CALCIUM CHLORIDE, MAGNESIUM CHLORIDE, SODIUM CHLORIDE, SODIUM BICARBONATE, POTASSIUM CHLORIDE AND SODIUM PHOSPHATE DIBASIC DIHYDRATE 12.5 ML/KG/HR: 3.68; 3.05; 6.34; 3.09; .314; .187 INJECTION INTRAVENOUS at 09:45

## 2019-01-01 RX ADMIN — SENNOSIDES AND DOCUSATE SODIUM 1 TABLET: 8.6; 5 TABLET ORAL at 08:51

## 2019-01-01 RX ADMIN — Medication 1 PACKET: at 08:54

## 2019-01-01 RX ADMIN — AMIODARONE HYDROCHLORIDE 400 MG: 200 TABLET ORAL at 11:11

## 2019-01-01 RX ADMIN — Medication 10 MG: at 21:36

## 2019-01-01 RX ADMIN — ACETAMINOPHEN 650 MG: 325 TABLET, FILM COATED ORAL at 21:47

## 2019-01-01 RX ADMIN — Medication 400 MG: at 19:51

## 2019-01-01 RX ADMIN — Medication 400 MG: at 08:14

## 2019-01-01 RX ADMIN — AMLODIPINE BESYLATE 5 MG: 5 TABLET ORAL at 08:02

## 2019-01-01 RX ADMIN — POLYETHYLENE GLYCOL 3350 17 G: 17 POWDER, FOR SOLUTION ORAL at 07:56

## 2019-01-01 RX ADMIN — Medication 10 MG: at 15:33

## 2019-01-01 RX ADMIN — AMIODARONE HYDROCHLORIDE 150 MG: 1.5 INJECTION, SOLUTION INTRAVENOUS at 09:32

## 2019-01-01 RX ADMIN — ACETAMINOPHEN 650 MG: 325 TABLET, FILM COATED ORAL at 21:41

## 2019-01-01 RX ADMIN — HEPARIN SODIUM 750 UNITS/HR: 10000 INJECTION, SOLUTION INTRAVENOUS at 23:17

## 2019-01-01 RX ADMIN — Medication 10 MG: at 14:15

## 2019-01-01 RX ADMIN — FENTANYL CITRATE 50 MCG: 50 INJECTION INTRAMUSCULAR; INTRAVENOUS at 11:04

## 2019-01-01 RX ADMIN — PROPOFOL 55 MCG/KG/MIN: 10 INJECTION, EMULSION INTRAVENOUS at 06:05

## 2019-01-01 RX ADMIN — Medication 1.2 MCG/KG/HR: at 11:08

## 2019-01-01 RX ADMIN — PANTOPRAZOLE SODIUM 40 MG: 40 INJECTION, POWDER, FOR SOLUTION INTRAVENOUS at 08:09

## 2019-01-01 RX ADMIN — METOPROLOL TARTRATE 25 MG: 25 TABLET ORAL at 13:59

## 2019-01-01 RX ADMIN — ACETAMINOPHEN 650 MG: 325 TABLET, FILM COATED ORAL at 08:37

## 2019-01-01 RX ADMIN — METOPROLOL TARTRATE 25 MG: 25 TABLET ORAL at 07:53

## 2019-01-01 RX ADMIN — INSULIN ASPART 1 UNITS: 100 INJECTION, SOLUTION INTRAVENOUS; SUBCUTANEOUS at 20:04

## 2019-01-01 RX ADMIN — OXYCODONE HYDROCHLORIDE 5 MG: 5 TABLET ORAL at 21:09

## 2019-01-01 RX ADMIN — ASPIRIN 81 MG CHEWABLE TABLET 81 MG: 81 TABLET CHEWABLE at 09:23

## 2019-01-01 RX ADMIN — Medication 1.2 MCG/KG/HR: at 20:37

## 2019-01-01 RX ADMIN — DEXTROSE MONOHYDRATE: 100 INJECTION, SOLUTION INTRAVENOUS at 05:26

## 2019-01-01 RX ADMIN — ASPIRIN 81 MG CHEWABLE TABLET 81 MG: 81 TABLET CHEWABLE at 08:13

## 2019-01-01 RX ADMIN — Medication 5 ML: at 07:41

## 2019-01-01 RX ADMIN — Medication 0.8 MCG/KG/HR: at 21:09

## 2019-01-01 RX ADMIN — ESMOLOL HYDROCHLORIDE IN SODIUM CHLORIDE 200 MCG/KG/MIN: 20 INJECTION INTRAVENOUS at 09:52

## 2019-01-01 RX ADMIN — Medication 5000 UNITS: at 06:08

## 2019-01-01 RX ADMIN — HYDRALAZINE HYDROCHLORIDE 5 MG: 20 INJECTION INTRAMUSCULAR; INTRAVENOUS at 09:10

## 2019-01-01 RX ADMIN — OXYCODONE HYDROCHLORIDE 5 MG: 5 TABLET ORAL at 20:39

## 2019-01-01 RX ADMIN — FENTANYL CITRATE 250 MCG: 50 INJECTION, SOLUTION INTRAMUSCULAR; INTRAVENOUS at 17:13

## 2019-01-01 RX ADMIN — Medication 5000 UNITS: at 21:18

## 2019-01-01 RX ADMIN — ESMOLOL HYDROCHLORIDE IN SODIUM CHLORIDE 250 MCG/KG/MIN: 20 INJECTION INTRAVENOUS at 23:42

## 2019-01-01 RX ADMIN — ONDANSETRON 4 MG: 2 INJECTION INTRAMUSCULAR; INTRAVENOUS at 06:11

## 2019-01-01 RX ADMIN — CEFTRIAXONE 1 G: 1 INJECTION, POWDER, FOR SOLUTION INTRAMUSCULAR; INTRAVENOUS at 23:15

## 2019-01-01 RX ADMIN — Medication 0.7 MCG/KG/HR: at 11:53

## 2019-01-01 RX ADMIN — Medication 10 MG: at 23:38

## 2019-01-01 RX ADMIN — ATORVASTATIN CALCIUM 40 MG: 40 TABLET, FILM COATED ORAL at 19:44

## 2019-01-01 RX ADMIN — HYDROMORPHONE HYDROCHLORIDE 0.5 MG: 1 INJECTION, SOLUTION INTRAMUSCULAR; INTRAVENOUS; SUBCUTANEOUS at 21:39

## 2019-01-01 RX ADMIN — ACETAMINOPHEN 650 MG: 325 TABLET, FILM COATED ORAL at 05:00

## 2019-01-01 RX ADMIN — Medication 10 MG: at 00:25

## 2019-01-01 RX ADMIN — Medication 400 MG: at 08:10

## 2019-01-01 RX ADMIN — ESMOLOL HYDROCHLORIDE 150 MCG/KG/MIN: 20 INJECTION INTRAVENOUS at 15:36

## 2019-01-01 RX ADMIN — QUETIAPINE 50 MG: 50 TABLET ORAL at 21:41

## 2019-01-01 RX ADMIN — ASPIRIN 81 MG CHEWABLE TABLET 81 MG: 81 TABLET CHEWABLE at 07:40

## 2019-01-01 RX ADMIN — ATORVASTATIN CALCIUM 40 MG: 40 TABLET, FILM COATED ORAL at 21:12

## 2019-01-01 RX ADMIN — Medication 200 MG: at 08:37

## 2019-01-01 RX ADMIN — METOPROLOL TARTRATE 5 MG: 5 INJECTION, SOLUTION INTRAVENOUS at 01:24

## 2019-01-01 RX ADMIN — SENNOSIDES AND DOCUSATE SODIUM 1 TABLET: 8.6; 5 TABLET ORAL at 22:51

## 2019-01-01 RX ADMIN — METOPROLOL TARTRATE 25 MG: 25 TABLET ORAL at 21:42

## 2019-01-01 RX ADMIN — HYDRALAZINE HYDROCHLORIDE 10 MG: 20 INJECTION INTRAMUSCULAR; INTRAVENOUS at 18:31

## 2019-01-01 RX ADMIN — Medication 5 ML: at 08:10

## 2019-01-01 RX ADMIN — Medication: at 08:13

## 2019-01-01 RX ADMIN — Medication 5 ML: at 08:22

## 2019-01-01 RX ADMIN — AMIODARONE HYDROCHLORIDE 400 MG: 200 TABLET ORAL at 11:34

## 2019-01-01 RX ADMIN — QUETIAPINE 50 MG: 50 TABLET ORAL at 21:47

## 2019-01-01 RX ADMIN — FUROSEMIDE 60 MG: 10 INJECTION, SOLUTION INTRAVENOUS at 22:15

## 2019-01-01 RX ADMIN — CALCIUM CHLORIDE, MAGNESIUM CHLORIDE, SODIUM CHLORIDE, SODIUM BICARBONATE, POTASSIUM CHLORIDE AND SODIUM PHOSPHATE DIBASIC DIHYDRATE 12.5 ML/KG/HR: 3.68; 3.05; 6.34; 3.09; .314; .187 INJECTION INTRAVENOUS at 09:31

## 2019-01-01 RX ADMIN — ESMOLOL HYDROCHLORIDE IN SODIUM CHLORIDE 250 MCG/KG/MIN: 20 INJECTION INTRAVENOUS at 07:48

## 2019-01-01 RX ADMIN — FENTANYL CITRATE 50 MCG: 50 INJECTION, SOLUTION INTRAMUSCULAR; INTRAVENOUS at 22:00

## 2019-01-01 RX ADMIN — QUETIAPINE 50 MG: 50 TABLET ORAL at 21:21

## 2019-01-01 RX ADMIN — METOPROLOL TARTRATE 5 MG: 5 INJECTION, SOLUTION INTRAVENOUS at 18:38

## 2019-01-01 RX ADMIN — Medication 1.2 MCG/KG/HR: at 11:54

## 2019-01-01 RX ADMIN — QUETIAPINE 50 MG: 50 TABLET ORAL at 22:02

## 2019-01-01 RX ADMIN — SENNOSIDES AND DOCUSATE SODIUM 1 TABLET: 8.6; 5 TABLET ORAL at 08:09

## 2019-01-01 RX ADMIN — PANTOPRAZOLE SODIUM 40 MG: 40 INJECTION, POWDER, FOR SOLUTION INTRAVENOUS at 19:58

## 2019-01-01 RX ADMIN — PANTOPRAZOLE SODIUM 40 MG: 40 INJECTION, POWDER, FOR SOLUTION INTRAVENOUS at 10:27

## 2019-01-01 RX ADMIN — AMIODARONE HYDROCHLORIDE 200 MG: 200 TABLET ORAL at 08:17

## 2019-01-01 RX ADMIN — Medication 1 PACKET: at 20:12

## 2019-01-01 RX ADMIN — PROPOFOL 20 MCG/KG/MIN: 10 INJECTION, EMULSION INTRAVENOUS at 18:22

## 2019-01-01 RX ADMIN — HYDRALAZINE HYDROCHLORIDE 10 MG: 20 INJECTION INTRAMUSCULAR; INTRAVENOUS at 15:41

## 2019-01-01 RX ADMIN — Medication 5000 UNITS: at 23:09

## 2019-01-01 RX ADMIN — HYDRALAZINE HYDROCHLORIDE 10 MG: 20 INJECTION INTRAMUSCULAR; INTRAVENOUS at 12:52

## 2019-01-01 RX ADMIN — Medication 5 ML: at 07:32

## 2019-01-01 RX ADMIN — ESMOLOL HYDROCHLORIDE IN SODIUM CHLORIDE 200 MCG/KG/MIN: 20 INJECTION INTRAVENOUS at 03:45

## 2019-01-01 RX ADMIN — CHLOROTHIAZIDE SODIUM 500 MG: 500 INJECTION, POWDER, LYOPHILIZED, FOR SOLUTION INTRAVENOUS at 08:17

## 2019-01-01 RX ADMIN — Medication 5000 UNITS: at 14:17

## 2019-01-01 RX ADMIN — SENNOSIDES AND DOCUSATE SODIUM 1 TABLET: 8.6; 5 TABLET ORAL at 20:52

## 2019-01-01 RX ADMIN — SENNOSIDES AND DOCUSATE SODIUM 2 TABLET: 8.6; 5 TABLET ORAL at 19:35

## 2019-01-01 RX ADMIN — SENNOSIDES AND DOCUSATE SODIUM 1 TABLET: 8.6; 5 TABLET ORAL at 08:02

## 2019-01-01 RX ADMIN — Medication 12.5 MG: at 20:51

## 2019-01-01 RX ADMIN — HYDRALAZINE HYDROCHLORIDE 25 MG: 25 TABLET, FILM COATED ORAL at 06:06

## 2019-01-01 RX ADMIN — Medication 5 ML: at 08:14

## 2019-01-01 RX ADMIN — HUMAN INSULIN 1 UNITS/HR: 100 INJECTION, SOLUTION SUBCUTANEOUS at 12:39

## 2019-01-01 RX ADMIN — SENNOSIDES AND DOCUSATE SODIUM 1 TABLET: 8.6; 5 TABLET ORAL at 19:51

## 2019-01-01 RX ADMIN — SENNOSIDES AND DOCUSATE SODIUM 1 TABLET: 8.6; 5 TABLET ORAL at 20:26

## 2019-01-01 RX ADMIN — CALCIUM CHLORIDE, MAGNESIUM CHLORIDE, SODIUM CHLORIDE, SODIUM BICARBONATE, POTASSIUM CHLORIDE AND SODIUM PHOSPHATE DIBASIC DIHYDRATE 12.5 ML/KG/HR: 3.68; 3.05; 6.34; 3.09; .314; .187 INJECTION INTRAVENOUS at 11:08

## 2019-01-01 RX ADMIN — POLYETHYLENE GLYCOL 3350 17 G: 17 POWDER, FOR SOLUTION ORAL at 08:14

## 2019-01-01 RX ADMIN — METOPROLOL TARTRATE 5 MG: 5 INJECTION, SOLUTION INTRAVENOUS at 11:03

## 2019-01-01 RX ADMIN — CEFAZOLIN 1 G: 1 INJECTION, POWDER, FOR SOLUTION INTRAMUSCULAR; INTRAVENOUS at 18:40

## 2019-01-01 RX ADMIN — CALCIUM CHLORIDE 500 MG: 100 INJECTION, SOLUTION INTRAVENOUS at 19:00

## 2019-01-01 RX ADMIN — ESMOLOL HYDROCHLORIDE IN SODIUM CHLORIDE 200 MCG/KG/MIN: 20 INJECTION INTRAVENOUS at 21:37

## 2019-01-01 RX ADMIN — PROPOFOL 15 MCG/KG/MIN: 10 INJECTION, EMULSION INTRAVENOUS at 23:23

## 2019-01-01 RX ADMIN — Medication: at 08:32

## 2019-01-01 RX ADMIN — Medication 1 PACKET: at 20:31

## 2019-01-01 RX ADMIN — AMLODIPINE BESYLATE 5 MG: 5 TABLET ORAL at 10:28

## 2019-01-01 RX ADMIN — CALCIUM CHLORIDE, MAGNESIUM CHLORIDE, SODIUM CHLORIDE, SODIUM BICARBONATE, POTASSIUM CHLORIDE AND SODIUM PHOSPHATE DIBASIC DIHYDRATE 12.5 ML/KG/HR: 3.68; 3.05; 6.34; 3.09; .314; .187 INJECTION INTRAVENOUS at 16:56

## 2019-01-01 RX ADMIN — FENTANYL CITRATE 50 MCG: 50 INJECTION, SOLUTION INTRAMUSCULAR; INTRAVENOUS at 13:05

## 2019-01-01 RX ADMIN — ROCURONIUM BROMIDE 20 MG: 10 INJECTION INTRAVENOUS at 17:52

## 2019-01-01 RX ADMIN — OXYCODONE HYDROCHLORIDE 5 MG: 5 TABLET ORAL at 08:49

## 2019-01-01 RX ADMIN — MUPIROCIN 1 G: 20 OINTMENT TOPICAL at 12:53

## 2019-01-01 RX ADMIN — Medication 400 MG: at 08:53

## 2019-01-01 RX ADMIN — LIDOCAINE HYDROCHLORIDE 3.5 ML: 10 INJECTION, SOLUTION EPIDURAL; INFILTRATION; INTRACAUDAL; PERINEURAL at 15:08

## 2019-01-01 RX ADMIN — FENTANYL CITRATE 200 MCG: 50 INJECTION, SOLUTION INTRAMUSCULAR; INTRAVENOUS at 15:16

## 2019-01-01 RX ADMIN — PROTAMINE SULFATE 50 MG: 10 INJECTION, SOLUTION INTRAVENOUS at 02:11

## 2019-01-01 RX ADMIN — Medication 100 MCG/HR: at 13:58

## 2019-01-01 RX ADMIN — ACETAMINOPHEN 650 MG: 325 TABLET, FILM COATED ORAL at 20:32

## 2019-01-01 RX ADMIN — Medication 400 MG: at 08:32

## 2019-01-01 RX ADMIN — Medication 1 PACKET: at 17:04

## 2019-01-01 RX ADMIN — AMLODIPINE BESYLATE 5 MG: 5 TABLET ORAL at 07:56

## 2019-01-01 RX ADMIN — METOPROLOL TARTRATE 5 MG: 5 INJECTION, SOLUTION INTRAVENOUS at 15:55

## 2019-01-01 RX ADMIN — AMLODIPINE BESYLATE 10 MG: 5 TABLET ORAL at 17:00

## 2019-01-01 RX ADMIN — ASPIRIN 81 MG 324 MG: 81 TABLET ORAL at 23:16

## 2019-01-01 RX ADMIN — PANTOPRAZOLE SODIUM 40 MG: 40 INJECTION, POWDER, FOR SOLUTION INTRAVENOUS at 07:40

## 2019-01-01 RX ADMIN — Medication 5000 UNITS: at 14:24

## 2019-01-01 RX ADMIN — Medication 5000 UNITS: at 21:44

## 2019-01-01 RX ADMIN — QUETIAPINE FUMARATE 25 MG: 25 TABLET ORAL at 11:53

## 2019-01-01 RX ADMIN — PANTOPRAZOLE SODIUM 40 MG: 40 INJECTION, POWDER, FOR SOLUTION INTRAVENOUS at 08:40

## 2019-01-01 RX ADMIN — ESMOLOL HYDROCHLORIDE IN SODIUM CHLORIDE 250 MCG/KG/MIN: 20 INJECTION INTRAVENOUS at 04:26

## 2019-01-01 RX ADMIN — Medication 0.8 MCG/KG/HR: at 00:27

## 2019-01-01 RX ADMIN — PROPOFOL 15 MCG/KG/MIN: 10 INJECTION, EMULSION INTRAVENOUS at 08:37

## 2019-01-01 RX ADMIN — HYDROMORPHONE HYDROCHLORIDE 0.5 MG: 1 INJECTION, SOLUTION INTRAMUSCULAR; INTRAVENOUS; SUBCUTANEOUS at 03:04

## 2019-01-01 RX ADMIN — Medication 5000 UNITS: at 13:44

## 2019-01-01 RX ADMIN — LEVOFLOXACIN 750 MG: 5 INJECTION, SOLUTION INTRAVENOUS at 17:21

## 2019-01-01 RX ADMIN — METOPROLOL TARTRATE 25 MG: 25 TABLET ORAL at 05:59

## 2019-01-01 RX ADMIN — Medication 0.6 MCG/KG/HR: at 00:56

## 2019-01-01 RX ADMIN — CEFTRIAXONE SODIUM 1 G: 1 INJECTION, POWDER, FOR SOLUTION INTRAMUSCULAR; INTRAVENOUS at 21:47

## 2019-01-01 RX ADMIN — COAGULATION FACTOR VIIA (RECOMBINANT) 3000 MCG: KIT at 00:46

## 2019-01-01 RX ADMIN — ATORVASTATIN CALCIUM 40 MG: 40 TABLET, FILM COATED ORAL at 20:43

## 2019-01-01 RX ADMIN — Medication 5000 UNITS: at 05:07

## 2019-01-01 RX ADMIN — ESMOLOL HYDROCHLORIDE IN SODIUM CHLORIDE 50 MCG/KG/MIN: 20 INJECTION INTRAVENOUS at 12:13

## 2019-01-01 RX ADMIN — PANTOPRAZOLE SODIUM 40 MG: 40 INJECTION, POWDER, FOR SOLUTION INTRAVENOUS at 18:33

## 2019-01-01 RX ADMIN — ASPIRIN 81 MG CHEWABLE TABLET 81 MG: 81 TABLET CHEWABLE at 08:14

## 2019-01-01 RX ADMIN — SENNOSIDES AND DOCUSATE SODIUM 2 TABLET: 8.6; 5 TABLET ORAL at 19:51

## 2019-01-01 RX ADMIN — QUETIAPINE 50 MG: 50 TABLET ORAL at 22:36

## 2019-01-01 RX ADMIN — CALCIUM CHLORIDE 1 G: 100 INJECTION, SOLUTION INTRAVENOUS at 05:11

## 2019-01-01 RX ADMIN — Medication: at 08:37

## 2019-01-01 RX ADMIN — Medication 5000 UNITS: at 06:06

## 2019-01-01 RX ADMIN — SENNOSIDES AND DOCUSATE SODIUM 2 TABLET: 8.6; 5 TABLET ORAL at 09:17

## 2019-01-01 RX ADMIN — Medication 0.4 MCG/KG/HR: at 06:48

## 2019-01-01 RX ADMIN — ESMOLOL HYDROCHLORIDE IN SODIUM CHLORIDE 200 MCG/KG/MIN: 20 INJECTION INTRAVENOUS at 10:02

## 2019-01-01 RX ADMIN — HYDROMORPHONE HYDROCHLORIDE 0.5 MG: 1 INJECTION, SOLUTION INTRAMUSCULAR; INTRAVENOUS; SUBCUTANEOUS at 23:15

## 2019-01-01 RX ADMIN — METOPROLOL TARTRATE 25 MG: 25 TABLET, FILM COATED ORAL at 14:02

## 2019-01-01 RX ADMIN — QUETIAPINE 50 MG: 50 TABLET ORAL at 23:23

## 2019-01-01 RX ADMIN — ACETAMINOPHEN 650 MG: 325 TABLET, FILM COATED ORAL at 08:14

## 2019-01-01 RX ADMIN — Medication 5 ML: at 08:32

## 2019-01-01 RX ADMIN — ASPIRIN 325 MG ORAL TABLET 325 MG: 325 PILL ORAL at 08:02

## 2019-01-01 RX ADMIN — Medication 0.7 MCG/KG/HR: at 02:15

## 2019-01-01 RX ADMIN — ESMOLOL HYDROCHLORIDE IN SODIUM CHLORIDE 150 MCG/KG/MIN: 20 INJECTION INTRAVENOUS at 13:05

## 2019-01-01 RX ADMIN — SODIUM CHLORIDE 300 ML: 9 INJECTION, SOLUTION INTRAVENOUS at 10:25

## 2019-01-01 RX ADMIN — CALCIUM CHLORIDE, MAGNESIUM CHLORIDE, SODIUM CHLORIDE, SODIUM BICARBONATE, POTASSIUM CHLORIDE AND SODIUM PHOSPHATE DIBASIC DIHYDRATE 12.5 ML/KG/HR: 3.68; 3.05; 6.34; 3.09; .314; .187 INJECTION INTRAVENOUS at 09:30

## 2019-01-01 RX ADMIN — Medication: at 10:56

## 2019-01-01 RX ADMIN — BUSPIRONE HYDROCHLORIDE 5 MG: 5 TABLET ORAL at 07:40

## 2019-01-01 RX ADMIN — PANTOPRAZOLE SODIUM 40 MG: 40 INJECTION, POWDER, FOR SOLUTION INTRAVENOUS at 08:42

## 2019-01-01 RX ADMIN — ALBUMIN HUMAN 500 ML: 0.05 INJECTION, SOLUTION INTRAVENOUS at 20:53

## 2019-01-01 RX ADMIN — Medication 5 ML: at 08:12

## 2019-01-01 RX ADMIN — HYDRALAZINE HYDROCHLORIDE 25 MG: 25 TABLET, FILM COATED ORAL at 00:19

## 2019-01-01 RX ADMIN — POTASSIUM CHLORIDE 20 MEQ: 29.8 INJECTION, SOLUTION INTRAVENOUS at 00:41

## 2019-01-01 RX ADMIN — ESMOLOL HYDROCHLORIDE IN SODIUM CHLORIDE 250 MCG/KG/MIN: 20 INJECTION INTRAVENOUS at 22:29

## 2019-01-01 RX ADMIN — INSULIN ASPART 1 UNITS: 100 INJECTION, SOLUTION INTRAVENOUS; SUBCUTANEOUS at 12:48

## 2019-01-01 RX ADMIN — SODIUM CHLORIDE 80 MG: 9 INJECTION, SOLUTION INTRAVENOUS at 18:41

## 2019-01-01 RX ADMIN — Medication 10 MG: at 20:07

## 2019-01-01 RX ADMIN — Medication 1 PACKET: at 08:08

## 2019-01-01 RX ADMIN — Medication 5000 UNITS: at 22:25

## 2019-01-01 RX ADMIN — DOCUSATE SODIUM 286 ML: 50 LIQUID ORAL at 13:08

## 2019-01-01 RX ADMIN — ATORVASTATIN CALCIUM 40 MG: 40 TABLET, FILM COATED ORAL at 20:12

## 2019-01-01 RX ADMIN — SENNOSIDES AND DOCUSATE SODIUM 1 TABLET: 8.6; 5 TABLET ORAL at 07:32

## 2019-01-01 RX ADMIN — Medication 5000 UNITS: at 14:10

## 2019-01-01 RX ADMIN — BUSPIRONE HYDROCHLORIDE 5 MG: 5 TABLET ORAL at 19:44

## 2019-01-01 RX ADMIN — Medication 400 MG: at 07:32

## 2019-01-01 RX ADMIN — CALCIUM CHLORIDE, MAGNESIUM CHLORIDE, SODIUM CHLORIDE, SODIUM BICARBONATE, POTASSIUM CHLORIDE AND SODIUM PHOSPHATE DIBASIC DIHYDRATE 12.5 ML/KG/HR: 3.68; 3.05; 6.34; 3.09; .314; .187 INJECTION INTRAVENOUS at 23:36

## 2019-01-01 RX ADMIN — SODIUM CHLORIDE 250 ML: 9 INJECTION, SOLUTION INTRAVENOUS at 16:44

## 2019-01-01 RX ADMIN — OXYCODONE HYDROCHLORIDE 5 MG: 5 TABLET ORAL at 04:30

## 2019-01-01 RX ADMIN — QUETIAPINE 50 MG: 50 TABLET ORAL at 21:33

## 2019-01-01 RX ADMIN — METOPROLOL TARTRATE 25 MG: 25 TABLET ORAL at 21:09

## 2019-01-01 RX ADMIN — NITROGLYCERIN 50 MCG: 10 INJECTION INTRAVENOUS at 15:26

## 2019-01-01 RX ADMIN — OXYCODONE HYDROCHLORIDE 5 MG: 5 TABLET ORAL at 09:07

## 2019-01-01 RX ADMIN — Medication 1 PACKET: at 08:13

## 2019-01-01 RX ADMIN — Medication 12.5 MG: at 08:03

## 2019-01-01 RX ADMIN — HYDRALAZINE HYDROCHLORIDE 5 MG: 20 INJECTION INTRAMUSCULAR; INTRAVENOUS at 15:30

## 2019-01-01 RX ADMIN — Medication 12.5 MG: at 20:10

## 2019-01-01 RX ADMIN — Medication 5 ML: at 07:40

## 2019-01-01 RX ADMIN — Medication 5000 UNITS: at 23:14

## 2019-01-01 RX ADMIN — QUETIAPINE 50 MG: 50 TABLET ORAL at 21:42

## 2019-01-01 RX ADMIN — METOPROLOL TARTRATE 25 MG: 25 TABLET, FILM COATED ORAL at 05:24

## 2019-01-01 RX ADMIN — PROPOFOL 20 MCG/KG/MIN: 10 INJECTION, EMULSION INTRAVENOUS at 09:07

## 2019-01-01 RX ADMIN — OXYCODONE HYDROCHLORIDE 5 MG: 5 TABLET ORAL at 01:55

## 2019-01-01 RX ADMIN — MULTIVITAMIN 15 ML: LIQUID ORAL at 07:56

## 2019-01-01 RX ADMIN — METOPROLOL TARTRATE 25 MG: 25 TABLET, FILM COATED ORAL at 14:08

## 2019-01-01 RX ADMIN — SODIUM CHLORIDE 250 ML: 9 INJECTION, SOLUTION INTRAVENOUS at 14:02

## 2019-01-01 RX ADMIN — ESMOLOL HYDROCHLORIDE IN SODIUM CHLORIDE 150 MCG/KG/MIN: 20 INJECTION INTRAVENOUS at 10:00

## 2019-01-01 RX ADMIN — PANTOPRAZOLE SODIUM 40 MG: 40 INJECTION, POWDER, FOR SOLUTION INTRAVENOUS at 08:17

## 2019-01-01 RX ADMIN — ESMOLOL HYDROCHLORIDE IN SODIUM CHLORIDE 300 MCG/KG/MIN: 20 INJECTION INTRAVENOUS at 18:30

## 2019-01-01 RX ADMIN — ACETAMINOPHEN 650 MG: 325 TABLET, FILM COATED ORAL at 08:02

## 2019-01-01 RX ADMIN — PANTOPRAZOLE SODIUM 40 MG: 40 TABLET, DELAYED RELEASE ORAL at 20:12

## 2019-01-01 RX ADMIN — Medication: at 12:27

## 2019-01-01 RX ADMIN — AMIODARONE HYDROCHLORIDE 400 MG: 200 TABLET ORAL at 19:45

## 2019-01-01 RX ADMIN — Medication 0.9 MCG/KG/HR: at 11:02

## 2019-01-01 RX ADMIN — CEFAZOLIN SODIUM 2 G: 2 INJECTION, SOLUTION INTRAVENOUS at 14:39

## 2019-01-01 RX ADMIN — PANTOPRAZOLE SODIUM 40 MG: 40 INJECTION, POWDER, FOR SOLUTION INTRAVENOUS at 21:44

## 2019-01-01 RX ADMIN — Medication 1 PACKET: at 20:44

## 2019-01-01 RX ADMIN — BUSPIRONE HYDROCHLORIDE 5 MG: 5 TABLET ORAL at 20:12

## 2019-01-01 RX ADMIN — BISACODYL 10 MG: 10 SUPPOSITORY RECTAL at 00:27

## 2019-01-01 RX ADMIN — ACETAMINOPHEN 650 MG: 325 TABLET, FILM COATED ORAL at 19:45

## 2019-01-01 RX ADMIN — PANTOPRAZOLE SODIUM 40 MG: 40 INJECTION, POWDER, FOR SOLUTION INTRAVENOUS at 07:56

## 2019-01-01 RX ADMIN — PANTOPRAZOLE SODIUM 40 MG: 40 INJECTION, POWDER, FOR SOLUTION INTRAVENOUS at 09:02

## 2019-01-01 RX ADMIN — OXYCODONE HYDROCHLORIDE 5 MG: 5 TABLET ORAL at 16:59

## 2019-01-01 RX ADMIN — PANTOPRAZOLE SODIUM 40 MG: 40 INJECTION, POWDER, FOR SOLUTION INTRAVENOUS at 07:29

## 2019-01-01 RX ADMIN — POLYETHYLENE GLYCOL 3350 17 G: 17 POWDER, FOR SOLUTION ORAL at 08:53

## 2019-01-01 RX ADMIN — METOPROLOL TARTRATE 25 MG: 25 TABLET, FILM COATED ORAL at 20:40

## 2019-01-01 RX ADMIN — BUSPIRONE HYDROCHLORIDE 5 MG: 5 TABLET ORAL at 07:56

## 2019-01-01 RX ADMIN — INSULIN ASPART 1 UNITS: 100 INJECTION, SOLUTION INTRAVENOUS; SUBCUTANEOUS at 00:22

## 2019-01-01 RX ADMIN — HEPARIN SODIUM 430 UNITS/HR: 10000 INJECTION, SOLUTION INTRAVENOUS at 17:25

## 2019-01-01 RX ADMIN — ACETAMINOPHEN 650 MG: 325 TABLET, FILM COATED ORAL at 20:29

## 2019-01-01 RX ADMIN — BUSPIRONE HYDROCHLORIDE 5 MG: 5 TABLET ORAL at 08:15

## 2019-01-01 RX ADMIN — Medication 75 MCG/HR: at 21:39

## 2019-01-01 RX ADMIN — SODIUM CHLORIDE: 9 INJECTION, SOLUTION INTRAVENOUS at 12:30

## 2019-01-01 RX ADMIN — AMIODARONE HYDROCHLORIDE 400 MG: 200 TABLET ORAL at 20:26

## 2019-01-01 RX ADMIN — Medication 5 ML: at 08:37

## 2019-01-01 RX ADMIN — Medication 20 MG: at 20:39

## 2019-01-01 RX ADMIN — Medication 75 MCG/HR: at 18:42

## 2019-01-01 RX ADMIN — HYDRALAZINE HYDROCHLORIDE 5 MG: 20 INJECTION INTRAMUSCULAR; INTRAVENOUS at 15:55

## 2019-01-01 RX ADMIN — QUETIAPINE 50 MG: 50 TABLET ORAL at 21:36

## 2019-01-01 RX ADMIN — INSULIN ASPART 1 UNITS: 100 INJECTION, SOLUTION INTRAVENOUS; SUBCUTANEOUS at 17:03

## 2019-01-01 RX ADMIN — ASPIRIN 81 MG CHEWABLE TABLET 81 MG: 81 TABLET CHEWABLE at 08:17

## 2019-01-01 RX ADMIN — Medication 0.7 MCG/KG/HR: at 22:03

## 2019-01-01 RX ADMIN — ACETAMINOPHEN 650 MG: 325 TABLET, FILM COATED ORAL at 00:13

## 2019-01-01 RX ADMIN — Medication 0.8 MCG/KG/HR: at 14:09

## 2019-01-01 RX ADMIN — Medication 10 MG: at 17:12

## 2019-01-01 RX ADMIN — Medication 5000 UNITS: at 14:09

## 2019-01-01 RX ADMIN — PANTOPRAZOLE SODIUM 40 MG: 40 INJECTION, POWDER, FOR SOLUTION INTRAVENOUS at 08:18

## 2019-01-01 RX ADMIN — SENNOSIDES AND DOCUSATE SODIUM 1 TABLET: 8.6; 5 TABLET ORAL at 07:56

## 2019-01-01 RX ADMIN — Medication 1 PACKET: at 08:03

## 2019-01-01 RX ADMIN — CALCIUM CHLORIDE, MAGNESIUM CHLORIDE, SODIUM CHLORIDE, SODIUM BICARBONATE, POTASSIUM CHLORIDE AND SODIUM PHOSPHATE DIBASIC DIHYDRATE 12.5 ML/KG/HR: 3.68; 3.05; 6.34; 3.09; .314; .187 INJECTION INTRAVENOUS at 16:55

## 2019-01-01 RX ADMIN — PHENYLEPHRINE HYDROCHLORIDE 100 MCG: 10 INJECTION, SOLUTION INTRAMUSCULAR; INTRAVENOUS; SUBCUTANEOUS at 13:45

## 2019-01-01 RX ADMIN — ROCURONIUM BROMIDE 50 MG: 10 INJECTION INTRAVENOUS at 15:04

## 2019-01-01 RX ADMIN — BUSPIRONE HYDROCHLORIDE 5 MG: 5 TABLET ORAL at 08:02

## 2019-01-01 RX ADMIN — METOPROLOL TARTRATE 25 MG: 25 TABLET, FILM COATED ORAL at 21:17

## 2019-01-01 RX ADMIN — METOPROLOL TARTRATE 25 MG: 25 TABLET, FILM COATED ORAL at 19:57

## 2019-01-01 RX ADMIN — ACETAMINOPHEN 650 MG: 325 TABLET, FILM COATED ORAL at 04:31

## 2019-01-01 RX ADMIN — BISACODYL 10 MG: 10 SUPPOSITORY RECTAL at 08:09

## 2019-01-01 RX ADMIN — AMIODARONE HYDROCHLORIDE 0.5 MG/MIN: 50 INJECTION, SOLUTION INTRAVENOUS at 22:29

## 2019-01-01 RX ADMIN — Medication 1 PACKET: at 19:42

## 2019-01-01 RX ADMIN — Medication 5000 UNITS: at 21:47

## 2019-01-01 RX ADMIN — Medication 1 PACKET: at 20:11

## 2019-01-01 RX ADMIN — PHENYLEPHRINE HYDROCHLORIDE 100 MCG: 10 INJECTION, SOLUTION INTRAMUSCULAR; INTRAVENOUS; SUBCUTANEOUS at 13:28

## 2019-01-01 RX ADMIN — METOPROLOL TARTRATE 5 MG: 1 INJECTION, SOLUTION INTRAVENOUS at 11:03

## 2019-01-01 RX ADMIN — AMLODIPINE BESYLATE 5 MG: 5 TABLET ORAL at 09:02

## 2019-01-01 RX ADMIN — Medication 0.5 MCG/KG/HR: at 00:00

## 2019-01-01 RX ADMIN — SODIUM CHLORIDE 300 ML: 9 INJECTION, SOLUTION INTRAVENOUS at 11:20

## 2019-01-01 RX ADMIN — Medication 400 MG: at 19:58

## 2019-01-01 RX ADMIN — NOREPINEPHRINE BITARTRATE 0.03 MCG/KG/MIN: 1 INJECTION INTRAVENOUS at 23:54

## 2019-01-01 RX ADMIN — POTASSIUM CHLORIDE 20 MEQ: 29.8 INJECTION, SOLUTION INTRAVENOUS at 06:05

## 2019-01-01 RX ADMIN — PROPOFOL 20 MCG/KG/MIN: 10 INJECTION, EMULSION INTRAVENOUS at 22:06

## 2019-01-01 RX ADMIN — METOPROLOL TARTRATE 25 MG: 25 TABLET ORAL at 07:56

## 2019-01-01 RX ADMIN — Medication 1 PACKET: at 07:41

## 2019-01-01 RX ADMIN — SENNOSIDES AND DOCUSATE SODIUM 1 TABLET: 8.6; 5 TABLET ORAL at 19:39

## 2019-01-01 RX ADMIN — Medication: at 08:03

## 2019-01-01 RX ADMIN — METOPROLOL TARTRATE 25 MG: 25 TABLET ORAL at 20:52

## 2019-01-01 RX ADMIN — Medication 400 MG: at 08:03

## 2019-01-01 RX ADMIN — Medication 400 MG: at 19:31

## 2019-01-01 RX ADMIN — Medication 0.7 MCG/KG/HR: at 17:50

## 2019-01-01 RX ADMIN — SUGAMMADEX 150 MG: 100 INJECTION, SOLUTION INTRAVENOUS at 19:50

## 2019-01-01 RX ADMIN — AMIODARONE HYDROCHLORIDE 150 MG: 1.5 INJECTION, SOLUTION INTRAVENOUS at 16:04

## 2019-01-01 RX ADMIN — Medication 100 MCG/HR: at 09:16

## 2019-01-01 RX ADMIN — Medication 0.8 MCG/KG/HR: at 04:53

## 2019-01-01 RX ADMIN — PROPOFOL 30 MCG/KG/MIN: 10 INJECTION, EMULSION INTRAVENOUS at 12:58

## 2019-01-01 RX ADMIN — ASPIRIN 325 MG ORAL TABLET 325 MG: 325 PILL ORAL at 08:51

## 2019-01-01 RX ADMIN — EPINEPHRINE 0.01 MCG/KG/MIN: 1 INJECTION PARENTERAL at 01:38

## 2019-01-01 RX ADMIN — ALBUMIN HUMAN 25 G: 0.05 INJECTION, SOLUTION INTRAVENOUS at 09:50

## 2019-01-01 RX ADMIN — Medication 0.8 MCG/KG/HR: at 21:22

## 2019-01-01 RX ADMIN — ACETAMINOPHEN 650 MG: 325 TABLET, FILM COATED ORAL at 12:18

## 2019-01-01 RX ADMIN — HYDRALAZINE HYDROCHLORIDE 5 MG: 20 INJECTION INTRAMUSCULAR; INTRAVENOUS at 05:07

## 2019-01-01 RX ADMIN — Medication 5000 UNITS: at 00:06

## 2019-01-01 RX ADMIN — ASPIRIN 81 MG CHEWABLE TABLET 81 MG: 81 TABLET CHEWABLE at 13:21

## 2019-01-01 RX ADMIN — HEPARIN SODIUM 630 UNITS/HR: 10000 INJECTION, SOLUTION INTRAVENOUS at 09:08

## 2019-01-01 RX ADMIN — CALCIUM CHLORIDE, MAGNESIUM CHLORIDE, SODIUM CHLORIDE, SODIUM BICARBONATE, POTASSIUM CHLORIDE AND SODIUM PHOSPHATE DIBASIC DIHYDRATE 12.5 ML/KG/HR: 3.68; 3.05; 6.34; 3.09; .314; .187 INJECTION INTRAVENOUS at 03:42

## 2019-01-01 RX ADMIN — Medication 5000 UNITS: at 06:24

## 2019-01-01 RX ADMIN — Medication 5000 UNITS: at 15:11

## 2019-01-01 RX ADMIN — Medication 10 MG: at 10:02

## 2019-01-01 RX ADMIN — BUSPIRONE HYDROCHLORIDE 5 MG: 5 TABLET ORAL at 08:36

## 2019-01-01 RX ADMIN — CALCIUM CHLORIDE, MAGNESIUM CHLORIDE, SODIUM CHLORIDE, SODIUM BICARBONATE, POTASSIUM CHLORIDE AND SODIUM PHOSPHATE DIBASIC DIHYDRATE 12.5 ML/KG/HR: 3.68; 3.05; 6.34; 3.09; .314; .187 INJECTION INTRAVENOUS at 03:00

## 2019-01-01 RX ADMIN — SENNOSIDES AND DOCUSATE SODIUM 2 TABLET: 8.6; 5 TABLET ORAL at 20:22

## 2019-01-01 RX ADMIN — EPINEPHRINE 0.03 MCG/KG/MIN: 1 INJECTION PARENTERAL at 18:11

## 2019-01-01 RX ADMIN — ALBUMIN HUMAN 25 G: 0.05 INJECTION, SOLUTION INTRAVENOUS at 14:37

## 2019-01-01 RX ADMIN — BUSPIRONE HYDROCHLORIDE 5 MG: 5 TABLET ORAL at 07:54

## 2019-01-01 RX ADMIN — ESMOLOL HYDROCHLORIDE IN SODIUM CHLORIDE 250 MCG/KG/MIN: 20 INJECTION INTRAVENOUS at 15:35

## 2019-01-01 RX ADMIN — NITROGLYCERIN 0.5 MCG/KG/MIN: 20 INJECTION INTRAVENOUS at 15:24

## 2019-01-01 RX ADMIN — OXYCODONE HYDROCHLORIDE 5 MG: 5 TABLET ORAL at 01:23

## 2019-01-01 RX ADMIN — AMIODARONE HYDROCHLORIDE 1 MG/MIN: 50 INJECTION, SOLUTION INTRAVENOUS at 16:38

## 2019-01-01 RX ADMIN — Medication 20 MG: at 16:01

## 2019-01-01 RX ADMIN — SENNOSIDES AND DOCUSATE SODIUM 2 TABLET: 8.6; 5 TABLET ORAL at 07:54

## 2019-01-01 RX ADMIN — SIMVASTATIN 40 MG: 40 TABLET, FILM COATED ORAL at 22:14

## 2019-01-01 RX ADMIN — Medication: at 08:35

## 2019-01-01 RX ADMIN — Medication 1 PACKET: at 19:38

## 2019-01-01 RX ADMIN — CEFAZOLIN 1 G: 330 INJECTION, POWDER, FOR SOLUTION INTRAMUSCULAR; INTRAVENOUS at 18:24

## 2019-01-01 RX ADMIN — INSULIN ASPART 1 UNITS: 100 INJECTION, SOLUTION INTRAVENOUS; SUBCUTANEOUS at 08:05

## 2019-01-01 RX ADMIN — HYDRALAZINE HYDROCHLORIDE 25 MG: 25 TABLET, FILM COATED ORAL at 12:17

## 2019-01-01 RX ADMIN — Medication 0.8 MCG/KG/HR: at 09:24

## 2019-01-01 RX ADMIN — OXYCODONE HYDROCHLORIDE 5 MG: 5 TABLET ORAL at 09:31

## 2019-01-01 RX ADMIN — HYDRALAZINE HYDROCHLORIDE 5 MG: 20 INJECTION INTRAMUSCULAR; INTRAVENOUS at 01:51

## 2019-01-01 RX ADMIN — Medication 12.5 MG: at 09:02

## 2019-01-01 RX ADMIN — AMLODIPINE BESYLATE 10 MG: 5 TABLET ORAL at 08:05

## 2019-01-01 RX ADMIN — HYDRALAZINE HYDROCHLORIDE 5 MG: 20 INJECTION INTRAMUSCULAR; INTRAVENOUS at 20:30

## 2019-01-01 RX ADMIN — MULTIVITAMIN 15 ML: LIQUID ORAL at 08:14

## 2019-01-01 RX ADMIN — INSULIN ASPART 1 UNITS: 100 INJECTION, SOLUTION INTRAVENOUS; SUBCUTANEOUS at 07:56

## 2019-01-01 RX ADMIN — QUETIAPINE FUMARATE 25 MG: 25 TABLET ORAL at 10:42

## 2019-01-01 RX ADMIN — Medication 5000 UNITS: at 21:33

## 2019-01-01 RX ADMIN — Medication: at 14:02

## 2019-01-01 RX ADMIN — BUSPIRONE HYDROCHLORIDE 5 MG: 5 TABLET ORAL at 08:51

## 2019-01-01 RX ADMIN — HYDRALAZINE HYDROCHLORIDE 10 MG: 20 INJECTION INTRAMUSCULAR; INTRAVENOUS at 18:17

## 2019-01-01 RX ADMIN — BUSPIRONE HYDROCHLORIDE 5 MG: 5 TABLET ORAL at 10:28

## 2019-01-01 RX ADMIN — HYDRALAZINE HYDROCHLORIDE 25 MG: 25 TABLET, FILM COATED ORAL at 12:02

## 2019-01-01 RX ADMIN — Medication 5000 UNITS: at 21:50

## 2019-01-01 RX ADMIN — Medication 5 ML: at 08:09

## 2019-01-01 RX ADMIN — SODIUM NITROPRUSSIDE 1 MCG/KG/MIN: 25 INJECTION INTRAVENOUS at 12:52

## 2019-01-01 RX ADMIN — OXYCODONE HYDROCHLORIDE 5 MG: 5 TABLET ORAL at 18:39

## 2019-01-01 RX ADMIN — Medication 0.5 MCG/KG/HR: at 16:08

## 2019-01-01 RX ADMIN — PANTOPRAZOLE SODIUM 40 MG: 40 INJECTION, POWDER, FOR SOLUTION INTRAVENOUS at 08:53

## 2019-01-01 RX ADMIN — PROPOFOL 30 MCG/KG/MIN: 10 INJECTION, EMULSION INTRAVENOUS at 19:34

## 2019-01-01 RX ADMIN — Medication: at 07:32

## 2019-01-01 RX ADMIN — Medication 1 PACKET: at 07:54

## 2019-01-01 RX ADMIN — ACETAMINOPHEN 650 MG: 325 TABLET, FILM COATED ORAL at 17:17

## 2019-01-01 RX ADMIN — METOPROLOL TARTRATE 25 MG: 25 TABLET ORAL at 08:02

## 2019-01-01 RX ADMIN — HEPARIN SODIUM 850 UNITS/HR: 10000 INJECTION, SOLUTION INTRAVENOUS at 01:24

## 2019-01-01 RX ADMIN — Medication 12.5 MG: at 19:38

## 2019-01-01 RX ADMIN — OXYCODONE HYDROCHLORIDE 5 MG: 5 TABLET ORAL at 14:29

## 2019-01-01 RX ADMIN — POTASSIUM CHLORIDE 20 MEQ: 1.5 POWDER, FOR SOLUTION ORAL at 21:17

## 2019-01-01 RX ADMIN — HEPARIN SODIUM 30000 UNITS: 1000 INJECTION, SOLUTION INTRAVENOUS; SUBCUTANEOUS at 16:22

## 2019-01-01 RX ADMIN — VASOPRESSIN 1.5 UNITS/HR: 20 INJECTION INTRAVENOUS at 15:38

## 2019-01-01 RX ADMIN — Medication 10 MG: at 10:01

## 2019-01-01 RX ADMIN — PANTOPRAZOLE SODIUM 40 MG: 40 INJECTION, POWDER, FOR SOLUTION INTRAVENOUS at 20:26

## 2019-01-01 RX ADMIN — INSULIN ASPART 1 UNITS: 100 INJECTION, SOLUTION INTRAVENOUS; SUBCUTANEOUS at 16:17

## 2019-01-01 RX ADMIN — BUSPIRONE HYDROCHLORIDE 5 MG: 5 TABLET ORAL at 08:32

## 2019-01-01 RX ADMIN — SENNOSIDES AND DOCUSATE SODIUM 2 TABLET: 8.6; 5 TABLET ORAL at 20:43

## 2019-01-01 RX ADMIN — PROPOFOL 20 MCG/KG/MIN: 10 INJECTION, EMULSION INTRAVENOUS at 07:29

## 2019-01-01 RX ADMIN — HUMAN ALBUMIN MICROSPHERES AND PERFLUTREN 4 ML: 10; .22 INJECTION, SOLUTION INTRAVENOUS at 12:00

## 2019-01-01 RX ADMIN — PROTAMINE SULFATE 160 MG: 10 INJECTION, SOLUTION INTRAVENOUS at 18:28

## 2019-01-01 RX ADMIN — CALCIUM CHLORIDE, MAGNESIUM CHLORIDE, SODIUM CHLORIDE, SODIUM BICARBONATE, POTASSIUM CHLORIDE AND SODIUM PHOSPHATE DIBASIC DIHYDRATE 12.5 ML/KG/HR: 3.68; 3.05; 6.34; 3.09; .314; .187 INJECTION INTRAVENOUS at 23:37

## 2019-01-01 RX ADMIN — QUETIAPINE 50 MG: 50 TABLET ORAL at 21:17

## 2019-01-01 RX ADMIN — Medication 10 MG: at 11:56

## 2019-01-01 RX ADMIN — POLYETHYLENE GLYCOL 3350 17 G: 17 POWDER, FOR SOLUTION ORAL at 08:21

## 2019-01-01 RX ADMIN — ESMOLOL HYDROCHLORIDE IN SODIUM CHLORIDE 300 MCG/KG/MIN: 20 INJECTION INTRAVENOUS at 22:11

## 2019-01-01 RX ADMIN — PROPOFOL 15 MCG/KG/MIN: 10 INJECTION, EMULSION INTRAVENOUS at 12:22

## 2019-01-01 RX ADMIN — Medication 1.2 MCG/KG/HR: at 16:51

## 2019-01-01 RX ADMIN — PANTOPRAZOLE SODIUM 40 MG: 40 INJECTION, POWDER, FOR SOLUTION INTRAVENOUS at 20:51

## 2019-01-01 RX ADMIN — Medication 100 MCG/HR: at 05:22

## 2019-01-01 RX ADMIN — PROPOFOL 15 MCG/KG/MIN: 10 INJECTION, EMULSION INTRAVENOUS at 12:28

## 2019-01-01 RX ADMIN — METOPROLOL TARTRATE 25 MG: 25 TABLET, FILM COATED ORAL at 05:15

## 2019-01-01 RX ADMIN — ACETAMINOPHEN 650 MG: 325 TABLET, FILM COATED ORAL at 16:10

## 2019-01-01 RX ADMIN — SODIUM BICARBONATE 100 MEQ: 84 INJECTION INTRAVENOUS at 21:50

## 2019-01-01 RX ADMIN — Medication 12.5 MG: at 10:28

## 2019-01-01 RX ADMIN — ALBUMIN HUMAN 12.5 G: 0.05 INJECTION, SOLUTION INTRAVENOUS at 21:48

## 2019-01-01 RX ADMIN — ROCURONIUM BROMIDE 50 MG: 10 INJECTION INTRAVENOUS at 16:40

## 2019-01-01 RX ADMIN — PROPOFOL 15 MCG/KG/MIN: 10 INJECTION, EMULSION INTRAVENOUS at 15:34

## 2019-01-01 RX ADMIN — Medication 12.5 MG: at 07:29

## 2019-01-01 RX ADMIN — CEPHALEXIN 250 MG: 250 POWDER, FOR SUSPENSION ORAL at 13:14

## 2019-01-01 RX ADMIN — SODIUM CHLORIDE 300 ML: 9 INJECTION, SOLUTION INTRAVENOUS at 14:48

## 2019-01-01 RX ADMIN — OXYCODONE HYDROCHLORIDE 5 MG: 5 TABLET ORAL at 05:00

## 2019-01-01 RX ADMIN — LEVOFLOXACIN 500 MG: 5 INJECTION, SOLUTION INTRAVENOUS at 16:55

## 2019-01-01 RX ADMIN — AMINOCAPROIC ACID 5 G: 250 INJECTION, SOLUTION INTRAVENOUS at 14:22

## 2019-01-01 RX ADMIN — Medication 0.7 MCG/KG/HR: at 11:15

## 2019-01-01 RX ADMIN — PANTOPRAZOLE SODIUM 40 MG: 40 TABLET, DELAYED RELEASE ORAL at 08:37

## 2019-01-01 RX ADMIN — Medication 5000 UNITS: at 05:59

## 2019-01-01 RX ADMIN — FENTANYL CITRATE 100 MCG: 50 INJECTION, SOLUTION INTRAMUSCULAR; INTRAVENOUS at 18:48

## 2019-01-01 RX ADMIN — HEPARIN SODIUM 3000 UNITS: 1000 INJECTION INTRAVENOUS; SUBCUTANEOUS at 08:18

## 2019-01-01 RX ADMIN — Medication 1 PACKET: at 19:31

## 2019-01-01 RX ADMIN — Medication 0.8 MCG/KG/HR: at 17:43

## 2019-01-01 RX ADMIN — PROPOFOL 30 MCG/KG/MIN: 10 INJECTION, EMULSION INTRAVENOUS at 14:17

## 2019-01-01 RX ADMIN — AMIODARONE HYDROCHLORIDE 400 MG: 200 TABLET ORAL at 07:53

## 2019-01-01 RX ADMIN — CEFAZOLIN SODIUM 500 MG: 500 INJECTION, POWDER, FOR SOLUTION INTRAMUSCULAR; INTRAVENOUS at 09:34

## 2019-01-01 RX ADMIN — BUSPIRONE HYDROCHLORIDE 5 MG: 5 TABLET ORAL at 11:06

## 2019-01-01 RX ADMIN — PROPOFOL 15 MCG/KG/MIN: 10 INJECTION, EMULSION INTRAVENOUS at 22:25

## 2019-01-01 RX ADMIN — HYDROMORPHONE HYDROCHLORIDE 0.5 MG: 1 INJECTION, SOLUTION INTRAMUSCULAR; INTRAVENOUS; SUBCUTANEOUS at 06:32

## 2019-01-01 RX ADMIN — Medication 5000 UNITS: at 06:44

## 2019-01-01 RX ADMIN — FENTANYL CITRATE 50 MCG: 50 INJECTION, SOLUTION INTRAMUSCULAR; INTRAVENOUS at 13:26

## 2019-01-01 RX ADMIN — HYDRALAZINE HYDROCHLORIDE 5 MG: 20 INJECTION INTRAMUSCULAR; INTRAVENOUS at 19:47

## 2019-01-01 RX ADMIN — NOREPINEPHRINE BITARTRATE 0.03 MCG/KG/MIN: 1 INJECTION INTRAVENOUS at 23:49

## 2019-01-01 RX ADMIN — ROCURONIUM BROMIDE 50 MG: 10 INJECTION INTRAVENOUS at 12:47

## 2019-01-01 RX ADMIN — Medication 1 PACKET: at 10:29

## 2019-01-01 RX ADMIN — PANTOPRAZOLE SODIUM 40 MG: 40 INJECTION, POWDER, FOR SOLUTION INTRAVENOUS at 08:13

## 2019-01-01 RX ADMIN — SENNOSIDES AND DOCUSATE SODIUM 2 TABLET: 8.6; 5 TABLET ORAL at 20:32

## 2019-01-01 RX ADMIN — Medication 5 ML: at 13:08

## 2019-01-01 RX ADMIN — Medication 5 ML: at 08:21

## 2019-01-01 RX ADMIN — ATORVASTATIN CALCIUM 40 MG: 40 TABLET, FILM COATED ORAL at 20:26

## 2019-01-01 RX ADMIN — Medication 5000 UNITS: at 06:12

## 2019-01-01 RX ADMIN — POTASSIUM CHLORIDE 20 MEQ: 1.5 POWDER, FOR SOLUTION ORAL at 13:18

## 2019-01-01 RX ADMIN — Medication 5000 UNITS: at 13:30

## 2019-01-01 RX ADMIN — ESMOLOL HYDROCHLORIDE IN SODIUM CHLORIDE 150 MCG/KG/MIN: 20 INJECTION INTRAVENOUS at 13:08

## 2019-01-01 RX ADMIN — AMIODARONE HYDROCHLORIDE 400 MG: 200 TABLET ORAL at 21:14

## 2019-01-01 RX ADMIN — ACETAMINOPHEN 650 MG: 325 TABLET, FILM COATED ORAL at 05:59

## 2019-01-01 RX ADMIN — SIMVASTATIN 40 MG: 40 TABLET, FILM COATED ORAL at 00:06

## 2019-01-01 RX ADMIN — Medication 200 MG: at 09:19

## 2019-01-01 RX ADMIN — COAGULATION FACTOR VIIA (RECOMBINANT) 3 MG: KIT at 02:46

## 2019-01-01 RX ADMIN — SIMVASTATIN 40 MG: 40 TABLET, FILM COATED ORAL at 22:15

## 2019-01-01 RX ADMIN — PROPOFOL 15 MCG/KG/MIN: 10 INJECTION, EMULSION INTRAVENOUS at 16:36

## 2019-01-01 RX ADMIN — METOPROLOL TARTRATE 25 MG: 25 TABLET, FILM COATED ORAL at 20:12

## 2019-01-01 RX ADMIN — Medication 1 PACKET: at 08:10

## 2019-01-01 RX ADMIN — BUSPIRONE HYDROCHLORIDE 5 MG: 5 TABLET ORAL at 08:37

## 2019-01-01 RX ADMIN — Medication 100 MCG/HR: at 10:54

## 2019-01-01 RX ADMIN — POTASSIUM CHLORIDE 40 MEQ: 1.5 POWDER, FOR SOLUTION ORAL at 01:40

## 2019-01-01 RX ADMIN — FENTANYL CITRATE 50 MCG: 50 INJECTION INTRAMUSCULAR; INTRAVENOUS at 11:10

## 2019-01-01 RX ADMIN — VASOPRESSIN 0.5 UNITS/HR: 20 INJECTION INTRAVENOUS at 08:19

## 2019-01-01 RX ADMIN — ATORVASTATIN CALCIUM 40 MG: 40 TABLET, FILM COATED ORAL at 20:33

## 2019-01-01 RX ADMIN — CALCIUM CHLORIDE, MAGNESIUM CHLORIDE, SODIUM CHLORIDE, SODIUM BICARBONATE, POTASSIUM CHLORIDE AND SODIUM PHOSPHATE DIBASIC DIHYDRATE 2.78 ML/KG/HR: 3.68; 3.05; 6.34; 3.09; .314; .187 INJECTION INTRAVENOUS at 23:36

## 2019-01-01 RX ADMIN — SODIUM CHLORIDE 300 ML: 9 INJECTION, SOLUTION INTRAVENOUS at 10:45

## 2019-01-01 RX ADMIN — SODIUM BICARBONATE 100 MEQ: 84 INJECTION INTRAVENOUS at 02:35

## 2019-01-01 RX ADMIN — Medication 1 PACKET: at 08:12

## 2019-01-01 RX ADMIN — HYDROMORPHONE HYDROCHLORIDE 0.3 MG: 1 INJECTION, SOLUTION INTRAMUSCULAR; INTRAVENOUS; SUBCUTANEOUS at 15:14

## 2019-01-01 RX ADMIN — QUETIAPINE 50 MG: 50 TABLET ORAL at 21:50

## 2019-01-01 RX ADMIN — HYDRALAZINE HYDROCHLORIDE 10 MG: 20 INJECTION INTRAMUSCULAR; INTRAVENOUS at 00:14

## 2019-01-01 RX ADMIN — SENNOSIDES AND DOCUSATE SODIUM 1 TABLET: 8.6; 5 TABLET ORAL at 08:05

## 2019-01-01 RX ADMIN — AMIODARONE HYDROCHLORIDE 200 MG: 200 TABLET ORAL at 08:18

## 2019-01-01 RX ADMIN — AMIODARONE HYDROCHLORIDE 200 MG: 200 TABLET ORAL at 08:09

## 2019-01-01 RX ADMIN — MULTIVITAMIN 15 ML: LIQUID ORAL at 07:54

## 2019-01-01 RX ADMIN — SODIUM CHLORIDE 300 ML: 9 INJECTION, SOLUTION INTRAVENOUS at 16:44

## 2019-01-01 RX ADMIN — AMLODIPINE BESYLATE 10 MG: 5 TABLET ORAL at 08:18

## 2019-01-01 RX ADMIN — NITROGLYCERIN 50 MCG: 10 INJECTION INTRAVENOUS at 18:58

## 2019-01-01 RX ADMIN — Medication 5 ML: at 09:17

## 2019-01-01 RX ADMIN — METOPROLOL TARTRATE 25 MG: 25 TABLET, FILM COATED ORAL at 21:47

## 2019-01-01 RX ADMIN — HYDROMORPHONE HYDROCHLORIDE 0.5 MG: 1 INJECTION, SOLUTION INTRAMUSCULAR; INTRAVENOUS; SUBCUTANEOUS at 08:14

## 2019-01-01 RX ADMIN — OXYCODONE HYDROCHLORIDE 5 MG: 5 TABLET ORAL at 06:14

## 2019-01-01 RX ADMIN — Medication 10 MG: at 04:31

## 2019-01-01 RX ADMIN — OXYCODONE HYDROCHLORIDE 5 MG: 5 TABLET ORAL at 04:21

## 2019-01-01 RX ADMIN — Medication 1 PACKET: at 07:33

## 2019-01-01 RX ADMIN — PANTOPRAZOLE SODIUM 40 MG: 40 TABLET, DELAYED RELEASE ORAL at 20:40

## 2019-01-01 RX ADMIN — OXYCODONE HYDROCHLORIDE 5 MG: 5 TABLET ORAL at 21:41

## 2019-01-01 RX ADMIN — OXYCODONE HYDROCHLORIDE 5 MG: 5 TABLET ORAL at 12:59

## 2019-01-01 RX ADMIN — ASPIRIN 325 MG ORAL TABLET 325 MG: 325 PILL ORAL at 07:56

## 2019-01-01 RX ADMIN — ESMOLOL HYDROCHLORIDE IN SODIUM CHLORIDE 300 MCG/KG/MIN: 20 INJECTION INTRAVENOUS at 17:02

## 2019-01-01 RX ADMIN — PROPOFOL 55 MCG/KG/MIN: 10 INJECTION, EMULSION INTRAVENOUS at 12:00

## 2019-01-01 ASSESSMENT — ACTIVITIES OF DAILY LIVING (ADL)
ADLS_ACUITY_SCORE: 22
ADLS_ACUITY_SCORE: 23
ADLS_ACUITY_SCORE: 23
ADLS_ACUITY_SCORE: 22
ADLS_ACUITY_SCORE: 22
ADLS_ACUITY_SCORE: 25
ADLS_ACUITY_SCORE: 22
ADLS_ACUITY_SCORE: 22
ADLS_ACUITY_SCORE: 23
ADLS_ACUITY_SCORE: 22
ADLS_ACUITY_SCORE: 21
ADLS_ACUITY_SCORE: 23
ADLS_ACUITY_SCORE: 18
ADLS_ACUITY_SCORE: 22
ADLS_ACUITY_SCORE: 21
ADLS_ACUITY_SCORE: 21
ADLS_ACUITY_SCORE: 23
ADLS_ACUITY_SCORE: 22
ADLS_ACUITY_SCORE: 23
ADLS_ACUITY_SCORE: 20
ADLS_ACUITY_SCORE: 23
ADLS_ACUITY_SCORE: 21
ADLS_ACUITY_SCORE: 22
ADLS_ACUITY_SCORE: 22
ADLS_ACUITY_SCORE: 23
ADLS_ACUITY_SCORE: 22
ADLS_ACUITY_SCORE: 23
ADLS_ACUITY_SCORE: 18
ADLS_ACUITY_SCORE: 23
ADLS_ACUITY_SCORE: 22
ADLS_ACUITY_SCORE: 23
ADLS_ACUITY_SCORE: 21
ADLS_ACUITY_SCORE: 22
ADLS_ACUITY_SCORE: 21
ADLS_ACUITY_SCORE: 22
ADLS_ACUITY_SCORE: 22
ADLS_ACUITY_SCORE: 23
ADLS_ACUITY_SCORE: 22
ADLS_ACUITY_SCORE: 23
ADLS_ACUITY_SCORE: 23
ADLS_ACUITY_SCORE: 21
ADLS_ACUITY_SCORE: 23
ADLS_ACUITY_SCORE: 21
ADLS_ACUITY_SCORE: 22
ADLS_ACUITY_SCORE: 22
ADLS_ACUITY_SCORE: 23
ADLS_ACUITY_SCORE: 22
ADLS_ACUITY_SCORE: 22
ADLS_ACUITY_SCORE: 23
ADLS_ACUITY_SCORE: 21
ADLS_ACUITY_SCORE: 22
ADLS_ACUITY_SCORE: 20
ADLS_ACUITY_SCORE: 22
ADLS_ACUITY_SCORE: 21
ADLS_ACUITY_SCORE: 22
ADLS_ACUITY_SCORE: 21
ADLS_ACUITY_SCORE: 22
ADLS_ACUITY_SCORE: 23
ADLS_ACUITY_SCORE: 22
ADLS_ACUITY_SCORE: 21
ADLS_ACUITY_SCORE: 22
ADLS_ACUITY_SCORE: 21
ADLS_ACUITY_SCORE: 21
ADLS_ACUITY_SCORE: 23
ADLS_ACUITY_SCORE: 22
ADLS_ACUITY_SCORE: 14
ADLS_ACUITY_SCORE: 22
ADLS_ACUITY_SCORE: 23
ADLS_ACUITY_SCORE: 23
ADLS_ACUITY_SCORE: 22
ADLS_ACUITY_SCORE: 20
ADLS_ACUITY_SCORE: 22
ADLS_ACUITY_SCORE: 23
ADLS_ACUITY_SCORE: 22
ADLS_ACUITY_SCORE: 21
ADLS_ACUITY_SCORE: 22
ADLS_ACUITY_SCORE: 14
ADLS_ACUITY_SCORE: 23
ADLS_ACUITY_SCORE: 22
ADLS_ACUITY_SCORE: 23
ADLS_ACUITY_SCORE: 22
ADLS_ACUITY_SCORE: 23
ADLS_ACUITY_SCORE: 22
ADLS_ACUITY_SCORE: 21
ADLS_ACUITY_SCORE: 23
ADLS_ACUITY_SCORE: 22
ADLS_ACUITY_SCORE: 21
ADLS_ACUITY_SCORE: 22
ADLS_ACUITY_SCORE: 22
ADLS_ACUITY_SCORE: 23
ADLS_ACUITY_SCORE: 22
ADLS_ACUITY_SCORE: 22
ADLS_ACUITY_SCORE: 21
ADLS_ACUITY_SCORE: 23
ADLS_ACUITY_SCORE: 18
ADLS_ACUITY_SCORE: 22
ADLS_ACUITY_SCORE: 14
ADLS_ACUITY_SCORE: 23
ADLS_ACUITY_SCORE: 22
ADLS_ACUITY_SCORE: 22
ADLS_ACUITY_SCORE: 23
ADLS_ACUITY_SCORE: 22
PREVIOUS_RESPONSIBILITIES: MEAL PREP
ADLS_ACUITY_SCORE: 27

## 2019-01-01 ASSESSMENT — ENCOUNTER SYMPTOMS
WEAKNESS: 1
FATIGUE: 1
DYSRHYTHMIAS: 1
DYSRHYTHMIAS: 1
ABDOMINAL PAIN: 1
DYSURIA: 0
SHORTNESS OF BREATH: 1
NAUSEA: 1
BLOOD IN STOOL: 0

## 2019-01-01 ASSESSMENT — COLUMBIA-SUICIDE SEVERITY RATING SCALE - C-SSRS
1. IN THE PAST MONTH, HAVE YOU WISHED YOU WERE DEAD OR WISHED YOU COULD GO TO SLEEP AND NOT WAKE UP?: OTHER (SEE COMMENTS)
IS THE PATIENT NOT ABLE TO COMPLETE C-SSRS: UNABLE TO VERBALIZE
2. HAVE YOU ACTUALLY HAD ANY THOUGHTS OF KILLING YOURSELF IN THE PAST MONTH?: OTHER (SEE COMMENTS)

## 2019-01-01 ASSESSMENT — MIFFLIN-ST. JEOR: SCORE: 1174.86

## 2019-01-01 ASSESSMENT — PAIN DESCRIPTION - DESCRIPTORS: DESCRIPTORS: ACHING

## 2019-01-12 NOTE — ED TRIAGE NOTES
Patient comes in for increasing weakness today. Patient notes diarrhea today. Denies shortness of breath at this time. Has been able to eat a little today, gets nauseated after. Patient also notes dental pain which has been seen by dentist for but is currently bothering her.

## 2019-01-12 NOTE — ED NOTES
Pt still SpO2 at 94% on Bipap.  Hemodynamically stable.  Frequent rounds on pt.  Report called to JERRY Lozada, at [facility] [place].

## 2019-01-12 NOTE — ED PROVIDER NOTES
History     Chief Complaint:  Generalized Weakness and Dental Pain    HPI   Wendy Rocha is a 78 year old female on Plavix with history of CKD, hypertension, hyperlipidemia, who presents to the emergency department today with generalized weakness and dental pain. The patient reports shortness of breath, nausea all day today.  No exacerbating or relieving symptoms.  She also reports accompanying diarrhea without blood, and left dental pain. She denies abdominal pain, cough, fever, chest pain. Patient has had the dental pain for a while and has been seen by 2 different dentists, who have not been able to find a cause. No recent hospitalizations, antibiotics or sick contacts.     Echo 12/11/18  CONCLUSIONS    Left ventricular ejection fraction is visually estimated at 55%.    Moderate mitral regurgitation.    Pulmonary artery systolic pressure estimate is 35mmHg above RAP.    (Elevated).    Mild dilation of the aorta is present involving the ascending aorta.    (Maximal dimension 4.5 cm).    No previous study available for comparison.     Allergies:  Lisinopril    Medications:    Tylenol  Norvasc  Buspar  Plavix  Cardura  Lopressor  Hydrocortisone acetate  Zofran  Protonix  Zocor  Desyrel     Past Medical History:    CKD (chronic kidney disease) stage 3, GFR 30-59 ml/min (H)  Cognitive disorder  HTN, goal < 140/90 (H)  Hallucinations  Hyperparathyroidism (H)  Depression  Carotid disease, bilateral (H)^^  Hyperlipidemia LDL goal <100  GERD (gastroesophageal reflux disease)  Vitamin D deficiency disease  Aspiration pneumonia (H)  Fever  Vitamin D deficiency  Abnormal ECG  PVD (peripheral vascular disease) (H)  Proteinuria  Renal artery stenosis (H)^^  Anxiety   Perforated duodenal ulcer (H)  Abnormal ECG   ARF (acute renal failure) (H)   Carotid stenosis   Lung nodule   Muscle aches   Vitamin D deficiency disease     Past Surgical History:     Arthroplasty knee  Eye surgery  Laparoscopic tubal ligation     Family  "History:    Neurologic disorder   Heart disease  Hypertension  Cerebrovascular disease     Social History:  The patient was accompanied to the ED by daughter and spouse.  Smoking Status: Former  Smokeless Tobacco: Never  Alcohol Use: Yes   Marital Status:       Review of Systems   Constitutional: Positive for fatigue.   HENT:        Jaw pain   Respiratory: Positive for shortness of breath.    Cardiovascular: Negative for chest pain.   Gastrointestinal: Positive for abdominal pain and nausea. Negative for blood in stool.   Genitourinary: Negative for dysuria.   Neurological: Positive for weakness.   All other systems reviewed and are negative.      Physical Exam     Patient Vitals for the past 24 hrs:   BP Temp Temp src Pulse Heart Rate Resp SpO2 Height Weight   01/11/19 2339 -- -- -- -- -- 23 -- -- --   01/11/19 2320 164/84 98.5  F (36.9  C) Oral -- 59 24 93 % -- --   01/11/19 2253 -- -- -- -- -- -- -- 1.6 m (5' 3\") 73.6 kg (162 lb 3.2 oz)   01/11/19 2229 -- -- -- -- 58 21 91 % -- --   01/11/19 2225 (!) 168/91 -- -- 60 -- -- -- -- --   01/11/19 2055 153/88 97.5  F (36.4  C) Temporal 60 -- 20 (!) 88 % -- --      Physical Exam  Nursing note and vitals reviewed.  Constitutional: Well nourished. Conversational dyspnea  Eyes: Conjunctiva normal.  Pupils are equal, round, and reactive to light.   ENT: Nose normal. Mucous membranes pink and moist.  Dentures.   Neck: Normal range of motion.  CVS: Irregular rhythm, regular rate.  Normal heart sounds.  No murmur.  Pulmonary: Lungs with bilateral rales  GI: Abdomen with generalized tenderness. No rigidity or guarding.    MSK: No calf tenderness; trace lower extremity edema  Neuro: Alert. Follows simple commands.  Skin: Skin is warm and dry. No rash noted.   Psychiatric: Normal affect.     Emergency Department Course     ECG:  Indication: shortness of breath   Completed at 2132.  Read at 2140.   Atrial fibrillation (NEW)  Inferior infarct, age undetermined   ST & T wave " abnormality, consider lateral ischemia   Abnormal ECG    Rate 60 bpm. MA interval *. QRS duration 112. QT/QTc 478/478. P-R-T axes * 5 87.     ECG:  Indication: repeat  Completed at 2355.  Read at 0000.   Sinus bradycardia  Inferior infarct, age undetermined   ST & T wave abnormality, consider lateral ischemia   Abnormal ECG    Rate 58 bpm. MA interval 158. QRS duration 112. QT/QTc 460/451. P-R-T axes 16 0 72.     Imaging:  Radiology findings were communicated with the patient and family who voiced understanding of the findings.  Abd/pelvis CT no contrast - Stone Protocol   Final Result   IMPRESSION:    1. No acute abnormality in the abdomen or pelvis. No cause for   abdominal pain is identified.   2. Small bilateral pleural effusions.   3. Patchy opacities at both lung bases could be related to atelectasis   or edema, although pneumonia cannot be excluded. Please clinically   correlate.                IAN RODRIGUEZ MD      XR Chest 2 Views   Final Result   IMPRESSION: Hazy perihilar and lower lobe airspace opacities and small   bilateral pleural effusions. Differential would include pneumonia or   pulmonary edema. Cardiac silhouette is borderline enlarged. No   pneumothorax is visualized.       REHAN WOLF MD      Report per radiology      Laboratory:  Laboratory findings were communicated with the patient and family who voiced understanding of the findings.  Blood cultures: Pending   Troponin (Collected 2141): 0.11  Troponin (Collected 2251): 0.800   Heparin Level: Pending   UA: slightly cloudy, yellow urine with 5 ketones, 100 protein albumin, large Leukocyte esterase, 69 WBC, 4 RBC, moderate bacteria, mucous present and 12 hyaline casts o/w WNL   Urine Culture Aerobic Bacterial: Pending   Creatinine   Date Value Ref Range Status   01/11/2019 2.12 (H) 0.52 - 1.04 mg/dL Final   BNP: 10,777  CMP: 112 Cl, 122 Glucose, 22 urea nitrogen, 22 GFR o/w WNL (Creatinine 2.07)   CBC: AWNL (WBC 8.7, HGB 11.9, )  D  Dimer (Collected 2136): 1.3  Lipase: 74    BMP: 112 Cl, 124 Glucose, 35 Urea, 21 GFR o/w WNL (Creatinine 2.12)   CBC: AWNL (WBC 9.5, HGB 12.1, )     Interventions:  2315: Rocephin 1 g IV   2316: Aspirin 324 mg PO   2317: Heparin 3,650 units IV   2317: Heparin 750 units/hr IV     Medications   heparin infusion 25,000 units in 0.45% NaCl 250 mL (750 Units/hr Intravenous New Bag 1/11/19 2317)   cefTRIAXone (ROCEPHIN) 1 g vial to attach to  mL bag for ADULTS or NS 50 mL bag for PEDS (1 g Intravenous New Bag 1/11/19 2315)   aspirin (ASA) chewable tablet 324 mg (324 mg Oral Given 1/11/19 2316)   furosemide (LASIX) injection 40 mg (40 mg Intravenous Given 1/11/19 2316)   heparin Loading Dose bolus dose from infusion pump 3,650 Units (3,650 Units Intravenous Given 1/11/19 2317)        Emergency Department Course:  Nursing notes and vitals reviewed.  2111: I performed an exam of the patient as documented above.   IV was inserted and blood was drawn for laboratory testing, results above.  The patient was sent for a Abdomen/Pelvis CT and Chest XR while in the emergency department, results above.   The patient provided a urine sample here in the emergency department. This was sent for laboratory testing, findings above.    2230: Family updated that patient with UTI; formal antibiotics ordered.  On reviewing indications and contraindications of Heparin, she was agreeable.  Patient started on heparin for management of NSTEMI    2305: Findings and plan explained to the Patient and spouse and daughter. Family requesting transfer to [facility]  pending acceptance as no ICU beds at [facility].  I personally reviewed the laboratory and imaging results with the Patient and spouse and daughter and answered all related questions.    2314: Patient started on BiPAP    2355: Patient states breathing stable on BiPAP.     0001: Repeat troponin uptrending.  Repeat EKG stable.  Patient denies any active  chest pain. Heparin running. Pending formal callback to the [facility], paged twice.  I discussed with family at bedside my concern for possible delay given bed availability. They are requesting if do not hear from [facility] formal transfer to [facility].  I was made aware that there are no ICU beds available at [facility].  After lengthy discussion they are agreeable to transfer to [facility].      0024:  Discussed the case with Dr. Bravo, at [facility] who is accepting of the transfer.  Patient to be placed in the ICU on arrival.  Family and patient updated with plan of care, agreeable to transfer. Patient remains on BiPAP, states her breathing is improving.         Impression & Plan      Medical Decision Making:  Wendy Rocha is a 78 year old female with diastolic CHF, CKD, hypertension, dyslipidemia, presenting with generalized weakness, and shortness of breath. She was hypoxic on arrival. The patient underwent an extensive workup in the ED. EKG with noted nonspecific T-wave inversions in comparison to previous EKG. Patient also noted to be in atrial fibrillation though rate controlled today, which is new in comparison to previous EKG. First troponin indeterminate. Chest XR does suggest fluid overload. Patient was diuresed in the department. Her D-Dimer was elevated, though patient is not a candidate for CT PE given worsening CKD. She was started on Heparin after risks/benefits discussed with patient for management of NSTEMI. BiPAP was also initiated during her time in the ED and patient reported improvements in her breathing. Repeat troponin uptrending though EKG without focal new changes.  Patient does have UTI today and was given a dose of rocephin. Formal culture sent.  She has acute on chronic renal insufficiency which I suspect is prerenal in origin given reported diarrhea/decreased PO intake.  Patient is not septic appearing.  Patient hemodynamically stable at this time other than requiring oxygenation  support.  I discussed extensively with family and patient she would require formal transfer as no ICU beds at [facility].  They were in agreement to [place] [facility] [place] was graciously accepted for admission to the ICU. Patient is FULL CODE.      Critical Care time:  45 minutes excluding procedures    Diagnosis:    ICD-10-CM   1. SOB (shortness of breath) R06.02                                                       2. Atrial fibrillation, unspecified type (H) I48.91   3. Congestive heart failure, unspecified HF chronicity, unspecified heart failure type (H) I50.9   4. NSTEMI (non-ST elevated myocardial infarction) (H) I21.4   5. Urinary tract infection without hematuria, site unspecified N39.0   6. Acute respiratory failure with hypoxia (H) J96.01   7. Acute renal failure superimposed on chronic kidney disease, unspecified CKD stage, unspecified acute renal failure type (H) N17.9    N18.9       Disposition:  [place] [facility]    Scribe Disclosure:  I, Kenya Black, am serving as a scribe at 9:41 PM on 1/11/2019 to document services personally performed by Maranda Aguero DO based on my observations and the provider's statements to me.    1/11/2019   [facility] EMERGENCY DEPARTMENT            Maranda Aguero DO  01/12/19 2007

## 2019-01-15 PROBLEM — I24.9 ACUTE CORONARY SYNDROME (H): Status: ACTIVE | Noted: 2019-01-01

## 2019-01-15 PROBLEM — Z03.89 CORONARY ARTERY DISEASE (CAD) EXCLUDED: Status: ACTIVE | Noted: 2019-01-01

## 2019-01-15 PROBLEM — I25.10 CORONARY ARTERY DISEASE: Status: ACTIVE | Noted: 2019-01-01

## 2019-01-15 NOTE — PLAN OF CARE
PT 4E: Cancel- PT orders received. Patient intubated/sedated with IABP, plan for patient to go to OR today. Will reschedule PT evaluation.

## 2019-01-15 NOTE — ANESTHESIA PROCEDURE NOTES
Arterial Line Procedure Note  Staff:     Anesthesiologist:  Raven Tenorio MD  Location: In OR After Induction  Procedure Start/Stop Times:     patient identified, IV checked, site marked, risks and benefits discussed, informed consent, monitors and equipment checked, pre-op evaluation and at physician/surgeon's request      Correct Patient: Yes      Correct Position: Yes      Correct Site: Yes      Correct Procedure: Yes      Correct Laterality:  Yes    Site Marked:  Yes  Line Placement:     Procedure:  Arterial Line    Insertion Site:  Radial    Insertion laterality:  Left    Skin Prep: Chloraprep      Patient Prep: patient draped, mask, sterile gloves, hat and hand hygiene      Local skin infiltration:  None    Ultrasound Guided?: No      Catheter size:  20 gauge, 12 cm    Cath secured with: suture      Dressing:  Tegaderm    Complications:  None obvious    Arterial waveform: Yes      IBP within 10% of NIBP: Yes

## 2019-01-15 NOTE — H&P
CV ICU ADMISSION NOTE  1/15/2019      ASSESSMENT:  Wendy Rocha is a 78 year old female with pmh CKDIII, b/l carotid stenosis, GERD, HTN, CAD s/p ACS transferred from [facility] with severe multi-vessel disease who is now pod 0 for three vessel CABG with Dr. Villegas      PLAN:   Neuro/ pain/ sedation:  -Monitor neurological status. Notify the MD for any acute changes in exam.  -fentanyl gtt for pain.  -propofol gtt for sedation.     Pulmonary care:   -Supplemental oxygen to keep saturation above 92 %.  -C/w mechanical ventilation overnight  -Aggressive pulmonary toilet once extubated     Cardiovascular:    -Monitor hemodynamic status.   -Hypertensive. Wean nitroglycerin gtt   -Ventricular pacing at 80. C/w at current settings  -IABP at 1:1 w/ 50% augmentation. Continue overnight     GI care:   -NPO, advance diet as tolerated once extubated  -C/w NGT     Fluids/ Electrolytes/ Nutrition:   - ICU electrolyte replacement protocol  - No indication for parenteral nutrition.     Renal/ Fluid Balance:    -chronic kidney disease III   -Urine output is adequate so far.  -Will continue to monitor intake and output.     Endocrine:    -insulin gtt while intubated  -Sliding scale for diabetes management if needed after extubation     ID/ Antibiotics:  -Klebsiella UTI diagnosed on 1/11. Treat w/ IV ceftriaxone   -complete perioperative antibiotics     Heme:     -Hemoglobin stable.   -Received plt x2 in OR  -INR 1/6. Will transfuse FFP if s/s of bleeding     Prophylaxis:    -Mechanical prophylaxis for DVT.   -No chemical DVT prophylaxis due to high risk of bleeding.       Lines/ tubes/ drains:  -NGT, ETT, RIJ CVC/[place], Chest tubes x3, Chen, L radial art line, L groin IABP, PIV      Disposition:  -CVICU        - - - - - - - - - - - - - - - - - - - - - - - - - - - - - - - - - - - - - - - - - - - - - - - - - - - - - - - - - - - - - - - - - - - - - - - -     PRIMARY TEAM: CTVS  PRIMARY PHYSICIAN: Dr. Villegas    REASON FOR CRITICAL CARE  ADMISSION: CABG   ADMITTING PHYSICIAN: Dr. Villegas    HISTORY PRESENTING ILLNESS: Wendy Rocha is a 78 year old that presented on 01/11/2019 to [facility] ER for evaluation. She was diagnosed with a NSTEMI and [facility] on 1[facility]12/[facility]  for further management. PCI was performed at [facility] and patient was transferred to [facility] for surgical management with Dr. Villegas. Patient is scheduled for CABG today.     REVIEW OF SYSTEMS: unable to be obtained due to sedation.     PAST MEDICAL HISTORY:   Past Medical History:   Diagnosis Date     Abnormal ECG      ARF (acute renal failure) (H) Aug 2010    Lisinopril was stopped at that time     Carotid stenosis      Carotid stenosis, bilateral      Carotid stenosis, bilateral     Vs Surgeon: dr Kush Mcmillan, Phone: [phone] fax: [phone]     CKD (chronic kidney disease) stage 3, GFR 30-59 ml/min      GERD (gastroesophageal reflux disease)      HTN, goal below 140/90      Hyperlipidemia LDL goal <100      Lung nodule may 2013    needs f/u may 2014     Muscle aches      Perforated duodenal ulcer (H) Aug 2010     Proteinuria      PVD (peripheral vascular disease) (H)      Renal artery stenosis (H)  April 2011    Right side     Vitamin D deficiency disease        SURGICAL HISTORY:   Past Surgical History:   Procedure Laterality Date     ARTHROPLASTY KNEE      left     EYE SURGERY      cataracts     LAPAROSCOPIC TUBAL LIGATION         SOCIAL HISTORY:  Social History     Socioeconomic History     Marital status:      Spouse name: Not on file     Number of children: Not on file     Years of education: Not on file     Highest education level: Not on file   Social Needs     Financial resource strain: Not on file     Food insecurity - worry: Not on file     Food insecurity - inability: Not on file     Transportation needs - medical: Not on file     Transportation needs - non-medical: Not on file   Occupational History     Not on file   Tobacco Use      Smoking status: Former Smoker     Last attempt to quit: 2010     Years since quittin.4     Smokeless tobacco: Never Used   Substance and Sexual Activity     Alcohol use: Yes     Comment: 1 x per year on a holidy     Drug use: No     Sexual activity: No   Other Topics Concern     Parent/sibling w/ CABG, MI or angioplasty before 65F 55M? Not Asked   Social History Narrative     Not on file       FAMILY HISTORY:   Family History   Problem Relation Age of Onset     Neurologic Disorder Brother 70        Parkinson's     Heart Disease Mother          92yo      Hypertension Father          46yo MVA     Family History Negative Sister      Cerebrovascular Disease Sister         Has had 2      Family History Negative Sister      Family History Negative Son      Family History Negative Son      Family History Negative Daughter      Family History Negative Daughter      Family History Negative Daughter      ALLERGIES:      Allergies   Allergen Reactions     Lisinopril      She developed ARF       MEDICATIONS:    No current facility-administered medications on file prior to encounter.   Current Outpatient Medications on File Prior to Encounter:  ACE/ARB NOT PRESCRIBED, INTENTIONAL, ACE & ARB not prescribed due to Worsening renal function on ACE/ARB therapy   acetaminophen (TYLENOL) 325 MG tablet Take 2 tablets (650 mg) by mouth every 6 hours as needed for mild pain   amLODIPine (NORVASC) 10 MG tablet TAKE 1 TABLET BY MOUTH ONCE DAILY   ASPIRIN NOT PRESCRIBED (INTENTIONAL) Antiplatelet medication not prescribed intentionally due to Plavix   busPIRone (BUSPAR) 5 MG tablet TAKE 1 TABLET BY MOUTH TWICE DAILY FOR ANXIETY   clopidogrel (PLAVIX) 75 MG tablet TAKE 1 TABLET BY MOUTH ONCE DAILY   doxazosin (CARDURA) 4 MG tablet TAKE 1 TABLET BY MOUTH AT BEDTIME   Hydrocortisone Acetate 2.5 % CREA Externally apply 1 Application topically 2 times daily Apply to both arms twice a day   metoprolol tartrate  (LOPRESSOR) 50 MG tablet TAKE ONE TABLET BY MOUTH TWICE DAILY   metoprolol tartrate (LOPRESSOR) 50 MG tablet Take 0.5 tablets (25 mg) by mouth 2 times daily   ondansetron (ZOFRAN) 8 MG tablet Take 1 tablet (8 mg) by mouth every 8 hours as needed for nausea   order for DME Machine to dispense the medication   pantoprazole (PROTONIX) 40 MG EC tablet TAKE ONE TABLET BY MOUTH ONCE DAILY 30-60  MINUTES  BEFORE  A  MEAL   simvastatin (ZOCOR) 40 MG tablet Take 1 tablet (40 mg) by mouth At Bedtime   traZODone (DESYREL) 50 MG tablet Take 1 tablet (50 mg) by mouth nightly as needed for sleep       PHYSICAL EXAMINATION:  Temp:  [97.4  F (36.3  C)-97.6  F (36.4  C)] 97.6  F (36.4  C)  Heart Rate:  [43-66] 63  Resp:  [13-18] 17  BP: ()/(61-98) 134/80  FiO2 (%):  [50 %-70 %] 70 %  SpO2:  [93 %-100 %] 99 %       General: Sedated  HEENT: NG tube in place. ETT in place  Neck: RIJ CVC/[place] in place  Chest wall: Symmetric thorax. Dressing c/d/i. Chest tubes x3 w/ sanguinous output  Respiratory: Mechanical lung sounds  Cardiovascular: Paced at 80  Gastrointestinal: Abdomen soft, non-distended  Genitourinary: Chen in place w/ clear urine  Extremities: wwp. L radial art line.   Skin: As noted above. No rashes or lesions appreciated.    LABS: Reviewed.   Arterial Blood Gases   No lab results found in last 7 days.  Complete Blood Count   Recent Labs   Lab 01/15/19  0509 01/14/19  2300 01/11/19  2251 01/11/19  2136   WBC 5.8 6.2 9.5 8.7   HGB 9.7* 8.5* 12.1 11.9    168 240 232     Basic Metabolic Panel  Recent Labs   Lab 01/15/19  0509 01/14/19  2300 01/11/19  2251 01/11/19  2136    137 140 141   POTASSIUM 4.4 3.3* 4.8 4.7   CHLORIDE 105 104 112* 112*   CO2 20 22 20 21   BUN 52* 49* 35* 33*   CR 2.24* 2.21* 2.12* 2.07*   GLC 99 108* 124* 122*     Liver Function Tests  Recent Labs   Lab 01/14/19  2300 01/11/19  2136   AST  --  34   ALT  --  23   ALKPHOS  --  57   BILITOTAL  --  1.0   ALBUMIN  --  3.6   INR 1.27*  --       Pancreatic Enzymes  Recent Labs   Lab 01/11/19  2136   LIPASE 74     Coagulation Profile  Recent Labs   Lab 01/14/19  2300   INR 1.27*   *     Lactate  Invalid input(s): LACTATE    IMAGING:  Results for orders placed or performed during the hospital encounter of 01/14/19   XR Chest Port 1 View    Narrative    EXAM: XR CHEST PORT 1 VW  1/15/2019 12:09 AM     HISTORY:  Intubation, , IABP, admit    COMPARISON: Chest radiograph dated 1/11/2019    FINDINGS: A single AP view of the chest is obtained. Endotracheal tube  tip is approximately 1.1 cm above the ursula. Gastric tube sidehole  projects over the stomach with tip off the field-of-view, IABP marker  projects over the posterior sixth rib.    Trachea is midline. Cardiac silhouette is stably enlarged. Extensive  mitral annular calcification. Small left pleural effusion and left  basilar opacities. No pneumothorax. No right pleural effusion. The  upper abdomen is unremarkable. No acute bony abnormality.      Impression    IMPRESSION:   1. Endotracheal tube tip projects proximal by 1.1 cm above the ursula,  consider slight retraction. Gastric tube is appropriately positioned.  2. Stable cardiomegaly with mitral annular calcification.  3. Small left pleural effusion with left basilar opacities,  atelectasis versus infection.    I have personally reviewed the examination and initial interpretation  and I agree with the findings.    YOVANY GLOVER MD   XR Abdomen Port 1 View    Narrative    EXAM: XR ABDOMEN PORT 1 VW  1/15/2019 12:10 AM     HISTORY:  NG    COMPARISON: Chest radiograph dated 1/11/2019, CT abdomen and pelvis  1/11/2019    FINDINGS: A single AP view of the upper abdomen and chest is obtained.  Endotracheal tube tip is approximately 1.1 cm above the ursula.  Gastric tube sidehole projects over the stomach cardia with tip off  the field-of-view.    Extensive vascular calcifications in the abdomen. No portal venous gas  or pneumatosis. Limited  visualized bowel gas due to collimation of the  lower abdomen.      Impression    IMPRESSION:   Gastric tube sidehole projects over the stomach cardiac with tip off  the field-of-view, recommended advancing about 5 cm.    I have personally reviewed the examination and initial interpretation  and I agree with the findings.    YOVANY GLOVER MD       Patient seen, findings and plan discussed with CVTS fellow, Dr. Greta Ivy MD  CVICU

## 2019-01-15 NOTE — PLAN OF CARE
Pt care provided 07:00 - 14:00    D/I/A:  Intubated, sedated. Propofol @ 55. Stopped briefly to assess neuro. Able to move all extremities, follow commands. Seems generally weak. Denied pain. Prop restarted d/t increasing BP. SB/SR 50-60s. SBP maintained <120 w/ Nipride at 1 mcg/kg/min.  CMV @ 70% 14/420/8. Scant secretions. +flatus, no BM. OG to LIS, brown coffee grounds output. MD aware. Voiding via Chen. ~35cc/hr UOP. 4x PIV, no CV access. Bedside carotid US, BLE vein mapping.    PLAN: To OR for CABG w/ Dr. Villegas ~14:00. Notify provider of changes.

## 2019-01-15 NOTE — PLAN OF CARE
Neuro - Pt sedated/intubated, Propofol gtt at 55. Pupils equal/reactive, Afebrile, withdraws to pain.  Cardiac - IABP 100% Augmentation, 1:1, Nipride started for MAPs 120's. Nipride gtt at 1. Heparin gtt off at 0400 per MD. SB/SR 40-70. K replaced.  Respiratory - VC-AC: PEEP 8, Fio2 70%, RR 14, Vt 420, Lung sounds diminished/clear.  GI - Hypoactive bowel sounds, NPO   - Chen, Good UOP  Skin - intact, preventative mepelix on coccyx. 2X pre-op scrubs done.  Plan - CABG today. Continue to monitor and notify MD of questions or concerns.

## 2019-01-15 NOTE — ANESTHESIA PROCEDURE NOTES
Perioperative RINA Report  Anesthesia Information  RINA probe placed and report generated by: : Raven Tenorio MD Lee, Belinda V, MD  Images for this study have been archived.   Surgeon:  Haseeb Villegas MD  Echocardiogram Comments: Pre-op exam:  IABP in correct position 8cm from left subclavian.  LV EF reduced (35-40%).   Wall motion abnormalities in inferior, lateral, and anterior wall. Dyskinetic wall motion with only septal wall thickening.  Trace AI.  Mild MR.  Trace TR.  Normal RV function.      Preanesthesia Checklist:  Patient identified, IV assessed, risks and benefits discussed, monitors and equipment assessed, procedure being performed at surgeon's request and anesthesia consent obtained.  General Procedure Information  Modalities:  2D    CABG  Echocardiographic and Doppler Measurements  Right Ventricle:  Cavity size normal.   Global function normal.     Left Ventricle:  Cavity size normal.   Global Function moderately impaired.   Ejection Fraction 35%.      Ventricular Regional Function:  1- Basal Anteroseptal:  dyskinetic  2- Basal Anterior:  dyskinetic  3- Basal Anterolateral:  dyskinetic  4- Basal Inferolateral:  dyskinetic  5- Basal Inferior:  dyskinetic  6- Basal Inferoseptal:  hypokinetic  7- Mid Anteroseptal:  hypokinetic  8- Mid Anterior:  dyskinetic  9- Mid Anterolateral:  dyskinetic  10- Mid Inferolateral:  dyskinetic  11- Mid Inferior:  dyskinetic  12- Mid Inferoseptal:  hypokinetic  13- Apical Anterior:  dyskinetic  14- Apical Lateral:  dyskinetic  15- Apical Inferior:  dyskinetic  16- Apical Septal:  normal  17- [place]:  dyskinetic    Valves  Aortic Valve: Annulus normal.  Stenosis not present.  Regurgitation +1.  Leaflets normal.  Leaflet motions normal.    Mitral Valve: Annulus normal.  Stenosis not present.  Regurgitation +2.  Leaflets normal.  Leaflet motions normal.    Tricuspid Valve: Annulus normal.  Stenosis not present.  Regurgitation +1.  Leaflets normal.  Leaflet motions normal.     Pulmonic Valve: Annulus normal.  Stenosis not present.      Aorta: Ascending Aorta: Size dilated.  Diameter 3.8 cm.  Dissection not present.    Descending Aorta: Size normal.         Right Atrium:  Size normal.    Left Atrium: Size normal.  Left atrial appendage normal.     Atrial Septum: Intra-atrial septal morphology normal.     Ventricular Septum: Intra-ventricular septum morphology normal.       Other Findings:   Pericardium:  normal.  Pleural Effusion:  left. .  .  .  .  Post Intervention Findings  Procedure(s) performed:  CABG. .   Regional wall motion:   Surgeon(s) notified of all postintervention findings:  Yes  .  .  .   .  .  .  .  .  .  .        Echocardiogram Comments  Pre-op exam:  IABP in correct position 8cm from left subclavian.  LV EF reduced (35-40%).   Wall motion abnormalities in inferior, lateral, and anterior wall. Dyskinetic wall motion with only septal wall thickening.  Trace AI.  Mild MR.  Trace TR.  Normal RV function.

## 2019-01-15 NOTE — ANESTHESIA PROCEDURE NOTES
RINA Probe Insertion Note:    Inserted by:  Raven Tenorio MD (Responsible Anesthesiologist)    Probe Status PRE Insertion: NO obvious damage  Probe type:  Adult 3D    Bite block used:   Yes  Insertion Technique: Easy, no oropharyngeal manipulation  Insertion complications: None obvious    Billing Report:RINA report by Anesthesiologist (See Separate Report note)

## 2019-01-15 NOTE — ANESTHESIA PROCEDURE NOTES
PA Catheter Insertion Note  Anesthesiologist: Raven Tenorio MD      Introducer: Introducer placed as part of procedure (SEE separate note)   Skin prep: Cap, Full body drape, hand hygiene, Mask, Sterile gloves and Sterile gown    PA Catheter type:  CCO    Distance catheter advanced:  60 cm.    Appropriate RA, RV, PA  waveforms?: Yes    Dressing:  Biopatch    Complications:  None apparent

## 2019-01-15 NOTE — PLAN OF CARE
OT/CR 4E: Cancel.  Acknowledge order placed for eval and treat.  Per chart review and discussion with RN, plan for pt to go to OR today.  Will follow up post op.

## 2019-01-15 NOTE — H&P
CV ICU ADMISSION NOTE  1/15/2019      ASSESSMENT: Wendy Rocha is a 78 year old female with Acute Coronary Syndrome [place] [facility] [place] Villegas. Dr. Napoles accepted transfer.   Wendy Rocha is a 78 year old that presented on 01/11/2019 to [facility] ER for evaluation. She was diagnosed with a NSTEMI and [place] [facility] [place]  [place] further management. [place] [facility] [place] and [place] to [facility] for surgical management with Dr. Villegas. Patient is scheduled for CABG today.    PLAN:   Neuro/ pain/ sedation:  - Monitor neurological status. Notify the MD for any acute changes in exam.  - Propofol for pain/sedation.     Pulmonary care:   - Intubated/Sedated for procedure later today  - Mechanical ventilation VC-AC: PEEP 8, Fio2 70%, RR 14, Vt 420  - Supplemental oxygen to keep saturation above 92 %.  - Incentive spirometer every 15- 30 minutes when awake.     Cardiovascular:    - Monitor hemodynamic status.   - Nitroprusside gtt for SBP < 120     GI care:   - NPO except ice chips and medications.     Fluids/ Electrolytes/ Nutrition:   - NS for IV fluid hydration  - No indication for parenteral nutrition.     Renal/ Fluid Balance:    - Chronic kidney disease, stage III  - Urine output is adequate so far.  - Will continue to monitor intake and output.  - Chen catheter in place for output monitoring     Endocrine:    - No management indication.     ID/ Antibiotics:  - Pre-op antibiotics ordered     Heme:     - Hemoglobin stable.   - PRBCs/FFP/Platelets ordered and on hold for surgery     Prophylaxis:    - Mechanical prophylaxis for DVT.   - Heparin at low intensity management     Lines/ tubes/ drains:  - 3 peripheral IVs  -  IABP placed on Right femoral       Disposition:  -Will remain in CVTS ICU for post-operative cares      - - - - - - - - - - - - - - - - - - - - - - - - - - - - - - - - - - - - - - - - - - - - - - - - - - - - - - - - - - - - - - - - - - -  - - - - -     PRIMARY TEAM: CVTS  PRIMARY PHYSICIAN: Thad Montgomery     REASON FOR CRITICAL CARE ADMISSION:  ADMITTING PHYSICIAN: Eduardo Napoles MD    HISTORY PRESENTING ILLNESS: Wendy Rocha is a 78 year old that presented on 01/11/2019 to [facility] ER for evaluation. She was diagnosed with a NSTEMI and [place] [facility] [place]. [place] [facility] [place] [facility] for surgical management with Dr. Villegas. Patient is scheduled for CABG today.    REVIEW OF SYSTEMS: As noted above. Patient intubated/sedated, unable to obtain    PAST MEDICAL HISTORY:   Past Medical History:   Diagnosis Date     Abnormal ECG      ARF (acute renal failure) (H) Aug 2010    Lisinopril was stopped at that time     Carotid stenosis      Carotid stenosis, bilateral      Carotid stenosis, bilateral     Vs Surgeon: dr Kush Mcmillan, Phone: [phone] fax: [phone]     CKD (chronic kidney disease) stage 3, GFR 30-59 ml/min      GERD (gastroesophageal reflux disease)      HTN, goal below 140/90      Hyperlipidemia LDL goal <100      Lung nodule may 2013    needs f/u may 2014     Muscle aches      Perforated duodenal ulcer (H) Aug 2010     Proteinuria      PVD (peripheral vascular disease) (H)      Renal artery stenosis (H)  April 2011    Right side     Vitamin D deficiency disease        SURGICAL HISTORY:   Past Surgical History:   Procedure Laterality Date     ARTHROPLASTY KNEE      left     EYE SURGERY      cataracts     LAPAROSCOPIC TUBAL LIGATION         SOCIAL HISTORY:   Social History     Socioeconomic History     Marital status:      Spouse name: Not on file     Number of children: Not on file     Years of education: Not on file     Highest education level: Not on file   Social Needs     Financial resource strain: Not on file     Food insecurity - worry: Not on file     Food insecurity - inability: Not on file     Transportation needs -  medical: Not on file     Transportation needs - non-medical: Not on file   Occupational History     Not on file   Tobacco Use     Smoking status: Former Smoker     Last attempt to quit: 2010     Years since quittin.4     Smokeless tobacco: Never Used   Substance and Sexual Activity     Alcohol use: Yes     Comment: 1 x per year on a holidy     Drug use: No     Sexual activity: No   Other Topics Concern     Parent/sibling w/ CABG, MI or angioplasty before 65F 55M? Not Asked   Social History Narrative     Not on file       FAMILY HISTORY:   Family History   Problem Relation Age of Onset     Neurologic Disorder Brother 70        Parkinson's     Heart Disease Mother          90yo      Hypertension Father          46yo MVA     Family History Negative Sister      Cerebrovascular Disease Sister         Has had 2      Family History Negative Sister      Family History Negative Son      Family History Negative Son      Family History Negative Daughter      Family History Negative Daughter      Family History Negative Daughter      ALLERGIES:      Allergies   Allergen Reactions     Lisinopril      She developed ARF     MEDICATIONS:    No current facility-administered medications on file prior to encounter.   Current Outpatient Medications on File Prior to Encounter:  ACE/ARB NOT PRESCRIBED, INTENTIONAL, ACE & ARB not prescribed due to Worsening renal function on ACE/ARB therapy   acetaminophen (TYLENOL) 325 MG tablet Take 2 tablets (650 mg) by mouth every 6 hours as needed for mild pain   amLODIPine (NORVASC) 10 MG tablet TAKE 1 TABLET BY MOUTH ONCE DAILY   ASPIRIN NOT PRESCRIBED (INTENTIONAL) Antiplatelet medication not prescribed intentionally due to Plavix   busPIRone (BUSPAR) 5 MG tablet TAKE 1 TABLET BY MOUTH TWICE DAILY FOR ANXIETY   clopidogrel (PLAVIX) 75 MG tablet TAKE 1 TABLET BY MOUTH ONCE DAILY   doxazosin (CARDURA) 4 MG tablet TAKE 1 TABLET BY MOUTH AT BEDTIME   Hydrocortisone Acetate 2.5  % CREA Externally apply 1 Application topically 2 times daily Apply to both arms twice a day   metoprolol tartrate (LOPRESSOR) 50 MG tablet TAKE ONE TABLET BY MOUTH TWICE DAILY   metoprolol tartrate (LOPRESSOR) 50 MG tablet Take 0.5 tablets (25 mg) by mouth 2 times daily   ondansetron (ZOFRAN) 8 MG tablet Take 1 tablet (8 mg) by mouth every 8 hours as needed for nausea   order for DME Machine to dispense the medication   pantoprazole (PROTONIX) 40 MG EC tablet TAKE ONE TABLET BY MOUTH ONCE DAILY 30-60  MINUTES  BEFORE  A  MEAL   simvastatin (ZOCOR) 40 MG tablet Take 1 tablet (40 mg) by mouth At Bedtime   traZODone (DESYREL) 50 MG tablet Take 1 tablet (50 mg) by mouth nightly as needed for sleep     PHYSICAL EXAMINATION:  Temp:  [97.4  F (36.3  C)-97.5  F (36.4  C)] 97.4  F (36.3  C)  Heart Rate:  [43-65] 62  Resp:  [13-17] 14  BP: ()/(61-98) 118/63  FiO2 (%):  [50 %-70 %] 70 %  SpO2:  [93 %-100 %] 98 %  General: Intubated, sedated  HEENT: Normocephalic, atraumatic. Patent nares.  Neck: No cervical lymphadenopathy. No thyromegaly.   Chest wall: Symmetric thorax. No masses or tenderness to palpation.   Respiratory: Intubated, mechanically ventilated. Lung sounds clear to auscultation bilaterally.   Cardiovascular: bradycardic  Gastrointestinal: Abdomen soft, non-distended, non-tender to palpation. No organomegaly or masses appreciated. No inguinal hernias.  Genitourinary: No CVA tenderness. Normal genitalia. Chen catheter in place with clear urine  Extremities: Moving all four extremities. No limb deformities. No pedal edema. Right femoral IABP  Skin: As noted above. No rashes or lesions appreciated.    LABS: Reviewed.   Arterial Blood Gases   No lab results found in last 7 days.  Complete Blood Count   Recent Labs   Lab 01/15/19  0509 01/14/19  2300 01/11/19  2251 01/11/19  2136   WBC 5.8 6.2 9.5 8.7   HGB 9.7* 8.5* 12.1 11.9    168 240 232     Basic Metabolic Panel  Recent Labs   Lab 01/15/19  0508  01/14/19  2300 01/11/19  2251 01/11/19 2136    137 140 141   POTASSIUM 4.4 3.3* 4.8 4.7   CHLORIDE 105 104 112* 112*   CO2 20 22 20 21   BUN 52* 49* 35* 33*   CR 2.24* 2.21* 2.12* 2.07*   GLC 99 108* 124* 122*     Liver Function Tests  Recent Labs   Lab 01/14/19  2300 01/11/19 2136   AST  --  34   ALT  --  23   ALKPHOS  --  57   BILITOTAL  --  1.0   ALBUMIN  --  3.6   INR 1.27*  --      Pancreatic Enzymes  Recent Labs   Lab 01/11/19 2136   LIPASE 74     Coagulation Profile  Recent Labs   Lab 01/14/19 2300   INR 1.27*   *     Lactate  Invalid input(s): LACTATE    IMAGING:  Results for orders placed or performed during the hospital encounter of 01/14/19   XR Chest Port 1 View    Narrative    This is a preliminary resident report. Full report will follow.      Impression    IMPRESSION:   1. Endotracheal tube tip projects proximal by 1.1 cm above the ursula,  consider slight retraction. Gastric tube is appropriately positioned.  2. Stable cardiomegaly with mitral annular calcification.  3. Small left pleural effusion with left basilar opacities,  atelectasis versus infection.   XR Abdomen Port 1 View    Narrative    EXAM: XR ABDOMEN PORT 1 VW  1/15/2019 12:10 AM     HISTORY:  NG    COMPARISON: Chest radiograph dated 1/11/2019, CT abdomen and pelvis  1/11/2019    FINDINGS: A single AP view of the upper abdomen and chest is obtained.  Endotracheal tube tip is approximately 1.1 cm above the ursula.  Gastric tube sidehole projects over the stomach cardia with tip off  the field-of-view.    Extensive vascular calcifications in the abdomen. No portal venous gas  or pneumatosis. Limited visualized bowel gas due to collimation of the  lower abdomen.      Impression    IMPRESSION:   Gastric tube sidehole projects over the stomach cardiac with tip off  the field-of-view, recommended advancing about 5 cm.    I have personally reviewed the examination and initial interpretation  and I agree with the  findings.    YOVANY GLOVER MD       Patient seen, findings and plan discussed with surgical ICU staff Quentin Hernandes MD CVTS Fellow.      Kassandra Espinoza MD  CVTS [facility]  [phone]

## 2019-01-15 NOTE — PROGRESS NOTES
Admitted[place]red [place] [facility] [place]  Reason for admission/transfer: CABG tomorrow  Patient status upon admission/transfer: Stable with IABP and Propofol gtt  Interventions: NA   Plan: Keep intubated/sedated for procedure tomorrow   2 RN skin assessment: completed by Stevie Goodwin  Result of skin assessment and Skin intact, Left groin IABP. 3X PIV  Interventions/actions: Preventative Mepilex dressing on coccyx  Height, weight, drug calc weight: done  Patient belongings: NA  MDRO education (if applicable): Pt sedated

## 2019-01-15 NOTE — ANESTHESIA PROCEDURE NOTES
Central Line Procedure Note  Staff:     Anesthesiologist:  Raven Tenorio MD  Location: In OR after induction  Procedure Start/Stop Times:     patient identified, IV checked, site marked, risks and benefits discussed, informed consent, monitors and equipment checked, pre-op evaluation and at physician/surgeon's request      Correct Patient: Yes      Correct Position: Yes      Correct Site: Yes      Correct Procedure: Yes      Correct Laterality:  Yes    Site Marked:  Yes  Line Placement:     Procedure:  Central Line    Insertion laterality:  Right    Insertion site:  Internal Jugular    Position:  Trendelenburg      Maximal Sterile Barriers: All elements of maximal sterile barrier technique followed      (Maximal sterile barriers include:   Sterile gown, Sterile Gloves, Mask, Cap, Whole body draped, hand hygiene and acceptable skin prep).Skin Prep: Chloraprep         Injection Technique:  Ultrasound guided    Sterile Ultrasound Technique:  Sterile probe cover and Sterile gel    Vein evaluated via U/S for patency/adequacy of catheter insertion and is adequate.  Using realtime U/S imaging the vein was punctured, and needle was observed entering vein on U/S      Permanent Image entered into patient's record      Local skin infiltration:  None    Catheter size:  9 Fr, 2 lumen 11.5 cm (MAC)    Cath secured with: suture      Dressing:  Tegaderm and Biopatch    Complications:  None obvious    Blood aspirated all lumens: Yes      All Lumens Flushed: Yes      Tip termination: right atrium      Verification method:  Ultrasound

## 2019-01-15 NOTE — PROGRESS NOTES
Pt transferred from an OSH intubated and on IABP.    Following are the vent settings and IABP settings:    Ventilation Mode: CMV/AC  (Continuous Mandatory Ventilation/ Assist Control)  Rate Set (breaths/minute): 14 breaths/min  Tidal Volume Set (mL): 420 mL  PEEP (cm H2O): 8 cmH2O  Oxygen Concentration (%): 50 %    IABP: 1:1, 100% EKG    Will continue to monitor     Rojas Terry RRT

## 2019-01-15 NOTE — ANESTHESIA PREPROCEDURE EVALUATION
Anesthesia Pre-Procedure Evaluation    Patient: Wendy Rocha   MRN:     [id number] Gender:   female   Age:    78 year old :      1940        Preoperative Diagnosis: coronary artery disease bypass   Procedure(s):  coronary artery disease bypass Graft     Past Medical History:   Diagnosis Date     Abnormal ECG      ARF (acute renal failure) (H) Aug 2010    Lisinopril was stopped at that time     Carotid stenosis      Carotid stenosis, bilateral      Carotid stenosis, bilateral     Vs Surgeon: dr Kush Mcmillan, Phone: [phone] fax: [phone]     CKD (chronic kidney disease) stage 3, GFR 30-59 ml/min      GERD (gastroesophageal reflux disease)      HTN, goal below 140/90      Hyperlipidemia LDL goal <100      Lung nodule may 2013    needs f/u may 2014     Muscle aches      Perforated duodenal ulcer (H) Aug 2010     Proteinuria      PVD (peripheral vascular disease) (H)      Renal artery stenosis (H)  2011    Right side     Vitamin D deficiency disease       Past Surgical History:   Procedure Laterality Date     ARTHROPLASTY KNEE      left     EYE SURGERY      cataracts     LAPAROSCOPIC TUBAL LIGATION            Anesthesia Evaluation     . Pt has had prior anesthetic. Type: General           ROS/MED HX    ENT/Pulmonary:       Neurologic:       Cardiovascular:     (+) hypertension--CAD, -past MI,-. : . . . :. .       METS/Exercise Tolerance:     Hematologic:         Musculoskeletal:         GI/Hepatic:     (+) GERD       Renal/Genitourinary:     (+) chronic renal disease,       Endo:         Psychiatric:         Infectious Disease:         Malignancy:         Other:                         PHYSICAL EXAM:   Mental Status/Neuro:    Airway: Facies: Feasible  Mallampati: Not Assessed  Mouth/Opening: Not Assessed  TM distance: Not Assessed  Neck ROM: Not Assessed  Device in place: ETT   Respiratory: Auscultation: CTAB     Resp. Rate: Normal     Resp. Effort: Normal      CV:   Rate: Partha  Heart:  "Normal Sounds   Comments:                    Lab Results   Component Value Date    WBC 5.8 01/15/2019    HGB 9.7 (L) 01/15/2019    HCT 30.0 (L) 01/15/2019     01/15/2019    CRP 65.5 (H) 05/22/2013    SED 20 05/22/2013     01/15/2019    POTASSIUM 4.4 01/15/2019    CHLORIDE 105 01/15/2019    CO2 20 01/15/2019    BUN 52 (H) 01/15/2019    CR 2.24 (H) 01/15/2019    GLC 99 01/15/2019    JEFFREY 8.4 (L) 01/15/2019    PHOS 4.5 01/15/2019    MAG 2.5 (H) 01/15/2019    ALBUMIN 3.6 01/11/2019    PROTTOTAL 7.4 01/11/2019    ALT 23 01/11/2019    AST 34 01/11/2019    ALKPHOS 57 01/11/2019    BILITOTAL 1.0 01/11/2019    LIPASE 74 01/11/2019     (H) 01/14/2019    INR 1.27 (H) 01/14/2019    FIBR 379 01/14/2019    TSH 3.83 01/11/2019    T4 1.05 01/27/2015       Preop Vitals  BP Readings from Last 3 Encounters:   01/15/19 108/67   01/12/19 (!) 161/134   06/18/18 144/78    Pulse Readings from Last 3 Encounters:   01/12/19 62   06/18/18 72   11/14/17 56      Resp Readings from Last 3 Encounters:   01/15/19 18   01/12/19 17   06/18/18 16    SpO2 Readings from Last 3 Encounters:   01/15/19 97%   01/12/19 94%   06/18/18 (!) 89%      Temp Readings from Last 1 Encounters:   01/15/19 36.4  C (97.6  F) (Oral)    Ht Readings from Last 1 Encounters:   01/11/19 1.6 m (5' 3\")      Wt Readings from Last 1 Encounters:   01/14/19 71.8 kg (158 lb 4.6 oz)    Estimated body mass index is 28.04 kg/m  as calculated from the following:    Height as of 1/11/19: 1.6 m (5' 3\").    Weight as of this encounter: 71.8 kg (158 lb 4.6 oz).     LDA:  Peripheral IV 01/11/19 Left Upper arm (Active)   Site Assessment WDL 1/15/2019  8:00 AM   Line Status Infusing 1/15/2019  8:00 AM   Phlebitis Scale 0-->no symptoms 1/15/2019  8:00 AM   Infiltration Scale 0 1/15/2019  8:00 AM   Infiltration Site Treatment Method  None 1/15/2019  4:00 AM   Extravasation? No 1/15/2019  8:00 AM   Number of days: 4       Peripheral IV 01/14/19 Anterior;Right Upper arm (Active) "   Site Assessment [facility] 1/15/2019  8:00 AM   Line Status Infusing 1/15/2019  8:00 AM   Phlebitis Scale 0-->no symptoms 1/15/2019  8:00 AM   Infiltration Scale 0 1/15/2019  8:00 AM   Extravasation? No 1/15/2019  8:00 AM   Number of days: 1       Peripheral IV 01/14/19 Anterior;Right Hand (Active)   Site Assessment [facility] 1/15/2019  8:00 AM   Line Status Saline locked 1/15/2019  8:00 AM   Phlebitis Scale 0-->no symptoms 1/15/2019  8:00 AM   Infiltration Scale 0 1/15/2019  8:00 AM   Extravasation? No 1/15/2019  8:00 AM   Number of days: 1       Arterial Sheath  (Active)   Specific Qualities Stat Locked 1/15/2019  8:00 AM   Site Assessment UTV 1/15/2019  8:00 AM   Dressing Type Gauze 1/15/2019  8:00 AM   Arterial Sheath Comment iabp 1/15/2019  8:00 AM   Number of days: 1       ETT (adult) (Active)   Secured By Commercial tube mendoza 1/15/2019  8:00 AM   Site Appearance Clean and dry 1/15/2019  8:00 AM   Secured at (cm) to lip 25 cm 1/15/2019  8:00 AM   Site of ET Tube Securement At lip 1/15/2019  8:00 AM   Cuff Pressure - Type minimal occluding volume 1/15/2019  8:00 AM   Tube Care/Reposition repositioned tube left side of mouth 1/15/2019  8:00 AM   Bite Block Secure and Patent 1/15/2019  8:00 AM   Safety Measures manual resuscitator at bedside 1/15/2019  8:00 AM   ETT Cuff Deflation Leak Test No Leak 1/15/2019  4:00 AM   Number of days: 1       NG/OG Tube Nasogastric Right nostril (Active)   Site Description WD 1/15/2019  8:00 AM   Status Suction-low intermittent 1/15/2019  8:00 AM   Drainage Appearance Brown 1/15/2019  8:00 AM   Placement Check X-ray 1/15/2019  4:00 AM   Residual (mL) 0 mL 1/15/2019 12:00 AM   Output (ml) 100 ml 1/15/2019  4:00 AM   Number of days: 1       Urethral Catheter (Active)   Tube Description Positional 1/15/2019  8:00 AM   Catheter Care Done;Catheter wipes 1/15/2019  8:00 AM   Collection Container Standard;Patent 1/15/2019  8:00 AM   Securement Method Securing device (Describe) 1/15/2019  8:00  AM   Rationale for Continued Use Strict 1-2 Hour I&O 1/15/2019  8:00 AM   Urine Output 7 mL 1/15/2019  8:00 AM   Number of days: 2            Assessment:   ASA SCORE: 5    NPO Status: > 6 hours since completed Solid Foods   Documentation: H&P complete; Preop Testing complete; Consents complete   Proceeding: Proceed without further delay  Tobacco Use:  NO Active use of Tobacco/UNKNOWN Tobacco use status     Plan:   Anes. Type:  General   Pre-Induction: None     Fluid/Blood-Preparation: Blood in Room; PRBC; FFP; Hot Line; Albumin; PLT; Cell Saver     Drips/Meds-Preparation: Nicardipine; Heparin; Norepi; Milrinone; NTG; Epinephrine; Aminocaproic Acid   Induction:  IV (Standard)   Airway: Oral ETT   Access/Monitoring: PIV; 2nd PIV; FloTrac; A-Line; US     Advanced Access: CPB; CVL by anesthesia     Advanced Monitoring: NIRS (cerebral); RINA Adult; TEG            ADULT RINA Checklist:               Absolute Contra-Indications: NONE               Relative Contra-Indications:  NONE               Final Plan: Proceed with RINA!   Maintenance: Balanced   Emergence: Post-Procedural Sedation; Postop SECURED AIRWAY likely   Logistics: ICU Admission     Postop Pain/Sedation Strategy:  Standard-Options: Opioids PRN  Advanced Options: Dexmedetomidine (cont.)     PONV Management:  Adult Risk Factors: Female, Non-Smoker, Postop Opioids  Prevention: Propofol Infusion; Dexamethasone     CONSENT: Deferred (Emergency)   Plan and risks discussed with: Not Applicable   Blood Products: N/a                         Gissel Vasquez MD     Anesthesia Attending    HPI: Wendy Rocha is a 78 year old female who  has a past medical history of Abnormal ECG, ARF (acute renal failure) (H) (Aug 2010), Carotid stenosis, Carotid stenosis, bilateral, Carotid stenosis, bilateral, CKD (chronic kidney disease) stage 3, GFR 30-59 ml/min, GERD (gastroesophageal reflux disease), HTN, goal below 140/90, Hyperlipidemia LDL goal <100, Lung nodule (may 2013),  Muscle aches, Perforated duodenal ulcer (H) (Aug 2010), Proteinuria, PVD (peripheral vascular disease) (H), Renal artery stenosis (H) (), and Vitamin D deficiency disease.  has a past surgical history that includes Arthroplasty knee; Laparoscopic tubal ligation; and Eye surgery. with a Acute coronary syndrome;Acute coronary syndrome (H) scheduled for Procedure(s):  coronary artery disease bypass Graft.      PMHx/PSHx/ROS:  Past Medical History:   Diagnosis Date     Abnormal ECG      ARF (acute renal failure) (H) Aug 2010    Lisinopril was stopped at that time     Carotid stenosis      Carotid stenosis, bilateral      Carotid stenosis, bilateral     Vs Surgeon: dr Kush Mcmillan, Phone: [phone] fax: [phone]     CKD (chronic kidney disease) stage 3, GFR 30-59 ml/min      GERD (gastroesophageal reflux disease)      HTN, goal below 140/90      Hyperlipidemia LDL goal <100      Lung nodule may 2013    needs f/u may 2014     Muscle aches      Perforated duodenal ulcer (H) Aug 2010     Proteinuria      PVD (peripheral vascular disease) (H)      Renal artery stenosis (H) US 2011    Right side     Vitamin D deficiency disease        Past Surgical History:   Procedure Laterality Date     ARTHROPLASTY KNEE      left     EYE SURGERY      cataracts     LAPAROSCOPIC TUBAL LIGATION         Soc Hx:   Social History     Tobacco Use     Smoking status: Former Smoker     Last attempt to quit: 2010     Years since quittin.4     Smokeless tobacco: Never Used   Substance Use Topics     Alcohol use: Yes     Comment: 1 x per year on a holidy         Allergies:   Allergies   Allergen Reactions     Lisinopril      She developed ARF       Meds:   Medications Prior to Admission   Medication Sig Dispense Refill Last Dose     ACE/ARB NOT PRESCRIBED, INTENTIONAL, ACE & ARB not prescribed due to Worsening renal function on ACE/ARB therapy   Taking     acetaminophen (TYLENOL) 325 MG tablet Take 2 tablets  (650 mg) by mouth every 6 hours as needed for mild pain 100 tablet 0 Taking     amLODIPine (NORVASC) 10 MG tablet TAKE 1 TABLET BY MOUTH ONCE DAILY 90 tablet 0      ASPIRIN NOT PRESCRIBED (INTENTIONAL) Antiplatelet medication not prescribed intentionally due to Plavix 0 each 0 Taking     busPIRone (BUSPAR) 5 MG tablet TAKE 1 TABLET BY MOUTH TWICE DAILY FOR ANXIETY 180 tablet 0      clopidogrel (PLAVIX) 75 MG tablet TAKE 1 TABLET BY MOUTH ONCE DAILY 90 tablet 0      doxazosin (CARDURA) 4 MG tablet TAKE 1 TABLET BY MOUTH AT BEDTIME 90 tablet 0      Hydrocortisone Acetate 2.5 % CREA Externally apply 1 Application topically 2 times daily Apply to both arms twice a day 28.4 g 1      metoprolol tartrate (LOPRESSOR) 50 MG tablet TAKE ONE TABLET BY MOUTH TWICE DAILY 180 tablet 0      metoprolol tartrate (LOPRESSOR) 50 MG tablet Take 0.5 tablets (25 mg) by mouth 2 times daily 90 tablet 0      ondansetron (ZOFRAN) 8 MG tablet Take 1 tablet (8 mg) by mouth every 8 hours as needed for nausea 20 tablet 0 Not Taking     order for [place] Machine to dispense the medication 1 Device 0 Taking     pantoprazole (PROTONIX) 40 MG EC tablet TAKE ONE TABLET BY MOUTH ONCE DAILY 30-60  MINUTES  BEFORE  A  MEAL 90 tablet 2      simvastatin (ZOCOR) 40 MG tablet Take 1 tablet (40 mg) by mouth At Bedtime 90 tablet 3      traZODone (DESYREL) 50 MG tablet Take 1 tablet (50 mg) by mouth nightly as needed for sleep 30 tablet 1 Not Taking       No current outpatient medications on file.         Labs:  BMP:  Recent Labs   Lab Test 01/15/19  0509      POTASSIUM 4.4   CHLORIDE 105   CO2 20   BUN 52*   CR 2.24*   GLC 99   JEFFREY 8.4*     CBC:   Recent Labs   Lab Test 01/15/19  0509   WBC 5.8   RBC 3.23*   HGB 9.7*   HCT 30.0*   MCV 93   MCH 30.0   MCHC 32.3   RDW 13.1        Coags:  Recent Labs   Lab Test 01/14/19  2300   INR 1.27*   *   FIBR 379     14-JAN-2019 23:36:13 [facility] -EKG  Sinus bradycardia  Non-specific  intra-ventricular conduction block  Inferior infarct (cited on or before 10-AUG-2010)  T wave abnormality, consider lateral ischemia  Abnormal ECG  When compared with ECG of 11-JAN-2019 23:55, (unconfirmed)  Questionable change in initial forces of Inferior leads  ST now depressed in Anterior leads  T wave inversion now evident in Inferior leads  T wave inversion now evident in Anterior leads  Referred by: LEANN ORDOÑEZ    Echo 12/11/18  CONCLUSIONS    Left ventricular ejection fraction is visually estimated at 55%.    Moderate mitral regurgitation.    Pulmonary artery systolic pressure estimate is 35mmHg above RAP.    (Elevated).    Mild dilation of the aorta is present involving the ascending aorta.    (Maximal dimension 4.5 cm).    No previous study available for comparison.        Admitted/transferred from: [facility] 1/14/2019  Reason for admission/transfer: CABG  Patient status upon admission/transfer: Stable with IABP and Propofol gtt  Interventions: NA        Plan: Keep intubated/sedated for procedure               Vital Signs:  T 97.6  P Data Unavailable  /67  R 18  SpO2 97%    78 year old female who  has a past medical history of Abnormal ECG, ARF (acute renal failure) (H) (Aug 2010), Carotid stenosis, Carotid stenosis, bilateral, Carotid stenosis, bilateral, CKD (chronic kidney disease) stage 3, GFR 30-59 ml/min, GERD (gastroesophageal reflux disease), HTN, goal below 140/90, Hyperlipidemia LDL goal <100, Lung nodule (may 2013), Muscle aches, Perforated duodenal ulcer (H) (Aug 2010), Proteinuria, PVD (peripheral vascular disease) (H), Renal artery stenosis (H) ( April 2011), and Vitamin D deficiency disease. to OR for Procedure(s):  coronary artery disease bypass Graft    Plan for GA, standard monitors, large bore IVs, invasive monitors, intraoperative RINA, possible blood transfusion, postoperative ICU.  PONV prophylaxis.  Antibiotics per primary service.    Chart reviewed    Gissel Vasquez MD

## 2019-01-15 NOTE — PROGRESS NOTES
Cardiothoracic Surgery    Ex  consented for surgery. I had an extensive discussion of the risks/benefits and plan for coronary artery bypass with her family. Her ex  remains very close and all children agree that he is the decision maker while Wendy Rocha is unable to consent.     The family further reports that January 11 she was started on an antibiotic for a urinary tract infection. We are sending a UA with reflex culture.     Quentin Hernandes

## 2019-01-16 NOTE — PLAN OF CARE
Pt remains intubated and sedated. Propofol is off, precedex at 0.4. Responds to pain. Pupils STEVEN. Vent settings , RR 14, peep of 8, 60% fio2. Bleeding last night with high output from CT sites. Team aware. Resolved with 2 units FFP, 1 platelet, 2 PBBC, 1/2 dose factor seven. UO has decreased to between 0 and 10 ml/ hr over last 5hrs, team aware. No BM overnight, skin intact, tmax 100.9. IABP at 1:1 with 100% augmentation, EKG, and site intact. No other issues noted. Will continue to monitor and address as needed.

## 2019-01-16 NOTE — PLAN OF CARE
Patient is intubated and sedated. Precedex replaced by propofol due to bradycardia, lowest HR 43. Backup pacer decreased to 40, VVI. Epi restarted due to hypotension, HR and BP improved. Currently 110s / 50s, HR 60s, CVP 10 - 11 throughout shift. PAPs 30s / 10s, SVR 1400 - 1600. CI 1.9 - 2.5. Alteplase ordered, intensivist resident to alteplase PA cath. 60 mg IV lasix given, no urine output. Renal to be consulted tomorrow if no urine output overnight. CT OP WNL, 30 - 40 ml/hr. Attempted to wake, patient becomes increasingly agitated. Does not follow commands, PERRLA 2 mm, moves all extremities.

## 2019-01-16 NOTE — PROCEDURES
Small Bowel Feeding Tube Placement Assessment  Reason for Feeding Tube Placement: post pyloric access for feeding  Cortrak Start Time: 12:45   Cortrak End Time: 12:51  Medicine Delivered During Procedure: lidocaine gel  Placement Successful: Presume post-pyloric (pending AXR confirmation).     Procedure Complications: None  Final Placement Justice at exit of nare: 87 cm  Face to Face time with patient: 15 mins    Jane Prescott RD, LD, CNSC  CVICU Dietitian  Pager: [phone]

## 2019-01-16 NOTE — PROGRESS NOTES
Received Post-OP patient Coronary Artery Bypass Graft x3 intubated. ETT # 7.5  25 at lips. Initial vent settings : CMV  , RR 14, PEEP 8, FIO2 70%. Breath sounds more diminished. Suctioned small amount creamy thick. Patient is on IABP Ratio 1:1,  Triger EKG, and Augmentation 100%. RT will continue to follow.

## 2019-01-16 NOTE — PROGRESS NOTES
"CLINICAL NUTRITION SERVICES - ASSESSMENT NOTE     Nutrition Prescription    RECOMMENDATIONS FOR MDs/PROVIDERS TO ORDER:  None today    Malnutrition Status:    Unable to determine    Recommendations already ordered by Registered Dietitian (RD):  1. Begin Nepro @ 10 mL/hr and advance 10 mL q8h to goal 40 ml/hr (960 ml/day) to provide 1728 kcals, 78 g PRO, 701 ml free H2O, 155 g CHO and 12 g Fiber daily.     2. 1 Pkt ProSource daily (40 kcal, 11 g PRO) for total 1768 kcal (31 kcal/kg) and 89 g PRO (1.6 g/kg)    3. Free water flushes 30 mL q4h for patency.    4. Certavite to ensure adequate micronutrients in case of slow TF adv, EN interruptions, hypermetabolic needs.    5. Ensure K+, Mg++, phos ordered daily while nutrition support advancing. Do not adv if K+, Mg++ < nrml or phos <2. Replace lytes per protocol.    Future/Additional Recommendations:  If remains intubated and once TF at goal, recommend obtain metabolic cart study to assess approp of energy provisions to avoid over/under feeding.     REASON FOR ASSESSMENT  Wendy Rocha is a/an 78 year old female assessed by the dietitian for Provider Order - Registered Dietitian to Assess and Order TF per Medical Nutrition Therapy Protocol and Nutrition Education for Post-op CV Surgery    NUTRITION HISTORY  Unable to obtain 2/2 intubated sedated. S/p CABG x3. Hx of CKD.     CURRENT NUTRITION ORDERS  Diet: NPO (intubated since 1/14)    LABS  Labs reviewed  K+ up to 5.2 yesterday - currently 4.4  Phos 5.5    MEDICATIONS  Medications reviewed    ANTHROPOMETRICS  Height:   Ht Readings from Last 2 Encounters:   01/11/19 1.6 m (5' 3\")   06/18/18 1.6 m (5' 3\")     Most Recent Weight: 72.4 kg (159 lb 9.8 oz)    IBW: 52.3 kg   BMI: Overweight BMI 25-29.9  Weight History: 71.8 kg on 1/14 (lowest wt). No recent wt loss noted.   Wt Readings from Last 10 Encounters:   01/16/19 72.4 kg (159 lb 9.8 oz)   01/11/19 73.6 kg (162 lb 3.2 oz)   06/18/18 72.1 kg (158 lb 14.4 oz)   11/14/17 " 63.9 kg (140 lb 14.4 oz)   10/19/17 61.3 kg (135 lb 3.2 oz)   10/16/17 62.1 kg (136 lb 12.8 oz)   10/09/17 63.4 kg (139 lb 12.8 oz)   10/05/17 63.5 kg (140 lb)   10/03/17 63.9 kg (140 lb 14.4 oz)   09/28/17 64.4 kg (142 lb)     Dosing Weight: 57 kg (adjusted from 71.8 kg)    ASSESSED NUTRITION NEEDS  Estimated Energy Needs: 1425 - 1710 kcals/day (25 - 30 kcals/kg)  Justification: Maintenance and Vented  Estimated Protein Needs: 68 - 86+ grams protein/day (1.2 - 1.5+ grams of pro/kg)  Justification: Hypercatabolism with critical illness, higher end Post-op  Estimated Fluid Needs: Per provider pending fluid status    PHYSICAL FINDINGS  See malnutrition section below.    MALNUTRITION  % Intake: Unable to assess  % Weight Loss: None noted  Subcutaneous Fat Loss: None observed  Muscle Loss: Temporal, Facial & jaw region, Thoracic region (clavicle, acromium bone, deltoid, trapezius, pectoral), Upper arm (bicep, tricep), Lower arm  (forearm), Dorsal hand and Posterior calf: all moderate vs age-related  Fluid Accumulation/Edema: None noted  Malnutrition Diagnosis: Unable to determine due to lack of nutrition hx    NUTRITION DIAGNOSIS  Inadequate protein-energy intake related to NPO on vent without TF started as evidenced by no TF started x 48 hours      INTERVENTIONS  Implementation  Nutrition Education: Not appropriate at this time due to patient condition. To be completed as appropriate as pt s/p CABG.   Collaboration with other providers - ICU rounds; plan to place FT and start TFs today  Enteral Nutrition - Initiate  Feeding tube flush - patency  Multivitamin/mineral supplement therapy - certavite    Goals  Total avg nutritional intake to meet a minimum of 25 kcal/kg and 1.2 g PRO/kg daily (per dosing wt 57 kg).     Monitoring/Evaluation  Progress toward goals will be monitored and evaluated per protocol.    Jane Prescott RD, LD, [facility]  CVICU Dietitian  Pager: [phone]

## 2019-01-16 NOTE — PLAN OF CARE
4E: Cxl - Pt having IABP removed today and will require several hours bedrest following; will initiate therapy services tomorrow as appropriate.

## 2019-01-16 NOTE — ANESTHESIA CARE TRANSFER NOTE
Patient: Wendy Rocha    Procedure(s):  Median Sternotomy, Coronary Artery Bypass Graft x3, Endovein [place] of Left Greater Saphenous Vein, Left Internal Mammary Artery Takedown  On Cardiopulmonary Bypass  Intraoperative Transesophageal Echocardiogram    Diagnosis: coronary artery disease bypass  Diagnosis Additional Information: No value filed.    Anesthesia Type:   No value filed.     Note:  Airway :ETT  Patient transferred to:ICU  Comments: Patient intubated, sedated.  Transported with balloon pump and monitors attached. VSS during transport and at time of signout.  Scar White  ICU Handoff: Call for PAUSE to initiate/utilize ICU HANDOFF, Identified Patient, Identified Responsible Provider, Reviewed the Pertinent Medical History, Discussed Surgical Course, Reviewed Intra-OP Anesthesia Management and Issues during Anesthesia, Set Expectations for Post Procedure Period and Allowed Opportunity for Questions and Acknowledgement of Understanding      Vitals: (Last set prior to Anesthesia Care Transfer)    CRNA VITALS  1/15/2019 1910 - 1/15/2019 2010      1/15/2019             Resp Rate (set):  10                Electronically Signed By: Scar White MD  January 15, 2019  8:16 PM

## 2019-01-16 NOTE — BRIEF OP NOTE
[facility], [place]    Brief Operative Note    Pre-operative diagnosis: coronary artery disease bypass  Post-operative diagnosis coronary artery disease bypass  Procedure: Procedure(s):  Median Sternotomy, Coronary Artery Bypass Graft x3, Endovein [place] of Left Greater Saphenous Vein, Left Internal Mammary Artery Takedown  On Cardiopulmonary Bypass  Intraoperative Transesophageal Echocardiogram  Surgeon: Surgeon(s) and Role:     * Haseeb Villegas MD - Primary     * Chiara Antonio PA-C - Assisting     * Filomena Doherty PA-C - Assisting  Anesthesia: General   Estimated blood loss: 1000ML  Drains: Mediastinal chest tubes x2, left pleural chest tube  Specimens: none  Findings:   CAD  Complications: none  Implants: none.

## 2019-01-16 NOTE — PROGRESS NOTES
CARDIOTHORACIC SURGERY PROGRESS NOTE          1/16/19   CO-MORBIDITIES:   CKDIII, b/l carotid stenosis, GERD, HTN, CAD         ASSESSMENT:   Aj is a 78 year old female with pmh CKDIII, b/l carotid stenosis, GERD, HTN, CAD s/p ACS transferred from [facility] [place] with severe multi-vessel disease who is now pod 1 for three vessel CABG with Dr. Villegas      PLANS FOR TODAY  Removed balloon pump  Urine tox screen  Keep ceftriaxone, stop ancef  500 albumin   Change fong  NG placement    PLANS:   Neuro/ pain/ sedation:  -Monitor neurological status. Notify the MD for any acute changes in exam.  -po oxycodone and IV dilaudid prn, scheduled tylenol   -precedex for sedation.  -urine tox pending      Pulmonary care:   -Supplemental oxygen to keep saturation above 92 %.  -wean sedation and plan to extubated when mentation and ventilation appropriate   -Aggressive pulmonary toilet once extubated      Cardiovascular:    -Monitor hemodynamic status.   -Ventricular pacing at 80. C/w at current settings  -IABP removed 1/16  -copious amount of blood from med chest tube overnight 1/15: FFP, platelets, and factor 7 given      GI care:   -NPO, advance diet as tolerated once extubated  -C/w NGT      Fluids/ Electrolytes/ Nutrition:   - ICU electrolyte replacement protocol  - No indication for parenteral nutrition.     Renal/ Fluid Balance:    -chronic kidney disease III   -Urine output is minimal, will consider diuresis afternoon of 1/16  -Will continue to monitor intake and output.      Endocrine:    -insulin gtt while intubated  -Sliding scale for diabetes management if needed after extubation      ID/ Antibiotics:  -Klebsiella UTI diagnosed on 1/11. Treat w/ IV ceftriaxone   -stop ancef       Heme:     -Hemoglobin stable.   -continue to monitor       Prophylaxis:    -Mechanical prophylaxis for DVT.   -No chemical DVT prophylaxis due to high risk of bleeding.       Lines/ tubes/ drains:  -NGT, ETT, RIJ CVC/[place], Chest tubes x3, Fong, L  radial art line, PIV      Disposition:  -CVICU            OBJECTIVE:     TODAY'S PROGRESS:   SUBJECTIVE:   Copious chest tube output, ffp, platelets, and rbcs given. Half dose factor 7 given.       Vitals:  Temp:  [93.9  F (34.4  C)-100.9  F (38.3  C)] 100.8  F (38.2  C)  Pulse:  [101-104] 104  Heart Rate:  [] 79  Resp:  [9-31] 16  BP: ()/(65-84) 102/70  MAP:  [59 mmHg-92 mmHg] 85 mmHg  Arterial Line BP: ()/(31-55) 129/47  FiO2 (%):  [60 %-80 %] 60 %  SpO2:  [96 %-100 %] 98 %    Physical Exam:    Gen: Intubated/sedated, NAD  CV: RRR, no murmurs, rubs or gallops   Pulm: Lungs CTA, no wheezing or rhonchi  Abd: Soft, NT, ND, +BS  Ext: no LE edema  Neuro: nonfocal, moves all extremities  Incision: c/d/i, no erythema, sternum stable  Tubes/drains: Dressing clean and dry, output remained stable overnight, no air leak.         I&O's:  I/O last 3 completed shifts:  In: 3934.89 [I.V.:2184.89; Other:590]  Out: 2470 [Urine:710; Emesis/NG output:50; Chest Tube:1710]    Ventilator Settings:  Ventilation Mode: CMV/AC  (Continuous Mandatory Ventilation/ Assist Control)  FiO2 (%): (S) 60 %  Rate Set (breaths/minute): 16 breaths/min  Tidal Volume Set (mL): (S) 470 mL  PEEP (cm H2O): 8 cmH2O  Oxygen Concentration (%): (S) 60 %  Resp: 16      Labs:   BMP  Recent Labs   Lab 01/16/19  0350 01/15/19  2350 01/15/19  2146 01/15/19  2000    144 144 142   POTASSIUM 4.4 3.8 3.6 3.5   CHLORIDE 109 107 109 109   JEFFREY 7.8* 7.6* 8.1* 8.4*   CO2 18* 22 21 19*   BUN 46* 43* 45* 45*   CR 2.69* 2.41* 2.26* 2.18*   * 184* 166* 150*     CBC  Recent Labs   Lab 01/16/19  0350 01/15/19  2350 01/15/19  2146 01/15/19  2000   WBC 8.7 8.9 8.9 7.5   RBC 3.01* 2.25* 3.07* 3.51*   HGB 8.8* 6.7* 9.0* 10.3*   HCT 27.0* 20.2* 27.8* 31.9*   MCV 90 90 91 91   MCH 29.2 29.8 29.3 29.3   MCHC 32.6 33.2 32.4 32.3   RDW 14.2 14.5 14.4 14.4    220 198 157     INR  Recent Labs   Lab 01/16/19  0350 01/15/19  2350 01/15/19  2146  01/15/19  2000   INR 0.93 1.61* 1.58* 1.55*      Hepatic Panel   Recent Labs   Lab 01/16/19  0350 01/15/19  2146 01/15/19  2000 01/11/19  2136   AST 55* 57* 62* 34   ALT 22 25 23 23   ALKPHOS 40 40 44 57   BILITOTAL 1.0 1.2 0.9 1.0   ALBUMIN 2.8* 2.9* 2.7* 3.6     Glucose  Recent Labs   Lab 01/16/19  0642 01/16/19  0401 01/16/19  0350 01/16/19  0326 01/15/19  2356 01/15/19  2350 01/15/19  2218 01/15/19  2146 01/15/19  2011 01/15/19  2000 01/15/19  1907 01/15/19  1857   GLC  --   --  137*  --   --  184*  --  166*  --  150* 158* 159*   BGM 88 146*  --  144* 193*  --  184*  --  160*  --   --   --      Blood Gas  Recent Labs   Lab 01/16/19  0352 01/16/19  0350 01/15/19  2350 01/15/19  2219 01/15/19  2146 01/15/19  2016   PH  --  7.41 7.37  --  7.31* 7.32*   PCO2  --  31* 33*  --  38 39   PO2  --  121* 160*  --  103 109*   HCO3  --  20* 19*  --  19* 20*   O2PER 70.0 70.0 80.0  80.0 80 80 80  80       Imaging:   Recent Results (from the past 24 hour(s))   US Lower Extremity Venous Mapping Bilateral    Narrative    Exam: US LOWER EXTREMITY VENOUS MAPPING BILATERAL, 1/15/2019 8:31 AM    Indication: cabg workup    Comparison: None    Findings:   Grayscale evaluation of the bilateral great saphenous veins.    Right GSV  Groin: Compressible, 4.0 mm  Mid thigh: Compressible, 2.5 mm  Lower thigh: Compressible, 2.4 mm  Knee: Compressible, 1.1 mm  Mid calf: Compressible, 1.0 mm  Ankle: Compressible, 1.1 mm    Left GSV  Groin: Compressible, 3.5 mm  Mid thigh: Compressible, 2.6 mm  Lower thigh: Compressible, 2.9 mm  Knee: Compressible, 2.7 mm  Mid calf: Compressible, 1.7 mm  Ankle: Compressible, 1.2 mm      Impression    Impression:   Bilateral great saphenous veins are compressible. See above for  measurements.   US Carotid Bilateral    Narrative    Exam: Bilateral carotid duplex Doppler ultrasound dated 1/15/2019 8:31  AM    Clinical history: emergent cabg workup    Comparison Study: Carotid ultrasound 08/21/2014    Ordering  provider: LEANN ORDOÑEZ    Technique: Grayscale (B-mode) and duplex and spectral Doppler  ultrasound of the extracranial internal carotid, external carotid,  vertebral artery origins, right brachiocephalic/subclavian and left  subclavian arteries. Velocity measurements obtained with angle  correction at or less than 60 degrees.    Findings:    Right side:     Plaque Morphology: Predominantly echogenic, Irregular       Proximal CCA: 57 cm/sec     Mid CCA: 50 cm/sec     Distal CCA: 130 cm/sec     External CA: 100 cm/sec       Proximal ICA: 130 cm/sec,      Mid ICA: 112/0 cm/sec     Distal ICA: 88/10 cm/sec       Vertebral artery: antegrade cm/sec     ICA/CCA ratio: Less than 2     waveform compatible with assisted perfusion    Left side:     Plaque Morphology: Predominantly echogenic, Irregular       Proximal CCA: 55 cm/sec     Mid CCA: 40 cm/sec     Distal CCA: 54 cm/sec     External CA: 93 cm/sec       Proximal ICA: 131 cm/sec     Mid ICA: 95 cm/sec     Distal ICA: 50 cm/sec       Vertebral artery: antegrade cm/sec     ICA/CCA ratio: Less than 2        Impression    Impression:    1. Right side:        Degree of stenosis: Less than 50 %. Waveform is compatible with  assisted intra-aortic balloon pump. The end diastolic velocity in the  right mid ICA is 0.    2. Left side:         Degree of stenosis: Less than 50 %. Waveform is compatible with  cystic intra-aortic balloon pump..    Consensus Panel Gray-Scale and Doppler US Criteria for Diagnosis of  ICA Stenosis (Radiology 11/2003) additionally modified by Vera et  al. in Journal of Vascular Surgery 1/2011, (28)16-54.       Normal         ICA PSV < 140 cm/sec       Plaque Estimate None       ICA/CCA  PSV Ratio < 2.0       ICA EDV < 40 cm/sec       < 50%          ICA PSV < 140 cm/sec       Plaque Estimate < 50%       ICA/CCA  PSV Ratio < 2.0       ICA EDV < 40 cm/sec       50- 69%       ICA -230 cm/sec       Plaque Estimate > or = 50%       ICA/CCA PSV  Ratio 2.0-4.0       ICA EDV  cm/sec         > or = 70%, less than near occlusion       ICA PSV > 230 cm/sec       Plaque Estimate > or = 50%       ICA/CCA Ratio > 4.0       ICA EDV > 100 cm/sec                                            Additional criteria from vascular surgery     > 80%       EDV > 120 cm/sec    XR Chest Port 1 View    Narrative    Exam:  Chest X-ray 1/15/2019 8:49 PM    History: cab    Comparison: 1/14/2019    Findings: AP supine frontal radiograph. The endotracheal tube is  approximately 1.2 cm above the ursula. Post surgical changes of CABG.  Median sternotomy wires and mediastinal surgical clips. IABP balloon  is in appropriate position. Interval placement of right IJ [place]-Dani  catheter with the tip projecting over the proximal right main  pulmonary artery. Bilateral chest tubes. Surgical clips project over  the upper abdomen. Media sternal drains. Gastric tube is unchanged.  The trachea is midline. Atherosclerotic calcifications of the aortic  arch. Mitral annular calcifications. Enlarged cardiac silhouette. No  pleural effusion. Increased interstitial markings. No pneumothorax.  The visualized upper abdomen is unremarkable.      Impression    Impression:   1. Postsurgical changes of CABG with interval placement of right IJ  [place]-Dani catheter with the tip projecting over the right main  pulmonary artery, bilateral chest tubes, and mediastinal drains.  2. The endotracheal tube is approximately 1.2 cm above the ursula.  Consider slight retraction.  3. Stable cardiomegaly with increased interstitial markings, likely  corresponds with mild interstitial pulmonary edema.    I have personally reviewed the examination and initial interpretation  and I agree with the findings.    YOVANY GLOVER MD       Discussed with ICU staff.     Lani Dc MD

## 2019-01-16 NOTE — PROCEDURES
Bridle Placement:   Reason for bridle placement: Securement of FT   Medicine delivered during procedure: lubricating jelly   Procedure: Successful   Location of top of clip on FT: @ 88 cm marker   Condition of nose/skin at time of bridle placement: Unremarkable   Face to Face time with patient: 10 minutes.    Jane Prescott RD, LD, [facility]  CVICU Dietitian  Pager: [phone]

## 2019-01-16 NOTE — ANESTHESIA POSTPROCEDURE EVALUATION
Anesthesia POST Procedure Evaluation    Patient: Wendy Rocha   MRN:     [id number] Gender:   female   Age:    78 year old :      1940        Preoperative Diagnosis: coronary artery disease bypass   Procedure(s):  Median Sternotomy, Coronary Artery Bypass Graft x3, Endovein [place] of Left Greater Saphenous Vein, Left Internal Mammary Artery Takedown  On Cardiopulmonary Bypass  Intraoperative Transesophageal Echocardiogram   Postop Comments: No value filed.       Anesthesia Type:  General    Reportable Event: NO     PAIN: Uncomplicated   Sign Out status: Comfortable, Well controlled pain     PONV: No PONV        Neuro/Psych: Uneventful perioperative course   Sign Out Status: Planned Postop Sedation     Airway/Resp.: Uneventful perioperative course   Sign Out Status: Airway Device present     Airway Device: ETT     CV: Uneventful perioperative course   Sign Out status: CV Support within EXPECTED Parameters     Disposition:   Sign Out in:  ICU  Disposition:  ICU  Recovery Course: Recovery in ICU  Follow-Up: Not required           Last Anesthesia Record Vitals:  CRNA VITALS  1/15/2019 1910 - 1/15/2019 2010      1/15/2019             Resp Rate (set):  10          Last PACU/Preop Vitals:  Vitals:    01/15/19 1100 01/15/19 1200 01/15/19 1300   BP: 134/80 125/84 102/70   Resp: 17 18 15   Temp:  37  C (98.6  F)    SpO2: 99% 99% 99%         Electronically Signed By: Boby Hinds MD, January 15, 2019, 8:17 PM

## 2019-01-16 NOTE — PLAN OF CARE
Hold PT per chart review and discussion with interdisciplinary team.  Pt is not following commands, has IABP, OT is following pt and will involve PT when indicated

## 2019-01-16 NOTE — PROGRESS NOTES
CARDIOTHORACIC SURGERY PROGRESS NOTE          1/16/19   CO-MORBIDITIES:   CKDIII, b/l carotid stenosis, GERD, HTN, CAD         ASSESSMENT:   Aj is a 78 year old female with pmh CKDIII, b/l carotid stenosis, GERD, HTN, CAD s/p ACS [place] [facility] [place] multi-vessel disease who is now pod 1 for three vessel CABG with Dr. Villegas      PLANS FOR TODAY  Removed balloon pump  Urine tox screen  Keep ceftriaxone, stop ancef  500 albumin   Change fong  NG placement    PLANS:   Neuro/ pain/ sedation:  -Monitor neurological status. Notify the MD for any acute changes in exam.  -po oxycodone and IV dilaudid prn, scheduled tylenol   -precedex for sedation.  -urine tox pending      Pulmonary care:   -Supplemental oxygen to keep saturation above 92 %.  -wean sedation and plan to extubated when mentation and ventilation appropriate   -Aggressive pulmonary toilet once extubated      Cardiovascular:    -Monitor hemodynamic status.   -Ventricular pacing at 80. C/w at current settings  -IABP removed 1/16  -copious amount of blood from med chest tube overnight 1/15: FFP, platelets, and factor 7 given      GI care:   -NPO, advance diet as tolerated once extubated  -C/w NGT      Fluids/ Electrolytes/ Nutrition:   - ICU electrolyte replacement protocol  - No indication for parenteral nutrition.     Renal/ Fluid Balance:    -chronic kidney disease III   -Urine output is minimal, will consider diuresis afternoon of 1/16  -Will continue to monitor intake and output.      Endocrine:    -insulin gtt while intubated  -Sliding scale for diabetes management if needed after extubation      ID/ Antibiotics:  -Klebsiella UTI diagnosed on 1/11. Treat w/ IV ceftriaxone   -stop ancef       Heme:     -Hemoglobin stable.   -continue to monitor       Prophylaxis:    -Mechanical prophylaxis for DVT.   -No chemical DVT prophylaxis due to high risk of bleeding.       Lines/ tubes/ drains:  -NGT, ETT, RIJ CVC/[place], Chest tubes x3, Fong, L  radial art line, PIV      Disposition:  -CVICU            OBJECTIVE:     TODAY'S PROGRESS:   SUBJECTIVE:   Copious chest tube output, ffp, platelets, and rbcs given. Half dose factor 7 given.       Vitals:  Temp:  [93.9  F (34.4  C)-100.9  F (38.3  C)] 99.9  F (37.7  C)  Pulse:  [101-104] 104  Heart Rate:  [] 65  Resp:  [9-31] 21  MAP:  [59 mmHg-99 mmHg] 99 mmHg  Arterial Line BP: ()/(31-73) 146/73  FiO2 (%):  [60 %-80 %] 60 %  SpO2:  [96 %-100 %] 99 %    Physical Exam:    Gen: Intubated/sedated, NAD  CV: RRR, no murmurs, rubs or gallops   Pulm: Lungs CTA, no wheezing or rhonchi  Abd: Soft, NT, ND, +BS  Ext: no LE edema  Neuro: nonfocal, moves all extremities  Incision: c/d/i, no erythema, sternum stable  Tubes/drains: Dressing clean and dry, output remained stable overnight, no air leak.         I&O's:  I/O last 3 completed shifts:  In: 3934.89 [I.V.:2184.89; Other:590]  Out: 2470 [Urine:710; Emesis/NG output:50; Chest Tube:1710]    Ventilator Settings:  Ventilation Mode: (S) CPAP/PS  (Continuous positive airway pressure with Pressure Support)  FiO2 (%): 60 %  Rate Set (breaths/minute): 16 breaths/min  Tidal Volume Set (mL): 470 mL  PEEP (cm H2O): 5 cmH2O  Pressure Support (cm H2O): (S) 7 cmH2O  Oxygen Concentration (%): 50 %  Resp: 21      Labs:   BMP  Recent Labs   Lab 01/16/19  0350 01/15/19  2350 01/15/19  2146 01/15/19  2000    144 144 142   POTASSIUM 4.4 3.8 3.6 3.5   CHLORIDE 109 107 109 109   JEFFREY 7.8* 7.6* 8.1* 8.4*   CO2 18* 22 21 19*   BUN 46* 43* 45* 45*   CR 2.69* 2.41* 2.26* 2.18*   * 184* 166* 150*     CBC  Recent Labs   Lab 01/16/19  0830 01/16/19  0755 01/16/19  0350 01/15/19  2350 01/15/19  2146 01/15/19  2000   WBC  --   --  8.7 8.9 8.9 7.5   RBC  --   --  3.01* 2.25* 3.07* 3.51*   HGB 8.7* 9.0* 8.8* 6.7* 9.0* 10.3*   HCT  --   --  27.0* 20.2* 27.8* 31.9*   MCV  --   --  90 90 91 91   MCH  --   --  29.2 29.8 29.3 29.3   MCHC  --   --  32.6 33.2 32.4 32.3   RDW  --   --   14.2 14.5 14.4 14.4   PLT  --   --  180 220 198 157     INR  Recent Labs   Lab 01/16/19  0350 01/15/19  2350 01/15/19  2146 01/15/19  2000   INR 0.93 1.61* 1.58* 1.55*      Hepatic Panel   Recent Labs   Lab 01/16/19  0350 01/15/19  2146 01/15/19  2000 01/11/19  2136   AST 55* 57* 62* 34   ALT 22 25 23 23   ALKPHOS 40 40 44 57   BILITOTAL 1.0 1.2 0.9 1.0   ALBUMIN 2.8* 2.9* 2.7* 3.6     Glucose  Recent Labs   Lab 01/16/19  1416 01/16/19  1204 01/16/19  0956 01/16/19  0756 01/16/19  0642 01/16/19  0401 01/16/19  0350  01/15/19  2350  01/15/19  2146  01/15/19  2000 01/15/19  1907 01/15/19  1857   GLC  --   --   --   --   --   --  137*  --  184*  --  166*  --  150* 158* 159*   * 133* 125* 109* 88 146*  --    < >  --    < >  --    < >  --   --   --     < > = values in this interval not displayed.     Blood Gas  Recent Labs   Lab 01/16/19  1214 01/16/19  1213 01/16/19  0755 01/16/19  0352 01/16/19  0350 01/15/19  2350  01/15/19  2146   PH  --  7.40  --   --  7.41 7.37  --  7.31*   PCO2  --  32*  --   --  31* 33*  --  38   PO2  --  101  --   --  121* 160*  --  103   HCO3  --  20*  --   --  20* 19*  --  19*   O2PER 60 60 60 70.0 70.0 80.0  80.0   < > 80    < > = values in this interval not displayed.       Imaging:   Recent Results (from the past 24 hour(s))   XR Chest Port 1 View    Narrative    Exam:  Chest X-ray 1/15/2019 8:49 PM    History: cab    Comparison: 1/14/2019    Findings: AP supine frontal radiograph. The endotracheal tube is  approximately 1.2 cm above the ursula. Post surgical changes of CABG.  Median sternotomy wires and mediastinal surgical clips. IABP balloon  is in appropriate position. Interval placement of right IJ [place]-Dani  catheter with the tip projecting over the proximal right main  pulmonary artery. Bilateral chest tubes. Surgical clips project over  the upper abdomen. Media sternal drains. Gastric tube is unchanged.  The trachea is midline. Atherosclerotic calcifications of the  aortic  arch. Mitral annular calcifications. Enlarged cardiac silhouette. No  pleural effusion. Increased interstitial markings. No pneumothorax.  The visualized upper abdomen is unremarkable.      Impression    Impression:   1. Postsurgical changes of CABG with interval placement of right IJ  [place]-Dani catheter with the tip projecting over the right main  pulmonary artery, bilateral chest tubes, and mediastinal drains.  2. The endotracheal tube is approximately 1.2 cm above the ursula.  Consider slight retraction.  3. Stable cardiomegaly with increased interstitial markings, likely  corresponds with mild interstitial pulmonary edema.    I have personally reviewed the examination and initial interpretation  and I agree with the findings.    YOVANY GLOVER MD   XR Chest Port 1 View    Narrative    EXAM: XR CHEST PORT 1 VW  1/16/2019 1:35 AM     HISTORY:  cab    COMPARISON: Chest radiograph dated 1/15/2019    FINDINGS: A single AP view of the chest is obtained. The patient is  rotated compared to prior. Endotracheal tube tip is approximately 1.6  cm above the ursula. Gastric tube courses below the left hemidiaphragm  with sidehole over the stomach and tip off the field-of-view. Right IJ  [place]-Dani catheter tip projects over the distal right pulmonary  artery. Epicardial pacer wires. Bilateral chest tubes and mediastinal  drains are stable. Postoperative changes of CABG. Median sternotomy  wires.    Stable cardiomegaly. Pulmonary vascular congestion. Bilateral pleural  effusions. Prominent interstitial markings, similar to prior. Mitral  annular calcification.      Impression    IMPRESSION:   1. [place]-Dani catheter tip projects over the distal right pulmonary  artery. Endotracheal tube tip is approximately 1.6 cm above the  ursula. Additional support devices are stable.  2. Mild interstitial pulmonary edema with bilateral pleural effusions.    I have personally reviewed the examination and initial interpretation  and I  agree with the findings.    YOVANY GLOVER MD   XR Abdomen Port 1 View    Narrative    Exam: XR ABDOMEN PORT 1 VW 1/16/2019 2:04 PM    History: Feeding tube    Comparison: CT 1/11/2019 chest x-ray same date    Findings: Single supine view of the abdomen. Feeding tube tip projects  over the distal duodenum. Gastric tube tip and sidehole project over  the stomach. Colon is decompressed. Nonspecific paucity of small bowel  gas. No portal venous gas and no pneumatosis.  Small amount of  residual oral contrast throughout the bowel. Extensive postoperative  changes in the chest and mitral valvular calcifications, better  depicted on same day chest x-ray. Please see that dictation for  additional details in the lung bases. Degenerative changes throughout  the spine. No acute bony normality.      Impression    Impression: Feeding tube tip projects over the distal duodenum.    DOMENICO VILCHIS MD       Discussed with CVTS fellow.       Lani Dc MD

## 2019-01-16 NOTE — PROGRESS NOTES
IABP removed at 1028, manual pressure held for 15 minutes, no hematoma present. Femostop then applied for 15 minutes.     Curtis Naqvi PA-C

## 2019-01-17 NOTE — PROGRESS NOTES
CV ICU PROGRESS NOTE          1/17/19   CO-MORBIDITIES:   CKDIII, b/l carotid stenosis, GERD, HTN, CAD       ASSESSMENT:   Aj is a 78 year old female with pmh CKDIII, b/l carotid stenosis, GERD, HTN, CAD s/p ACS [place] [facility] [place] [facility] [place] disease who is now pod 2 for three vessel CABG with Dr. Villegas      PLANS FOR TODAY  Discontinue ancef  Start subcutaneous hep  Nephrology consult  Restart PTA buspirone  PST  hydralazine prn   Stop IVF     PLANS:   Neuro/ pain/ sedation:  -Monitor neurological status. Notify the MD for any acute changes in exam.  -po oxycodone and IV dilaudid prn, scheduled tylenol   -Precedex discontinued due to bradycardia.  -urine tox: positive for benzos   -restart PTA buspirone     Pulmonary care:   -Supplemental oxygen to keep saturation above 92 %.  -wean sedation and plan to extubated when mentation and ventilation appropriate   -Aggressive pulmonary toilet once extubated      Cardiovascular:    -Monitor hemodynamic status.   -Ventricular pacing at 80. C/w at current settings, cap pacing wires 1/17  -IABP removed 1/16  -Epi off, CI borderline, will wedge this am  -prn hydralazine for systolic >140      GI care:   -NPO, advance diet as tolerated once extubated  -C/w NGT  - Bowel regimen       Fluids/ Electrolytes/ Nutrition:   - ICU electrolyte replacement protocol  - No indication for parenteral nutrition.     Renal/ Fluid Balance:    -chronic kidney disease III   -Urine output is minimal, received bumex 4 mg IV and diuril 1/17, consult nephrology  -Will continue to monitor intake and output.      Endocrine:    -insulin gtt while intubated  -Sliding scale for diabetes management if needed after extubation      ID/ Antibiotics:  -Klebsiella UTI diagnosed on 1/11. Treat w/ IV ceftriaxone   -stop ancef 1/17      Heme:     -Hemoglobin stable.   -continue to monitor       Prophylaxis:    -Mechanical prophylaxis for DVT.   -No chemical DVT prophylaxis due to high risk of  bleeding.       Lines/ tubes/ drains:  -NGT, ETT, RIJ CVC/[place], Chest tubes x3, Chen, L radial art line, PIV      Disposition:  -CVICU            OBJECTIVE:     TODAY'S PROGRESS:   SUBJECTIVE:   Off epi, tolerating PST this am, off propofol       Vitals:  Temp:  [97.6  F (36.4  C)-99.9  F (37.7  C)] 98.6  F (37  C)  Heart Rate:  [] 101  Resp:  [11-33] 18  BP: (124)/(64) 124/64  MAP:  [61 mmHg-104 mmHg] 89 mmHg  Arterial Line BP: ()/(46-77) 147/61  FiO2 (%):  [50 %-60 %] 50 %  SpO2:  [91 %-100 %] 94 %    Physical Exam:    Gen: Intubated/sedated  CV: RRR, no murmurs, rubs or gallops   Pulm: Lungs CTA, no wheezing or rhonchi  Abd: Soft, NT, ND, +BS  Ext: no LE edema  Neuro: nonfocal, moves all extremities  Incision: c/d/i, no erythema, sternum stable  Tubes/drains: Dressing clean and dry, output remained stable overnight, no air leak.         I&O's:  I/O last 3 completed shifts:  In: 1855.17 [I.V.:805.17; NG/GT:310]  Out: 811 [Urine:211; Chest Tube:600]    Ventilator Settings:  Ventilation Mode: (S) CMV/AC  (Continuous Mandatory Ventilation/ Assist Control)  FiO2 (%): 50 %  Rate Set (breaths/minute): 14 breaths/min  Tidal Volume Set (mL): 470 mL  PEEP (cm H2O): 5 cmH2O  Pressure Support (cm H2O): 7 cmH2O  Oxygen Concentration (%): 50 %  Resp: 18      Labs:   [facility]  Recent Labs   Lab 01/17/19  0400 01/16/19 2025 01/16/19  0350 01/15/19  2350 01/15/19  2146     --  144 144 144   POTASSIUM 4.5 4.9 4.4 3.8 3.6   CHLORIDE 110*  --  109 107 109   JEFFREY 7.1*  --  7.8* 7.6* 8.1*   CO2 21  --  18* 22 21   BUN 56*  --  46* 43* 45*   CR 3.81*  --  2.69* 2.41* 2.26*   *  --  137* 184* 166*     CBC  Recent Labs   Lab 01/17/19  0400 01/17/19  0008 01/16/19 2025 01/16/19  0830  01/16/19  0350   WBC 8.7 8.7 8.0  --   --  8.7   RBC 3.27* 3.31* 2.47*  --   --  3.01*   HGB 9.4* 9.6* 7.4* 8.7*   < > 8.8*   HCT 28.8* 29.0* 22.1*  --   --  27.0*   MCV 88 88 90  --   --  90   MCH 28.7 29.0 30.0  --   --  29.2   MCHC  32.6 33.1 33.5  --   --  32.6   RDW 15.6* 15.3* 15.0  --   --  14.2   * 102* 119*  --   --  180    < > = values in this interval not displayed.     INR  Recent Labs   Lab 01/16/19  0350 01/15/19  2350 01/15/19  2146 01/15/19  2000   INR 0.93 1.61* 1.58* 1.55*      Hepatic Panel   Recent Labs   Lab 01/17/19  0400 01/16/19  0350 01/15/19  2146 01/15/19  2000   AST 50* 55* 57* 62*   ALT 10 22 25 23   ALKPHOS 41 40 40 44   BILITOTAL 0.5 1.0 1.2 0.9   ALBUMIN 2.7* 2.8* 2.9* 2.7*     Glucose  Recent Labs   Lab 01/17/19  1009 01/17/19  0917 01/17/19  0802 01/17/19  0610 01/17/19  0400 01/17/19  0359 01/17/19  0306  01/16/19  0350  01/15/19  2350  01/15/19  2146  01/15/19  2000 01/15/19  1907   GLC  --   --   --   --  111*  --   --   --  137*  --  184*  --  166*  --  150* 158*   * 177* 128* 125*  --  109* 134*   < >  --    < >  --    < >  --    < >  --   --     < > = values in this interval not displayed.     Blood Gas  Recent Labs   Lab 01/17/19  0758 01/17/19  0623 01/17/19  0400 01/16/19  2335  01/16/19 2025   PH  --  7.40 7.41 7.42  --  7.38   PCO2  --  35 37 38  --  32*   PO2  --  94 70* 68*  --  109*   HCO3  --  22 23 25  --  19*   O2PER 50 60 50  50 50  50   < > 50.0    < > = values in this interval not displayed.       Imaging:   Recent Results (from the past 24 hour(s))   XR Abdomen Port 1 View    Narrative    Exam: XR ABDOMEN PORT 1 VW 1/16/2019 2:04 PM    History: Feeding tube    Comparison: CT 1/11/2019 chest x-ray same date    Findings: Single supine view of the abdomen. Feeding tube tip projects  over the distal duodenum. Gastric tube tip and sidehole project over  the stomach. Colon is decompressed. Nonspecific paucity of small bowel  gas. No portal venous gas and no pneumatosis.  Small amount of  residual oral contrast throughout the bowel. Extensive postoperative  changes in the chest and mitral valvular calcifications, better  depicted on same day chest x-ray. Please see that dictation  for  additional details in the lung bases. Degenerative changes throughout  the spine. No acute bony normality.      Impression    Impression: Feeding tube tip projects over the distal duodenum.    DOMENICO VILCHIS MD       Discussed with icu staff.        Lani Dc MD

## 2019-01-17 NOTE — PLAN OF CARE
Pt is sedated on 20 mg of propofol; was pressure supported for up to 4 hours in order to correct low bicarb on abg at 2000 last night. Pt was given 2 amps of bicarb, 2 unit(s) of PRBC for drop in hgb/hematocrit and 60 mg of Lasix for low UO. Hemoglobin is now stable, as well as abg, although pt's FiO2 had to be increased to 60% from 50% for decreasing PaO2. Will hold off of pressure support until later this AM.  Pt's hemodynamics were WDL on 0.01 mcg/kg/min to improve CI/CO. Pt's SVR was increased noticeably, also SBP higher than 140s. Pt was given hydralazine x1 to lower SBP and as an afterload reducer. Pt's urine output remains minimal at 10-15hr inspite diuresis, MD aware; also, pt's creatinine is now much higher at 3.36 this AM. BS WDL on trophic feeds; now at 20/hr. Will continue to monitor.

## 2019-01-17 NOTE — PHARMACY
Pharmacy Tube Feeding Consult    Medication reviewed for administration by feeding tube and for potential food/drug interactions.    Recommendation: No changes are needed at this time.     Pharmacy will continue to follow as new medications are ordered.    Ching Galvez, DeeD

## 2019-01-17 NOTE — PLAN OF CARE
4E: Cxl - Per chart review and discussion with RN, pt remains intubated and agitated but not following commands when sedation lowered. Will HOLD OT for today and reassess appropriateness for therapy initiation/ROM tomorrow.

## 2019-01-17 NOTE — OP NOTE
Procedure Date: 01/15/2019      PREOPERATIVE DIAGNOSES:   1.  Coronary artery disease.   2.  Renal failure.   3.  Peripheral vascular disease.   4.  Pulmonary edema with respiratory failure.      POSTOPERATIVE DIAGNOSES:     1.  Coronary artery disease.   2.  Renal failure.   3.  Peripheral vascular disease.   4.  Pulmonary edema with respiratory failure.      PROCEDURE:   1.  Coronary artery bypass grafting x3 (left internal mammary artery, left anterior descending artery, saphenous vein graft to posterior descending artery, saphenous vein graft to first obtuse marginal artery).   2.  Endoscopic vein harvest.      SURGEON:  Haseeb Villegas MD      ASSISTANTS:  Chiara Antonio PA-C; Quentin Hernandes MD      OPERATIVE INDICATIONS:  The patient is an 78-year-old female who was transferred from [facility] with unstable angina requiring placement of balloon pump.  Decision was made to proceed with coronary bypass grafting.  I discussed risks and benefits of surgery with the family with the risks of death, stroke, wound infection, renal failure and arrhythmias.   They understand and wanted to proceed with surgery.        OPERATIVE FINDINGS:  The patient's sternum was of adequate quality.  The pericardial space is free of any lesions.  Preoperative echocardiography showed ejection fraction of 35-40% with mild to moderate mitral regurgitation.  The aorta was grossly free of clots.  The aorta was a moderately dilated vessel.  Bypass for the left anterior descending artery was a 2 mm vessel with proximal stenosis.  It should be noted that all coronary arteries were diffusely calcified as well as the posterior descending artery which is 1.75 mm vessel with proximal occlusion and the first obtuse marginal artery was diffusely calcified.        DESCRIPTION OF PROCEDURE:   The patient was brought to the operating room in stable condition for the administration of anesthesia.  The patient's chest, abdomen and lower  extremity were prepped and draped in usual sterile manner.  A long segment of saphenous vein was harvested from the left lower extremity from the left greater saphenous vein using endoscopic vein harvest technique.  Simultaneously, a mediastinotomy was performed.  The left internal mammary artery was harvested from its bed and dilated with topical papaverine.   Preparation for cardiopulmonary bypass included ACT-guided heparinization and admission of [place].  The aorta was cannulated with a 20 [place] arterial cannula via 3-0 [place] pursestring pledgeted sutures x2.  Dual stage venous cannula was inserted in the right atrium.  Antegrade and retrograde cardioplegia cannulas were placed.  Full cardiopulmonary bypass was initiated.  Aortic pressure was temporarily reduced, and aorta was crossclamped.  One liter of cold blood cardioplegia was administered in antegrade and retrograde route.  Subsequent doses of cardioplegia were given at no greater than 20-minute increments via the retrograde route as well as via the completed vein grafts.  A reverse segment of the saphenous vein was anastomosed end-to-side to an arteriotomy in the mid portion of the posterolateral descending artery using 7-0 Prolene.  A secondary reverse segment of the saphenous vein was anastomosed end-to-side to the arteriotomy in the mid portion of the first obtuse marginal artery with 7-0 Prolene.  Distally, the left intramammary artery was anastomosed end-to-side to an arteriotomy in the mid portion of left anterior descending artery using 7-0 Prolene.  Proximal ends of the two vein grafts were anastomosed to a 4 mm aortotomy using 6-0 Prolene.  A warm dose of retrograde hot shot cardioplegia was given and with high suction on the aortic root when the crossclamp was released.  Organized rhythm resumed without need for any defibrillations. Two right ventricular pacing wires were placed.  The patient was gradually weaned off cardiopulmonary bypass.   After termination of cardiopulmonary bypass, LV function had an approximately 40-45% ejection fraction.  Protamine was given.        All cannulas were removed.  Careful hemostasis was obtained.  Two units of platelets were administered for hemostasis.  Large serous bilateral pulmonary effusions drained, 2 anterior mediastinal chest tubes and bilateral pleural chest tubes placed.  Sternum was closed with surgical steel wires.  Fascia and subcutaneous tissue of the chest were closed in layers.  The patient was transferred to ICU in stable critical condition.         YANA ORTIZ MD             D: 2019   T: 2019   MT: EKATERINA      Name:     BJORN TREVINO   MRN:      [id number]        Account:        [id number]   :      1940           Procedure Date: 01/15/2019      Document: [id number]       cc: Josafat Sanchez MD       [facility] Billing

## 2019-01-17 NOTE — PROGRESS NOTES
CARDIOTHORACIC SURGERY PROGRESS NOTE          1/17/19   CO-MORBIDITIES:   CKDIII, b/l carotid stenosis, GERD, HTN, CAD       ASSESSMENT:   Aj is a 78 year old female with pmh CKDIII, b/l carotid stenosis, GERD, HTN, CAD s/p ACS [place] [facility] [place]-vessel disease who is now pod 2 for three vessel CABG with Dr. Villegas      PLANS FOR TODAY  Discontinue ancef  Start subcutaneous hep  Nephrology consult  Restart PTA buspirone  PST    PLANS:   Neuro/ pain/ sedation:  -Monitor neurological status. Notify the MD for any acute changes in exam.  -po oxycodone and IV dilaudid prn, scheduled tylenol   -Precedex discontinued due to bradycardia.  -urine tox: positive for benzos   -restart PTA buspirone     Pulmonary care:   -Supplemental oxygen to keep saturation above 92 %.  -wean sedation and plan to extubated when mentation and ventilation appropriate   -Aggressive pulmonary toilet once extubated      Cardiovascular:    -Monitor hemodynamic status.   -Ventricular pacing at 80. C/w at current settings, cap pacing wires 1/17  -IABP removed 1/16  -Epi off, CI borderline, will wedge this am      GI care:   -NPO, advance diet as tolerated once extubated  -C/w NGT  - Bowel regimen       Fluids/ Electrolytes/ Nutrition:   - ICU electrolyte replacement protocol  - No indication for parenteral nutrition.     Renal/ Fluid Balance:    -chronic kidney disease III   -Urine output is minimal, received bumex 4 mg IV and diuril 1/16, consult nephrology  -Will continue to monitor intake and output.      Endocrine:    -insulin gtt while intubated  -Sliding scale for diabetes management if needed after extubation      ID/ Antibiotics:  -Klebsiella UTI diagnosed on 1/11. Treat w/ IV ceftriaxone   -stop ancef 1/17      Heme:     -Hemoglobin stable.   -continue to monitor       Prophylaxis:    -Mechanical prophylaxis for DVT.   -No chemical DVT prophylaxis due to high risk of bleeding.       Lines/ tubes/ drains:  -NGT, ETT,  RIJ CVC/[place], Chest tubes x3, Chen, L radial art line, PIV      Disposition:  -CVICU            OBJECTIVE:     TODAY'S PROGRESS:   SUBJECTIVE:   Off epi, tolerating PST this am, off propofol       Vitals:  Temp:  [97.6  F (36.4  C)-99.9  F (37.7  C)] 98.6  F (37  C)  Heart Rate:  [] 101  Resp:  [11-33] 18  BP: (124)/(64) 124/64  MAP:  [61 mmHg-104 mmHg] 89 mmHg  Arterial Line BP: ()/(46-77) 147/61  FiO2 (%):  [50 %-60 %] 50 %  SpO2:  [91 %-100 %] 94 %    Physical Exam:    Gen: Intubated/sedated  CV: RRR, no murmurs, rubs or gallops   Pulm: Lungs CTA, no wheezing or rhonchi  Abd: Soft, NT, ND, +BS  Ext: no LE edema  Neuro: nonfocal, moves all extremities  Incision: c/d/i, no erythema, sternum stable  Tubes/drains: Dressing clean and dry, output remained stable overnight, no air leak.         I&O's:  I/O last 3 completed shifts:  In: 1855.17 [I.V.:805.17; NG/GT:310]  Out: 811 [Urine:211; Chest Tube:600]    Ventilator Settings:  Ventilation Mode: (S) CMV/AC  (Continuous Mandatory Ventilation/ Assist Control)  FiO2 (%): 50 %  Rate Set (breaths/minute): 14 breaths/min  Tidal Volume Set (mL): 470 mL  PEEP (cm H2O): 5 cmH2O  Pressure Support (cm H2O): 7 cmH2O  Oxygen Concentration (%): 50 %  Resp: 18      Labs:   BMP  Recent Labs   Lab 01/17/19  0400 01/16/19  2025 01/16/19  0350 01/15/19  2350 01/15/19  2146     --  144 144 144   POTASSIUM 4.5 4.9 4.4 3.8 3.6   CHLORIDE 110*  --  109 107 109   JEFFREY 7.1*  --  7.8* 7.6* 8.1*   CO2 21  --  18* 22 21   BUN 56*  --  46* 43* 45*   CR 3.81*  --  2.69* 2.41* 2.26*   *  --  137* 184* 166*     CBC  Recent Labs   Lab 01/17/19  0400 01/17/19  0008 01/16/19 2025 01/16/19  0830  01/16/19  0350   WBC 8.7 8.7 8.0  --   --  8.7   RBC 3.27* 3.31* 2.47*  --   --  3.01*   HGB 9.4* 9.6* 7.4* 8.7*   < > 8.8*   HCT 28.8* 29.0* 22.1*  --   --  27.0*   MCV 88 88 90  --   --  90   MCH 28.7 29.0 30.0  --   --  29.2   MCHC 32.6 33.1 33.5  --   --  32.6   RDW 15.6* 15.3*  15.0  --   --  14.2   * 102* 119*  --   --  180    < > = values in this interval not displayed.     INR  Recent Labs   Lab 01/16/19  0350 01/15/19  2350 01/15/19  2146 01/15/19  2000   INR 0.93 1.61* 1.58* 1.55*      Hepatic Panel   Recent Labs   Lab 01/17/19  0400 01/16/19  0350 01/15/19  2146 01/15/19  2000   AST 50* 55* 57* 62*   ALT 10 22 25 23   ALKPHOS 41 40 40 44   BILITOTAL 0.5 1.0 1.2 0.9   ALBUMIN 2.7* 2.8* 2.9* 2.7*     Glucose  Recent Labs   Lab 01/17/19  1009 01/17/19  0917 01/17/19  0802 01/17/19  0610 01/17/19  0400 01/17/19  0359 01/17/19  0306  01/16/19  0350  01/15/19  2350  01/15/19  2146  01/15/19  2000 01/15/19  1907   GLC  --   --   --   --  111*  --   --   --  137*  --  184*  --  166*  --  150* 158*   * 177* 128* 125*  --  109* 134*   < >  --    < >  --    < >  --    < >  --   --     < > = values in this interval not displayed.     Blood Gas  Recent Labs   Lab 01/17/19  0758 01/17/19  0623 01/17/19 0400 01/16/19  2335  01/16/19 2025   PH  --  7.40 7.41 7.42  --  7.38   PCO2  --  35 37 38  --  32*   PO2  --  94 70* 68*  --  109*   HCO3  --  22 23 25  --  19*   O2PER 50 60 50  50 50  50   < > 50.0    < > = values in this interval not displayed.       Imaging:   Recent Results (from the past 24 hour(s))   XR Abdomen Port 1 View    Narrative    Exam: XR ABDOMEN PORT 1 VW 1/16/2019 2:04 PM    History: Feeding tube    Comparison: CT 1/11/2019 chest x-ray same date    Findings: Single supine view of the abdomen. Feeding tube tip projects  over the distal duodenum. Gastric tube tip and sidehole project over  the stomach. Colon is decompressed. Nonspecific paucity of small bowel  gas. No portal venous gas and no pneumatosis.  Small amount of  residual oral contrast throughout the bowel. Extensive postoperative  changes in the chest and mitral valvular calcifications, better  depicted on same day chest x-ray. Please see that dictation for  additional details in the lung bases.  Degenerative changes throughout  the spine. No acute bony normality.      Impression    Impression: Feeding tube tip projects over the distal duodenum.    DOMENICO VILCHIS MD       Discussed with CVTS fellow.       Lani Dc MD

## 2019-01-17 NOTE — PROGRESS NOTES
CVTS PROGRESS NOTE          1/17/19   CO-MORBIDITIES:   CKDIII, b/l carotid stenosis, GERD, HTN, CAD       ASSESSMENT:   Aj is a 78 year old female with pmh CKDIII, b/l carotid stenosis, GERD, HTN, CAD s/p ACS transferred from [facility] [place] with severe multi-vessel disease who is now pod 2 for three vessel CABG with Dr. Villegas      PLANS FOR TODAY  Discontinue ancef  Start subcutaneous hep  Nephrology consult  Restart PTA buspirone  PST  hydralazine prn   Stop IVF     PLANS:   Neuro/ pain/ sedation:  -Monitor neurological status. Notify the MD for any acute changes in exam.  -po oxycodone and IV dilaudid prn, scheduled tylenol   -Precedex discontinued due to bradycardia.  -urine tox: positive for benzos   -restart PTA buspirone     Pulmonary care:   -Supplemental oxygen to keep saturation above 92 %.  -wean sedation and plan to extubated when mentation and ventilation appropriate   -Aggressive pulmonary toilet once extubated      Cardiovascular:    -Monitor hemodynamic status.   -Ventricular pacing at 80. C/w at current settings, cap pacing wires 1/17  -IABP removed 1/16  -Epi off, CI borderline, will wedge this am  -prn hydralazine for systolic >140      GI care:   -NPO, advance diet as tolerated once extubated  -C/w NGT  - Bowel regimen       Fluids/ Electrolytes/ Nutrition:   - ICU electrolyte replacement protocol  - No indication for parenteral nutrition.     Renal/ Fluid Balance:    -chronic kidney disease III   -Urine output is minimal, received bumex 4 mg IV and diuril 1/17, consult nephrology  -Will continue to monitor intake and output.      Endocrine:    -insulin gtt while intubated  -Sliding scale for diabetes management if needed after extubation      ID/ Antibiotics:  -Klebsiella UTI diagnosed on 1/11. Treat w/ IV ceftriaxone   -stop ancef 1/17      Heme:     -Hemoglobin stable.   -continue to monitor       Prophylaxis:    -Mechanical prophylaxis for DVT.   -No chemical DVT prophylaxis due to high risk of  bleeding.       Lines/ tubes/ drains:  -NGT, ETT, RIJ CVC/[place], Chest tubes x3, Chen, L radial art line, PIV      Disposition:  -CVICU            OBJECTIVE:     TODAY'S PROGRESS:   SUBJECTIVE:   Off epi, tolerating PST this am, off propofol       Vitals:  Temp:  [97.6  F (36.4  C)-99.9  F (37.7  C)] 98.6  F (37  C)  Heart Rate:  [] 101  Resp:  [11-33] 18  BP: (124)/(64) 124/64  MAP:  [61 mmHg-104 mmHg] 89 mmHg  Arterial Line BP: ()/(46-77) 147/61  FiO2 (%):  [50 %-60 %] 50 %  SpO2:  [91 %-100 %] 94 %    Physical Exam:    Gen: Intubated/sedated  CV: RRR, no murmurs, rubs or gallops   Pulm: Lungs CTA, no wheezing or rhonchi  Abd: Soft, NT, ND, +BS  Ext: no LE edema  Neuro: nonfocal, moves all extremities  Incision: c/d/i, no erythema, sternum stable  Tubes/drains: Dressing clean and dry, output remained stable overnight, no air leak.         I&O's:  I/O last 3 completed shifts:  In: 1855.17 [I.V.:805.17; NG/GT:310]  Out: 811 [Urine:211; Chest Tube:600]    Ventilator Settings:  Ventilation Mode: (S) CMV/AC  (Continuous Mandatory Ventilation/ Assist Control)  FiO2 (%): 50 %  Rate Set (breaths/minute): 14 breaths/min  Tidal Volume Set (mL): 470 mL  PEEP (cm H2O): 5 cmH2O  Pressure Support (cm H2O): 7 cmH2O  Oxygen Concentration (%): 50 %  Resp: 18      Labs:   [facility]  Recent Labs   Lab 01/17/19  0400 01/16/19 2025 01/16/19  0350 01/15/19  2350 01/15/19  2146     --  144 144 144   POTASSIUM 4.5 4.9 4.4 3.8 3.6   CHLORIDE 110*  --  109 107 109   JEFFREY 7.1*  --  7.8* 7.6* 8.1*   CO2 21  --  18* 22 21   BUN 56*  --  46* 43* 45*   CR 3.81*  --  2.69* 2.41* 2.26*   *  --  137* 184* 166*     CBC  Recent Labs   Lab 01/17/19  0400 01/17/19  0008 01/16/19 2025 01/16/19  0830  01/16/19  0350   WBC 8.7 8.7 8.0  --   --  8.7   RBC 3.27* 3.31* 2.47*  --   --  3.01*   HGB 9.4* 9.6* 7.4* 8.7*   < > 8.8*   HCT 28.8* 29.0* 22.1*  --   --  27.0*   MCV 88 88 90  --   --  90   MCH 28.7 29.0 30.0  --   --  29.2   MCHC  32.6 33.1 33.5  --   --  32.6   RDW 15.6* 15.3* 15.0  --   --  14.2   * 102* 119*  --   --  180    < > = values in this interval not displayed.     INR  Recent Labs   Lab 01/16/19  0350 01/15/19  2350 01/15/19  2146 01/15/19  2000   INR 0.93 1.61* 1.58* 1.55*      Hepatic Panel   Recent Labs   Lab 01/17/19  0400 01/16/19  0350 01/15/19  2146 01/15/19  2000   AST 50* 55* 57* 62*   ALT 10 22 25 23   ALKPHOS 41 40 40 44   BILITOTAL 0.5 1.0 1.2 0.9   ALBUMIN 2.7* 2.8* 2.9* 2.7*     Glucose  Recent Labs   Lab 01/17/19  1009 01/17/19  0917 01/17/19  0802 01/17/19  0610 01/17/19  0400 01/17/19  0359 01/17/19  0306  01/16/19  0350  01/15/19  2350  01/15/19  2146  01/15/19  2000 01/15/19  1907   GLC  --   --   --   --  111*  --   --   --  137*  --  184*  --  166*  --  150* 158*   * 177* 128* 125*  --  109* 134*   < >  --    < >  --    < >  --    < >  --   --     < > = values in this interval not displayed.     Blood Gas  Recent Labs   Lab 01/17/19  0758 01/17/19  0623 01/17/19  0400 01/16/19  2335  01/16/19 2025   PH  --  7.40 7.41 7.42  --  7.38   PCO2  --  35 37 38  --  32*   PO2  --  94 70* 68*  --  109*   HCO3  --  22 23 25  --  19*   O2PER 50 60 50  50 50  50   < > 50.0    < > = values in this interval not displayed.       Imaging:   Recent Results (from the past 24 hour(s))   XR Abdomen Port 1 View    Narrative    Exam: XR ABDOMEN PORT 1 VW 1/16/2019 2:04 PM    History: Feeding tube    Comparison: CT 1/11/2019 chest x-ray same date    Findings: Single supine view of the abdomen. Feeding tube tip projects  over the distal duodenum. Gastric tube tip and sidehole project over  the stomach. Colon is decompressed. Nonspecific paucity of small bowel  gas. No portal venous gas and no pneumatosis.  Small amount of  residual oral contrast throughout the bowel. Extensive postoperative  changes in the chest and mitral valvular calcifications, better  depicted on same day chest x-ray. Please see that dictation  for  additional details in the lung bases. Degenerative changes throughout  the spine. No acute bony normality.      Impression    Impression: Feeding tube tip projects over the distal duodenum.    DOMENICO VILCHIS MD       Discussed with cvts fellow.        Lani Dc MD

## 2019-01-18 NOTE — PLAN OF CARE
Pt was pressure supported at 0630 this AM, tolerated and was then put back on CMV setting d/t bradypnea at 8 bpm. Pt was given hydralazine to control BP when off propofol for pressure supporting. Pt was also given dilaudid and oxycodone for pain, which helped BP as well. Pt's hemodynamics remain stable this shift although urine output remains 15-20hr, MD aware, will consider CRRT vs HD today. Pt's heart rate stayed in sinus rhythm with occasional PACs. BS WDL, insulin gtt at 0.2-0.5 unit(s)/hr. TF was increased to goal rate at 40/hr. Will continue to monitor.

## 2019-01-18 NOTE — PLAN OF CARE
Patient sedated, Propofol and Insulin gtts. PS for 2 hours today. Mental status unchanged. Episode of AF w RVR 160s. 5 mg IV metoprolol given, now SR 70s. VSS. Pressures 130s - 140s / 70s. Hydralazine 10 mg IV given x3 during shift. Started on Protonix BID for red secretions from gastric tube. Trending hemoglobin. Diuril and Bumex given. Mild increase in urine output. Renal consulted, holding off on dialysis and will assess tomorrow. PAPs increased to high 30s / 10s, CVP 11 - 15. SVR trending down, last at 484. CO / CI 7.1 / 4.0. CT OP WNL.

## 2019-01-18 NOTE — PROGRESS NOTES
CV ICU PROGRESS NOTE          1/17/19   CO-MORBIDITIES:   CKDIII, b/l carotid stenosis, GERD, HTN, CAD       ASSESSMENT:   Aj is a 78 year old female with pmh CKDIII, b/l carotid stenosis, GERD, HTN, CAD s/p ACS transferred from [facility] [place] with severe multi-vessel disease who is now pod 2 for three vessel CABG with Dr. Villegas      PLANS FOR TODAY  Remove mediastinal chest tubes   Keep v wires   5mg amlodipine  Metoprolol 12.5 BID    Stop iv dilaudid    CT head     PLANS:   Neuro/ pain/ sedation:  -continued altered mental status   -CT head 1/18  -po oxycodone prn, scheduled tylenol   -propofol for sedation  -urine tox: positive for benzos   -restart PTA buspirone     Pulmonary care:   -Supplemental oxygen to keep saturation above 92 %.  -wean sedation and plan to extubated when mentation and ventilation appropriate   -Aggressive pulmonary toilet once extubated      Cardiovascular:    -Monitor hemodynamic status.   -Ventricular pacing at 80. C/w at current settings, cap pacing wires 1/17, will leave in 1/18  -1/18: amlodipine 5mg, lopressor 12.5 bid   -prn hydralazine for systolic >140      GI care:   -NPO, advance diet as tolerated once extubated  -C/w NGT  - Bowel regimen       Fluids/ Electrolytes/ Nutrition:   - ICU electrolyte replacement protocol  - No indication for parenteral nutrition.     Renal/ Fluid Balance:    -chronic kidney disease III   -Urine output is minimal, nephrology consulted, appreciate recs   -Will continue to monitor intake and output.      Endocrine:    -insulin gtt while intubated  -Sliding scale for diabetes management if needed after extubation      ID/ Antibiotics:  -Klebsiella UTI diagnosed on 1/11.  -levofloxacin started due to resistance      Heme:     -Hemoglobin stable.   -continue to monitor       Prophylaxis:    -Mechanical prophylaxis for DVT.   -subcutaneous heparin       Lines/ tubes/ drains:  -NGT, ETT, RIJ CVC/[place], Chest tubes x3, Chen, L radial art line, PIV       Disposition:  -CVICU            OBJECTIVE:     TODAY'S PROGRESS:   SUBJECTIVE:   No acute events overnight, continued ams        Vitals:  Temp:  [97  F (36.1  C)-99.7  F (37.6  C)] 97  F (36.1  C)  Heart Rate:  [] 75  Resp:  [9-19] 15  BP: (126-130)/(61-64) 130/64  MAP:  [67 mmHg-102 mmHg] 87 mmHg  Arterial Line BP: (103-173)/(3-79) 133/63  FiO2 (%):  [40 %-50 %] 50 %  SpO2:  [90 %-99 %] 95 %    Physical Exam:    Gen: Intubated/ams with minimal sedation   CV: RRR, no murmurs, rubs or gallops   Pulm: Lungs CTA, no wheezing or rhonchi  Abd: Soft, NT, ND, +BS  Ext: no LE edema  Neuro: nonfocal, moves all extremities  Incision: c/d/i, no erythema, sternum stable  Tubes/drains: Dressing clean and dry, output remained stable overnight, no air leak.         I&O's:  I/O last 3 completed shifts:  In: 1810.3 [I.V.:660.3; NG/GT:330]  Out: 1369 [Urine:434; Emesis/NG output:525; Chest Tube:410]    Ventilator Settings:  Ventilation Mode: CPAP/PS  (Continuous positive airway pressure with Pressure Support)  FiO2 (%): 50 %  Rate Set (breaths/minute): 14 breaths/min  Tidal Volume Set (mL): 470 mL  PEEP (cm H2O): 5 cmH2O  Pressure Support (cm H2O): 7 cmH2O  Oxygen Concentration (%): 40 %  Resp: 15      Labs:   BMP  Recent Labs   Lab 01/18/19  0328 01/17/19  1605 01/17/19  1115 01/17/19  0400    144 144 144   POTASSIUM 4.5 4.3 4.3 4.5   CHLORIDE 105 107 107 110*   JEFFREY 7.7* 7.4* 7.6* 7.1*   CO2 24 23 22 21   BUN 80* 69* 64* 56*   CR 5.17* 4.66* 4.50* 3.81*   * 121* 114* 111*     CBC  Recent Labs   Lab 01/18/19 0328 01/17/19 2037 01/17/19  1115 01/17/19  0400   WBC 9.3 10.1 10.8 8.7   RBC 3.06* 3.16* 3.20* 3.27*   HGB 8.9* 9.0* 9.3* 9.4*   HCT 27.5* 28.1* 28.4* 28.8*   MCV 90 89 89 88   MCH 29.1 28.5 29.1 28.7   MCHC 32.4 32.0 32.7 32.6   RDW 16.3* 16.2* 15.9* 15.6*   PLT 99* 101* 106* 107*     INR  Recent Labs   Lab 01/16/19  0350 01/15/19  2350 01/15/19  2146 01/15/19  2000   INR 0.93 1.61* 1.58* 1.55*       Hepatic Panel   Recent Labs   Lab 01/17/19  0400 01/16/19  0350 01/15/19  2146 01/15/19  2000   AST 50* 55* 57* 62*   ALT 10 22 25 23   ALKPHOS 41 40 40 44   BILITOTAL 0.5 1.0 1.2 0.9   ALBUMIN 2.7* 2.8* 2.9* 2.7*     Glucose  Recent Labs   Lab 01/18/19  0602 01/18/19  0400 01/18/19  0328 01/18/19  0157 01/17/19  2359 01/17/19  2307 01/17/19  2212  01/17/19  1605  01/17/19  1115  01/17/19  0400  01/16/19  0350  01/15/19  2350   GLC  --   --  129*  --   --   --   --   --  121*  --  114*  --  111*  --  137*  --  184*   * 113*  --  138* 134* 142* 138*   < >  --    < >  --    < >  --    < >  --    < >  --     < > = values in this interval not displayed.     Blood Gas  Recent Labs   Lab 01/18/19  0806 01/18/19  0320 01/18/19  0000 01/17/19  2037  01/17/19  0623   PH  --  7.39 7.38 7.45  --  7.40   PCO2  --  38 38 33*  --  35   PO2  --  89 97 81  --  94   HCO3  --  23 22 23  --  22   O2PER 40.0 50.0  50.0 50.0  50.0 50  50   < > 60    < > = values in this interval not displayed.       Imaging:   Recent Results (from the past 24 hour(s))   US Renal Complete    Narrative    EXAMINATION: US RENAL COMPLETE, 1/17/2019 4:55 PM     COMPARISON: Ultrasound 4/26/2011. CT 1/11/2019    HISTORY: Renal failure    FINDINGS:    Right kidney: Measures 10.4 cm in length. Parenchyma is of normal  thickness and echogenicity. No focal mass. No hydronephrosis.    Left kidney: Measures 6.7 cm in length, which is atrophic. Diffusely  increased echogenicity. Parenchyma is of normal thickness and  echogenicity. No focal mass. No hydronephrosis.     Bladder: Incompletely distended and unable to fully evaluate      Impression    IMPRESSION:  Atrophic left kidney. Otherwise normal renal ultrasound.    I have personally reviewed the examination and initial interpretation  and I agree with the findings.    YOVANY GLOVER MD   XR Chest Port 1 View    Narrative    EXAM: XR CHEST PORT 1 VW  1/18/2019 3:45 AM     HISTORY:  interval  change    COMPARISON: Chest radiograph dated 1/18/2019    FINDINGS: A single AP view of the chest is obtained. Endotracheal tube  tip projects approximately 1.5 cm above the ursula. Gastric tube  sidehole projects near the esophageal junction with tip off the  field-of-view. Feeding tube courses below left hemidiaphragm with tip  off the field-of-view. Right IJ [place] catheter tip projects over  the distal right pulmonary artery. Bilateral chest tubes. Mediastinal  drains. Epicardial pacer wire. Postoperative changes of CABG. Mitral  annular calcification. Median sternotomy wires.    Trachea is midline. Increased prominence of the cardiomediastinal  silhouette. Bilateral pleural effusions. Prominent interstitial lung  markings, similar to prior. Increased left perihilar and retrocardiac  opacities. The upper abdomen is unremarkable.      Impression    IMPRESSION:   1. Gastric tube sidehole projects near the gastroesophageal junction,  consider slight advancement. Endotracheal tube tip projects  approximately 1.5 cm above the ursula, recommend slight retraction.  Additional support devices are stable.  2. Increased prominence of the cardiomediastinal silhouette. May be  projectional, however pericardial effusion should also be considered.  3. Bilateral pleural effusions with increased left perihilar and  basilar opacities, atelectasis versus infection/aspiration.    I have personally reviewed the examination and initial interpretation  and I agree with the findings.    YOVANY GLOVER MD       Discussed with icu fellow.     Lani Dc MD

## 2019-01-18 NOTE — PROGRESS NOTES
01/18/19 0911   Quick Adds   Type of Visit Initial Occupational Therapy Evaluation   Living Environment   Lives With alone  (Assisted living.)   Living Arrangements assisted living   Home Accessibility other (see comments)  (tub shower with grab bars(has access to shower chair if need)   Living Environment Comment Per family pt. lives in [place] indep. with BADLs and lt. IADLs including meals, bathing.  family A with driving/shopping.   Self-Care   Usual Activity Tolerance good   Current Activity Tolerance poor   Equipment Currently Used at Home none   Functional Level   Ambulation 0-->independent   Transferring 0-->independent   Toileting 0-->independent   Bathing 1-->assistive equipment  (grab bars)   Dressing 0-->independent   Eating 0-->independent   Communication 0-->understands/communicates without difficulty   Swallowing 0-->swallows foods/liquids without difficulty   Cognition 0 - no cognition issues reported   Fall history within last six months no   Which of the above functional risks had a recent onset or change? ambulation;transferring;toileting;bathing;dressing;cognition   Prior Functional Level Comment Pt. indep. with BADLs and functional mobility, ambulating without AD.   General Information   Onset of Illness/Injury or Date of Surgery - Date 01/14/19   Referring Physician Quentin Hernandes MD   Additional Occupational Profile Info/Pertinent History of Current Problem 78 year old female with pmh CKDIII, b/l carotid stenosis, GERD, HTN, CAD s/p ACS transferred from [facility] [place] severe multi-vessel disease who is now  for three vessel CABG    Precautions/Limitations sternal precautions   General Info Comments activity orders; up in chair advance per ICU early mobility guidelines   Cognitive Status Examination   Cognitive Comment pt. intubated/sedated, no command following at time of session   Range of Motion (ROM)   ROM Comment BUE UE/LE PROM WFL   Strength   Strength Comments unable to formally assess  2/2  "pt. not following commands   Transfer Skill: Bed to Chair/Chair to Bed   Level of [place]: Bed to Chair dependent (less than 25% patients effort)  ([facility]. ceiling lift to chair)   Upper Body Dressing   Level of [place]: Dress Upper Body unable to perform   Grooming   Level of [place]: Grooming unable to perform   Instrumental Activities of Daily Living (IADL)   Previous Responsibilities meal prep   Activities of Daily Living Analysis   Impairments Contributing to Impaired Activities of Daily Living balance impaired;cognition impaired;post surgical precautions;strength decreased   General Therapy Interventions   Planned Therapy Interventions ADL retraining;strengthening;ROM;transfer training;home program guidelines;progressive activity/exercise;cognition   Clinical Impression   Criteria for Skilled Therapeutic Interventions Met yes, treatment indicated   OT Diagnosis impaired ADls/mobility   Influenced by the following impairments intubated/sedated, post op precautions   Assessment of Occupational Performance 3-5 Performance Deficits   Identified Performance Deficits dressing, toileting, bathing, home mgmt.   Clinical Decision Making (Complexity) Low complexity   Therapy Frequency 3 times/wk   Predicted Duration of Therapy Intervention (days/wks) ~ 2 weeks   Anticipated Equipment Needs at Discharge (TBD)   Anticipated Discharge Disposition Acute Rehabilitation Facility;Transitional Care Facility   Risks and Benefits of Treatment have been explained. Yes   Patient, Family & other staff in agreement with plan of care Yes   [facility] AM-PAC TM \"6 Clicks\"   2016, Trustees of [facility], under license to [employer].  All rights reserved.   6 Clicks Short Forms Daily Activity Inpatient Short Form   [facility] AM-PAC  \"6 Clicks\" Daily Activity Inpatient Short Form   1. Putting on and taking off regular lower body clothing? 1 - Total   2. Bathing (including washing, rinsing, drying)? 1 - " Total   3. Toileting, which includes using toilet, bedpan or urinal? 1 - Total   4. Putting on and taking off regular upper body clothing? 1 - Total   5. Taking care of personal grooming such as brushing teeth? 1 - Total   6. Eating meals? 1 - Total   Daily Activity Raw Score (Score out of 24.Lower scores equate to lower levels of function) 6   Total Evaluation Time   Total Evaluation Time (Minutes) 8

## 2019-01-18 NOTE — PROGRESS NOTES
CVTS PROGRESS NOTE          1/17/19   CO-MORBIDITIES:   CKDIII, b/l carotid stenosis, GERD, HTN, CAD       ASSESSMENT:   Aj is a 78 year old female with pmh CKDIII, b/l carotid stenosis, GERD, HTN, CAD s/p ACS transferred from [facility] [place] with severe multi-vessel disease who is now pod 2 for three vessel CABG with Dr. Villegas      PLANS FOR TODAY  Remove mediastinal chest tubes   Keep v wires   5mg amlodipine  Metoprolol 12.5 BID    Stop iv dilaudid    CT head     PLANS:   Neuro/ pain/ sedation:  -continued altered mental status   -CT head 1/18  -po oxycodone prn, scheduled tylenol   -propofol for sedation  -urine tox: positive for benzos   -restart PTA buspirone     Pulmonary care:   -Supplemental oxygen to keep saturation above 92 %.  -wean sedation and plan to extubated when mentation and ventilation appropriate   -Aggressive pulmonary toilet once extubated      Cardiovascular:    -Monitor hemodynamic status.   -Ventricular pacing at 80. C/w at current settings, cap pacing wires 1/17, will leave in 1/18  -1/18: amlodipine 5mg, lopressor 12.5 bid   -prn hydralazine for systolic >140      GI care:   -NPO, advance diet as tolerated once extubated  -C/w NGT  - Bowel regimen       Fluids/ Electrolytes/ Nutrition:   - ICU electrolyte replacement protocol  - No indication for parenteral nutrition.     Renal/ Fluid Balance:    -chronic kidney disease III   -Urine output is minimal, nephrology consulted, appreciate recs   -Will continue to monitor intake and output.      Endocrine:    -insulin gtt while intubated  -Sliding scale for diabetes management if needed after extubation      ID/ Antibiotics:  -Klebsiella UTI diagnosed on 1/11.  -levofloxacin started due to resistance      Heme:     -Hemoglobin stable.   -continue to monitor       Prophylaxis:    -Mechanical prophylaxis for DVT.   -subcutaneous heparin       Lines/ tubes/ drains:  -NGT, ETT, RIJ CVC/[place], Chest tubes x3, Chen, L radial art line, PIV       Disposition:  -CVICU            OBJECTIVE:     TODAY'S PROGRESS:   SUBJECTIVE:   No acute events overnight, continued ams        Vitals:  Temp:  [97  F (36.1  C)-99.7  F (37.6  C)] 97  F (36.1  C)  Heart Rate:  [] 75  Resp:  [9-19] 15  BP: (126-130)/(61-64) 130/64  MAP:  [67 mmHg-102 mmHg] 87 mmHg  Arterial Line BP: (103-173)/(3-79) 133/63  FiO2 (%):  [40 %-50 %] 50 %  SpO2:  [90 %-99 %] 95 %    Physical Exam:    Gen: Intubated/ams with minimal sedation   CV: RRR, no murmurs, rubs or gallops   Pulm: Lungs CTA, no wheezing or rhonchi  Abd: Soft, NT, ND, +BS  Ext: no LE edema  Neuro: nonfocal, moves all extremities  Incision: c/d/i, no erythema, sternum stable  Tubes/drains: Dressing clean and dry, output remained stable overnight, no air leak.         I&O's:  I/O last 3 completed shifts:  In: 1810.3 [I.V.:660.3; NG/GT:330]  Out: 1369 [Urine:434; Emesis/NG output:525; Chest Tube:410]    Ventilator Settings:  Ventilation Mode: CPAP/PS  (Continuous positive airway pressure with Pressure Support)  FiO2 (%): 50 %  Rate Set (breaths/minute): 14 breaths/min  Tidal Volume Set (mL): 470 mL  PEEP (cm H2O): 5 cmH2O  Pressure Support (cm H2O): 7 cmH2O  Oxygen Concentration (%): 40 %  Resp: 15      Labs:   BMP  Recent Labs   Lab 01/18/19  0328 01/17/19  1605 01/17/19  1115 01/17/19  0400    144 144 144   POTASSIUM 4.5 4.3 4.3 4.5   CHLORIDE 105 107 107 110*   JEFFREY 7.7* 7.4* 7.6* 7.1*   CO2 24 23 22 21   BUN 80* 69* 64* 56*   CR 5.17* 4.66* 4.50* 3.81*   * 121* 114* 111*     CBC  Recent Labs   Lab 01/18/19 0328 01/17/19 2037 01/17/19  1115 01/17/19  0400   WBC 9.3 10.1 10.8 8.7   RBC 3.06* 3.16* 3.20* 3.27*   HGB 8.9* 9.0* 9.3* 9.4*   HCT 27.5* 28.1* 28.4* 28.8*   MCV 90 89 89 88   MCH 29.1 28.5 29.1 28.7   MCHC 32.4 32.0 32.7 32.6   RDW 16.3* 16.2* 15.9* 15.6*   PLT 99* 101* 106* 107*     INR  Recent Labs   Lab 01/16/19  0350 01/15/19  2350 01/15/19  2146 01/15/19  2000   INR 0.93 1.61* 1.58* 1.55*       Hepatic Panel   Recent Labs   Lab 01/17/19  0400 01/16/19  0350 01/15/19  2146 01/15/19  2000   AST 50* 55* 57* 62*   ALT 10 22 25 23   ALKPHOS 41 40 40 44   BILITOTAL 0.5 1.0 1.2 0.9   ALBUMIN 2.7* 2.8* 2.9* 2.7*     Glucose  Recent Labs   Lab 01/18/19  0602 01/18/19  0400 01/18/19  0328 01/18/19  0157 01/17/19  2359 01/17/19  2307 01/17/19  2212  01/17/19  1605  01/17/19  1115  01/17/19  0400  01/16/19  0350  01/15/19  2350   GLC  --   --  129*  --   --   --   --   --  121*  --  114*  --  111*  --  137*  --  184*   * 113*  --  138* 134* 142* 138*   < >  --    < >  --    < >  --    < >  --    < >  --     < > = values in this interval not displayed.     Blood Gas  Recent Labs   Lab 01/18/19  0806 01/18/19  0320 01/18/19  0000 01/17/19  2037  01/17/19  0623   PH  --  7.39 7.38 7.45  --  7.40   PCO2  --  38 38 33*  --  35   PO2  --  89 97 81  --  94   HCO3  --  23 22 23  --  22   O2PER 40.0 50.0  50.0 50.0  50.0 50  50   < > 60    < > = values in this interval not displayed.       Imaging:   Recent Results (from the past 24 hour(s))   US Renal Complete    Narrative    EXAMINATION: US RENAL COMPLETE, 1/17/2019 4:55 PM     COMPARISON: Ultrasound 4/26/2011. CT 1/11/2019    HISTORY: Renal failure    FINDINGS:    Right kidney: Measures 10.4 cm in length. Parenchyma is of normal  thickness and echogenicity. No focal mass. No hydronephrosis.    Left kidney: Measures 6.7 cm in length, which is atrophic. Diffusely  increased echogenicity. Parenchyma is of normal thickness and  echogenicity. No focal mass. No hydronephrosis.     Bladder: Incompletely distended and unable to fully evaluate      Impression    IMPRESSION:  Atrophic left kidney. Otherwise normal renal ultrasound.    I have personally reviewed the examination and initial interpretation  and I agree with the findings.    YOVANY GLOVER MD   XR Chest Port 1 View    Narrative    EXAM: XR CHEST PORT 1 VW  1/18/2019 3:45 AM     HISTORY:  interval  change    COMPARISON: Chest radiograph dated 1/18/2019    FINDINGS: A single AP view of the chest is obtained. Endotracheal tube  tip projects approximately 1.5 cm above the ursula. Gastric tube  sidehole projects near the esophageal junction with tip off the  field-of-view. Feeding tube courses below left hemidiaphragm with tip  off the field-of-view. Right IJ [place]Dani catheter tip projects over  the distal right pulmonary artery. Bilateral chest tubes. Mediastinal  drains. Epicardial pacer wire. Postoperative changes of CABG. Mitral  annular calcification. Median sternotomy wires.    Trachea is midline. Increased prominence of the cardiomediastinal  silhouette. Bilateral pleural effusions. Prominent interstitial lung  markings, similar to prior. Increased left perihilar and retrocardiac  opacities. The upper abdomen is unremarkable.      Impression    IMPRESSION:   1. Gastric tube sidehole projects near the gastroesophageal junction,  consider slight advancement. Endotracheal tube tip projects  approximately 1.5 cm above the ursula, recommend slight retraction.  Additional support devices are stable.  2. Increased prominence of the cardiomediastinal silhouette. May be  projectional, however pericardial effusion should also be considered.  3. Bilateral pleural effusions with increased left perihilar and  basilar opacities, atelectasis versus infection/aspiration.    I have personally reviewed the examination and initial interpretation  and I agree with the findings.    YOVANY GLOVER MD       Discussed with cvts fellow.        Lani Dc MD

## 2019-01-18 NOTE — CONSULTS
Nephrology Initial Consult  January 17, 2019      Wendy Rocha MRN:[id number] YOB: 1940  Date of Admission:1/14/2019  Primary care provider: Valentina Thad Gold  Requesting physician: Haseeb Villegas MD    Today Recommendation:  - No indication for emergent hemodialysis  - Hold off all diuretic and continue monitoring urine output.  - US/Renal with doppler  - Avoid nephrotoxic agents, avoid hypotension episodes  - Strict I/Os.    ASSESSMENT AND RECOMMENDATIONS:   Wendy Rocha is a 78 year old female with pmh CKDIII, b/l carotid stenosis, GERD, HTN, CAD s/p ACS [place] [facility] [place] with severe multi-vessel disease who had tripple vessel CABG surgery on 1/15 complicated by anuric NATALIA.      # Anuric NATALIA on CKD stage IIIb:  - Patient was transferred with Crea~2.2 and GFR in 20s.  - CABG surgery complicated by anuric NATALIA and Crea has elevated gradually.  - NATALIA etiology: pre-renal azotemia due to hypotension episode during surgery vs renovascular disease vs ATN.  - Patient today has electrolytes within acceptable range, minimum volume overload, BUN slightly elevated from baseline so no indication for emergent hemodialysis.  - We recommend to closely monitoring her electrolytes, strict I/Os, avoid nephrotoxic agents.   -  US/Renal with doppler  - No diuresis for now.     # Blood pressure, volume status:  - BP in normotensive range, minimum volume overload, patient is intubated.   - Anuric so to monitor her volume status closely    # Electrolytes abnormalities, Acid-Base:  - K: 4.3 WNL  - Na: 144 high normal  - BiCarb: 22, no issue  - Continue monitoring,     # Anemia:  - Hgb: 9.3, normocytic anemia  - Likely anemia of chronic disease  - Goal Hgb>8, transfuse by primary team.    # Atherosclerosis, Severe CAD S/P triple vessels CABG surgery:  - Cardiothoracic surgery team on board.  - Patient noticed to have significantly diminished pedis dorsalis, so recommend US/Renal with doppler to rule out renal artery  stenosis.     Recommendations were communicated to primary team via [employer]    Seen and discussed with Dr. April Schwab MD   Nephrology Fellow  [phone]      REASON FOR CONSULT: Anuric NATALIA on CKD stage IIIb    HISTORY OF PRESENT ILLNESS:  Admitting provider and nursing notes reviewed  Wendy Rocha is a 78 year old with PMHx of CKDIII, b/l carotid stenosis, GERD, HTN, CAD s/p ACS who presented on 01/11/2019 to [facility] ER for evaluation. She was diagnosed with a NSTEMI and transferred to [facility] on 1/12/2019  for further management. PCI was performed at [facility] and patient was transferred to [facility]TS for surgical management. Tripple vessel CABG surgery was done on 1/15 complicated by anuric NATALIA when we were consulted for NATALIA on CKD management.     PAST MEDICAL HISTORY:  Reviewed from chart on 01/17/2019   Patient is sedated, intubated.   Past Medical History:   Diagnosis Date     Abnormal ECG      ARF (acute renal failure) (H) Aug 2010    Lisinopril was stopped at that time     Carotid stenosis      Carotid stenosis, bilateral      Carotid stenosis, bilateral     Vs Surgeon: dr Kush Mcmillan, Phone: [phone] fax: [phone]     CKD (chronic kidney disease) stage 3, GFR 30-59 ml/min (H)      GERD (gastroesophageal reflux disease)      HTN, goal below 140/90      Hyperlipidemia LDL goal <100      Lung nodule may 2013    needs f/u may 2014     Muscle aches      Perforated duodenal ulcer (H) Aug 2010     Proteinuria      PVD (peripheral vascular disease) (H)      Renal artery stenosis (H)  April 2011    Right side     Vitamin D deficiency disease        Past Surgical History:   Procedure Laterality Date     ARTHROPLASTY KNEE      left     BYPASS CARDIOPULMONARY N/A 1/15/2019    Procedure: On Cardiopulmonary Bypass;  Surgeon: Haseeb Villegas MD;  Location: [facility] OR     BYPASS GRAFT ARTERY CORONARY MINIMALLY INVASIVE  (NO PUMP) N/A 1/15/2019    Procedure: Median Sternotomy, Coronary  Artery Bypass Graft x3, Endovein [place] of Left Greater Saphenous Vein, Left Internal Mammary Artery Takedown;  Surgeon: Haseeb Villegas MD;  Location: [facility] OR     EYE SURGERY      cataracts     LAPAROSCOPIC TUBAL LIGATION       TRANSESOPHAGEAL ECHOCARDIOGRAM INTRAOPERATIVE  1/15/2019    Procedure: Intraoperative Transesophageal Echocardiogram;  Surgeon: Haseeb Villegas MD;  Location: [facility] OR        MEDICATIONS:  PTA Meds  Prior to Admission medications    Medication Sig Last Dose Taking? Auth Provider   ACE/ARB NOT PRESCRIBED, INTENTIONAL, ACE & ARB not prescribed due to Worsening renal function on ACE/ARB therapy   Natalee Owens MD   acetaminophen (TYLENOL) 325 MG tablet Take 2 tablets (650 mg) by mouth every 6 hours as needed for mild pain   Natalee Owens MD   amLODIPine (NORVASC) 10 MG tablet TAKE 1 TABLET BY MOUTH ONCE DAILY   Natalee Owens MD   ASPIRIN NOT PRESCRIBED (INTENTIONAL) Antiplatelet medication not prescribed intentionally due to Plavix   Natalee Owens MD   busPIRone (BUSPAR) 5 MG tablet TAKE 1 TABLET BY MOUTH TWICE DAILY FOR ANXIETY   Natalee Owens MD   clopidogrel (PLAVIX) 75 MG tablet TAKE 1 TABLET BY MOUTH ONCE DAILY   Natalee Owens MD   doxazosin (CARDURA) 4 MG tablet TAKE 1 TABLET BY MOUTH AT BEDTIME   Natalee Owens MD   Hydrocortisone Acetate 2.5 % CREA Externally apply 1 Application topically 2 times daily Apply to both arms twice a day   Natalee Owens MD   metoprolol tartrate (LOPRESSOR) 50 MG tablet TAKE ONE TABLET BY MOUTH TWICE DAILY   Natalee Owens MD   metoprolol tartrate (LOPRESSOR) 50 MG tablet Take 0.5 tablets (25 mg) by mouth 2 times daily   Natalee Owens MD   ondansetron (ZOFRAN) 8 MG tablet Take 1 tablet (8 mg) by mouth every 8 hours as needed for nausea   Natalee Owens MD   order for DME  Machine to dispense the medication   Natalee Owens MD   pantoprazole (PROTONIX) 40 MG EC tablet TAKE ONE TABLET BY MOUTH ONCE DAILY 30-60  MINUTES  BEFORE  A  MEAL   Natalee Owens MD   simvastatin (ZOCOR) 40 MG tablet Take 1 tablet (40 mg) by mouth At Bedtime   Natalee Owens MD   traZODone (DESYREL) 50 MG tablet Take 1 tablet (50 mg) by mouth nightly as needed for sleep   Natalee Owens MD      Current Meds    aspirin  81 mg Oral or Feeding Tube Daily     busPIRone  5 mg Oral or Feeding Tube BID     heparin  5,000 Units Subcutaneous Q8H FAISAL     insulin aspart   Subcutaneous TID w/meals     [START ON 1/19/2019] levofloxacin  500 mg Intravenous Q48H     multivitamins w/minerals  15 mL Per Feeding Tube Daily     [START ON 1/18/2019] pantoprazole (PROTONIX) IV  40 mg Intravenous BID     polyethylene glycol  17 g Oral or Feeding Tube Daily     protein modular  1 packet Per Feeding Tube Daily     senna-docusate  1 tablet Oral or Feeding Tube BID    Or     senna-docusate  2 tablet Oral or Feeding Tube BID     simvastatin  40 mg Oral or Feeding Tube At Bedtime     sodium chloride (PF)  3 mL Intracatheter Q8H     Infusion Meds    dexmedetomidine Stopped (01/16/19 1530)     IV fluid REPLACEMENT ONLY       IV fluid REPLACEMENT ONLY       EPINEPHrine IV infusion ADULT 0.01 mcg/kg/min (01/17/19 0933)     insulin (regular) 2.5 Units/hr (01/17/19 1800)     norepinephrine Stopped (01/16/19 0412)     propofol (DIPRIVAN) infusion 30 mcg/kg/min (01/17/19 1934)     BETA BLOCKER NOT PRESCRIBED         ALLERGIES:    Allergies   Allergen Reactions     Lisinopril      She developed ARF       REVIEW OF SYSTEMS:  A comprehensive of systems was negative except as noted above.    SOCIAL HISTORY:   Social History     Socioeconomic History     Marital status:      Spouse name: Not on file     Number of children: Not on file     Years of education: Not on file      Highest education level: Not on file   Social Needs     Financial resource strain: Not on file     Food insecurity - worry: Not on file     Food insecurity - inability: Not on file     Transportation needs - medical: Not on file     Transportation needs - non-medical: Not on file   Occupational History     Not on file   Tobacco Use     Smoking status: Former Smoker     Last attempt to quit: 2010     Years since quittin.4     Smokeless tobacco: Never Used   Substance and Sexual Activity     Alcohol use: Yes     Comment: 1 x per year on a holidy     Drug use: No     Sexual activity: No   Other Topics Concern     Parent/sibling w/ CABG, MI or angioplasty before 65F 55M? Not Asked   Social History Narrative     Not on file     Reviewed from chart on 2019   Patient is sedated, intubated.   daughter accompanies Wendy Rocha in hospital room    FAMILY MEDICAL HISTORY:   Family History   Problem Relation Age of Onset     Neurologic Disorder Brother 70        Parkinson's     Heart Disease Mother          90yo      Hypertension Father          46yo MVA     Family History Negative Sister      Cerebrovascular Disease Sister         Has had 2      Family History Negative Sister      Family History Negative Son      Family History Negative Son      Family History Negative Daughter      Family History Negative Daughter      Family History Negative Daughter      Reviewed from chart on 2019   Patient is sedated, intubated.     PHYSICAL EXAM:   Temp  Av.5  F (36.9  C)  Min: 93.9  F (34.4  C)  Max: 100.9  F (38.3  C)  Arterial Line BP  Min: 84/37  Max: 158/73  Arterial Line MAP (mmHg)  Av.6 mmHg  Min: 59 mmHg  Max: 105 mmHg      Pulse  Av.2  Min: 55  Max: 104 Resp  Av  Min: 9  Max: 33  FiO2 (%)  Av.4 %  Min: 50 %  Max: 80 %  SpO2  Av.1 %  Min: 88 %  Max: 100 %    CVP (mmHg): 14 mmHg  /64   Pulse 104   Temp 99.7  F (37.6  C) (Pulmonary Artery)   Resp 18   Wt  73.1 kg (161 lb 2.5 oz)   SpO2 95%   BMI 28.55 kg/m     Date 01/17/19 0700 - 01/18/19 0659   Shift 3976-2126 3416-1641 3052-2595 24 Hour Total   INTAKE   I.V. 211 132.1  343.1   NG/ 30  180   Enteral 190 150  340   Shift Total(mL/kg) 551(7.54) 312.1(4.27)  863.1(11.81)   OUTPUT   Urine 123 50  173   Emesis/NG output 300 200  500   Chest Tube(mL/kg) 180(2.46) 80(1.09)  260(3.56)   Shift Total(mL/kg) 603(8.25) 330(4.51)  933(12.76)   Weight (kg) 73.1 73.1 73.1 73.1      Admit Weight: 71.8 kg (158 lb 4.6 oz)     GENERAL APPEARANCE: Sedated, intubated.   EYES: No scleral icterus, pupils equal  Endo: no goiter, no moon facies  Lymphatics: no cervical or supraclavicular LAD  Pulmonary: lungs clear to auscultation anteriorly B/L.  CV: regular rhythm, normal rate, no rub   - JVD not elevated   - Edema 1+ to lower extrs  GI: soft, nontender, normal bowel sounds  MS: no evidence of inflammation in joints, no muscle tenderness  : fong in place, no urine  SKIN: no rash, warm, dry, no cyanosis  NEURO: Sedated, intubated    LABS:   CMP  Recent Labs   Lab 01/17/19  1605 01/17/19  1115 01/17/19  0400 01/16/19  2025 01/16/19  0350  01/15/19  2146 01/15/19  2000    144 144  --  144   < > 144 142   POTASSIUM 4.3 4.3 4.5 4.9 4.4   < > 3.6 3.5   CHLORIDE 107 107 110*  --  109   < > 109 109   CO2 23 22 21  --  18*   < > 21 19*   ANIONGAP 14 15* 14  --  17*   < > 14 14   * 114* 111*  --  137*   < > 166* 150*   BUN 69* 64* 56*  --  46*   < > 45* 45*   CR 4.66* 4.50* 3.81*  --  2.69*   < > 2.26* 2.18*   GFRESTIMATED 8* 9* 10*  --  16*   < > 20* 21*   GFRESTBLACK 10* 10* 12*  --  19*   < > 23* 24*   JEFFREY 7.4* 7.6* 7.1*  --  7.8*   < > 8.1* 8.4*   MAG 2.9*  --   --  2.9* 2.7*  --   --  3.1*   PHOS 6.6*  --  7.0* 6.4* 5.5*  --   --  6.3*   PROTTOTAL  --   --  5.3*  --  5.6*  --  5.2* 5.3*   ALBUMIN  --   --  2.7*  --  2.8*  --  2.9* 2.7*   BILITOTAL  --   --  0.5  --  1.0  --  1.2 0.9   ALKPHOS  --   --  41  --  40  --   40 44   AST  --   --  50*  --  55*  --  57* 62*   ALT  --   --  10  --  22  --  25 23    < > = values in this interval not displayed.     CBC  Recent Labs   Lab 01/17/19  1115 01/17/19  0400 01/17/19  0008 01/16/19 2025   HGB 9.3* 9.4* 9.6* 7.4*   WBC 10.8 8.7 8.7 8.0   RBC 3.20* 3.27* 3.31* 2.47*   HCT 28.4* 28.8* 29.0* 22.1*   MCV 89 88 88 90   MCH 29.1 28.7 29.0 30.0   MCHC 32.7 32.6 33.1 33.5   RDW 15.9* 15.6* 15.3* 15.0   * 107* 102* 119*     INR  Recent Labs   Lab 01/16/19  0350 01/15/19  2350 01/15/19  2146 01/15/19  2000   INR 0.93 1.61* 1.58* 1.55*   PTT 29 36 36 33     ABG  Recent Labs   Lab 01/17/19  0758 01/17/19  0623 01/17/19  0400 01/16/19  2335  01/16/19 2025   PH  --  7.40 7.41 7.42  --  7.38   PCO2  --  35 37 38  --  32*   PO2  --  94 70* 68*  --  109*   HCO3  --  22 23 25  --  19*   O2PER 50 60 50  50 50  50   < > 50.0    < > = values in this interval not displayed.      URINE STUDIES  Recent Labs   Lab Test 01/16/19  1725 01/15/19  1157 01/11/19  2157 10/05/17  1651   COLOR Yellow Light Yellow Yellow Yellow   APPEARANCE Clear Clear Slightly Cloudy Slightly Cloudy   URINEGLC 30* Negative Negative Negative   URINEBILI Negative Negative Negative Negative   URINEKETONE 5* Negative 5* 5*   SG 1.016 1.009 1.023 1.012   UBLD Small* Negative Negative Moderate*   URINEPH 5.5 5.0 6.0 5.0   PROTEIN 100* 10* 100* 30*   NITRITE Negative Negative Negative Positive*   LEUKEST Negative Moderate* Large* Trace*   RBCU 1 1 4* 72*   WBCU 1 9* 69* 5*     No lab results found.  PTH  Recent Labs   Lab Test 01/27/15  1218   PTHI 112*     IRON STUDIES  Recent Labs   Lab Test 01/27/15  1218 07/11/12  0804   IRON 74 80    311   IRONSAT 21 26   ORQUIDEA 40 67       IMAGING:  All imaging studies reviewed by me.     Hany Schwab MD   Nephrology Fellow  Pager: [phone]  I, Simon Chen, saw this patient on 01/17/2019 with Dr. Trev Crook and agree with the findings and plan of care as  documented in the note.

## 2019-01-18 NOTE — PLAN OF CARE
Discharge Planner OT   Patient plan for discharge: unable to discuss  Current status: pt. Intubated/no command following noted. Pt. Tolerated UE/LE PROM well with VSS throughout on PEEP 5, Fio2 40%, BP 150s/60s, HR 70s.  Barriers to return to prior living situation: medical readiness  Recommendations for discharge: rehab  Rationale for recommendations: increase activity barney./strength to max. Safety/indep. With ADLs/mobility.       Entered by: Helen Mathur 01/18/2019 10:36 AM

## 2019-01-19 NOTE — PROGRESS NOTES
CTVS PROGRESS NOTE          1/19/19   CO-MORBIDITIES:   CKDIII, b/l carotid stenosis, GERD, HTN, CAD       ASSESSMENT:   Aj is a 78 year old female with pmh CKDIII, b/l carotid stenosis, GERD, HTN, CAD s/p ACS transferred from [facility] [place] with severe multi-vessel disease who is now pod 2 for three vessel CABG with Dr. Villegas      PLANS FOR TODAY  Sliding scale insulin   Nephrology would like to wait another day before dialysis   Increase BP goals to <160  Add precedex   Increase metoprolol to 25 bid   Amiodarone infusion started      PLANS:   Neuro/ pain/ sedation:  -continued altered mental status   -CT head 1/18: normal  -po oxycodone prn, scheduled tylenol   -precedex for sedation and agitation   -urine tox: positive for benzos   -restart PTA buspirone     Pulmonary care:   -Supplemental oxygen to keep saturation above 92 %.  - plan to extubate when mentation and ventilation appropriate   -continue PST   -Aggressive pulmonary toilet once extubated      Cardiovascular:    -Monitor hemodynamic status.   -Ventricular pacing at 80. C/w at current settings, cap pacing wires 1/17, will leave in 1/18  -1/18: amlodipine 5mg, lopressor 12.5 bid   -prn hydralazine for systolic >140  -1/19: episode of A. Fib overnight; rate controled with metoprolol, started amiodarone infusion       GI care:   -NPO, advance diet as tolerated once extubated  -C/w NGT  - Bowel regimen       Fluids/ Electrolytes/ Nutrition:   - ICU electrolyte replacement protocol     Renal/ Fluid Balance:    -chronic kidney disease III   -Urine output is minimal, nephrology consulted, appreciate recs   -Will continue to monitor intake and output.      Endocrine:    -Sliding scale insulin       ID/ Antibiotics:  -Klebsiella UTI diagnosed on 1/11.  -levofloxacin started due to resistance        Heme:     -Hemoglobin stable.   -continue to monitor       Prophylaxis:    -Mechanical prophylaxis for DVT.   -subcutaneous heparin       Lines/ tubes/ drains:  -NGT, ETT,  RIJ CVC, Chest tubes x2, Chen, L radial art line, PIV      Disposition:  -CVICU        OBJECTIVE:     TODAY'S PROGRESS:   SUBJECTIVE:   A fib overnight with metoprolol IV given x2         Vitals:  Temp:  [97.8  F (36.6  C)-98.8  F (37.1  C)] 98.6  F (37  C)  Heart Rate:  [] 96  Resp:  [14-32] 30  BP: (132-145)/(67-71) 132/67  MAP:  [74 mmHg-101 mmHg] 81 mmHg  Arterial Line BP: (108-159)/(49-76) 123/62  FiO2 (%):  [40 %] 40 %  SpO2:  [95 %-100 %] 98 %    Physical Exam:    Gen: Intubated/ams no sedation   CV: RRR, no murmurs, rubs or gallops   Pulm: Lungs CTA, no wheezing or rhonchi  Abd: Soft, NT, ND, +BS  Ext: no LE edema  Neuro: nonfocal, moves all extremities  Incision: c/d/i, no erythema, sternum stable  Tubes/drains: Dressing clean and dry, output remained stable overnight, no air leak.         I&O's:  I/O last 3 completed shifts:  In: 1616.48 [I.V.:226.48; NG/GT:430]  Out: 1865 [Urine:415; Emesis/NG output:1150; Chest Tube:300]    Ventilator Settings:  Ventilation Mode: (S) CMV/AC  (Continuous Mandatory Ventilation/ Assist Control)  FiO2 (%): 40 %  Rate Set (breaths/minute): 14 breaths/min  Tidal Volume Set (mL): 470 mL  PEEP (cm H2O): 5 cmH2O  Pressure Support (cm H2O): 7 cmH2O  Oxygen Concentration (%): 40 %  Resp: 30      Labs:   BMP  Recent Labs   Lab 01/19/19  0456 01/18/19  2107 01/18/19  1449 01/18/19  0328    142 142 143   POTASSIUM 4.2 4.4 4.3 4.5   CHLORIDE 106 105 105 105   JEFFREY 7.7* 7.8* 7.9* 7.7*   CO2 19* 20 20 24   BUN 96* 90* 85* 80*   CR 6.11* 5.92* 5.64* 5.17*   * 128* 137* 129*     CBC  Recent Labs   Lab 01/19/19  0456 01/18/19  2107 01/18/19  1449 01/18/19  0328   WBC 9.5 10.5 10.9 9.3   RBC 3.08* 3.11* 3.09* 3.06*   HGB 8.8* 9.0* 8.9* 8.9*   HCT 27.4* 27.8* 27.6* 27.5*   MCV 89 89 89 90   MCH 28.6 28.9 28.8 29.1   MCHC 32.1 32.4 32.2 32.4   RDW 16.3* 16.3* 16.3* 16.3*   * 110* 105* 99*     INR  Recent Labs   Lab 01/16/19  0350 01/15/19  2350 01/15/19  9781  01/15/19  2000   INR 0.93 1.61* 1.58* 1.55*      Hepatic Panel   Recent Labs   Lab 01/17/19  0400 01/16/19  0350 01/15/19  2146 01/15/19  2000   AST 50* 55* 57* 62*   ALT 10 22 25 23   ALKPHOS 41 40 40 44   BILITOTAL 0.5 1.0 1.2 0.9   ALBUMIN 2.7* 2.8* 2.9* 2.7*     Glucose  Recent Labs   Lab 01/19/19  1304 01/19/19  0929 01/19/19  0623 01/19/19  0503 01/19/19  0456 01/19/19  0241 01/19/19  0122  01/18/19  2107  01/18/19  1449  01/18/19  0328  01/17/19  1605  01/17/19  1115   GLC  --   --   --   --  146*  --   --   --  128*  --  137*  --  129*  --  121*  --  114*   * 172* 127* 147*  --  109* 117*   < >  --    < >  --    < >  --    < >  --    < >  --     < > = values in this interval not displayed.     Blood Gas  Recent Labs   Lab 01/18/19  0806 01/18/19  0320 01/18/19  0000 01/17/19  2037  01/17/19  0623   PH  --  7.39 7.38 7.45  --  7.40   PCO2  --  38 38 33*  --  35   PO2  --  89 97 81  --  94   HCO3  --  23 22 23  --  22   O2PER 40.0 50.0  50.0 50.0  50.0 50  50   < > 60    < > = values in this interval not displayed.       Imaging:   No results found for this or any previous visit (from the past 24 hour(s)).    Discussed with ctvs fellow.     Lani Dc MD

## 2019-01-19 NOTE — PROGRESS NOTES
CV ICU PROGRESS NOTE          1/19/19   CO-MORBIDITIES:   CKDIII, b/l carotid stenosis, GERD, HTN, CAD       ASSESSMENT:   Aj is a 78 year old female with pmh CKDIII, b/l carotid stenosis, GERD, HTN, CAD s/p ACS transferred from [facility] [place] with severe multi-vessel disease who is now pod 2 for three vessel CABG with Dr. Villegas      PLANS FOR TODAY  Sliding scale insulin   Nephrology would like to wait another day before dialysis   Increase BP goals to <160  Add precedex   Increase metoprolol to 25 bid   Amiodarone infusion started      PLANS:   Neuro/ pain/ sedation:  -continued altered mental status   -CT head 1/18: normal  -po oxycodone prn, scheduled tylenol   -precedex for sedation and agitation   -urine tox: positive for benzos   -restart PTA buspirone     Pulmonary care:   -Supplemental oxygen to keep saturation above 92 %.  - plan to extubate when mentation and ventilation appropriate   -continue PST   -Aggressive pulmonary toilet once extubated      Cardiovascular:    -Monitor hemodynamic status.   -Ventricular pacing at 80. C/w at current settings, cap pacing wires 1/17, will leave in 1/18  -1/18: amlodipine 5mg, lopressor 12.5 bid   -prn hydralazine for systolic >140  -1/19: episode of A. Fib overnight; rate controled with metoprolol, started amiodarone infusion       GI care:   -NPO, advance diet as tolerated once extubated  -C/w NGT  - Bowel regimen       Fluids/ Electrolytes/ Nutrition:   - ICU electrolyte replacement protocol     Renal/ Fluid Balance:    -chronic kidney disease III   -Urine output is minimal, nephrology consulted, appreciate recs   -Will continue to monitor intake and output.      Endocrine:    -Sliding scale insulin       ID/ Antibiotics:  -Klebsiella UTI diagnosed on 1/11.  -levofloxacin started due to resistance        Heme:     -Hemoglobin stable.   -continue to monitor       Prophylaxis:    -Mechanical prophylaxis for DVT.   -subcutaneous heparin       Lines/ tubes/ drains:  -NGT,  ETT, RIJ CVC, Chest tubes x2, Chen, L radial art line, PIV      Disposition:  -CVICU        OBJECTIVE:     TODAY'S PROGRESS:   SUBJECTIVE:   A fib overnight with metoprolol IV given x2         Vitals:  Temp:  [97.8  F (36.6  C)-98.8  F (37.1  C)] 98.6  F (37  C)  Heart Rate:  [] 96  Resp:  [14-32] 30  BP: (132-145)/(67-71) 132/67  MAP:  [74 mmHg-101 mmHg] 81 mmHg  Arterial Line BP: (108-159)/(49-76) 123/62  FiO2 (%):  [40 %] 40 %  SpO2:  [95 %-100 %] 98 %    Physical Exam:    Gen: Intubated/ams no sedation   CV: RRR, no murmurs, rubs or gallops   Pulm: Lungs CTA, no wheezing or rhonchi  Abd: Soft, NT, ND, +BS  Ext: no LE edema  Neuro: nonfocal, moves all extremities  Incision: c/d/i, no erythema, sternum stable  Tubes/drains: Dressing clean and dry, output remained stable overnight, no air leak.         I&O's:  I/O last 3 completed shifts:  In: 1616.48 [I.V.:226.48; NG/GT:430]  Out: 1865 [Urine:415; Emesis/NG output:1150; Chest Tube:300]    Ventilator Settings:  Ventilation Mode: (S) CMV/AC  (Continuous Mandatory Ventilation/ Assist Control)  FiO2 (%): 40 %  Rate Set (breaths/minute): 14 breaths/min  Tidal Volume Set (mL): 470 mL  PEEP (cm H2O): 5 cmH2O  Pressure Support (cm H2O): 7 cmH2O  Oxygen Concentration (%): 40 %  Resp: 30      Labs:   BMP  Recent Labs   Lab 01/19/19  0456 01/18/19  2107 01/18/19  1449 01/18/19  0328    142 142 143   POTASSIUM 4.2 4.4 4.3 4.5   CHLORIDE 106 105 105 105   JEFFREY 7.7* 7.8* 7.9* 7.7*   CO2 19* 20 20 24   BUN 96* 90* 85* 80*   CR 6.11* 5.92* 5.64* 5.17*   * 128* 137* 129*     CBC  Recent Labs   Lab 01/19/19  0456 01/18/19  2107 01/18/19  1449 01/18/19  0328   WBC 9.5 10.5 10.9 9.3   RBC 3.08* 3.11* 3.09* 3.06*   HGB 8.8* 9.0* 8.9* 8.9*   HCT 27.4* 27.8* 27.6* 27.5*   MCV 89 89 89 90   MCH 28.6 28.9 28.8 29.1   MCHC 32.1 32.4 32.2 32.4   RDW 16.3* 16.3* 16.3* 16.3*   * 110* 105* 99*     INR  Recent Labs   Lab 01/16/19  0350 01/15/19  2350 01/15/19  1817  01/15/19  2000   INR 0.93 1.61* 1.58* 1.55*      Hepatic Panel   Recent Labs   Lab 01/17/19  0400 01/16/19  0350 01/15/19  2146 01/15/19  2000   AST 50* 55* 57* 62*   ALT 10 22 25 23   ALKPHOS 41 40 40 44   BILITOTAL 0.5 1.0 1.2 0.9   ALBUMIN 2.7* 2.8* 2.9* 2.7*     Glucose  Recent Labs   Lab 01/19/19  1304 01/19/19  0929 01/19/19  0623 01/19/19  0503 01/19/19  0456 01/19/19  0241 01/19/19  0122  01/18/19  2107  01/18/19  1449  01/18/19  0328  01/17/19  1605  01/17/19  1115   GLC  --   --   --   --  146*  --   --   --  128*  --  137*  --  129*  --  121*  --  114*   * 172* 127* 147*  --  109* 117*   < >  --    < >  --    < >  --    < >  --    < >  --     < > = values in this interval not displayed.     Blood Gas  Recent Labs   Lab 01/18/19  0806 01/18/19  0320 01/18/19  0000 01/17/19  2037  01/17/19  0623   PH  --  7.39 7.38 7.45  --  7.40   PCO2  --  38 38 33*  --  35   PO2  --  89 97 81  --  94   HCO3  --  23 22 23  --  22   O2PER 40.0 50.0  50.0 50.0  50.0 50  50   < > 60    < > = values in this interval not displayed.       Imaging:   No results found for this or any previous visit (from the past 24 hour(s)).    Discussed with icu staff.     Lani Dc MD

## 2019-01-19 NOTE — PROGRESS NOTES
Nephrology Progress Note  01/18/2019         Today Recommendations:  - Continue monitoring UOP  - Ok for gently diuresis for volume overload.   - No indication for emergent hemodialysis today, continue monitoring closely.     Assessment & Recommendations:   Wendy Rocha is a 78 year old female with pmh CKDIII, b/l carotid stenosis, GERD, HTN, CAD s/p ACS transferred from [facility] [place] with severe multi-vessel disease who had tripple vessel CABG surgery on 1/15 complicated by anuric NATALIA.      # Oliguric NATALIA on CKD stage IIIb:  - Patient was transferred with Crea~2.2 and GFR in 20s.  - CABG surgery complicated by anuric NATALIA and Crea has elevated gradually.  - NATALIA etiology: pre-renal azotemia due to hypotension episode during surgery vs renovascular disease vs ATN.  - CKD etiology: likely due to renovascular disease/renal artery stenosis causes left kidney atrophy which compatible with her severe atherosclerosis disease, but still can not rule out congential left kidney atrophy as we do not have previous renal ultrasound to compare.    - Patient today has electrolytes within acceptable range, minimum volume overload, chest X ray this morning shows mild pulmonary congestion, BUN elevates gradually.  - no indication for emergent hemodialysis.  - We recommend to closely monitoring her electrolytes, strict I/Os, avoid nephrotoxic agents.   -  US/Renal with doppler: Left kidney 6.7 cm in length, atrophic. Diffusely increased echogenicity. No focal mass. No hydronephrosis.  Right kidney: 10.4 cm, Parenchyma is of normal thickness and echogenicity. No focal mass. No hydronephrosis.  - No diuresis for now.   - Patient started makes some urine today afternoon~15 ml/hour, to continue monitoring.      # Blood pressure, volume status:  - BP in normotensive range, minimum volume overload, patient is intubated.   - Anuric so to monitor her volume status closely     # Electrolytes abnormalities, Acid-Base:  - K: 4.5 WNL  - Na: 142 high  normal  - BiCarb: 20, low normal, no issue  - Continue monitoring,      # Anemia:  - Hgb: 8.8, normocytic anemia  - Likely anemia of chronic disease  - Goal Hgb>8, transfuse by primary team.     # Atherosclerosis, Severe CAD S/P triple vessels CABG surgery:  - Cardiothoracic surgery team on board.  - Patient noticed to have significantly diminished pedis dorsalis, US/Renal shows left kidney atrophy likely due to renal artery atherosclerosis causes renal artery stenosis.       Recommendations were communicated to primary team via verbal.      Seen and discussed with Dr. April Schwab MD   Nephrology Fellow  [phone]      Interval History :   Nursing and provider notes from last 24 hours reviewed.  Patient was seen and examined at bedside afternoon. Patient is intubated, off sedation since this morning. UOP~15 ml/hour afternoon, light yellow urine was noticed, no indication for acute hemodialysis today.     Review of Systems:   Not able to obtain, patient is intubated.     Physical Exam:   I/O last 3 completed shifts:  In: 1821.3 [I.V.:541.3; NG/GT:330]  Out: 1436 [Urine:491; Emesis/NG output:625; Chest Tube:320]   /64   Pulse 104   Temp 97.9  F (36.6  C) (Axillary)   Resp 24   Wt 74.3 kg (163 lb 12.8 oz)   SpO2 97%   BMI 29.02 kg/m       GENERAL APPEARANCE: Intubated  EYES:  No scleral icterus, pupils equal  HENT: mouth without ulcers or lesions  PULM: Mild rales to lung base B/L  CV: Irregular rhythm, normal rate, no rub, chest tube in place, pedis dorsalis pulse diminished B/L      -JVD not elevated     -edema trace to lower extrs  GI: soft, non distended, bowel sounds are positive  INTEGUMENT: no cyanosis, no rash  NEURO: Not able to evaluate     Labs:   All labs reviewed by me  Electrolytes/Renal -   Recent Labs   Lab Test 01/18/19  1449 01/18/19  0328 01/17/19  1605  01/17/19  0400 01/16/19  2025 01/16/19  0350    143 144   < > 144  --  144   POTASSIUM 4.3 4.5 4.3   < > 4.5  4.9 4.4   CHLORIDE 105 105 107   < > 110*  --  109   CO2 20 24 23   < > 21  --  18*   BUN 85* 80* 69*   < > 56*  --  46*   CR 5.64* 5.17* 4.66*   < > 3.81*  --  2.69*   * 129* 121*   < > 111*  --  137*   JEFFREY 7.9* 7.7* 7.4*   < > 7.1*  --  7.8*   MAG  --   --  2.9*  --   --  2.9* 2.7*   PHOS  --  7.1* 6.6*  --  7.0* 6.4* 5.5*    < > = values in this interval not displayed.       CBC -   Recent Labs   Lab Test 01/18/19  1449 01/18/19  0328 01/17/19  2037   WBC 10.9 9.3 10.1   HGB 8.9* 8.9* 9.0*   * 99* 101*       LFTs -   Recent Labs   Lab Test 01/17/19  0400 01/16/19  0350 01/15/19  2146   ALKPHOS 41 40 40   BILITOTAL 0.5 1.0 1.2   ALT 10 22 25   AST 50* 55* 57*   PROTTOTAL 5.3* 5.6* 5.2*   ALBUMIN 2.7* 2.8* 2.9*       Iron Panel -   Recent Labs   Lab Test 01/27/15  1218 07/11/12  0804   IRON 74 80   IRONSAT 21 26   ORQUIDEA 40 67         Imaging:  All imaging studies reviewed by me.     Current Medications:    amLODIPine  5 mg Oral or Feeding Tube Daily     aspirin  325 mg Oral or Feeding Tube Daily     busPIRone  5 mg Oral or Feeding Tube BID     heparin  5,000 Units Subcutaneous Q8H FAISAL     insulin aspart   Subcutaneous TID w/meals     [START ON 1/19/2019] levofloxacin  500 mg Intravenous Q48H     metoprolol tartrate  12.5 mg Oral or Feeding Tube BID     multivitamins w/minerals  15 mL Per Feeding Tube Daily     pantoprazole (PROTONIX) IV  40 mg Intravenous BID     polyethylene glycol  17 g Oral or Feeding Tube Daily     protein modular  1 packet Per Feeding Tube Daily     senna-docusate  1 tablet Oral or Feeding Tube BID    Or     senna-docusate  2 tablet Oral or Feeding Tube BID     simvastatin  40 mg Oral or Feeding Tube At Bedtime     sodium chloride (PF)  3 mL Intracatheter Q8H       IV fluid REPLACEMENT ONLY       IV fluid REPLACEMENT ONLY       insulin (regular) 0.5 Units/hr (01/18/19 1800)     propofol (DIPRIVAN) infusion Stopped (01/18/19 0600)     BETA BLOCKER NOT PRESCRIBED       Hany Karimi  MD [place]   Nephrology Fellow  Pager: [phone]      I, Simon Chen, saw this patient on 01/18/2019 with Dr. Trev Crook and agree with the findings and plan of care as documented in the note.

## 2019-01-19 NOTE — PLAN OF CARE
Adult Inpatient Plan of Care  Plan of Care Review  PT remained off sedation all night.  Anxious at times with RR 30.  Oxycodone give this am for anxiety.  Pt. Does not follow any commands, moves all extremities except left upper extremity.  CVTS updated on this.  CT of head negative yesterday.  Gave multiple doses of metoprolol IV to keep HR less than 120 and hydralazine to keep sbp less than 140.  Also told CVTS this also.  Pt remains having low urine out put.  Around 150 from midnight to 0700.

## 2019-01-19 NOTE — PROGRESS NOTES
CTVS PROGRESS NOTE          1/17/19   CO-MORBIDITIES:   CKDIII, b/l carotid stenosis, GERD, HTN, CAD       ASSESSMENT:   Aj is a 78 year old female with pmh CKDIII, b/l carotid stenosis, GERD, HTN, CAD s/p ACS transferred from [facility] [place] with severe multi-vessel disease who is now pod 2 for three vessel CABG with Dr. Villegas      PLANS FOR TODAY  Sliding scale insulin   Nephrology would like to wait another day before dialysis   Increase BP goals to <160  Add precedex   Increase metoprolol to 25 bid   Amiodarone infusion started      PLANS:   Neuro/ pain/ sedation:  -continued altered mental status   -CT head 1/18: normal  -po oxycodone prn, scheduled tylenol   -precedex for sedation and agitation   -urine tox: positive for benzos   -restart PTA buspirone     Pulmonary care:   -Supplemental oxygen to keep saturation above 92 %.  - plan to extubate when mentation and ventilation appropriate   -continue PST   -Aggressive pulmonary toilet once extubated      Cardiovascular:    -Monitor hemodynamic status.   -Ventricular pacing at 80. C/w at current settings, cap pacing wires 1/17, will leave in 1/18  -1/18: amlodipine 5mg, lopressor 12.5 bid   -prn hydralazine for systolic >140  -1/19: episode of A. Fib overnight; rate controled with metoprolol, started amiodarone infusion       GI care:   -NPO, advance diet as tolerated once extubated  -C/w NGT  - Bowel regimen       Fluids/ Electrolytes/ Nutrition:   - ICU electrolyte replacement protocol     Renal/ Fluid Balance:    -chronic kidney disease III   -Urine output is minimal, nephrology consulted, appreciate recs   -Will continue to monitor intake and output.      Endocrine:    -Sliding scale insulin       ID/ Antibiotics:  -Klebsiella UTI diagnosed on 1/11.  -levofloxacin started due to resistance        Heme:     -Hemoglobin stable.   -continue to monitor       Prophylaxis:    -Mechanical prophylaxis for DVT.   -subcutaneous heparin       Lines/ tubes/ drains:  -NGT, ETT,  RIJ CVC, Chest tubes x2, Chen, L radial art line, PIV      Disposition:  -CVICU        OBJECTIVE:     TODAY'S PROGRESS:   SUBJECTIVE:   A fib overnight with metoprolol IV given x2         Vitals:  Temp:  [97.8  F (36.6  C)-98.8  F (37.1  C)] 98.6  F (37  C)  Heart Rate:  [] 96  Resp:  [14-32] 30  BP: (132-145)/(67-71) 132/67  MAP:  [74 mmHg-101 mmHg] 81 mmHg  Arterial Line BP: (108-159)/(49-76) 123/62  FiO2 (%):  [40 %] 40 %  SpO2:  [95 %-100 %] 98 %    Physical Exam:    Gen: Intubated/ams no sedation   CV: RRR, no murmurs, rubs or gallops   Pulm: Lungs CTA, no wheezing or rhonchi  Abd: Soft, NT, ND, +BS  Ext: no LE edema  Neuro: nonfocal, moves all extremities  Incision: c/d/i, no erythema, sternum stable  Tubes/drains: Dressing clean and dry, output remained stable overnight, no air leak.         I&O's:  I/O last 3 completed shifts:  In: 1616.48 [I.V.:226.48; NG/GT:430]  Out: 1865 [Urine:415; Emesis/NG output:1150; Chest Tube:300]    Ventilator Settings:  Ventilation Mode: (S) CMV/AC  (Continuous Mandatory Ventilation/ Assist Control)  FiO2 (%): 40 %  Rate Set (breaths/minute): 14 breaths/min  Tidal Volume Set (mL): 470 mL  PEEP (cm H2O): 5 cmH2O  Pressure Support (cm H2O): 7 cmH2O  Oxygen Concentration (%): 40 %  Resp: 30      Labs:   BMP  Recent Labs   Lab 01/19/19  0456 01/18/19  2107 01/18/19  1449 01/18/19  0328    142 142 143   POTASSIUM 4.2 4.4 4.3 4.5   CHLORIDE 106 105 105 105   JEFFREY 7.7* 7.8* 7.9* 7.7*   CO2 19* 20 20 24   BUN 96* 90* 85* 80*   CR 6.11* 5.92* 5.64* 5.17*   * 128* 137* 129*     CBC  Recent Labs   Lab 01/19/19  0456 01/18/19  2107 01/18/19  1449 01/18/19  0328   WBC 9.5 10.5 10.9 9.3   RBC 3.08* 3.11* 3.09* 3.06*   HGB 8.8* 9.0* 8.9* 8.9*   HCT 27.4* 27.8* 27.6* 27.5*   MCV 89 89 89 90   MCH 28.6 28.9 28.8 29.1   MCHC 32.1 32.4 32.2 32.4   RDW 16.3* 16.3* 16.3* 16.3*   * 110* 105* 99*     INR  Recent Labs   Lab 01/16/19  0350 01/15/19  2350 01/15/19  6210  01/15/19  2000   INR 0.93 1.61* 1.58* 1.55*      Hepatic Panel   Recent Labs   Lab 01/17/19  0400 01/16/19  0350 01/15/19  2146 01/15/19  2000   AST 50* 55* 57* 62*   ALT 10 22 25 23   ALKPHOS 41 40 40 44   BILITOTAL 0.5 1.0 1.2 0.9   ALBUMIN 2.7* 2.8* 2.9* 2.7*     Glucose  Recent Labs   Lab 01/19/19  1304 01/19/19  0929 01/19/19  0623 01/19/19  0503 01/19/19  0456 01/19/19  0241 01/19/19  0122  01/18/19  2107  01/18/19  1449  01/18/19  0328  01/17/19  1605  01/17/19  1115   GLC  --   --   --   --  146*  --   --   --  128*  --  137*  --  129*  --  121*  --  114*   * 172* 127* 147*  --  109* 117*   < >  --    < >  --    < >  --    < >  --    < >  --     < > = values in this interval not displayed.     Blood Gas  Recent Labs   Lab 01/18/19  0806 01/18/19  0320 01/18/19  0000 01/17/19  2037  01/17/19  0623   PH  --  7.39 7.38 7.45  --  7.40   PCO2  --  38 38 33*  --  35   PO2  --  89 97 81  --  94   HCO3  --  23 22 23  --  22   O2PER 40.0 50.0  50.0 50.0  50.0 50  50   < > 60    < > = values in this interval not displayed.       Imaging:   No results found for this or any previous visit (from the past 24 hour(s)).    Discussed with ctvs fellow.     Lani Dc MD

## 2019-01-20 NOTE — PROGRESS NOTES
Nephrology Progress Note  01/20/2019         Today Recommendations:  - Start HD today, 2 hours, 1 liter removal as tolerated.       Assessment & Recommendations:   Wendy Rocha is a 78 year old female with pmh CKDIII, b/l carotid stenosis, GERD, HTN, CAD s/p ACS [place] [facility] [place] multi-vessel disease who had tripple vessel CABG surgery on 1/15 complicated by anuric NATALIA.      # Oliguric NATALIA on CKD stage IIIb:  - Patient was transferred with Crea~2.2 and GFR in 20s.  - CABG surgery complicated by anuric NATALIA and Crea has elevated gradually.  - NATALIA etiology: pre-renal azotemia due to hypotension episode during surgery vs renovascular disease vs ATN.  - CKD etiology: likely due to renovascular disease/renal artery stenosis causes left kidney atrophy which compatible with her severe atherosclerosis disease, but still can not rule out congential left kidney atrophy as we do not have previous renal ultrasound to compare.    - Patient today has electrolytes within acceptable range, more volume overload than yesterday, BUN continues elevating gradually, this morning 118, Crea continues elevating, this morning 7.11, decision to start with HD today for 2 hours, blood flow 200 ml/min, dialysate rate 300 ml/hour with fluid removal 1 liters as tolerated.  - We recommend to closely monitoring her electrolytes, strict I/Os, avoid nephrotoxic agents.   -  US/Renal with doppler: Left kidney 6.7 cm in length, atrophic. Diffusely increased echogenicity. No focal mass. No hydronephrosis.  Right kidney: 10.4 cm, Parenchyma is of normal thickness and echogenicity. No focal mass. No hydronephrosis.  - No diuresis for now.   - Patient still makes some urine ~15-20 ml/hour, to continue monitoring.      # Blood pressure, volume status:  - BP in normotensive range, worsening volume overload, patient is intubated.   - Oliguric~300-400 ml/day so to monitor her volume status closely     # Electrolytes abnormalities,  Acid-Base:  - K: 4.2 WNL  - Na: 142 WNL  - BiCarb: 20, low normal  - Continue monitoring,      # Anemia:  - Hgb: 8.6, normocytic anemia  - Likely anemia of chronic disease  - Goal Hgb>8, transfuse by primary team.     # Atherosclerosis, Severe CAD S/P triple vessels CABG surgery:  - Cardiothoracic surgery team on board.  - Patient noticed to have significantly diminished pedis dorsalis, US/Renal shows left kidney atrophy likely due to renal artery atherosclerosis causes renal artery stenosis.       Recommendations were communicated to primary team via verbal.      Seen and discussed with Dr. Francois Schwab MD   Nephrology Fellow  [phone]      Interval History :   Nursing and provider notes from last 24 hours reviewed.  Patient was seen and examined at bedside afternoon. Patient is intubated, off sedation. UOP~15-20 ml/hour, oliguric, light yellow urine was noticed, plan to start hemodialysis today.     Review of Systems:   Not able to obtain, patient is intubated.     Physical Exam:   I/O last 3 completed shifts:  In: 1838.64 [I.V.:508.64; NG/GT:370]  Out: 1123 [Urine:303; Emesis/NG output:500; Chest Tube:320]   BP 93/44   Pulse 104   Temp 98.5  F (36.9  C) (Oral)   Resp 16   Wt 71.6 kg (157 lb 13.6 oz)   SpO2 98%   BMI 27.96 kg/m       GENERAL APPEARANCE: Intubated  EYES:  No scleral icterus, pupils equal  HENT: mouth without ulcers or lesions  PULM: Mild rales to lung base B/L  CV: Irregular rhythm, normal rate, no rub, chest tube in place, pedis dorsalis pulse diminished B/L      -JVD mild elevated     -edema +1 to lower extrs  GI: soft, non distended, bowel sounds are positive  INTEGUMENT: no cyanosis, no rash  NEURO: Not able to evaluate     Labs:   All labs reviewed by me  Electrolytes/Renal -   Recent Labs   Lab Test 01/20/19  1513 01/20/19  0424 01/19/19  2100  01/19/19  0456 01/18/19  2107  01/17/19  1605    142 141   < > 143 142   < > 144   POTASSIUM 4.0 4.2 4.4   < > 4.2 4.4    < > 4.3   CHLORIDE 102 104 104   < > 106 105   < > 107   CO2 20 20 21   < > 19* 20   < > 23   * 118* 110*   < > 96* 90*   < > 69*   CR 7.67* 7.17* 6.93*   < > 6.11* 5.92*   < > 4.66*   * 154* 136*   < > 146* 128*   < > 121*   JEFFREY 7.5* 7.4* 7.4*   < > 7.7* 7.8*   < > 7.4*   MAG  --  3.2*  --   --  3.0*  --   --  2.9*   PHOS  --  7.4*  --   --  6.4* 6.9*   < > 6.6*    < > = values in this interval not displayed.       CBC -   Recent Labs   Lab Test 01/20/19  1513 01/20/19  0424 01/19/19  2100   WBC 7.9 7.7 8.3   HGB 8.9* 8.6* 8.7*    142* 130*       LFTs -   Recent Labs   Lab Test 01/17/19  0400 01/16/19  0350 01/15/19  2146   ALKPHOS 41 40 40   BILITOTAL 0.5 1.0 1.2   ALT 10 22 25   AST 50* 55* 57*   PROTTOTAL 5.3* 5.6* 5.2*   ALBUMIN 2.7* 2.8* 2.9*       Iron Panel -   Recent Labs   Lab Test 01/27/15  1218 07/11/12  0804   IRON 74 80   IRONSAT 21 26   ORQUIDEA 40 67         Imaging:  All imaging studies reviewed by me.     Current Medications:    amiodarone  400 mg Oral or Feeding Tube BID     amLODIPine  5 mg Oral or Feeding Tube Daily     aspirin  325 mg Oral or Feeding Tube Daily     atorvastatin  40 mg Oral QPM     busPIRone  5 mg Oral or Feeding Tube BID     heparin  5,000 Units Subcutaneous Q8H FAISAL     insulin aspart  1-6 Units Subcutaneous Q4H     levofloxacin  500 mg Intravenous Q48H     metoprolol tartrate  25 mg Oral or Feeding Tube BID     multivitamins w/minerals  15 mL Per Feeding Tube Daily     pantoprazole (PROTONIX) IV  40 mg Intravenous BID     polyethylene glycol  17 g Oral or Feeding Tube Daily     protein modular  1 packet Per Feeding Tube Daily     senna-docusate  1 tablet Oral or Feeding Tube BID    Or     senna-docusate  2 tablet Oral or Feeding Tube BID     sodium chloride (PF)  3 mL Intracatheter Q8H       dexmedetomidine 0.7 mcg/kg/hr (01/20/19 1700)     IV fluid REPLACEMENT ONLY       IV fluid REPLACEMENT ONLY       BETA BLOCKER NOT PRESCRIBED       Hany Schwab,  MD   Nephrology Fellow  Pager: [phone]

## 2019-01-20 NOTE — PROGRESS NOTES
Nephrology Progress Note  01/19/2019         Today Recommendations:  - Continue monitoring UOP  - Ok for gently diuresis for volume overload.   - No indication for emergent hemodialysis today, continue monitoring closely, if no improvement in kidney function, plan to start HD tomorrow on Sunday.     Assessment & Recommendations:   Wendy Rocha is a 78 year old female with pmh CKDIII, b/l carotid stenosis, GERD, HTN, CAD s/p ACS transferred from [facility] [place] with severe multi-vessel disease who had tripple vessel CABG surgery on 1/15 complicated by anuric NATALIA.      # Oliguric NATALIA on CKD stage IIIb:  - Patient was transferred with Crea~2.2 and GFR in 20s.  - CABG surgery complicated by anuric NATALIA and Crea has elevated gradually.  - NATALIA etiology: pre-renal azotemia due to hypotension episode during surgery vs renovascular disease vs ATN.  - CKD etiology: likely due to renovascular disease/renal artery stenosis causes left kidney atrophy which compatible with her severe atherosclerosis disease, but still can not rule out congential left kidney atrophy as we do not have previous renal ultrasound to compare.    - Patient today has electrolytes within acceptable range, minimum volume overload, chest X ray this morning shows mild pulmonary congestion, BUN continues elevating gradually, this morning 96, primary team were concerned about her mental status changes due to elevated BUN, which is one of the differential diagnosis. As kidney function has showed mild improvement for the past 24 hours and no emergent need for HD today, we recommend to continue monitoring her kidney function till tomorrow, if no improvement then we will start with HD.  - Discussion with her family, daughter and es- that he is her health care proxy, patient had expressed to them in the past that she does not want long term hemodialysis but she agrees for temporary hemodialysis if she needs it at some point. So for now we will proceed with HD if no  kidney function improvement till tomorrow, and if situation turns to be HD dependent then we will have another discussion with the family and primary team and palliative team to set up a clear plan for long term HD especially in her case when providing a long term HD in outpatient facility is challenging with concern of her other comorbidity and quality of life.     - no indication for emergent hemodialysis today.  - We recommend to closely monitoring her electrolytes, strict I/Os, avoid nephrotoxic agents.   -  US/Renal with doppler: Left kidney 6.7 cm in length, atrophic. Diffusely increased echogenicity. No focal mass. No hydronephrosis.  Right kidney: 10.4 cm, Parenchyma is of normal thickness and echogenicity. No focal mass. No hydronephrosis.  - No diuresis for now.   - Patient started makes some urine today afternoon~15-20 ml/hour, to continue monitoring.      # Blood pressure, volume status:  - BP in normotensive range, minimum volume overload, patient is intubated.   - Anuric so to monitor her volume status closely     # Electrolytes abnormalities, Acid-Base:  - K: 4.2 WNL  - Na: 143 high normal  - BiCarb: 19, low, continue monitoring.   - Continue monitoring,      # Anemia:  - Hgb: 8.8, normocytic anemia  - Likely anemia of chronic disease  - Goal Hgb>8, transfuse by primary team.     # Atherosclerosis, Severe CAD S/P triple vessels CABG surgery:  - Cardiothoracic surgery team on board.  - Patient noticed to have significantly diminished pedis dorsalis, US/Renal shows left kidney atrophy likely due to renal artery atherosclerosis causes renal artery stenosis.       Recommendations were communicated to primary team via verbal.      Seen and discussed with Dr. Francois Schwab MD   Nephrology Fellow  [phone]      Interval History :   Nursing and provider notes from last 24 hours reviewed.  Patient was seen and examined at bedside afternoon. Patient is intubated, off sedation. UOP~15-20  ml/hour afternoon, light yellow urine was noticed, no indication for acute hemodialysis today, to re-evaluate tomorrow Sunday morning, if no improvement then to start hemodialysis.     Review of Systems:   Not able to obtain, patient is intubated.     Physical Exam:   I/O last 3 completed shifts:  In: 1803.57 [I.V.:493.57; NG/GT:350]  Out: 1799 [Urine:279; Emesis/NG output:1250; Chest Tube:270]   /65   Pulse 104   Temp 97.4  F (36.3  C) (Axillary)   Resp 26   Wt 74.3 kg (163 lb 12.8 oz)   SpO2 98%   BMI 29.02 kg/m       GENERAL APPEARANCE: Intubated  EYES:  No scleral icterus, pupils equal  HENT: mouth without ulcers or lesions  PULM: Mild rales to lung base B/L  CV: Irregular rhythm, normal rate, no rub, chest tube in place, pedis dorsalis pulse diminished B/L      -JVD not elevated     -edema trace to lower extrs  GI: soft, non distended, bowel sounds are positive  INTEGUMENT: no cyanosis, no rash  NEURO: Not able to evaluate     Labs:   All labs reviewed by me  Electrolytes/Renal -   Recent Labs   Lab Test 01/19/19  2100 01/19/19  1313 01/19/19  0456 01/18/19  2107  01/18/19  0328 01/17/19  1605  01/16/19 2025    143 143 142   < > 143 144   < >  --    POTASSIUM 4.4 4.1 4.2 4.4   < > 4.5 4.3   < > 4.9   CHLORIDE 104 105 106 105   < > 105 107   < >  --    CO2 21 22 19* 20   < > 24 23   < >  --    * 101* 96* 90*   < > 80* 69*   < >  --    CR 6.93* 6.78* 6.11* 5.92*   < > 5.17* 4.66*   < >  --    * 134* 146* 128*   < > 129* 121*   < >  --    JEFFREY 7.4* 7.7* 7.7* 7.8*   < > 7.7* 7.4*   < >  --    MAG  --   --  3.0*  --   --   --  2.9*  --  2.9*   PHOS  --   --  6.4* 6.9*  --  7.1* 6.6*   < > 6.4*    < > = values in this interval not displayed.       CBC -   Recent Labs   Lab Test 01/19/19  2100 01/19/19  1313 01/19/19  0456   WBC 8.3 10.0 9.5   HGB 8.7* 8.8* 8.8*   * 134* 121*       LFTs -   Recent Labs   Lab Test 01/17/19  0400 01/16/19  0350 01/15/19  2146   ALKPHOS 41 40 40    BILITOTAL 0.5 1.0 1.2   ALT 10 22 25   AST 50* 55* 57*   PROTTOTAL 5.3* 5.6* 5.2*   ALBUMIN 2.7* 2.8* 2.9*       Iron Panel -   Recent Labs   Lab Test 01/27/15  1218 07/11/12  0804   IRON 74 80   IRONSAT 21 26   ORQUIDEA 40 67         Imaging:  All imaging studies reviewed by me.     Current Medications:    amLODIPine  5 mg Oral or Feeding Tube Daily     aspirin  325 mg Oral or Feeding Tube Daily     atorvastatin  40 mg Oral QPM     busPIRone  5 mg Oral or Feeding Tube BID     heparin  5,000 Units Subcutaneous Q8H FAISAL     insulin aspart  1-6 Units Subcutaneous Q4H     levofloxacin  500 mg Intravenous Q48H     metoprolol tartrate  25 mg Oral or Feeding Tube BID     multivitamins w/minerals  15 mL Per Feeding Tube Daily     pantoprazole (PROTONIX) IV  40 mg Intravenous BID     polyethylene glycol  17 g Oral or Feeding Tube Daily     protein modular  1 packet Per Feeding Tube Daily     senna-docusate  1 tablet Oral or Feeding Tube BID    Or     senna-docusate  2 tablet Oral or Feeding Tube BID     sodium chloride (PF)  3 mL Intracatheter Q8H       amiodarone 0.5 mg/min (01/19/19 2200)     dexmedetomidine 0.3 mcg/kg/hr (01/19/19 2226)     IV fluid REPLACEMENT ONLY       IV fluid REPLACEMENT ONLY       BETA BLOCKER NOT PRESCRIBED       Hany Schwab MD   Nephrology Fellow  Pager: [phone]

## 2019-01-20 NOTE — PLAN OF CARE
POD #4 CABG 3v.     Neuro: PT is restless but unable to follow commands. Able to PINEDA minus minimal activity of left arm. Precedex gtts on for agitation.   CV: A fib 's, Amio gtts off and started PO Amio. BP stable, afebrile. VVI pacer wires connected. 2 pleural CT's, right greater than left output.   Pulm: CMV settings 40%, 470 TV,14 RR, 5 peep. PS this AM LS coarse. Minimal secretions.  GI: NJ in place with TF at goal. OG to LIS with no output. No BM.   : Chen in place with about 5-10 ml/hr, 4 mg Bumex given. Going to do first HD run today.  IV: Right internal jugular exchanged for HD line. Left a-line, 2 PIV's.   Gtts: Precedex  Pain: No PRN's given.     Plan: Continue to monitor Neuro status. HR run today.

## 2019-01-20 NOTE — PROGRESS NOTES
Nephrology staff note:    79 y/o female with a h/o CKD stage 3, HTN, b/l carotid stenosis, CAD with severe multivessel disease is now POD 2 s/p 3 vessel CABG.   Patient has long standing CKD likely 2/2 vascular disease and rt solitary kidney (atrophic lt kidney). She had a baseline Cr of ~ 1.5 mg/dl with eGFR in mid 30's. This creatinine could ne an overestimation of her kidney function due to low muscle mass. She has non nephrotic proteinuria (albuminuria) going back to 2011.   Currently has oliguric NATALIA on CKD  She is now off sedation and is awake, however remains confused, does not follow commands.   Increased total body volume with stable respiratory status/minimal vent settings.     Discussed with the family that she will likely need dialysis for clearance as well as volume optimization in coming 24-48 hours however, there is a fair chance that she may require it long term given the degree of her underlying CKD. Per patients Ex  and her daughter, patient would not want long term hemodialysis however would not be against temporary dialysis.     Family wants to go ahead with temporary dialysis in a hope of renal recovery, however they do understand that we will have to revisit this situation in 2-3 weeks again if she remains dialysis dependent. Discussed with them that withdrawal of dialysis in such situations can be a difficult decision.     Sisi Parrish MD  [phone]

## 2019-01-20 NOTE — PROGRESS NOTES
CV ICU PROGRESS NOTE         1/20/19   CO-MORBIDITIES:   CKDIII, b/l carotid stenosis, GERD, HTN, CAD       ASSESSMENT:   Aj is a 78 year old female with pmh CKDIII, b/l carotid stenosis, GERD, HTN, CAD s/p ACS [place] from [facility] [place] with severe multi-vessel disease who is now pod 2 for three vessel CABG with Dr. Villegas      PLANS FOR TODAY  Exchange for dialysis catheter  bumex 4mg x1   Po amiodarone     PLANS:   Neuro/ pain/ sedation:  -continued altered mental status   -CT head 1/18: normal  -po oxycodone prn, scheduled tylenol   -precedex for sedation and agitation   -urine tox: positive for benzos   -restart PTA buspirone     Pulmonary care:   -Supplemental oxygen to keep saturation above 92 %.  - plan to extubate when mentation and ventilation appropriate   -continue PST   -Aggressive pulmonary toilet once extubated      Cardiovascular:    -Monitor hemodynamic status.   -1/18: amlodipine 5mg, lopressor 25mg bid   -prn hydralazine for systolic >160  -1/19: episode of A. Fib overnight; rate controled with metoprolol, started amiodarone infusion   -po amiodarone started 1/20       GI care:   -NPO, advance diet as tolerated once extubated  -C/w NGT  - Bowel regimen       Fluids/ Electrolytes/ Nutrition:   - ICU electrolyte replacement protocol     Renal/ Fluid Balance:    -chronic kidney disease III   -Urine output is minimal, nephrology consulted, appreciate recs   -HD 1/20/18      Endocrine:    -Sliding scale insulin       ID/ Antibiotics:  -Klebsiella UTI diagnosed on 1/11.  -levofloxacin started due to resistance        Heme:     -Hemoglobin stable.   -continue to monitor       Prophylaxis:    -Mechanical prophylaxis for DVT.   -subcutaneous heparin       Lines/ tubes/ drains:  -NGT, ETT, RIJ CVC, Chest tubes x2, Chen, L radial art line, PIV      Disposition:  -CVICU        OBJECTIVE:     TODAY'S PROGRESS:   SUBJECTIVE:   No acute events overnight       Vitals:  Temp:  [97.2  F (36.2  C)-98.6  F (37  C)]  97.9  F (36.6  C)  Heart Rate:  [] 92  Resp:  [14-30] 14  BP: ()/(44-80) 93/44  MAP:  [69 mmHg-97 mmHg] 92 mmHg  Arterial Line BP: (103-136)/(48-76) 130/71  FiO2 (%):  [40 %] 40 %  SpO2:  [96 %-99 %] 98 %    Physical Exam:    Gen: Intubated, does not follow commands   CV: RRR, no murmurs, rubs or gallops   Pulm: Lungs CTA, no wheezing or rhonchi  Abd: Soft, NT, ND, +BS  Ext: no LE edema  Neuro: nonfocal, moves all extremities  Incision: c/d/i, no erythema, sternum stable  Tubes/drains: Dressing clean and dry, output remained stable overnight, no air leak.         I&O's:  I/O last 3 completed shifts:  In: 1841.36 [I.V.:571.36; NG/GT:310]  Out: 1444 [Urine:244; Emesis/NG output:950; Chest Tube:250]    Ventilator Settings:  Ventilation Mode: CPAP/PS  (Continuous positive airway pressure with Pressure Support)  FiO2 (%): 40 %  Rate Set (breaths/minute): 14 breaths/min  Tidal Volume Set (mL): 470 mL  PEEP (cm H2O): 5 cmH2O  Pressure Support (cm H2O): 7 cmH2O  Oxygen Concentration (%): 40 %  Resp: 14      Labs:   BMP  Recent Labs   Lab 01/20/19  0424 01/19/19  2100 01/19/19  1313 01/19/19  0456    141 143 143   POTASSIUM 4.2 4.4 4.1 4.2   CHLORIDE 104 104 105 106   JEFFREY 7.4* 7.4* 7.7* 7.7*   CO2 20 21 22 19*   * 110* 101* 96*   CR 7.17* 6.93* 6.78* 6.11*   * 136* 134* 146*     CBC  Recent Labs   Lab 01/20/19  0424 01/19/19  2100 01/19/19  1313 01/19/19  0456   WBC 7.7 8.3 10.0 9.5   RBC 3.00* 3.04* 3.05* 3.08*   HGB 8.6* 8.7* 8.8* 8.8*   HCT 27.0* 27.3* 27.5* 27.4*   MCV 90 90 90 89   MCH 28.7 28.6 28.9 28.6   MCHC 31.9 31.9 32.0 32.1   RDW 16.3* 16.3* 16.4* 16.3*   * 130* 134* 121*     INR  Recent Labs   Lab 01/16/19  0350 01/15/19  2350 01/15/19  2146 01/15/19  2000   INR 0.93 1.61* 1.58* 1.55*      Hepatic Panel   Recent Labs   Lab 01/17/19  0400 01/16/19  0350 01/15/19  2146 01/15/19  2000   AST 50* 55* 57* 62*   ALT 10 22 25 23   ALKPHOS 41 40 40 44   BILITOTAL 0.5 1.0 1.2 0.9    ALBUMIN 2.7* 2.8* 2.9* 2.7*     Glucose  Recent Labs   Lab 01/20/19  1204 01/20/19  0741 01/20/19  0424 01/20/19  0421 01/19/19  2340 01/19/19  2100 01/19/19  1616 01/19/19  1313  01/19/19  0456  01/18/19  2107  01/18/19  1449   GLC  --   --  154*  --   --  136*  --  134*  --  146*  --  128*  --  137*   * 159*  --  154* 145* 123* 176*  --    < >  --    < >  --    < >  --     < > = values in this interval not displayed.     Blood Gas  Recent Labs   Lab 01/18/19  0806 01/18/19  0320 01/18/19  0000 01/17/19  2037  01/17/19  0623   PH  --  7.39 7.38 7.45  --  7.40   PCO2  --  38 38 33*  --  35   PO2  --  89 97 81  --  94   HCO3  --  23 22 23  --  22   O2PER 40.0 50.0  50.0 50.0  50.0 50  50   < > 60    < > = values in this interval not displayed.       Imaging:   Recent Results (from the past 24 hour(s))   XR Chest Port 1 View    Narrative    EXAM: TEMPORARY  1/20/2019 8:35 AM     HISTORY:  Status post lung transplant       COMPARISON: 1/18/2019    FINDINGS: Upright frontal radiograph the chest. Interval removal of  right IJ pulmonary artery catheter with a sheath now within the right  IJ in the tip projecting over the confluence of the innominate vein.  Removal of mediastinal drains. The endotracheal tube is approximately  2.9 cm with a ursula. Feeding tube and gastric tube courses below the  level of the diaphragm with the tip, in view, however these sidehole  of the gastric tube is near or at the gastroesophageal junction.  Median sternotomy wires. Unchanged position of the bilateral chest  tubes. Epicardial pacer lead. The trachea is midline. Mitral annular  calcifications. Bilateral pleural effusions. Left basilar opacities.  No pneumothorax.  The visualized upper abdomen is unremarkable.      Impression    IMPRESSION:   1. Interval removal of right IJ pulmonary artery catheter and  mediastinal drains with the right IJ sheath tip projecting over the  confluence of the innominate veins.  2. The gastric  tube sidehole projects near the gastroesophageal  junction. Recommend advancing a minimum of 10 to 15 cm.  3. Stable left pleural effusion and associated left basilar opacities,  atelectasis and/or consolidation.    I have personally reviewed the examination and initial interpretation  and I agree with the findings.    YOVANY GLOVER MD   XR Chest Port 1 View    Narrative    Exam:  Chest X-ray 1/20/2019 1:22 PM    History: HD IJ placement    Comparison: 1/20/2019    Findings: Semiupright frontal radiograph the chest. Right central  venous catheter with the tip projecting over the low SVC. The  endotracheal tube is approximately 2.4 cm with a ursula. Median  sternotomy wires. The gastric tube sidehole is near the  gastroesophageal junction. Epicardial pacer leads. Feeding tube  courses below the level of the diaphragm with the tip out of the field  of view. Stable bilateral chest tubes. Stable left pleural effusion  and associated opacities. Mitral annular calcifications. No  pneumothorax. No new focal pulmonary opacity. The visualized upper  abdomen is unremarkable.      Impression    Impression:   1. Interval placement of right central venous catheter with the tip  projecting over the low SVC.  2. Gastric sidehole is near the gastroesophageal junction. Recommend  advancing 10 to 15 cm.  3. Stable left pleural effusion and associated left basilar opacities,  atelectasis and/or consolidation.    I have personally reviewed the examination and initial interpretation  and I agree with the findings.    YOVANY GLOVER MD       Discussed with icu staff   Lani Dc MD     Patient seen and examined. Agree with plan. Discussed condition of patient with family.

## 2019-01-20 NOTE — PLAN OF CARE
POD #3 CABG 3v.    Neuro: PT is restless but unable to follow commands. Able to MAL minus minimal activity of left arm. Precedex gtts started for agitation.   CV: A fib 's, Amio gtts started. BP stable, afebrile. VVI pacer wires connected. 2 pleural CT's, right with very little output, left with about 150 during shift.   Pulm: CMV settings 40%, 470 TV,14 RR, 5 peep. PS 2X today. LS coarse. Minimal secretions.  GI: NJ in place with TF at goal. OG to LIS with 500 output. No BM.   : Chen in place with about 290 mls since midnight. Nephrology aware and possibly HD run tomorrow. BUN and CR trending up.  IV: Right internal jugular, Left a-line, 2 PIV's.   Gtts: Amio and precedex  Pain: No PRN's given.    Plan: Continue to monitor Neuro status. Possibly HD tomorrow to clear kidneys,

## 2019-01-20 NOTE — PROGRESS NOTES
SPIRITUAL HEALTH SERVICES Significant Event  Hoahaoism Sacrament of ANOINTING  [facility] ([place]) 4E    Pt anointed by Father Robert per request of family of pt..    Pt's family joined me in praying for pt.  I encouraged them to give each family member the freedom to spend alone time with pt. so that each would have the opportunity to thank her, express love for her and give and ask for any forgiveness with her that maybe necessary.    Pt's family expressed how important her Hoahaoism Marie is to her.    PLAN: I will continue to follow pt. through her hospital stay.     Robert Braun M.Div.

## 2019-01-20 NOTE — PLAN OF CARE
Adjustment to Illness (Acute Coronary Syndrome)  Optimal Adaptation to Illness  1/20/2019 0726 - No Change by Kaitlin Guerin, RN   Pt opens eyes spontaneously; does not follow commands; tracks slightly; very restless with turning/repositioning; continuing precedex; moves all extremities however minimal movement of LUE. VSS; A-fib controlled; amio gtt 0.5. Clear-diminished lung sounds. PS today otherwise CMV settings unchanged. No/minimal secretions. Gave oxy and tylenol r/t nonverbal signs of pain. Creatinine continues to rise; UOP 20 -30cc q2h; fong in place; small leaking onto chux; re-inflated balloon and inflated again to see if it would cure the leak. Pleural CT x2; L CT puts out more output. Will continue to assess and notify MD of any changes in exam.

## 2019-01-20 NOTE — PROGRESS NOTES
HEMODIALYSIS TREATMENT NOTE    Date: 1/20/2019  Time: 1800    Data:  Pre Wt:  71.6 kg   Desired Wt: 70.6 kg   Post Wt:    Weight gain:     Weight change:     Ultrafiltration - Post Run Net Total Removed (mL):  1000 mL  Ultrafiltration - Post Run Net Total Gain (mL):  0  Vascular Access Status: Yes, secured and visible  Dialyzer Rinse:  light  Total Blood Volume Processed: 24 L   Total Dialysis (Treatment) Time:  2 hours    Lab:  Hep B    Assessment/Interventions:  2 hour HD run.   mL/min and  mL/min as directed per order.  1 kg removed.  Tolerated well.  ICU RN updated.     Plan:  Continue with plan of care.  Next run per Renal Team.

## 2019-01-20 NOTE — PROGRESS NOTES
CTVS PROGRESS NOTE         1/20/19   CO-MORBIDITIES:   CKDIII, b/l carotid stenosis, GERD, HTN, CAD       ASSESSMENT:   Aj is a 78 year old female with pmh CKDIII, b/l carotid stenosis, GERD, HTN, CAD [place]p [place] [facility] [place] who is now pod 2 for three vessel CABG with Dr. Villegas      PLANS FOR TODAY  Exchange for dialysis catheter  bumex 4mg x1   Po amiodarone     PLANS:   Neuro/ pain/ sedation:  -continued altered mental status   -CT head 1/18: normal  -po oxycodone prn, scheduled tylenol   -precedex for sedation and agitation   -urine tox: positive for benzos   -restart PTA buspirone     Pulmonary care:   -Supplemental oxygen to keep saturation above 92 %.  - plan to extubate when mentation and ventilation appropriate   -continue PST   -Aggressive pulmonary toilet once extubated      Cardiovascular:    -Monitor hemodynamic status.   -1/18: amlodipine 5mg, lopressor 25mg bid   -prn hydralazine for systolic >160  -1/19: episode of A. Fib overnight; rate controled with metoprolol, started amiodarone infusion   -po amiodarone started 1/20       GI care:   -NPO, advance diet as tolerated once extubated  -C/w NGT  - Bowel regimen       Fluids/ Electrolytes/ Nutrition:   - ICU electrolyte replacement protocol     Renal/ Fluid Balance:    -chronic kidney disease III   -Urine output is minimal, nephrology consulted, appreciate recs   -HD 1/20/18      Endocrine:    -Sliding scale insulin       ID/ Antibiotics:  -Klebsiella UTI diagnosed on 1/11.  -levofloxacin started due to resistance        Heme:     -Hemoglobin stable.   -continue to monitor       Prophylaxis:    -Mechanical prophylaxis for DVT.   -subcutaneous heparin       Lines/ tubes/ drains:  -NGT, ETT, RIJ CVC, Chest tubes x2, Chen, L radial art line, PIV      Disposition:  -CVICU        OBJECTIVE:     TODAY'S PROGRESS:   SUBJECTIVE:   No acute events overnight       Vitals:  Temp:  [97.2  F (36.2  C)-98.6  F (37  C)]  97.9  F (36.6  C)  Heart Rate:  [] 92  Resp:  [14-30] 14  BP: ()/(44-80) 93/44  MAP:  [69 mmHg-97 mmHg] 92 mmHg  Arterial Line BP: (103-136)/(48-76) 130/71  FiO2 (%):  [40 %] 40 %  SpO2:  [96 %-99 %] 98 %    Physical Exam:    Gen: Intubated, does not follow commands   CV: RRR, no murmurs, rubs or gallops   Pulm: Lungs CTA, no wheezing or rhonchi  Abd: Soft, NT, ND, +BS  Ext: no LE edema  Neuro: nonfocal, moves all extremities  Incision: c/d/i, no erythema, sternum stable  Tubes/drains: Dressing clean and dry, output remained stable overnight, no air leak.         I&O's:  I/O last 3 completed shifts:  In: 1841.36 [I.V.:571.36; NG/GT:310]  Out: 1444 [Urine:244; Emesis/NG output:950; Chest Tube:250]    Ventilator Settings:  Ventilation Mode: CPAP/PS  (Continuous positive airway pressure with Pressure Support)  FiO2 (%): 40 %  Rate Set (breaths/minute): 14 breaths/min  Tidal Volume Set (mL): 470 mL  PEEP (cm H2O): 5 cmH2O  Pressure Support (cm H2O): 7 cmH2O  Oxygen Concentration (%): 40 %  Resp: 14      Labs:   BMP  Recent Labs   Lab 01/20/19  0424 01/19/19  2100 01/19/19  1313 01/19/19  0456    141 143 143   POTASSIUM 4.2 4.4 4.1 4.2   CHLORIDE 104 104 105 106   JEFFREY 7.4* 7.4* 7.7* 7.7*   CO2 20 21 22 19*   * 110* 101* 96*   CR 7.17* 6.93* 6.78* 6.11*   * 136* 134* 146*     CBC  Recent Labs   Lab 01/20/19  0424 01/19/19  2100 01/19/19  1313 01/19/19  0456   WBC 7.7 8.3 10.0 9.5   RBC 3.00* 3.04* 3.05* 3.08*   HGB 8.6* 8.7* 8.8* 8.8*   HCT 27.0* 27.3* 27.5* 27.4*   MCV 90 90 90 89   MCH 28.7 28.6 28.9 28.6   MCHC 31.9 31.9 32.0 32.1   RDW 16.3* 16.3* 16.4* 16.3*   * 130* 134* 121*     INR  Recent Labs   Lab 01/16/19  0350 01/15/19  2350 01/15/19  2146 01/15/19  2000   INR 0.93 1.61* 1.58* 1.55*      Hepatic Panel   Recent Labs   Lab 01/17/19  0400 01/16/19  0350 01/15/19  2146 01/15/19  2000   AST 50* 55* 57* 62*   ALT 10 22 25 23   ALKPHOS 41 40 40 44   BILITOTAL 0.5 1.0 1.2 0.9    ALBUMIN 2.7* 2.8* 2.9* 2.7*     Glucose  Recent Labs   Lab 01/20/19  1204 01/20/19  0741 01/20/19  0424 01/20/19  0421 01/19/19  2340 01/19/19  2100 01/19/19  1616 01/19/19  1313  01/19/19  0456  01/18/19  2107  01/18/19  1449   GLC  --   --  154*  --   --  136*  --  134*  --  146*  --  128*  --  137*   * 159*  --  154* 145* 123* 176*  --    < >  --    < >  --    < >  --     < > = values in this interval not displayed.     Blood Gas  Recent Labs   Lab 01/18/19  0806 01/18/19  0320 01/18/19  0000 01/17/19  2037  01/17/19  0623   PH  --  7.39 7.38 7.45  --  7.40   PCO2  --  38 38 33*  --  35   PO2  --  89 97 81  --  94   HCO3  --  23 22 23  --  22   O2PER 40.0 50.0  50.0 50.0  50.0 50  50   < > 60    < > = values in this interval not displayed.       Imaging:   Recent Results (from the past 24 hour(s))   XR Chest Port 1 View    Narrative    EXAM: TEMPORARY  1/20/2019 8:35 AM     HISTORY:  Status post lung transplant       COMPARISON: 1/18/2019    FINDINGS: Upright frontal radiograph the chest. Interval removal of  right IJ pulmonary artery catheter with a sheath now within the right  IJ in the tip projecting over the confluence of the innominate vein.  Removal of mediastinal drains. The endotracheal tube is approximately  2.9 cm with a ursula. Feeding tube and gastric tube courses below the  level of the diaphragm with the tip, in view, however these sidehole  of the gastric tube is near or at the gastroesophageal junction.  Median sternotomy wires. Unchanged position of the bilateral chest  tubes. Epicardial pacer lead. The trachea is midline. Mitral annular  calcifications. Bilateral pleural effusions. Left basilar opacities.  No pneumothorax.  The visualized upper abdomen is unremarkable.      Impression    IMPRESSION:   1. Interval removal of right IJ pulmonary artery catheter and  mediastinal drains with the right IJ sheath tip projecting over the  confluence of the innominate veins.  2. The gastric  tube sidehole projects near the gastroesophageal  junction. Recommend advancing a minimum of 10 to 15 cm.  3. Stable left pleural effusion and associated left basilar opacities,  atelectasis and/or consolidation.    I have personally reviewed the examination and initial interpretation  and I agree with the findings.    YOVANY GLOVER MD   XR Chest Port 1 View    Narrative    Exam:  Chest X-ray 1/20/2019 1:22 PM    History: HD IJ placement    Comparison: 1/20/2019    Findings: Semiupright frontal radiograph the chest. Right central  venous catheter with the tip projecting over the low SVC. The  endotracheal tube is approximately 2.4 cm with a ursula. Median  sternotomy wires. The gastric tube sidehole is near the  gastroesophageal junction. Epicardial pacer leads. Feeding tube  courses below the level of the diaphragm with the tip out of the field  of view. Stable bilateral chest tubes. Stable left pleural effusion  and associated opacities. Mitral annular calcifications. No  pneumothorax. No new focal pulmonary opacity. The visualized upper  abdomen is unremarkable.      Impression    Impression:   1. Interval placement of right central venous catheter with the tip  projecting over the low SVC.  2. Gastric sidehole is near the gastroesophageal junction. Recommend  advancing 10 to 15 cm.  3. Stable left pleural effusion and associated left basilar opacities,  atelectasis and/or consolidation.    I have personally reviewed the examination and initial interpretation  and I agree with the findings.    YOVANY GLOVER MD       Discussed with ctvs fellow.     Lani Dc MD

## 2019-01-21 NOTE — PLAN OF CARE
D/I/A:    Care of this pt from [id number]. Dialysis at bedside. 1 kg off per Dialysis RN. Dressing on dialysis catheter redressed with mepliex lite underneath,and mepliex lite underneath ear.  1 sticht in per CVTS due to pulling on skin and potential PI. Continue to monitor site.      P: Continue to monitor. Contact [facility] with changes.

## 2019-01-21 NOTE — PROGRESS NOTES
CTVS PROGRESS NOTE         1/21/19   CO-MORBIDITIES:   CKDIII, b/l carotid stenosis, GERD, HTN, CAD       ASSESSMENT:   Aj is a 78 year old female with pmh CKDIII, b/l carotid stenosis, GERD, HTN, CAD s/p ACS [place] [facility] [place] severe multi-vessel disease who is now pod 6 for three vessel CABG with Dr. Villegas.      PLANS FOR TODAY  -Heparin low intensity gtt  -discontinue Chest tubes and pacing leads today   -Plan to wean sedation as able and SBT     PLANS:   Neuro/ pain/ sedation:  -continued altered mental status   -CT head 1/18: normal  -po oxycodone prn, scheduled tylenol   -precedex for sedation and agitation   -urine tox: positive for benzos   -restart PTA buspirone     Pulmonary care:   -Supplemental oxygen to keep saturation above 92 %.  - plan to extubate when mentation and ventilation appropriate   -continue PST, possible extubation today    -Aggressive pulmonary toilet once extubated  -chest tubes out today       Cardiovascular:    -Monitor hemodynamic status.   -1/18: amlodipine 5mg, lopressor 25mg bid   -prn hydralazine for systolic >160  -1/19: episode of A. Fib overnight; rate controled with metoprolol, started amiodarone infusion   -po amiodarone started 1/20       GI care:   -NPO, advance diet as tolerated once extubated  -C/w NGT  - Bowel regimen       Fluids/ Electrolytes/ Nutrition:   - ICU electrolyte replacement protocol     Renal/ Fluid Balance:    -chronic kidney disease III   -Urine output is minimal, nephrology consulted, appreciate recs   -HD 1/20/18      Endocrine:    -Sliding scale insulin       ID/ Antibiotics:  -Klebsiella UTI diagnosed on 1/11.  -levofloxacin started due to resistance        Heme:     -Hemoglobin stable.   -continue to monitor       Prophylaxis:    -Mechanical prophylaxis for DVT.   -subcutaneous heparin       Lines/ tubes/ drains:  -NGT, ETT, RIJ CVC, Chest tubes x2, Chen, PIV      Disposition:  -CVICU        OBJECTIVE:     TODAY'S PROGRESS:    SUBJECTIVE:   No acute events overnight       Vitals:  Temp:  [97.5  F (36.4  C)-98.7  F (37.1  C)] 98.7  F (37.1  C)  Pulse:  [93] 93  Heart Rate:  [] 122  Resp:  [12-26] 20  BP: (109)/(67) 109/67  MAP:  [65 mmHg-104 mmHg] 87 mmHg  Arterial Line BP: ()/(51-84) 135/63  FiO2 (%):  [40 %] 40 %  SpO2:  [96 %-99 %] 99 %    Physical Exam:    Gen: Intubated, does not follow commands   CV: RRR, no murmurs, rubs or gallops   Pulm: Lungs CTA, no wheezing or rhonchi  Abd: Soft, NT, ND, +BS  Ext: no LE edema  Neuro: nonfocal, moves all extremities  Incision: c/d/i, no erythema, sternum stable  Tubes/drains: Dressing clean and dry, output remained stable overnight, no air leak.         I&O's:  I/O last 3 completed shifts:  In: 1640.6 [I.V.:380.6; NG/GT:300]  Out: 2129 [Urine:328; Emesis/NG output:500; Other:1000; Chest Tube:301]    Ventilator Settings:  Ventilation Mode: (S) CPAP/PS  (Continuous positive airway pressure with Pressure Support)  FiO2 (%): 40 %  Rate Set (breaths/minute): 14 breaths/min  Tidal Volume Set (mL): 470 mL  PEEP (cm H2O): 5 cmH2O  Pressure Support (cm H2O): 7 cmH2O  Oxygen Concentration (%): 40 %  Resp: 20      Labs:   BMP  Recent Labs   Lab 01/21/19  0405 01/20/19  1513 01/20/19  0424 01/19/19  2100    139 142 141   POTASSIUM 3.7 4.0 4.2 4.4   CHLORIDE 100 102 104 104   JEFFREY 7.9* 7.5* 7.4* 7.4*   CO2 22 20 20 21   BUN 86* 128* 118* 110*   CR 5.55* 7.67* 7.17* 6.93*   * 147* 154* 136*     CBC  Recent Labs   Lab 01/21/19  0922 01/21/19  0405 01/20/19  1513 01/20/19  0424   WBC 7.6 7.8 7.9 7.7   RBC 2.94* 3.08* 3.08* 3.00*   HGB 8.4* 8.9* 8.9* 8.6*   HCT 26.5* 27.8* 27.5* 27.0*   MCV 90 90 89 90   MCH 28.6 28.9 28.9 28.7   MCHC 31.7 32.0 32.4 31.9   RDW 15.9* 16.2* 16.3* 16.3*    165 152 142*     INR  Recent Labs   Lab 01/16/19  0350 01/15/19  2350 01/15/19  2146 01/15/19  2000   INR 0.93 1.61* 1.58* 1.55*      Hepatic Panel   Recent Labs   Lab 01/17/19  0400  01/16/19  0350 01/15/19  2146 01/15/19  2000   AST 50* 55* 57* 62*   ALT 10 22 25 23   ALKPHOS 41 40 40 44   BILITOTAL 0.5 1.0 1.2 0.9   ALBUMIN 2.7* 2.8* 2.9* 2.7*     Glucose  Recent Labs   Lab 01/21/19  0738 01/21/19  0406 01/21/19  0405 01/21/19  0058 01/20/19  2104 01/20/19  1513 01/20/19  1512 01/20/19  1204  01/20/19  0424  01/19/19  2100  01/19/19  1313  01/19/19  0456   GLC  --   --  130*  --   --  147*  --   --   --  154*  --  136*  --  134*  --  146*   * 123*  --  126* 97  --  145* 128*   < >  --    < > 123*   < >  --    < >  --     < > = values in this interval not displayed.     Blood Gas  Recent Labs   Lab 01/18/19  0806 01/18/19  0320 01/18/19  0000 01/17/19  2037  01/17/19  0623   PH  --  7.39 7.38 7.45  --  7.40   PCO2  --  38 38 33*  --  35   PO2  --  89 97 81  --  94   HCO3  --  23 22 23  --  22   O2PER 40.0 50.0  50.0 50.0  50.0 50  50   < > 60    < > = values in this interval not displayed.       Imaging:   Recent Results (from the past 24 hour(s))   XR Chest Port 1 View    Narrative    Exam:  Chest X-ray 1/20/2019 1:22 PM    History: HD IJ placement    Comparison: 1/20/2019    Findings: Semiupright frontal radiograph the chest. Right central  venous catheter with the tip projecting over the low SVC. The  endotracheal tube is approximately 2.4 cm with a ursula. Median  sternotomy wires. The gastric tube sidehole is near the  gastroesophageal junction. Epicardial pacer leads. Feeding tube  courses below the level of the diaphragm with the tip out of the field  of view. Stable bilateral chest tubes. Stable left pleural effusion  and associated opacities. Mitral annular calcifications. No  pneumothorax. No new focal pulmonary opacity. The visualized upper  abdomen is unremarkable.      Impression    Impression:   1. Interval placement of right central venous catheter with the tip  projecting over the low SVC.  2. Gastric sidehole is near the gastroesophageal junction. Recommend  advancing  10 to 15 cm.  3. Stable left pleural effusion and associated left basilar opacities,  atelectasis and/or consolidation.    I have personally reviewed the examination and initial interpretation  and I agree with the findings.    YOVANY GLOVER MD       Discussed with ctvs fellow.     Lani Dc MD

## 2019-01-21 NOTE — PLAN OF CARE
Discharge Planner OT   Patient plan for discharge: unable to state  Current status: More sedated this PM, not following commands, minimal agitation, tolerating UE/LE PROM well with VSS.  Barriers to return to prior living situation: Decreased ADL independence and activity tolerance  Recommendations for discharge: rehab  Rationale for recommendations: Increase functional endurance and ADL independence at rehab       Entered by: Carlos Sheth 01/21/2019 1:36 PM

## 2019-01-21 NOTE — PLAN OF CARE
Confusion Acute  Optimal Cognitive Function  1/21/2019 0626 - No Change by Kaitlin Guerin, RN     Pt restless/agitated with repositioning/activity; Seroquel ordered and very effective; continuing precedex at 0.7.  Pt tracking writer but does not follow commands. Pupils 3mm brisk/reactive. VSS; afebrile. Clear lung sounds bilaterally. CMV settings unchanged. Small creamy secretions this AM.  Active bowel sounds; no BM; last 1/18; due. TF at goal. Chen in place with UOP 15 - 30cc q2h. Will continue to assess and notify MD of any changes in exam.

## 2019-01-21 NOTE — PROGRESS NOTES
CTVS PROGRESS NOTE         1/22/19   CO-MORBIDITIES:   CKDIII, b/l carotid stenosis, GERD, HTN, CAD       ASSESSMENT:   Aj is a 78 year old female with pmh CKDIII, b/l carotid stenosis, GERD, HTN, CAD [place]p [place] [facility] [place] who is now pod 6 for three vessel CABG with Dr. Villegas.      PLANS FOR TODAY  -Heparin low intensity gtt  -discontinue Chest tubes and pacing leads today   -Plan to wean sedation as able and SBT     PLANS:   Neuro/ pain/ sedation:  -continued altered mental status   -CT head 1/18: normal  -po oxycodone prn, scheduled tylenol   -precedex for sedation and agitation   -urine tox: positive for benzos   -restart PTA buspirone     Pulmonary care:   -Supplemental oxygen to keep saturation above 92 %.  - plan to extubate when mentation and ventilation appropriate   -continue PST, possible extubation today    -Aggressive pulmonary toilet once extubated  -chest tubes out today       Cardiovascular:    -Monitor hemodynamic status.   -1/18: amlodipine 5mg, lopressor 25mg bid   -prn hydralazine for systolic >160  -1/19: episode of A. Fib overnight; rate controled with metoprolol, started amiodarone infusion   -po amiodarone started 1/20       GI care:   -NPO, advance diet as tolerated once extubated  -C/w NGT  - Bowel regimen       Fluids/ Electrolytes/ Nutrition:   - ICU electrolyte replacement protocol     Renal/ Fluid Balance:    -chronic kidney disease III   -Urine output is minimal, nephrology consulted, appreciate recs   -HD 1/20/18      Endocrine:    -Sliding scale insulin       ID/ Antibiotics:  -Klebsiella UTI diagnosed on 1/11.  -levofloxacin started due to resistance        Heme:     -Hemoglobin stable.   -continue to monitor       Prophylaxis:    -Mechanical prophylaxis for DVT.   -subcutaneous heparin       Lines/ tubes/ drains:  -NGT, ETT, RIJ CVC, Chest tubes x2, Chen, PIV      Disposition:  -CVICU        OBJECTIVE:     TODAY'S PROGRESS:    SUBJECTIVE:   No acute events overnight       Vitals:  Temp:  [97.5  F (36.4  C)-98.7  F (37.1  C)] 98.7  F (37.1  C)  Pulse:  [93] 93  Heart Rate:  [] 122  Resp:  [12-26] 20  BP: (109)/(67) 109/67  MAP:  [65 mmHg-104 mmHg] 87 mmHg  Arterial Line BP: ()/(51-84) 135/63  FiO2 (%):  [40 %] 40 %  SpO2:  [96 %-99 %] 99 %    Physical Exam:    Gen: Intubated, does not follow commands   CV: RRR, no murmurs, rubs or gallops   Pulm: Lungs CTA, no wheezing or rhonchi  Abd: Soft, NT, ND, +BS  Ext: no LE edema  Neuro: nonfocal, moves all extremities  Incision: c/d/i, no erythema, sternum stable  Tubes/drains: Dressing clean and dry, output remained stable overnight, no air leak.         I&O's:  I/O last 3 completed shifts:  In: 1640.6 [I.V.:380.6; NG/GT:300]  Out: 2129 [Urine:328; Emesis/NG output:500; Other:1000; Chest Tube:301]    Ventilator Settings:  Ventilation Mode: (S) CPAP/PS  (Continuous positive airway pressure with Pressure Support)  FiO2 (%): 40 %  Rate Set (breaths/minute): 14 breaths/min  Tidal Volume Set (mL): 470 mL  PEEP (cm H2O): 5 cmH2O  Pressure Support (cm H2O): 7 cmH2O  Oxygen Concentration (%): 40 %  Resp: 20      Labs:   BMP  Recent Labs   Lab 01/21/19  0405 01/20/19  1513 01/20/19  0424 01/19/19  2100    139 142 141   POTASSIUM 3.7 4.0 4.2 4.4   CHLORIDE 100 102 104 104   JEFFREY 7.9* 7.5* 7.4* 7.4*   CO2 22 20 20 21   BUN 86* 128* 118* 110*   CR 5.55* 7.67* 7.17* 6.93*   * 147* 154* 136*     CBC  Recent Labs   Lab 01/21/19  0922 01/21/19  0405 01/20/19  1513 01/20/19  0424   WBC 7.6 7.8 7.9 7.7   RBC 2.94* 3.08* 3.08* 3.00*   HGB 8.4* 8.9* 8.9* 8.6*   HCT 26.5* 27.8* 27.5* 27.0*   MCV 90 90 89 90   MCH 28.6 28.9 28.9 28.7   MCHC 31.7 32.0 32.4 31.9   RDW 15.9* 16.2* 16.3* 16.3*    165 152 142*     INR  Recent Labs   Lab 01/16/19  0350 01/15/19  2350 01/15/19  2146 01/15/19  2000   INR 0.93 1.61* 1.58* 1.55*      Hepatic Panel   Recent Labs   Lab 01/17/19  0400  01/16/19  0350 01/15/19  2146 01/15/19  2000   AST 50* 55* 57* 62*   ALT 10 22 25 23   ALKPHOS 41 40 40 44   BILITOTAL 0.5 1.0 1.2 0.9   ALBUMIN 2.7* 2.8* 2.9* 2.7*     Glucose  Recent Labs   Lab 01/21/19  0738 01/21/19  0406 01/21/19  0405 01/21/19  0058 01/20/19  2104 01/20/19  1513 01/20/19  1512 01/20/19  1204  01/20/19  0424  01/19/19  2100  01/19/19  1313  01/19/19  0456   GLC  --   --  130*  --   --  147*  --   --   --  154*  --  136*  --  134*  --  146*   * 123*  --  126* 97  --  145* 128*   < >  --    < > 123*   < >  --    < >  --     < > = values in this interval not displayed.     Blood Gas  Recent Labs   Lab 01/18/19  0806 01/18/19  0320 01/18/19  0000 01/17/19  2037  01/17/19  0623   PH  --  7.39 7.38 7.45  --  7.40   PCO2  --  38 38 33*  --  35   PO2  --  89 97 81  --  94   HCO3  --  23 22 23  --  22   O2PER 40.0 50.0  50.0 50.0  50.0 50  50   < > 60    < > = values in this interval not displayed.       Imaging:   Recent Results (from the past 24 hour(s))   XR Chest Port 1 View    Narrative    Exam:  Chest X-ray 1/20/2019 1:22 PM    History: HD IJ placement    Comparison: 1/20/2019    Findings: Semiupright frontal radiograph the chest. Right central  venous catheter with the tip projecting over the low SVC. The  endotracheal tube is approximately 2.4 cm with a ursula. Median  sternotomy wires. The gastric tube sidehole is near the  gastroesophageal junction. Epicardial pacer leads. Feeding tube  courses below the level of the diaphragm with the tip out of the field  of view. Stable bilateral chest tubes. Stable left pleural effusion  and associated opacities. Mitral annular calcifications. No  pneumothorax. No new focal pulmonary opacity. The visualized upper  abdomen is unremarkable.      Impression    Impression:   1. Interval placement of right central venous catheter with the tip  projecting over the low SVC.  2. Gastric sidehole is near the gastroesophageal junction. Recommend  advancing  10 to 15 cm.  3. Stable left pleural effusion and associated left basilar opacities,  atelectasis and/or consolidation.    I have personally reviewed the examination and initial interpretation  and I agree with the findings.    YOVANY GLOVER MD       Discussed with icu saff.   Lani Dc MD

## 2019-01-22 NOTE — PROGRESS NOTES
CV ICU PROGRESS NOTE         1/22/19         CO-MORBIDITIES:   CKDIII, b/l carotid stenosis, GERD, HTN, CAD       ASSESSMENT:   Aj is a 78 year old female with pmh CKDIII, b/l carotid stenosis, GERD, HTN, CAD s/p ACS transferred from [facility] with severe multi-vessel disease who is now pod 6 for three vessel CABG with Dr. Villegas.      PLANS FOR TODAY  Remove arterial line   -HD therapy   -extubate      PLANS:   Neuro/ pain/ sedation:  -mentation improving   -CT head 1/18: normal  -po oxycodone prn, scheduled tylenol   -precedex for sedation and agitation   -urine tox: positive for benzos   -restart PTA buspirone     Pulmonary care:   -Supplemental oxygen to keep saturation above 92 %.  - extubated 1/122, bipap if needed   -chest tubes out 1/21, and CXR shows no pneumothorax today 1/22      Cardiovascular:    -Monitor hemodynamic status.   -1/18: amlodipine 5mg, lopressor 25mg bid   -prn hydralazine for systolic >160  -1/19: episode of A. Fib overnight; rate controled with metoprolol, started amiodarone infusion   -po amiodarone started 1/20       GI care:   -NPO, advance diet as tolerated once extubated  -C/w NGT  - Bowel regimen       Fluids/ Electrolytes/ Nutrition:   - ICU electrolyte replacement protocol     Renal/ Fluid Balance:    -chronic kidney disease III   -Urine output is minimal, nephrology consulted, appreciate recs   -HD evaluated daily       Endocrine:    -Sliding scale insulin       ID/ Antibiotics:  -Klebsiella UTI diagnosed on 1/11, Levaquin therapy ended 1/21/19  -Will monitor WBC count     Heme:     -Hemoglobin stable.   -continue to monitor  -low intensity heparin for A. Fib       Prophylaxis:    -Mechanical prophylaxis for DVT.        Lines/ tubes/ drains:  -NGT, ETT, RIJ CVC, Chen, PIV      Disposition:  -CVICU    OBJECTIVE:     TODAY'S PROGRESS:   SUBJECTIVE:   No acute events overnight       Vitals:  Temp:  [96.4  F (35.8  C)-98  F (36.7  C)] 96.4  F (35.8  C)  Pulse:  [] 79  Heart  Rate:  [] 83  Resp:  [14-28] 20  BP: ()/(64-83) 133/65  MAP:  [47 mmHg-94 mmHg] 61 mmHg  Arterial Line BP: ()/(20-74) 82/50  FiO2 (%):  [30 %-40 %] 30 %  SpO2:  [94 %-100 %] 98 %    Physical Exam:    Gen: Intubated, following commands    CV: RRR, no murmurs, rubs or gallops   Pulm: Lungs CTA, no wheezing or rhonchi  Abd: Soft, NT, ND, +BS  Ext: no LE edema  Neuro: nonfocal, moves all extremities  Incision: c/d/i, no erythema, sternum stable  Tubes/drains: Dressing clean and dry, output remained stable overnight, no air leak.         I&O's:  I/O last 3 completed shifts:  In: 2199.17 [I.V.:689.17; NG/GT:670]  Out: 440 [Urine:208; Emesis/NG output:50; Other:113; Chest Tube:69]    Ventilator Settings:  Ventilation Mode: (S) CPAP/PS  (Continuous positive airway pressure with Pressure Support)  FiO2 (%): 30 %  Rate Set (breaths/minute): 14 breaths/min  Tidal Volume Set (mL): 470 mL  PEEP (cm H2O): 6 cmH2O  Pressure Support (cm H2O): 8 cmH2O  Oxygen Concentration (%): 40 %  Resp: 20      Labs:   BMP  Recent Labs   Lab 01/22/19  0516 01/21/19 2040 01/21/19  1147 01/21/19  0405    136 138 137   POTASSIUM 3.6 3.4 4.0 3.7   CHLORIDE 100 101 101 100   JEFFREY 7.9* 7.8* 7.6* 7.9*   CO2 22 25 22 22   BUN 59* 47* 92* 86*   CR 4.03* 3.37* 5.82* 5.55*   * 126* 111* 130*     CBC  Recent Labs   Lab 01/22/19  0516 01/21/19 2040 01/21/19  1147 01/21/19  0922   WBC 9.0 9.1 8.9 7.6   RBC 2.94* 2.92* 3.04* 2.94*   HGB 8.5* 8.4* 8.7* 8.4*   HCT 26.6* 26.2* 27.4* 26.5*   MCV 91 90 90 90   MCH 28.9 28.8 28.6 28.6   MCHC 32.0 32.1 31.8 31.7   RDW 15.9* 15.7* 16.0* 15.9*    171 180 163     INR  Recent Labs   Lab 01/16/19  0350 01/15/19  2350 01/15/19  2146 01/15/19  2000   INR 0.93 1.61* 1.58* 1.55*      Hepatic Panel   Recent Labs   Lab 01/17/19  0400 01/16/19  0350 01/15/19  2146 01/15/19  2000   AST 50* 55* 57* 62*   ALT 10 22 25 23   ALKPHOS 41 40 40 44   BILITOTAL 0.5 1.0 1.2 0.9   ALBUMIN 2.7* 2.8* 2.9*  2.7*     Glucose  Recent Labs   Lab 01/22/19  0756 01/22/19  0517 01/22/19  0516 01/22/19  0007 01/21/19  2040 01/21/19  2035 01/21/19  1601 01/21/19  1147 01/21/19  1135  01/21/19  0405  01/20/19  1513  01/20/19  0424   GLC  --   --  128*  --  126*  --   --  111*  --   --  130*  --  147*  --  154*   * 136*  --  132*  --  122* 147*  --  113*   < >  --    < >  --    < >  --     < > = values in this interval not displayed.     Blood Gas  Recent Labs   Lab 01/21/19  1836 01/18/19  0806 01/18/19  0320 01/18/19  0000 01/17/19 2037   PH 7.52*  --  7.39 7.38 7.45   PCO2 28*  --  38 38 33*   PO2 92  --  89 97 81   HCO3 23  --  23 22 23   O2PER 40.0 40.0 50.0  50.0 50.0  50.0 50  50       Imaging:   Recent Results (from the past 24 hour(s))   XR Chest Port 1 View    Narrative    Exam:  Chest X-ray 1/21/2019 1:26 PM    History: chest tube removal    Comparison: 1/20/2019    Findings: Semiupright frontal radiograph the chest. The endotracheal  tube is proximal and 2.9 cm above the ursula. Median sternotomy wires.  The gastric tube sidehole projects near the gastroesophageal junction.  The feeding tube courses below the level the diaphragm with the tip,  and out of view. Right IJ central venous catheter with the tip  projecting over the low SVC. The trachea is midline. Stable cardiac  mediastinal silhouette. Mitral annular calcifications. Interval  removal of bilateral chest tubes. No appreciable pneumothorax. Small  left pleural effusion and associated left basilar opacities. The  visualized upper abdomen is unremarkable.      Impression    Impression:   1. Interval removal of bilateral chest tubes with no appreciable  pneumothorax.  2. Gastric tube sidehole projects at the gastroesophageal junction.  Recommend repositioning.  3. Stable left pleural effusion and associated left basilar opacities,  atelectasis and/or consolidation.    I have personally reviewed the examination and initial interpretation  and I agree  with the findings.    FERMÍN HEAD MD       Discussed with icu saff.   Lani Dc MD

## 2019-01-22 NOTE — PLAN OF CARE
Shift durration: 7424-6505    LOC: alert/lethargic at times.  Pt on precedex at 1.2 at shift change for restlessness.  Neuro:  can move all extremities, does not follow commands so neuro assessment limited.  PERRLA 3mm each.  Cards: S1, S2.  A fib on amio PO and heparin gtt.  Next Xa due ~0030 1/22/19.  Pacer wires d/c'd today per CVTS. Not on vasoactive gtts.  Pulm: /5/14/40%,  Pt CPAP/PS 6/6 8841-6881.  Plan to extubate tomorrow if safe to do so. LS clear/dim.  L and R CT d/c'd today per CVTS.  Min secretions.  GI/: oliguric ~5cc/hr via fong, keep fong today per CVTS rounds.  NG to LIS with min output.  NJ to tube feeds at goal of 40/hr with flushes 30q4H.  HD run today, tolerated well.  Skin: no changes to skin, dressings intact to removed CT sites.  Preventative mepilex put on bilateral elbows today and coccyx. LLE donor site ecchymotic but intact.  Mobility: A2 with L and R repositioning.  Pt was up in chair [id number] today using sling and ceiling lift.  Special/Event: No acute events overnight.  Continue to work on and achieve adequate sedation/agitation level.  Continue to assess and intervene as indicated as ICU level of care.  Possible extubation tomorrow if safe to do so.

## 2019-01-22 NOTE — PLAN OF CARE
D/I/A: Extubated at 0900, was on HFNC at 40%, switched to oxi plus 8-10L due to primarily mouth breathing. A&O to person only, very restless/agitated and fixated on going home. Gave 25mg seroquel pt still restless/agitated. Up to the chair x3 today, total ~ 2.5 - 3 hours. HR afib 80-110s, BPs low 70-80s/50-60s Maps in 60s, gave 25mg albumin, BP improved to 90-100s/70-80s maps 70-80s. HD canceled today due to soft BPs. Very little UO ~ 5-10/2 hours. OG to LIS, put out ~ 1L thick green fluid. TF through NJ @ 40ml/hr. No BM on shift. Incisions all unchanged and CDI.  Precedex at 1.2 mcg/kg/hr, Heparin at 430 unit(s)/hour.   P: Bowel program, monitor respiratory status, redirect/reorient, continue with plan of care.

## 2019-01-22 NOTE — PROGRESS NOTES
CVTS PROGRESS NOTE         1/22/19         CO-MORBIDITIES:   CKDIII, b/l carotid stenosis, GERD, HTN, CAD       ASSESSMENT:   Aj is a 78 year old female with pmh CKDIII, b/l carotid stenosis, GERD, HTN, CAD s/p ACS transferred [place] [facility] with severe multi-vessel disease who is now pod 6 for three vessel CABG with Dr. Villegas.      PLANS FOR TODAY  Remove arterial line   -HD therapy   -extubate      PLANS:   Neuro/ pain/ sedation:  -mentation improving   -CT head 1/18: normal  -po oxycodone prn, scheduled tylenol   -precedex for sedation and agitation   -urine tox: positive for benzos   -restart PTA buspirone     Pulmonary care:   -Supplemental oxygen to keep saturation above 92 %.  - extubated 1/122, bipap if needed   -chest tubes out 1/21, and CXR shows no pneumothorax today 1/22      Cardiovascular:    -Monitor hemodynamic status.   -1/18: amlodipine 5mg, lopressor 25mg bid   -prn hydralazine for systolic >160  -1/19: episode of A. Fib overnight; rate controled with metoprolol, started amiodarone infusion   -po amiodarone started 1/20       GI care:   -NPO, advance diet as tolerated once extubated  -C/w NGT  - Bowel regimen       Fluids/ Electrolytes/ Nutrition:   - ICU electrolyte replacement protocol     Renal/ Fluid Balance:    -chronic kidney disease III   -Urine output is minimal, nephrology consulted, appreciate recs   -HD evaluated daily       Endocrine:    -Sliding scale insulin       ID/ Antibiotics:  -Klebsiella UTI diagnosed on 1/11, Levaquin therapy ended 1/21/19  -Will monitor WBC count     Heme:     -Hemoglobin stable.   -continue to monitor  -low intensity heparin for A. Fib       Prophylaxis:    -Mechanical prophylaxis for DVT.        Lines/ tubes/ drains:  -NGT, ETT, RIJ CVC, Chen, PIV      Disposition:  -CVICU    OBJECTIVE:     TODAY'S PROGRESS:   SUBJECTIVE:   No acute events overnight       Vitals:  Temp:  [96.4  F (35.8  C)-98  F (36.7  C)] 96.4  F (35.8  C)  Pulse:  [] 79  Heart  Rate:  [] 83  Resp:  [14-28] 20  BP: ()/(64-83) 133/65  MAP:  [47 mmHg-94 mmHg] 61 mmHg  Arterial Line BP: ()/(20-74) 82/50  FiO2 (%):  [30 %-40 %] 30 %  SpO2:  [94 %-100 %] 98 %    Physical Exam:    Gen: Intubated, following commands    CV: RRR, no murmurs, rubs or gallops   Pulm: Lungs CTA, no wheezing or rhonchi  Abd: Soft, NT, ND, +BS  Ext: no LE edema  Neuro: nonfocal, moves all extremities  Incision: c/d/i, no erythema, sternum stable  Tubes/drains: Dressing clean and dry, output remained stable overnight, no air leak.         I&O's:  I/O last 3 completed shifts:  In: 2199.17 [I.V.:689.17; NG/GT:670]  Out: 440 [Urine:208; Emesis/NG output:50; Other:113; Chest Tube:69]    Ventilator Settings:  Ventilation Mode: (S) CPAP/PS  (Continuous positive airway pressure with Pressure Support)  FiO2 (%): 30 %  Rate Set (breaths/minute): 14 breaths/min  Tidal Volume Set (mL): 470 mL  PEEP (cm H2O): 6 cmH2O  Pressure Support (cm H2O): 8 cmH2O  Oxygen Concentration (%): 40 %  Resp: 20      Labs:   BMP  Recent Labs   Lab 01/22/19  0516 01/21/19 2040 01/21/19  1147 01/21/19  0405    136 138 137   POTASSIUM 3.6 3.4 4.0 3.7   CHLORIDE 100 101 101 100   JEFFREY 7.9* 7.8* 7.6* 7.9*   CO2 22 25 22 22   BUN 59* 47* 92* 86*   CR 4.03* 3.37* 5.82* 5.55*   * 126* 111* 130*     CBC  Recent Labs   Lab 01/22/19  0516 01/21/19 2040 01/21/19  1147 01/21/19  0922   WBC 9.0 9.1 8.9 7.6   RBC 2.94* 2.92* 3.04* 2.94*   HGB 8.5* 8.4* 8.7* 8.4*   HCT 26.6* 26.2* 27.4* 26.5*   MCV 91 90 90 90   MCH 28.9 28.8 28.6 28.6   MCHC 32.0 32.1 31.8 31.7   RDW 15.9* 15.7* 16.0* 15.9*    171 180 163     INR  Recent Labs   Lab 01/16/19  0350 01/15/19  2350 01/15/19  2146 01/15/19  2000   INR 0.93 1.61* 1.58* 1.55*      Hepatic Panel   Recent Labs   Lab 01/17/19  0400 01/16/19  0350 01/15/19  2146 01/15/19  2000   AST 50* 55* 57* 62*   ALT 10 22 25 23   ALKPHOS 41 40 40 44   BILITOTAL 0.5 1.0 1.2 0.9   ALBUMIN 2.7* 2.8* 2.9*  2.7*     Glucose  Recent Labs   Lab 01/22/19  0756 01/22/19  0517 01/22/19  0516 01/22/19  0007 01/21/19  2040 01/21/19  2035 01/21/19  1601 01/21/19  1147 01/21/19  1135  01/21/19  0405  01/20/19  1513  01/20/19  0424   GLC  --   --  128*  --  126*  --   --  111*  --   --  130*  --  147*  --  154*   * 136*  --  132*  --  122* 147*  --  113*   < >  --    < >  --    < >  --     < > = values in this interval not displayed.     Blood Gas  Recent Labs   Lab 01/21/19  1836 01/18/19  0806 01/18/19  0320 01/18/19  0000 01/17/19 2037   PH 7.52*  --  7.39 7.38 7.45   PCO2 28*  --  38 38 33*   PO2 92  --  89 97 81   HCO3 23  --  23 22 23   O2PER 40.0 40.0 50.0  50.0 50.0  50.0 50  50       Imaging:   Recent Results (from the past 24 hour(s))   XR Chest Port 1 View    Narrative    Exam:  Chest X-ray 1/21/2019 1:26 PM    History: chest tube removal    Comparison: 1/20/2019    Findings: Semiupright frontal radiograph the chest. The endotracheal  tube is proximal and 2.9 cm above the ursula. Median sternotomy wires.  The gastric tube sidehole projects near the gastroesophageal junction.  The feeding tube courses below the level the diaphragm with the tip,  and out of view. Right IJ central venous catheter with the tip  projecting over the low SVC. The trachea is midline. Stable cardiac  mediastinal silhouette. Mitral annular calcifications. Interval  removal of bilateral chest tubes. No appreciable pneumothorax. Small  left pleural effusion and associated left basilar opacities. The  visualized upper abdomen is unremarkable.      Impression    Impression:   1. Interval removal of bilateral chest tubes with no appreciable  pneumothorax.  2. Gastric tube sidehole projects at the gastroesophageal junction.  Recommend repositioning.  3. Stable left pleural effusion and associated left basilar opacities,  atelectasis and/or consolidation.    I have personally reviewed the examination and initial interpretation  and I agree  with the findings.    FERMÍN HEAD MD       Discussed with CVTS fellow  Lani Dc MD

## 2019-01-22 NOTE — PLAN OF CARE
VSS. Pt on CMV settings most of the night and tolerated well, pt placed on CPAP/PS at 0600 and tolerating well. Pt responded well to seroquel for the first part of the night and then was restless on and off throughout the night. Pt would settle down some after being repositioned. Plan is to hopefully extubate today, asked MD to decrease freq of labs and then remove ART line as pt doesn't have a central line and labs are ordered q8 hours. Continue per POC.

## 2019-01-22 NOTE — PROGRESS NOTES
Care Coordinator Progress Note    Admission Date/Time:  1/14/2019  Attending MD:  Haseeb Villegas MD    Data  Chart reviewed, discussed with interdisciplinary team.   Patient transferred from [facility] [place] for CABG.    Assessment  Pt is s/p CABG X 5 on 1/15.  Pt is still vented and doing PS.  Per morning report and chart review, plan to extubated pt today.  PT/OT are following and rehab need is anticipated.  RNCC will cont to follow plan of care     Plan  Anticipated Discharge Date:  TBD.  Anticipated Discharge Plan:   TBD.  RNCC will cont to follow plan of care.      Aneta Plunkett RN, PHN, BSN  4A and 4E/ ICU  Care Coordinator  Phone: [phone]  Pager: [phone]

## 2019-01-22 NOTE — PROGRESS NOTES
Nephrology Progress Note  01/22/2019       Mrs Rocha is an 78 yof w/CKD III, Carotid stenosis, GERD, HTN, CAD who had emergent CABG x3 on 1/15, Nephrology consulted for NATALIA on CKD, started HD on 1/21.      Interval History :   Mrs Rocha is scheduled for 2nd HD run today, again with no UF with borderline BP's as we do not want to cause another NATALIA.  Still with some UOP (~185cc yesterday, similar trajectory today).  I have not had discussion with family personally yet regarding long term HD but per previous renal providers she would not want long term HD, will see how UOP and chemistries trend in coming days.  With essentially solitary kidney and significant NATALIA on CKD post CABG chances of recovery are on lower side.  Will consider UF with future runs if hemodynamics improve a bit.      Assessment & Recommendations:   NATALIA on CKD-CKD 3, baseline Cr of ~2.0 with one atrophic kidney on imaging with no former imaging to compare.  Injury involving hemodynamics with CABG as well as contrast for angiogram prior.  Started on HD 1/21 for clearance, today is the second HD run with similar plan.     -HD, moderate BFR today (300ml/min)   -Line is RIJ from 1/20.      Volume status-Mild volume overload but with borderline BP's running with no UF today, may try to pull some fluid if hemodynamics improve, still making minimal UOP but not anuric.     Electrolytes/pH-K 3.6, bicarb 22.      Ca/phos/pth-Ca 7.9, Phos 5.1 but should come down with HD.      Anemia-Hgb 8.5, following.      Nutrition-On Nepro TF    Seen and discussed with Dr Pizarro    Recommendations were communicated to primary team via verbal communication.     Quincy Jesus  Clinical Nurse Specialist  [phone]    Review of Systems:   I reviewed the following systems:  ROS not done due to mental status    Physical Exam:   I/O last 3 completed shifts:  In: 2744.82 [I.V.:764.82; NG/GT:850]  Out: 1216 [Urine:203; Emesis/NG output:900; Other:113]   BP 98/78   Pulse 100    Temp 97.9  F (36.6  C) (Axillary)   Resp 22   Wt 74.6 kg (164 lb 7.4 oz)   SpO2 95%   BMI 29.13 kg/m       GENERAL APPEARANCE: Awake, extubated but minimally interactive.   EYES:  No scleral icterus, pupils equal  HENT: mouth without ulcers or lesions  PULM: lungs rhonchi to auscultation  CV: irregular rhythm, normal rate, no rub     -edema +1LE  GI: soft, non-tender, non-distended, bowel sounds are +  MS: no evidence of inflammation in joints, no muscle tenderness  NEURO: Awake but minimally interactive, moving extremities, No asterixis   Lines: 1/20 [facility]    Labs:   All labs reviewed by me  Electrolytes/Renal -   Recent Labs   Lab Test 01/22/19  0516 01/21/19 2040 01/21/19  1147  01/20/19  0424  01/19/19  0456    136 138   < > 142   < > 143   POTASSIUM 3.6 3.4 4.0   < > 4.2   < > 4.2   CHLORIDE 100 101 101   < > 104   < > 106   CO2 22 25 22   < > 20   < > 19*   BUN 59* 47* 92*   < > 118*   < > 96*   CR 4.03* 3.37* 5.82*   < > 7.17*   < > 6.11*   * 126* 111*   < > 154*   < > 146*   JEFFREY 7.9* 7.8* 7.6*   < > 7.4*   < > 7.7*   MAG 2.1  --   --   --  3.2*  --  3.0*   PHOS 5.1*  --   --   --  7.4*  --  6.4*    < > = values in this interval not displayed.       CBC -   Recent Labs   Lab Test 01/22/19  0516 01/21/19  2040 01/21/19  1147   WBC 9.0 9.1 8.9   HGB 8.5* 8.4* 8.7*    171 180       LFTs -   Recent Labs   Lab Test 01/22/19  0516 01/17/19  0400 01/16/19  0350 01/15/19  2146   ALKPHOS  --  41 40 40   BILITOTAL  --  0.5 1.0 1.2   ALT  --  10 22 25   AST  --  50* 55* 57*   PROTTOTAL  --  5.3* 5.6* 5.2*   ALBUMIN 2.2* 2.7* 2.8* 2.9*       Iron Panel -   Recent Labs   Lab Test 01/27/15  1218 07/11/12  0804   IRON 74 80   IRONSAT 21 26   ORQUIDEA 40 67           Current Medications:    amiodarone  400 mg Oral or Feeding Tube BID     amLODIPine  5 mg Oral or Feeding Tube Daily     aspirin  325 mg Oral or Feeding Tube Daily     atorvastatin  40 mg Oral QPM     busPIRone  5 mg Oral or Feeding Tube BID      insulin aspart  1-6 Units Subcutaneous Q4H     metoprolol tartrate  25 mg Oral or Feeding Tube BID     multivitamins w/minerals  15 mL Per Feeding Tube Daily     pantoprazole (PROTONIX) IV  40 mg Intravenous BID     polyethylene glycol  17 g Oral or Feeding Tube Daily     protein modular  1 packet Per Feeding Tube Daily     QUEtiapine  50 mg Oral At Bedtime     senna-docusate  1 tablet Oral or Feeding Tube BID    Or     senna-docusate  2 tablet Oral or Feeding Tube BID     sodium chloride (PF)  3 mL Intracatheter Q8H       dexmedetomidine 1.2 mcg/kg/hr (01/22/19 1400)     IV fluid REPLACEMENT ONLY       IV fluid REPLACEMENT ONLY       HEParin 430 Units/hr (01/22/19 1400)     BETA BLOCKER NOT PRESCRIBED         I, Amanda Pizarro, saw and evaluated this patient as part of a shared visit.  I have reviewed and discussed with the advanced practice provider their history, physical and plan.    I personally reviewed the vital signs, medications, labs and imaging.    My key history or physical exam findings:  NATALIA, s/p CABG  Key management decisions made by me:  HD for solute and volume control on a trial basis    Amanda Pizarro  Date of Service (when I saw the patient): 1/22

## 2019-01-23 NOTE — PROGRESS NOTES
Intubation Note:    Assisted with endotracheal intubation for respiratory failure/airway protection. A #8.0 ETT was placed and secured at 27 cm @ lip. Positive color change noted with CO2 detector, breath sounds were clear, diminished, equal bilaterally. Patient placed on full vent support, labs and CXR pending.    Patiet is currently on the following vent settings:  Ventilation Mode: CMV/AC  (Continuous Mandatory Ventilation/ Assist Control)  FiO2 (%): 40 %  Rate Set (breaths/minute): 14 breaths/min  Tidal Volume Set (mL): 470 mL  PEEP (cm H2O): 14 cmH2O  Pressure Support (cm H2O): 8 cmH2O  Oxygen Concentration (%): 100 %  Resp: 22      Rojas Terry RT  1/23/2019 2:04 AM

## 2019-01-23 NOTE — PLAN OF CARE
Care assumed at 0400. CRRT initiated. Family bedside and updated. Heparin and tube feed on hold. Patient will continue to be monitored and assessed. Please see MAR, flow sheets, and remainder of chart for further details. .

## 2019-01-23 NOTE — PLAN OF CARE
D: POD #8 CABG x3 with Dr. Villegas  I/A: Opened eyes to repeated stimulation, does not follow commands. Withdraws from pain. Restless upon moving/turning, sedation titrated as necessary. Hypothermic this AM, noy hugger on, T now 96-97 axillary. Weaned Fio2 at tolerated to keep sats >92, ABGs adequate. HR Afib rates . BP labile, MAPs <55, Vaso restarted. CRRT goal I=O, filter changed around 1630. Midsternal incision leaking serous fluid, MD aware. Dressing monitored. Minimal output from pleural CT. Jud lady enema given, pt had 1 incontinent loose stool after. TF restarted at goal rate. PICC placed, verified via xray. Pt family updated in person and via phone on pt status.   R: Continue POC, anticipate care conference tomorrow at 1030.

## 2019-01-23 NOTE — PROGRESS NOTES
Cardiothoracic Surgery    Patient went into PEA, coded with ROSC s/p compressions, intubation, and epi.     Since ROSC, using sterile technique, and without consent given the emergent nature, a right femoral arterial line, right femoral triple lumen central venous catheter, and a left chest tube were placed.    CXR had shown hemothorax, chest tube placed with approximately 200mL dark venous blood on bed and another 200 in pleuravac. She had an acute drop in Hb this evening, and has received 4 units PRBC, 1 platelets, and 1/2 dose factor 7 (for INR 4)    We are currently weaning off pressors. The family has been updated.     Quentin woody

## 2019-01-23 NOTE — PROGRESS NOTES
CVTS  PROGRESS NOTE                                                                                      1/23/19             CO-MORBIDITIES:   CKDIII, b/l carotid stenosis, GERD, HTN, CAD     ASSESSMENT:   Aj is a 78 year old female with pmh CKDIII, b/l carotid stenosis, GERD, HTN, CAD [place]p [place] [facility] [place] who is now s/p CABG  X 3 with Dr. Villegas 1/15.    PLAN FOR TODAY:    - TTE given PEA arrest yesterday   - Vent settings; TV decreased to 450 ml ,ABG in 30 minutes  -Abdominal KUB and restart tube feeds  -PICC line placement today  -CT Scan Head if mental status does not improve this afternoon   - Hold parameters for metoprolol and amlodipine given borderline MAPs  - Suppository ordered   -Speak with primary about ASA     PLANS:   Neuro/ pain/ sedation:  -CT head 1/18: normal  -precedex for sedation and agitation   - Will obtain CT head 1/23 if mental status fails to improve      Pulmonary care:   -CMV /AC; TV decreased to 450cc, check ABG (prior ABg shows respiratory alkalosis)  - chest tube placed 1/23 AM because of hemothorax      Cardiovascular:    -Monitor hemodynamic status. Weaned off pressors , lactate trending down   -1/18: amlodipine 5mg, lopressor 25mg bid . Hold given low MAPs  - Cotninue po amiodarone started 1/20   - F/u formal echo       GI care:   - Check NJ position with AXR and restart feeds  - Bowel regimen, will add pink lady enema today        Fluids/ Electrolytes/ Nutrition:   - ICU electrolyte replacement protocol     Renal/ Fluid Balance:    -chronic kidney disease III   -Urine output is minimal, nephrology consulted, appreciate recs   -HD evaluated daily, aim for I=O given tenuous hemodynamic status        Endocrine:    -Sliding scale insulin       ID/ Antibiotics:  -Klebsiella UTI diagnosed on 1/11, Levaquin therapy ended 1/21/19     Heme:     -Hemoglobin stable.   -continue to monitor      Prophylaxis:    -Mechanical prophylaxis for DVT.    -  Hold heparin gtt ( was started for a fib)      Lines/ tubes/ drains:  -NGT, NJ,  ETT, femo A line and CVC, Chen, FREDRICK      Disposition:  - CVICU     OBJECTIVE:      TODAY'S PROGRESS:   SUBJECTIVE:   Patient went into PEA, coded with ROSC s/p compressions, intubation, and epi. aorund 2 AM. A right femoral arterial line, right femoral triple lumen central venous catheter, and a left chest tube were placed.   CXR had shown hemothorax, chest tube placed with approximately 200mL dark venous blood on bed and another 200 in pleuravac. She had an acute drop in Hb and  received 4 units PRBC, 1 platelets, and 1/2 dose factor 7 (for INR 4) overnight. Weaned off pressors this AM.           Vitals:  BP 92/80   Pulse 112   Temp 94.1  F (34.5  C)   Resp 14   Wt 74.6 kg (164 lb 7.4 oz)   SpO2 99%   BMI 29.13 kg/m      Physical Exam:     Gen: Intubated, opens eyes to voice, not following commands   CV: RRR, no murmurs, rubs or gallops   Pulm: Lungs CTA, no wheezing or rhonchi  Abd: Soft, NT, ND, +BS  Ext: no LE edema  Neuro: opens eyes to voice, not following commands   Incision: c/d/i with oozing from incision, no erythema, sternum stable  Tubes/drains: Dressing clean and dry, output remained stable overnight, no air leak.         I&O's:  I/O  CT : 510 ml last shift   UOP: minimal      Ventilator Settings:  Ventilation Mode: CMV/AC  FiO2 (%): 100  Rate Set (breaths/minute): 14 breaths/min  Tidal Volume Set (mL): 450 mL  PEEP (cm H2O): 12       Labs:   BMP         Recent Labs   Lab 01/22/19  0516 01/21/19  2040 01/21/19  1147 01/21/19  0405    136 138 137   POTASSIUM 3.6 3.4 4.0 3.7   CHLORIDE 100 101 101 100   JEFFREY 7.9* 7.8* 7.6* 7.9*   CO2 22 25 22 22   BUN 59* 47* 92* 86*   CR 4.03* 3.37* 5.82* 5.55*   * 126* 111* 130*      CBC         Recent Labs   Lab 01/22/19  0516 01/21/19  2040 01/21/19  1147 01/21/19  0922   WBC 9.0 9.1 8.9 7.6   RBC 2.94* 2.92* 3.04* 2.94*   HGB 8.5* 8.4* 8.7* 8.4*   HCT 26.6* 26.2* 27.4*  26.5*   MCV 91 90 90 90   MCH 28.9 28.8 28.6 28.6   MCHC 32.0 32.1 31.8 31.7   RDW 15.9* 15.7* 16.0* 15.9*    171 180 163      INR         Recent Labs   Lab 01/16/19  0350 01/15/19  2350 01/15/19  2146 01/15/19  2000   INR 0.93 1.61* 1.58* 1.55*      Hepatic Panel          Recent Labs   Lab 01/17/19  0400 01/16/19  0350 01/15/19  2146 01/15/19  2000   AST 50* 55* 57* 62*   ALT 10 22 25 23   ALKPHOS 41 40 40 44   BILITOTAL 0.5 1.0 1.2 0.9   ALBUMIN 2.7* 2.8* 2.9* 2.7*      Glucose                    Recent Labs   Lab 01/22/19  0756 01/22/19  0517 01/22/19  0516 01/22/19  0007 01/21/19  2040 01/21/19  2035 01/21/19  1601 01/21/19  1147 01/21/19  1135   01/21/19  0405   01/20/19  1513   01/20/19  0424   GLC  --   --  128*  --  126*  --   --  111*  --   --  130*  --  147*  --  154*   * 136*  --  132*  --  122* 147*  --  113*   < >  --    < >  --    < >  --     < > = values in this interval not displayed.      Blood Gas          Recent Labs   Lab 01/21/19  1836 01/18/19  0806 01/18/19  0320 01/18/19  0000 01/17/19 2037   PH 7.52*  --  7.39 7.38 7.45   PCO2 28*  --  38 38 33*   PO2 92  --  89 97 81   HCO3 23  --  23 22 23   O2PER 40.0 40.0 50.0  50.0 50.0  50.0 50  50         Imaging:       Recent Results (from the past 24 hour(s))   XR Chest Port 1 View     Narrative     Exam:  Chest X-ray 1/21/2019 1:26 PM     History: chest tube removal     Comparison: 1/20/2019     Findings: Semiupright frontal radiograph the chest. The endotracheal  tube is proximal and 2.9 cm above the ursula. Median sternotomy wires.  The gastric tube sidehole projects near the gastroesophageal junction.  The feeding tube courses below the level the diaphragm with the tip,  and out of view. Right IJ central venous catheter with the tip  projecting over the low SVC. The trachea is midline. Stable cardiac  mediastinal silhouette. Mitral annular calcifications. Interval  removal of bilateral chest tubes. No appreciable pneumothorax.  Small  left pleural effusion and associated left basilar opacities. The  visualized upper abdomen is unremarkable.        Impression     Impression:   1. Interval removal of bilateral chest tubes with no appreciable  pneumothorax.  2. Gastric tube sidehole projects at the gastroesophageal junction.  Recommend repositioning.  3. Stable left pleural effusion and associated left basilar opacities,  atelectasis and/or consolidation.     I have personally reviewed the examination and initial interpretation  and I agree with the findings.     FERMÍN HEAD MD         Discussed with staff    Miguel Reeves MD

## 2019-01-23 NOTE — PROGRESS NOTES
CRRT STATUS NOTE    DATA:  Time:  6:16 AM  Pressures WNL:  YES  Filter Status:  WDL    Problems Reported/Alarms Noted:  NA    Supplies Present:  YES    ASSESSMENT:  Patient Net Fluid Balance:  1/22: +1.5L, 1/23: +809 ml  Vital Signs:  Vital signs:  Temp: 94.8  F (34.9  C) Temp src: Esophageal BP: 92/80 Pulse: 112 Heart Rate: 88 Resp: 16 SpO2: 99 % O2 Device: Mechanical Ventilator /73(91)  Labs:  Cr 4.45, Mag 2.1, Phos 10.6, LA 5.4, WBC 17.2  Goals of Therapy:  Net negative 0-100 with MAP > 65 and no increase in pressor requirements.    INTERVENTIONS:   Started CRRT this morning with blood warmer on.    PLAN:  Continue CVVHDF per renal orders. Contact CRRT RN with questions or concerns at [phone].

## 2019-01-23 NOTE — PROGRESS NOTES
Care Coordinator Progress Note    Admission Date/Time:  1/14/2019  Attending MD:  Haseeb Villegas MD    Data  Chart reviewed, discussed with interdisciplinary team.   Patient was admitted for:    Acute kidney injury (H)  CKD (chronic kidney disease) stage 3, GFR 30-59 ml/min (H).    Assessment  Pt is s/p CABG X 5 on 1/15.  Pt had a code this morning around 2 am and got reintubated.  Pt was extubated yesterday, 1/22.  Pt is vented, sedated and on CRRT.     Bedside RN stated the team have informed family to give pt 24 hrs and re-evaluate her progress and pt family has expressed that pt will not want to be on HD if her kidney doesn't recover.  Bedside RN stated pt family will benefit from care conf.  RNCC discussed about the care conf. with CVICU team.  The team agreed to meet.  RNCC paged Dr. Villgeas # [phone].  Dr. Villegas is in OR and RNCC was unable to talk to him.  Dr. Reeves talked to Dr. Villegas about the care conf.  Dr. Villegas agreed to meet tomorrow, 1/24 at 10:30.  RNCC called pt dtr, Manju # [phone] and informed her the care conf. date and time.  Manju agreed with the date and time and stated she will inform the other family members.  Team members that have been informed about the care conf. are; CVTS ( Dr. Villegas # [phone] and Dr. Reeves # [id number])), CVICU ( Dr. Evans ), Nephrology ( Quincy Jesus, MICHELE # [phone].  Text message have been sent), and  bedside RN ( Zulma).       Plan  Anticipated Discharge Date:  TBD.  Anticipated Discharge Plan:   TBD.  Care conf. Arranged for tomorrow, 1/24 at 10:30 to update family and discuss the plan of care.  RNCC will cont to follow plan of care.      Aneta Plunkett, RN, PHN, BSN  4A and 4E/ ICU  Care Coordinator  Phone: [phone]  Pager: [phone]

## 2019-01-23 NOTE — PROGRESS NOTES
CRRT INITIATION NOTE    Consent for CRRT Completed:  NO, Started emergently, asked physician to obtain consent now.  Patient s Vascular Access: Catheter              Placement Confirmed: YES  Manufacture:  [employer]  Model:  Edifilm Elite  Length/Macedonian Size:  12 Fr / 20 cm  Flush Volume:  1.4/1.4    DATA:  Procedure:  CVVHDF  Start Time:  0429  Machine#:  [id number]  Filter:  M150  Blood Flow:  200  ML/min  Pre-Replacement Solution:  Phoxillum BK 4/2.5  Post-Replacement Solution:  Phoxillum BK 4/2.5  Dialysate Solution:  Phoxillum BK 4/2.5  Pre-Replacement Solution Rate:  900 mL/hr  Post-Replacement Solution Rate:  200 mL/hr  Dialysate Flow Rate:  900 mL/hr   Patient Removal Rate:  100 mL/hr  Anticoagulation Type and Rate:  NA    ASSESSMENT:  How Patient Tolerated Initiation:   Vital Signs:  T 35.7 C, , /79(92), SpO2 99%, RR 16  Initial Pressures:  Access:  -68  Filter:  118  Return:  69  TMP:  54  Change in Filter Pressure:  23      INTERVENTIONS:  CRRT initiated with blood warmer on.    PLAN:  Continue CVVHDF per renal orders. Contact CRRT RN with questions or concerns at [phone].

## 2019-01-23 NOTE — PROGRESS NOTES
CLINICAL NUTRITION SERVICES - REASSESSMENT NOTE     Nutrition Prescription    RECOMMENDATIONS FOR MDs/PROVIDERS TO ORDER:  Increase bowel regimen if continues with no BM    Malnutrition Status:    Patient does not meet two of the criteria necessary for diagnosing malnutrition but is at risk for malnutrition    Recommendations already ordered by Registered Dietitian (RD):  1. Continue Nepro @ 40 ml/hr (960 ml/day) to provide 1728 kcals, 78 g PRO, 701 ml free H2O, 155 g CHO and 12 g Fiber daily.      2. Increase to 1 Pkt ProSource BID (80 kcal, 22 g PRO) for total 1808 kcal (32 kcal/kg) and 100 g PRO (1.8 g/kg)    3. Change certavite to nephronex on CRRT    Future/Additional Recommendations:  If remains intubated and once TF stable at goal, recommend obtain metabolic cart study to assess approp of energy provisions to avoid over/under feeding.     EVALUATION OF THE PROGRESS TOWARD GOALS   Diet: NPO  Nutrition Support: Nepro @ 40 ml/hr  1 Pkt ProSource daily (40 kcal, 11 g PRO) for total 1768 kcal (31 kcal/kg) and 89 g PRO (1.6 g/kg)  Intake:  TF held overnight during code, plan to restart after AXR. Average intakes over past 5 days (TF + prosource) = 1725 kcal (30 kcal/kg) and 87 g PRO (1.5 g/kg)     NEW FINDINGS   -Resp/CV: Extubated yesterday and re-intubated overnight. Will defer metabolic cart as TF has been off.   -Wt: 74.6 kg today from 71.8 kg on admit.   -GI: Last BM on 1/18. Bowel regimen ordered.   -Skin: Abisai 9. On certavite.   -Renal: CRRT started overnight. K+ 3.8 and phos 7.3.     Updated ASSESSED NUTRITION NEEDS per 57 kg dosing wt  Estimated Protein Needs: 86 - 114 grams protein/day (1.5 - 2 grams of pro/kg)  Justification: CRRT    MALNUTRITION  % Intake: No decreased intake noted  % Weight Loss: None noted  Subcutaneous Fat Loss: None observed  Muscle Loss: Temporal, Thoracic region (clavicle, acromium bone, deltoid, trapezius, pectoral), Upper arm (bicep, tricep), Lower arm  (forearm), Dorsal hand  and Posterior calf: all moderate vs age-related muscle depletion  Fluid Accumulation/Edema: Does not meet criteria  Malnutrition Diagnosis: Patient does not meet two of the above criteria necessary for diagnosing malnutrition but is at risk for malnutrition    Previous Goals   Total avg nutritional intake to meet a minimum of 25 kcal/kg and 1.2 g PRO/kg daily (per dosing wt 57 kg).  Evaluation: Met    Previous Nutrition Diagnosis  Inadequate protein-energy intake related to NPO on vent without TF started as evidenced by no TF started x 48 hours    Evaluation: Resolved    CURRENT NUTRITION DIAGNOSIS  Predicted inadequate nutrient intake (kcal/PRO) related to reliance on TF to meet needs while vented, increased protein needs on CRRT as evidenced by TF currently meeting estimated needs but with potential for TF interruptions to result in meeting < estimated needs      INTERVENTIONS  Implementation  Collaboration with other providers - Rounds, discussed TF restart with RN  Enteral Nutrition - Increase prosource  Multivitamin - change to nephronex    Goals  Total avg nutritional intake to meet a minimum of 25 kcal/kg and 1.2 g PRO/kg daily (per dosing wt 57 kg).    Monitoring/Evaluation  Progress toward goals will be monitored and evaluated per protocol.    Jane Prescott RD, LD, CNSC  CVICU Dietitian  Pager: [phone]

## 2019-01-23 NOTE — PROGRESS NOTES
CVICU PROGRESS NOTE                                                                                      1/23/19             CO-MORBIDITIES:   CKDIII, b/l carotid stenosis, GERD, HTN, CAD     ASSESSMENT:   Aj is a 78 year old female with pmh CKDIII, b/l carotid stenosis, GERD, HTN, CAD s/p ACS transferred from [facility] [place] with severe multi-vessel disease who is now s/p CABG  X 3 with Dr. Villegas 1/15.    PLAN FOR TODAY:    - TTE given PEA arrest yesterday   - Vent settings; TV decreased to 450 ml ,ABG in 30 minutes  -Abdominal KUB and restart tube feeds  -PICC line placement today  -CT Scan Head if mental status does not improve this afternoon   - Hold parameters for metoprolol and amlodipine given borderline MAPs  - Suppository ordered   -Speak with primary about ASA     PLANS:   Neuro/ pain/ sedation:  -CT head 1/18: normal  -precedex for sedation and agitation   - Will obtain CT head 1/23 if mental status fails to improve      Pulmonary care:   -CMV /AC; TV decreased to 450cc, check ABG (prior ABg shows respiratory alkalosis)  - chest tube placed 1/23 AM because of hemothorax      Cardiovascular:    -Monitor hemodynamic status. Weaned off pressors , lactate trending down   -1/18: amlodipine 5mg, lopressor 25mg bid . Hold given low MAPs  - Cotninue po amiodarone started 1/20   - F/u formal echo       GI care:   - Check NJ position with AXR and restart feeds  - Bowel regimen, will add pink lady enema today        Fluids/ Electrolytes/ Nutrition:   - ICU electrolyte replacement protocol     Renal/ Fluid Balance:    -chronic kidney disease III   -Urine output is minimal, nephrology consulted, appreciate recs   -HD evaluated daily, aim for I=O given tenuous hemodynamic status        Endocrine:    -Sliding scale insulin       ID/ Antibiotics:  -Klebsiella UTI diagnosed on 1/11, Levaquin therapy ended 1/21/19     Heme:     -Hemoglobin stable.   -continue to monitor      Prophylaxis:    -Mechanical prophylaxis for DVT.    -  Hold heparin gtt ( was started for a fib)      Lines/ tubes/ drains:  -NGT, NJ,  ETT, femo A line and CVC, Chen, FREDRICK      Disposition:  - CVICU     OBJECTIVE:      TODAY'S PROGRESS:   SUBJECTIVE:   Patient went into PEA, coded with ROSC s/p compressions, intubation, and epi. aorund 2 AM. A right femoral arterial line, right femoral triple lumen central venous catheter, and a left chest tube were placed.   CXR had shown hemothorax, chest tube placed with approximately 200mL dark venous blood on bed and another 200 in pleuravac. She had an acute drop in Hb and  received 4 units PRBC, 1 platelets, and 1/2 dose factor 7 (for INR 4) overnight. Weaned off pressors this AM.           Vitals:  BP 92/80   Pulse 112   Temp 94.1  F (34.5  C)   Resp 14   Wt 74.6 kg (164 lb 7.4 oz)   SpO2 99%   BMI 29.13 kg/m      Physical Exam:     Gen: Intubated, opens eyes to voice, not following commands   CV: RRR, no murmurs, rubs or gallops   Pulm: Lungs CTA, no wheezing or rhonchi  Abd: Soft, NT, ND, +BS  Ext: no LE edema  Neuro: opens eyes to voice, not following commands   Incision: c/d/i with oozing from incision, no erythema, sternum stable  Tubes/drains: Dressing clean and dry, output remained stable overnight, no air leak.         I&O's:  I/O  CT : 510 ml last shift   UOP: minimal      Ventilator Settings:  Ventilation Mode: CMV/AC  FiO2 (%): 100  Rate Set (breaths/minute): 14 breaths/min  Tidal Volume Set (mL): 450 mL  PEEP (cm H2O): 12       Labs:   BMP         Recent Labs   Lab 01/22/19  0516 01/21/19  2040 01/21/19  1147 01/21/19  0405    136 138 137   POTASSIUM 3.6 3.4 4.0 3.7   CHLORIDE 100 101 101 100   JEFFREY 7.9* 7.8* 7.6* 7.9*   CO2 22 25 22 22   BUN 59* 47* 92* 86*   CR 4.03* 3.37* 5.82* 5.55*   * 126* 111* 130*      CBC         Recent Labs   Lab 01/22/19  0516 01/21/19  2040 01/21/19  1147 01/21/19  0922   WBC 9.0 9.1 8.9 7.6   RBC 2.94* 2.92* 3.04* 2.94*   HGB 8.5* 8.4* 8.7* 8.4*   HCT 26.6* 26.2* 27.4*  26.5*   MCV 91 90 90 90   MCH 28.9 28.8 28.6 28.6   MCHC 32.0 32.1 31.8 31.7   RDW 15.9* 15.7* 16.0* 15.9*    171 180 163      INR         Recent Labs   Lab 01/16/19  0350 01/15/19  2350 01/15/19  2146 01/15/19  2000   INR 0.93 1.61* 1.58* 1.55*      Hepatic Panel          Recent Labs   Lab 01/17/19  0400 01/16/19  0350 01/15/19  2146 01/15/19  2000   AST 50* 55* 57* 62*   ALT 10 22 25 23   ALKPHOS 41 40 40 44   BILITOTAL 0.5 1.0 1.2 0.9   ALBUMIN 2.7* 2.8* 2.9* 2.7*      Glucose                    Recent Labs   Lab 01/22/19  0756 01/22/19  0517 01/22/19  0516 01/22/19  0007 01/21/19  2040 01/21/19  2035 01/21/19  1601 01/21/19  1147 01/21/19  1135   01/21/19  0405   01/20/19  1513   01/20/19  0424   GLC  --   --  128*  --  126*  --   --  111*  --   --  130*  --  147*  --  154*   * 136*  --  132*  --  122* 147*  --  113*   < >  --    < >  --    < >  --     < > = values in this interval not displayed.      Blood Gas          Recent Labs   Lab 01/21/19  1836 01/18/19  0806 01/18/19  0320 01/18/19  0000 01/17/19 2037   PH 7.52*  --  7.39 7.38 7.45   PCO2 28*  --  38 38 33*   PO2 92  --  89 97 81   HCO3 23  --  23 22 23   O2PER 40.0 40.0 50.0  50.0 50.0  50.0 50  50         Imaging:       Recent Results (from the past 24 hour(s))   XR Chest Port 1 View     Narrative     Exam:  Chest X-ray 1/21/2019 1:26 PM     History: chest tube removal     Comparison: 1/20/2019     Findings: Semiupright frontal radiograph the chest. The endotracheal  tube is proximal and 2.9 cm above the ursula. Median sternotomy wires.  The gastric tube sidehole projects near the gastroesophageal junction.  The feeding tube courses below the level the diaphragm with the tip,  and out of view. Right IJ central venous catheter with the tip  projecting over the low SVC. The trachea is midline. Stable cardiac  mediastinal silhouette. Mitral annular calcifications. Interval  removal of bilateral chest tubes. No appreciable pneumothorax.  Small  left pleural effusion and associated left basilar opacities. The  visualized upper abdomen is unremarkable.        Impression     Impression:   1. Interval removal of bilateral chest tubes with no appreciable  pneumothorax.  2. Gastric tube sidehole projects at the gastroesophageal junction.  Recommend repositioning.  3. Stable left pleural effusion and associated left basilar opacities,  atelectasis and/or consolidation.     I have personally reviewed the examination and initial interpretation  and I agree with the findings.     FERMÍN HEAD MD         Discussed with staff    Miguel Reeves MD

## 2019-01-23 NOTE — PLAN OF CARE
Patient went into PEA arrest around 0100, code called, ROSC s/p compressions, intubation, and epi. Prior to code, pt VSS, continued on oxyplus mask at 9L/min and sating in mid 90's. No issues with BP or HR. Patient began to shekhar down to 40's, and was unresponsive. Code called. Right femoral arterial line, right femoral triple lumen central venous catheter, and a left chest tube were placed. Per MD, CXR had shown hemothorax, chest tube placed with approximately 200mL dark venous blood on bed and another 200 in pleuravac. She had an acute drop in Hb this evening, and has received 4 units PRBC, 1 platelets, and 1/2 dose factor 7 (for INR 4). Dana Horton has taken over as pt. Is now on CRRT.

## 2019-01-23 NOTE — PROGRESS NOTES
CRRT RESTART NOTE    Reason for Restart:  Filter clotted  Error Code:  N/A    Patient s Vascular Access: RIJ Catheter                   Placement Confirmed:  YES  Manufacture:  [employer]  Model:  [employer] Elite  Length/Scottish Size:  12 Fr x 20 cm  Flush Volume:  A 1.4 mL / V 1.4 mL    DATA:  Procedure:  CVVHDF  Start Time:  1634  Machine#:  11  Filter:  M150  Blood Flow:   200 mL/min  Pre-Replacement Solution:  Phoxillum BK4/2.5  Post-Replacement Solution:  Phoxillum BK4/2.5  Dialysate Solution:  Phoxillum BK4/2.5  Pre-Replacement Solution Rate:  900 mL/hr  Post-Replacement Solution Rate:  200 mL/hr  Dialysate Flow Rate:  900 mL/hr  Patient Removal Rate:  80 mL/hr  Anticoagulation Type and Rate:  N/A    ASSESSMENT:  How Patient Tolerated Restart:  well  Vital Signs:  T 97.7, HR 94, BP 90/54 (72) on Vasopressin, RR 17, SpO2 99% on vent support (60% FiO2, PEEP 12)  Initial Pressures:  Access:  -62  Filter:  118  Return:  66  TMP:  46  Change in Filter Pressure:  29    INTERVENTIONS:  Restarted per orders.  Blood warmer applied.  Initial fluid removal to 80 mL/hr per bedside RN.    PLAN:  Fluid removal as tolerated per goals of therapy.  Check filter daily, change filter q 72 hrs and PRN.  Please contact the CRRT resource RN at [phone] with any questions/concerns.

## 2019-01-23 NOTE — ANESTHESIA PROCEDURE NOTES
ANESTHESIOLOGY RESIDENT/CRNA INTUBATION NOTE  Indication for intubation: cardiac arrest.  Provider Ordering Intubation: Yancy Luo MD  History regarding the most recent potassium obtained: No  History regarding renal failure obtained: No  History of presence or absence of CVA/stroke was obtained: No  History of presence or absence of NM disorder obtained: No  Post Intubation: No apparent complications, Sedation to be ordered by primary/ICU team, Primary/ICU team to review CXR, Vent settings by primary/ICU team, ETT secured and Report given to primary nurse and/or team

## 2019-01-23 NOTE — PROGRESS NOTES
Nephrology Progress Note  01/23/2019         Mrs Rocha is an 78 yof w/CKD III, Carotid stenosis, GERD, HTN, CAD who had emergent CABG x3 on 1/15, Nephrology consulted for NATALIA on CKD, started HD on 1/21.       Interval History :   Mrs Rocha had second HD session yesterday, no UF and did reasonably well.  Unfortunately had PEA arrest overnight and was re-intubated and placed on CRRT to try and pull volume but too unstable with very tenuous blood pressures.  Trying to match I=O as able, her UOP has gone from ~150cc yesterday to zero today.  Overall chances renal recovery with solitary kidney and anuric renal failure are very low.      Assessment & Recommendations:   NATALIA on CKD-CKD 3, baseline Cr of ~2.0 with one atrophic kidney on imaging with no former imaging to compare.  Injury involving hemodynamics with CABG as well as contrast for angiogram prior.  Had 2 sessions of HD without UF and did reasonably well but now had PEA arrest and was placed on CRRT to stabilize electrolytes and prevent worsening volume overload.  UOP now anuric, chances of kidney recovery with solitary kidney with anuric renal failure post CABG are very low.                 -CRRT, I=O as BP's allow.                 -Line is RIJ from 1/20.       Volume status-On CRRT, matching I=O as able with BP's after PEA arrest last night. Overall is ~5kg above wt prior to admit, will look to pull if hemodynamics improve in coming days.       Electrolytes/pH-K 3.8, bicarb 25.       Ca/phos/pth-Ca 8.1, Phos 7.3 but should come down with CRRT.      CABG-Was recovering although needed HD for NATALIA but had PEA arrest evening of 1/22, started on CRRT.       Anemia-Hgb 8.5, following.       Nutrition-On Nepro TF     Seen and discussed with Dr Pizarro     Recommendations were communicated to primary team via verbal communication.         Quincy Jesus  Clinical Nurse Specialist  [phone]    Review of Systems:   I reviewed the following systems:  ROS not done due  to mental status.     Physical Exam:   I/O last 3 completed shifts:  In: 7265.09 [I.V.:3675.09; NG/GT:810]  Out: 1842 [Urine:80; Emesis/NG output:1150; Other:102; Chest Tube:510]   BP 92/80   Pulse 112   Temp 95  F (35  C)   Resp 14   Wt 74.6 kg (164 lb 7.4 oz)   SpO2 98%   BMI 29.13 kg/m       GENERAL APPEARANCE: Awake, extubated but minimally interactive.   EYES:  No scleral icterus, pupils equal  HENT: mouth without ulcers or lesions  PULM: lungs rhonchi to auscultation  CV: irregular rhythm, normal rate, no rub     -edema +1LE  GI: soft, non-tender, non-distended, bowel sounds are +  MS: no evidence of inflammation in joints, no muscle tenderness  NEURO: Awake but minimally interactive, moving extremities, No asterixis   Lines: 1/20 [facility]      Labs:   All labs reviewed by me  Electrolytes/Renal -   Recent Labs   Lab Test 01/23/19 0946 01/23/19  0403 01/23/19  0139    144 147*   POTASSIUM 3.8 3.8 3.9   CHLORIDE 105 104 107   CO2 25 24 25   BUN 60* 74* 73*   CR 3.41* 4.45* 4.35*   * 134* 253*   JEFFREY 8.1* 9.3 11.5*   MAG 2.0 2.1 2.1   PHOS 7.3* 10.6* 8.8*       CBC -   Recent Labs   Lab Test 01/23/19  0946 01/23/19  0814 01/23/19  0403 01/23/19  0256   WBC 15.1*  --  17.2* 16.2*   HGB 10.2* 10.4* 10.2* 9.9*   *  --  156 142*       LFTs -   Recent Labs   Lab Test 01/23/19  0946 01/23/19  0403 01/23/19  0139   ALKPHOS 40 44 33*   BILITOTAL 0.8 0.8 0.3   ALT 63* 63* 21   * 260* 82*   PROTTOTAL 5.1* 4.9* 3.5*   ALBUMIN 2.3* 2.3* 1.6*       Iron Panel -   Recent Labs   Lab Test 01/27/15  1218 07/11/12  0804   IRON 74 80   IRONSAT 21 26   ORQUIDEA 40 67           Current Medications:    amiodarone  400 mg Oral or Feeding Tube BID     amLODIPine  5 mg Oral or Feeding Tube Daily     aspirin  325 mg Oral or Feeding Tube Daily     atorvastatin  40 mg Oral QPM     B and C vitamin Complex with folic acid  5 mL Per Feeding Tube Daily     busPIRone  5 mg Oral or Feeding Tube BID     insulin aspart  1-6  Units Subcutaneous Q4H     metoprolol tartrate  25 mg Oral or Feeding Tube BID     pantoprazole (PROTONIX) IV  40 mg Intravenous BID     pink lady enema  286 mL Rectal Once     polyethylene glycol  17 g Oral or Feeding Tube Daily     protein modular  1 packet Per Feeding Tube BID     QUEtiapine  25 mg Oral Once     QUEtiapine  50 mg Oral At Bedtime     senna-docusate  1 tablet Oral or Feeding Tube BID    Or     senna-docusate  2 tablet Oral or Feeding Tube BID     sodium chloride (PF)  10 mL Intracatheter Once     sodium chloride (PF)  3 mL Intracatheter Q8H       dexmedetomidine 0.8 mcg/kg/hr (01/23/19 1300)     IV fluid REPLACEMENT ONLY       IV fluid REPLACEMENT ONLY       CRRT replacement solution 12.5 mL/kg/hr (01/23/19 0945)     EPINEPHrine IV infusion ADULT Stopped (01/23/19 1200)     fentaNYL 50 mcg/hr (01/23/19 1300)     - MEDICATION INSTRUCTIONS -       CRRT replacement solution 2.782 mL/kg/hr (01/23/19 0341)     CRRT replacement solution 12.5 mL/kg/hr (01/23/19 0945)     BETA BLOCKER NOT PRESCRIBED       vasopressin (PITRESSIN) infusion ADULT (40 mL) Stopped (01/23/19 0430)       I, Amanda Pizarro, saw and evaluated this patient as part of a shared visit.  I have reviewed and discussed with the advanced practice provider their history, physical and plan.    I personally reviewed the vital signs, medications, labs and imaging.    My key history or physical exam findings:  NATALIA, s/p emergent CABG  PEA arrest overnight,  CRRT started  Key management decisions made by me:  CRRT for solute and volume control  - will try to discuss with family renal outcome and overall outcome after PEA arrest.    Amanda Pizarro  Date of Service (when I saw the patient): 1/23

## 2019-01-23 NOTE — PLAN OF CARE
OT: Cx, per discussion w/ RN, pt not medically appropriate for therapy today. OT will cx and reschedule for 1/24/19.

## 2019-01-24 NOTE — PROGRESS NOTES
CRRT STATUS NOTE    DATA:  Time:  1:24 PM  Pressures WNL:  YES  Filter Status:  WDL    Problems Reported/Alarms Noted:  None reported.    Supplies Present:  YES    ASSESSMENT:  Patient Net Fluid Balance:  +200 ml @ 1330.  Vital Signs:  Temp: 98.1  F (36.7  C) Temp src: Esophageal     Heart Rate: 89 Resp: 14 SpO2: 98 % O2 Device: Mechanical Ventilator    Labs:  Na 139, K 4.0, Cr 1.87, Mg 2.3, Phos 5.7, ICa 4.6, WBC 11.5, Hgb 9.0, Plt 160  Goals of Therapy:  I=O as able. (orders have not been updated since 1/23 at this time, Quincy Jesus aware and following.)    INTERVENTIONS:   Continue with CVVHDF. Goal I=O. Care conference today, patient's family stated patient would not want dialysis long term. During care conference patient's family stated they would like to continue with cares despite being told that patient is likely to need ongoing dialysis.     PLAN:  Continue with CVVHDF CRRT per POC. Family updated on poor renal prognosis for future. Contact CRRT RN at [phone] with further questions/concerns.

## 2019-01-24 NOTE — PLAN OF CARE
OT/4E:  Discharge Planner OT   Patient plan for discharge: unstated  Current status: Pt intubated and sedated. Facilitated PROM to amee UE/LEs. Pt not following commands   Barriers to return to prior living situation: medical status, ADL I, mobility   Recommendations for discharge: rehab  Rationale for recommendations: to progress ADL I       Entered by: Yola Pierre 01/24/2019 3:54 PM

## 2019-01-24 NOTE — PROGRESS NOTES
CV ICU PROGRESS NOTE  January 24, 2019      CO-MORBIDITIES:   CKDIII, b/l carotid stenosis, GERD, HTN, CAD    ASSESSMENT:   Aj is a 78 year old female with pmh CKDIII, b/l carotid stenosis, GERD, HTN, CAD [place] [facility] [place] who is now s/p CABG  X 3 with Dr. Villegas 1/15.     PLAN FOR TODAY:   -Decrease PEEP to 10cm H20  -Propofol IV gtt prn for sedation  -Continue on Vasopressin 0.5 unit/hr  -Neuro status is baseline  -Care conference today at 11am, trach and peg monday  -Amlodopine/Lopressor held  -Tube feeds  -CRRT dialysis     PLANS:   Neuro/ pain/ sedation:  -CT head 1/18: normal  -precedex for sedation and agitation      Pulmonary care:   -CMV /AC; TV decreased to 450cc, check ABG (prior ABg shows respiratory alkalosis)  - chest tube placed 1/23 AM because of hemothorax  -trach monday      Cardiovascular:    -Monitor hemodynamic status  -1/23: Patient went into PEA, coded with ROSC s/p compressions, intubation, and epi.  -1/18: amlodipine 5mg, lopressor 25mg bid . Hold given low MAPs  - Cotninue po amiodarone started 1/20   - 1/23 formal echo: stable       GI care:   - Check NJ position with AXR and restart feeds  - Bowel regimen, will add pink lady enema today        Fluids/ Electrolytes/ Nutrition:   - ICU electrolyte replacement protocol     Renal/ Fluid Balance:    -chronic kidney disease III   -Urine output is minimal, nephrology consulted, appreciate recs   -crrt due to instability       Endocrine:    -Sliding scale insulin       ID/ Antibiotics:  -Klebsiella UTI diagnosed on 1/11, Levaquin therapy ended 1/21/19      Heme:     -Hemoglobin stable.   -continue to monitor      Prophylaxis:    -Mechanical prophylaxis for DVT.    - Hold heparin gtt ( was started for a fib)      Lines/ tubes/ drains:  -NGT, NJ,  ETT, femo A line and CVC, Chen, PIV      Disposition:  - CVICU     Physical Exam:  Gen: Intubated, opens eyes to voice, not following commands   CV: RRR,  no murmurs, rubs or gallops   Pulm: Lungs CTA, no wheezing or rhonchi  Abd: Soft, NT, ND, +BS  Ext: no LE edema  Neuro: opens eyes to voice, not following commands   Incision: c/d/i with oozing from incision, no erythema, sternum stable  Tubes/drains: Dressing clean and dry, output remained stable overnight, no air leak.      VITAL SIGNS:  Temp:  [95.5  F (35.3  C)-98.2  F (36.8  C)] 98.2  F (36.8  C)  Heart Rate:  [] 88  Resp:  [11-28] 14  MAP:  [57 mmHg-95 mmHg] 84 mmHg  Arterial Line BP: ()/(49-79) 111/69  FiO2 (%):  [50 %-70 %] 50 %  SpO2:  [90 %-100 %] 97 %  Ventilation Mode: CMV/AC  (Continuous Mandatory Ventilation/ Assist Control)  FiO2 (%): 50 %  Rate Set (breaths/minute): 14 breaths/min  Tidal Volume Set (mL): 450 mL  PEEP (cm H2O): (S) 10 cmH2O  Oxygen Concentration (%): 50 %  Resp: 14      INTAKE/ OUTPUT:   I/O last 3 completed shifts:  In: 1844.07 [I.V.:694.07; NG/GT:390]  Out: 2482 [Emesis/NG output:450; Other:1672; Chest Tube:360]   ALLERGIES:      Allergies   Allergen Reactions     Lisinopril      She developed ARF       MEDICATIONS:    No current facility-administered medications on file prior to encounter.   Current Outpatient Medications on File Prior to Encounter:  ACE/ARB NOT PRESCRIBED, INTENTIONAL, ACE & ARB not prescribed due to Worsening renal function on ACE/ARB therapy   acetaminophen (TYLENOL) 325 MG tablet Take 2 tablets (650 mg) by mouth every 6 hours as needed for mild pain   amLODIPine (NORVASC) 10 MG tablet TAKE 1 TABLET BY MOUTH ONCE DAILY   ASPIRIN NOT PRESCRIBED (INTENTIONAL) Antiplatelet medication not prescribed intentionally due to Plavix   busPIRone (BUSPAR) 5 MG tablet TAKE 1 TABLET BY MOUTH TWICE DAILY FOR ANXIETY   clopidogrel (PLAVIX) 75 MG tablet TAKE 1 TABLET BY MOUTH ONCE DAILY   doxazosin (CARDURA) 4 MG tablet TAKE 1 TABLET BY MOUTH AT BEDTIME   Hydrocortisone Acetate 2.5 % CREA Externally apply 1 Application topically 2 times daily Apply to both arms twice  a day   metoprolol tartrate (LOPRESSOR) 50 MG tablet TAKE ONE TABLET BY MOUTH TWICE DAILY   metoprolol tartrate (LOPRESSOR) 50 MG tablet Take 0.5 tablets (25 mg) by mouth 2 times daily   ondansetron (ZOFRAN) 8 MG tablet Take 1 tablet (8 mg) by mouth every 8 hours as needed for nausea   order for DME Machine to dispense the medication   pantoprazole (PROTONIX) 40 MG EC tablet TAKE ONE TABLET BY MOUTH ONCE DAILY 30-60  MINUTES  BEFORE  A  MEAL   simvastatin (ZOCOR) 40 MG tablet Take 1 tablet (40 mg) by mouth At Bedtime   traZODone (DESYREL) 50 MG tablet Take 1 tablet (50 mg) by mouth nightly as needed for sleep       LABS: Reviewed.   Arterial Blood Gases   Recent Labs   Lab 01/24/19 0348 01/23/19 2205 01/23/19  1556 01/23/19  0946   PH 7.42 7.43 7.45 7.43   PCO2 38 36 37 41   PO2 83 125* 79* 75*   HCO3 25 24 26 27     Complete Blood Count   Recent Labs   Lab 01/24/19  0957 01/24/19 0348 01/23/19 2205 01/23/19  1556   WBC 11.5* 11.1* 13.2* 13.1*   HGB 9.0* 9.3* 10.0* 10.2*    156 161 158     Basic Metabolic Panel  Recent Labs   Lab 01/24/19  0957 01/24/19 0348 01/23/19 2205 01/23/19  1556    139 142 142   POTASSIUM 4.0 4.0 4.3 4.3   CHLORIDE 105 106 108 106   CO2 23 22 23 26   BUN 37* 43* 50* 54*   CR 1.87* 2.20* 2.58* 3.06*   * 144* 130* 130*     Liver Function Tests  Recent Labs   Lab 01/24/19  0957 01/24/19 0348 01/23/19 2205 01/23/19  1556 01/23/19  0946  01/23/19  0139 01/23/19  0115   * 155* 208* 278* 269*   < > 82*  --    ALT 41 44 49 58* 63*   < > 21  --    ALKPHOS 56 49 49 44 40   < > 33*  --    BILITOTAL 0.8 0.7 0.6 0.9 0.8   < > 0.3  --    ALBUMIN 2.2* 2.2* 2.3* 2.3* 2.3*   < > 1.6*  --    INR  --   --   --  1.85* 1.47*  --  4.00* 3.63*    < > = values in this interval not displayed.     Pancreatic Enzymes  No lab results found in last 7 days.  Coagulation Profile  Recent Labs   Lab 01/23/19  1556 01/23/19  0946 01/23/19  0139 01/23/19  0115   INR 1.85* 1.47* 4.00* 3.63*    PTT  --  39* 39* 57*     Lactate  Invalid input(s): LACTATE      RADIOLOGY:   Recent Results (from the past 24 hour(s))   XR Chest Port 1 View    Narrative    EXAMINATION: XR CHEST PORT 1 VW, 1/23/2019 3:56 PM    INDICATION: PICC    COMPARISON: Chest x-ray 1/23/2019    FINDINGS: AP supine views of the chest.     Trachea is midline. Endotracheal tube measures 3.1 cm above the  ursula. Stable postsurgical changes with intact median sternotomy  wires in unchanged position of left apical chest tube. Feeding tube  and enteric tube travels along the course of the esophagus below the  diaphragm with their tips out of the field-of-view. The enteric tube  side port projects at the gastroesophageal junction. Right IJ central  venous catheter with tip located at the mid SVC. Internal temperature  probe within the esophagus. Interval new Left upper extremity PICC  line with tip located at the mid SVC. Stable enlarged cardiac  silhouette. Left-sided pleural effusion with associated left lung  basilar opacity. No acute osseous abnormalities. Soft tissues are  unremarkable. Upper abdomen is unremarkable.      Impression    IMPRESSION:     1. Interval a new placement of left upper chest with a PICC line with  tip located in the mid SVC.  2. Stable postsurgical changes with intact median sternotomy wires and  unchanged left apical chest tube.  3. Left lung pleural effusion with associated left basilar opacity,  concerning for atelectasis and/or consolidation.  4. Stable support devices.    I have personally reviewed the examination and initial interpretation  and I agree with the findings.    FERMÍN HEAD MD   XR Chest Port 1 View    Narrative    XR CHEST PORT 1   1/24/2019 1:00 AM      HISTORY: intubated    COMPARISON: Chest x-ray 1/23/2019    TECHNIQUE: Semiupright frontal view of the chest    FINDINGS: Stable small moderate sized left-sided pleural effusion with  associated consolidation/atelectasis of the left lower lobe.  No  pneumothorax. Postoperative changes of midline sternotomy. Cardiac  mediastinal silhouette is within normal limits. Calcification  projecting over the mitral valve. The trachea is midline with  endotracheal tube measuring 3.2 cm right. Right-sided internal jugular  central venous catheter and left-sided PICC line with tips projecting  over the low SVC. Left-sided chest tube in place. Enteric tubes are  visualized projecting over the esophagus and stomach. Esophageal  temperature probe projecting over the upper thoracic esophagus. No  suspicious osseous lesion.      Impression    IMPRESSION:   1. Stable small moderate sized left-sided pleural effusion with  associated consolidation/atelectasis of the left lower lobe.  2. Stable support devices.    I have personally reviewed the examination and initial interpretation  and I agree with the findings.    JACLYN BEST MD         Patient seen, findings and plan discussed with surgical ICU staff .

## 2019-01-24 NOTE — PROGRESS NOTES
Social Work: Assessment with Discharge Plan    Patient Name:  Wendy Rocha  :  1940  Age:  78 year old  MRN:  [id number]  Risk/Complexity Score:  Filed Complexity Screen Score: 7  Completed assessment with:  Pt's family, including daughters Eva and Manju, son, Juan Luis, and ex-, Prosper. DaughterElizabeth was on the phone    Presenting Information   Reason for Referral:  Discharge plan and Psychosocial Assessment  Date of Intake:  2019  Referral Source:  Family Care Conference  Decision Maker:  Pt, at baseline  Alternate Decision Maker:  Per [facility] policy, Pt's 5 children are her surrogate decision-makers.  Health Care Directive:  Provided education  Living Situation:  Apartment; Pt lives in an independent living high rise with elevator access.   Previous Functional Status:  Independent; Pt had been active, per family. She is retired and stopped driving some years ago.  Patient and family understanding of hospitalization:  Pt's family is aware of her current situation. Family conference was held today with CV Surgeon, CV ICU Intensivist, Nephrology, RN, and family listed above. Discussion was held about Pt's hospital course and current condition. Family was encouraged to consider proceeding to trach and PEG placement next week, pending Pt's progress over the weekend. It was discussed that Pt appears to be neurologically back to her status of a few days ago, prior to the code and resuscitation of a couple days ago. Family was encouraged by this and appears to be leaning toward agreement with trach/PEG plans. They did acknowledge that Pt has traditionally been against the idea of HD, but feel that she would be willing to be on HD if it means saving her life.  Cultural/Language/Spiritual Considerations:  Per chart, Pt identifies as Nondenominational.  Adjustment to Illness:  Pt is currently intubated. Family has concerns about her condition, but uses humor to cope and is supportive of one  another.    Physical Health  Reason for Admission:    1. Acute kidney injury (H)    2. CKD (chronic kidney disease) stage 3, GFR 30-59 ml/min (H)      Services Needed/Recommended:  Other:  Pt will need inpatient rehab, possibly LTACH, pending progress.    Mental Health/Chemical Dependency  Diagnosis:  Not noted  Support/Services in Place:  N/A  Services Needed/Recommended:  N/A    Support System  Significant relationship at present time:  5 adult children  Family of origin is available for support:  Yes, Pt's family mostly lives nearby and all are supportive.  Other support available:  Pt's ex- is still involved and supportive.  Gaps in support system:  None  Patient is caregiver to:  None     Provider Information   Primary Care Physician:  Thad Montgomery   [phone]   Clinic:  [facility] [id number] [id number]      :  None    Financial   Income Source:  Retired  Financial Concerns:  None noted  Insurance:    Payor/Plan Subscriber Name Rel Member # Group #   BCBS - BCBS MEDICARE * BJORN TREVINO  [id number]       [id number]       Discharge Plan   Patient and family discharge goal:  Pt's family is hopeful for meaningful recovery.  Provided education on discharge plan:  YES  Patient agreeable to discharge plan:  YES  A list of Medicare Certified Facilities was provided to the patient and/or family to encourage patient choice. Patient's choices for facility are:  Discussion was held about likely need for LTACH versus TCU, pending progress. No list shared at this time as level of care need is not yet determined.  Will NH provide Skilled rehabilitation or complex medical:  N/A  General information regarding anticipated insurance coverage and possible out of pocket cost was discussed. Patient and patient's family are aware patient may incur the cost of transportation to the facility, pending insurance payment: YES; initial discussion of Medicare coverage  "for LTACH and TCU was held.  Barriers to discharge:  Medical stabilization, treatment plan    Discharge Recommendations   Anticipated Disposition:  Facility:  LTACH vs TCU  Transportation Needs:  Medical:  Other:  pending needs at time of transfer.  Name of Transportation Company and Phone:  [facility], [phone]    Additional comments   SW attended the family care conference with the above participants. After the care conference, Writer remained with family to complete psychosocial assessment of Pt and further discuss possible discharge needs. Pt's family describes Pt as the \"center\" of the family. They stated that she is normally active and indicated that she prefers to be in charge of her situation. Family displayed a good sense of humor while trying to manage their understanding of the situation and concerns for Pt's recovery. SW will remain available as needed for ongoing support and planning as needed.        Mirella Perea, [facility]  ICU   Pager: [phone]  "

## 2019-01-24 NOTE — PROGRESS NOTES
CVTS PROGRESS NOTE  January 24, 2019      CO-MORBIDITIES:   CKDIII, b/l carotid stenosis, GERD, HTN, CAD    ASSESSMENT:   Aj is a 78 year old female with pmh CKDIII, b/l carotid stenosis, GERD, HTN, CAD [place]p [place] [facility] [place] who is now s/p CABG  X 3 with Dr. Villegas 1/15.     PLAN FOR TODAY:   -Decrease PEEP to 10cm H20  -Propofol IV gtt prn for sedation  -Continue on Vasopressin 0.5 unit/hr  -Neuro status is baseline  -Care conference today at 11am, trach and peg monday  -Amlodopine/Lopressor held  -Tube feeds  -CRRT dialysis     PLANS:   Neuro/ pain/ sedation:  -CT head 1/18: normal  -precedex for sedation and agitation      Pulmonary care:   -CMV /AC; TV decreased to 450cc, check ABG (prior ABg shows respiratory alkalosis)  - chest tube placed 1/23 AM because of hemothorax  -trach monday      Cardiovascular:    -Monitor hemodynamic status  -1/23: Patient went into PEA, coded with ROSC s/p compressions, intubation, and epi.  -1/18: amlodipine 5mg, lopressor 25mg bid . Hold given low MAPs  - Cotninue po amiodarone started 1/20   - 1/23 formal echo: stable       GI care:   - Check NJ position with AXR and restart feeds  - Bowel regimen, will add pink lady enema today        Fluids/ Electrolytes/ Nutrition:   - ICU electrolyte replacement protocol     Renal/ Fluid Balance:    -chronic kidney disease III   -Urine output is minimal, nephrology consulted, appreciate recs   -crrt due to instability       Endocrine:    -Sliding scale insulin       ID/ Antibiotics:  -Klebsiella UTI diagnosed on 1/11, Levaquin therapy ended 1/21/19      Heme:     -Hemoglobin stable.   -continue to monitor      Prophylaxis:    -Mechanical prophylaxis for DVT.    - Hold heparin gtt ( was started for a fib)      Lines/ tubes/ drains:  -NGT, NJ,  ETT, femo A line and CVC, Chen, PIV      Disposition:  - CVICU     Physical Exam:  Gen: Intubated, opens eyes to voice, not following commands   CV: RRR, no  murmurs, rubs or gallops   Pulm: Lungs CTA, no wheezing or rhonchi  Abd: Soft, NT, ND, +BS  Ext: no LE edema  Neuro: opens eyes to voice, not following commands   Incision: c/d/i with oozing from incision, no erythema, sternum stable  Tubes/drains: Dressing clean and dry, output remained stable overnight, no air leak.      VITAL SIGNS:  Temp:  [95.5  F (35.3  C)-98.2  F (36.8  C)] 98.2  F (36.8  C)  Heart Rate:  [] 88  Resp:  [11-28] 14  MAP:  [57 mmHg-95 mmHg] 84 mmHg  Arterial Line BP: ()/(49-79) 111/69  FiO2 (%):  [50 %-70 %] 50 %  SpO2:  [90 %-100 %] 97 %  Ventilation Mode: CMV/AC  (Continuous Mandatory Ventilation/ Assist Control)  FiO2 (%): 50 %  Rate Set (breaths/minute): 14 breaths/min  Tidal Volume Set (mL): 450 mL  PEEP (cm H2O): (S) 10 cmH2O  Oxygen Concentration (%): 50 %  Resp: 14      INTAKE/ OUTPUT:   I/O last 3 completed shifts:  In: 1844.07 [I.V.:694.07; NG/GT:390]  Out: 2482 [Emesis/NG output:450; Other:1672; Chest Tube:360]   ALLERGIES:      Allergies   Allergen Reactions     Lisinopril      She developed ARF       MEDICATIONS:    No current facility-administered medications on file prior to encounter.   Current Outpatient Medications on File Prior to Encounter:  ACE/ARB NOT PRESCRIBED, INTENTIONAL, ACE & ARB not prescribed due to Worsening renal function on ACE/ARB therapy   acetaminophen (TYLENOL) 325 MG tablet Take 2 tablets (650 mg) by mouth every 6 hours as needed for mild pain   amLODIPine (NORVASC) 10 MG tablet TAKE 1 TABLET BY MOUTH ONCE DAILY   ASPIRIN NOT PRESCRIBED (INTENTIONAL) Antiplatelet medication not prescribed intentionally due to Plavix   busPIRone (BUSPAR) 5 MG tablet TAKE 1 TABLET BY MOUTH TWICE DAILY FOR ANXIETY   clopidogrel (PLAVIX) 75 MG tablet TAKE 1 TABLET BY MOUTH ONCE DAILY   doxazosin (CARDURA) 4 MG tablet TAKE 1 TABLET BY MOUTH AT BEDTIME   Hydrocortisone Acetate 2.5 % CREA Externally apply 1 Application topically 2 times daily Apply to both arms twice a  day   metoprolol tartrate (LOPRESSOR) 50 MG tablet TAKE ONE TABLET BY MOUTH TWICE DAILY   metoprolol tartrate (LOPRESSOR) 50 MG tablet Take 0.5 tablets (25 mg) by mouth 2 times daily   ondansetron (ZOFRAN) 8 MG tablet Take 1 tablet (8 mg) by mouth every 8 hours as needed for nausea   order for DME Machine to dispense the medication   pantoprazole (PROTONIX) 40 MG EC tablet TAKE ONE TABLET BY MOUTH ONCE DAILY 30-60  MINUTES  BEFORE  A  MEAL   simvastatin (ZOCOR) 40 MG tablet Take 1 tablet (40 mg) by mouth At Bedtime   traZODone (DESYREL) 50 MG tablet Take 1 tablet (50 mg) by mouth nightly as needed for sleep       LABS: Reviewed.   Arterial Blood Gases   Recent Labs   Lab 01/24/19 0348 01/23/19 2205 01/23/19  1556 01/23/19  0946   PH 7.42 7.43 7.45 7.43   PCO2 38 36 37 41   PO2 83 125* 79* 75*   HCO3 25 24 26 27     Complete Blood Count   Recent Labs   Lab 01/24/19  0957 01/24/19 0348 01/23/19 2205 01/23/19  1556   WBC 11.5* 11.1* 13.2* 13.1*   HGB 9.0* 9.3* 10.0* 10.2*    156 161 158     Basic Metabolic Panel  Recent Labs   Lab 01/24/19  0957 01/24/19 0348 01/23/19 2205 01/23/19  1556    139 142 142   POTASSIUM 4.0 4.0 4.3 4.3   CHLORIDE 105 106 108 106   CO2 23 22 23 26   BUN 37* 43* 50* 54*   CR 1.87* 2.20* 2.58* 3.06*   * 144* 130* 130*     Liver Function Tests  Recent Labs   Lab 01/24/19  0957 01/24/19  0348 01/23/19 2205 01/23/19  1556 01/23/19  0946  01/23/19  0139 01/23/19  0115   * 155* 208* 278* 269*   < > 82*  --    ALT 41 44 49 58* 63*   < > 21  --    ALKPHOS 56 49 49 44 40   < > 33*  --    BILITOTAL 0.8 0.7 0.6 0.9 0.8   < > 0.3  --    ALBUMIN 2.2* 2.2* 2.3* 2.3* 2.3*   < > 1.6*  --    INR  --   --   --  1.85* 1.47*  --  4.00* 3.63*    < > = values in this interval not displayed.     Pancreatic Enzymes  No lab results found in last 7 days.  Coagulation Profile  Recent Labs   Lab 01/23/19  1556 01/23/19  0946 01/23/19  0139 01/23/19  0115   INR 1.85* 1.47* 4.00* 3.63*    PTT  --  39* 39* 57*     Lactate  Invalid input(s): LACTATE      RADIOLOGY:   Recent Results (from the past 24 hour(s))   XR Chest Port 1 View    Narrative    EXAMINATION: XR CHEST PORT 1 VW, 1/23/2019 3:56 PM    INDICATION: PICC    COMPARISON: Chest x-ray 1/23/2019    FINDINGS: AP supine views of the chest.     Trachea is midline. Endotracheal tube measures 3.1 cm above the  ursula. Stable postsurgical changes with intact median sternotomy  wires in unchanged position of left apical chest tube. Feeding tube  and enteric tube travels along the course of the esophagus below the  diaphragm with their tips out of the field-of-view. The enteric tube  side port projects at the gastroesophageal junction. Right IJ central  venous catheter with tip located at the mid SVC. Internal temperature  probe within the esophagus. Interval new Left upper extremity PICC  line with tip located at the mid SVC. Stable enlarged cardiac  silhouette. Left-sided pleural effusion with associated left lung  basilar opacity. No acute osseous abnormalities. Soft tissues are  unremarkable. Upper abdomen is unremarkable.      Impression    IMPRESSION:     1. Interval a new placement of left upper chest with a PICC line with  tip located in the mid SVC.  2. Stable postsurgical changes with intact median sternotomy wires and  unchanged left apical chest tube.  3. Left lung pleural effusion with associated left basilar opacity,  concerning for atelectasis and/or consolidation.  4. Stable support devices.    I have personally reviewed the examination and initial interpretation  and I agree with the findings.    FERMÍN HEAD MD   XR Chest Port 1 View    Narrative    XR CHEST PORT 1   1/24/2019 1:00 AM      HISTORY: intubated    COMPARISON: Chest x-ray 1/23/2019    TECHNIQUE: Semiupright frontal view of the chest    FINDINGS: Stable small moderate sized left-sided pleural effusion with  associated consolidation/atelectasis of the left lower lobe.  No  pneumothorax. Postoperative changes of midline sternotomy. Cardiac  mediastinal silhouette is within normal limits. Calcification  projecting over the mitral valve. The trachea is midline with  endotracheal tube measuring 3.2 cm right. Right-sided internal jugular  central venous catheter and left-sided PICC line with tips projecting  over the low SVC. Left-sided chest tube in place. Enteric tubes are  visualized projecting over the esophagus and stomach. Esophageal  temperature probe projecting over the upper thoracic esophagus. No  suspicious osseous lesion.      Impression    IMPRESSION:   1. Stable small moderate sized left-sided pleural effusion with  associated consolidation/atelectasis of the left lower lobe.  2. Stable support devices.    I have personally reviewed the examination and initial interpretation  and I agree with the findings.    JACLYN BEST MD         Patient seen, findings and plan discussed with cvts fellow.

## 2019-01-24 NOTE — PROGRESS NOTES
Nephrology Progress Note  01/24/2019         Mrs Rocha is an 78 yof w/CKD III, Carotid stenosis, GERD, HTN, CAD who had emergent CABG x3 on 1/15, Nephrology consulted for NATALIA on CKD, started HD on 1/21.       Interval History :   Mrs Rocha continues on CRRT, I=O as able.  Had care conference, I expressed that chances of renal recovery are essentially zero with solitary kidney with baseline GFR of ~20 (with her low muscle mass this may be overestimate) and multiple NATALIA's and now anuric, HD dependent.  Family would still like to pursue trach/PEG/tunneled line next week and work on rehabilitation, will continue CRRT as BP's are still tenuous and work on getting her back on iHD.      Assessment & Recommendations:   NATALIA on CKD-CKD 3, baseline Cr of ~2.0 with one atrophic kidney on imaging with no former imaging to compare.  Injury involving hemodynamics with CABG as well as contrast for angiogram prior.  Had 2 sessions of HD without UF and did reasonably well but now had PEA arrest and was placed on CRRT to stabilize electrolytes and prevent worsening volume overload.  UOP now anuric, chances of kidney recovery with solitary kidney with anuric renal failure post CABG are very low.                 -CRRT, I=O as BP's allow.                 -Line is RIJ from 1/20.       Volume status-On CRRT, matching I=O as able with BP's after PEA arrest, still on low dose Vaso.  No major volume overload, will look to pull fluid once more hemodynamically stable.        Electrolytes/pH-K 4.0, bicarb 23.       Ca/phos/pth-Ca 7.7, Phos 5.7 but should come down with CRRT.       CABG-Was recovering although needed HD for NATALIA but had PEA arrest evening of 1/22, started on CRRT.       Anemia-Hgb 9, following.       Nutrition-On Nepro TF     Seen and discussed with Dr Pizarro     Recommendations were communicated to primary team via verbal communication.            Quincy Jesus  Clinical Nurse Specialist  [phone]    Review of Systems:    I reviewed the following systems:  ROS not done due to vent/sedation.     Physical Exam:   I/O last 3 completed shifts:  In: 2264.7 [I.V.:764.7; NG/GT:540]  Out: 2477 [Emesis/NG output:550; Other:1667; Chest Tube:260]   BP 92/80   Pulse 112   Temp 98.1  F (36.7  C) (Esophageal)   Resp 14   Wt 74.6 kg (164 lb 7.4 oz)   SpO2 99%   BMI 29.13 kg/m       GENERAL APPEARANCE: Awake, extubated but minimally interactive.   EYES:  No scleral icterus, pupils equal  HENT: mouth without ulcers or lesions  PULM: lungs rhonchi to auscultation  CV: irregular rhythm, normal rate, no rub     -edema +1LE  GI: soft, non-tender, non-distended, bowel sounds are +  MS: no evidence of inflammation in joints, no muscle tenderness  NEURO: Awake but minimally interactive, moving extremities, No asterixis   Lines: 1/20 [facility]        Labs:   All labs reviewed by me  Electrolytes/Renal -   Recent Labs   Lab Test 01/24/19  0957 01/24/19 0348 01/23/19  2205    139 142   POTASSIUM 4.0 4.0 4.3   CHLORIDE 105 106 108   CO2 23 22 23   BUN 37* 43* 50*   CR 1.87* 2.20* 2.58*   * 144* 130*   JEFFREY 7.7* 7.6* 7.6*   MAG 2.3 2.3 2.3   PHOS 5.7* 6.2* 6.7*       CBC -   Recent Labs   Lab Test 01/24/19  1549 01/24/19  0957 01/24/19 0348   WBC 11.1* 11.5* 11.1*   HGB 9.0* 9.0* 9.3*   * 160 156       LFTs -   Recent Labs   Lab Test 01/24/19  0957 01/24/19 0348 01/23/19  2205   ALKPHOS 56 49 49   BILITOTAL 0.8 0.7 0.6   ALT 41 44 49   * 155* 208*   PROTTOTAL 5.0* 5.2* 5.2*   ALBUMIN 2.2* 2.2* 2.3*       Iron Panel -   Recent Labs   Lab Test 01/27/15  1218 07/11/12  0804   IRON 74 80   IRONSAT 21 26   ORQUIDEA 40 67           Current Medications:    amiodarone  400 mg Oral or Feeding Tube BID     atorvastatin  40 mg Oral QPM     B and C vitamin Complex with folic acid  5 mL Per Feeding Tube Daily     busPIRone  5 mg Oral or Feeding Tube BID     heparin lock flush  5-10 mL Intracatheter Q24H     insulin aspart  1-6 Units Subcutaneous Q4H      pantoprazole (PROTONIX) IV  40 mg Intravenous BID     polyethylene glycol  17 g Oral or Feeding Tube Daily     protein modular  1 packet Per Feeding Tube BID     QUEtiapine  25 mg Oral Once     QUEtiapine  50 mg Oral At Bedtime     senna-docusate  1 tablet Oral or Feeding Tube BID    Or     senna-docusate  2 tablet Oral or Feeding Tube BID     sodium chloride (PF)  3 mL Intracatheter Q8H       dexmedetomidine 0.7 mcg/kg/hr (01/24/19 1700)     IV fluid REPLACEMENT ONLY       IV fluid REPLACEMENT ONLY       CRRT replacement solution 12.5 mL/kg/hr (01/24/19 0930)     EPINEPHrine IV infusion ADULT Stopped (01/23/19 1200)     fentaNYL 75 mcg/hr (01/24/19 1700)     - MEDICATION INSTRUCTIONS -       CRRT replacement solution 2.782 mL/kg/hr (01/23/19 1621)     CRRT replacement solution 12.5 mL/kg/hr (01/24/19 0931)     propofol (DIPRIVAN) infusion 10 mcg/kg/min (01/24/19 1700)     BETA BLOCKER NOT PRESCRIBED       vasopressin (PITRESSIN) infusion ADULT (40 mL) 1 Units/hr (01/24/19 1700)     I, Amanda Pizarro, saw and evaluated this patient as part of a shared visit.  I have reviewed and discussed with the advanced practice provider their history, physical and plan.    I personally reviewed the vital signs, medications, labs and imaging.    My key history or physical exam findings:  NATALIA, s/p CABG, PEA arrest (?respiratory) recently  Key management decisions made by me:  Continue CRRT for solute and volume control    Amanda Pizarro  Date of Service (when I saw the patient): 1/24

## 2019-01-24 NOTE — PROGRESS NOTES
CRRT STATUS NOTE    DATA:  Time:  6:20 AM  Pressures WNL:  YES  Filter Status:  WDL    Problems Reported/Alarms Noted:  NA    Supplies Present:  YES    ASSESSMENT:  Patient Net Fluid Balance:  1/23: +3.8L, 1/24: -140 ml  Vital Signs:  Vital signs:  Temp: 96.1  F (35.6  C) Temp src: Oral     Heart Rate: 87 Resp: 14 SpO2: 98 % O2 Device: Mechanical Ventilator 86/59(70)  Labs:  Cr 2.2, Phos 6.2, WBC 11.1  Goals of Therapy:  I=O as able.    INTERVENTIONS:   Checked in with bedside nurse.    PLAN:  Continue CVVHDF per renal orders. Change set every 72 hours and PRN. Contact CRRT RN with questions or concerns at [phone].

## 2019-01-24 NOTE — PROGRESS NOTES
CRRT STATUS NOTE    DATA:  Time:  6:27 PM  Pressures WNL:  YES  Filter Status:  WDL    Problems Reported/Alarms Noted:  none    Supplies Present:  YES    ASSESSMENT:  Patient Net Fluid Balance:  +3,933 mL ()  Vital Signs:  Tmax 98.2, HR , MAPs 65-84 on Vasopressin drip, RR 13-19, SpO2 97-99% on vent support (60% FiO2, PEEP 12)  Labs:  Cr 3.06 (downtrending), Lactate down to 1.2, WBC 13.1 (downtrending), INR 1.85 (increased)  Goals of Therapy:  I=O as able.  Unable to achieve today due to patient coding and receiving large volume fluid resuscitation and blood products.    INTERVENTIONS:   Restarted earlier this evening.  Blood warmer increased to 42 degrees C and Caitlin hugger in place as patient was hypothermic to 34.4 degrees C.  Will decrease blood warmer as patient temp improves.    PLAN:  Fluid removal as tolerated per goals of therapy.  Check filter daily and change filter q 72 hrs and PRN.  Please contact the CRRT resource RN at [phone] with any questions/concerns.

## 2019-01-24 NOTE — PLAN OF CARE
Assessment:   Cardiac: A fib CVR. Vasopressin titrated to keep MAP > 65.  Resp: CMV 50%/450/14/12.  Neuro:  Opens eyes spontaneously and makes eye contact, does not follow commands, moves all extremities. CAM positive for delirium, varying periods of restlessness and calmness, no change noted with HS dose of Seroquel. Precedex gtt infusing.  : Anuric, Ongoing CRRT, minimal alarms during shift. Able to meet goal of I = O, consider changing order to pull fluid.   GI: Tube feedings via NJ @ 40 cc/hr. Rectal pouch in place. OG to LIS.  Skin: Pleural CT -20 cc, no air leak present.   Endocrine: Supplemental insulin given x 2.   Pain: Continuous fentanyl gtt infusing.     Interventions: No significant events overnight.     Plan: Care conference today at 1030. Will continue to monitor/assess and update MD as needed.    Recommend:  Changed CRRT order to start pulling fluid and increase frequency/dose of Seroquel.

## 2019-01-25 NOTE — PROGRESS NOTES
Care Coordinator - Discharge Planning    Admission Date/Time:  1/14/2019  Attending MD:  Haseeb Villegas MD     Data  Chart reviewed, discussed with interdisciplinary team.   Patient was admitted for:   1. Acute kidney injury (H)    2. CKD (chronic kidney disease) stage 3, GFR 30-59 ml/min (H)         Assessment   Full assessment completed in previous note     The team and family met yesterday and discussed the plan of care.  Pt family agreed to have trach and PEG placement if pt is not extubated in the next few days.  Family was informed about LTAC need if pt receives trach.  Pt son requested to talk to RNCC to get more info about [facility] and [facility].  RNCC visited pt son and spouse for support and address pt son questions.  Pt son stated the meeting with all the providers was very helpful.  Pt son requested info about both [facility] and [facility] to check the facility incase pt gets trach on Monday.  RNCC provided both facilities brochures and discussed about the referral process.  Pt son agreed to have referral send for both facilities to have pt evaluated and insurance checked.  Pt son stated they don't need to meet the liaisons at this time and request RNCC to follow up with the family on Monday.  RNCC agreed to make the referral now and check with the family on Monday.  Referral sent for both [facility] and [facility].      Coordination of Care and Referrals: Provided patient/family with options for LTACH.      Plan  Anticipated Discharge Date:  TBD  Anticipated Discharge Plan:   TBD.  [facility] and [facility] are assessing pt for LTAC placement if pt gets trach.  RNCC will cont to follow plan of care.      Aneta Plunkett RN, PHN, BSN  4A and 4E/ ICU  Care Coordinator  Phone: [phone]  Pager: [phone]

## 2019-01-25 NOTE — PROGRESS NOTES
CV ICU PROGRESS NOTE  January 25, 2019      CO-MORBIDITIES:   Acute kidney injury (H)  (primary encounter diagnosis)  CKD (chronic kidney disease) stage 3, GFR 30-59 ml/min (H)    ASSESSMENT: Aj is a 78 year old female with pmh CKDIII, b/l carotid stenosis, GERD, HTN, CAD s/p ACS transferred from [facility] [place] with severe multi-vessel disease who is now s/p CABG  X 3 with Dr. Villegas 1/15.    CHANGES FOR TODAY :  -HD net -1L  - Decrease FiO2 50%  - discontinue CVC and A lien (femoral)    PLANS:   Neuro/ pain/ sedation:  -CT head 1/18: normal  -precedex for sedation and agitation      Pulmonary care:   -CMV /AC; decrease fi02 to 50%  - chest tube placed 1/23 AM because of hemothorax  -trach monday      Cardiovascular:    -Monitor hemodynamic status  -1/23: Patient went into PEA, coded with ROSC s/p compressions, intubation, and epi.  -1/18: amlodipine 5mg, lopressor 25mg bid . Hold given low MAPs  - Cotninue po amiodarone started 1/20   - 1/23 formal echo: stable       GI care:   - Check NJ position with AXR and restart feeds  - Bowel regimen prn       Fluids/ Electrolytes/ Nutrition:   - ICU electrolyte replacement protocol     Renal/ Fluid Balance:    -chronic kidney disease III   -Urine output is minimal, nephrology consulted, appreciate recs   -continue CRRT       Endocrine:    -Sliding scale insulin       ID/ Antibiotics:  -Klebsiella UTI diagnosed on 1/11, Levaquin therapy ended 1/21/19      Heme:     -Hemoglobin stable.   -continue to monitor      Prophylaxis:    -Mechanical prophylaxis for DVT.    - Hold heparin gtt ( was started for a fib)      Lines/ tubes/ drains:  -NGT, NJ,  ETT, femo A line and CVC, Chen, PIV      Disposition:  - CVICU        ====================================    TODAY'S PROGRESS:   SUBJECTIVE:   - none        OBJECTIVE:   1. VITAL SIGNS:   Temp:  [97  F (36.1  C)-99.9  F (37.7  C)] 97.7  F (36.5  C)  Pulse:  [78] 78  Heart Rate:  [] 72  Resp:  [14-19] 19  BP: ()/(64-76)  84/64  MAP:  [62 mmHg-106 mmHg] 70 mmHg  Arterial Line BP: ()/(47-81) 93/56  FiO2 (%):  [50 %-60 %] 50 %  SpO2:  [95 %-100 %] 99 %  Ventilation Mode: CMV/AC  (Continuous Mandatory Ventilation/ Assist Control)  FiO2 (%): (S) 50 %  Rate Set (breaths/minute): 14 breaths/min  Tidal Volume Set (mL): 450 mL  PEEP (cm H2O): 10 cmH2O  Oxygen Concentration (%): 60 %  Resp: 19      2. INTAKE/ OUTPUT:   I/O last 3 completed shifts:  In: 2450.85 [I.V.:795.85; NG/GT:695]  Out: 2637 [Urine:25; Emesis/NG output:500; Other:1842; Chest Tube:270]    3. PHYSICAL EXAMINATION:   Gen: Intubated, not following commands   CV: RRR, no murmurs, rubs or gallops   Pulm: Lungs CTA, no wheezing or rhonchi  Abd: Soft, NT, ND, +BS  Ext: no LE edema  Neuro: opens eyes to voice, not following commands   Incision: c/d/i , no erythema, sternum stable  Tubes/drains: Dressing clean and dry, output remained stable overnight, no air leak  4. INVESTIGATIONS:   Arterial Blood Gases   Recent Labs   Lab 01/25/19 0345 01/24/19 2148 01/24/19 0348 01/23/19  2205   PH 7.40 7.39 7.42 7.43   PCO2 40 40 38 36   PO2 74* 79* 83 125*   HCO3 25 24 25 24     Complete Blood Count   Recent Labs   Lab 01/25/19  1008 01/25/19 0345 01/24/19 2148 01/24/19  1549   WBC 12.9* 10.5 11.9* 11.1*   HGB 8.7* 8.5* 8.9* 9.0*    145* 146* 148*     Basic Metabolic Panel  Recent Labs   Lab 01/25/19 0345 01/24/19 2148 01/24/19  1549 01/24/19  0957    139 140 139   POTASSIUM 4.0 4.2 3.8 4.0   CHLORIDE 106 106 105 105   CO2 24 24 24 23   BUN 30 34* 33* 37*   CR 1.60* 1.70* 1.74* 1.87*   * 117* 135* 139*     Liver Function Tests  Recent Labs   Lab 01/25/19  0345 01/24/19  2148 01/24/19  1549 01/24/19  0957  01/23/19  1556 01/23/19  0946  01/23/19  0139 01/23/19  0115   AST 73* 83* 103* 118*   < > 278* 269*   < > 82*  --    ALT 30 35 38 41   < > 58* 63*   < > 21  --    ALKPHOS 56 55 55 56   < > 44 40   < > 33*  --    BILITOTAL 0.7 0.9 0.6 0.8   < > 0.9 0.8   < >  0.3  --    ALBUMIN 2.1* 2.1* 2.2* 2.2*   < > 2.3* 2.3*   < > 1.6*  --    INR  --   --   --   --   --  1.85* 1.47*  --  4.00* 3.63*    < > = values in this interval not displayed.     Pancreatic Enzymes  No lab results found in last 7 days.  Coagulation Profile  Recent Labs   Lab 01/23/19  1556 01/23/19  0946 01/23/19  0139 01/23/19  0115   INR 1.85* 1.47* 4.00* 3.63*   PTT  --  39* 39* 57*     Lactate  Invalid input(s): LACTATE    5. RADIOLOGY:   No results found for this or any previous visit (from the past 24 hour(s)).    =========================================    Discussed with staff, Dr. Nathan Dc MD

## 2019-01-25 NOTE — PROGRESS NOTES
CVTS PROGRESS NOTE  January 25, 2019      CO-MORBIDITIES:   Acute kidney injury (H)  (primary encounter diagnosis)  CKD (chronic kidney disease) stage 3, GFR 30-59 ml/min (H)    ASSESSMENT: Aj is a 78 year old female with pmh CKDIII, b/l carotid stenosis, GERD, HTN, CAD s/p ACS [place] [facility] [place] severe multi-vessel disease who is now s/p CABG  X 3 with Dr. Villegas 1/15.    CHANGES FOR TODAY :  -HD net -1L  - Decrease FiO2 50%  - discontinue CVC and A lien (femoral)    PLANS:   Neuro/ pain/ sedation:  -CT head 1/18: normal  -precedex for sedation and agitation      Pulmonary care:   -CMV /AC; decrease fi02 to 50%  - chest tube placed 1/23 AM because of hemothorax  -trach monday      Cardiovascular:    -Monitor hemodynamic status  -1/23: Patient went into PEA, coded with ROSC s/p compressions, intubation, and epi.  -1/18: amlodipine 5mg, lopressor 25mg bid . Hold given low MAPs  - Cotninue po amiodarone started 1/20   - 1/23 formal echo: stable       GI care:   - Check NJ position with AXR and restart feeds  - Bowel regimen prn       Fluids/ Electrolytes/ Nutrition:   - ICU electrolyte replacement protocol     Renal/ Fluid Balance:    -chronic kidney disease III   -Urine output is minimal, nephrology consulted, appreciate recs   -continue CRRT       Endocrine:    -Sliding scale insulin       ID/ Antibiotics:  -Klebsiella UTI diagnosed on 1/11, Levaquin therapy ended 1/21/19      Heme:     -Hemoglobin stable.   -continue to monitor      Prophylaxis:    -Mechanical prophylaxis for DVT.    - Hold heparin gtt ( was started for a fib)      Lines/ tubes/ drains:  -NGT, NJ,  ETT, femo A line and CVC, Chen, PIV      Disposition:  - CVICU        ====================================    TODAY'S PROGRESS:   SUBJECTIVE:   - none        OBJECTIVE:   1. VITAL SIGNS:   Temp:  [97  F (36.1  C)-99.9  F (37.7  C)] 97.7  F (36.5  C)  Pulse:  [78] 78  Heart Rate:  [] 72  Resp:  [14-19] 19  BP: ()/(64-76)  84/64  MAP:  [62 mmHg-106 mmHg] 70 mmHg  Arterial Line BP: ()/(47-81) 93/56  FiO2 (%):  [50 %-60 %] 50 %  SpO2:  [95 %-100 %] 99 %  Ventilation Mode: CMV/AC  (Continuous Mandatory Ventilation/ Assist Control)  FiO2 (%): (S) 50 %  Rate Set (breaths/minute): 14 breaths/min  Tidal Volume Set (mL): 450 mL  PEEP (cm H2O): 10 cmH2O  Oxygen Concentration (%): 60 %  Resp: 19      2. INTAKE/ OUTPUT:   I/O last 3 completed shifts:  In: 2450.85 [I.V.:795.85; NG/GT:695]  Out: 2637 [Urine:25; Emesis/NG output:500; Other:1842; Chest Tube:270]    3. PHYSICAL EXAMINATION:   Gen: Intubated, not following commands   CV: RRR, no murmurs, rubs or gallops   Pulm: Lungs CTA, no wheezing or rhonchi  Abd: Soft, NT, ND, +BS  Ext: no LE edema  Neuro: opens eyes to voice, not following commands   Incision: c/d/i , no erythema, sternum stable  Tubes/drains: Dressing clean and dry, output remained stable overnight, no air leak  4. INVESTIGATIONS:   Arterial Blood Gases   Recent Labs   Lab 01/25/19 0345 01/24/19 2148 01/24/19 0348 01/23/19  2205   PH 7.40 7.39 7.42 7.43   PCO2 40 40 38 36   PO2 74* 79* 83 125*   HCO3 25 24 25 24     Complete Blood Count   Recent Labs   Lab 01/25/19  1008 01/25/19 0345 01/24/19 2148 01/24/19  1549   WBC 12.9* 10.5 11.9* 11.1*   HGB 8.7* 8.5* 8.9* 9.0*    145* 146* 148*     Basic Metabolic Panel  Recent Labs   Lab 01/25/19 0345 01/24/19 2148 01/24/19  1549 01/24/19  0957    139 140 139   POTASSIUM 4.0 4.2 3.8 4.0   CHLORIDE 106 106 105 105   CO2 24 24 24 23   BUN 30 34* 33* 37*   CR 1.60* 1.70* 1.74* 1.87*   * 117* 135* 139*     Liver Function Tests  Recent Labs   Lab 01/25/19  0345 01/24/19  2148 01/24/19  1549 01/24/19  0957  01/23/19  1556 01/23/19  0946  01/23/19  0139 01/23/19  0115   AST 73* 83* 103* 118*   < > 278* 269*   < > 82*  --    ALT 30 35 38 41   < > 58* 63*   < > 21  --    ALKPHOS 56 55 55 56   < > 44 40   < > 33*  --    BILITOTAL 0.7 0.9 0.6 0.8   < > 0.9 0.8   < >  0.3  --    ALBUMIN 2.1* 2.1* 2.2* 2.2*   < > 2.3* 2.3*   < > 1.6*  --    INR  --   --   --   --   --  1.85* 1.47*  --  4.00* 3.63*    < > = values in this interval not displayed.     Pancreatic Enzymes  No lab results found in last 7 days.  Coagulation Profile  Recent Labs   Lab 01/23/19  1556 01/23/19  0946 01/23/19  0139 01/23/19  0115   INR 1.85* 1.47* 4.00* 3.63*   PTT  --  39* 39* 57*     Lactate  Invalid input(s): LACTATE    5. RADIOLOGY:   No results found for this or any previous visit (from the past 24 hour(s)).    =========================================    Discussed with staff, Dr. Nathan Dc MD

## 2019-01-25 NOTE — PROGRESS NOTES
Nephrology Progress Note  01/25/2019         Mrs Rocha is an 78 yof w/CKD III, Carotid stenosis, GERD, HTN, CAD who had emergent CABG x3 on 1/15, Nephrology consulted for NATALIA on CKD, started HD on 1/21.       Interval History :   Mrs Rocha continues on CRRT, was net even yesterday with improving BP's, off of pressor this am.  Can hold on restarting should CRRT clot, will pull 0-50cc/hr until then, likely iHD tomorrow unless CRRT circuit lasts until then.  Will request tunneled line for next week as she is anuric since her PEA arrest.       Assessment & Recommendations:   NATALIA on CKD-CKD 3, baseline Cr of ~2.0 with one atrophic kidney on imaging with no former imaging to compare.  Injury involving hemodynamics with CABG as well as contrast for angiogram prior.  Had 2 sessions of HD without UF and did reasonably well but now had PEA arrest and was placed on CRRT to stabilize electrolytes and prevent worsening volume overload.  UOP now anuric, chances of kidney recovery with solitary kidney with anuric renal failure post CABG are very low.                 -CRRT, I=O as BP's allow.                 -Line is RIJ from 1/20.       Volume status-On CRRT, on/off one pressor.  Trying to pull 0-50cc/hr and will consider iHD based on BP's in am as BP's are improving.       Electrolytes/pH-K 3.8, bicarb 22.       Ca/phos/pth-Ca 7.4, Phos 4.8 but should come down with CRRT.       CABG-Was recovering although needed HD for NATALIA but had PEA arrest evening of 1/22, started on CRRT.       Anemia-Hgb 8.7, following.       Nutrition-On Nepro TF     Seen and discussed with Dr Pizarro     Recommendations were communicated to primary team via verbal communication.            Quincy Jesus  Clinical Nurse Specialist  [phone]    Review of Systems:   I reviewed the following systems:  ROS not done due to vent/sedation.       Physical Exam:   I/O last 3 completed shifts:  In: 2487.82 [I.V.:897.82; NG/GT:630]  Out: 2814 [Urine:25;  Emesis/NG output:700; Other:1789; Chest Tube:300]   BP 90/59   Pulse 61   Temp 97.7  F (36.5  C) (Axillary)   Resp 14   Wt 76.6 kg (168 lb 14 oz)   SpO2 100%   BMI 29.91 kg/m       GENERAL APPEARANCE: Awake, extubated but minimally interactive.   EYES:  No scleral icterus, pupils equal  HENT: mouth without ulcers or lesions  PULM: lungs rhonchi to auscultation  CV: irregular rhythm, normal rate, no rub     -edema +1LE  GI: soft, non-tender, non-distended, bowel sounds are +  MS: no evidence of inflammation in joints, no muscle tenderness  NEURO: Awake but minimally interactive, moving extremities, No asterixis   Lines: 1/20 [facility]        Labs:   All labs reviewed by me  Electrolytes/Renal -   Recent Labs   Lab Test 01/25/19  1008 01/25/19  0345 01/24/19 2148    139 139   POTASSIUM 3.8 4.0 4.2   CHLORIDE 106 106 106   CO2 22 24 24   BUN 29 30 34*   CR 1.56* 1.60* 1.70*   * 127* 117*   JEFFREY 7.4* 7.0* 7.4*   MAG 2.3 2.3 2.4*   PHOS 4.8* 5.0* 4.8*       CBC -   Recent Labs   Lab Test 01/25/19  1008 01/25/19  0345 01/24/19 2148   WBC 12.9* 10.5 11.9*   HGB 8.7* 8.5* 8.9*    145* 146*       LFTs -   Recent Labs   Lab Test 01/25/19  1008 01/25/19  0345 01/24/19  2148   ALKPHOS 58 56 55   BILITOTAL 0.7 0.7 0.9   ALT 28 30 35   AST 61* 73* 83*   PROTTOTAL 5.3* 5.2* 5.2*   ALBUMIN 2.1* 2.1* 2.1*       Iron Panel -   Recent Labs   Lab Test 01/27/15  1218 07/11/12  0804   IRON 74 80   IRONSAT 21 26   ORQUIDEA 40 67           Current Medications:    amiodarone  400 mg Oral or Feeding Tube BID     artificial tears   Both Eyes Daily     atorvastatin  40 mg Oral QPM     B and C vitamin Complex with folic acid  5 mL Per Feeding Tube Daily     busPIRone  5 mg Oral or Feeding Tube BID     heparin lock flush  5-10 mL Intracatheter Q24H     insulin aspart  1-6 Units Subcutaneous Q4H     pantoprazole (PROTONIX) IV  40 mg Intravenous BID     polyethylene glycol  17 g Oral or Feeding Tube Daily     protein modular  1  packet Per Feeding Tube BID     QUEtiapine  25 mg Oral Once     QUEtiapine  50 mg Oral At Bedtime     senna-docusate  1 tablet Oral or Feeding Tube BID    Or     senna-docusate  2 tablet Oral or Feeding Tube BID     sodium chloride (PF)  3 mL Intracatheter Q8H       dexmedetomidine 0.5 mcg/kg/hr (01/25/19 1600)     IV fluid REPLACEMENT ONLY       IV fluid REPLACEMENT ONLY       CRRT replacement solution 12.5 mL/kg/hr (01/25/19 1108)     fentaNYL 75 mcg/hr (01/25/19 1600)     - MEDICATION INSTRUCTIONS -       CRRT replacement solution 2.782 mL/kg/hr (01/24/19 2336)     CRRT replacement solution 12.5 mL/kg/hr (01/25/19 1107)     propofol (DIPRIVAN) infusion 30 mcg/kg/min (01/25/19 1600)     BETA BLOCKER NOT PRESCRIBED       vasopressin (PITRESSIN) infusion ADULT (40 mL) Stopped (01/25/19 0900)       I, Amanda Pizarro, saw and evaluated this patient as part of a shared visit.  I have reviewed and discussed with the advanced practice provider their history, physical and plan.    I personally reviewed the vital signs, medications, labs and imaging.    My key history or physical exam findings:  NATALIA, s/p CABG, on CRRT for solute and volume control  Key management decisions made by me:  Continue CRRT, transition to IHD if remains stable, minimal pressors in last 24 hours    Amanda Pizarro  Date of Service (when I saw the patient): 1/25

## 2019-01-25 NOTE — PROGRESS NOTES
CRRT RESTART NOTE    Reason for Restart:  Filter clogging  Error Code:  None    Patient s Vascular Access: RIJ Catheter                     Placement Confirmed:  YES  Manufacture:  [employer]  Model:  Lacho  Length/Algerian Size:  12 Fr / 20 cm  Flush Volume:  A 1.4 ml / V 1.4 ml    DATA:  Procedure:  CVVHDF  Start Time:  2351  Machine#:  [id number]  Filter:  M150  Blood Flow:   200 mL/min  Pre-Replacement Solution:  Phoxillum 4/2.5  Post-Replacement Solution:  Phoxillum 4/2.5  Dialysate Solution:  Phoxillum 4/2.5  Pre-Replacement Solution Rate:  900mL/hr  Post-Replacement Solution Rate:  200mL/hr  Dialysate Flow Rate:  900mL/hr  Patient Removal Rate:  80 mL/hr  Anticoagulation Type and Rate:  0    ASSESSMENT:  How Patient Tolerated Restart:  well  Vital Signs:  HR 67, BP 92/50, MAP 65  Initial Pressures:  Access:  -57  Filter:  109  Return:  61  TMP:  55  Change in Filter Pressure:  16    INTERVENTIONS:  Restarted per MD orders. Lines in normal position and blood warmer not on due to pt being febrile.    PLAN:  Continue to monitor circuit daily and change set q72 hours or PRN for clogging/clotting. Please call CRRT RN with any questions/problems.

## 2019-01-25 NOTE — CONSULTS
[facility] Surgery Consultation    Wendy Rocha MRN# [id number]   Age: 78 year old YOB: 1940     Date of Admission:  1/14/2019    Reason for consult: evaluation for tracheostomy consult       Requesting physician: Dr. Villegas       Level of consult: One-time consult to assist in determining a diagnosis and to recommend an appropriate treatment plan           Assessment and Plan:   Assessment:   Aj is a 78 year old female with pmh CKDIII, b/l carotid stenosis, GERD, HTN, CAD s/p ACS transferred from [facility] [place] with severe multi-vessel disease who is now s/p CABG  X 3 with Dr. Villegas 1/15.          Plan:   Tracheostomy next week when vent setting are more moderate (Peep 8 or less and FiO2 40% or less)             Chief Complaint:   Respiratory failure s/p PEA arrest after CABG     Unable to obtain a history from the patient due to critical condition         History of Present Illness:     Wendy Rocha is a 78 year old that presented on 01/11/2019 to [facility] ER for evaluation. She was diagnosed with a NSTEMI and transferred to [facility] on 1/12/2019  for further management. PCI was performed at [facility] and patient was transferred to [facility] CVTS for surgical management with Dr. Villegas 1/15              Past Medical History:   I have reviewed this patient's past medical history          Past Surgical History:   This patient has no significant past surgical history          Social History:   I have reviewed this patient's social history          Family History:   I have reviewed this patient's family history          Immunizations:   Immunization status is unknown          Allergies:   All allergies reviewed and addressed          Medications:     Current Facility-Administered Medications   Medication     acetaminophen (TYLENOL) tablet 650 mg     albuterol (PROAIR HFA/PROVENTIL HFA/VENTOLIN HFA) 108 (90 Base) MCG/ACT inhaler 6 puff     amiodarone (CORDARONE) suspension 400 mg     artificial tears  ophthalmic ointment     atorvastatin (LIPITOR) tablet 40 mg     B and C vitamin Complex with folic acid (NEPHRONEX) liquid 5 mL     bisacodyl (DULCOLAX) Suppository 10 mg     busPIRone (BUSPAR) tablet 5 mg     calcium chloride 1 g in sodium chloride 0.9 % 100 mL intermittent infusion     calcium chloride 2 g in sodium chloride 0.9 % 100 mL intermittent infusion     calcium chloride 3 g in sodium chloride 0.9 % 200 mL intermittent infusion     calcium chloride 4 g in sodium chloride 0.9 % 200 mL intermittent infusion     dexmedetomidine (PRECEDEX) 400 mcg in 0.9% sodium chloride 100 mL     dextrose 10 % 1,000 mL infusion     dextrose 10 % 1,000 mL infusion     glucose gel 15-30 g    Or     dextrose 50 % injection 25-50 mL    Or     glucagon injection 1 mg     dialysate for CVVHD & CVVHDF (Phoxillum BK4/2.5)     diphenhydrAMINE (BENADRYL) solution 12.5 mg    Or     diphenhydrAMINE (BENADRYL) injection 12.5 mg     EPINEPHrine (ADRENALIN) 5 mg in sodium chloride 0.9 % 250 mL infusion     fentaNYL (SUBLIMAZE) infusion     heparin lock flush 10 UNIT/ML injection 2-5 mL     heparin lock flush 10 UNIT/ML injection 5-10 mL     heparin lock flush 10 UNIT/ML injection 5-10 mL     hydrALAZINE (APRESOLINE) injection 10 mg     insulin aspart (NovoLOG) inj (RAPID ACTING)     insulin aspart (NovoLOG) inj (RAPID ACTING)     ipratropium - albuterol 0.5 mg/2.5 mg/3 mL (DUONEB) neb solution 3 mL     lidocaine (LMX4) cream     lidocaine (LMX4) cream     lidocaine (LMX4) cream     lidocaine 1 % 1 mL     lidocaine 1 % 1 mL     magnesium sulfate 2 g in water intermittent infusion     magnesium sulfate 2 g in water intermittent infusion     magnesium sulfate 4 g in 100 mL sterile water (premade)     [Rx hold ] metoprolol tartrate (LOPRESSOR) tablet 25 mg     naloxone (NARCAN) injection 0.1-0.4 mg     No heparin required     ondansetron (ZOFRAN-ODT) ODT tab 4 mg    Or     ondansetron (ZOFRAN) injection 4 mg     oxyCODONE (ROXICODONE) tablet  5 mg     pantoprazole (PROTONIX) 40 mg IV push injection     polyethylene glycol (MIRALAX/GLYCOLAX) Packet 17 g     POST-filter replacement solution for CVVHD & CVVHDF (Phoxillum BK4/2.5)     potassium chloride (KLOR-CON) Packet 20-40 mEq     potassium chloride 10 mEq in 100 mL intermittent infusion with 10 mg lidocaine     potassium chloride 10 mEq in 100 mL sterile water intermittent infusion (premix)     potassium chloride 20 mEq in 50 mL intermittent infusion     potassium chloride 20 mEq in 50 mL intermittent infusion     potassium chloride ER (K-DUR/KLOR-CON M) CR tablet 20-40 mEq     PRE-filter replacement solution for CVVHD & CVVHDF (Phoxillum BK4/2.5)     propofol (DIPRIVAN) infusion     protein modular (PROSOURCE TF) 1 packet     QUEtiapine (SEROquel) tablet 25 mg     QUEtiapine (SEROquel) tablet 50 mg     Reason beta blocker order not selected     senna-docusate (SENOKOT-S/PERICOLACE) 8.6-50 MG per tablet 1 tablet    Or     senna-docusate (SENOKOT-S/PERICOLACE) 8.6-50 MG per tablet 2 tablet     sodium chloride (PF) 0.9% PF flush 10-20 mL     sodium chloride (PF) 0.9% PF flush 3 mL     sodium chloride (PF) 0.9% PF flush 3 mL     sodium chloride (PF) 0.9% PF flush 5-50 mL     sodium phosphate 20 mmol in D5W 100 mL intermittent infusion     vasopressin (VASOSTRICT) 40 Units in D5W 40 mL infusion             Review of Systems:   The Review of Systems is negative other than noted in the HPI          Physical Exam:   Vitals were reviewed  Temp: 97.2  F (36.2  C) Temp src: Esophageal BP: (!) 82/51 Pulse: 62 Heart Rate: 62 Resp: 14 SpO2: 99 % O2 Device: Mechanical Ventilator    General: Sedated and Intubated  HEENT: normocephalic. Atraumatic   Cardiovascular: RRR    Pulmonary: CTA b/l  MS: intubated. Not following commands  Abd: soft nd/nt no scars            Data:   All laboratory data reviewed  All imaging studies reviewed by me.     Attestation:  I have reviewed today's vital signs, notes, medications, labs  and imaging.    Lynette Mendoza MD

## 2019-01-25 NOTE — PLAN OF CARE
D: POD #9 CABG x3 with Dr. Villegas  I/A: Pt does not follow commands but intermittently nods/shakes head appropriately to questions. Opens eyes spontaneously, moves all 4 extremities purposefully. Pt restless this AM, propofol added and fentanyl increased. HR Afib  then SR with PACs around 1630, confirmed via EKG. Vent settings remain stable, PEEP decreased to 10 with adequate ABG and O2 sats >95%. Minimal output from rectal pouch. Hypo BS. Bladder scanned for 34 mL. TF at goal. CRRT goal I=O. Discussed POC/pt status with family at care conference today, see social work note.   R: Continue POC, anticipate Trach/Peg early next week

## 2019-01-25 NOTE — PLAN OF CARE
Assessment:   Cardiac: SR 60s with occasional PACs, Vasopressin titrated to keep MAP > 65.  Resp: CMV 60%/450/14/10. Precedex gtt infusing.  Neuro:Less restless with propofol gtt infusing. Opens eyes spontaneously, does not follow commands, moves all extremities.  : Incontinent of urine x 2 very small amounts, very odorous. Anuric, Ongoing CRRT, minimal alarms during shift. Able to meet goal of I = O, consider changing order to pull fluid.   GI: Tube feedings via NJ @ 40 cc/hr. OG to LIS.  Skin: Pleural CT -20 cc, no air leak present.   Endocrine: No supplemental insulin required   Pain: Continuous fentanyl gtt infusing.      Interventions: No significant events overnight.      Plan: Will continue to monitor/assess and update MD as needed.     Recommend:  Changed CRRT order to start pulling fluid.

## 2019-01-25 NOTE — PROGRESS NOTES
Social Work Services Progress Note    Hospital Day: 12  Date of Initial Social Work Evaluation:  1/24/19  Collaborated with:  Pt's son, Juan Luis    Data:  Writer met with Pt's son in hallway outside Pt's room. Juan Luis had some questions about [facility] policy and further decision-making.     Intervention:  Writer outlined [facility] policy, concluding that Pt's 5 children together would be the surrogates at this time. Juan Luis indicated his understanding. He feels that there will not likely be discord, but knows that the family will continue to discuss the options ahead of them throughout the next few days. Writer encouraged him to request additional information from Pt's providers as needed in order to make the most informed decisions. Writer explained that the team is primarily asking family to consider how to best represent Pt's wishes in making their decisions. Juan Luis had some additional questions about LTACH and Writer offered to connect him with the RNCC.    Assessment:  Pt's family appears to be working together as they consider the care options ahead, pending Pt's progress.    Plan:    Anticipated Disposition:  Facility:  LTACH vs TCU    Barriers to d/c plan:  Possible trach and PEG placement    Follow Up:  SW will remain available for ongoing support and discharge planning as needed.      Mirella Perea [facility]  ICU   Pager: [phone]    Addendum: Juan Luis's Contact Info: [phone]

## 2019-01-25 NOTE — PLAN OF CARE
D: POD 10, CABG x3 with Dr. Villegas    I/A: Not following commands despite PINEDA, eyes opening spontaneously, restless with propofol lightened. Eye ointment initiated for dry-appearing eyes. Fentanyl remains at 75, Dex 0.5-0.7, Propofol 20-35. Vaso weaned off around 0900, started to pull some fluid on CRRT with new goal, soft MAPs required less pulling to leave Vaso off. Weaned FiO2 60-50% and eventually PEEP from 10 to 8, presently CMV/450/14/8/50% with SpO2 100%. R femoral arterial line and R femoral CVC removed this morning; HOB elevated this afternoon. Remains in SR, occasional PAC this morning. Ionized Ca replaced x1. Bowel incontinence, stools soft. Consent completed for trach placement on Monday 1/28/19, documentation placed in pt chart. IR consulted for tunneled catheter placement.     P: Continue POC, trach placement on Monday. Anticipate PEG and tunneled dialysis line next week.      Addendum: CRRT stopped at 1820; circuit clotted. No plan to restart this evening per (earlier today) Nephrology order.

## 2019-01-26 NOTE — PLAN OF CARE
Plan to transition to HD, CRRT off overnight. IR for HD line placement after the weekend. Restless with sedation interruption and cares, moves al extremities, opens eyes spontaneously,  does not track with eyes, or follow commands. MAP >65 , no pressors.  Oxygen weaned to 40% on ventilator. Chest tube 70cc out this shift. Soft tan paste like consistency stools through night, 600cc brown fluid out of OG overnight. Did not void, BUS showed 13cc. Patient will continue to be monitored and assessed. Please see MAR, flow sheets, and remainder of chart for further details. .

## 2019-01-26 NOTE — CONSULTS
Duplicate note below entered to associate consult order:      Received consult for tunneled line to be placed early next week.  Pt has NATLAIA on CKD in setting of CABG after ACS on 1/15.  Has been on CRRT.      Will not be able to add to schedule until Monday morning, but should be able to be done early next week    Please make NPO Sunday at midnight in case we can add on to mondays schedule.  Call IR call pager Monday morning around 0800 to see if it can be added on.     INR needs to be below 1.8 and platelets over 50k.      Emanuel Mccormick MD  Interventional Radiology Fellow  [phone] (personal pager)  [phone] (IR service pass pager)

## 2019-01-26 NOTE — PROGRESS NOTES
Nephrology Progress Note  01/26/2019         Mrs Rocha is an 78 yof w/CKD III, Carotid stenosis, GERD, HTN, CAD who had emergent CABG x3 on 1/15, Nephrology consulted for NATALIA on CKD, started HD on 1/21.       Interval History :   Mrs Rocha is sitting in chair, still not following comands. CRRT stopped last night, will evaluate for IHD tomorrow     Assessment & Recommendations:   NATALIA on CKD-CKD 3, baseline Cr of ~2.0 with one atrophic kidney on imaging with no former imaging to compare.  Injury involving hemodynamics with CABG as well as contrast for angiogram prior.  Had 2 sessions of HD without UF and did reasonably well but now had PEA arrest and was placed on CRRT to stabilize electrolytes and prevent worsening volume overload.   -CRRT stopped 1/25  - transition to IHD, treatment tomorrow or Monday, will evaluate daily               -Line is RIJ from 1/20.  consult for IR tunneled line placed     Volume status-On CRRT, on/off one pressor.  Trying to pull 0-50cc/hr and will consider iHD based on BP's in am as BP's are improving.       Electrolytes/pH-K 3.5, bicarb 22.       Ca/phos/pth-Ca 7.0 Phos 6.5  - tube feeds should be nepro (low K and low phos)     CABG-Was recovering although needed HD for NATALIA but had PEA arrest evening of 1/22, started on CRRT.       Anemia-Hgb 8.7, following.       Nutrition-On Nepro TF       Recommendations were communicated to primary team via this note.          Review of Systems:   I reviewed the following systems:  ROS not done due to vent/sedation.       Physical Exam:   I/O last 3 completed shifts:  In: 2212.62 [I.V.:707.62; NG/GT:545]  Out: 1806 [Emesis/NG output:1025; Other:621; Chest Tube:160]   /62   Pulse 68   Temp 97.4  F (36.3  C) (Axillary)   Resp 14   Wt 77.8 kg (171 lb 8.3 oz)   SpO2 98%   BMI 30.38 kg/m       GENERAL APPEARANCE: intubated, in chair  EYES:  No scleral icterus, pupils equal  HENT: mouth without ulcers or lesions  PULM: lungs rhonchi to  auscultation  CV: irregular rhythm, normal rate, no rub     -edema +1LE  GI: soft, non-tender, non-distended, bowel sounds are +  MS: no evidence of inflammation in joints, no muscle tenderness  NEURO: Awake but minimally interactive, moving extremities, No asterixis   Lines: 1/20 [facility]        Labs:   All labs reviewed by me  Electrolytes/Renal -   Recent Labs   Lab Test 01/26/19  0957 01/26/19  0425 01/25/19  2141    137 138   POTASSIUM 3.5 3.9 3.6   CHLORIDE 103 104 105   CO2 22 22 25   BUN 42* 38* 32*   CR 2.55* 2.28* 1.78*   * 135* 103*   JEFFREY 7.0* 7.7* 7.7*   MAG 2.4* 2.6* 2.5*   PHOS 6.5* 6.0* 5.1*       CBC -   Recent Labs   Lab Test 01/26/19  0957 01/26/19  0425 01/25/19  2141   WBC 14.1* 13.4* 12.8*   HGB 8.2* 8.5* 8.4*    173 173       LFTs -   Recent Labs   Lab Test 01/26/19  0957 01/26/19  0425 01/25/19  2141   ALKPHOS 76 71 58   BILITOTAL 0.9 0.8 0.8   ALT 20 19 24   AST 39 42 46*   PROTTOTAL 5.1* 5.1* 5.2*   ALBUMIN 1.9* 1.9* 2.0*       Iron Panel -   Recent Labs   Lab Test 01/27/15  1218 07/11/12  0804   IRON 74 80   IRONSAT 21 26   ORQUIDEA 40 67           Current Medications:    [START ON 1/27/2019] amiodarone  400 mg Oral or Feeding Tube Daily     artificial tears   Both Eyes Daily     atorvastatin  40 mg Oral QPM     B and C vitamin Complex with folic acid  5 mL Per Feeding Tube Daily     busPIRone  5 mg Oral or Feeding Tube BID     calcium gluconate  2 g Intravenous Once     heparin lock flush  5-10 mL Intracatheter Q24H     insulin aspart  1-6 Units Subcutaneous Q4H     metoprolol tartrate  12.5 mg Oral BID     pantoprazole (PROTONIX) IV  40 mg Intravenous BID     polyethylene glycol  17 g Oral or Feeding Tube Daily     protein modular  1 packet Per Feeding Tube BID     QUEtiapine  25 mg Oral Once     QUEtiapine  50 mg Oral At Bedtime     senna-docusate  1 tablet Oral or Feeding Tube BID    Or     senna-docusate  2 tablet Oral or Feeding Tube BID     sodium chloride (PF)  3 mL  Intracatheter Q8H       dexmedetomidine 0.9 mcg/kg/hr (01/26/19 1200)     IV fluid REPLACEMENT ONLY       IV fluid REPLACEMENT ONLY       fentaNYL 100 mcg/hr (01/26/19 1200)     propofol (DIPRIVAN) infusion 15 mcg/kg/min (01/26/19 1200)     BETA BLOCKER NOT PRESCRIBED       vasopressin (PITRESSIN) infusion ADULT (40 mL) Stopped (01/25/19 0900)       Amanda Pizarro  [phone]

## 2019-01-26 NOTE — PROGRESS NOTES
CVTS PROGRESS NOTE  January 26, 2019      CO-MORBIDITIES:   Acute kidney injury (H)  (primary encounter diagnosis)  CKD (chronic kidney disease) stage 3, GFR 30-59 ml/min (H)    ASSESSMENT: Aj is a 78 year old female with pmh CKDIII, b/l carotid stenosis, GERD, HTN, CAD s/p ACS transferred from [facility] [place] with severe multi-vessel disease who is now s/p CABG  X 3 with Dr. Villegas 1/15.    CHANGES FOR TODAY :  -HD net -1L  - Decrease peep 5  - Metoprolol 12.5 bid   -Seroquel am and hs   -PO amio to 400 daily     PLANS:   Neuro/ pain/ sedation:  -CT head 1/18: normal  -precedex for sedation and agitation   -seroquel hs and am      Pulmonary care:   -CMV /AC; decrease peep 5   - chest tube placed 1/23 AM because of hemothorax  -trach monday      Cardiovascular:    -Monitor hemodynamic status  -1/23: Patient went into PEA, coded with ROSC s/p compressions, intubation, and epi.  -1/18: amlodipine 5mg, lopressor 25mg bid . Hold given low MAPs  - Cotninue po amiodarone started 1/20, decrease to 400 daily    - 1/23 formal echo: stable   -1/25: added back metoprolol 12.5 bid      GI care:   - Check   - Bowel regimen prn   -TF at goal      Fluids/ Electrolytes/ Nutrition:   - ICU electrolyte replacement protocol     Renal/ Fluid Balance:    -chronic kidney disease III   -Urine output is minimal, nephrology consulted, appreciate recs   -continue CRRT vs HD per nephrology        Endocrine:    -Sliding scale insulin       ID/ Antibiotics:  -Klebsiella UTI diagnosed on 1/11, Levaquin therapy ended 1/21/19      Heme:     -Hemoglobin stable.   -continue to monitor      Prophylaxis:    -Mechanical prophylaxis for DVT.    - Hold heparin gtt ( was started for a fib)      Lines/ tubes/ drains:  -NGT, NJ,  ETT, femo A line and CVC, Chen, PIV      Disposition:  - CVICU        ====================================    TODAY'S PROGRESS:   SUBJECTIVE:   - none        OBJECTIVE:   1. VITAL SIGNS:   Temp:  [97  F (36.1  C)-99.9  F (37.7  C)] 97.4   F (36.3  C)  Pulse:  [] 69  Heart Rate:  [] 69  Resp:  [14-24] 14  BP: ()/(49-84) 93/56  FiO2 (%):  [40 %-50 %] 40 %  SpO2:  [95 %-100 %] 98 %  Ventilation Mode: CMV/AC  (Continuous Mandatory Ventilation/ Assist Control)  FiO2 (%): 40 %  Rate Set (breaths/minute): 14 breaths/min  Tidal Volume Set (mL): 450 mL  PEEP (cm H2O): 8 cmH2O  Oxygen Concentration (%): 40 %  Resp: 14      2. INTAKE/ OUTPUT:   I/O last 3 completed shifts:  In: 2212.62 [I.V.:707.62; NG/GT:545]  Out: 1806 [Emesis/NG output:1025; Other:621; Chest Tube:160]    3. PHYSICAL EXAMINATION:   Gen: Intubated, not following commands   CV: RRR, no murmurs, rubs or gallops   Pulm: Lungs CTA, no wheezing or rhonchi  Abd: Soft, NT, ND, +BS  Ext: no LE edema  Neuro: opens eyes to voice, not following commands   Incision: c/d/i , no erythema, sternum stable  Tubes/drains: Dressing clean and dry, output remained stable overnight, no air leak  4. INVESTIGATIONS:   Arterial Blood Gases   Recent Labs   Lab 01/25/19 0345 01/24/19 2148 01/24/19 0348 01/23/19  2205   PH 7.40 7.39 7.42 7.43   PCO2 40 40 38 36   PO2 74* 79* 83 125*   HCO3 25 24 25 24     Complete Blood Count   Recent Labs   Lab 01/26/19  0957 01/26/19  0425 01/25/19 2141 01/25/19  1549   WBC 14.1* 13.4* 12.8* 12.5*   HGB 8.2* 8.5* 8.4* 8.7*    173 173 155     Basic Metabolic Panel  Recent Labs   Lab 01/26/19  0957 01/26/19  0425 01/25/19 2141 01/25/19  1549    137 138 137   POTASSIUM 3.5 3.9 3.6 3.9   CHLORIDE 103 104 105 105   CO2 22 22 25 26   BUN 42* 38* 32* 26   CR 2.55* 2.28* 1.78* 1.42*   * 135* 103* 105*     Liver Function Tests  Recent Labs   Lab 01/26/19  0957 01/26/19  0425 01/25/19  2141 01/25/19  1549  01/23/19  1556 01/23/19  0946  01/23/19  0139 01/23/19  0115   AST 39 42 46* 52*   < > 278* 269*   < > 82*  --    ALT 20 19 24 25   < > 58* 63*   < > 21  --    ALKPHOS 76 71 58 56   < > 44 40   < > 33*  --    BILITOTAL 0.9 0.8 0.8 1.2   < > 0.9 0.8    < > 0.3  --    ALBUMIN 1.9* 1.9* 2.0* 2.0*   < > 2.3* 2.3*   < > 1.6*  --    INR  --   --   --   --   --  1.85* 1.47*  --  4.00* 3.63*    < > = values in this interval not displayed.     Pancreatic Enzymes  No lab results found in last 7 days.  Coagulation Profile  Recent Labs   Lab 01/23/19  1556 01/23/19  0946 01/23/19  0139 01/23/19  0115   INR 1.85* 1.47* 4.00* 3.63*   PTT  --  39* 39* 57*     Lactate  Invalid input(s): LACTATE    5. RADIOLOGY:   Recent Results (from the past 24 hour(s))   XR Chest Port 1 View    Narrative    XR CHEST PORT 1 VW  1/26/2019 3:39 AM      HISTORY: verify lines and airway , daily ICU    COMPARISON: Chest x-ray 1/25/2019    TECHNIQUE: Supine frontal view of the chest    FINDINGS: Right costophrenic angle is clipped from field-of-view.  Stable small left sided pleural effusion with associated  consolidation/atelectasis of the left lower lobe. No appreciable  pneumothorax. Stable enlargement of the cardiac silhouette with  calcification projecting over the mitral valve. Postoperative changes  of CABG. Endotracheal tube measures 2.4 cm superior to ursula.  Left-sided PICC line projects over the mid SVC. Right-sided dual-lumen  IJ catheter projecting over the superior cavoatrial junction. Enteric  tube incompletely visualized projecting over the esophagus and into  the stomach. No suspicious osseous lesion.      Impression    IMPRESSION:   1. Stable small left-sided pleural effusion with associated  consolidation/atelectasis of the left lower lobe.  2. Stable support devices.    I have personally reviewed the examination and initial interpretation  and I agree with the findings.    ISH ANTOINE MD       =========================================    Discussed with staff, Dr. Natahn Dc MD

## 2019-01-26 NOTE — PROGRESS NOTES
CV ICU PROGRESS NOTE  January 26, 2019      CO-MORBIDITIES:   Acute kidney injury (H)  (primary encounter diagnosis)  CKD (chronic kidney disease) stage 3, GFR 30-59 ml/min (H)    ASSESSMENT: Aj is a 78 year old female with pmh CKDIII, b/l carotid stenosis, GERD, HTN, CAD s/p ACS transferred from [facility] [place] with severe multi-vessel disease who is now s/p CABG  X 3 with Dr. Villegas 1/15.    CHANGES FOR TODAY :  -HD net -1L  - Decrease peep 5  - Metoprolol 12.5 bid   -Seroquel am and hs   -PO amio to 400 daily     PLANS:   Neuro/ pain/ sedation:  -CT head 1/18: normal  -precedex for sedation and agitation   -seroquel hs and am      Pulmonary care:   -CMV /AC; decrease peep 5   - chest tube placed 1/23 AM because of hemothorax  -trach monday      Cardiovascular:    -Monitor hemodynamic status  -1/23: Patient went into PEA, coded with ROSC s/p compressions, intubation, and epi.  -1/18: amlodipine 5mg, lopressor 25mg bid . Hold given low MAPs  - Cotninue po amiodarone started 1/20, decrease to 400 daily    - 1/23 formal echo: stable   -1/25: added back metoprolol 12.5 bid      GI care:   - Check   - Bowel regimen prn   -TF at goal      Fluids/ Electrolytes/ Nutrition:   - ICU electrolyte replacement protocol     Renal/ Fluid Balance:    -chronic kidney disease III   -Urine output is minimal, nephrology consulted, appreciate recs   -continue CRRT vs HD per nephrology        Endocrine:    -Sliding scale insulin       ID/ Antibiotics:  -Klebsiella UTI diagnosed on 1/11, Levaquin therapy ended 1/21/19      Heme:     -Hemoglobin stable.   -continue to monitor      Prophylaxis:    -Mechanical prophylaxis for DVT.    - Hold heparin gtt ( was started for a fib)      Lines/ tubes/ drains:  -NGT, NJ,  ETT, femo A line and CVC, Chen, PIV      Disposition:  - CVICU        ====================================    TODAY'S PROGRESS:   SUBJECTIVE:   - none        OBJECTIVE:   1. VITAL SIGNS:   Temp:  [97  F (36.1  C)-99.9  F (37.7  C)]  97.4  F (36.3  C)  Pulse:  [] 69  Heart Rate:  [] 69  Resp:  [14-24] 14  BP: ()/(49-84) 93/56  FiO2 (%):  [40 %-50 %] 40 %  SpO2:  [95 %-100 %] 98 %  Ventilation Mode: CMV/AC  (Continuous Mandatory Ventilation/ Assist Control)  FiO2 (%): 40 %  Rate Set (breaths/minute): 14 breaths/min  Tidal Volume Set (mL): 450 mL  PEEP (cm H2O): 8 cmH2O  Oxygen Concentration (%): 40 %  Resp: 14      2. INTAKE/ OUTPUT:   I/O last 3 completed shifts:  In: 2212.62 [I.V.:707.62; NG/GT:545]  Out: 1806 [Emesis/NG output:1025; Other:621; Chest Tube:160]    3. PHYSICAL EXAMINATION:   Gen: Intubated, not following commands   CV: RRR, no murmurs, rubs or gallops   Pulm: Lungs CTA, no wheezing or rhonchi  Abd: Soft, NT, ND, +BS  Ext: no LE edema  Neuro: opens eyes to voice, not following commands   Incision: c/d/i , no erythema, sternum stable  Tubes/drains: Dressing clean and dry, output remained stable overnight, no air leak  4. INVESTIGATIONS:   Arterial Blood Gases   Recent Labs   Lab 01/25/19 0345 01/24/19 2148 01/24/19 0348 01/23/19  2205   PH 7.40 7.39 7.42 7.43   PCO2 40 40 38 36   PO2 74* 79* 83 125*   HCO3 25 24 25 24     Complete Blood Count   Recent Labs   Lab 01/26/19  0957 01/26/19  0425 01/25/19 2141 01/25/19  1549   WBC 14.1* 13.4* 12.8* 12.5*   HGB 8.2* 8.5* 8.4* 8.7*    173 173 155     Basic Metabolic Panel  Recent Labs   Lab 01/26/19  0957 01/26/19  0425 01/25/19 2141 01/25/19  1549    137 138 137   POTASSIUM 3.5 3.9 3.6 3.9   CHLORIDE 103 104 105 105   CO2 22 22 25 26   BUN 42* 38* 32* 26   CR 2.55* 2.28* 1.78* 1.42*   * 135* 103* 105*     Liver Function Tests  Recent Labs   Lab 01/26/19  0957 01/26/19  0425 01/25/19  2141 01/25/19  1549  01/23/19  1556 01/23/19  0946  01/23/19  0139 01/23/19  0115   AST 39 42 46* 52*   < > 278* 269*   < > 82*  --    ALT 20 19 24 25   < > 58* 63*   < > 21  --    ALKPHOS 76 71 58 56   < > 44 40   < > 33*  --    BILITOTAL 0.9 0.8 0.8 1.2   < > 0.9  0.8   < > 0.3  --    ALBUMIN 1.9* 1.9* 2.0* 2.0*   < > 2.3* 2.3*   < > 1.6*  --    INR  --   --   --   --   --  1.85* 1.47*  --  4.00* 3.63*    < > = values in this interval not displayed.     Pancreatic Enzymes  No lab results found in last 7 days.  Coagulation Profile  Recent Labs   Lab 01/23/19  1556 01/23/19  0946 01/23/19  0139 01/23/19  0115   INR 1.85* 1.47* 4.00* 3.63*   PTT  --  39* 39* 57*     Lactate  Invalid input(s): LACTATE    5. RADIOLOGY:   Recent Results (from the past 24 hour(s))   XR Chest Port 1 View    Narrative    XR CHEST PORT 1 VW  1/26/2019 3:39 AM      HISTORY: verify lines and airway , daily ICU    COMPARISON: Chest x-ray 1/25/2019    TECHNIQUE: Supine frontal view of the chest    FINDINGS: Right costophrenic angle is clipped from field-of-view.  Stable small left sided pleural effusion with associated  consolidation/atelectasis of the left lower lobe. No appreciable  pneumothorax. Stable enlargement of the cardiac silhouette with  calcification projecting over the mitral valve. Postoperative changes  of CABG. Endotracheal tube measures 2.4 cm superior to ursula.  Left-sided PICC line projects over the mid SVC. Right-sided dual-lumen  IJ catheter projecting over the superior cavoatrial junction. Enteric  tube incompletely visualized projecting over the esophagus and into  the stomach. No suspicious osseous lesion.      Impression    IMPRESSION:   1. Stable small left-sided pleural effusion with associated  consolidation/atelectasis of the left lower lobe.  2. Stable support devices.    I have personally reviewed the examination and initial interpretation  and I agree with the findings.    ISH ANTOINE MD       =========================================    Discussed with staff, Dr. Nathan Dc MD

## 2019-01-26 NOTE — CONSULTS
Received consult for tunneled line to be placed early next week.  Pt has NATALIA on CKD in setting of CABG after ACS on 1/15.  Has been on CRRT.      Will not be able to add to schedule until Monday morning, but should be able to be done early next week    Please make NPO Sunday at midnight in case we can add on to mondays schedule.  Call IR call pager Monday morning around 0800 to see if it can be added on.     INR needs to be below 1.8 and platelets over 50k.      Emanuel Mccormick MD  Interventional Radiology Fellow  [phone] (personal pager)  [phone] (IR service pass pager)

## 2019-01-27 NOTE — PROGRESS NOTES
CV ICU PROGRESS NOTE  January 27, 2019        Overnight Events:  -No overnight events, afebrile, VSS    Today's Plan:  -Plan for percutaneous tracheostomy and PEG placement 1/28  -Serial neurologic examinations daily  -Nephrology daily to determine need for CRRT    ASSESSMENT:  Wendy Rocha is a 78 year old female with PMH CKDIII, B/L carotid stenosis, GERD, HTN, CAD s/p ACS transferred from [facility] [place] with severe multi-vessel disease who is s/p CABG. On 1/23, the patient went into PEA, with ROSC s/p compressions, intubation, and epinephrine.       PLANS:   Neuro/ pain/ sedation:  -CT head 1/18: normal  -RN has weaned sedation and examination shows mildly less responsive today. She was started on Seroquel yesterday in the morning and at night, so will follow serial neuro exams, and may change dose to at bedtime only.  -On fentanyl, dexmedetomidine, and propofol for pain and sedation     Pulmonary care:   -CMV /AC; PEEP 5 cm H20, and FIO2 35%  -chest tube placed 1/23 AM because of hemothorax  -Tracheostomy planned 1/28      Cardiovascular:    -Monitor hemodynamic status  -Amiodarone 400mg once daily  -Started Metoprolol 12.5mg po BID (1/25), blood pressure appears stable on current dose  -1/23:       GI care:   -PEG placement planned 1/28  -Bowel regimen prn   -TF at goal      Fluids/ Electrolytes/ Nutrition:   - ICU electrolyte replacement protocol     Renal/ Fluid Balance:    -chronic kidney disease III   -Urine output is minimal, nephrology consulted, appreciate recs   -continue CRRT vs HD per nephrology        Endocrine:    -Sliding scale insulin       ID/ Antibiotics:  -WBC count decreased from 14.1 to 11.7, she is off antibiotics after being treated for a Klebsiella UTI (1/21)      Heme:     -Hemoglobin stable.   -Continue to monitor      Prophylaxis:    -Mechanical prophylaxis for DVT.        Lines/ tubes/ drains:  -NGT, NJ,  ETT, femo A line and CVC, Chen, PIV      Disposition:  - CVICU      VITAL  SIGNS:  Temp:  [96.4  F (35.8  C)-99.6  F (37.6  C)] 97.6  F (36.4  C)  Pulse:  [63-88] 77  Heart Rate:  [63-93] 78  Resp:  [13-21] 13  BP: ()/(56-97) 108/68  FiO2 (%):  [40 %-50 %] 50 %  SpO2:  [91 %-99 %] 94 %  Ventilation Mode: CMV/AC  (Continuous Mandatory Ventilation/ Assist Control)  FiO2 (%): 50 %  Rate Set (breaths/minute): 14 breaths/min  Tidal Volume Set (mL): 450 mL  PEEP (cm H2O): 5 cmH2O  Oxygen Concentration (%): 50 %  Resp: 13      INTAKE/ OUTPUT:   I/O last 3 completed shifts:  In: 2175.55 [I.V.:690.55; NG/GT:525]  Out: 770 [Emesis/NG output:580; Chest Tube:190]     PHYSICAL EXAMINATION:  General: intubated, in no acute distress.  HEENT: Normocephalic, atraumatic.   Neck: No cervical lymphadenopathy. No thyromegaly.  Chest wall: Symmetric thorax. No masses or tenderness to palpation.   Respiratory: Non-labored breathing. Lung sounds clear to auscultation bilaterally.   Cardiovascular: Regular rate and rhythm.   Gastrointestinal: Abdomen soft, non-distended, non-tender to palpation. No organomegaly or masses appreciated. No inguinal hernias.   Genitourinary: No CVA tenderness.   Extremities: Moving all four extremities. No limb deformities. No pedal edema.   Skin: As noted above. No rashes or lesions appreciated.    ALLERGIES:      Allergies   Allergen Reactions     Lisinopril      She developed ARF       MEDICATIONS:    No current facility-administered medications on file prior to encounter.   Current Outpatient Medications on File Prior to Encounter:  ACE/ARB NOT PRESCRIBED, INTENTIONAL, ACE & ARB not prescribed due to Worsening renal function on ACE/ARB therapy   acetaminophen (TYLENOL) 325 MG tablet Take 2 tablets (650 mg) by mouth every 6 hours as needed for mild pain   amLODIPine (NORVASC) 10 MG tablet TAKE 1 TABLET BY MOUTH ONCE DAILY   ASPIRIN NOT PRESCRIBED (INTENTIONAL) Antiplatelet medication not prescribed intentionally due to Plavix   busPIRone (BUSPAR) 5 MG tablet TAKE 1 TABLET BY  MOUTH TWICE DAILY FOR ANXIETY   clopidogrel (PLAVIX) 75 MG tablet TAKE 1 TABLET BY MOUTH ONCE DAILY   doxazosin (CARDURA) 4 MG tablet TAKE 1 TABLET BY MOUTH AT BEDTIME   Hydrocortisone Acetate 2.5 % CREA Externally apply 1 Application topically 2 times daily Apply to both arms twice a day   metoprolol tartrate (LOPRESSOR) 50 MG tablet TAKE ONE TABLET BY MOUTH TWICE DAILY   metoprolol tartrate (LOPRESSOR) 50 MG tablet Take 0.5 tablets (25 mg) by mouth 2 times daily   ondansetron (ZOFRAN) 8 MG tablet Take 1 tablet (8 mg) by mouth every 8 hours as needed for nausea   order for DME Machine to dispense the medication   pantoprazole (PROTONIX) 40 MG EC tablet TAKE ONE TABLET BY MOUTH ONCE DAILY 30-60  MINUTES  BEFORE  A  MEAL   simvastatin (ZOCOR) 40 MG tablet Take 1 tablet (40 mg) by mouth At Bedtime   traZODone (DESYREL) 50 MG tablet Take 1 tablet (50 mg) by mouth nightly as needed for sleep       LABS: Reviewed.   Arterial Blood Gases   Recent Labs   Lab 01/25/19 0345 01/24/19 2148 01/24/19 0348 01/23/19  2205   PH 7.40 7.39 7.42 7.43   PCO2 40 40 38 36   PO2 74* 79* 83 125*   HCO3 25 24 25 24     Complete Blood Count   Recent Labs   Lab 01/27/19  0404 01/26/19  0957 01/26/19 0425 01/25/19  2141   WBC 11.7* 14.1* 13.4* 12.8*   HGB 7.8* 8.2* 8.5* 8.4*    176 173 173     Basic Metabolic Panel  Recent Labs   Lab 01/27/19  0404 01/26/19  1754 01/26/19  0957 01/26/19  0425 01/25/19  2141     --  137 137 138   POTASSIUM 3.4 3.8 3.5 3.9 3.6   CHLORIDE 101  --  103 104 105   CO2 19*  --  22 22 25   BUN 60*  --  42* 38* 32*   CR 3.53*  --  2.55* 2.28* 1.78*   *  --  115* 135* 103*     Liver Function Tests  Recent Labs   Lab 01/27/19  0404 01/26/19  0957 01/26/19  0425 01/25/19  2141  01/23/19  1556 01/23/19  0946  01/23/19  0139 01/23/19  0115   AST 30 39 42 46*   < > 278* 269*   < > 82*  --    ALT 14 20 19 24   < > 58* 63*   < > 21  --    ALKPHOS 76 76 71 58   < > 44 40   < > 33*  --    BILITOTAL 0.9  0.9 0.8 0.8   < > 0.9 0.8   < > 0.3  --    ALBUMIN 1.8* 1.9* 1.9* 2.0*   < > 2.3* 2.3*   < > 1.6*  --    INR  --   --   --   --   --  1.85* 1.47*  --  4.00* 3.63*    < > = values in this interval not displayed.     Pancreatic Enzymes  No lab results found in last 7 days.  Coagulation Profile  Recent Labs   Lab 01/23/19  1556 01/23/19  0946 01/23/19  0139 01/23/19  0115   INR 1.85* 1.47* 4.00* 3.63*   PTT  --  39* 39* 57*     Lactate  Invalid input(s): LACTATE      RADIOLOGY:   Recent Results (from the past 24 hour(s))   XR Abdomen Port 1 View    Narrative    Exam: XR ABDOMEN PORT 1 VW, 1/26/2019 12:44 PM    Indication: high residuals in tube feeds    Comparison: 1/23/2018    Findings:   Feeding tube at the ligament of Treitz. Enteric tube tip in the body  of the stomach with sidehole in the body of the stomach as well. No  free air or pneumatosis. Moderate stool burden. No significant small  bowel gas. Extensive vascular calcifications are present.      Impression    Impression:   1. Enteric tube and feeding tubes are in good position.  2. Nonobstructive bowel gas pattern with moderate stool burden.    JACLYN BEST MD   XR Chest Port 1 View    Narrative    Single view chest    Comparisons: 1/26/2019    HISTORY: Interval chest x-ray    FINDINGS: Left-sided PICC line, right IJ catheter, endotracheal tube,  nasogastric tube, feeding tube and left-sided chest tube remain in  place. There is dense retrocardiac opacity and blunting of the left  costophrenic angle. Enlarged cardiac silhouette is unchanged. Diffuse  interstitial opacities. Somewhat indistinct pulmonary vascularity.  Calcified left infrahilar lymph nodes.      Impression    IMPRESSION:  1. Unchanged left pleural effusion and left lower lobe  consolidation/atelectasis.  2. Mild interstitial pulmonary edema.    JACKIE ANTONIO MD         Patient seen, findings and plan discussed with surgical ICU staff Dr. Simon Evans.    Lazaro Cantu MD  Adult  Cardiothoracic Anesthesia Fellow

## 2019-01-27 NOTE — PLAN OF CARE
D: POD 11, CABG x3 with Dr. Villegas     I/A: No change to neuro status, continues to PINEDA, not to command. Weaned propofol to 5 mcg/kg/min after increase to fentanyl and precedex; did not tolerate propofol off for more than 10 minutes before alertness appeared to provoke coughing around ETT; noted agitation with significant increase to RR, HR, and BP. Tmax 99.6, oral.   Tolerating vent modification, PEEP decreased to 5, intermittently breathing over vent. RR up to high 20s with repositioning and provoked agitation, frequently restless.   MAPs elevated, especially with agitation, metoprolol restarted. SR, PACs, HR up to mid-90s with non-verbal indicators of discomfort. Tapered CT output. Ionized Ca replaced x1 per one-time order; CRRT labs and electrolyte panel updated.   Up to chair x2 hrs today.   Continues to have paste-like bowel movements every 2-3 hours; noted to have (dribble amounts) urine occurrences with most BMs today. Abdomen xray completed for continued high OG outputs.      P: Continue POC, trach placement on Monday. Anticipate tunneled dialysis line as early as Monday per IR note.

## 2019-01-27 NOTE — PLAN OF CARE
Pt remains on Fentanyl, Precedex, and Propofol; continues to be restless/agitated with cares but improved after scheduled Seroquel dose. Pt not following commands but appears to be intermittently tracking at times. Moves all extremities; right side > left side. Pupils equal, slightly sluggish to light. Pt breathes set vent rate at rest. RR up to 20s with stimulation/cares then will slowly return to set rate. FiO2 increased to 50% from 40, PEEP remains at 5. Left chest tube with 60 ml serosang output this shift; remains to -20 suction. Small amount of urine on incontinent pad x2 (estimated less 15-30 ml each). Bladder scanned last evening for 19 ml. Thick, dark brown/green gastric output per NG, 200 ml this shift. Irrigated every 4 hrs. Pt had small BM; soft, formed stool. Hgb continues to trend down, 7.8 this AM. Cr rising again but lytes stable. Off dialysis since 1/25.   P: trach, possibly PEG tomorrow. IR for tunneled line catheter early this week. Per renal, will continue to evaluate for dialysis need daily. Continue to keep family updated. Continue plan of care.

## 2019-01-27 NOTE — PROGRESS NOTES
Nephrology Progress Note  01/27/2019         Mrs Rocha is an 78 yof w/CKD III, Carotid stenosis, GERD, HTN, CAD who had emergent CABG x3 on 1/15, Nephrology consulted for NATALIA on CKD, started HD on 1/21.       Interval History :   Mrs Rocha is doing fairly, off pressors, still not mentating / following commands. Family visiting. BP stable     Assessment & Recommendations:   NATALIA on CKD-CKD 3, baseline Cr of ~2.0 with one atrophic kidney on imaging with no former imaging to compare.  Injury involving hemodynamics with CABG as well as contrast for angiogram prior.  Had 2 sessions of HD without UF and did reasonably well but  had PEA arrest and was placed on CRRT to stabilize electrolytes and prevent worsening volume overload.   -CRRT stopped 1/25  - transition to IHD, planned for 1/28               -Line is RIJ from 1/20.  consult for IR tunneled line placed     Volume status- hypervolemic, now off pressors so will try to remove volume. UF 2 liter or more as tolerated with IHD, obligate intake about 2 liters daily.       Electrolytes/pH-K 3.4, bicarb 19.       Ca/phos/pth-Ca 7.0 Phos 6.5  - tube feeds should be nepro (low K and low phos)     CABG-Was recovering although needed HD for NATALIA but had PEA arrest evening of 1/22, started on CRRT.       Anemia-Hgb 8.7, following.       Nutrition-On Nepro TF       Recommendations were communicated to primary team via this note.          Review of Systems:   I reviewed the following systems:  ROS not done due to vent/sedation.       Physical Exam:   I/O last 3 completed shifts:  In: 2059.46 [I.V.:529.46; NG/GT:570]  Out: 650 [Emesis/NG output:550; Chest Tube:100]   /78   Pulse 73   Temp 99.7  F (37.6  C) (Axillary)   Resp 17   Wt 76.8 kg (169 lb 5 oz)   SpO2 97%   BMI 29.99 kg/m       GENERAL APPEARANCE: intubated, in chair  EYES:  No scleral icterus, pupils equal  HENT: mouth without ulcers or lesions  PULM: lungs rhonchi to auscultation  CV: irregular rhythm,  normal rate, no rub     -edema +1LE  GI: soft, non-tender, non-distended, bowel sounds are +  MS: no evidence of inflammation in joints, no muscle tenderness  NEURO: Awake but minimally interactive, moving extremities, No asterixis   Lines: 1/20 [facility]        Labs:   All labs reviewed by me  Electrolytes/Renal -   Recent Labs   Lab Test 01/27/19  0404 01/26/19  1754 01/26/19  0957 01/26/19  0425     --  137 137   POTASSIUM 3.4 3.8 3.5 3.9   CHLORIDE 101  --  103 104   CO2 19*  --  22 22   BUN 60*  --  42* 38*   CR 3.53*  --  2.55* 2.28*   *  --  115* 135*   JEFFREY 7.7*  --  7.0* 7.7*   MAG 2.6*  --  2.4* 2.6*   PHOS 8.0*  --  6.5* 6.0*       CBC -   Recent Labs   Lab Test 01/27/19  0404 01/26/19  0957 01/26/19  0425   WBC 11.7* 14.1* 13.4*   HGB 7.8* 8.2* 8.5*    176 173       LFTs -   Recent Labs   Lab Test 01/27/19  0404 01/26/19  0957 01/26/19  0425   ALKPHOS 76 76 71   BILITOTAL 0.9 0.9 0.8   ALT 14 20 19   AST 30 39 42   PROTTOTAL 5.1* 5.1* 5.1*   ALBUMIN 1.8* 1.9* 1.9*       Iron Panel -   Recent Labs   Lab Test 01/27/15  1218 07/11/12  0804   IRON 74 80   IRONSAT 21 26   ORQUIDEA 40 67           Current Medications:    amiodarone  400 mg Oral or Feeding Tube Daily     artificial tears   Both Eyes Daily     atorvastatin  40 mg Oral QPM     B and C vitamin Complex with folic acid  5 mL Per Feeding Tube Daily     busPIRone  5 mg Oral or Feeding Tube BID     heparin lock flush  5-10 mL Intracatheter Q24H     insulin aspart  1-6 Units Subcutaneous Q4H     metoprolol tartrate  12.5 mg Oral BID     pantoprazole (PROTONIX) IV  40 mg Intravenous BID     polyethylene glycol  17 g Oral or Feeding Tube Daily     protein modular  1 packet Per Feeding Tube BID     QUEtiapine  25 mg Oral Once     QUEtiapine  50 mg Oral At Bedtime     senna-docusate  1 tablet Oral or Feeding Tube BID    Or     senna-docusate  2 tablet Oral or Feeding Tube BID     sodium chloride (PF)  3 mL Intracatheter Q8H       dexmedetomidine 0.8  mcg/kg/hr (01/27/19 1400)     IV fluid REPLACEMENT ONLY       IV fluid REPLACEMENT ONLY       fentaNYL 100 mcg/hr (01/27/19 1400)     propofol (DIPRIVAN) infusion 20 mcg/kg/min (01/27/19 1400)     BETA BLOCKER NOT PRESCRIBED         Amanda Pizarro  [phone]

## 2019-01-28 NOTE — PLAN OF CARE
D: POD #13 CABG x3, status post PEA arrest   I/A: No change in neurologic status, not following commands, opens eyes to voice/sound, no tracking. Moves extremities purposefully but not to commands. Restless/agitated behavior on sedation interruption, sedation back on. Low grade temp 99.1-99.4. Minimal secretions from ETT.TF remain at goal, no BM this shift. Oliguric, Purewick in place.  Upper abd distended/ soft with moderate OG output, MD notified. ABD xray ordered/pending. Pleural CT removed, chest xray pending. HD run at bedside, 1700 off. Family at bedside to visit this afternoon, updated on pt status and POC.   R: Continue POC, anticipate trach/peg in OR on 1/30. Tunneled dialysis line placement tomorrow or Wednesday.

## 2019-01-28 NOTE — PROGRESS NOTES
CV ICU PROGRESS NOTE  January 28, 2019        Overnight Events:  -No overnight events, afebrile, VSS    Today's Plan:  - Plan for percutaneous tracheostomy and PEG placement Wednesday  - Possible care conference mid-week; need to re-discuss PEG/trach placement  - Remove L pleural chest tube   - Resume  mg   - iHD per nephrology       ASSESSMENT:  Wendy Rocha is a 78 year old female with PMH CKDIII, B/L carotid stenosis, GERD, HTN, CAD [place]p [place] [facility] [place] who is s/p CABG. On 1/23, the patient went into PEA, with ROSC s/p compressions, intubation, and epinephrine.       PLANS:   Neuro/ pain/ sedation:  -CT head 1/18: normal  -RN has weaned sedation and examination shows mildly less responsive today. She was started on Seroquel yesterday in the morning and at night, so will follow serial neuro exams, and may change dose to at bedtime only.  -On fentanyl, dexmedetomidine, and propofol for pain and sedation     Pulmonary care:   -CMV /AC; PEEP 5 cm H20, and FIO2 35%  -chest tube placed 1/23 AM because of hemothorax  -Tracheostomy planned 1/30      Cardiovascular:    -Monitor hemodynamic status  -Amiodarone 400mg once daily  -Started Metoprolol 12.5mg po BID (1/25), blood pressure appears stable on current dose     GI care:   -PEG placement planned 1/30  -Bowel regimen prn   -TF at goal      Fluids/ Electrolytes/ Nutrition:   - ICU electrolyte replacement protocol     Renal/ Fluid Balance:    -chronic kidney disease III   -Urine output is minimal, nephrology consulted, appreciate recs   -iHD per nephrology        Endocrine:    -Sliding scale insulin       ID/ Antibiotics:  -WBC count decreased from 14.1 to 11.7, she is off antibiotics after being treated for a Klebsiella UTI (1/21)      Heme:     -Hemoglobin stable.   -Continue to monitor      Prophylaxis:    -Mechanical prophylaxis for DVT.        Lines/ tubes/ drains:  - CVC Albaro (placement 1/20)  - L PICC  (placement 1/23)  - NG (placement 1/14)  - NJ (placmemtn 1/16)  - ETT (intubated since       Disposition:  - CVICU    Patient seen and discussed with CV ICU attending, Dr. Gustavo Lozoya, YAZAN CNP on 1/28/2019 at 2:29 PM      VITAL SIGNS:  Temp:  [98.6  F (37  C)-99.7  F (37.6  C)] 99.1  F (37.3  C)  Pulse:  [] 111  Heart Rate:  [] 106  Resp:  [12-37] 17  BP: ()/() 116/72  Cuff Mean (mmHg):  [90] 90  FiO2 (%):  [50 %-60 %] 60 %  SpO2:  [92 %-100 %] 92 %  Ventilation Mode: CMV/AC  (Continuous Mandatory Ventilation/ Assist Control)  FiO2 (%): 60 %  Rate Set (breaths/minute): 14 breaths/min  Tidal Volume Set (mL): 450 mL  PEEP (cm H2O): 5 cmH2O  Oxygen Concentration (%): 50 %  Resp: 17      INTAKE/ OUTPUT:   I/O last 3 completed shifts:  In: 2189.51 [I.V.:579.51; NG/GT:650]  Out: 855 [Emesis/NG output:775; Chest Tube:80]     PHYSICAL EXAMINATION:  General: intubated, in no acute distress.  HEENT: Normocephalic, atraumatic.   Neck: No cervical lymphadenopathy. No thyromegaly.  Chest wall: Symmetric thorax. No masses or tenderness to palpation.   Respiratory: Non-labored breathing. Lung sounds clear to auscultation bilaterally.   Cardiovascular: Regular rate and rhythm.   Gastrointestinal: Abdomen soft, non-distended, non-tender to palpation. No organomegaly or masses appreciated. No inguinal hernias.   Genitourinary: No CVA tenderness.   Extremities: Moving all four extremities. No limb deformities. No pedal edema.   Skin: As noted above. No rashes or lesions appreciated.    ALLERGIES:      Allergies   Allergen Reactions     Lisinopril      She developed ARF       MEDICATIONS:    No current facility-administered medications on file prior to encounter.   Current Outpatient Medications on File Prior to Encounter:  ACE/ARB NOT PRESCRIBED, INTENTIONAL, ACE & ARB not prescribed due to Worsening renal function on ACE/ARB therapy   acetaminophen (TYLENOL) 325 MG tablet Take 2 tablets (650 mg)  by mouth every 6 hours as needed for mild pain   amLODIPine (NORVASC) 10 MG tablet TAKE 1 TABLET BY MOUTH ONCE DAILY   ASPIRIN NOT PRESCRIBED (INTENTIONAL) Antiplatelet medication not prescribed intentionally due to Plavix   busPIRone (BUSPAR) 5 MG tablet TAKE 1 TABLET BY MOUTH TWICE DAILY FOR ANXIETY   clopidogrel (PLAVIX) 75 MG tablet TAKE 1 TABLET BY MOUTH ONCE DAILY   doxazosin (CARDURA) 4 MG tablet TAKE 1 TABLET BY MOUTH AT BEDTIME   Hydrocortisone Acetate 2.5 % CREA Externally apply 1 Application topically 2 times daily Apply to both arms twice a day   metoprolol tartrate (LOPRESSOR) 50 MG tablet TAKE ONE TABLET BY MOUTH TWICE DAILY   metoprolol tartrate (LOPRESSOR) 50 MG tablet Take 0.5 tablets (25 mg) by mouth 2 times daily   ondansetron (ZOFRAN) 8 MG tablet Take 1 tablet (8 mg) by mouth every 8 hours as needed for nausea   order for DME Machine to dispense the medication   pantoprazole (PROTONIX) 40 MG EC tablet TAKE ONE TABLET BY MOUTH ONCE DAILY 30-60  MINUTES  BEFORE  A  MEAL   simvastatin (ZOCOR) 40 MG tablet Take 1 tablet (40 mg) by mouth At Bedtime   traZODone (DESYREL) 50 MG tablet Take 1 tablet (50 mg) by mouth nightly as needed for sleep       LABS: Reviewed.   Arterial Blood Gases   Recent Labs   Lab 01/25/19  0345 01/24/19  2148 01/24/19  0348 01/23/19  2205   PH 7.40 7.39 7.42 7.43   PCO2 40 40 38 36   PO2 74* 79* 83 125*   HCO3 25 24 25 24     Complete Blood Count   Recent Labs   Lab 01/28/19  0335 01/27/19  0404 01/26/19  0957 01/26/19  0425   WBC 11.7* 11.7* 14.1* 13.4*   HGB 7.8* 7.8* 8.2* 8.5*    190 176 173     Basic Metabolic Panel  Recent Labs   Lab 01/28/19  0335 01/27/19  1830 01/27/19  0404 01/26/19  1754 01/26/19  0957 01/26/19  0425     --  136  --  137 137   POTASSIUM 3.6 3.4 3.4 3.8 3.5 3.9   CHLORIDE 98  --  101  --  103 104   CO2 19*  --  19*  --  22 22   BUN 80*  --  60*  --  42* 38*   CR 4.63*  --  3.53*  --  2.55* 2.28*   *  --  125*  --  115* 135*      Liver Function Tests  Recent Labs   Lab 01/27/19  0404 01/26/19  0957 01/26/19  0425 01/25/19  2141  01/23/19  1556 01/23/19  0946  01/23/19  0139 01/23/19  0115   AST 30 39 42 46*   < > 278* 269*   < > 82*  --    ALT 14 20 19 24   < > 58* 63*   < > 21  --    ALKPHOS 76 76 71 58   < > 44 40   < > 33*  --    BILITOTAL 0.9 0.9 0.8 0.8   < > 0.9 0.8   < > 0.3  --    ALBUMIN 1.8* 1.9* 1.9* 2.0*   < > 2.3* 2.3*   < > 1.6*  --    INR  --   --   --   --   --  1.85* 1.47*  --  4.00* 3.63*    < > = values in this interval not displayed.     Pancreatic Enzymes  No lab results found in last 7 days.  Coagulation Profile  Recent Labs   Lab 01/23/19  1556 01/23/19  0946 01/23/19  0139 01/23/19  0115   INR 1.85* 1.47* 4.00* 3.63*   PTT  --  39* 39* 57*     Lactate  Invalid input(s): LACTATE      RADIOLOGY:   Recent Results (from the past 24 hour(s))   XR Chest Port 1 View    Narrative    Exam: Portable chest, 1/28/2019 1:39 AM    Indication: Interval    Comparison: Chest x-ray 01/27/2019    Findings:   AP single view of the chest. Tip of the endotracheal tube projects 1.5  cm above the ursula. Incomplete visualization of gastric and enteric  tubes. Tip of the right IJ central venous catheter/sheath projects  over the cavoatrial junction. Postsurgical changes of CABG including  mediastinum wires. Low left lung volume with patchy interstitial  opacity. Moderate left-sided pleural effusion. Stable calcified left  infrahilar lymph node. Upper abdomen is unremarkable.      Impression    Impression:   1. Grossly unchanged moderate left-sided pleural effusion with left  basilar opacity, infection versus compressive atelectasis.  2. Ongoing pulmonary congestions  3. Tip of the endotracheal tube projects 1.5 cm above the ursula.    I have personally reviewed the examination and initial interpretation  and I agree with the findings.    JACLYN BEST MD         Patient seen, findings and plan discussed with surgical ICU staff Dr. Montes  Nathan.    Lazaro Cantu MD  Adult Cardiothoracic Anesthesia Fellow

## 2019-01-28 NOTE — PROGRESS NOTES
CVTS PROGRESS NOTE  January 28, 2019        Overnight Events:  -No overnight events, afebrile, VSS    Today's Plan:  - Plan for percutaneous tracheostomy and PEG placement Wednesday  - Possible care conference mid-week; need to re-discuss PEG/trach placement  - Remove L pleural chest tube   - Resume  mg   - iHD per nephrology       ASSESSMENT:  Wendy Rocha is a 78 year old female with PMH CKDIII, B/L carotid stenosis, GERD, HTN, CAD s/p ACS transferred from [facility] with severe multi-vessel disease who is s/p CABG. On 1/23, the patient went into PEA, with ROSC s/p compressions, intubation, and epinephrine.       PLANS:   Neuro/ pain/ sedation:  -CT head 1/18: normal  -RN has weaned sedation and examination shows mildly less responsive today. She was started on Seroquel yesterday in the morning and at night, so will follow serial neuro exams, and may change dose to at bedtime only.  -On fentanyl, dexmedetomidine, and propofol for pain and sedation     Pulmonary care:   -CMV /AC; PEEP 5 cm H20, and FIO2 35%  -chest tube placed 1/23 AM because of hemothorax  -Tracheostomy planned 1/28      Cardiovascular:    -Monitor hemodynamic status  -Amiodarone 400mg once daily  -Started Metoprolol 12.5mg po BID (1/25), blood pressure appears stable on current dose     GI care:   -PEG placement planned 1/30  -Bowel regimen prn   -TF at goal      Fluids/ Electrolytes/ Nutrition:   - ICU electrolyte replacement protocol     Renal/ Fluid Balance:    -chronic kidney disease III   -Urine output is minimal, nephrology consulted, appreciate recs   -iHD per nephrology        Endocrine:    -Sliding scale insulin       ID/ Antibiotics:  -WBC count decreased from 14.1 to 11.7, she is off antibiotics after being treated for a Klebsiella UTI (1/21)      Heme:     -Hemoglobin stable.   -Continue to monitor      Prophylaxis:    -Mechanical prophylaxis for DVT.        Lines/ tubes/ drains:  - CVC Albaro (placement 1/20)  - L PICC  (placement 1/23)  - NG (placement 1/14)  - NJ (placmemtn 1/16)  - ETT (intubated since       Disposition:  - CVICU    Patient discussed with CVTS staff.     YAZAN Gray CNP on 1/28/2019 at 2:29 PM      VITAL SIGNS:  Temp:  [98.6  F (37  C)-99.2  F (37.3  C)] 99.2  F (37.3  C)  Pulse:  [] 101  Heart Rate:  [] 107  Resp:  [12-37] 14  BP: ()/() 129/93  Cuff Mean (mmHg):  [90] 90  FiO2 (%):  [50 %-60 %] 60 %  SpO2:  [92 %-100 %] 96 %  Ventilation Mode: CMV/AC  (Continuous Mandatory Ventilation/ Assist Control)  FiO2 (%): 60 %  Rate Set (breaths/minute): 14 breaths/min  Tidal Volume Set (mL): 450 mL  PEEP (cm H2O): 5 cmH2O  Oxygen Concentration (%): 50 %  Resp: 14      INTAKE/ OUTPUT:   I/O last 3 completed shifts:  In: 2189.51 [I.V.:579.51; NG/GT:650]  Out: 855 [Emesis/NG output:775; Chest Tube:80]     PHYSICAL EXAMINATION:  General: intubated, in no acute distress.  HEENT: Normocephalic, atraumatic.   Neck: No cervical lymphadenopathy. No thyromegaly.  Chest wall: Symmetric thorax. No masses or tenderness to palpation.   Respiratory: Non-labored breathing. Lung sounds clear to auscultation bilaterally.   Cardiovascular: Regular rate and rhythm.   Gastrointestinal: Abdomen soft, non-distended, non-tender to palpation. No organomegaly or masses appreciated. No inguinal hernias.   Genitourinary: No CVA tenderness.   Extremities: Moving all four extremities. No limb deformities. No pedal edema.   Skin: As noted above. No rashes or lesions appreciated.    ALLERGIES:      Allergies   Allergen Reactions     Lisinopril      She developed ARF       MEDICATIONS:    No current facility-administered medications on file prior to encounter.   Current Outpatient Medications on File Prior to Encounter:  ACE/ARB NOT PRESCRIBED, INTENTIONAL, ACE & ARB not prescribed due to Worsening renal function on ACE/ARB therapy   acetaminophen (TYLENOL) 325 MG tablet Take 2 tablets (650 mg) by mouth every 6 hours as  needed for mild pain   amLODIPine (NORVASC) 10 MG tablet TAKE 1 TABLET BY MOUTH ONCE DAILY   ASPIRIN NOT PRESCRIBED (INTENTIONAL) Antiplatelet medication not prescribed intentionally due to Plavix   busPIRone (BUSPAR) 5 MG tablet TAKE 1 TABLET BY MOUTH TWICE DAILY FOR ANXIETY   clopidogrel (PLAVIX) 75 MG tablet TAKE 1 TABLET BY MOUTH ONCE DAILY   doxazosin (CARDURA) 4 MG tablet TAKE 1 TABLET BY MOUTH AT BEDTIME   Hydrocortisone Acetate 2.5 % CREA Externally apply 1 Application topically 2 times daily Apply to both arms twice a day   metoprolol tartrate (LOPRESSOR) 50 MG tablet TAKE ONE TABLET BY MOUTH TWICE DAILY   metoprolol tartrate (LOPRESSOR) 50 MG tablet Take 0.5 tablets (25 mg) by mouth 2 times daily   ondansetron (ZOFRAN) 8 MG tablet Take 1 tablet (8 mg) by mouth every 8 hours as needed for nausea   order for DME Machine to dispense the medication   pantoprazole (PROTONIX) 40 MG EC tablet TAKE ONE TABLET BY MOUTH ONCE DAILY 30-60  MINUTES  BEFORE  A  MEAL   simvastatin (ZOCOR) 40 MG tablet Take 1 tablet (40 mg) by mouth At Bedtime   traZODone (DESYREL) 50 MG tablet Take 1 tablet (50 mg) by mouth nightly as needed for sleep       LABS: Reviewed.   Arterial Blood Gases   Recent Labs   Lab 01/25/19  0345 01/24/19  2148 01/24/19  0348 01/23/19  2205   PH 7.40 7.39 7.42 7.43   PCO2 40 40 38 36   PO2 74* 79* 83 125*   HCO3 25 24 25 24     Complete Blood Count   Recent Labs   Lab 01/28/19  0335 01/27/19  0404 01/26/19  0957 01/26/19  0425   WBC 11.7* 11.7* 14.1* 13.4*   HGB 7.8* 7.8* 8.2* 8.5*    190 176 173     Basic Metabolic Panel  Recent Labs   Lab 01/28/19  0335 01/27/19  1830 01/27/19  0404 01/26/19  1754 01/26/19  0957 01/26/19  0425     --  136  --  137 137   POTASSIUM 3.6 3.4 3.4 3.8 3.5 3.9   CHLORIDE 98  --  101  --  103 104   CO2 19*  --  19*  --  22 22   BUN 80*  --  60*  --  42* 38*   CR 4.63*  --  3.53*  --  2.55* 2.28*   *  --  125*  --  115* 135*     Liver Function Tests  Recent  Labs   Lab 01/27/19  0404 01/26/19  0957 01/26/19  0425 01/25/19  2141  01/23/19  1556 01/23/19  0946  01/23/19  0139 01/23/19  0115   AST 30 39 42 46*   < > 278* 269*   < > 82*  --    ALT 14 20 19 24   < > 58* 63*   < > 21  --    ALKPHOS 76 76 71 58   < > 44 40   < > 33*  --    BILITOTAL 0.9 0.9 0.8 0.8   < > 0.9 0.8   < > 0.3  --    ALBUMIN 1.8* 1.9* 1.9* 2.0*   < > 2.3* 2.3*   < > 1.6*  --    INR  --   --   --   --   --  1.85* 1.47*  --  4.00* 3.63*    < > = values in this interval not displayed.     Pancreatic Enzymes  No lab results found in last 7 days.  Coagulation Profile  Recent Labs   Lab 01/23/19  1556 01/23/19  0946 01/23/19  0139 01/23/19  0115   INR 1.85* 1.47* 4.00* 3.63*   PTT  --  39* 39* 57*     Lactate  Invalid input(s): LACTATE      RADIOLOGY:   Recent Results (from the past 24 hour(s))   XR Chest Port 1 View    Narrative    Exam: Portable chest, 1/28/2019 1:39 AM    Indication: Interval    Comparison: Chest x-ray 01/27/2019    Findings:   AP single view of the chest. Tip of the endotracheal tube projects 1.5  cm above the ursula. Incomplete visualization of gastric and enteric  tubes. Tip of the right IJ central venous catheter/sheath projects  over the cavoatrial junction. Postsurgical changes of CABG including  mediastinum wires. Low left lung volume with patchy interstitial  opacity. Moderate left-sided pleural effusion. Stable calcified left  infrahilar lymph node. Upper abdomen is unremarkable.      Impression    Impression:   1. Grossly unchanged moderate left-sided pleural effusion with left  basilar opacity, infection versus compressive atelectasis.  2. Ongoing pulmonary congestions  3. Tip of the endotracheal tube projects 1.5 cm above the ursula.    I have personally reviewed the examination and initial interpretation  and I agree with the findings.    JACLYN BEST MD   XR Abdomen Port 1 View    Narrative    EXAMINATION:  XR ABDOMEN PORT 1 VW 1/28/2019 12:07 PM.    COMPARISON:  1/26/2019.    HISTORY:  Abd distension    FINDINGS: Single portable supine view of the abdomen. Feeding tube tip  projects over the ligament of Treitz and gastric tube tip and sidehole  project over the stomach, comparable to prior exam. No abnormal air  filled bowel. Mild colonic stool burden. Somewhat paucity of  possibility of air-filled bowel, decreased since 1/26/2019. Left  basilar opacities. Mitral valve calcifications.  Partially visualize  sternotomy wire.       Impression    IMPRESSION:   1. Nonspecific bowel gas pattern with paucity of air filled bowel.   Mild colonic stool burden.  2. Support lines and tubes as above.    SAKINA PEÑA MD         Patient seen, findings and plan discussed with surgical ICU staff Dr. Simon Evans.    Lazaro Cantu MD  Adult Cardiothoracic Anesthesia Fellow

## 2019-01-28 NOTE — PROGRESS NOTES
HEMODIALYSIS TREATMENT NOTE    Date: 1/28/2019  Time: 2:34 PM    Data:  Pre Wt:   77.1kg    Desired Wt: 74.1 kg   Post Wt:    Weight gain:   kg   Weight change:   kg  Ultrafiltration - Post Run Net Total Removed (mL): 1700 mL  Ultrafiltration - Post Run Net Total Gain (mL): 0 mL  Vascular Access Status: Yes, secured and visible  Dialyzer Rinse: Streaked, Moderate  Total Blood Volume Processed: 60.6  Total Dialysis (Treatment) Time:  3.5    Lab:     Interventions:  3 hours and 10 minutes of HD via R internal jugular  Run cut short 2/2 clotting of system. 1700 mls pulled.BP stable. Hand off to Zulma    Assessment:  NATALIA patient in ICU needing HD 2/2 chemistries and fluid overload     Plan:    Per renal

## 2019-01-28 NOTE — PROGRESS NOTES
Brief SICU Note    Pt has difficult neck habitus for perc trach, have scheduled pt for open tracheostomy and PEG tube placement in the OR at noon with Dr. Luo, Dr Javier, and myself on Wednesday. Consent has been obtained and pt is on the schedule. Please call [phone] with questions.    --  Guero Wong MD  General Surgery PGY2  [phone]

## 2019-01-28 NOTE — PROGRESS NOTES
Nephrology Progress Note  01/28/2019         Mrs Rocha is an 78 yof w/CKD III, Carotid stenosis, GERD, HTN, CAD who had emergent CABG x3 on 1/15, Nephrology consulted for NATALIA on CKD, started HD on 1/21.       Interval History :   Mrs Rocha is planned for HD today, BP's stable off of pressors so anticipate HD will go well.  Planned for IR for tunneled line in coming days, no UOP, planning for ESRD given baseline Cr and extent of multiple NATALIA's with major surgery and PEA arrest.       Assessment & Recommendations:   NATALIA on CKD-CKD 3, baseline Cr of ~2.0 with one atrophic kidney on imaging with no former imaging to compare.  Injury involving hemodynamics with CABG as well as contrast for angiogram prior.  Had 2 sessions of HD without UF and did reasonably well but now had PEA arrest and was placed on CRRT to stabilize electrolytes and prevent worsening volume overload.   -CRRT stopped 1/25      - transition to IHD, running today.                -Line is RIJ from 1/20.  Tunneled line in coming days.       Volume status-On CRRT, on/off one pressor.  Trying to pull 0-50cc/hr and will consider iHD based on BP's in am as BP's are improving.       Electrolytes/pH-K 3.6, bicarb 19.       Ca/phos/pth-Ca 7.3 Phos 8.3 yesterday, running HD today.  -On Nepro TF.     CABG-Was recovering although needed HD for NATALIA but had PEA arrest evening of 1/22, started on CRRT but again transitioned to iHD.        Anemia-Hgb 7.8, following.       Nutrition-On Nepro TF     Seen and discussed with Dr Chen.      Recommendations were communicated to primary team via this note.            Quincy Jesus  Clinical Nurse Specialist  [phone]    Review of Systems:   I reviewed the following systems:  ROS not done due to vent/sedation.     Physical Exam:   I/O last 3 completed shifts:  In: 2189.51 [I.V.:579.51; NG/GT:650]  Out: 855 [Emesis/NG output:775; Chest Tube:80]   /80   Pulse 94   Temp 99.2  F (37.3  C) (Axillary)   Resp 14   Wt  77 kg (169 lb 12.4 oz)   SpO2 97%   BMI 30.07 kg/m       GENERAL APPEARANCE: Intubated, sedated.    EYES:  No scleral icterus, pupils equal  HENT: mouth without ulcers or lesions  PULM: lungs rhonchi to auscultation  CV: irregular rhythm, normal rate, no rub     -edema +1LE  GI: soft, non-tender, non-distended, bowel sounds are +  MS: no evidence of inflammation in joints, no muscle tenderness  NEURO: Awake but minimally interactive, moving extremities, No asterixis   Lines: 1/20 [facility]      Labs:   All labs reviewed by me  Electrolytes/Renal -   Recent Labs   Lab Test 01/28/19  0335 01/27/19  1830 01/27/19  0404  01/26/19  0957     --  136  --  137   POTASSIUM 3.6 3.4 3.4   < > 3.5   CHLORIDE 98  --  101  --  103   CO2 19*  --  19*  --  22   BUN 80*  --  60*  --  42*   CR 4.63*  --  3.53*  --  2.55*   *  --  125*  --  115*   JEFFREY 7.3*  --  7.7*  --  7.0*   MAG  --  2.8* 2.6*  --  2.4*   PHOS  --  8.3* 8.0*  --  6.5*    < > = values in this interval not displayed.       CBC -   Recent Labs   Lab Test 01/28/19  0335 01/27/19  0404 01/26/19  0957   WBC 11.7* 11.7* 14.1*   HGB 7.8* 7.8* 8.2*    190 176       LFTs -   Recent Labs   Lab Test 01/27/19  0404 01/26/19  0957 01/26/19  0425   ALKPHOS 76 76 71   BILITOTAL 0.9 0.9 0.8   ALT 14 20 19   AST 30 39 42   PROTTOTAL 5.1* 5.1* 5.1*   ALBUMIN 1.8* 1.9* 1.9*       Iron Panel -   Recent Labs   Lab Test 01/27/15  1218 07/11/12  0804   IRON 74 80   IRONSAT 21 26   ORQUIDEA 40 67           Current Medications:    amiodarone  400 mg Oral or Feeding Tube Daily     artificial tears   Both Eyes Daily     aspirin  81 mg Oral or Feeding Tube Daily     atorvastatin  40 mg Oral QPM     B and C vitamin Complex with folic acid  5 mL Per Feeding Tube Daily     busPIRone  5 mg Oral or Feeding Tube BID     gelatin absorbable  1 each Topical During Hemodialysis (from stock)     heparin lock flush  5-10 mL Intracatheter Q24H     heparin  5,000 Units Subcutaneous Q8H FAISAL      insulin aspart  1-6 Units Subcutaneous Q4H     metoprolol tartrate  12.5 mg Oral BID     pantoprazole (PROTONIX) IV  40 mg Intravenous BID     polyethylene glycol  17 g Oral or Feeding Tube Daily     protein modular  1 packet Per Feeding Tube BID     QUEtiapine  50 mg Oral At Bedtime     senna-docusate  1 tablet Oral or Feeding Tube BID    Or     senna-docusate  2 tablet Oral or Feeding Tube BID     sodium chloride (PF)  3 mL Intracatheter Q8H     sodium chloride (PF)  9 mL Intracatheter During Hemodialysis (from stock)     sodium chloride (PF)  9 mL Intracatheter During Hemodialysis (from stock)       dexmedetomidine 0.8 mcg/kg/hr (01/28/19 1400)     IV fluid REPLACEMENT ONLY       IV fluid REPLACEMENT ONLY       fentaNYL 100 mcg/hr (01/28/19 1400)     propofol (DIPRIVAN) infusion 20 mcg/kg/min (01/28/19 1400)     BETA BLOCKER NOT PRESCRIBED         I, Simon Chen, saw and evaluated this patient with Quincy Jesus, Clinical Nurse Specialist, 01/28/19, as part of a shared visit.     I personally reviewed major complaints, physical findings, investigations, medications and the overall management plan.        My key findings: dialysis-dependent kidney failure    Key management decisions made by me: continue scheduled renal replacement therapy.

## 2019-01-28 NOTE — PLAN OF CARE
D: 77 y/o F post CABGx3 c/b PEA arrest   Neuro: PINEDA not to command, does not track; PERRL  CV: Sinus rhythm 70-90s; Hydralazine given x1 d/t SBP >160. @ 1830 HR 130s, sinus with frequent PAC's. Labs drawn, EKG ordered, team notified.  Resp: LS clear/diminished; overbreathes vent at times; small thin secretions  : Two incontinent episodes of urine found on chux; placed pure wick  GI: NJ TF @ Goal; NG to LIS  Gtts: Fent; Dex; Propofol    P: Trach, PEG, tunneled cath and HD planned for tomorrow. Continue to monitor and follow plan of care. Notify MD if appropriate.

## 2019-01-28 NOTE — PROVIDER NOTIFICATION
12 lead EKG obtained; ALIN. Fib with RVR with possible STEMI; Notified CVTS and CSI team. Awaiting orders

## 2019-01-28 NOTE — CONSULTS
Patient is on IR schedule 1/30/2019 for a Image guided placement of large bore, double lumen, tunneled CVC.   Labs WNL for procedure.  Orders for NPO, scrubs and antibiotics have been entered.  Consent will be done prior to procedure.     Please contact the IR charge RN at [phone] for estimated time of procedure.     Case discussed with JAY Hayden. Patient with non-tunneled line in place currently working. Plan for tunneled line placement as patient continues to need dialysis.    Radha Sena DNP, APRN  Interventional Radiology  Pager: [phone]

## 2019-01-28 NOTE — PROGRESS NOTES
"SPIRITUAL HEALTH SERVICES  SPIRITUAL ASSESSMENT Progress Note  [facility] [address]     ON-CALL VISIT    REFERRAL SOURCE: Paged by bedside nurse per family's request      Wendy's children - daughter Manju and a son - were bedside.    Family affirmed their gratitude for Fr. Jones's care (\"We just felt so good after he prayed with us we wanted to do it again\") and asked that I offer prayer.    When I inquired about special intentions for prayer, Wendy's children answered: \"Just support for her. We know she will get through this. She's stubborn. We just knew we have a long journey ahead of us.\"    Pt[place] son, Juan Luis, is returning home after several days in hospital so we also prayed for his safe return.    Family expressed gratitude for prayer with tears in their eyes.     PLAN: I will notify unit  of visit.    Montana Morales  Chaplain Resident  Pager [phone]    "

## 2019-01-28 NOTE — PLAN OF CARE
Remains intubaed CMV/14/450/5 fio2 at 60%. Cough with suction, small amount of secretions through ETT. LS clear. Chest tube with 50cc serosanguinous output. BPs stable, maintaining MAPs >65. AFib RVR at beginning of shift, given 5mg metoprolol x2 and scheduled metoprolol. Remains in Afib however rate is better controlled. Afebrile. TF running at goal with standard FWF. One small BM. NG to LIS with moderate amount of green/brown output. Purewick in place, occasionally voids very small amounts when coughing. Arouses to voice, however does not appear to track and does not follow commands. Moves all four extremities spontaneously. See flowsheets for skin assessment. Running propofol, precedex and fentanyl. Plan for trach and Peg placement, potential tunneled line placement and HD today. Provider notified as needed throughout shift, continue with plan of care.

## 2019-01-29 NOTE — PROGRESS NOTES
Nephrology Progress Note  01/29/2019         Mrs Rocha is an 78 yof w/CKD III, Carotid stenosis, GERD, HTN, CAD who had emergent CABG x3 on 1/15, Nephrology consulted for NATALIA on CKD, started HD on 1/21.       Interval History :   Mrs Rocha had HD yesterday, pulled 1.7L and was net negative slightly yesterday.  Has PEG/trach/tunneled line scheduled for tomorrow so plan to do short run today to clear her schedule for tomorrow.  Pulling 1L, is up in wt but much of her fluid is 3rd spaced with edema and pleural effusion with albumin of 1.8 on last check.      Assessment & Recommendations:   NATALIA on CKD-CKD 3, baseline Cr of ~2.0 with one atrophic kidney on imaging with no former imaging to compare.  Injury involving hemodynamics with CABG as well as contrast for angiogram prior.  Had 2 sessions of HD without UF and did reasonably well but now had PEA arrest and was placed on CRRT to stabilize electrolytes and prevent worsening volume overload.   -CRRT stopped 1/25               - transitioned to IHD, running today due to PEG/Trach tomorrow.               -Line is RIJ from 1/20.  Tunneled line in coming days.       Volume status-Ran HD yesterday, pulled 1.7L and was slightly net negative.  Given day of procedures tomorrow will run today, UF 1L as BP's allow as she is up in wt but with low albumin much of it is 3rd spaced with edema and pleural effusion.     Electrolytes/pH-K 3.6, bicarb 23.       Ca/phos/pth-Ca 7.8 Phos 8.3 last check, running HD today.  -On Nepro TF.      CABG-Was recovering although needed HD for NATALIA but had PEA arrest evening of 1/22, started on CRRT but again transitioned to iHD.        Anemia-Hgb 8.1, following.       Nutrition-On Nepro TF     Seen and discussed with Dr Chen.      Recommendations were communicated to primary team via this note.            Quincy Jesus  Clinical Nurse Specialist  [phone]    Review of Systems:   I reviewed the following systems:  ROS not done due to  vent/sedation.     Physical Exam:   I/O last 3 completed shifts:  In: 1795.8 [I.V.:615.8; NG/GT:300]  Out: 2475 [Emesis/NG output:775; Other:1700]   /74   Pulse 87   Temp 98.4  F (36.9  C) (Axillary)   Resp 13   Wt 76.9 kg (169 lb 8.5 oz)   SpO2 99%   BMI 30.03 kg/m       GENERAL APPEARANCE: Intubated, sedated.    EYES:  No scleral icterus, pupils equal  HENT: mouth without ulcers or lesions  PULM: lungs rhonchi to auscultation  CV: irregular rhythm, normal rate, no rub     -edema +1LE  GI: soft, non-tender, non-distended, bowel sounds are +  MS: no evidence of inflammation in joints, no muscle tenderness  NEURO: Awake but minimally interactive, moving extremities, No asterixis   Lines: 1/20 [facility]      Labs:   All labs reviewed by me  Electrolytes/Renal -   Recent Labs   Lab Test 01/29/19  0408 01/28/19  1615 01/28/19  0335 01/27/19  1830 01/27/19  0404  01/26/19  0957     --  133  --  136  --  137   POTASSIUM 3.6 3.1* 3.6 3.4 3.4   < > 3.5   CHLORIDE 98  --  98  --  101  --  103   CO2 23  --  19*  --  19*  --  22   BUN 51*  --  80*  --  60*  --  42*   CR 3.27*  --  4.63*  --  3.53*  --  2.55*   *  --  137*  --  125*  --  115*   JEFFREY 7.8*  --  7.3*  --  7.7*  --  7.0*   MAG  --   --   --  2.8* 2.6*  --  2.4*   PHOS  --   --   --  8.3* 8.0*  --  6.5*    < > = values in this interval not displayed.       CBC -   Recent Labs   Lab Test 01/29/19  0408 01/28/19  0335 01/27/19  0404   WBC 10.4 11.7* 11.7*   HGB 8.1* 7.8* 7.8*    246 190       LFTs -   Recent Labs   Lab Test 01/27/19  0404 01/26/19  0957 01/26/19  0425   ALKPHOS 76 76 71   BILITOTAL 0.9 0.9 0.8   ALT 14 20 19   AST 30 39 42   PROTTOTAL 5.1* 5.1* 5.1*   ALBUMIN 1.8* 1.9* 1.9*       Iron Panel -   Recent Labs   Lab Test 01/27/15  1218 07/11/12  0804   IRON 74 80   IRONSAT 21 26   ORQUIDEA 40 67           Current Medications:    sodium chloride 0.9%  250 mL Intravenous Once in dialysis     sodium chloride 0.9%  300 mL Hemodialysis  Machine Once     amiodarone  400 mg Oral or Feeding Tube Daily     artificial tears   Both Eyes Daily     aspirin  81 mg Oral or Feeding Tube Daily     atorvastatin  40 mg Oral QPM     B and C vitamin Complex with folic acid  5 mL Per Feeding Tube Daily     busPIRone  5 mg Oral or Feeding Tube BID     heparin lock flush  5-10 mL Intracatheter Q24H     heparin  5,000 Units Subcutaneous Q8H FAISAL     insulin aspart  1-6 Units Subcutaneous Q4H     metoprolol tartrate  25 mg Oral BID     - MEDICATION INSTRUCTIONS -   Does not apply Once     pantoprazole (PROTONIX) IV  40 mg Intravenous BID     polyethylene glycol  17 g Oral or Feeding Tube Daily     protein modular  1 packet Per Feeding Tube BID     QUEtiapine  50 mg Oral At Bedtime     senna-docusate  1 tablet Oral or Feeding Tube BID    Or     senna-docusate  2 tablet Oral or Feeding Tube BID     sodium chloride (PF)  3 mL Intracatheter Q8H     sodium chloride (PF)  9 mL Intracatheter During Hemodialysis (from stock)     sodium chloride (PF)  9 mL Intracatheter During Hemodialysis (from stock)       dexmedetomidine 0.8 mcg/kg/hr (01/29/19 1448)     IV fluid REPLACEMENT ONLY       IV fluid REPLACEMENT ONLY       fentaNYL 100 mcg/hr (01/29/19 1400)     propofol (DIPRIVAN) infusion 15 mcg/kg/min (01/29/19 1400)       I, Simon Chen, saw and evaluated this patient with Quincy Jesus, Clinical Nurse Specialist, 01/29/19, as part of a shared visit.     I personally reviewed major complaints, physical findings, investigations, medications and the overall management plan.        My key findings: dialysis-dependent kidney failure    Key management decisions made by me: continue scheduled renal replacement therapy.

## 2019-01-29 NOTE — PROGRESS NOTES
[facility]         I reviewed the chart of Wendy Rocha for possible LTACH admission at the request of Aneta Plunkett RN CC. The pt meets criteria for admission to [facility] pending clinical readiness. I will continue to follow.        I left a message for the pt's dtr, Manju Manzo, requesting a call back to review [facility] services.       Zena Nieves RN  [facility] Admissions  Ph: [phone]  Fax: [phone]  [email or web address]

## 2019-01-29 NOTE — PLAN OF CARE
Adult Inpatient Plan of Care  Plan of Care Review  1/29/2019 0534 - No Change by Kassandra Mcginnis, RN  D/I: Patient sedated overnight, opens eyes to pain/stimulation but does not follow. Continues to be in a-fib, unchanged. All other VSS. Intubated with 50% FiO2, sats stable. Anuric overnight. TF continue to run at goal.   A/P: Continue to monitor and follow POC, will alert MD to any concerns or changes in patient condition.

## 2019-01-29 NOTE — PLAN OF CARE
Discharge Planner OT   Patient plan for discharge: not stated  Current status: Pt tolerated BUE/BLE PROM w/in precautions, no command follow, opening eyes sporadically no tracking. VS on CMV FiO2 50% PEEP 5  Barriers to return to prior living situation: medical status  Recommendations for discharge: TCU  Rationale for recommendations: to increase ind in ADLS/IADLS       Entered by: Elisha Owusu 01/29/2019 4:37 PM

## 2019-01-29 NOTE — CONSULTS
[facility], [place]  Palliative Care Consultation Note    Patient: Wendy Rocha  Date of Admission:  1/14/2019    Requesting provider/team: Scar White MD  Reason for consult: Goals of care    Recommendations:  - Family wishes to proceed with placement of trach/PEG tomorrow  - Palliative care interdisciplinary team will continue to follow for additional support       These recommendations have been discussed with primary team.    Thank you for the opportunity to participate in the care of this patient and family. Our team will follow. Please feel free to contact the on-call Palliative provider with any urgent needs.    YAZAN Suh CNP  Palliative Care Consult Team  Pager: [phone]    [facility] Inpatient Team Consult pager [phone] (M-F 8-4:30)  After-hours Answering Service [phone]   Palliative Clinic: [phone]     75 minutes spent with patient, with >50% counseling and in care coordination.    Assessment:  Wendy Rocha is an 78 year old female, intubated and sedated in the ICU, s/p CABG x3 on 1/15 and PEA arrest 1/23, with plans for trach/peg placement 1/30.     Social:    Living situation: PTA in an assisted living  Support system: ex-, 5 adult children (3 daughters, 2 son - all local except one son who lives in [place]), 2 long-term friends   Functional status: independent with ADLS; has help with finances and shopping   Occupation: retired from working in an Assisted Living Facility   Hobbies: history (particularly WW II), family     Spiritual/Sabianism:   Spiritual background: Restorationist - Taoist   Spiritual needs: appreciates prayer     Advance Care Planning:          Decision making capacity: not intact  Goals of Care: proceed with trach/peg tomorrow, with hopes that Wendy will get better with some more time   Health care directive: not on file   Health care agent: children are next of kin   Code Status:  Full Code  POLST not on file     History of Present  "Illness   Sources of History: electronic health record and patient's family    Wendy Rocha is an 78 year old female with a past medical history of CKD stage III, carotid stenosis, GERD, HTN, and CAD. She presented to [facility] on 1/11 with NSTEMI and [place] [facility] [place] where PCI was preformed. Patient was then transferred to [facility] on 1/14 for surgical management and underwent CABG x3 on 1/15. PEA arrest on 1/23, with ROSC after compressions, intubation and receivng epinephrine. Family agreed to trach/PEG placement, despite patient verbalizing resistance in past care conference; procedure scheduled for 1/30 in IR.     Palliative care consulted to assist with family support.     I met with Wendy, her ex-, and son today to introduce the Palliative Care team and services we provide; such as symptom management, assistance navigating chronic illness and goals for treatment, counseling, psychosocial and spiritual support. Wendy was sitting up in a chair but not able to participate in our discussion due to being intubated and sedated. The nurse and family reported that if sedation is decreased, Wendy becomes quite agitated. Family shared that they agreed to a trach and peg tomorrow in hopes that she will be more comfortable and able to rehab without sedation. They are also hoping she will be able to communicate with them and help direct her care. We discussed that patients with new trachs are not typically able to speak or eat in the beginning, but it is a possibility to regain those skills with therapy down the road (not expected in the first couple weeks post placement). Family was disappointed this would not be right away, but remained consistent that they want to \"give her a shot\" at recovery. Family welcomed our support and thanked me for spending time with them getting to know Wendy.    ROS:  A comprehensive ROS has been negative other than stated in the HPI and below:   Unable to " complete - pt intubated     Past Medical History:   Past Medical History:   Diagnosis Date     Abnormal ECG      ARF (acute renal failure) (H) Aug 2010    Lisinopril was stopped at that time     Carotid stenosis      Carotid stenosis, bilateral      Carotid stenosis, bilateral     Vs Surgeon: dr Kush Mcmillan, Phone: [phone] fax: [phone]     CKD (chronic kidney disease) stage 3, GFR 30-59 ml/min (H)      GERD (gastroesophageal reflux disease)      HTN, goal below 140/90      Hyperlipidemia LDL goal <100      Lung nodule may 2013    needs f/u may 2014     Muscle aches      Perforated duodenal ulcer (H) Aug 2010     Proteinuria      PVD (peripheral vascular disease) (H)      Renal artery stenosis (H)  2011    Right side     Vitamin D deficiency disease           Past Surgical History:   Past Surgical History:   Procedure Laterality Date     ARTHROPLASTY KNEE      left     BYPASS CARDIOPULMONARY N/A 1/15/2019    Procedure: On Cardiopulmonary Bypass;  Surgeon: Haseeb Villegas MD;  Location: [facility] OR     BYPASS GRAFT ARTERY CORONARY MINIMALLY INVASIVE  (NO PUMP) N/A 1/15/2019    Procedure: Median Sternotomy, Coronary Artery Bypass Graft x3, Endovein [place] of Left Greater Saphenous Vein, Left Internal Mammary Artery Takedown;  Surgeon: Haseeb Villegas MD;  Location: [facility]     EYE SURGERY      cataracts     LAPAROSCOPIC TUBAL LIGATION       TRANSESOPHAGEAL ECHOCARDIOGRAM INTRAOPERATIVE  1/15/2019    Procedure: Intraoperative Transesophageal Echocardiogram;  Surgeon: Haseeb Villegas MD;  Location: [facility] OR             Family History:   Family History   Problem Relation Age of Onset     Neurologic Disorder Brother 70        Parkinson's     Heart Disease Mother          90yo      Hypertension Father          46yo MVA     Family History Negative Sister      Cerebrovascular Disease Sister         Has had 2      Family History Negative Sister      Family History Negative Son      Family History  Negative Son      Family History Negative Daughter      Family History Negative Daughter      Family History Negative Daughter           Allergies:   Allergies   Allergen Reactions     Lisinopril      She developed ARF            Medications:   I have reviewed this patient's medication profile and medications given in the past 24 hours.    Buspar 5 mg BID  Miralax daily -- received one dose in the last three days    Seroquel 50 mg at bedtime   Senna-docusate 1-2 tabs BID    Precedex infusion  Fentanyl infusion  Propofol infusion     Ondansetron 4 mg q6h prn--0  Oxycodone 5 mg q4h prn--0         Physical Exam:   Vital Signs: Temp: 97.9  F (36.6  C) Temp src: Axillary BP: 92/62 Pulse: 87 Heart Rate: 98 Resp: 17 SpO2: 97 % O2 Device: Mechanical Ventilator    Weight: 169 lbs 8.54 oz    Constitutional: Critically ill female, seen sitting in recliner. Ill appearing, but in no acute distress.  Head: ETT in place. MMM.   Extremities: Warm and well perfused. 2+ generalized edema.  Pulm: On mechanical vent. Coughing frequently.    Musculoskeletal: No obvious malformations.   Skin: No jaundice. No concerning rashes or lesions on exposed areas.   Neuro: Eyes open but not tracking. No purposeful interactions during my visit.        Data reviewed:     CMP  Recent Labs   Lab 01/29/19  0408 01/28/19  1615 01/28/19  0335 01/27/19  1830 01/27/19  0404  01/26/19  0957 01/26/19  0425 01/25/19  2141     --  133  --  136  --  137 137 138   POTASSIUM 3.6 3.1* 3.6 3.4 3.4   < > 3.5 3.9 3.6   CHLORIDE 98  --  98  --  101  --  103 104 105   CO2 23  --  19*  --  19*  --  22 22 25   ANIONGAP 11  --  16*  --  15*  --  12 12 8   *  --  137*  --  125*  --  115* 135* 103*   BUN 51*  --  80*  --  60*  --  42* 38* 32*   CR 3.27*  --  4.63*  --  3.53*  --  2.55* 2.28* 1.78*   GFRESTIMATED 13*  --  8*  --  12*  --  17* 20* 26*   GFRESTBLACK 15*  --  10*  --  13*  --  20* 23* 31*   JEFFREY 7.8*  --  7.3*  --  7.7*  --  7.0* 7.7* 7.7*   MAG  --    --   --  2.8* 2.6*  --  2.4* 2.6* 2.5*   PHOS  --   --   --  8.3* 8.0*  --  6.5* 6.0* 5.1*   PROTTOTAL  --   --   --   --  5.1*  --  5.1* 5.1* 5.2*   ALBUMIN  --   --   --   --  1.8*  --  1.9* 1.9* 2.0*   BILITOTAL  --   --   --   --  0.9  --  0.9 0.8 0.8   ALKPHOS  --   --   --   --  76  --  76 71 58   AST  --   --   --   --  30  --  39 42 46*   ALT  --   --   --   --  14  --  20 19 24    < > = values in this interval not displayed.     CBC  Recent Labs   Lab 01/29/19  0408 01/28/19  0335 01/27/19 0404 01/26/19  0957   WBC 10.4 11.7* 11.7* 14.1*   RBC 2.73* 2.64* 2.67* 2.74*   HGB 8.1* 7.8* 7.8* 8.2*   HCT 24.8* 23.9* 24.3* 25.3*   MCV 91 91 91 92   MCH 29.7 29.5 29.2 29.9   MCHC 32.7 32.6 32.1 32.4   RDW 15.8* 15.6* 15.6* 15.7*    246 190 176     INR  Recent Labs   Lab 01/23/19  1556 01/23/19  0946 01/23/19  0139 01/23/19  0115   INR 1.85* 1.47* 4.00* 3.63*     Arterial Blood Gas  Recent Labs   Lab 01/29/19  0454 01/27/19  0404 01/26/19  0425 01/25/19 2141 01/25/19 0345 01/24/19 2148 01/24/19 0348 01/23/19  2205   PH  --   --   --   --  7.40 7.39 7.42 7.43   PCO2  --   --   --   --  40 40 38 36   PO2  --   --   --   --  74* 79* 83 125*   HCO3  --   --   --   --  25 24 25 24   O2PER 50 50 40 50 50.0 50 50.0 60

## 2019-01-29 NOTE — PROGRESS NOTES
CVTS PROGRESS NOTE  January 29, 2019        Overnight Events:  -No overnight events, afebrile, VSS    Today's Plan:  - Plan for percutaneous tracheostomy and PEG placement Wednesday  - Wean dexemedetomidine, fentanyl, propofol for neuro exam.      ASSESSMENT:  Wendy Rocha is a 78 year old female with PMH CKDIII, B/L carotid stenosis, GERD, HTN, CAD s/p ACS transferred from [facility] with severe multi-vessel disease who is s/p CABG. On 1/23, the patient went into PEA, with ROSC s/p compressions, intubation, and epinephrine.        PLANS:   Neuro/ pain/ sedation:  -CT head 1/18: normal  -RN has weaned sedation and examination shows mildly less responsive today. She was started on Seroquel yesterday in the morning and at night, so will follow serial neuro exams, and may change dose to at bedtime only.  -On fentanyl, dexmedetomidine, and propofol for pain and sedation     Pulmonary care:   -CMV /AC; PEEP 5 cm H20  -chest tube placed 1/23 AM because of hemothorax  -Tracheostomy planned 1/30      Cardiovascular:    -Monitor hemodynamic status  -Amiodarone 400mg once daily  -Started Metoprolol 12.5mg po BID (1/25), blood pressure appears stable on current dose     GI care:   -PEG placement planned 1/30  -Bowel regimen prn   -TF at goal      Fluids/ Electrolytes/ Nutrition:   - ICU electrolyte replacement protocol     Renal/ Fluid Balance:    -chronic kidney disease III   -Urine output is minimal, nephrology consulted, appreciate recs   -iHD per nephrology    -Tunnel Catheter surgical procedure on hold      Endocrine:    -Sliding scale insulin       ID/ Antibiotics:  -WBC count decreased from 14.1 to 11.7, she is off antibiotics after being treated for a Klebsiella UTI (1/21)  -1/23 sputum with light growth coag neg staph and candida-colonizers. Will monitor but no indication to treat      Heme:     -Hemoglobin stable.   -Continue to monitor      Prophylaxis:    -Mechanical prophylaxis for DVT.        Lines/ tubes/  drains:  - CVC Albaro (placement 1/20)  - L PICC (placement 1/23)  - NG (placement 1/14)  - NJ (placmemtn 1/16)  - ETT (intubated since       Disposition:  - CVICU    Patient discussed with CVTS staff, MD Lazaro Morales MD  Adult Cardiothoracic Anesthesia Fellow    VITAL SIGNS:  Temp:  [97.9  F (36.6  C)-99.4  F (37.4  C)] 97.9  F (36.6  C)  Pulse:  [] 87  Heart Rate:  [] 98  Resp:  [12-23] 17  BP: ()/() 92/62  FiO2 (%):  [50 %-60 %] 50 %  SpO2:  [92 %-100 %] 97 %  Ventilation Mode: CMV/AC  (Continuous Mandatory Ventilation/ Assist Control)  FiO2 (%): 50 %  Rate Set (breaths/minute): 14 breaths/min  Tidal Volume Set (mL): 450 mL  PEEP (cm H2O): 5 cmH2O  Oxygen Concentration (%): 50 %  Resp: 17      INTAKE/ OUTPUT:   I/O last 3 completed shifts:  In: 1795.8 [I.V.:615.8; NG/GT:300]  Out: 2475 [Emesis/NG output:775; Other:1700]     PHYSICAL EXAMINATION:  General: intubated, in no acute distress.  HEENT: Normocephalic, atraumatic.   Neck: No cervical lymphadenopathy. No thyromegaly.  Chest wall: Symmetric thorax. No masses or tenderness to palpation.   Respiratory: Non-labored breathing. Lung sounds clear to auscultation bilaterally.   Cardiovascular: Regular rate and rhythm.   Gastrointestinal: Abdomen soft, non-distended, non-tender to palpation. No organomegaly or masses appreciated. No inguinal hernias.   Genitourinary: No CVA tenderness.   Extremities: Moving all four extremities. No limb deformities. No pedal edema.   Skin: As noted above. No rashes or lesions appreciated.    ALLERGIES:      Allergies   Allergen Reactions     Lisinopril      She developed ARF       MEDICATIONS:    Current Facility-Administered Medications on File Prior to Encounter:  [COMPLETED] aspirin (ASA) chewable tablet 324 mg   [COMPLETED] cefTRIAXone (ROCEPHIN) 1 g vial to attach to  mL bag for ADULTS or NS 50 mL bag for PEDS   [COMPLETED] furosemide (LASIX) injection 40 mg   [COMPLETED]  heparin Loading Dose bolus dose from infusion pump 3,650 Units     Current Outpatient Medications on File Prior to Encounter:  ACE/ARB NOT PRESCRIBED, INTENTIONAL, ACE & ARB not prescribed due to Worsening renal function on ACE/ARB therapy   acetaminophen (TYLENOL) 325 MG tablet Take 2 tablets (650 mg) by mouth every 6 hours as needed for mild pain   amLODIPine (NORVASC) 10 MG tablet TAKE 1 TABLET BY MOUTH ONCE DAILY   ASPIRIN NOT PRESCRIBED (INTENTIONAL) Antiplatelet medication not prescribed intentionally due to Plavix   busPIRone (BUSPAR) 5 MG tablet TAKE 1 TABLET BY MOUTH TWICE DAILY FOR ANXIETY   clopidogrel (PLAVIX) 75 MG tablet TAKE 1 TABLET BY MOUTH ONCE DAILY   doxazosin (CARDURA) 4 MG tablet TAKE 1 TABLET BY MOUTH AT BEDTIME   Hydrocortisone Acetate 2.5 % CREA Externally apply 1 Application topically 2 times daily Apply to both arms twice a day   metoprolol tartrate (LOPRESSOR) 50 MG tablet TAKE ONE TABLET BY MOUTH TWICE DAILY   metoprolol tartrate (LOPRESSOR) 50 MG tablet Take 0.5 tablets (25 mg) by mouth 2 times daily   ondansetron (ZOFRAN) 8 MG tablet Take 1 tablet (8 mg) by mouth every 8 hours as needed for nausea   order for DME Machine to dispense the medication   pantoprazole (PROTONIX) 40 MG EC tablet TAKE ONE TABLET BY MOUTH ONCE DAILY 30-60  MINUTES  BEFORE  A  MEAL   simvastatin (ZOCOR) 40 MG tablet Take 1 tablet (40 mg) by mouth At Bedtime   traZODone (DESYREL) 50 MG tablet Take 1 tablet (50 mg) by mouth nightly as needed for sleep       LABS: Reviewed.   Arterial Blood Gases   Recent Labs   Lab 01/25/19  0345 01/24/19  2148 01/24/19  0348 01/23/19  2205   PH 7.40 7.39 7.42 7.43   PCO2 40 40 38 36   PO2 74* 79* 83 125*   HCO3 25 24 25 24     Complete Blood Count   Recent Labs   Lab 01/29/19  0408 01/28/19  0335 01/27/19  0404 01/26/19  0957   WBC 10.4 11.7* 11.7* 14.1*   HGB 8.1* 7.8* 7.8* 8.2*    246 190 176     Basic Metabolic Panel  Recent Labs   Lab 01/29/19  0408 01/28/19  1615  01/28/19  0335 01/27/19  1830 01/27/19  0404  01/26/19  0957     --  133  --  136  --  137   POTASSIUM 3.6 3.1* 3.6 3.4 3.4   < > 3.5   CHLORIDE 98  --  98  --  101  --  103   CO2 23  --  19*  --  19*  --  22   BUN 51*  --  80*  --  60*  --  42*   CR 3.27*  --  4.63*  --  3.53*  --  2.55*   *  --  137*  --  125*  --  115*    < > = values in this interval not displayed.     Liver Function Tests  Recent Labs   Lab 01/27/19  0404 01/26/19  0957 01/26/19  0425 01/25/19  2141  01/23/19  1556 01/23/19  0946  01/23/19  0139 01/23/19  0115   AST 30 39 42 46*   < > 278* 269*   < > 82*  --    ALT 14 20 19 24   < > 58* 63*   < > 21  --    ALKPHOS 76 76 71 58   < > 44 40   < > 33*  --    BILITOTAL 0.9 0.9 0.8 0.8   < > 0.9 0.8   < > 0.3  --    ALBUMIN 1.8* 1.9* 1.9* 2.0*   < > 2.3* 2.3*   < > 1.6*  --    INR  --   --   --   --   --  1.85* 1.47*  --  4.00* 3.63*    < > = values in this interval not displayed.     Pancreatic Enzymes  No lab results found in last 7 days.  Coagulation Profile  Recent Labs   Lab 01/23/19  1556 01/23/19  0946 01/23/19  0139 01/23/19  0115   INR 1.85* 1.47* 4.00* 3.63*   PTT  --  39* 39* 57*     Lactate  Invalid input(s): LACTATE      RADIOLOGY:   Recent Results (from the past 24 hour(s))   XR Abdomen Port 1 View    Narrative    EXAMINATION:  XR ABDOMEN PORT 1 VW 1/28/2019 12:07 PM.    COMPARISON: 1/26/2019.    HISTORY:  Abd distension    FINDINGS: Single portable supine view of the abdomen. Feeding tube tip  projects over the ligament of Treitz and gastric tube tip and sidehole  project over the stomach, comparable to prior exam. No abnormal air  filled bowel. Mild colonic stool burden. Somewhat paucity of  possibility of air-filled bowel, decreased since 1/26/2019. Left  basilar opacities. Mitral valve calcifications.  Partially visualize  sternotomy wire.       Impression    IMPRESSION:   1. Nonspecific bowel gas pattern with paucity of air filled bowel.   Mild colonic stool  burden.  2. Support lines and tubes as above.    SAKINA PEÑA MD   XR Chest Port 1 View    Narrative    EXAMINATION: XR CHEST PORT 1 VW, 1/28/2019 2:35 PM    INDICATION: chest tube removal, 78 yof w/CKD III, Carotid stenosis,  GERD, HTN, CAD who had emergent CABG x3 on 1/15    COMPARISON: Chest x-ray 1/20/2019, 1/27/2019    FINDINGS: AP semiupright views of the chest.     Trachea is midline. Endotracheal tube measures 3.6 cm above the  ursula. Gastric tube and feeding tube travel along the course of the  esophagus below the diaphragm and are collimated off the  field-of-view. Right central venous catheter with tip located at the  superior cavoatrial junction. Moderate atherosclerosis of the aortic  arch. Cardiomegaly. Calcification of the mitral annulus. Left-sided  pleural effusion with associated left-sided basilar opacity.  Postsurgical changes of recent CABG with intact median sternotomy  wires and multiple surgical clips visualized. Removal of left apical  chest tube. No appreciable pneumothorax. Unchanged patchy interstitial  opacities. No acute osseous abnormality. Soft tissues are  unremarkable. No acute intra-abdominal finding.      Impression    IMPRESSION:     1. Moderate left-sided pleural effusion with left basilar opacity.  2. Unchanged patchy interstitial opacities concerning for pulmonary  vascular congestion.  3. Interval removal of left apical chest tube with no evidence for  pneumothorax.    I have personally reviewed the examination and initial interpretation  and I agree with the findings.    FERMÍN HEAD MD   XR Chest Port 1 View    Narrative    Exam: TEMPORARY, 1/29/2019 1:05 AM    Indication: Interval    Comparison: X-ray 01/28/2019    Findings:   AP single view of the chest. Tip of the endotracheal tube projects  over the low thoracic trachea. Tip of the right IJ central venous  catheter/sheath projects over the low SVC. Incomplete visualization of  enteric gastric and feeding tubes. Tip  of the left PICC line projects  over the mid SVC. Postsurgical changes of CABG. Ongoing mild to  moderate left-sided pleural effusion with left retrocardiac opacity.  Cardiac silhouette appears enlarged, stable. Extensive mitral annular  calcifications. Upper abdomen is unremarkable.      Impression    Impression:   1. Ongoing mild to moderate left-sided pleural effusion with left  retrocardiac opacity, compressive atelectasis versus infection.   2. Tip of the endotracheal tube projects over the low thoracic  trachea, previously midthoracic trachea. Otherwise stable supportive  devices.    I have personally reviewed the examination and initial interpretation  and I agree with the findings.    YOVANY GLOVER MD         Patient seen, findings and plan discussed with surgical ICU staff Dr. Simon Evans.    Lazaro Cantu MD  Adult Cardiothoracic Anesthesia Fellow

## 2019-01-30 NOTE — PROGRESS NOTES
HEMODIALYSIS TREATMENT NOTE    Date: 1/29/2019  Time: 6:17 PM    Data:  Pre Wt:     Desired Wt: 76.9 kg   Post Wt: 74.8kg   Weight gain:   kg   Weight change: 2.1 kg  Ultrafiltration - Post Run Net Total Removed (mL): 1000 mL  Ultrafiltration - Post Run Net Total Gain (mL): 0 mL  Vascular Access Status: Patent  Dialyzer Rinse: Streaked, Moderate  Total Blood Volume Processed: 45.3 liters   Total Dialysis (Treatment) Time:  2 hours     Lab:   No    Interventions/Assessment:  HD well tolerated by Pt,   VSS during HD, no dialysis r/t concerns,   Hand-off report given to bedside RN     Plan:    Next HD per renal team.

## 2019-01-30 NOTE — PROGRESS NOTES
CV ICU PROGRESS NOTE  January 30, 2019        Overnight Events:  -No overnight events, afebrile, VSS    Today's Plan:  - Plan for percutaneous tracheostomy and PEG placement today  - Tunneled dialysis catheter placed today   - Wean dexemedetomidine, fentanyl, propofol as tolerated  - Decrease scheduled amio to 200 mg daily       ASSESSMENT:  Wendy Rocha is a 78 year old female with PMH CKDIII, B/L carotid stenosis, GERD, HTN, CAD s/p ACS transferred from [facility] with severe multi-vessel disease who is s/p CABG. On 1/23, the patient went into PEA, with ROSC s/p compressions, intubation, and epinephrine.        PLANS:   Neuro/ pain/ sedation:  -CT head 1/18: normal  -RN has weaned sedation and examination shows mildly less responsive today. She was started on Seroquel yesterday in the morning and at night, so will follow serial neuro exams, and may change dose to at bedtime only.  -On fentanyl, dexmedetomidine, and propofol for pain and sedation     Pulmonary care:   -CMV /AC; PEEP 5 cm H20  -chest tube placed 1/23 AM because of hemothorax  -Tracheostomy planned 1/30      Cardiovascular:    -Monitor hemodynamic status  -Amiodarone 200mg once daily  -Started Metoprolol 12.5mg po BID (1/25), blood pressure appears stable on current dose     GI care:   -PEG placement planned 1/30  -Bowel regimen prn   -TF at goal      Fluids/ Electrolytes/ Nutrition:   - ICU electrolyte replacement protocol     Renal/ Fluid Balance:    -chronic kidney disease III   -Urine output is minimal, nephrology consulted, appreciate recs   -iHD per nephrology        Endocrine:    -Sliding scale insulin       ID/ Antibiotics:  -Off antibiotics after being treated for Klebsiella UTI (1/21)  -1/23 sputum with light growth coag neg staph and candida-colonizers. Will monitor but no indication to treat      Heme:     -Hemoglobin stable.   -Continue to monitor      Prophylaxis:    -Mechanical prophylaxis for DVT.        Lines/ tubes/ drains:  -  Tunneled RIJ (1-30)  - L PICC (placement 1/23)  - NG (placement 1/14)  - NJ (placmemtn 1/16)  - ETT (intubated since       Disposition:  - CVICU    Patient discussed with CVTS staff, MD Scar Morales  PGY3    VITAL SIGNS:  Temp:  [96.1  F (35.6  C)-98.4  F (36.9  C)] 97.2  F (36.2  C)  Heart Rate:  [] 100  Resp:  [11-49] 14  BP: ()/() 135/91  Cuff Mean (mmHg):  [] 105  FiO2 (%):  [50 %] 50 %  SpO2:  [96 %-100 %] 96 %  Ventilation Mode: CMV/AC  (Continuous Mandatory Ventilation/ Assist Control)  FiO2 (%): 50 %  Rate Set (breaths/minute): 14 breaths/min  Tidal Volume Set (mL): 450 mL  PEEP (cm H2O): 5 cmH2O  Oxygen Concentration (%): 50 %  Resp: 14      INTAKE/ OUTPUT:   I/O last 3 completed shifts:  In: 1915.42 [I.V.:585.42; NG/GT:370]  Out: 1475 [Emesis/NG output:475; Other:1000]     PHYSICAL EXAMINATION:  General: intubated, in no acute distress.  HEENT: Normocephalic, atraumatic.   Neck: No cervical lymphadenopathy. No thyromegaly.  Chest wall: Symmetric thorax. No masses or tenderness to palpation.   Respiratory: Non-labored breathing. Lung sounds clear to auscultation bilaterally.   Cardiovascular: Regular rate and rhythm.   Gastrointestinal: Abdomen soft, non-distended, non-tender to palpation. No organomegaly or masses appreciated. No inguinal hernias.   Genitourinary: No CVA tenderness.   Extremities: Moving all four extremities. No limb deformities. No pedal edema.   Skin: As noted above. No rashes or lesions appreciated.    ALLERGIES:      Allergies   Allergen Reactions     Lisinopril      She developed ARF       MEDICATIONS:    No current facility-administered medications on file prior to encounter.   Current Outpatient Medications on File Prior to Encounter:  ACE/ARB NOT PRESCRIBED, INTENTIONAL, ACE & ARB not prescribed due to Worsening renal function on ACE/ARB therapy   acetaminophen (TYLENOL) 325 MG tablet Take 2 tablets (650 mg) by mouth every 6 hours as needed  for mild pain   amLODIPine (NORVASC) 10 MG tablet TAKE 1 TABLET BY MOUTH ONCE DAILY   ASPIRIN NOT PRESCRIBED (INTENTIONAL) Antiplatelet medication not prescribed intentionally due to Plavix   busPIRone (BUSPAR) 5 MG tablet TAKE 1 TABLET BY MOUTH TWICE DAILY FOR ANXIETY   clopidogrel (PLAVIX) 75 MG tablet TAKE 1 TABLET BY MOUTH ONCE DAILY   doxazosin (CARDURA) 4 MG tablet TAKE 1 TABLET BY MOUTH AT BEDTIME   Hydrocortisone Acetate 2.5 % CREA Externally apply 1 Application topically 2 times daily Apply to both arms twice a day   metoprolol tartrate (LOPRESSOR) 50 MG tablet TAKE ONE TABLET BY MOUTH TWICE DAILY   metoprolol tartrate (LOPRESSOR) 50 MG tablet Take 0.5 tablets (25 mg) by mouth 2 times daily   ondansetron (ZOFRAN) 8 MG tablet Take 1 tablet (8 mg) by mouth every 8 hours as needed for nausea   order for DME Machine to dispense the medication   pantoprazole (PROTONIX) 40 MG EC tablet TAKE ONE TABLET BY MOUTH ONCE DAILY 30-60  MINUTES  BEFORE  A  MEAL   simvastatin (ZOCOR) 40 MG tablet Take 1 tablet (40 mg) by mouth At Bedtime   traZODone (DESYREL) 50 MG tablet Take 1 tablet (50 mg) by mouth nightly as needed for sleep       LABS: Reviewed.   Arterial Blood Gases   Recent Labs   Lab 01/25/19  0345 01/24/19  2148 01/24/19  0348 01/23/19  2205   PH 7.40 7.39 7.42 7.43   PCO2 40 40 38 36   PO2 74* 79* 83 125*   HCO3 25 24 25 24     Complete Blood Count   Recent Labs   Lab 01/29/19  0408 01/28/19  0335 01/27/19  0404 01/26/19  0957   WBC 10.4 11.7* 11.7* 14.1*   HGB 8.1* 7.8* 7.8* 8.2*    246 190 176     Basic Metabolic Panel  Recent Labs   Lab 01/30/19  0402 01/29/19  0408 01/28/19  1615 01/28/19  0335  01/27/19  0404    133  --  133  --  136   POTASSIUM 3.2* 3.6 3.1* 3.6   < > 3.4   CHLORIDE 98 98  --  98  --  101   CO2 25 23  --  19*  --  19*   BUN 51* 51*  --  80*  --  60*   CR 2.92* 3.27*  --  4.63*  --  3.53*   * 127*  --  137*  --  125*    < > = values in this interval not displayed.      Liver Function Tests  Recent Labs   Lab 01/27/19  0404 01/26/19  0957 01/26/19  0425 01/25/19  2141  01/23/19  1556   AST 30 39 42 46*   < > 278*   ALT 14 20 19 24   < > 58*   ALKPHOS 76 76 71 58   < > 44   BILITOTAL 0.9 0.9 0.8 0.8   < > 0.9   ALBUMIN 1.8* 1.9* 1.9* 2.0*   < > 2.3*   INR  --   --   --   --   --  1.85*    < > = values in this interval not displayed.     Pancreatic Enzymes  No lab results found in last 7 days.  Coagulation Profile  Recent Labs   Lab 01/23/19  1556   INR 1.85*     Lactate  Invalid input(s): LACTATE      RADIOLOGY:   Recent Results (from the past 24 hour(s))   XR Chest Port 1 View    Narrative    Exam: XR CHEST PORT 1 VW, 1/30/2019 2:08 AM    Indication: Interval CXR    Comparison: X-ray 01/29/2019 1:05 AM    Findings:   AP chest single view of the chest. Tip of the endotracheal tube  projects over the midthoracic trachea. Left-sided PICC tip projects  over the mid SVC. Tip of the right IJ sheath projects over the  cavoatrial junction. Incomplete visualization of enteric gastric and  feeding tube. Postsurgical changes of CABG. Persistent low left lung  volume with streaky basilar opacity. Trace left-sided pleural  effusion. Mitral annular extensive calcification.      Impression    Impression:   1. Ongoing mild to moderate left-sided pleural effusion with left  retrocardiac opacity, compressive atelectasis versus infection.  2. Stable supportive devices.    I have personally reviewed the examination and initial interpretation  and I agree with the findings.    YOVANY GLOVER MD   IR CVC Tunnel Placement > 5 Yrs of Age    Narrative    PRE-PROCEDURE DIAGNOSIS: Dialysis patient    POST-PROCEDURE DIAGNOSIS: Same    PROCEDURE: Tunneled central venous catheter placement    Impression    IMPRESSION: Completed image-guided placement of 14.5 Indonesian, 19 cm  double lumen tunneled central venous catheter via right internal  jugular vein. Catheter tip in high right atrium. Aspirates and  flushes  freely, heparin locked and ready for immediate use. No complication.     ----------    CLINICAL HISTORY: Patient requires central venous access for therapy.  Tunneled central venous catheter placement requested.    PERFORMED BY: Neville Messina PA-C    CONSENT: Written informed consent was obtained and is documented in  the patient record.    MEDICATIONS: A 10:1 volume mixture of 1% lidocaine without epinephrine  buffered with 8.4% bicarbonate solution was available for local  anesthesia. The catheter was heparin locked upon completion of  placement.    NURSING: The patient was placed on continuous vital signs monitoring.  Vital signs and sedation were monitored by nursing staff under IR  physician staff supervision.      FLUOROSCOPY TIME: 1.1 minutes    DESCRIPTION: The right neck and upper chest were prepped and draped in  the usual sterile fashion.      Under ultrasound guidance, the right internal jugular vein was  identified and the overlying skin was anesthetized and skin  dermatotomy was made. Under ultrasound guidance, right internal  jugular venipuncture was made with needle. Image saved documenting  venipuncture and patency.    Needle was exchanged over guidewire for a dilator under fluoroscopic  guidance. Length to right atrium was measured with guidewire.  Guidewire and inner dilator were removed. Wire was advanced into  inferior vena cava under fluoroscopic guidance and secured.     The anterior chest skin was anesthetized and incision was made after  measurements were taken. A cuffed catheter was subcutaneously tunneled  from the anterior chest incision to the internal jugular venipuncture  site after path of tunnel was anesthetized. The dilator was exchanged  over guidewire for a peel-away sheath. Guidewire was removed. Under  fluoroscopic guidance, the catheter was placed through the peel-away  sheath. Peel-away sheath was removed.      Final catheter position saved. Both catheter lumens  adequately  aspirated and flushed. Each lumen was heparin locked. A catheter  retaining suture and sterile dressing were applied. The skin  dermatotomy site overlying the internal jugular venipuncture was  closed with topical adhesive.    COMPLICATIONS: No immediate concerns, the patient remained stable  throughout the procedure and tolerated it well.    ESTIMATED BLOOD LOSS: Minimal    SPECIMENS: None    HAYDEE LITTLE PA-C         Patient seen, findings and plan discussed with surgical ICU staff Dr. Chen.

## 2019-01-30 NOTE — ANESTHESIA CARE TRANSFER NOTE
Patient: Wendy Rocha    Procedure(s):  Percutaneous tracheostomy converted to Open Tracheostomy  Percutaneous Gastrostomy Tube Insertion    Diagnosis: Prolonged Intubation  Diagnosis Additional Information: No value filed.    Anesthesia Type:   No value filed.     Note:  Airway :Tracheostomy  Patient transferred to:ICU  Comments: Transferred to icu manually ventilated without issue. Care transferred on arrival to the icu team with staff Anesthesiologist present.     Thong Calderón MD  [phone] Handoff: Call for PAUSE to initiate/utilize ICU HANDOFF, Identified Patient, Identified Responsible Provider, Reviewed the Pertinent Medical History, Discussed Surgical Course, Reviewed Intra-OP Anesthesia Management and Issues during Anesthesia, Set Expectations for Post Procedure Period and Allowed Opportunity for Questions and Acknowledgement of Understanding      Vitals: (Last set prior to Anesthesia Care Transfer)    CRNA VITALS  1/30/2019 1346 - 1/30/2019 1442      1/30/2019             Resp Rate (observed):  18                Electronically Signed By: Thong Calderón MD  January 30, 2019  2:42 PM

## 2019-01-30 NOTE — IR NOTE
Patient Name: Wendy Rocha  Medical Record Number: [id number]  Today's Date: 1/30/2019    Procedure: tunneled central venous catheter placement  Proceduralist: ETHAN Messina    Sedation start time: na  Sedation end time: na  Sedation medications administered: pt on Fentanyl, Propofol and Precedex gtts   Total sedation time: na  Sedation Notes: no sedation given      Procedure start time: 0900    Puncture time: 0920  Procedure end time: 0945    Report given to: JERRY Rendon [id number]  : sandra    Other Notes: Pt arrived to IR room 5 from [facility] with RT,RN and monitoring. Consent reviewed. Pt denies any questions or concerns regarding procedure. Pt positioned supine and monitored per protocol. Pt tolerated procedure without any noted complications. Placed a DL large bore venous catheter into RIJ. Flushed with heparin 1000 units per ml. Site clean, dry and intact. Pt transferred back to [facility] with RN , RT and monitoring.

## 2019-01-30 NOTE — ANESTHESIA PREPROCEDURE EVALUATION
Anesthesia Pre-Procedure Evaluation    Patient: Wendy Rocha   MRN:     [id number] Gender:   female   Age:    78 year old :      1940        Preoperative Diagnosis: Prolonged Intubation   Procedure(s):  Open Tracheostomy  Percutaneous Gastrostomy Tube Insertion     Past Medical History:   Diagnosis Date     Abnormal ECG      ARF (acute renal failure) (H) Aug 2010    Lisinopril was stopped at that time     Carotid stenosis      Carotid stenosis, bilateral      Carotid stenosis, bilateral     Vs Surgeon: dr Kush Mcmillan, Phone: [phone] fax: [phone]     CKD (chronic kidney disease) stage 3, GFR 30-59 ml/min (H)      GERD (gastroesophageal reflux disease)      HTN, goal below 140/90      Hyperlipidemia LDL goal <100      Lung nodule may 2013    needs f/u may 2014     Muscle aches      Perforated duodenal ulcer (H) Aug 2010     Proteinuria      PVD (peripheral vascular disease) (H)      Renal artery stenosis (H)  2011    Right side     Vitamin D deficiency disease       Past Surgical History:   Procedure Laterality Date     ARTHROPLASTY KNEE      left     BYPASS CARDIOPULMONARY N/A 1/15/2019    Procedure: On Cardiopulmonary Bypass;  Surgeon: Haseeb Villegas MD;  Location: [facility] OR     BYPASS GRAFT ARTERY CORONARY MINIMALLY INVASIVE  (NO PUMP) N/A 1/15/2019    Procedure: Median Sternotomy, Coronary Artery Bypass Graft x3, Endovein [place] of Left Greater Saphenous Vein, Left Internal Mammary Artery Takedown;  Surgeon: Haseeb Villegas MD;  Location: [facility] OR     EYE SURGERY      cataracts     IR CVC TUNNEL PLACEMENT > 5 YRS OF AGE  2019     LAPAROSCOPIC TUBAL LIGATION       TRANSESOPHAGEAL ECHOCARDIOGRAM INTRAOPERATIVE  1/15/2019    Procedure: Intraoperative Transesophageal Echocardiogram;  Surgeon: Haseeb Villegas MD;  Location: [facility] OR          Anesthesia Evaluation     . Pt has had prior anesthetic. Type: General           ROS/MED HX    ENT/Pulmonary:  - neg pulmonary ROS     Neurologic:  Comment: Carotid stenosis    (+)other neuro    Spinal cord injury: ongoing cognitive dysfunction after CABG/ arrest.   Cardiovascular:     (+) hypertension--CAD, -CABG-. : . . . :. dysrhythmias a-fib, . Previous cardiac testing       METS/Exercise Tolerance:     Hematologic:  - neg hematologic  ROS       Musculoskeletal:  - neg musculoskeletal ROS       GI/Hepatic: Comment: Previous perforated duodenal ulcer    (+) GERD       Renal/Genitourinary:     (+) chronic renal disease, type: ARF, Pt requires dialysis, type: Hemodialysis, Pt has no history of transplant,       Endo:         Psychiatric:  - neg psychiatric ROS       Infectious Disease:  - neg infectious disease ROS       Malignancy:         Other:                         PHYSICAL EXAM:   Mental Status/Neuro: Mental status: Somnolent  Sedation: Intermittent Medical Sedation   Airway: Facies: Feasible  Mallampati: Not Assessed  Mouth/Opening: Not Assessed  TM distance: Not Assessed  Neck ROM: Not Assessed  Device in place: ETT   Respiratory: Auscultation: CTAB     Resp. Rate: Normal     Resp. Effort: Normal      CV: Rhythm: Regular  Rate: Age appropriate  Heart: Normal Sounds   Comments:                    Lab Results   Component Value Date    WBC 10.4 01/29/2019    HGB 8.1 (L) 01/29/2019    HCT 24.8 (L) 01/29/2019     01/29/2019    CRP 65.5 (H) 05/22/2013    SED 20 05/22/2013     01/30/2019    POTASSIUM 3.6 01/30/2019    CHLORIDE 98 01/30/2019    CO2 25 01/30/2019    BUN 51 (H) 01/30/2019    CR 2.92 (H) 01/30/2019     (H) 01/30/2019    JEFFREY 7.7 (L) 01/30/2019    PHOS 8.3 (H) 01/27/2019    MAG 2.8 (H) 01/27/2019    ALBUMIN 1.8 (L) 01/27/2019    PROTTOTAL 5.1 (L) 01/27/2019    ALT 14 01/27/2019    AST 30 01/27/2019    ALKPHOS 76 01/27/2019    BILITOTAL 0.9 01/27/2019    LIPASE 74 01/11/2019    PTT 39 (H) 01/23/2019    INR 1.85 (H) 01/23/2019    FIBR 286 01/23/2019    TSH 3.83 01/11/2019    T4 1.05 01/27/2015       Preop Vitals  BP Readings from  "Last 3 Encounters:   01/30/19 109/77   01/12/19 (!) 161/134   06/18/18 144/78    Pulse Readings from Last 3 Encounters:   01/30/19 86   01/12/19 62   06/18/18 72      Resp Readings from Last 3 Encounters:   01/30/19 13   01/12/19 17   06/18/18 16    SpO2 Readings from Last 3 Encounters:   01/30/19 97%   01/12/19 94%   06/18/18 (!) 89%      Temp Readings from Last 1 Encounters:   01/30/19 36.4  C (97.5  F) (Axillary)    Ht Readings from Last 1 Encounters:   01/11/19 1.6 m (5' 3\")      Wt Readings from Last 1 Encounters:   01/30/19 75 kg (165 lb 5.5 oz)    Estimated body mass index is 29.29 kg/m  as calculated from the following:    Height as of 1/11/19: 1.6 m (5' 3\").    Weight as of an earlier encounter on 1/30/19: 75 kg (165 lb 5.5 oz).     LDA:  PICC Double Lumen 01/23/19 Left Basilic (Active)   Site Assessment WDL 1/30/2019 12:00 PM   External Cath Length (cm) 1.5 cm 1/23/2019  4:00 PM   Dressing Intervention Chlorhexidine patch;Transparent 1/30/2019 12:00 PM   Dressing Change Due 02/03/19 1/30/2019  4:00 AM   PICC Comment CDI 1/30/2019 12:00 PM   Lumen A - Color GRAY 1/30/2019 12:00 PM   Lumen A - Status saline locked 1/30/2019 12:00 PM   Lumen A - Cap Change Due 02/01/19 1/30/2019  4:00 AM   Lumen B - Color PURPLE 1/30/2019 12:00 PM   Lumen B - Status infusing 1/30/2019 12:00 PM   Lumen B - Cap Change Due 02/01/19 1/30/2019  4:00 AM   Extravasation? No 1/30/2019 12:00 PM   Number of days: 7       CVC Double Lumen 01/30/19 Right Internal jugular (Active)   Dressing Intervention Chlorhexidine sponge;Transparent 1/14/2019 12:00 AM   Number of days: 0       ETT (adult) 8 (Active)   Secured By Commercial tube mendoza 1/30/2019 12:00 PM   Site Appearance Clean and dry 1/30/2019 12:00 PM   Secured at (cm) to lip 23 cm 1/30/2019 12:00 PM   Site of ET Tube Securement At lip 1/30/2019 12:00 PM   Cuff Pressure - Type minimal leak technique 1/30/2019 12:00 PM   Tube Care/Reposition repositioned tube right side of mouth " 1/30/2019  6:00 AM   Bite Block Secure and Patent 1/30/2019  8:28 AM   Safety Measures manual resuscitator at bedside 1/30/2019  8:28 AM   Number of days: 7       Surgical Airway Shiley 6 Cuffed (Active)   Number of days: 0       NG/OG Tube Nasogastric Right nostril (Active)   Site Description [facility] 1/30/2019 12:00 PM   Status Suction-low intermittent 1/30/2019 12:00 PM   Drainage Appearance Bile;Green 1/30/2019 12:00 PM   Placement Check [place] unchanged 1/30/2019 12:00 PM   Intake (ml) 60 ml 1/24/2019  4:00 PM   Flush/Free Water (mL) 40 mL 1/28/2019  4:00 AM   Residual (mL) 0 mL 1/29/2019  4:00 PM   Output (ml) 0 ml 1/30/2019 12:00 PM   Number of days: 16       NG/OG Tube 10 fr Left nostril (Active)   Site Description [facility] 1/30/2019 12:00 PM   Status Enteral Feedings 1/30/2019 12:00 PM   Drainage Appearance [facility] 1/30/2019 12:00 PM   Placement Check [place] unchanged 1/30/2019 12:00 PM   [place] (cm marking) at nare/mouth 89 cm 1/30/2019 12:00 PM   Intake (ml) 60 ml 1/30/2019 12:00 PM   Flush/Free Water (mL) 30 mL 1/30/2019 12:00 PM   Residual (mL) 0 mL 1/23/2019  4:00 PM   Number of days: 14            Assessment:   ASA SCORE: 4    NPO Status: > 6 hours since completed Solid Foods   Documentation: H&P complete; Preop Testing complete; Consents complete   Proceeding: Proceed without further delay  Tobacco Use:  NO Active use of Tobacco/UNKNOWN Tobacco use status     Plan:   Anes. Type:  General   Pre-Induction: None   Induction:  IV (Standard); Not applicable   Airway: Oral ETT   Access/Monitoring: PIV; CVL/Port present   Maintenance: Balanced   Emergence: Postop SECURED AIRWAY likely; Recovery Site (PACU/ICU) (TRACH)   Logistics: ICU Admission     Postop Pain/Sedation Strategy:  Standard-Options: Opioids PRN     PONV Management:  Adult Risk Factors: Female, Non-Smoker, Postop Opioids  Prevention: Ondansetron     CONSENT: Direct conversation   Plan and risks discussed with: Legal Guardian; Power of    Blood  Products: Consented (ALL Blood Products)       Comments for Plan/Consent:  History of cognitive dysfuntion after CABG with history of PEA arrest who has been intubated with minimal venitlator settings but cannot neurologically protect her airway. Scheduled for percutaneous tracheostomy and PEG tube placement in the OR under general anesthesia. Hemodynamics have been stable since afib with rvr was treated and has been stable without needing any support recently.                                Arianna Salinas MD

## 2019-01-30 NOTE — ANESTHESIA POSTPROCEDURE EVALUATION
Anesthesia POST Procedure Evaluation    Patient: Wendy Rocha   MRN:     [id number] Gender:   female   Age:    78 year old :      1940        Preoperative Diagnosis: Prolonged Intubation   Procedure(s):  Percutaneous tracheostomy converted to Open Tracheostomy  Percutaneous Gastrostomy Tube Insertion   Postop Comments: No value filed.       Anesthesia Type:  General    Reportable Event: NO     PAIN:        Airway/Resp.: Uneventful perioperative course   Sign Out Status: Airway Device presnt     Airway Device: Tracheostomy (NEW)                 Reason: Planned (pre-op)     CV: Uneventful perioperative course   Sign Out status: Appropriate BP and perfusion indices; Appropriate HR/Rhythm     Disposition:   Sign Out in:  ICU  Disposition:  ICU  Recovery Course: Recovery in ICU  Follow-Up: Not required     Comments/Narrative:  Patient intubated and sedated; unable to assess pain, PONV, mental status.  Transported to ICU on full monitors and controlled ventilation.  Vital signs at time of transfer:    /79  SpO2 100%    Sedated with dexmedetomidine 0.8 mcg/kg/h and fentanyl 100 mcg/h.    Full report to ICU team.           Last Anesthesia Record Vitals:  CRNA VITALS  2019 1346 - 2019 1444      2019             Resp Rate (observed):  18          Last PACU/Preop Vitals:  Vitals:    19 0945 19 1100 19 1200   BP: (!) 135/91 105/76 109/77   Pulse:  89 86   Resp: 14 14 13   Temp:   36.4  C (97.5  F)   SpO2: 96% 96% 97%         Electronically Signed By: Arianna Salinas MD, 2019, 2:44 PM

## 2019-01-30 NOTE — OP NOTE
OPERATIVE NOTE    PRE-OP DIAGNOSIS: Ventilator dependent respiratory failure, failure to wean  POST-OP Diagnosis: Same  OPERATION: Percutaneous converted to open tracheostomy and percutaneous endoscopic gastrostomy tube  ANESTHESIA: Local with IV sedation  SURGEON: Yancy Luo MD  ASSISTANTS: Lita Javier MD (fellow)  Guero Wong MD (resident)  COMORBIDITIES:   Patient Active Problem List   Diagnosis     Perforated duodenal ulcer (H)     Renal artery stenosis (H)^^     Proteinuria     Anxiety     Muscle aches     CKD (chronic kidney disease) stage 3, GFR 30-59 ml/min (H)     Abnormal ECG     PVD (peripheral vascular disease) (H)     Vitamin D deficiency     Fever     Aspiration pneumonia (H)     Vitamin D deficiency disease     GERD (gastroesophageal reflux disease)     Hyperlipidemia LDL goal <100     Carotid disease, bilateral (H)^^     DEPRESSION, Severe, recurrent, with psychotic features (H)     Hyperparathyroidism (H)     Hallucinations     HTN, goal < 140/90 (H)     Cognitive disorder     Acute coronary syndrome (H)     Coronary artery disease     Coronary artery disease (CAD) excluded     EBL: <50 mL    FINDINGS: Short neck, challenging neck anatomy    INDICATIONS:Wendy Rocha is a 78 year old female who has ventilator dependent respiratory failure with failure to wean from the ventilator.  Percutaneous tracheostomy and gastrostomy tubes were recommended.  Risks, benefits and alternatives were discussed and all agreed to proceed with the operation as planned.    PROCEDURE: IV sedation and pain medication were administered during the procedure. A bronchoscope was inserted through the patient's ETT and advanced into the trachea distal to the ETT. The trachea was inspected, and there were no lesions or masses. The anterior neck and superior sternum were prepped and draped. Local anesthetic was instilled in the skin and subcutaneous tissue.  A 1-cm vertical skin incision was made. The ETT was pulled  "back until the site of external compression was visible. Upon advancement of an angiocath needle towards the 3rd tracheal ring, there was some bleeding and no expected visualization of pressure on the trachea so the angiocath needle was removed and the decision was made to convert to an open tracheostomy. The skin incision was extended several centimeters and some minor bleeding was controlled with electrocautery. At this point a U-shaped \"trap door\" incision was made into the trachea and a prolene stitch was secured to it, pulling down so that the 6 [place] Shiley T tube could be advanced into the trachea. The balloon was inflated and the ventilator was connected to the T tube. The tracheostomy tube was secured with vicryl stitches and a tracheostomy collar. We then turned our attention to placing the gastrostomy tube.    A bite block was placed into the mouth. The endoscope was then introduced into the mouth, oropharynx and passed down the esophagus into the stomach.  The mucosa was inspected and esophagus was normal with GE junction at the diaphragmatic hiatus and z-line normal.  The stomach was without mucosal lesions, and on retrflexion there was no evidence of paraesophageal hernia.      Next, the scope was used to transilluminated through the stomach and abdominal wall.  The abdomen was then prepped and draped steriley.  1% lidocaine with epinepherine was infiltrated into the subcutaneous tissues.  Next, a 1cm skin incision was made.   The needle with local was placed into the stomach lumen and aspirated while being withdrawn without evidence of enteric contents.    At this point, the snare was placed down the scope.  The catheter was placed through the abdominal wall into the stomach.  Next, the blue wire was advanced and grasped with the snare device.   The scope and wire were then withdrawn through the mouth and gastrostomy tube loaded onto the wire.      Next, the blue wire was pulled back into the mouth, " "down the esophagus and into position in the gastric wall.  The scope followed the g-tube to visually confirm placement. There was no evidence of bleeding and the hub was in good position, 3 cm st the skin.    The g-tube was then cut to desired length and prepared with the bumper, clip and adapter.    The tracheostomy site should be monitor carefully to avoid skin breakdown. The sutures can be removed in one week. There are loose sutures secured to the patient's chest with steristrips, these correspond to the \"trap door\" of the trachea. Please contact our service with questions/concerns.    Dr Luo was present throughout the procedure. There were no immediate complications.    --  Guero Wong MD  General Surgery PGY2  [phone]      "

## 2019-01-30 NOTE — ANESTHESIA PREPROCEDURE EVALUATION
Anesthesia Pre-Procedure Evaluation    Patient: Wendy Rocha   MRN:     [id number] Gender:   female   Age:    78 year old :      1940        Preoperative Diagnosis: Prolonged Intubation   Procedure(s):  Open Tracheostomy  Percutaneous Gastrostomy Tube Insertion     Past Medical History:   Diagnosis Date     Abnormal ECG      ARF (acute renal failure) (H) Aug 2010    Lisinopril was stopped at that time     Carotid stenosis      Carotid stenosis, bilateral      Carotid stenosis, bilateral     Vs Surgeon: dr Kush Mcmillan, Phone: [phone] fax: [phone]     CKD (chronic kidney disease) stage 3, GFR 30-59 ml/min (H)      GERD (gastroesophageal reflux disease)      HTN, goal below 140/90      Hyperlipidemia LDL goal <100      Lung nodule may 2013    needs f/u may 2014     Muscle aches      Perforated duodenal ulcer (H) Aug 2010     Proteinuria      PVD (peripheral vascular disease) (H)      Renal artery stenosis (H)  2011    Right side     Vitamin D deficiency disease       Past Surgical History:   Procedure Laterality Date     ARTHROPLASTY KNEE      left     BYPASS CARDIOPULMONARY N/A 1/15/2019    Procedure: On Cardiopulmonary Bypass;  Surgeon: Haseeb Villegas MD;  Location: [facility] OR     BYPASS GRAFT ARTERY CORONARY MINIMALLY INVASIVE  (NO PUMP) N/A 1/15/2019    Procedure: Median Sternotomy, Coronary Artery Bypass Graft x3, Endovein Howe of Left Greater Saphenous Vein, Left Internal Mammary Artery Takedown;  Surgeon: Haseeb Villegas MD;  Location: [facility] OR     EYE SURGERY      cataracts     IR CVC TUNNEL PLACEMENT > 5 YRS OF AGE  2019     LAPAROSCOPIC TUBAL LIGATION       TRANSESOPHAGEAL ECHOCARDIOGRAM INTRAOPERATIVE  1/15/2019    Procedure: Intraoperative Transesophageal Echocardiogram;  Surgeon: Haseeb Villegas MD;  Location: [facility] OR          Anesthesia Evaluation     . Pt has had prior anesthetic. Type: General           ROS/MED HX    ENT/Pulmonary:  - neg pulmonary ROS     Neurologic:  Comment: Carotid stenosis    (+)other neuro    Spinal cord injury: ongoing cognitive dysfunction after CABG/ arrest.   Cardiovascular:     (+) hypertension--CAD, -CABG-. : . . . :. dysrhythmias a-fib, . Previous cardiac testing       METS/Exercise Tolerance:     Hematologic:  - neg hematologic  ROS       Musculoskeletal:  - neg musculoskeletal ROS       GI/Hepatic: Comment: Previous perforated duodenal ulcer    (+) GERD       Renal/Genitourinary:     (+) chronic renal disease, type: ARF, Pt requires dialysis, type: Hemodialysis, Pt has no history of transplant,       Endo:         Psychiatric:  - neg psychiatric ROS       Infectious Disease:  - neg infectious disease ROS       Malignancy:         Other:                         PHYSICAL EXAM:   Mental Status/Neuro: Mental status: Somnolent  Sedation: Intermittent Medical Sedation   Airway: Facies: Feasible  Mallampati: Not Assessed  Mouth/Opening: Not Assessed  TM distance: > 6 cm  Neck ROM: Not Assessed  Device in place: ETT   Respiratory: Auscultation: CTAB     Resp. Rate: Normal     Resp. Effort: Normal      CV: Rhythm: Irregular  Heart: Normal Sounds  Pulses: Normal   Comments:                    Lab Results   Component Value Date    WBC 10.4 01/29/2019    HGB 8.1 (L) 01/29/2019    HCT 24.8 (L) 01/29/2019     01/29/2019    CRP 65.5 (H) 05/22/2013    SED 20 05/22/2013     01/30/2019    POTASSIUM 3.2 (L) 01/30/2019    CHLORIDE 98 01/30/2019    CO2 25 01/30/2019    BUN 51 (H) 01/30/2019    CR 2.92 (H) 01/30/2019     (H) 01/30/2019    JEFFREY 7.7 (L) 01/30/2019    PHOS 8.3 (H) 01/27/2019    MAG 2.8 (H) 01/27/2019    ALBUMIN 1.8 (L) 01/27/2019    PROTTOTAL 5.1 (L) 01/27/2019    ALT 14 01/27/2019    AST 30 01/27/2019    ALKPHOS 76 01/27/2019    BILITOTAL 0.9 01/27/2019    LIPASE 74 01/11/2019    PTT 39 (H) 01/23/2019    INR 1.85 (H) 01/23/2019    FIBR 286 01/23/2019    TSH 3.83 01/11/2019    T4 1.05 01/27/2015       Preop Vitals  BP Readings from Last 3  "Encounters:   01/30/19 (!) 135/91   01/12/19 (!) 161/134   06/18/18 144/78    Pulse Readings from Last 3 Encounters:   01/28/19 87   01/12/19 62   06/18/18 72      Resp Readings from Last 3 Encounters:   01/30/19 14   01/12/19 17   06/18/18 16    SpO2 Readings from Last 3 Encounters:   01/30/19 96%   01/12/19 94%   06/18/18 (!) 89%      Temp Readings from Last 1 Encounters:   01/30/19 36.2  C (97.2  F) (Axillary)    Ht Readings from Last 1 Encounters:   01/11/19 1.6 m (5' 3\")      Wt Readings from Last 1 Encounters:   01/30/19 75 kg (165 lb 5.5 oz)    Estimated body mass index is 29.29 kg/m  as calculated from the following:    Height as of 1/11/19: 1.6 m (5' 3\").    Weight as of this encounter: 75 kg (165 lb 5.5 oz).     LDA:  PICC Double Lumen 01/23/19 Left Basilic (Active)   Site Assessment WDL 1/30/2019  8:00 AM   External Cath Length (cm) 1.5 cm 1/23/2019  4:00 PM   Dressing Intervention Chlorhexidine patch;Transparent 1/30/2019  8:00 AM   Dressing Change Due 02/03/19 1/30/2019  4:00 AM   PICC Comment CDI 1/30/2019  8:00 AM   Lumen A - Color GRAY 1/30/2019  8:00 AM   Lumen A - Status saline locked 1/30/2019  8:00 AM   Lumen A - Cap Change Due 02/01/19 1/30/2019  4:00 AM   Lumen B - Color PURPLE 1/30/2019  8:00 AM   Lumen B - Status infusing 1/30/2019  8:00 AM   Lumen B - Cap Change Due 02/01/19 1/30/2019  4:00 AM   Extravasation? No 1/30/2019  8:00 AM   Number of days: 7       CVC Double Lumen 01/30/19 Right Internal jugular (Active)   Dressing Intervention Chlorhexidine sponge;Transparent 1/14/2019 12:00 AM   Number of days: 0       ETT (adult) 8 (Active)   Secured By Commercial tube mendoza 1/30/2019  8:00 AM   Site Appearance Clean and dry 1/30/2019  8:00 AM   Secured at (cm) to lip 23 cm 1/30/2019  8:28 AM   Site of ET Tube Securement At lip 1/30/2019  8:28 AM   Cuff Pressure - Type minimal leak technique 1/30/2019  8:28 AM   Tube Care/Reposition repositioned tube right side of mouth 1/30/2019  6:00 AM "   Bite Block Secure and Patent 1/30/2019  8:28 AM   Safety Measures manual resuscitator at bedside 1/30/2019  8:28 AM   Number of days: 7       NG/OG Tube Nasogastric Right nostril (Active)   Site Description [facility] 1/30/2019  8:00 AM   Status Suction-low intermittent 1/30/2019  8:00 AM   Drainage Appearance Bile;Green 1/30/2019  8:00 AM   Placement Check [place] unchanged 1/30/2019  8:00 AM   Intake (ml) 60 ml 1/24/2019  4:00 PM   Flush/Free Water (mL) 40 mL 1/28/2019  4:00 AM   Residual (mL) 0 mL 1/29/2019  4:00 PM   Output (ml) 225 ml 1/30/2019  6:00 AM   Number of days: 16       NG/OG Tube 10 fr Left nostril (Active)   Site Description [facility] 1/30/2019  8:00 AM   Status Enteral Feedings 1/30/2019  8:00 AM   Drainage Appearance WDL 1/30/2019  8:00 AM   Placement Check [place] unchanged 1/30/2019  8:00 AM   [place] (cm marking) at nare/mouth 89 cm 1/30/2019  8:00 AM   Intake (ml) 60 ml 1/30/2019  8:00 AM   Flush/Free Water (mL) 30 mL 1/30/2019  8:00 AM   Residual (mL) 0 mL 1/23/2019  4:00 PM   Number of days: 14            Assessment:   ASA SCORE: 3 emergent     NPO Status: > 6 hours since completed Solid Foods   Documentation: H&P complete; Consents complete   Proceeding: Proceed without further delay     Plan:   Anes. Type:  General   Pre-Induction: None   Induction:  IV (Standard); Not applicable   Airway: Oral ETT   Access/Monitoring: PIV; CVL/Port present   Maintenance: Balanced   Emergence: Procedure Site; Postop SECURED AIRWAY likely (TRACH)   Logistics: Same Day Surgery     Postop Pain/Sedation Strategy:  Standard-Options: Opioids PRN     PONV Management:  Adult Risk Factors: Female, Postop Opioids  Prevention: Ondansetron     CONSENT: Direct conversation   Plan and risks discussed with: Legal Guardian; Power of    Blood Products: Consented (ALL Blood Products)       Comments for Plan/Consent:  History of cognitive dysfuntion after CABG with history of PEA arrest who has been intubated with minimal  venitlator settings but cannot neurologically protect her airway. Scheduled for percutaneous tracheostomy and PEG tube placement in the OR under general anesthesia. Hemodynamics have been stable since afib with rvr was treated and has been stable without needing any support recently.     Thong Calderón MD  [phone]                         Thong Calderón MD

## 2019-01-30 NOTE — PROGRESS NOTES
CVTS PROGRESS NOTE  January 30, 2019        Overnight Events:  -No overnight events, afebrile, VSS    Today's Plan:  - Plan for percutaneous tracheostomy and PEG placement today  - Tunneled dialysis catheter placed today   - Wean dexemedetomidine, fentanyl, propofol as tolerated  - Decrease scheduled amio to 200 mg daily       ASSESSMENT:  Wendy Rocha is a 78 year old female with PMH CKDIII, B/L carotid stenosis, GERD, HTN, CAD s[place]p [place] [facility] [place] who is s/p CABG. On 1/23, the patient went into PEA, with ROSC s/p compressions, intubation, and epinephrine.        PLANS:   Neuro/ pain/ sedation:  -CT head 1/18: normal  -RN has weaned sedation and examination shows mildly less responsive today. She was started on Seroquel yesterday in the morning and at night, so will follow serial neuro exams, and may change dose to at bedtime only.  -On fentanyl, dexmedetomidine, and propofol for pain and sedation     Pulmonary care:   -CMV /AC; PEEP 5 cm H20  -chest tube placed 1/23 AM because of hemothorax  -Tracheostomy planned 1/30      Cardiovascular:    -Monitor hemodynamic status  -Amiodarone 200mg once daily  -Started Metoprolol 12.5mg po BID (1/25), blood pressure appears stable on current dose     GI care:   -PEG placement planned 1/30  -Bowel regimen prn   -TF at goal      Fluids/ Electrolytes/ Nutrition:   - ICU electrolyte replacement protocol     Renal/ Fluid Balance:    -chronic kidney disease III   -Urine output is minimal, nephrology consulted, appreciate recs   -iHD per nephrology        Endocrine:    -Sliding scale insulin       ID/ Antibiotics:  -Off antibiotics after being treated for Klebsiella UTI (1/21)  -1/23 sputum with light growth coag neg staph and candida-colonizers. Will monitor but no indication to treat      Heme:     -Hemoglobin stable.   -Continue to monitor      Prophylaxis:    -Mechanical prophylaxis for DVT.        Lines/ tubes/ drains:  - Tunneled  RIJ (1-30)  - L PICC (placement 1/23)  - NG (placement 1/14)  - NJ (placmemtn 1/16)  - ETT (intubated since       Disposition:  - CVICU    Patient discussed with CVTS staff, MD Scar Morales  PGY3    VITAL SIGNS:  Temp:  [96.1  F (35.6  C)-97.7  F (36.5  C)] 97.2  F (36.2  C)  Heart Rate:  [] 100  Resp:  [11-29] 14  BP: ()/() 135/91  Cuff Mean (mmHg):  [] 105  FiO2 (%):  [50 %] 50 %  SpO2:  [96 %-100 %] 96 %  Ventilation Mode: CMV/AC  (Continuous Mandatory Ventilation/ Assist Control)  FiO2 (%): 50 %  Rate Set (breaths/minute): 14 breaths/min  Tidal Volume Set (mL): 450 mL  PEEP (cm H2O): 5 cmH2O  Oxygen Concentration (%): 50 %  Resp: 14      INTAKE/ OUTPUT:   I/O last 3 completed shifts:  In: 1915.42 [I.V.:585.42; NG/GT:370]  Out: 1475 [Emesis/NG output:475; Other:1000]     PHYSICAL EXAMINATION:  General: intubated, in no acute distress.  HEENT: Normocephalic, atraumatic.   Neck: No cervical lymphadenopathy. No thyromegaly.  Chest wall: Symmetric thorax. No masses or tenderness to palpation.   Respiratory: Non-labored breathing. Lung sounds clear to auscultation bilaterally.   Cardiovascular: Regular rate and rhythm.   Gastrointestinal: Abdomen soft, non-distended, non-tender to palpation. No organomegaly or masses appreciated. No inguinal hernias.   Genitourinary: No CVA tenderness.   Extremities: Moving all four extremities. No limb deformities. No pedal edema.   Skin: As noted above. No rashes or lesions appreciated.    ALLERGIES:      Allergies   Allergen Reactions     Lisinopril      She developed ARF       MEDICATIONS:    No current facility-administered medications on file prior to encounter.   Current Outpatient Medications on File Prior to Encounter:  ACE/ARB NOT PRESCRIBED, INTENTIONAL, ACE & ARB not prescribed due to Worsening renal function on ACE/ARB therapy   acetaminophen (TYLENOL) 325 MG tablet Take 2 tablets (650 mg) by mouth every 6 hours as needed for mild  pain   amLODIPine (NORVASC) 10 MG tablet TAKE 1 TABLET BY MOUTH ONCE DAILY   ASPIRIN NOT PRESCRIBED (INTENTIONAL) Antiplatelet medication not prescribed intentionally due to Plavix   busPIRone (BUSPAR) 5 MG tablet TAKE 1 TABLET BY MOUTH TWICE DAILY FOR ANXIETY   clopidogrel (PLAVIX) 75 MG tablet TAKE 1 TABLET BY MOUTH ONCE DAILY   doxazosin (CARDURA) 4 MG tablet TAKE 1 TABLET BY MOUTH AT BEDTIME   Hydrocortisone Acetate 2.5 % CREA Externally apply 1 Application topically 2 times daily Apply to both arms twice a day   metoprolol tartrate (LOPRESSOR) 50 MG tablet TAKE ONE TABLET BY MOUTH TWICE DAILY   metoprolol tartrate (LOPRESSOR) 50 MG tablet Take 0.5 tablets (25 mg) by mouth 2 times daily   ondansetron (ZOFRAN) 8 MG tablet Take 1 tablet (8 mg) by mouth every 8 hours as needed for nausea   order for DME Machine to dispense the medication   pantoprazole (PROTONIX) 40 MG EC tablet TAKE ONE TABLET BY MOUTH ONCE DAILY 30-60  MINUTES  BEFORE  A  MEAL   simvastatin (ZOCOR) 40 MG tablet Take 1 tablet (40 mg) by mouth At Bedtime   traZODone (DESYREL) 50 MG tablet Take 1 tablet (50 mg) by mouth nightly as needed for sleep       LABS: Reviewed.   Arterial Blood Gases   Recent Labs   Lab 01/25/19  0345 01/24/19  2148 01/24/19  0348 01/23/19  2205   PH 7.40 7.39 7.42 7.43   PCO2 40 40 38 36   PO2 74* 79* 83 125*   HCO3 25 24 25 24     Complete Blood Count   Recent Labs   Lab 01/29/19  0408 01/28/19  0335 01/27/19  0404 01/26/19  0957   WBC 10.4 11.7* 11.7* 14.1*   HGB 8.1* 7.8* 7.8* 8.2*    246 190 176     Basic Metabolic Panel  Recent Labs   Lab 01/30/19  0402 01/29/19  0408 01/28/19  1615 01/28/19  0335  01/27/19  0404    133  --  133  --  136   POTASSIUM 3.2* 3.6 3.1* 3.6   < > 3.4   CHLORIDE 98 98  --  98  --  101   CO2 25 23  --  19*  --  19*   BUN 51* 51*  --  80*  --  60*   CR 2.92* 3.27*  --  4.63*  --  3.53*   * 127*  --  137*  --  125*    < > = values in this interval not displayed.     Liver  Function Tests  Recent Labs   Lab 01/27/19  0404 01/26/19  0957 01/26/19  0425 01/25/19  2141  01/23/19  1556   AST 30 39 42 46*   < > 278*   ALT 14 20 19 24   < > 58*   ALKPHOS 76 76 71 58   < > 44   BILITOTAL 0.9 0.9 0.8 0.8   < > 0.9   ALBUMIN 1.8* 1.9* 1.9* 2.0*   < > 2.3*   INR  --   --   --   --   --  1.85*    < > = values in this interval not displayed.     Pancreatic Enzymes  No lab results found in last 7 days.  Coagulation Profile  Recent Labs   Lab 01/23/19  1556   INR 1.85*     Lactate  Invalid input(s): LACTATE      RADIOLOGY:   Recent Results (from the past 24 hour(s))   XR Chest Port 1 View    Narrative    Exam: XR CHEST PORT 1 VW, 1/30/2019 2:08 AM    Indication: Interval CXR    Comparison: X-ray 01/29/2019 1:05 AM    Findings:   AP chest single view of the chest. Tip of the endotracheal tube  projects over the midthoracic trachea. Left-sided PICC tip projects  over the mid SVC. Tip of the right IJ sheath projects over the  cavoatrial junction. Incomplete visualization of enteric gastric and  feeding tube. Postsurgical changes of CABG. Persistent low left lung  volume with streaky basilar opacity. Trace left-sided pleural  effusion. Mitral annular extensive calcification.      Impression    Impression:   1. Ongoing mild to moderate left-sided pleural effusion with left  retrocardiac opacity, compressive atelectasis versus infection.  2. Stable supportive devices.    I have personally reviewed the examination and initial interpretation  and I agree with the findings.    YOVANY GLOVER MD   IR CVC Tunnel Placement > 5 Yrs of Age    Narrative    PRE-PROCEDURE DIAGNOSIS: Dialysis patient    POST-PROCEDURE DIAGNOSIS: Same    PROCEDURE: Tunneled central venous catheter placement    Impression    IMPRESSION: Completed image-guided placement of 14.5 [place], 19 cm  double lumen tunneled central venous catheter via right internal  jugular vein. Catheter tip in high right atrium. Aspirates and flushes  freely,  heparin locked and ready for immediate use. No complication.     ----------    CLINICAL HISTORY: Patient requires central venous access for therapy.  Tunneled central venous catheter placement requested.    PERFORMED BY: Neville Messina PA-C    CONSENT: Written informed consent was obtained and is documented in  the patient record.    MEDICATIONS: A 10:1 volume mixture of 1% lidocaine without epinephrine  buffered with 8.4% bicarbonate solution was available for local  anesthesia. The catheter was heparin locked upon completion of  placement.    NURSING: The patient was placed on continuous vital signs monitoring.  Vital signs and sedation were monitored by nursing staff under IR  physician staff supervision.      FLUOROSCOPY TIME: 1.1 minutes    DESCRIPTION: The right neck and upper chest were prepped and draped in  the usual sterile fashion.      Under ultrasound guidance, the right internal jugular vein was  identified and the overlying skin was anesthetized and skin  dermatotomy was made. Under ultrasound guidance, right internal  jugular venipuncture was made with needle. Image saved documenting  venipuncture and patency.    Needle was exchanged over guidewire for a dilator under fluoroscopic  guidance. Length to right atrium was measured with guidewire.  Guidewire and inner dilator were removed. Wire was advanced into  inferior vena cava under fluoroscopic guidance and secured.     The anterior chest skin was anesthetized and incision was made after  measurements were taken. A cuffed catheter was subcutaneously tunneled  from the anterior chest incision to the internal jugular venipuncture  site after path of tunnel was anesthetized. The dilator was exchanged  over guidewire for a peel-away sheath. Guidewire was removed. Under  fluoroscopic guidance, the catheter was placed through the peel-away  sheath. Peel-away sheath was removed.      Final catheter position saved. Both catheter lumens adequately  aspirated  and flushed. Each lumen was heparin locked. A catheter  retaining suture and sterile dressing were applied. The skin  dermatotomy site overlying the internal jugular venipuncture was  closed with topical adhesive.    COMPLICATIONS: No immediate concerns, the patient remained stable  throughout the procedure and tolerated it well.    ESTIMATED BLOOD LOSS: Minimal    SPECIMENS: None    HAYDEE LITTLE PA-C         Patient seen, findings and plan discussed with surgical ICU staff Dr. Chen.

## 2019-01-30 NOTE — PLAN OF CARE
Neuro: Patient remain intubated and sedated. PERRL but sluggish, withdraws to pain, slight purposeful movement to BLL with sedation vacation but got restless and agitated with elevated BP. Sedation restarted. Does not follow commands.    CV: MAP/SBP goal achieved with slight intervention this shift. Elevated BP with sedation vacation and during HD, MD Abbott aware and stated to bolus small amounts of propofol. Boluses given of propofol, 10-15mcg X2 different occassions.    Resp: LS coarse diminished at bases, vented with FIO2 50$, tried to decrease FIO2 to 40%, pt desat to low 90's (90-93%). FIO2 back up to 50%.    GI/: pt has large BM X2, meduim X1, small amounts of incontinence X3 with BMs, unmeasurable. Pt has HD today, started at 1530 and done at 1800, with 1L removed, NPO, TF at goal, tolerating well, NG with greenish bile, see flow sheet for output amount.    Skin: Incision to skin with dressing, no issues noted.    Gtts: Titrated as order, See MAR    Plan: Continue to monitor and treat per plan of care. Notify 4E of changes in pt condition. Tentative OR tomorrow, 1/30 for trach and peg. HD line placement of hold per 4E MD.

## 2019-01-30 NOTE — PROGRESS NOTES
CLINICAL NUTRITION SERVICES - REASSESSMENT NOTE     Nutrition Prescription    RECOMMENDATIONS FOR MDs/PROVIDERS TO ORDER:  None today.    Malnutrition Status:    Patient does not meet two of the criteria necessary for diagnosing malnutrition but is at risk for malnutrition    Recommendations already ordered by Registered Dietitian (RD):  1. Continue Nepro @ 40 ml/hr (960 ml/day) to provide 1728 kcals, 78 g PRO, 701 ml free H2O, 155 g CHO and 12 g Fiber daily.      2. Continue 1 Pkt ProSource BID (80 kcal, 22 g PRO) for total 1808 kcal (102% MREE vs 32 kcal/kg) and 100 g PRO (1.8 g/kg)    Future/Additional Recommendations:  1. While remains on vent, recommend obtain weekly metabolic cart study to assess approp of energy provisions to avoid over/under feeding.   2. Monitor propofol kcals post-trach to see if need to decrease TF energy provisions  3. If K+ remains low (and pending phos trends), if needs standard formula - recommend: Nutren 1.5 @ 45 mL/hr to provide 1620 kcals, 73 g PRO, 821 mL H2O, 190 g CHO and no Fiber daily. Increase ProSource to 1 pkt TID.       EVALUATION OF THE PROGRESS TOWARD GOALS   Diet: NPO  Enteral Access: NJT, plan for PEG today  Nutrition Support: Nepro @ 40 ml/hr  1 Pkt ProSource BID  Intake: Average intakes over past 6 days (TF + ProSource + Propofol) = 1964 kcal (34 kcal/kg) and 99 g PRO (1.7 g/kg)     NEW FINDINGS   -Resp: Obtained metabolic cart study 1/29 @ 10:00 with the following results: MREE = 1774 kcals/day (equiv to 31 kcal/kg/day) with RQ = 0.92.  Pt received 920 ml of TF + 2 ProSource + propofol in 24 hours preceding the study providing 1974 kcals (111% MREE).  RQ is within physiologic range and logical given provisions (hypercaloric) received prior to study.  Would aim energy intakes minimally at 80% of this MREE (equiv to 25 kcal/kg/day).  -Wt: 75 kg today from 71.8 kg on admit.   -GI: having BMs, 1-4 daily x past 2 days  -Skin: Abisai 14. On nephronex  -Renal: HD. K+ 3.2  and phos 8.3.     Updated ASSESSED NUTRITION NEEDS per 57 kg dosing wt  Estimated Energy Needs: 1419 - 1774 kcals/day (% MREE vs 25-31 kcal/kg)  Justification: per metabolic cart study    MALNUTRITION  % Intake: No decreased intake noted  % Weight Loss: None noted  Subcutaneous Fat Loss: None observed (per previous assessment, pt in OR)  Muscle Loss: Temporal, Thoracic region (clavicle, acromium bone, deltoid, trapezius, pectoral), Upper arm (bicep, tricep), Lower arm  (forearm), Dorsal hand and Posterior calf: all moderate vs age-related muscle depletion (per previous assessment, pt in OR)  Fluid Accumulation/Edema: Does not meet criteria  Malnutrition Diagnosis: Patient does not meet two of the above criteria necessary for diagnosing malnutrition but is at risk for malnutrition    Previous Goals   Total avg nutritional intake to meet a minimum of 25 kcal/kg and 1.2 g PRO/kg daily (per dosing wt 57 kg).  Evaluation: Met    Previous Nutrition Diagnosis  Predicted inadequate nutrient intake (kcal/PRO) related to reliance on TF to meet needs while vented, increased protein needs on CRRT as evidenced by TF currently meeting estimated needs but with potential for TF interruptions to result in meeting < estimated needs  Evaluation: changed below    CURRENT NUTRITION DIAGNOSIS  Predicted inadequate vs excessive nutrient intake (kcal) related to reliance on TF to meet needs while vented as evidenced by TF currently exceeding estimated needs w/ propofol, unclear future propofol needs s/p trach, but also with potential for TF interruptions to result in meeting < estimated needs      INTERVENTIONS  Implementation  Enteral Nutrition - no change today (recs for future changes if needed)    Goals  Total avg nutritional intake to meet a minimum of 80% MREE (25 kcal/kg) and 1.5 g PRO/kg daily (per dosing wt 57 kg).    Monitoring/Evaluation  Progress toward goals will be monitored and evaluated per protocol.    Jane Prescott,  MALIK, ZARI, [facility]  CV[facility] Dietitian  Pager: [phone]

## 2019-01-30 NOTE — PLAN OF CARE
Adult Inpatient Plan of Care  Plan of Care Review  1/30/2019 0642 - No Change by Kassandra Mcginnis, RN  D/I: Sedated overnight, opening eyes to voice as well as stimulation but does not track or follow commands. Continues to be in a-fib, 80-100s. All other VSS. BMx2 overnight, small amount of urine incontinence x1. TF continue at goal, BG stable. K replaced this am per order. Family updated via phone regarding patient status and current plan of care.  A/P: To OR this afternoon for trach/peg. Morning heparin held, TF continue until transport to OR per policy. Continue to monitor and follow POC.

## 2019-01-30 NOTE — PROCEDURES
Interventional Radiology Brief Post Procedure Note    Procedure: IR CVC TUNNEL PLACEMENT > 5 YRS OF AGE    Proceduralist: Neville Messina PA-C    Assistant: None    Time Out: Prior to the start of the procedure and with procedural staff participation, I verbally confirmed the patient s identity using two indicators, relevant allergies, that the procedure was appropriate and matched the consent or emergent situation, and that the correct equipment/implants were available. Immediately prior to starting the procedure I conducted the Time Out with the procedural staff and re-confirmed the patient s name, procedure, and site/side. (The Joint Commission universal protocol was followed.)  Yes    Medications   Medication Event Details Admin User Admin Time   fentaNYL (SUBLIMAZE) infusion Medication New Bag Dose: 100 mcg/hr; Rate: 2 mL/hr; Route: Intravenous; Scheduled Time:  9:16 AM Kim Perez RN 1/30/2019  9:16 AM   ceFAZolin (ANCEF) 500 mg vial to attach to  ml bag Medication New Bag Dose: 500 mg; Route: Intravenous; Scheduled Time:  9:34 AM Medina Dyer RN 1/30/2019  9:34 AM   lidocaine 1 % 1-30 mL Medication Given by Other Clinician Dose: 15 mL; Route: Intradermal; Scheduled Time: 10:00 AM Medina Dyer RN 1/30/2019  9:52 AM   heparin Lock (1000 units/mL High concentration) 3,000 Units Medication Given by Other Clinician Dose: 3,000 Units; Route: Intracatheter; Scheduled Time: 10:00 AM Medina Dyer RN 1/30/2019  9:53 AM   heparin Lock (1000 units/mL High concentration) 3,000 Units Medication Given by Other Clinician Dose: 3,000 Units; Route: Intracatheter; Scheduled Time: 10:00 AM Medina Dyer RN 1/30/2019  9:53 AM       Sedation: None. Local Anesthestic used    Findings: Completed image-guided placement of 14.5 Kyr[place]z, 19 cm double lumen tunneled central venous catheter via right IJ. Aspirates and flushes freely, heparin locked and ready for immediate use. Tip in high right  atrium.    Estimated Blood Loss: Minimal    Fluoroscopy Time: 1.1 minute(s)    SPECIMENS: None    Complications: 1. None     Condition: Stable    Plan: Follow-up per primary team.     Comments: See dictated procedure note for full details.    Neville Messina PA-C

## 2019-01-30 NOTE — PROGRESS NOTES
Nephrology Progress Note  01/30/2019       Mrs Rocha is an 78 yof w/CKD III, Carotid stenosis, GERD, HTN, CAD who had emergent CABG x3 on 1/15, Nephrology consulted for NATALIA on CKD, started HD on 1/21.       Interval History :   Mrs Rocha had HD yesterday mainly to get on TTS schedule, pulled 1L and although has some edema and is above EDW is likely intravascularly euvolemic.  Plan for run tomorrow, will continue to try to challenge EDW.      Assessment & Recommendations:   NATALIA on CKD-CKD 3, baseline Cr of ~2.0 with one atrophic kidney on imaging with no former imaging to compare.  Injury involving hemodynamics with CABG as well as contrast for angiogram prior.  Had 2 sessions of HD without UF and did reasonably well but now had PEA arrest and was placed on CRRT to stabilize electrolytes and prevent worsening volume overload.   -CRRT stopped 1/25               - transitioned to IHD, TTS, next run tomorrow.                -Appreciate IR placing tunneled line today, RIJ temp line removed this am.       Volume status-Ran HD yesterday to free up time for multiple procedures today, pulled 1L without issue, wt stable.  Is up a bit in wt from admission but has 3rd spaced fluid in the form of edema and pleural effusions which will take some time to remove with UF.  Plan for run tomorrow and will likely stay on TTS schedule.       Electrolytes/pH-K 3.2, bicarb 25.       Ca/phos/pth-Ca 7.7 Phos 8.3 last check, running HD today.  -On Nepro TF.      CABG-Was recovering although needed HD for NATALIA but had PEA arrest evening of 1/22, started on CRRT but again transitioned to iHD.        Anemia-Hgb 8.1, following.       Nutrition-On Nepro TF     Seen and discussed with Dr Chen.      Recommendations were communicated to primary team via this note.       Quincy Jesus  Clinical Nurse Specialist  [phone]    Review of Systems:   I reviewed the following systems:  ROS not done due to vent/sedation.     Physical Exam:   I/O last 3  completed shifts:  In: 1915.42 [I.V.:585.42; NG/GT:370]  Out: 1475 [Emesis/NG output:475; Other:1000]   BP (!) 135/91 (BP Location: Right arm)   Pulse 87   Temp 97.2  F (36.2  C) (Axillary)   Resp 14   Wt 75 kg (165 lb 5.5 oz)   SpO2 96%   BMI 29.29 kg/m       GENERAL APPEARANCE: Intubated, sedated.    EYES:  No scleral icterus, pupils equal  HENT: mouth without ulcers or lesions  PULM: lungs rhonchi to auscultation  CV: irregular rhythm, normal rate, no rub     -edema +1LE  GI: soft, non-tender, non-distended, bowel sounds are +  MS: no evidence of inflammation in joints, no muscle tenderness  NEURO: Awake but minimally interactive, moving extremities, No asterixis   Lines: 1/20 [facility]    Labs:   All labs reviewed by me  Electrolytes/Renal -   Recent Labs   Lab Test 01/30/19  0402 01/29/19  0408 01/28/19  1615 01/28/19  0335 01/27/19  1830 01/27/19  0404  01/26/19  0957    133  --  133  --  136  --  137   POTASSIUM 3.2* 3.6 3.1* 3.6 3.4 3.4   < > 3.5   CHLORIDE 98 98  --  98  --  101  --  103   CO2 25 23  --  19*  --  19*  --  22   BUN 51* 51*  --  80*  --  60*  --  42*   CR 2.92* 3.27*  --  4.63*  --  3.53*  --  2.55*   * 127*  --  137*  --  125*  --  115*   JEFFREY 7.7* 7.8*  --  7.3*  --  7.7*  --  7.0*   MAG  --   --   --   --  2.8* 2.6*  --  2.4*   PHOS  --   --   --   --  8.3* 8.0*  --  6.5*    < > = values in this interval not displayed.       CBC -   Recent Labs   Lab Test 01/29/19  0408 01/28/19  0335 01/27/19  0404   WBC 10.4 11.7* 11.7*   HGB 8.1* 7.8* 7.8*    246 190       LFTs -   Recent Labs   Lab Test 01/27/19  0404 01/26/19  0957 01/26/19  0425   ALKPHOS 76 76 71   BILITOTAL 0.9 0.9 0.8   ALT 14 20 19   AST 30 39 42   PROTTOTAL 5.1* 5.1* 5.1*   ALBUMIN 1.8* 1.9* 1.9*       Iron Panel -   Recent Labs   Lab Test 01/27/15  1218 07/11/12  0804   IRON 74 80   IRONSAT 21 26   ORQUIDEA 40 67           Current Medications:    amiodarone  400 mg Oral or Feeding Tube Daily     artificial tears    Both Eyes Daily     aspirin  81 mg Oral or Feeding Tube Daily     atorvastatin  40 mg Oral QPM     B and C vitamin Complex with folic acid  5 mL Per Feeding Tube Daily     busPIRone  5 mg Oral or Feeding Tube BID     heparin lock flush  5-10 mL Intracatheter Q24H     heparin  5,000 Units Subcutaneous Q8H FAISAL     insulin aspart  1-6 Units Subcutaneous Q4H     metoprolol tartrate  25 mg Oral BID     pantoprazole (PROTONIX) IV  40 mg Intravenous BID     polyethylene glycol  17 g Oral or Feeding Tube Daily     protein modular  1 packet Per Feeding Tube BID     QUEtiapine  50 mg Oral At Bedtime     senna-docusate  1 tablet Oral or Feeding Tube BID    Or     senna-docusate  2 tablet Oral or Feeding Tube BID     sodium chloride (PF)  3 mL Intracatheter Q8H       dexmedetomidine 0.8 mcg/kg/hr (01/30/19 0700)     IV fluid REPLACEMENT ONLY       IV fluid REPLACEMENT ONLY       fentaNYL 100 mcg/hr (01/30/19 0916)     propofol (DIPRIVAN) infusion 15 mcg/kg/min (01/30/19 0700)       I, Simon Chen,  evaluated this patient with Quincy Jesus, Clinical Nurse Specialist, 01/30/19, as part of a shared visit.     I personally reviewed major complaints, physical findings, investigations, medications and the overall management plan.        My key findings: dialysis-dependent kidney failure    Key management decisions made by me: continue scheduled renal replacement therapy.

## 2019-01-31 NOTE — PROGRESS NOTES
Nephrology Progress Note  01/31/2019   Mrs Rocha is an 78 yof w/CKD III, Carotid stenosis, GERD, HTN, CAD who had emergent CABG x3 on 1/15, Nephrology consulted for NATALIA on CKD, started HD on 1/21.       Interval History :   Mrs Rocha is planned for HD today, trying for 2-3L but having some a-fib issues so may not achieve full UF goal.  Some edema/volume overload but with low albumin much of it is 3rd spaced.  Will continue with TTS HD, has had issues with a-fib but overall fairly stable HD runs.       Assessment & Recommendations:   NATALIA on CKD-CKD 3, baseline Cr of ~2.0 with one atrophic kidney on imaging with no former imaging to compare.  Injury involving hemodynamics with CABG as well as contrast for angiogram prior.  Had 2 sessions of HD without UF and did reasonably well but now had PEA arrest and was placed on CRRT to stabilize electrolytes and prevent worsening volume overload.   -CRRT stopped 1/25               - transitioned to IHD, TTS, next run tomorrow.                -Appreciate IR placing tunneled line today, RIJ temp line removed this am.       Volume status-Planning HD today, pulling 2-3L as able but having some a-fib issues so may get closer to 1L of UF.       Electrolytes/pH-K 3.6, bicarb 23.       Ca/phos/pth-Ca 7.5 Phos 8.3 last check, running HD today.  -On Nepro TF.      CABG-Was recovering although needed HD for NATALIA but had PEA arrest evening of 1/22, started on CRRT but again transitioned to iHD.        Anemia-Hgb 8.1, following.       Nutrition-On Nepro TF     Seen and discussed with Dr Chen.      Recommendations were communicated to primary team via this note.           Quincy Jesus  Clinical Nurse Specialist  [phone]    Review of Systems:   I reviewed the following systems:  ROS not done due to vent/sedation.     Physical Exam:   I/O last 3 completed shifts:  In: 2017.23 [I.V.:1047.23; NG/GT:330]  Out: 0    BP (!) 149/105   Pulse 92   Temp 99.3  F (37.4  C) (Axillary)   Resp 25    Wt 76.4 kg (168 lb 6.9 oz)   SpO2 97%   BMI 29.84 kg/m       GENERAL APPEARANCE: Intubated, sedated.    EYES:  No scleral icterus, pupils equal  HENT: mouth without ulcers or lesions  PULM: lungs rhonchi to auscultation  CV: irregular rhythm, normal rate, no rub     -edema +1LE  GI: soft, non-tender, non-distended, bowel sounds are +  MS: no evidence of inflammation in joints, no muscle tenderness  NEURO: Awake but minimally interactive, moving extremities, No asterixis   Lines: 1/20 [facility]         Labs:   All labs reviewed by me  Electrolytes/Renal -   Recent Labs   Lab Test 01/31/19  0357 01/30/19  1227 01/30/19  0402 01/29/19  0408  01/27/19  1830 01/27/19  0404  01/26/19  0957     --  134 133   < >  --  136  --  137   POTASSIUM 3.6 3.6 3.2* 3.6   < > 3.4 3.4   < > 3.5   CHLORIDE 97  --  98 98   < >  --  101  --  103   CO2 23  --  25 23   < >  --  19*  --  22   BUN 69*  --  51* 51*   < >  --  60*  --  42*   CR 4.07*  --  2.92* 3.27*   < >  --  3.53*  --  2.55*   GLC 99  --  119* 127*   < >  --  125*  --  115*   JEFFREY 7.5*  --  7.7* 7.8*   < >  --  7.7*  --  7.0*   MAG 2.3  --   --   --   --  2.8* 2.6*  --  2.4*   PHOS  --   --   --   --   --  8.3* 8.0*  --  6.5*    < > = values in this interval not displayed.       CBC -   Recent Labs   Lab Test 01/29/19  0408 01/28/19  0335 01/27/19  0404   WBC 10.4 11.7* 11.7*   HGB 8.1* 7.8* 7.8*    246 190       LFTs -   Recent Labs   Lab Test 01/27/19  0404 01/26/19  0957 01/26/19  0425   ALKPHOS 76 76 71   BILITOTAL 0.9 0.9 0.8   ALT 14 20 19   AST 30 39 42   PROTTOTAL 5.1* 5.1* 5.1*   ALBUMIN 1.8* 1.9* 1.9*       Iron Panel -   Recent Labs   Lab Test 01/27/15  1218 07/11/12  0804   IRON 74 80   IRONSAT 21 26   ORQUIDEA 40 67           Current Medications:    amiodarone  200 mg Oral or Feeding Tube Daily     artificial tears   Both Eyes Daily     aspirin  81 mg Oral or Feeding Tube Daily     atorvastatin  40 mg Oral QPM     B and C vitamin Complex with folic acid  5  mL Per Feeding Tube Daily     busPIRone  5 mg Oral or Feeding Tube BID     heparin Lock (1000 units/mL High concentration)  3 mL Intracatheter Once     heparin Lock (1000 units/mL High concentration)  3 mL Intracatheter Once     heparin lock flush  5-10 mL Intracatheter Q24H     heparin  5,000 Units Subcutaneous Q8H FAISAL     insulin aspart  1-6 Units Subcutaneous Q4H     metoprolol tartrate  25 mg Oral BID     - MEDICATION INSTRUCTIONS -   Does not apply Once     pantoprazole  40 mg Oral or Feeding Tube BID     polyethylene glycol  17 g Oral or Feeding Tube Daily     protein modular  1 packet Per Feeding Tube BID     QUEtiapine  50 mg Oral At Bedtime     senna-docusate  1 tablet Oral or Feeding Tube BID    Or     senna-docusate  2 tablet Oral or Feeding Tube BID     sodium chloride (PF)  3 mL Intracatheter Q8H     sodium chloride (PF)  9 mL Intracatheter During Hemodialysis (from stock)     sodium chloride (PF)  9 mL Intracatheter During Hemodialysis (from stock)       IV fluid REPLACEMENT ONLY       IV fluid REPLACEMENT ONLY       esmolol 200 mcg/kg/min (01/31/19 1250)       I, Simon Chen, saw and evaluated this patient with Quincy Jesus, Clinical Nurse Specialist, 01/31/19, as part of a shared visit.     I personally reviewed major complaints, physical findings, investigations, medications and the overall management plan.        My key findings: dialysis-dependent kidney failure    Key management decisions made by me: continue scheduled renal replacement therapy.

## 2019-01-31 NOTE — PROGRESS NOTES
HEMODIALYSIS TREATMENT NOTE    Date: 1/31/2019  Time: 4:51 PM    Data:  Pre Wt:   76.4kg  Desired Wt: TBD  Post Wt:    Weight gain:   kg   Weight change:   kg  Ultrafiltration - Post Run Net Total Removed (mL): 1500 mL  Ultrafiltration - Post Run Net Total Gain (mL): 0 mL  Vascular Access Status: Yes, secured and visible  Dialyzer Rinse: Streaked  Total Blood Volume Processed: 69.2  Total Dialysis (Treatment) Time:  3 hours  Lab:   No    Interventions:  3hr HD tx completed with net UF 1.5L.  Titrated down d/t HR issues and steep crit line.  Nephrology consulted multiple times.  Prior to starting HD stopped sedation.  Shortly after initiating HD started on esmolol gtt, maxed by end of tx with minimal results.  -140's, a-fib, R 22-30 vented at 60% FiO2, hypertensive -160/.  , CVC patent and functioning well.  NS locked, caps in place.      Assessment:  Tolerated HD well, difficult to assess UF toleration d/t multiple drug changes/ titrations and baseline a-fib tachycardia.  Per crit line, did not tolerate ordered UF of 2-3L.     Plan:    Per renal team.

## 2019-01-31 NOTE — PROGRESS NOTES
CV ICU PROGRESS NOTE  January 31, 2019    CO-MORBIDITIES:   Acute kidney injury (H)  (primary encounter diagnosis)  CKD (chronic kidney disease) stage 3, GFR 30-59 ml/min (H)    Overnight Events:  -Elevated heart rate  -S/p tracheostomy, and GJ tube  -Afebrile     Today's Plan:  -Stop sedation, obtain neurologic exam  -Neurology consultation if exam is different than pre-arrest  -Started on Esmolol gtt  mcg/k/min to control HR <90bpm  -Oxycodone prn pain  -HD today, goal to remove 2-3L      PLAN:   Neuro/ pain/ sedation:  -Monitor neurological status. Notify the MD for any acute changes in exam.  -Obtain neurologic examination today  -Consult Neurology if examination different than pre-arrest  -Oxycodone prn pain  -Seroquel 50mg QHS     Pulmonary care:   -Continue trach care  -Keep patient on current settings     Cardiovascular:    -Monitor hemodynamic status.   -Esmolol gtt to keep HR less than 90  -Continue amiodarone 200 QD, metoprolol 25 bid  -Hydralazine prn    GI care:   -Tube feeds at goal at 40 ml/he      Fluids/ Electrolytes/ Nutrition:   -5% Albumin bolus prn for low CVP/low urine output  -Replace electrolytes per ICU protocol K>4, Mg>2, Phos WNL     Renal/ Fluid Balance:    -HD today, goal to remove 2-3L  -Will continue to monitor intake and output.     Endocrine:    -Insulin sliding scale  -Transition to sliding scale once blood glucose is stable     ID/ Antibiotics:  -No antibiotics, no elevated WBC count, afebrile     Heme:     -CBC shows stable hemoglobin     Prophylaxis:    -Mechanical prophylaxis for DVT.   -Pantoprazole IV  -Dulcolax, Senna, Miralax prn     Disposition:  -Surgical ICU.      - - - - - - - - - - - - - - - - - - - - - - - - - - - - - - - - - - - - - - - - - - - - - - - - - - - - - - - - - - - - - - - - - - - - - - - -     PRIMARY TEAM: CVTS  REASON FOR CRITICAL CARE ADMISSION: CV/Respiratory ICU care       REVIEW OF SYSTEMS: As noted above. No headache, dizziness. No fever,  chills. No chest pain or shortness of breath. No abdominal pain, nausea, vomiting. No diarrhea or constipation. No urinary difficulties. No muscle or body aches.     PAST MEDICAL HISTORY:   Past Medical History:   Diagnosis Date     Abnormal ECG      ARF (acute renal failure) (H) Aug 2010    Lisinopril was stopped at that time     Carotid stenosis      Carotid stenosis, bilateral      Carotid stenosis, bilateral     Vs Surgeon: dr Kush Mcmillan, Phone: [phone] fax: [phone]     CKD (chronic kidney disease) stage 3, GFR 30-59 ml/min (H)      GERD (gastroesophageal reflux disease)      HTN, goal below 140/90      Hyperlipidemia LDL goal <100      Lung nodule may 2013    needs f/u may 2014     Muscle aches      Perforated duodenal ulcer (H) Aug 2010     Proteinuria      PVD (peripheral vascular disease) (H)      Renal artery stenosis (H)  April 2011    Right side     Vitamin D deficiency disease        SURGICAL HISTORY:   Past Surgical History:   Procedure Laterality Date     ARTHROPLASTY KNEE      left     BYPASS CARDIOPULMONARY N/A 1/15/2019    Procedure: On Cardiopulmonary Bypass;  Surgeon: Haseeb Villegas MD;  Location:  OR     BYPASS GRAFT ARTERY CORONARY MINIMALLY INVASIVE  (NO PUMP) N/A 1/15/2019    Procedure: Median Sternotomy, Coronary Artery Bypass Graft x3, Endovein [place] of Left Greater Saphenous Vein, Left Internal Mammary Artery Takedown;  Surgeon: Haseeb Villegas MD;  Location:  OR     EYE SURGERY      cataracts     IR CVC TUNNEL PLACEMENT > 5 YRS OF AGE  1/30/2019     LAPAROSCOPIC TUBAL LIGATION       TRANSESOPHAGEAL ECHOCARDIOGRAM INTRAOPERATIVE  1/15/2019    Procedure: Intraoperative Transesophageal Echocardiogram;  Surgeon: Haseeb Villegas MD;  Location:  OR       FAMILY HISTORY: No family history of problems with anesthesia    VITAL SIGNS:  Temp:  [97.2  F (36.2  C)-99.3  F (37.4  C)] 99.3  F (37.4  C)  Pulse:  [] 92  Heart Rate:  [] 140  Resp:  [13-27] 23  BP:  (103-157)/() 141/104  Cuff Mean (mmHg):  [110-121] 121  FiO2 (%):  [50 %-60 %] 60 %  SpO2:  [93 %-100 %] 95 %  Ventilation Mode: CMV/AC  (Continuous Mandatory Ventilation/ Assist Control)  FiO2 (%): 60 %  Rate Set (breaths/minute): 14 breaths/min  Tidal Volume Set (mL): 450 mL  PEEP (cm H2O): 5 cmH2O  Oxygen Concentration (%): 50 %  Resp: 23      INTAKE/ OUTPUT:   I/O last 3 completed shifts:  In: 2017.23 [I.V.:1047.23; NG/GT:330]  Out: 0      PHYSICAL EXAMINATION:  General: in no acute distress.  Neuro: not following commands, no response to pain  HEENT: Normocephalic, atraumatic, tracheostomy site blood tinged  Neck: No cervical lymphadenopathy. No thyromegaly.  Chest wall: Symmetric thorax. No masses or tenderness to palpation.   Respiratory: Non-labored breathing.   Cardiovascular: Regular rate and rhythm.   Gastrointestinal: Abdomen soft, non-distended, non-tender to palpation. No organomegaly or masses appreciated. No inguinal hernias.   Genitourinary: No CVA tenderness.   Extremities: Moving all four extremities. No limb deformities. No pedal edema.   Skin: As noted above. No rashes or lesions appreciated.    ALLERGIES:      Allergies   Allergen Reactions     Lisinopril      She developed ARF       MEDICATIONS:    No current facility-administered medications on file prior to encounter.   Current Outpatient Medications on File Prior to Encounter:  ACE/ARB NOT PRESCRIBED, INTENTIONAL, ACE & ARB not prescribed due to Worsening renal function on ACE/ARB therapy   acetaminophen (TYLENOL) 325 MG tablet Take 2 tablets (650 mg) by mouth every 6 hours as needed for mild pain   amLODIPine (NORVASC) 10 MG tablet TAKE 1 TABLET BY MOUTH ONCE DAILY   ASPIRIN NOT PRESCRIBED (INTENTIONAL) Antiplatelet medication not prescribed intentionally due to Plavix   busPIRone (BUSPAR) 5 MG tablet TAKE 1 TABLET BY MOUTH TWICE DAILY FOR ANXIETY   clopidogrel (PLAVIX) 75 MG tablet TAKE 1 TABLET BY MOUTH ONCE DAILY   doxazosin  (CARDURA) 4 MG tablet TAKE 1 TABLET BY MOUTH AT BEDTIME   Hydrocortisone Acetate 2.5 % CREA Externally apply 1 Application topically 2 times daily Apply to both arms twice a day   metoprolol tartrate (LOPRESSOR) 50 MG tablet TAKE ONE TABLET BY MOUTH TWICE DAILY   metoprolol tartrate (LOPRESSOR) 50 MG tablet Take 0.5 tablets (25 mg) by mouth 2 times daily   ondansetron (ZOFRAN) 8 MG tablet Take 1 tablet (8 mg) by mouth every 8 hours as needed for nausea   order for DME Machine to dispense the medication   pantoprazole (PROTONIX) 40 MG EC tablet TAKE ONE TABLET BY MOUTH ONCE DAILY 30-60  MINUTES  BEFORE  A  MEAL   simvastatin (ZOCOR) 40 MG tablet Take 1 tablet (40 mg) by mouth At Bedtime   traZODone (DESYREL) 50 MG tablet Take 1 tablet (50 mg) by mouth nightly as needed for sleep       LABS: Reviewed.   Arterial Blood Gases   Recent Labs   Lab 01/25/19  0345 01/24/19  2148   PH 7.40 7.39   PCO2 40 40   PO2 74* 79*   HCO3 25 24     Complete Blood Count   Recent Labs   Lab 01/29/19  0408 01/28/19  0335 01/27/19  0404 01/26/19  0957   WBC 10.4 11.7* 11.7* 14.1*   HGB 8.1* 7.8* 7.8* 8.2*    246 190 176     Basic Metabolic Panel  Recent Labs   Lab 01/31/19  0357 01/30/19  1227 01/30/19  0402 01/29/19  0408  01/28/19  0335     --  134 133  --  133   POTASSIUM 3.6 3.6 3.2* 3.6   < > 3.6   CHLORIDE 97  --  98 98  --  98   CO2 23  --  25 23  --  19*   BUN 69*  --  51* 51*  --  80*   CR 4.07*  --  2.92* 3.27*  --  4.63*   GLC 99  --  119* 127*  --  137*    < > = values in this interval not displayed.     Liver Function Tests  Recent Labs   Lab 01/27/19  0404 01/26/19  0957 01/26/19  0425 01/25/19  2141   AST 30 39 42 46*   ALT 14 20 19 24   ALKPHOS 76 76 71 58   BILITOTAL 0.9 0.9 0.8 0.8   ALBUMIN 1.8* 1.9* 1.9* 2.0*     Pancreatic Enzymes  No lab results found in last 7 days.  Coagulation Profile  No lab results found in last 7 days.  Lactate  Invalid input(s): LACTATE      RADIOLOGY:   Recent Results (from the  past 24 hour(s))   XR Chest Port 1 View    Narrative    XR CHEST PORT 1 VW  1/30/2019 4:27 PM      HISTORY: Postop CXR    COMPARISON: Earlier same day    FINDINGS: Tracheostomy. Right IJ central venous catheter tip  projecting over the cavoatrial junction. Postsurgical changes of CABG.  Enlarged cardiac silhouette, unchanged. Increased interstitial  opacities compared to prior examination. Probable layering pleural  effusions. No pneumothorax.      Impression    IMPRESSION:   1. Increased pulmonary interstitial opacities, likely pulmonary edema.  Probable layering pleural effusions.   2. Postsurgical changes of CABG. No pneumothorax.    I have personally reviewed the examination and initial interpretation  and I agree with the findings.    JACKIE ANTONIO MD   XR Chest Port 1 View    Narrative    Exam: XR CHEST PORT 1 VW, 1/31/2019 2:01 AM    Indication: Interval CXR    Comparison: 1/30/2019 x-ray    Findings:   AP single view of chest. Tip of the tracheostomy tube projects over  the upper thoracic trachea. Tip of the double-lumen right IJ central  venous catheter projects over the mid SVC. Tip of the left-sided PICC  projects over the mid SVC. Postsurgical changes of CABG. Ongoing  moderate left-sided pleural effusion with adjacent opacity. Cardia  mediastinal silhouettes is enlarged, stable. Again noted the mitral  annular calcifications.      Impression    Impression:   1. Moderate left-sided pleural effusion with adjacent opacity, likely  atelectasis. Cannot rule rule out retrocardiac/left lower lobe  infection.  2. Stable lines and tubes.    I have personally reviewed the examination and initial interpretation  and I agree with the findings.    YOVANY GLOVER MD         Patient seen, findings and plan discussed with surgical ICU staff Dr. Rob Chen.    Lazaro Cantu MD  Adult Cardiothoracic Anesthesia Fellow

## 2019-01-31 NOTE — PROGRESS NOTES
Brief SICU Note    Pt seen and examined. PEG tube intact. Tracheostomy also c/d/i with minimal cuff leak. Can try inflating cuff some more if this is an issue. Tracheostomy sutures can be removed in 1 week. G tube ok for meds and feeds. SICU will sign off. Please call with any questions or concerns.     --  Guero Wong MD  General Surgery PGY2  [phone]

## 2019-01-31 NOTE — PROGRESS NOTES
Care Coordinator - Discharge Planning    Admission Date/Time:  1/14/2019  Attending MD:  Haseeb Villegas MD     Data  Chart reviewed, discussed with interdisciplinary team.   Patient was admitted for:   1. Acute kidney injury (H)    2. CKD (chronic kidney disease) stage 3, GFR 30-59 ml/min (H)    pt is s/p CABG X 5 on 1/15.     Assessment   Full assessment completed in previous note.    Pt had trach and PEG placement done yesterday, 1/30.  Pt has been referred for [facility]AC ( [facility]) and meets criteria.   [facility] liaison has met with the family and provided info about [facility].  Visited pt ex- and 2 dtrs to check if they have any question and what their LTAC preference.  Pt family stated they are planning to visit both facilities tomorrow or Saturday.  Pt family stated the team have updated them about the plan of care.    Coordination of Care and Referrals: Provided patient/family with options for LTACH.      Plan  Anticipated Discharge Date:  TBD.  Anticipated Discharge Plan:   LTAC.  RNCC will cont to follow plan of care.      Aneta Plunkett RN, PHN, BSN  4A and 4E/ ICU  Care Coordinator  Phone: [phone]  Pager: [phone]

## 2019-01-31 NOTE — SIGNIFICANT EVENT
SPIRITUAL HEALTH SERVICES Significant Event  [facility] of ANOINTING  [facility] 4A    Pt anointed by Father Cisco per request of family.    Isabela Muñoz  Palliative   Pager [phone]

## 2019-01-31 NOTE — PLAN OF CARE
D/I: Sedation continued overnight, patient HR and BP increased significantly as well as patient agitation with attempts to wean. Opening eyes so speech/stimulation but unable to track or follow commands. Withdrawal to pain. Continues to be in A-fib, 70-100s. All other VSS. Patient with new trach, small amount of bleeding at the site. FiO2 remains at 50%, unable to wean at this time. New PEG site with a small amount of dried blood at the site, TF restarted at 2000 running at previous goal of 40cc/hr. BG stable overnight. Family updated via phone call regarding current patient status.    A/P: Continue to monitor and follow current POC, attempt to wean sedation and FiO2 as patient tolerates.

## 2019-01-31 NOTE — CONSULTS
[facility]: [place]    Neuro ICU Consult    Patient Name:  Wendy Rocha  MRN:  [id number]    :  1940  Date of Admission:  2019  Date of Service:  2019  Primary care provider:  Thad Montgomery      History of Present Illness:  Wendy Rocha is an 78-year-old-female who was admitted on 2019 with a history of CKDIII, bilateral carotid stenosis, CHF, GERD, HTN, CAD s/p ACS [place] [facility] [place]-v[place] disease s/p emergent CABG x3 on 1/15. Condition and mental status was complicated by in-house PEA on  with about 30 min to ROSC. She was sedated for a prolonged period for time for sedation to help with BP management. She has been on hemodialysis. She was trached and peged on .  NCC/vascular consulted for neurological prognosis.     Patient is minimally interactive and non-verbal. Per chart review, at baseline and before hospitalization she had normal mental status, alert, and would follow simple commands. Once hospitalized she has been sedated and unable to follow simple commands.  Centrally acting medications include propofol, fentanyl, quetiapine, and buspirone. Patient has significantly impaired renal function and on dialysis.     ROS: A 10-point ROS is unable to obtain due to lack of responsiveness.      Allergies:  Allergies   Allergen Reactions     Lisinopril      She developed ARF       Medications:    Medications Prior to Admission   Medication Sig Dispense Refill Last Dose     ACE/ARB NOT PRESCRIBED, INTENTIONAL, ACE & ARB not prescribed due to Worsening renal function on ACE/ARB therapy   Taking     acetaminophen (TYLENOL) 325 MG tablet Take 2 tablets (650 mg) by mouth every 6 hours as needed for mild pain 100 tablet 0 Taking     amLODIPine (NORVASC) 10 MG tablet TAKE 1 TABLET BY MOUTH ONCE DAILY 90 tablet 0      ASPIRIN NOT PRESCRIBED (INTENTIONAL) Antiplatelet medication not prescribed intentionally due to Plavix 0 each 0  Taking     busPIRone (BUSPAR) 5 MG tablet TAKE 1 TABLET BY MOUTH TWICE DAILY FOR ANXIETY 180 tablet 0      clopidogrel (PLAVIX) 75 MG tablet TAKE 1 TABLET BY MOUTH ONCE DAILY 90 tablet 0      doxazosin (CARDURA) 4 MG tablet TAKE 1 TABLET BY MOUTH AT BEDTIME 90 tablet 0      Hydrocortisone Acetate 2.5 % CREA Externally apply 1 Application topically 2 times daily Apply to both arms twice a day 28.4 g 1      metoprolol tartrate (LOPRESSOR) 50 MG tablet TAKE ONE TABLET BY MOUTH TWICE DAILY 180 tablet 0      metoprolol tartrate (LOPRESSOR) 50 MG tablet Take 0.5 tablets (25 mg) by mouth 2 times daily 90 tablet 0      ondansetron (ZOFRAN) 8 MG tablet Take 1 tablet (8 mg) by mouth every 8 hours as needed for nausea 20 tablet 0 Not Taking     order for CellScape Machine to dispense the medication 1 Device 0 Taking     pantoprazole (PROTONIX) 40 MG EC tablet TAKE ONE TABLET BY MOUTH ONCE DAILY 30-60  MINUTES  BEFORE  A  MEAL 90 tablet 2      simvastatin (ZOCOR) 40 MG tablet Take 1 tablet (40 mg) by mouth At Bedtime 90 tablet 3      traZODone (DESYREL) 50 MG tablet Take 1 tablet (50 mg) by mouth nightly as needed for sleep 30 tablet 1 Not Taking       PMH:  Past Medical History:   Diagnosis Date     Abnormal ECG      ARF (acute renal failure) (H) Aug 2010    Lisinopril was stopped at that time     Carotid stenosis      Carotid stenosis, bilateral      Carotid stenosis, bilateral     Vs Surgeon: dr Kush Mcmillan, Phone: [phone] fax: [phone]     CKD (chronic kidney disease) stage 3, GFR 30-59 ml/min (H)      GERD (gastroesophageal reflux disease)      HTN, goal below 140/90      Hyperlipidemia LDL goal <100      Lung nodule may 2013    needs f/u may 2014     Muscle aches      Perforated duodenal ulcer (H) Aug 2010     Proteinuria      PVD (peripheral vascular disease) (H)      Renal artery stenosis (H)  April 2011    Right side     Vitamin D deficiency disease      Past Surgical History:   Procedure Laterality Date      ARTHROPLASTY KNEE      left     BYPASS CARDIOPULMONARY N/A 1/15/2019    Procedure: On Cardiopulmonary Bypass;  Surgeon: Haseeb Villegas MD;  Location: [facility] OR     BYPASS GRAFT ARTERY CORONARY MINIMALLY INVASIVE  (NO PUMP) N/A 1/15/2019    Procedure: Median Sternotomy, Coronary Artery Bypass Graft x3, Endovein [place] of Left Greater Saphenous Vein, Left Internal Mammary Artery Takedown;  Surgeon: Haseeb Villegas MD;  Location: [facility] OR     EYE SURGERY      cataracts     IR CVC TUNNEL PLACEMENT > 5 YRS OF AGE  2019     LAPAROSCOPIC TUBAL LIGATION       TRACHEOSTOMY N/A 2019    Procedure: Percutaneous tracheostomy converted to Open Tracheostomy;  Surgeon: Yancy Luo MD;  Location: [facility] OR     TRANSESOPHAGEAL ECHOCARDIOGRAM INTRAOPERATIVE  1/15/2019    Procedure: Intraoperative Transesophageal Echocardiogram;  Surgeon: Haseeb Villegas MD;  Location: [facility] OR       Social History:  Social History     Tobacco Use     Smoking status: Former Smoker     Last attempt to quit: 2010     Years since quittin.5     Smokeless tobacco: Never Used   Substance Use Topics     Alcohol use: Yes     Comment: 1 x per year on a holidy       Family History:    Family History   Problem Relation Age of Onset     Neurologic Disorder Brother 70        Parkinson's     Heart Disease Mother          92yo      Hypertension Father          46yo MVA     Family History Negative Sister      Cerebrovascular Disease Sister         Has had 2      Family History Negative Sister      Family History Negative Son      Family History Negative Son      Family History Negative Daughter      Family History Negative Daughter      Family History Negative Daughter        Physical Examination:   Vitals:  B/P: 137/107, T: 98.4, P: 92, R: 25  General:  Eyes open at rest, laying in bed with trach  HEENT:  NC/AT, no icterus, op pink and moist, no ear or nose drainage.   Cardiac:  RRR  Chest:  No increase WOB  Abdomen:  S/NT/ND    Skin:  No rash or lesion noted.    Neuro Exam:  Mental status: alert, unable to follow commands, does not squeeze hand, question if she tracks to the right.  Cranial nerves: gaze midline at rest, blinks spontaneously, blinks to threat on R, not really on left, no facial asymmetry noted, spontaneous cough and gag  Motor/Sensory: increased tone on R upper and lower extremities, normal tone on L. Withdraws to noxious stimuli minimally LUE, not movement RUE, withdraws b/l LE. Minimal spontaneous movements BLE.  Reflexes: normal patellar and brachial reflexes, toe up-going on L, equivocal on R. No clonus.   Coordination: unable to assess due to MS  Gait: unable to assess due to MS    Investigations:  Data     CMP   Recent Labs   Lab 01/31/19  0357 01/30/19  1227 01/30/19  0402 01/29/19  0408  01/28/19  0335 01/27/19  1830 01/27/19  0404  01/26/19  0957 01/26/19  0425 01/25/19  2141     --  134 133  --  133  --  136  --  137 137 138   POTASSIUM 3.6 3.6 3.2* 3.6   < > 3.6 3.4 3.4   < > 3.5 3.9 3.6   CHLORIDE 97  --  98 98  --  98  --  101  --  103 104 105   CO2 23  --  25 23  --  19*  --  19*  --  22 22 25   ANIONGAP 14  --  12 11  --  16*  --  15*  --  12 12 8   GLC 99  --  119* 127*  --  137*  --  125*  --  115* 135* 103*   BUN 69*  --  51* 51*  --  80*  --  60*  --  42* 38* 32*   CR 4.07*  --  2.92* 3.27*  --  4.63*  --  3.53*  --  2.55* 2.28* 1.78*   GFRESTIMATED 10*  --  14* 13*  --  8*  --  12*  --  17* 20* 26*   GFRESTBLACK 11*  --  17* 15*  --  10*  --  13*  --  20* 23* 31*   JEFFREY 7.5*  --  7.7* 7.8*  --  7.3*  --  7.7*  --  7.0* 7.7* 7.7*   MAG 2.3  --   --   --   --   --  2.8* 2.6*  --  2.4* 2.6* 2.5*   PHOS  --   --   --   --   --   --  8.3* 8.0*  --  6.5* 6.0* 5.1*   PROTTOTAL  --   --   --   --   --   --   --  5.1*  --  5.1* 5.1* 5.2*   ALBUMIN  --   --   --   --   --   --   --  1.8*  --  1.9* 1.9* 2.0*   BILITOTAL  --   --   --   --   --   --   --  0.9  --  0.9 0.8 0.8   ALKPHOS  --   --   --   --   --    --   --  76  --  76 71 58   AST  --   --   --   --   --   --   --  30  --  39 42 46*   ALT  --   --   --   --   --   --   --  14  --  20 19 24    < > = values in this interval not displayed.        CBC   Recent Labs   Lab 01/29/19  0408 01/28/19  0335 01/27/19  0404 01/26/19  0957   WBC 10.4 11.7* 11.7* 14.1*   RBC 2.73* 2.64* 2.67* 2.74*   HGB 8.1* 7.8* 7.8* 8.2*   HCT 24.8* 23.9* 24.3* 25.3*   MCV 91 91 91 92   MCH 29.7 29.5 29.2 29.9   MCHC 32.7 32.6 32.1 32.4   RDW 15.8* 15.6* 15.6* 15.7*    246 190 176       INR, PTT No lab results found in last 7 days.     Arterial Blood Gas   Recent Labs   Lab 01/29/19  0454 01/27/19  0404 01/26/19  0425 01/25/19  2141 01/25/19  0345 01/24/19  2148   PH  --   --   --   --  7.40 7.39   PCO2  --   --   --   --  40 40   PO2  --   --   --   --  74* 79*   HCO3  --   --   --   --  25 24   O2PER 50 50 40 50 50.0 50       UA  No lab results found in last 7 days.    Micro No lab results found in last 7 days.       Radiological Data  Data   Recent Results (from the past 48 hour(s))   XR Chest Port 1 View    Narrative    Exam: XR CHEST PORT 1 VW, 1/30/2019 2:08 AM    Indication: Interval CXR    Comparison: X-ray 01/29/2019 1:05 AM    Findings:   AP chest single view of the chest. Tip of the endotracheal tube  projects over the midthoracic trachea. Left-sided PICC tip projects  over the mid SVC. Tip of the right IJ sheath projects over the  cavoatrial junction. Incomplete visualization of enteric gastric and  feeding tube. Postsurgical changes of CABG. Persistent low left lung  volume with streaky basilar opacity. Trace left-sided pleural  effusion. Mitral annular extensive calcification.      Impression    Impression:   1. Ongoing mild to moderate left-sided pleural effusion with left  retrocardiac opacity, compressive atelectasis versus infection.  2. Stable supportive devices.    I have personally reviewed the examination and initial interpretation  and I agree with the  findings.    YOVANY GLOVER MD   IR CVC Tunnel Placement > 5 Yrs of Age    Narrative    PRE-PROCEDURE DIAGNOSIS: Dialysis patient    POST-PROCEDURE DIAGNOSIS: Same    PROCEDURE: Tunneled central venous catheter placement    Impression    IMPRESSION: Completed image-guided placement of 14.5 Bel[place]ian, 19 cm  double lumen tunneled central venous catheter via right internal  jugular vein. Catheter tip in high right atrium. Aspirates and flushes  freely, heparin locked and ready for immediate use. No complication.     ----------    CLINICAL HISTORY: Patient requires central venous access for therapy.  Tunneled central venous catheter placement requested.    PERFORMED BY: Neville Messina PA-C    CONSENT: Written informed consent was obtained and is documented in  the patient record.    MEDICATIONS: A 10:1 volume mixture of 1% lidocaine without epinephrine  buffered with 8.4% bicarbonate solution was available for local  anesthesia. The catheter was heparin locked upon completion of  placement.    NURSING: The patient was placed on continuous vital signs monitoring.  Vital signs and sedation were monitored by nursing staff under IR  physician staff supervision.      FLUOROSCOPY TIME: 1.1 minutes    DESCRIPTION: The right neck and upper chest were prepped and draped in  the usual sterile fashion.      Under ultrasound guidance, the right internal jugular vein was  identified and the overlying skin was anesthetized and skin  dermatotomy was made. Under ultrasound guidance, right internal  jugular venipuncture was made with needle. Image saved documenting  venipuncture and patency.    Needle was exchanged over guidewire for a dilator under fluoroscopic  guidance. Length to right atrium was measured with guidewire.  Guidewire and inner dilator were removed. Wire was advanced into  inferior vena cava under fluoroscopic guidance and secured.     The anterior chest skin was anesthetized and incision was made after  measurements were  taken. A cuffed catheter was subcutaneously tunneled  from the anterior chest incision to the internal jugular venipuncture  site after path of tunnel was anesthetized. The dilator was exchanged  over guidewire for a peel-away sheath. Guidewire was removed. Under  fluoroscopic guidance, the catheter was placed through the peel-away  sheath. Peel-away sheath was removed.      Final catheter position saved. Both catheter lumens adequately  aspirated and flushed. Each lumen was heparin locked. A catheter  retaining suture and sterile dressing were applied. The skin  dermatotomy site overlying the internal jugular venipuncture was  closed with topical adhesive.    COMPLICATIONS: No immediate concerns, the patient remained stable  throughout the procedure and tolerated it well.    ESTIMATED BLOOD LOSS: Minimal    SPECIMENS: None    HAYDEE LITTLE PA-C   XR Chest Port 1 View    Narrative    XR CHEST PORT 1   1/30/2019 4:27 PM      HISTORY: Postop CXR    COMPARISON: Earlier same day    FINDINGS: Tracheostomy. Right IJ central venous catheter tip  projecting over the cavoatrial junction. Postsurgical changes of CABG.  Enlarged cardiac silhouette, unchanged. Increased interstitial  opacities compared to prior examination. Probable layering pleural  effusions. No pneumothorax.      Impression    IMPRESSION:   1. Increased pulmonary interstitial opacities, likely pulmonary edema.  Probable layering pleural effusions.   2. Postsurgical changes of CABG. No pneumothorax.    I have personally reviewed the examination and initial interpretation  and I agree with the findings.    JACKIE ANTONIO MD   XR Chest Port 1 View    Narrative    Exam: XR CHEST PORT 1 VW, 1/31/2019 2:01 AM    Indication: Interval CXR    Comparison: 1/30/2019 x-ray    Findings:   AP single view of chest. Tip of the tracheostomy tube projects over  the upper thoracic trachea. Tip of the double-lumen right IJ central  venous catheter projects over the  mid SVC. Tip of the left-sided PICC  projects over the mid SVC. Postsurgical changes of CABG. Ongoing  moderate left-sided pleural effusion with adjacent opacity. Cardia  mediastinal silhouettes is enlarged, stable. Again noted the mitral  annular calcifications.      Impression    Impression:   1. Moderate left-sided pleural effusion with adjacent opacity, likely  atelectasis. Cannot rule rule out retrocardiac/left lower lobe  infection.  2. Stable lines and tubes.    I have personally reviewed the examination and initial interpretation  and I agree with the findings.    YOVANY GLOVER MD          Impression and Recommendations  Wendy Rocha is an 78-year-old-female who was admitted on 1/14/2019 with a history of CKDIII, bilateral carotid stenosis, CHF, GERD, HTN, CAD s/p ACS transferred form [facility] [place], [place] with severe multi-vessel disease s/p emergent CABG x3 on 1/15, required dialysis, then complicated by PEA on 1/23 with ROSC. NCC/vascular consulted for neurological prognosis and evaluation of focal neurological deficits and altered mental status on exam.    #Stroke vs anoxic brain injury vs toxic metabolic encephalopathy  Asymmetric exam, persistent minimally responsive state now >2 weeks since admission with multiple medical comorbidities. Anoxic brain injury likely. Unclear baseline mental function prior to admission. Status epilepticus unlikely but is possible to explain her unresponsiveness.    Recommendations:  - MRI brain non contrast  - Routine EEG    This patient was seen & discussed with my senior resident Dr. Conn and attending, Dr. Peterson, who agrees with my assessment and plan.     Angelina Rodríguez, MS3  [employer]     Resident/Fellow Attestation   I, Zoie Conn, was present with the medical student who participated in the service and in the documentation of the note.  I have verified the history and personally performed the physical exam and medical decision making.  I  agree with the assessment and plan of care as documented in the note.      I have edited and reviewed the note entirely, please see above for key findings. Recommend MRI and EEG    Zoie Conn MD  [id number]  Date of Service (when I saw the patient): 1/31/19

## 2019-01-31 NOTE — PROGRESS NOTES
CLINICAL NUTRITION SERVICES - brief note. See 1/30 note for full assessment.    -FEN/GI: PEG yesterday. TF running @ goal via new access.     Interventions  Enteral Nutrition - Adjusted TF order to reflect PEG, ordered GRV checks and HOB 30 degrees.      RD will continue to follow.    Jane Prescott RD, LD, [facility]  CVICU Dietitian  Pager: [phone]

## 2019-01-31 NOTE — PLAN OF CARE
Discharge Planner OT   Patient plan for discharge: not stated  Current status: Pt intubated/sedated weaned and following zero% of commands during OT tx session. Therapist provided PROM to BUEs/LEs to maintain ROM needed for ADLs and functional mobility. Pt's BP started to increased when sedation weaned from SBP 130s to SBPs 150s, RN informed.  VSS on vent settings CMV, FiO2 50% and PEEP 5. Pt w/ non purposeful BUE/BLE movements and eye movement w/ tracking sporadically.   Barriers to return to prior living situation: medical status  Recommendations for discharge: TCU  Rationale for recommendations: to increase ind in ADLS/IADLS       Entered by: Elisha Owusu 01/31/2019 11:23 AM

## 2019-01-31 NOTE — PLAN OF CARE
Neuro: Patient appears to withdraw from stimulus. Pupils are sluggish. Does not follow commands are do any purposeful movement while off sedation.     Resp: Trach placed today. Minimal vent settings. No secretions, small amount of blood post trach placement suctioned.     CV: Afebrile, Controlled a-fib. HR into 140s and systolics 160s while off sedation.     GI/: PEG placed, TF to be restarted at 2000. NG removed. Anuric. 1 BM.     IV: HD line placed in right. PICC infusing with propofol at 15mcg/kg, fentanyl at 100mcg, and Dex at 0.5mcg.     Ethical concern raised to team regarding placement of tach and peg. No CT of head has been obtained post PEA arrest. With talking to family, members seemed very uneducated about expectations for recovery and patient condition. NM made aware. Team stated a care conference had already been completed.     P: Transition to LTACH

## 2019-02-01 NOTE — PLAN OF CARE
Neuro: Sedation off, withdraws to pain and grimaces.    CV: A-fib RVR throughout majority of day.  Esmolol and amiodarone added with moderate results.    Resp: Scant thick secretions.  Coarse to auscultation.    GI: Vomit x2, TF on hold until not vomiting for two hours.  Zofran given.  Small BM x1.    : HD run today.    Skin: Unchanged.    Plan: Continue to monitor neuro status and CV status.

## 2019-02-01 NOTE — PLAN OF CARE
Neuro: newly unequal pupils- L>R and decorticate posturing in BUE. STAT head CT at 2100 was negative for acute changes. Occasional movement in BLE unrelated to stimuli.     CV: A-fib with RVR. Esmolol and amiodarone drips continued. Called sx to clarify BP/HR parameters- HR ok in 60s and SBP <160. Will further clarify in AM.    Resp: Scant thick secretions. Coarse to auscultation.     GI: Emesis x5. Zofran given and sx notified. OK to hold TFs until morning.    : Had HD yesterday. No urine overnight.    Plan: continue to monitor neuro and CV status, as well as to further address emesis.

## 2019-02-01 NOTE — PROGRESS NOTES
CV ICU PROGRESS NOTE  February 1, 2019    CO-MORBIDITIES:   Acute kidney injury (H)  (primary encounter diagnosis)  CKD (chronic kidney disease) stage 3, GFR 30-59 ml/min (H)    Overnight Events:  -Elevated heart rate on esmolol  -S/p tracheostomy, and GJ tube  -Vomited 6x overnight, tube feeds held  -Afebrile      Today's Plan:  -MRI and EEG per neurology  -Wean esmolol gtt  mcg/k/min to control HR <90bpm  -Start on Labetalol q6h with q2h prn doses, Hydralazine 5mg IV Q4prn SBP>140  -Off sedation  -Oxycodone prn pain  -HD today  -Ordered at KUB which shows stool in colon, enema ordered     PLAN:   Neuro/ pain/ sedation:  -Monitor neurological status. Notify the MD for any acute changes in exam.  -EEG done, follow up report  -MRI today per neurology  -Oxycodone prn pain  -Seroquel 50mg QHS     Pulmonary care:   -Continue trach care  -Keep patient on current settings      Cardiovascular:    -Monitor hemodynamic status.   -Esmolol gtt to keep HR less than 90  -Continue amiodarone 200 QD, metoprolol 25 bid  -Hydralazine prn     GI care:   -Tube feeds at goal at 40 ml/hr      Fluids/ Electrolytes/ Nutrition:   -5% Albumin bolus prn for low CVP/low urine output  -Replace electrolytes per ICU protocol K>4, Mg>2, Phos WNL     Renal/ Fluid Balance:    -HD today, goal to remove 2-3L  -Will continue to monitor intake and output.      Endocrine:    -Insulin sliding scale  -Transition to sliding scale once blood glucose is stable      ID/ Antibiotics:  -No antibiotics, no elevated WBC count, afebrile      Heme:     -CBC shows stable hemoglobin      Prophylaxis:    -Mechanical prophylaxis for DVT.   -Pantoprazole IV  -Dulcolax, Senna, Miralax prn  -Ordered enema because KUB shows stool in colon      Disposition:  -Surgical ICU.          - - - - - - - - - - - - - - - - - - - - - - - - - - - - - - - - - - - - - - - - - - - - - - - - - - - - - - - - - - - - - - - - - - - - - - - -     PRIMARY TEAM: CVTS  REASON FOR CRITICAL  CARE ADMISSION: CV/Respiratory ICU care       REVIEW OF SYSTEMS: As noted above. No headache, dizziness. No fever, chills. No chest pain or shortness of breath. No abdominal pain, nausea, vomiting. No diarrhea or constipation. No urinary difficulties. No muscle or body aches.     PAST MEDICAL HISTORY:   Past Medical History:   Diagnosis Date     Abnormal ECG      ARF (acute renal failure) (H) Aug 2010    Lisinopril was stopped at that time     Carotid stenosis      Carotid stenosis, bilateral      Carotid stenosis, bilateral     Vs Surgeon: dr Kush Mcmillan, Phone: [phone] fax: [phone]     CKD (chronic kidney disease) stage 3, GFR 30-59 ml/min (H)      GERD (gastroesophageal reflux disease)      HTN, goal below 140/90      Hyperlipidemia LDL goal <100      Lung nodule may 2013    needs f/u may 2014     Muscle aches      Perforated duodenal ulcer (H) Aug 2010     Proteinuria      PVD (peripheral vascular disease) (H)      Renal artery stenosis (H)  April 2011    Right side     Vitamin D deficiency disease        SURGICAL HISTORY:   Past Surgical History:   Procedure Laterality Date     ARTHROPLASTY KNEE      left     BYPASS CARDIOPULMONARY N/A 1/15/2019    Procedure: On Cardiopulmonary Bypass;  Surgeon: Haseeb Villegas MD;  Location: [facility] OR     BYPASS GRAFT ARTERY CORONARY MINIMALLY INVASIVE  (NO PUMP) N/A 1/15/2019    Procedure: Median Sternotomy, Coronary Artery Bypass Graft x3, Endovein [place] of Left Greater Saphenous Vein, Left Internal Mammary Artery Takedown;  Surgeon: Haseeb Villegas MD;  Location: [facility] OR     EYE SURGERY      cataracts     IR CVC TUNNEL PLACEMENT > 5 YRS OF AGE  1/30/2019     LAPAROSCOPIC TUBAL LIGATION       TRACHEOSTOMY N/A 1/30/2019    Procedure: Percutaneous tracheostomy converted to Open Tracheostomy;  Surgeon: Yancy Luo MD;  Location: [facility] OR     TRANSESOPHAGEAL ECHOCARDIOGRAM INTRAOPERATIVE  1/15/2019    Procedure: Intraoperative Transesophageal  Echocardiogram;  Surgeon: Haseeb Villegas MD;  Location: [facility] OR       FAMILY HISTORY: No family history of problems with anesthesia    VITAL SIGNS:  Temp:  [97.1  F (36.2  C)-98.3  F (36.8  C)] 97.4  F (36.3  C)  Heart Rate:  [] 69  Resp:  [14-18] 14  BP: (120-162)/() 158/81  Cuff Mean (mmHg):  [109-126] 110  FiO2 (%):  [50 %-60 %] 50 %  SpO2:  [93 %-100 %] 97 %  Ventilation Mode: CMV/AC  (Continuous Mandatory Ventilation/ Assist Control)  FiO2 (%): 50 %  Rate Set (breaths/minute): 14 breaths/min  Tidal Volume Set (mL): 450 mL  PEEP (cm H2O): 5 cmH2O  Oxygen Concentration (%): 50 %  Resp: 14      INTAKE/ OUTPUT:   I/O last 3 completed shifts:  In: 1675.53 [I.V.:1485.53; NG/GT:110]  Out: 1050 [Emesis/NG output:1050]     PHYSICAL EXAMINATION:  General: in no acute distress.  HEENT: Normocephalic, atraumatic, tracheostomy site  Neck: No cervical lymphadenopathy. No thyromegaly.  Chest wall: Symmetric thorax. No masses or tenderness to palpation.   Respiratory: Non-labored breathing.   Cardiovascular: Regular rate and rhythm.   Gastrointestinal: Abdomen soft, non-distended, non-tender to palpation. No organomegaly or masses appreciated. No inguinal hernias.   Genitourinary: No CVA tenderness.   Extremities: Moving all four extremities. No limb deformities. No pedal edema.   Skin: As noted above. No rashes or lesions appreciated.    ALLERGIES:      Allergies   Allergen Reactions     Lisinopril      She developed ARF       MEDICATIONS:    No current facility-administered medications on file prior to encounter.   Current Outpatient Medications on File Prior to Encounter:  ACE/ARB NOT PRESCRIBED, INTENTIONAL, ACE & ARB not prescribed due to Worsening renal function on ACE/ARB therapy   acetaminophen (TYLENOL) 325 MG tablet Take 2 tablets (650 mg) by mouth every 6 hours as needed for mild pain   amLODIPine (NORVASC) 10 MG tablet TAKE 1 TABLET BY MOUTH ONCE DAILY   ASPIRIN NOT PRESCRIBED (INTENTIONAL) Antiplatelet  medication not prescribed intentionally due to Plavix   busPIRone (BUSPAR) 5 MG tablet TAKE 1 TABLET BY MOUTH TWICE DAILY FOR ANXIETY   clopidogrel (PLAVIX) 75 MG tablet TAKE 1 TABLET BY MOUTH ONCE DAILY   doxazosin (CARDURA) 4 MG tablet TAKE 1 TABLET BY MOUTH AT BEDTIME   Hydrocortisone Acetate 2.5 % CREA Externally apply 1 Application topically 2 times daily Apply to both arms twice a day   metoprolol tartrate (LOPRESSOR) 50 MG tablet TAKE ONE TABLET BY MOUTH TWICE DAILY   metoprolol tartrate (LOPRESSOR) 50 MG tablet Take 0.5 tablets (25 mg) by mouth 2 times daily   ondansetron (ZOFRAN) 8 MG tablet Take 1 tablet (8 mg) by mouth every 8 hours as needed for nausea   order for DME Machine to dispense the medication   pantoprazole (PROTONIX) 40 MG EC tablet TAKE ONE TABLET BY MOUTH ONCE DAILY 30-60  MINUTES  BEFORE  A  MEAL   simvastatin (ZOCOR) 40 MG tablet Take 1 tablet (40 mg) by mouth At Bedtime   traZODone (DESYREL) 50 MG tablet Take 1 tablet (50 mg) by mouth nightly as needed for sleep       LABS: Reviewed.   Arterial Blood Gases   No lab results found in last 7 days.  Complete Blood Count   Recent Labs   Lab 02/01/19  1037 01/29/19  0408 01/28/19  0335 01/27/19  0404   WBC 10.4 10.4 11.7* 11.7*   HGB 7.3* 8.1* 7.8* 7.8*    273 246 190     Basic Metabolic Panel  Recent Labs   Lab 02/01/19  1037 01/31/19  0357 01/30/19  1227 01/30/19  0402 01/29/19  0408    135  --  134 133   POTASSIUM 3.9 3.6 3.6 3.2* 3.6   CHLORIDE 98 97  --  98 98   CO2 23 23  --  25 23   BUN 48* 69*  --  51* 51*   CR 3.10* 4.07*  --  2.92* 3.27*   * 99  --  119* 127*     Liver Function Tests  Recent Labs   Lab 01/27/19  0404 01/26/19  0957 01/26/19  0425 01/25/19  2141   AST 30 39 42 46*   ALT 14 20 19 24   ALKPHOS 76 76 71 58   BILITOTAL 0.9 0.9 0.8 0.8   ALBUMIN 1.8* 1.9* 1.9* 2.0*     Pancreatic Enzymes  No lab results found in last 7 days.  Coagulation Profile  No lab results found in last 7 days.  Lactate  Invalid  input(s): LACTATE      RADIOLOGY:   Recent Results (from the past 24 hour(s))   CT Head w/o Contrast    Narrative    CT HEAD W/O CONTRAST 1/31/2019 9:25 PM    History: Neuro deficit(s), subacute  ICD-10:  Comparison: 1/18/2019.    Technique: Using multidetector thin collimation helical acquisition  technique, axial, coronal and sagittal CT images from the skull base  to the vertex were obtained without intravenous contrast.     Findings:    On the noncontrast images of the brain, there is no definite  intracranial hemorrhage, mass affect, or midline shift. The ventricles  are not enlarged. Low-attenuation in periventricular white matter is  not disproportionate to the patient's age. The basal cisterns are  patent.  There is some limitation due to motion. Diffuse mastoid debris is new.  Diffuse severe carotid and basilar atherosclerosis, as previously.  Left frontal lobe hypoattenuation likely related to streak artifact  and motion.       Impression    Impression:   1. Some limitation in evaluation due to motion, but no definite acute  intracranial pathology.  2. Severe leukoariosis and some cerebral atrophy, slightly  disproportionate to patient's age, unchanged.    JIM BECK MD   XR Abdomen Port 1 View    Narrative    Exam: XR ABDOMEN PORT 1 VW 2/1/2019 12:59 PM    History: Vomiting, potential obstruction    Comparison: 1/28/2019    Findings: Single supine view of the abdomen. Left upper quadrant  percutaneous gastrostomy tube. Previously seen feeding tube and  gastric tube have been removed. Bowel gas pattern is nonobstructive.  Prominent loops of gas and stool-filled colon; rectum contains gas. No  portal venous gas and no pneumatosis. Vascular calcifications. No  acute bony normality. Degenerative changes throughout the spine.      Impression    Impression: Nonobstructive bowel gas pattern.    DOMENICO VILCHIS MD   XR Chest Port 1 View    Narrative    XR CHEST PORT 1 VW  2/1/2019 1:00 PM      HISTORY:  Interval CXR    COMPARISON: 2/1/2019    FINDINGS: Portable AP view the chest. Right IJ central venous catheter  with tip projecting over the mid SVC. Tracheostomy tube with tip  projecting over the trachea. Left upper approach PICC with tip  projecting over the high SVC.    Unchanged enlargement of the cardiac silhouette. Severe mitral annular  calcifications. Left retrocardiac airspace opacities are also not  appreciably changed. Small left pleural effusion. No definite  pneumothorax.      Impression    IMPRESSION:   1. Stable support devices.  2. Unchanged left retrocardiac atelectasis, infection or aspiration.    I have personally reviewed the examination and initial interpretation  and I agree with the findings.    FERMÍN HEAD MD         Patient seen, findings and plan discussed with surgical ICU staff Dr. Rob Chne.    Lazaro Cantu MD  Adult Cardiothoracic Anesthesia Fellow

## 2019-02-01 NOTE — PROGRESS NOTES
01/31/2019    CVICU Cross Cover Note    Called by nursing regarding unequal pupils    On my exam  Pupils 4mm on Left, 3mm on right  Eye opening symmetric  Face symmetric  Decorticate postering     STAT CT Head ordered    Discussed with CV fellow    Fili Marquez MD  Surgery PGY-4

## 2019-02-02 NOTE — PROGRESS NOTES
CVTS PROGRESS NOTE  February 2, 2019    CO-MORBIDITIES:   Acute kidney injury (H)  (primary encounter diagnosis)  CKD (chronic kidney disease) stage 3, GFR 30-59 ml/min (H)    Plan for today  - MRI brain shows two new punctate foci of restricted diffusion in left cerebellar hemisphere, new acute infarctions. Neurology following   - PO amiodarone BID  - RT to add ETCO2 monitor to vent to minimize labs draws for ABG/VBG  - Start trickle feeds today, no longer vomiting   - Transfuse 1U pRBCs for Hb 7  - discontinue Buspirone       PLAN:   Neuro/ pain/ sedation:  -Monitor neurological status. Notify the MD for any acute changes in exam.  -MRI brain shows two new punctate foci of restricted diffusion in left cerebellar hemisphere, new acute infarctions. Neurology following   -Oxycodone prn pain  -Seroquel 50mg at bedtime, dc'ed busprione     Pulmonary care:   -Continue trach care  -Keep patient on current settings, ETCO2 monitoring      Cardiovascular:    -Monitor hemodynamic status.   -Esmolol gtt to keep HR less than 90. Wean off esmolol  - PO amio 400 BID     GI care:   - Nausea and emesis 2/1, KUB showed moderate stool burden and was given enemas with good response.  - Restart Tube feeds@10 ml/hr and advance as tolerated       Fluids/ Electrolytes/ Nutrition:   -Replace electrolytes per ICU protocol K>4, Mg>2, Phos WNL     Renal/ Fluid Balance:    -HD, nephrology following , is anuric      Endocrine:    -Insulin sliding scale      ID/ Antibiotics:  -No antibiotics, no elevated WBC count, afebrile      Heme:     - Hb 7.3, will give 1U pRBCs       Prophylaxis:    -Mechanical prophylaxis for DVT.   -Pantoprazole IV      Disposition:  -Surgical ICU.    Overnight events: No acute events overnight         PAST MEDICAL HISTORY:   Past Medical History:   Diagnosis Date     Abnormal ECG      ARF (acute renal failure) (H) Aug 2010    Lisinopril was stopped at that time     Carotid stenosis      Carotid stenosis, bilateral       Carotid stenosis, bilateral     Vs Surgeon: dr Kush Mcmillan, Phone: [phone] fax: [phone]     CKD (chronic kidney disease) stage 3, GFR 30-59 ml/min (H)      GERD (gastroesophageal reflux disease)      HTN, goal below 140/90      Hyperlipidemia LDL goal <100      Lung nodule may 2013    needs f/u may 2014     Muscle aches      Perforated duodenal ulcer (H) Aug 2010     Proteinuria      PVD (peripheral vascular disease) (H)      Renal artery stenosis (H)  April 2011    Right side     Vitamin D deficiency disease        SURGICAL HISTORY:   Past Surgical History:   Procedure Laterality Date     ARTHROPLASTY KNEE      left     BYPASS CARDIOPULMONARY N/A 1/15/2019    Procedure: On Cardiopulmonary Bypass;  Surgeon: Haseeb Villegas MD;  Location: [facility] OR     BYPASS GRAFT ARTERY CORONARY MINIMALLY INVASIVE  (NO PUMP) N/A 1/15/2019    Procedure: Median Sternotomy, Coronary Artery Bypass Graft x3, Endovein [place] of Left Greater Saphenous Vein, Left Internal Mammary Artery Takedown;  Surgeon: Haseeb Villegas MD;  [facility]     EYE SURGERY      cataracts     IR CVC TUNNEL PLACEMENT > 5 YRS OF AGE  1/30/2019     LAPAROSCOPIC TUBAL LIGATION       TRACHEOSTOMY N/A 1/30/2019    Procedure: Percutaneous tracheostomy converted to Open Tracheostomy;  Surgeon: Yancy Luo MD;  [facility]     TRANSESOPHAGEAL ECHOCARDIOGRAM INTRAOPERATIVE  1/15/2019    Procedure: Intraoperative Transesophageal Echocardiogram;  Surgeon: Haseeb Villegas MD;  Location:  OR       FAMILY HISTORY: No family history of problems with anesthesia    VITAL SIGNS:  Temp:  [38.3  F (3.5  C)-98.4  F (36.9  C)] 98.4  F (36.9  C)  Heart Rate:  [62-73] 71  Resp:  [14-28] 23  BP: ()/() 148/78  Cuff Mean (mmHg):  [104-138] 131  FiO2 (%):  [40 %-50 %] 40 %  SpO2:  [90 %-100 %] 93 %  Ventilation Mode: (S) CMV/AC  (Continuous Mandatory Ventilation/ Assist Control)  FiO2 (%): 40 %  Rate Set (breaths/minute): 14  breaths/min  Tidal Volume Set (mL): 450 mL  PEEP (cm H2O): 5 cmH2O  Pressure Support (cm H2O): 7 cmH2O  Oxygen Concentration (%): 40 %  Resp: 23      INTAKE/ OUTPUT:   I/O last 3 completed shifts:  In: 1780.51 [I.V.:1435.51; NG/GT:345]  Out: 3650 [Emesis/NG output:650; Other:3000]     PHYSICAL EXAMINATION:  General: trached, not following commands, moves extremities sponatneously but no purposeful movements  HEENT: Normocephalic, atraumatic, tracheostomy site C/D  Respiratory: Non-labored breathing. Minimal vent settings  Cardiovascular: Regular rate and rhythm.   Gastrointestinal: Abdomen soft, non-distended, non-tender to palpation.  Extremities: Moving all four extremities. No limb deformities. No pedal edema.       ALLERGIES:      Allergies   Allergen Reactions     Lisinopril      She developed ARF       MEDICATIONS:    No current facility-administered medications on file prior to encounter.   Current Outpatient Medications on File Prior to Encounter:  ACE/ARB NOT PRESCRIBED, INTENTIONAL, ACE & ARB not prescribed due to Worsening renal function on ACE/ARB therapy   acetaminophen (TYLENOL) 325 MG tablet Take 2 tablets (650 mg) by mouth every 6 hours as needed for mild pain   amLODIPine (NORVASC) 10 MG tablet TAKE 1 TABLET BY MOUTH ONCE DAILY   ASPIRIN NOT PRESCRIBED (INTENTIONAL) Antiplatelet medication not prescribed intentionally due to Plavix   busPIRone (BUSPAR) 5 MG tablet TAKE 1 TABLET BY MOUTH TWICE DAILY FOR ANXIETY   clopidogrel (PLAVIX) 75 MG tablet TAKE 1 TABLET BY MOUTH ONCE DAILY   doxazosin (CARDURA) 4 MG tablet TAKE 1 TABLET BY MOUTH AT BEDTIME   Hydrocortisone Acetate 2.5 % CREA Externally apply 1 Application topically 2 times daily Apply to both arms twice a day   metoprolol tartrate (LOPRESSOR) 50 MG tablet TAKE ONE TABLET BY MOUTH TWICE DAILY   metoprolol tartrate (LOPRESSOR) 50 MG tablet Take 0.5 tablets (25 mg) by mouth 2 times daily   ondansetron (ZOFRAN) 8 MG tablet Take 1 tablet (8 mg) by  mouth every 8 hours as needed for nausea   order for DME Machine to dispense the medication   pantoprazole (PROTONIX) 40 MG EC tablet TAKE ONE TABLET BY MOUTH ONCE DAILY 30-60  MINUTES  BEFORE  A  MEAL   simvastatin (ZOCOR) 40 MG tablet Take 1 tablet (40 mg) by mouth At Bedtime   traZODone (DESYREL) 50 MG tablet Take 1 tablet (50 mg) by mouth nightly as needed for sleep       LABS: Reviewed.   Arterial Blood Gases   No lab results found in last 7 days.  Complete Blood Count   Recent Labs   Lab 02/02/19  0849 02/01/19  1037 01/29/19  0408 01/28/19  0335   WBC 13.2* 10.4 10.4 11.7*   HGB 7.1* 7.3* 8.1* 7.8*    357 273 246     Basic Metabolic Panel  Recent Labs   Lab 02/02/19  0849 02/01/19  1037 01/31/19  0357 01/30/19  1227 01/30/19  0402    136 135  --  134   POTASSIUM 4.0 3.9 3.6 3.6 3.2*   CHLORIDE 101 98 97  --  98   CO2 24 23 23  --  25   BUN 33* 48* 69*  --  51*   CR 2.50* 3.10* 4.07*  --  2.92*   * 123* 99  --  119*     Liver Function Tests  Recent Labs   Lab 01/27/19  0404   AST 30   ALT 14   ALKPHOS 76   BILITOTAL 0.9   ALBUMIN 1.8*     Pancreatic Enzymes  No lab results found in last 7 days.  Coagulation Profile  No lab results found in last 7 days.  Lactate  Invalid input(s): LACTATE      RADIOLOGY:   Recent Results (from the past 24 hour(s))   MR Brain w/o Contrast   Result Value    Radiologist flags stroke (Urgent)    Narrative    MR BRAIN W/O CONTRAST 2/1/2019 7:56 PM    Provided History: Altered level of consciousness (LOC), unexplained    Comparison:  7/12/2017     Technique: Sagittal T1-weighted and axial T2-weighted, turboFLAIR and  diffusion-weighted with ADC map images of the brain were obtained  without intravenous contrast.    Findings: These images reveal no intracranial mass lesion, mass  effect, midline shift or abnormal extraaxial fluid collection. The  ventricles and sulci are normal for age. Extensive periventricular  deep white matter FLAIR hyperintensities. Not  meaningfully changed  from 7/12/2017. Old lacunar infarcts in the basal ganglia and sal.  Two punctate foci of restricted diffusion in left cerebellar  hemisphere. Prominent signal in the central sal on the DWI images,  similar to 7/12/2017. Normal intravascular flow voids are identified.  Bilateral mastoid effusions. Bilateral pseudophakia.      Impression    Impression:  1. Two short linear acute infarctions in the left cerebellar  hemisphere.  2. No substantial change in the probable small vessel ischemic  disease.    [Urgent Result: stroke]    Finding was identified on 2/1/2019 7:58 PM.     The on call CVTS resident was contacted by Dr. Guzman at 2/1/2019  8:02 PM and verbalized understanding of the urgent finding.     I have personally reviewed the examination and initial interpretation  and I agree with the findings.    AMAURY PALMER MD   XR Chest Port 1 View    Narrative    Exam: XR CHEST PORT 1 VW, 2/2/2019 3:47 AM    Indication: Interval CXR    Comparison: 02/01/2019 x-ray    Findings:   AP single view chest. Tip of the tracheostomy tube projects over the  midthoracic trachea. Right IJ central venous catheter in stable  position. Ongoing moderate left-sided pleural effusion associated with  compressive atelectasis. Cardia mediastinal silhouette is enlarged,  stable.      Impression    Impression:   1. Unchanged moderate left-sided pleural effusion with adjacent  compressive atelectasis. Cannot rule out superimposed infection.  2. Stable enlarged cardiomediastinal silhouette  3. Stable supportive devices    I have personally reviewed the examination and initial interpretation  and I agree with the findings.    FERMÍN HAED MD         Patient seen, findings and plan discussed with surgical ICU staff Dr. Rob Chen.    Miguel Reeves MD

## 2019-02-02 NOTE — PROVIDER NOTIFICATION
Spoke to CVTS regarding Amio gtt.  Ok to stop now, will address PO dosing further in am.  Also discussed MRI results - per CVTS neuro is aware and there are no further orders at this time.

## 2019-02-02 NOTE — PROGRESS NOTES
[facility] PROGRESS NOTE  February 2, 2019    CO-MORBIDITIES:   Acute kidney injury (H)  (primary encounter diagnosis)  CKD (chronic kidney disease) stage 3, GFR 30-59 ml/min (H)    Plan for today  - MRI brain shows two new punctate foci of restricted diffusion in left cerebellar hemisphere, new acute infarctions. Neurology following   - PO amiodarone BID  - RT to add ETCO2 monitor to vent to minimize labs draws for ABG/VBG  - Start trickle feeds today, no longer vomiting   - Transfuse 1U pRBCs for Hb 7  - discontinue Buspirone       PLAN:   Neuro/ pain/ sedation:  -Monitor neurological status. Notify the MD for any acute changes in exam.  -MRI brain shows two new punctate foci of restricted diffusion in left cerebellar hemisphere, new acute infarctions. Neurology following   -Oxycodone prn pain  -Seroquel 50mg at bedtime, dc'ed busprione     Pulmonary care:   -Continue trach care  -Keep patient on current settings, ETCO2 monitoring      Cardiovascular:    -Monitor hemodynamic status.   -Esmolol gtt to keep HR less than 90. Wean off esmolol  - PO amio 400 BID     GI care:   - Nausea and emesis 2/1, KUB showed moderate stool burden and was given enemas with good response.  - Restart Tube feeds@10 ml/hr and advance as tolerated       Fluids/ Electrolytes/ Nutrition:   -Replace electrolytes per ICU protocol K>4, Mg>2, Phos WNL     Renal/ Fluid Balance:    -HD, nephrology following , is anuric      Endocrine:    -Insulin sliding scale      ID/ Antibiotics:  -No antibiotics, no elevated WBC count, afebrile      Heme:     - Hb 7.3, will give 1U pRBCs       Prophylaxis:    -Mechanical prophylaxis for DVT.   -Pantoprazole IV      Disposition:  -Surgical ICU.    Overnight events: No acute events overnight         PAST MEDICAL HISTORY:   Past Medical History:   Diagnosis Date     Abnormal ECG      ARF (acute renal failure) (H) Aug 2010    Lisinopril was stopped at that time     Carotid stenosis      Carotid stenosis, bilateral       Carotid stenosis, bilateral     [facility] Surgeon: dr Kush Mcmillan, Phone: [phone] fax: [phone]     CKD (chronic kidney disease) stage 3, GFR 30-59 ml/min (H)      GERD (gastroesophageal reflux disease)      HTN, goal below 140/90      Hyperlipidemia LDL goal <100      Lung nodule may 2013    needs f/u may 2014     Muscle aches      Perforated duodenal ulcer (H) Aug 2010     Proteinuria      PVD (peripheral vascular disease) (H)      Renal artery stenosis (H)  April 2011    Right side     Vitamin D deficiency disease        SURGICAL HISTORY:   Past Surgical History:   Procedure Laterality Date     ARTHROPLASTY KNEE      left     BYPASS CARDIOPULMONARY N/A 1/15/2019    Procedure: On Cardiopulmonary Bypass;  Surgeon: Haseeb Villegas MD;  Location: [facility] OR     BYPASS GRAFT ARTERY CORONARY MINIMALLY INVASIVE  (NO PUMP) N/A 1/15/2019    Procedure: Median Sternotomy, Coronary Artery Bypass Graft x3, Endovein [place] of Left Greater Saphenous Vein, Left Internal Mammary Artery Takedown;  Surgeon: Haseeb Villegas MD;  Location: [facility]     EYE SURGERY      cataracts     IR CVC TUNNEL PLACEMENT > 5 YRS OF AGE  1/30/2019     LAPAROSCOPIC TUBAL LIGATION       TRACHEOSTOMY N/A 1/30/2019    Procedure: Percutaneous tracheostomy converted to Open Tracheostomy;  Surgeon: Yancy Luo MD;  [facility]: [facility]     TRANSESOPHAGEAL ECHOCARDIOGRAM INTRAOPERATIVE  1/15/2019    Procedure: Intraoperative Transesophageal Echocardiogram;  Surgeon: Haseeb Villegas MD;  Location:  OR       FAMILY HISTORY: No family history of problems with anesthesia    VITAL SIGNS:  Temp:  [38.3  F (3.5  C)-98  F (36.7  C)] 97.9  F (36.6  C)  Heart Rate:  [62-73] 71  Resp:  [14-28] 27  BP: ()/() 141/94  Cuff Mean (mmHg):  [104-138] 131  FiO2 (%):  [40 %-50 %] 40 %  SpO2:  [90 %-100 %] 94 %  Ventilation Mode: (S) CMV/AC  (Continuous Mandatory Ventilation/ Assist Control)  FiO2 (%): 40 %  Rate Set (breaths/minute): 14  breaths/min  Tidal Volume Set (mL): 450 mL  PEEP (cm H2O): 5 cmH2O  Pressure Support (cm H2O): 7 cmH2O  Oxygen Concentration (%): 40 %  Resp: 27      INTAKE/ OUTPUT:   I/O last 3 completed shifts:  In: 1780.51 [I.V.:1435.51; NG/GT:345]  Out: 3650 [Emesis/NG output:650; Other:3000]     PHYSICAL EXAMINATION:  General: trached, not following commands, moves extremities sponatneously but no purposeful movements  HEENT: Normocephalic, atraumatic, tracheostomy site C/D  Respiratory: Non-labored breathing. Minimal vent settings  Cardiovascular: Regular rate and rhythm.   Gastrointestinal: Abdomen soft, non-distended, non-tender to palpation.  Extremities: Moving all four extremities. No limb deformities. No pedal edema.       ALLERGIES:      Allergies   Allergen Reactions     Lisinopril      She developed ARF       MEDICATIONS:    No current facility-administered medications on file prior to encounter.   Current Outpatient Medications on File Prior to Encounter:  ACE/ARB NOT PRESCRIBED, INTENTIONAL, ACE & ARB not prescribed due to Worsening renal function on ACE/ARB therapy   acetaminophen (TYLENOL) 325 MG tablet Take 2 tablets (650 mg) by mouth every 6 hours as needed for mild pain   amLODIPine (NORVASC) 10 MG tablet TAKE 1 TABLET BY MOUTH ONCE DAILY   ASPIRIN NOT PRESCRIBED (INTENTIONAL) Antiplatelet medication not prescribed intentionally due to Plavix   busPIRone (BUSPAR) 5 MG tablet TAKE 1 TABLET BY MOUTH TWICE DAILY FOR ANXIETY   clopidogrel (PLAVIX) 75 MG tablet TAKE 1 TABLET BY MOUTH ONCE DAILY   doxazosin (CARDURA) 4 MG tablet TAKE 1 TABLET BY MOUTH AT BEDTIME   Hydrocortisone Acetate 2.5 % CREA Externally apply 1 Application topically 2 times daily Apply to both arms twice a day   metoprolol tartrate (LOPRESSOR) 50 MG tablet TAKE ONE TABLET BY MOUTH TWICE DAILY   metoprolol tartrate (LOPRESSOR) 50 MG tablet Take 0.5 tablets (25 mg) by mouth 2 times daily   ondansetron (ZOFRAN) 8 MG tablet Take 1 tablet (8 mg) by  mouth every 8 hours as needed for nausea   order for DME Machine to dispense the medication   pantoprazole (PROTONIX) 40 MG EC tablet TAKE ONE TABLET BY MOUTH ONCE DAILY 30-60  MINUTES  BEFORE  A  MEAL   simvastatin (ZOCOR) 40 MG tablet Take 1 tablet (40 mg) by mouth At Bedtime   traZODone (DESYREL) 50 MG tablet Take 1 tablet (50 mg) by mouth nightly as needed for sleep       LABS: Reviewed.   Arterial Blood Gases   No lab results found in last 7 days.  Complete Blood Count   Recent Labs   Lab 02/02/19  0849 02/01/19  1037 01/29/19  0408 01/28/19  0335   WBC 13.2* 10.4 10.4 11.7*   HGB 7.1* 7.3* 8.1* 7.8*    357 273 246     Basic Metabolic Panel  Recent Labs   Lab 02/02/19  0849 02/01/19  1037 01/31/19  0357 01/30/19  1227 01/30/19  0402    136 135  --  134   POTASSIUM 4.0 3.9 3.6 3.6 3.2*   CHLORIDE 101 98 97  --  98   CO2 24 23 23  --  25   BUN 33* 48* 69*  --  51*   CR 2.50* 3.10* 4.07*  --  2.92*   * 123* 99  --  119*     Liver Function Tests  Recent Labs   Lab 01/27/19  0404   AST 30   ALT 14   ALKPHOS 76   BILITOTAL 0.9   ALBUMIN 1.8*     Pancreatic Enzymes  No lab results found in last 7 days.  Coagulation Profile  No lab results found in last 7 days.  Lactate  Invalid input(s): LACTATE      RADIOLOGY:   Recent Results (from the past 24 hour(s))   XR Abdomen Port 1 View    Narrative    Exam: XR ABDOMEN PORT 1 VW 2/1/2019 12:59 PM    History: Vomiting, potential obstruction    Comparison: 1/28/2019    Findings: Single supine view of the abdomen. Left upper quadrant  percutaneous gastrostomy tube. Previously seen feeding tube and  gastric tube have been removed. Bowel gas pattern is nonobstructive.  Prominent loops of gas and stool-filled colon; rectum contains gas. No  portal venous gas and no pneumatosis. Vascular calcifications. No  acute bony normality. Degenerative changes throughout the spine.      Impression    Impression: Nonobstructive bowel gas pattern.    DOMENICO VILCHIS MD   XR  Chest Port 1 View    Narrative    XR CHEST PORT 1 VW  2/1/2019 1:00 PM      HISTORY: Interval CXR    COMPARISON: 2/1/2019    FINDINGS: Portable AP view the chest. Right IJ central venous catheter  with tip projecting over the mid SVC. Tracheostomy tube with tip  projecting over the trachea. Left upper approach PICC with tip  projecting over the high SVC.    Unchanged enlargement of the cardiac silhouette. Severe mitral annular  calcifications. Left retrocardiac airspace opacities are also not  appreciably changed. Small left pleural effusion. No definite  pneumothorax.      Impression    IMPRESSION:   1. Stable support devices.  2. Unchanged left retrocardiac atelectasis, infection or aspiration.    I have personally reviewed the examination and initial interpretation  and I agree with the findings.    FERMÍN HEAD MD   MR Brain w/o Contrast   Result Value    Radiologist flags stroke (Urgent)    Narrative    MR BRAIN W/O CONTRAST 2/1/2019 7:56 PM    Provided History: Altered level of consciousness (LOC), unexplained    Comparison:  7/12/2017     Technique: Sagittal T1-weighted and axial T2-weighted, turboFLAIR and  diffusion-weighted with ADC map images of the brain were obtained  without intravenous contrast.    Findings: These images reveal no intracranial mass lesion, mass  effect, midline shift or abnormal extraaxial fluid collection. The  ventricles and sulci are normal for age. Extensive periventricular  deep white matter FLAIR hyperintensities. Not meaningfully changed  from 7/12/2017. Old lacunar infarcts in the basal ganglia and sal.  Two punctate foci of restricted diffusion in left cerebellar  hemisphere. Prominent signal in the central sal on the DWI images,  similar to 7/12/2017. Normal intravascular flow voids are identified.  Bilateral mastoid effusions. Bilateral pseudophakia.      Impression    Impression:  1. Two short linear acute infarctions in the left cerebellar  hemisphere.  2. No  substantial change in the probable small vessel ischemic  disease.    [Urgent Result: stroke]    Finding was identified on 2/1/2019 7:58 PM.     The on call [facility] resident was contacted by Dr. Guzman at 2/1/2019  8:02 PM and verbalized understanding of the urgent finding.     I have personally reviewed the examination and initial interpretation  and I agree with the findings.    AMAURY PALMER MD   XR Chest Port 1 View    Narrative    Exam: XR CHEST PORT 1 VW, 2/2/2019 3:47 AM    Indication: Interval CXR    Comparison: 02/01/2019 x-ray    Findings:   AP single view chest. Tip of the tracheostomy tube projects over the  midthoracic trachea. Right IJ central venous catheter in stable  position. Ongoing moderate left-sided pleural effusion associated with  compressive atelectasis. Cardia mediastinal silhouette is enlarged,  stable.      Impression    Impression:   1. Unchanged moderate left-sided pleural effusion with adjacent  compressive atelectasis. Cannot rule out superimposed infection.  2. Stable enlarged cardiomediastinal silhouette  3. Stable supportive devices    I have personally reviewed the examination and initial interpretation  and I agree with the findings.    FERMÍN HEAD MD         Patient seen, findings and plan discussed with surgical ICU staff Dr. Rob Chen.    Miguel Reeves MD

## 2019-02-02 NOTE — PROGRESS NOTES
Neuroscience Intensive Care Progress Note    2019    Wendy Rocha is an 78-year-old-female who was admitted on 2019 with a history of CKDIII, bilateral carotid stenosis, CHF, GERD, HTN, CAD s/p ACS transferred form [facility] with severe multi-vessel disease s/p emergent CABG x3 on 1/15, required dialysis, then complicated by PEA on  with ROSC. NCC/vascular consulted for neurological prognosis and evaluation of focal neurological deficits and altered mental status on exam.    24 hour events:  No acute events overnight    24 Hour Vital Signs Summary:  Temperatures:  Current - Temp: 97.9  F (36.6  C); Max - Temp  Av.2  F (31.8  C)  Min: 38.3  F (3.5  C)  Max: 98  F (36.7  C)  Respiration range: Resp  Av.4  Min: 14  Max: 28  Pulse range: No Data Recorded  Blood pressure range: Systolic (24hrs), Av , Min:98 , Max:179   ; Diastolic (24hrs), Av, Min:61, Max:131    Pulse oximetry range: SpO2  Av.5 %  Min: 90 %  Max: 100 %    Ventilator Settings  Ventilation Mode: CPAP/PS  (Continuous positive airway pressure with Pressure Support)  FiO2 (%): 40 %  Rate Set (breaths/minute): 14 breaths/min  Tidal Volume Set (mL): 450 mL  PEEP (cm H2O): 5 cmH2O  Pressure Support (cm H2O): 7 cmH2O  Oxygen Concentration (%): 40 %  Resp: 27        Intake/Output Summary (Last 24 hours) at 2019 0905  Last data filed at 2019 0600  Gross per 24 hour   Intake 1586.41 ml   Output 3650 ml   Net -2063.59 ml       BP - Mean:  [] 103    Current Medications:    aspirin  81 mg Oral or Feeding Tube Daily     atorvastatin  40 mg Oral QPM     B and C vitamin Complex with folic acid  5 mL Per Feeding Tube Daily     busPIRone  5 mg Oral or Feeding Tube BID     heparin Lock (1000 units/mL High concentration)  3 mL Intracatheter Once     heparin Lock (1000 units/mL High concentration)  3 mL Intracatheter Once     heparin lock flush  5-10 mL Intracatheter Q24H     heparin  5,000 Units Subcutaneous Q8H FAISAL      insulin aspart  1-6 Units Subcutaneous Q4H     labetalol  10 mg Intravenous Q6H     pantoprazole (PROTONIX) IV  40 mg Intravenous Daily with breakfast     polyethylene glycol  17 g Oral or Feeding Tube Daily     protein modular  1 packet Per Feeding Tube BID     QUEtiapine  50 mg Oral At Bedtime     senna-docusate  1 tablet Oral or Feeding Tube BID    Or     senna-docusate  2 tablet Oral or Feeding Tube BID     sodium chloride (PF)  3 mL Intracatheter Q8H       PRN Medications:  acetaminophen, albuterol, artificial tears, bisacodyl, IV fluid REPLACEMENT ONLY, IV fluid REPLACEMENT ONLY, glucose **OR** dextrose **OR** glucagon, diphenhydrAMINE **OR** diphenhydrAMINE, heparin lock flush, heparin lock flush, HOLD MEDICATION, hydrALAZINE, insulin aspart, ipratropium - albuterol 0.5 mg/2.5 mg/3 mL, labetalol, lidocaine 4%, lidocaine 4%, lidocaine 4%, lidocaine (buffered or not buffered), lidocaine (buffered or not buffered), magnesium sulfate, magnesium sulfate, metoprolol, naloxone, ondansetron **OR** ondansetron, oxyCODONE, sodium chloride (PF), sodium chloride (PF), sodium chloride (PF)    Infusions:    amiodarone Stopped (02/01/19 2253)     IV fluid REPLACEMENT ONLY       IV fluid REPLACEMENT ONLY       esmolol 150 mcg/kg/min (02/02/19 0644)     propofol (DIPRIVAN) infusion Stopped (02/01/19 2015)       Allergies   Allergen Reactions     Lisinopril      She developed ARF       Physical Examination:  /76   Pulse 92   Temp 97.9  F (36.6  C) (Axillary)   Resp 27   Wt 77.7 kg (171 lb 4.8 oz)   SpO2 96%   BMI 30.34 kg/m      Mental status: alert, unable to follow commands, does not squeeze hand, question if she tracks to the right.  Cranial nerves: gaze midline at rest, blinks spontaneously, blinks to threat on R, not really on left, no facial asymmetry noted, spontaneous cough and gag  Motor/Sensory: increased tone on R upper and lower extremities, normal tone on L. Withdraws to noxious stimuli minimally LUE,  not movement RUE, withdraws b/l LE. Minimal spontaneous movements BLE.  Reflexes: normal patellar and brachial reflexes, toe up-going on L, equivocal on R. No clonus.   Coordination: unable to assess due to MS  Gait: unable to assess due to MS      Labs/Studies:  Recent Labs   Lab Test 02/01/19  1037 01/31/19  0357 01/30/19  1227 01/30/19  0402 01/29/19  0408  01/28/19  0335    135  --  134 133  --  133   POTASSIUM 3.9 3.6 3.6 3.2* 3.6   < > 3.6   CHLORIDE 98 97  --  98 98  --  98   CO2 23 23  --  25 23  --  19*   ANIONGAP 15* 14  --  12 11  --  16*   * 99  --  119* 127*  --  137*   BUN 48* 69*  --  51* 51*  --  80*   CR 3.10* 4.07*  --  2.92* 3.27*  --  4.63*   JEFFREY 8.2* 7.5*  --  7.7* 7.8*  --  7.3*   WBC 10.4  --   --   --  10.4  --  11.7*   RBC 2.53*  --   --   --  2.73*  --  2.64*   HGB 7.3*  --   --   --  8.1*  --  7.8*     --   --   --  273  --  246    < > = values in this interval not displayed.       Recent Labs   Lab Test 01/23/19  1556 01/23/19  0946 01/23/19  0139 01/23/19  0115   INR 1.85* 1.47* 4.00* 3.63*   PTT  --  39* 39* 57*         Recent Labs   Lab 01/29/19  0454 01/27/19  0404   O2PER 50 50       Imaging:    Mr Brain W/o Contrast    Result Date: 2/1/2019  MR BRAIN W/O CONTRAST 2/1/2019 7:56 PM Provided History: Altered level of consciousness (LOC), unexplained Comparison:  7/12/2017 Technique: Sagittal T1-weighted and axial T2-weighted, turboFLAIR and diffusion-weighted with ADC map images of the brain were obtained without intravenous contrast. Findings: These images reveal no intracranial mass lesion, mass effect, midline shift or abnormal extraaxial fluid collection. The ventricles and sulci are normal for age. Extensive periventricular deep white matter FLAIR hyperintensities. Not meaningfully changed from 7/12/2017. Old lacunar infarcts in the basal ganglia and sal. Two punctate foci of restricted diffusion in left cerebellar hemisphere. Prominent signal in the central  sal on the DWI images, similar to 7/12/2017. Normal intravascular flow voids are identified. Bilateral mastoid effusions. Bilateral pseudophakia.     Impression: 1. Two short linear acute infarctions in the left cerebellar hemisphere. 2. No substantial change in the probable small vessel ischemic disease. [Urgent Result: stroke] Finding was identified on 2/1/2019 7:58 PM. The on call CVTS resident was contacted by Dr. Guzman at 2/1/2019 8:02 PM and verbalized understanding of the urgent finding. I have personally reviewed the examination and initial interpretation and I agree with the findings. AMAURY PALMER MD    Xr Chest Port 1 View    Result Date: 2/1/2019  XR CHEST PORT 1 VW  2/1/2019 1:00 PM  HISTORY: Interval CXR COMPARISON: 2/1/2019 FINDINGS: Portable AP view the chest. Right IJ central venous catheter with tip projecting over the mid SVC. Tracheostomy tube with tip projecting over the trachea. Left upper approach PICC with tip projecting over the high SVC. Unchanged enlargement of the cardiac silhouette. Severe mitral annular calcifications. Left retrocardiac airspace opacities are also not appreciably changed. Small left pleural effusion. No definite pneumothorax.     IMPRESSION: 1. Stable support devices. 2. Unchanged left retrocardiac atelectasis, infection or aspiration. I have personally reviewed the examination and initial interpretation and I agree with the findings. FERMÍN HEAD MD    Xr Chest Port 1 View    Result Date: 1/31/2019  Exam: XR CHEST PORT 1 VW, 1/31/2019 2:01 AM Indication: Interval CXR Comparison: 1/30/2019 x-ray Findings: AP single view of chest. Tip of the tracheostomy tube projects over the upper thoracic trachea. Tip of the double-lumen right IJ central venous catheter projects over the mid SVC. Tip of the left-sided PICC projects over the mid SVC. Postsurgical changes of CABG. Ongoing moderate left-sided pleural effusion with adjacent opacity. Cardia mediastinal silhouettes  is enlarged, stable. Again noted the mitral annular calcifications.     Impression: 1. Moderate left-sided pleural effusion with adjacent opacity, likely atelectasis. Cannot rule rule out retrocardiac/left lower lobe infection. 2. Stable lines and tubes. I have personally reviewed the examination and initial interpretation and I agree with the findings. YOVANY GLOVER MD    Xr Chest Port 1 View    Result Date: 1/30/2019  XR CHEST PORT 1 VW  1/30/2019 4:27 PM  HISTORY: Postop CXR COMPARISON: Earlier same day FINDINGS: Tracheostomy. Right IJ central venous catheter tip projecting over the cavoatrial junction. Postsurgical changes of CABG. Enlarged cardiac silhouette, unchanged. Increased interstitial opacities compared to prior examination. Probable layering pleural effusions. No pneumothorax.     IMPRESSION: 1. Increased pulmonary interstitial opacities, likely pulmonary edema. Probable layering pleural effusions. 2. Postsurgical changes of CABG. No pneumothorax. I have personally reviewed the examination and initial interpretation and I agree with the findings. JACKIE ANTONIO MD    Xr Abdomen Port 1 View    Result Date: 2/1/2019  Exam: XR ABDOMEN PORT 1 VW 2/1/2019 12:59 PM History: Vomiting, potential obstruction Comparison: 1/28/2019 Findings: Single supine view of the abdomen. Left upper quadrant percutaneous gastrostomy tube. Previously seen feeding tube and gastric tube have been removed. Bowel gas pattern is nonobstructive. Prominent loops of gas and stool-filled colon; rectum contains gas. No portal venous gas and no pneumatosis. Vascular calcifications. No acute bony normality. Degenerative changes throughout the spine.     Impression: Nonobstructive bowel gas pattern. DOMENICO VILCHIS MD    Ir Cvc Tunnel Placement > 5 Yrs Of Age    Result Date: 1/30/2019  PRE-PROCEDURE DIAGNOSIS: Dialysis patient POST-PROCEDURE DIAGNOSIS: Same PROCEDURE: Tunneled central venous catheter placement    IMPRESSION:  Completed image-guided placement of 14.5 Romansh, 19 cm double lumen tunneled central venous catheter via right internal jugular vein. Catheter tip in high right atrium. Aspirates and flushes freely, heparin locked and ready for immediate use. No complication. ---------- CLINICAL HISTORY: Patient requires central venous access for therapy. Tunneled central venous catheter placement requested. PERFORMED BY: Neville Messina PA-C CONSENT: Written informed consent was obtained and is documented in the patient record. MEDICATIONS: A 10:1 volume mixture of 1% lidocaine without epinephrine buffered with 8.4% bicarbonate solution was available for local anesthesia. The catheter was heparin locked upon completion of placement. NURSING: The patient was placed on continuous vital signs monitoring. Vital signs and sedation were monitored by nursing staff under IR physician staff supervision.  FLUOROSCOPY TIME: 1.1 minutes DESCRIPTION: The right neck and upper chest were prepped and draped in the usual sterile fashion.  Under ultrasound guidance, the right internal jugular vein was identified and the overlying skin was anesthetized and skin dermatotomy was made. Under ultrasound guidance, right internal jugular venipuncture was made with needle. Image saved documenting venipuncture and patency. Needle was exchanged over guidewire for a dilator under fluoroscopic guidance. Length to right atrium was measured with guidewire. Guidewire and inner dilator were removed. Wire was advanced into inferior vena cava under fluoroscopic guidance and secured. The anterior chest skin was anesthetized and incision was made after measurements were taken. A cuffed catheter was subcutaneously tunneled from the anterior chest incision to the internal jugular venipuncture site after path of tunnel was anesthetized. The dilator was exchanged over guidewire for a peel-away sheath. Guidewire was removed. Under fluoroscopic guidance, the catheter was placed  through the peel-away sheath. Peel-away sheath was removed.  Final catheter position saved. Both catheter lumens adequately aspirated and flushed. Each lumen was heparin locked. A catheter retaining suture and sterile dressing were applied. The skin dermatotomy site overlying the internal jugular venipuncture was closed with topical adhesive. COMPLICATIONS: No immediate concerns, the patient remained stable throughout the procedure and tolerated it well. ESTIMATED BLOOD LOSS: Minimal SPECIMENS: None HAYDEE LITTLE PA-C    Ct Head W/o Contrast    Result Date: 1/31/2019  CT HEAD W/O CONTRAST 1/31/2019 9:25 PM History: Neuro deficit(s), subacute ICD-10: Comparison: 1/18/2019. Technique: Using multidetector thin collimation helical acquisition technique, axial, coronal and sagittal CT images from the skull base to the vertex were obtained without intravenous contrast. Findings:  On the noncontrast images of the brain, there is no definite intracranial hemorrhage, mass affect, or midline shift. The ventricles are not enlarged. Low-attenuation in periventricular white matter is not disproportionate to the patient's age. The basal cisterns are patent. There is some limitation due to motion. Diffuse mastoid debris is new. Diffuse severe carotid and basilar atherosclerosis, as previously. Left frontal lobe hypoattenuation likely related to streak artifact and motion.      Impression: 1. Some limitation in evaluation due to motion, but no definite acute intracranial pathology. 2. Severe leukoariosis and some cerebral atrophy, slightly disproportionate to patient's age, unchanged. JIM BECK MD    Assessment/Plan  Wendy Rocha is an 78-year-old-female who was admitted on 1/14/2019 with a history of CKDIII, bilateral carotid stenosis, CHF, GERD, HTN, CAD s/p ACS transferred form [facility] with severe multi-vessel disease s/p emergent CABG x3 on 1/15, required dialysis, then complicated by PEA on 1/23 with ROSC.  NCC/vascular consulted for neurological prognosis and evaluation of focal neurological deficits and altered mental status on exam.     #Stroke vs anoxic brain injury vs toxic metabolic encephalopathy  Asymmetric exam, persistent minimally responsive state now >2 weeks since admission with multiple medical comorbidities. Anoxic brain injury likely. Unclear baseline mental function prior to admission.     Routine ordered, which did not demonstrate electrographic seizures. Findings were consistent with moderate-severe electrographic encephalopathy, excluding nonconvulsive status as explanation for decreased responsiveness.      Recommendations:  - MRI brain completed which demonstrates two new punctate foci of restricted diffusion in left cerebellar hemisphere, new acute infarctions, unlikely to explain her decreased responsiveness.   - She likely has mild anoxic brain injury as a result of her complicated course, possibly during PEA arrest vs earlier during hospitalization surrounding initial emergent need for CABG.   - No intervention necessary. Will sign off, please contact with questions or concerns.     Tri Rivera MD  Neurocritical care fellow

## 2019-02-02 NOTE — PROGRESS NOTES
HEMODIALYSIS TREATMENT NOTE    Date: 2/1/2019  Time: 6:24 PM    Data:  Pre Wt:   79.1 kg  Desired Wt: 76.1 kg   Post Wt:  76.1 kg  Weight gain:-3.0 kg off  Weight change:  3.0 kg  Ultrafiltration - Post Run Net Total Removed (mL): 3000 mL  Ultrafiltration - Post Run Net Total Gain (mL): 0 mL  Vascular Access Status: Yes, secured and visible  Dialyzer Rinse: Streaked, Light  Total Blood Volume Processed: 72.9 L  Total Dialysis (Treatment) Time:  3 hrs    Lab:   NO    Assessment:  Patent Lt Tunneled CVC line. Pre un K/Ca:3.9/8.2, BUN/Cr: 48/3.1.  Left Lung -Pleural effusion, T-tube with Vented 50 %, CABG* 3 (1/15/19), Esmolol IV at 150 mcg/kg/mins, Amiodarone at 0.5mg/mins    Interventions:  Initiated HD via Lt CVC line with K 3/3 bath. Reach BFR to 450 ml/mins. Kept ~170 mmHg during HD. ICU RN gave Labetalol 10 mg IV * 2 times per PRN order. But not working well.  Notified MD and gave oxycodone 5 mg po and hydralazine 5 mg IV to get better BP. Esmolol is dialysis off drugs. Finished HD with rinse back and CVC locked with NS 10 ml. Clearguard caps placed.     Plan:    NExt run per renal team

## 2019-02-02 NOTE — PLAN OF CARE
Neuro: Unable to assess orientation.  Patient extremely stiff in all extremities, difficulty with bending arms or legs.  Tremors, jerking, and shaking noted throughout shift, primarily in BLE.  Movement seen in BUE, and RLE.  Pupils intermittently slightly two different sizes but reactive; patient resisting opening right eye and difficult to evaluate at times.  Off unit for MRI at beginning of shift - see additional note with CVTS.  Propofol turned off upon arrival from MRI.  CV: SR on monitor with PACs occasionally.  Amiodarone gtt stopped.  Goal SBP <140 and achieved with increasing Esmolol and PRN administration of Hydralazine/Labetalol.  Pulm: CMV settings with PEEP 5 and FiO2 decreased to 40%.  Patient's breathing appears labored at times; improves with pain medication.  Breathing with mouth wide open and yawning often.  Trach with small amount of oozing/secretions.  Minimal suctioning required.  Minimal cough.  GI: Pink lady enema given as scheduled along with Sennokot.  Patient had several smears or small amounts of stools and more substantial BM this am @ 0630.  Tolerated enema without issue.  PEG remains in place and open to gravity; clamped temporarily after meds given.  : Anuric.  Hemodialysis yesterday.  Skin: Trach remains stitched in.  Area around trach appears to be reddened and have some swelling - difficult to clean or offload due to how tightly the faceplate is stitched in place.  IV/Gtt: Amiodarone gtt discontinued.  Propofol discontinued.  Esmolol gtt continues - titrating to keep HR and BP within goal range.  L PICC line remains in place.  R tunneled catheter for dialysis remains in place.

## 2019-02-02 NOTE — PROCEDURES
[facility] EEG #:      DATE OF RECORDIN2019    Duration of Recordin mins.    CLINICAL SUMMARY:  This urgent portable inpatient EEG recording was performed in evaluation of encephalopathy in Wendy Rocha using 23 scalp electrodes placed in the 10-20 system.  She was reported to be receiving quetiapine and buspirone at the time of this recording.      EEG ACTIVITIES DURING STUPOR:  During ongoing stupor, there was no evidence of normal activities of waking or sleep.  There was ongoing generalized irregular 2-8 Hz delta-theta slowing of moderate amplitude, with superimposed slower amplitude faster activities symmetrically.      No interictal epileptiform abnormalities and no electrographic seizures were recorded.      IMPRESSION:  This was an abnormal EEG recording during stupor due to generalized delta-theta slowing.  No interictal epileptiform abnormalities and no electrographic seizures were recorded.    These findings indicate moderate-severe electrographic encephalopathy, which is etiologically nonspecific, and exclude a diagnosis of nonconvulsive status epilepticus.  Clinical correlation is recommended.   Armand Kauffman M.D., Professor of Neurology          D: 2019   T: 2019   MT: EKATERINA      Name:     WENDY ROCHA   MRN:      [id number]        Account:        [id number]   :      1940           Procedure Date: 2019      Document: [id number]

## 2019-02-02 NOTE — PLAN OF CARE
Neuro: pt remains the same, does not follow commands, intermittently with unequal pupils, MD aware. No posturing noted. Pt does not track. Pt appeared to be in discomfort intermittently, PRN oxycodone and tylenol given. Pt going to MRI at 1900.   Cardiac: esmolol titrated down to 150, needed to be increased to 200 during and post dialysis. PRN labetalol and hydralazine given with minimal BP drop. MD notified X 3, no new orders at this time, to increase esmolol PRN. Remains on amiodarone at 0.5mg/min  Respiratory: remains on trach vent, no PS. Minimal secretions, dry mouth. LS coarse.   GI: abd soft, abd X-ray completed, which showed constipation, plan for pink lady enema. G-tube to gravity with dark brown/bile output.   : oliguric, dialysis run with 3L fluid removal. Pt became more hypertensive during dialysis run.   Plan: continue to monitor pt closely and notify MD of any changes or concerns.

## 2019-02-03 NOTE — PLAN OF CARE
Neuro: same per current baseline. L pupil larger then right pupil, reactive. Noted to move extremities, but does not follow commands. Intermittent eye roving movement present. Intermittent jerky, shaking movement with tremors noted. PRN oxycodone given X 1.   Cardiac: HR remained <90, esmolol gtt discontinued by MD, needed to be restarted for high 's-170's. PRN hydralazine and labetalol given. Afebrile.   Respiratory: trach vented, CMV settings. Minimal secretions. LS coarse.   GI: abd soft, tube feeds advanced to 20/hr, with plan to increase this evening. No stool.   : no UOP noted, Hemodialysis pt.   Plan:Continue to monitor pt closely and notify MD of any changes or concerns.

## 2019-02-03 NOTE — PROGRESS NOTES
"CLINICAL NUTRITION SERVICES - BRIEF NOTE     Received nutrition consult for \"Registered Dietitian to Assess & Order TF (per Medical Nutrition Therapy recommendations).\"      Per chart review, pt previously on TF via G tube consisting of Nepro @ 40 ml/hr (960 ml/day) plus 1 Pkt ProSource BID (80 kcal, 22 g PRO) for total 1808 kcal (102% MREE vs 32 kcal/kg) and 100 g PRO (1.8 g/kg).   However, TF was discontinued on 2/1 d/t concern for obstruction.     Provider entered TF orders last night to restart TF via J tube (pt only has G- port) with Nepro at 10 ml/hr x 4 hrs with adv by 10 ml every 4 hrs to goal of 40 mL/hr.        Interventions  Enteral Nutrition - Adjusted TF order to reflect change in route of TF to G tube.    Yola Gutierrez RD,LD  Weekend pager [phone]      "

## 2019-02-03 NOTE — PLAN OF CARE
Neuro: pt remains the same, L pupils slightly bigger then right, both are reactive. intermittently tracks, does not follow commands. Roving eye movement noted intermittently. Noted to have some shaking and jerky movement, with tremors. Movement noted in all extremities, more in BUE. MD talked to family about MRI results. PRN oxycodone given X 1 for noted discomfort.   Cardiac: NSR with occasional PAC's. SBP goal <140, PRN labetalol and hydralazine given. Esmolol gtt increased to 250. MD notified X 3 about SBP>140, per MD verbal order for SBP to remain <150. Hgb of 7.1, 1 unit of PRBC's given with no side effects noted.   Respiratory: LS coarse, remain on CMV VIA trach, intermittently labored. Minimal secretions noted, dry mouth.   GI: abd soft, stool X 3. Trickle tube feeds started at 1830 at 10ml/hr via PEG.  : oliguric, hemodialysis patient.   Plan: continue to monitor and notify MD of any changes or concerns.

## 2019-02-03 NOTE — SIGNIFICANT EVENT
SPIRITUAL HEALTH SERVICES Significant Event  [employer] [facility] [employer]OINTING  [facility] 3C    REFERRAL SOURCE: Follow up    I ANOINTED PT. ON 2/3/2019    PLAN: I will continue to follow pt. While on 4th floor.    Robert Braun M.Div.  [facility] Otero

## 2019-02-03 NOTE — PROGRESS NOTES
CVTS PROGRESS NOTE  February 3, 2019    CO-MORBIDITIES:   Acute kidney injury (H)  (primary encounter diagnosis)  CKD (chronic kidney disease) stage 3, GFR 30-59 ml/min (H)    Plan for today  - PO amiodarone continue, started PO metoprolol BID, continue labetalol IV. discontinue esmolol  - Advance feeds today, no longer vomiting   - Family conference next week      PLAN:   Neuro/ pain/ sedation:  -Monitor neurological status. Notify the MD for any acute changes in exam.  -MRI brain shows two new punctate foci of restricted diffusion in left cerebellar hemisphere, new acute infarctions. Neurology signed off. No plans for intervention   -Oxycodone prn pain  -Seroquel 50mg at bedtime     Pulmonary care:   -Continue trach care  -Keep patient on current settings, ETCO2 monitoring      Cardiovascular:    -Monitor hemodynamic status.   - Discontinue esmolol gtt  - PO amiodarone continue, started PO metoprolol BID, continue labetalol IV.     GI care:   - advance as tolerated       Fluids/ Electrolytes/ Nutrition:   -Replace electrolytes per ICU protocol K>4, Mg>2, Phos WNL     Renal/ Fluid Balance:    -HD, nephrology following , is anuric      Endocrine:    -Insulin sliding scale      ID/ Antibiotics:  -No antibiotics, no elevated WBC count, afebrile      Heme:     - Hb 7.5, stable.      Prophylaxis:    -Mechanical prophylaxis for DVT.   - Heparin TID  -Pantoprazole IV      Disposition:  -Surgical ICU.        Overnight events:   No acute events overnight          PAST MEDICAL HISTORY:   Past Medical History:   Diagnosis Date     Abnormal ECG      ARF (acute renal failure) (H) Aug 2010    Lisinopril was stopped at that time     Carotid stenosis      Carotid stenosis, bilateral      Carotid stenosis, bilateral     [facility] Surgeon: dr Kush Mcmillan, Phone: [phone] fax: [phone]     CKD (chronic kidney disease) stage 3, GFR 30-59 ml/min (H)      GERD (gastroesophageal reflux disease)      HTN, goal below 140/90       Hyperlipidemia LDL goal <100      Lung nodule may 2013    needs f/u may 2014     Muscle aches      Perforated duodenal ulcer (H) Aug 2010     Proteinuria      PVD (peripheral vascular disease) (H)      Renal artery stenosis (H)  April 2011    Right side     Vitamin D deficiency disease        SURGICAL HISTORY:   Past Surgical History:   Procedure Laterality Date     ARTHROPLASTY KNEE      left     BYPASS CARDIOPULMONARY N/A 1/15/2019    Procedure: On Cardiopulmonary Bypass;  Surgeon: Haseeb Villegas MD;  Location: [facility] OR     BYPASS GRAFT ARTERY CORONARY MINIMALLY INVASIVE  (NO PUMP) N/A 1/15/2019    Procedure: Median Sternotomy, Coronary Artery Bypass Graft x3, Endovein [place] of Left Greater Saphenous Vein, Left Internal Mammary Artery Takedown;  Surgeon: Haseeb Villegas MD;  Location: [facility] OR     EYE SURGERY      cataracts     IR CVC TUNNEL PLACEMENT > 5 YRS OF AGE  1/30/2019     LAPAROSCOPIC TUBAL LIGATION       TRACHEOSTOMY N/A 1/30/2019    Procedure: Percutaneous tracheostomy converted to Open Tracheostomy;  Surgeon: Yancy Luo MD;  Location: [facility] OR     TRANSESOPHAGEAL ECHOCARDIOGRAM INTRAOPERATIVE  1/15/2019    Procedure: Intraoperative Transesophageal Echocardiogram;  Surgeon: Haseeb Villegas MD;  Location: [facility] OR       FAMILY HISTORY: No family history of problems with anesthesia    VITAL SIGNS:  Temp:  [97.8  F (36.6  C)-99.2  F (37.3  C)] 97.8  F (36.6  C)  Heart Rate:  [64-77] 65  Resp:  [17-30] 29  BP: (106-157)/() 127/71  FiO2 (%):  [40 %] 40 %  SpO2:  [92 %-100 %] 93 %  Ventilation Mode: CMV/AC  (Continuous Mandatory Ventilation/ Assist Control)  FiO2 (%): 40 %  Rate Set (breaths/minute): 14 breaths/min  Tidal Volume Set (mL): 450 mL  PEEP (cm H2O): 5 cmH2O  Pressure Support (cm H2O): 7 cmH2O  Oxygen Concentration (%): 40 %  Resp: 29      INTAKE/ OUTPUT:   I/O last 3 completed shifts:  In: 1857.31 [I.V.:1069.31; NG/GT:425]  Out: 100 [Emesis/NG output:100]     PHYSICAL  EXAMINATION:  General: trached, not following commands, moves extremities sponatneously but no purposeful movements  HEENT: Normocephalic, atraumatic, tracheostomy site C/D  Respiratory: Non-labored breathing. Minimal vent settings  Cardiovascular: Regular rate and rhythm.   Gastrointestinal: Abdomen soft, non-distended, non-tender to palpation.  Extremities: Moving all four extremities. No limb deformities. No pedal edema.       ALLERGIES:      Allergies   Allergen Reactions     Lisinopril      She developed ARF       MEDICATIONS:    No current facility-administered medications on file prior to encounter.   Current Outpatient Medications on File Prior to Encounter:  ACE/ARB NOT PRESCRIBED, INTENTIONAL, ACE & ARB not prescribed due to Worsening renal function on ACE/ARB therapy   acetaminophen (TYLENOL) 325 MG tablet Take 2 tablets (650 mg) by mouth every 6 hours as needed for mild pain   amLODIPine (NORVASC) 10 MG tablet TAKE 1 TABLET BY MOUTH ONCE DAILY   ASPIRIN NOT PRESCRIBED (INTENTIONAL) Antiplatelet medication not prescribed intentionally due to Plavix   busPIRone (BUSPAR) 5 MG tablet TAKE 1 TABLET BY MOUTH TWICE DAILY FOR ANXIETY   clopidogrel (PLAVIX) 75 MG tablet TAKE 1 TABLET BY MOUTH ONCE DAILY   doxazosin (CARDURA) 4 MG tablet TAKE 1 TABLET BY MOUTH AT BEDTIME   Hydrocortisone Acetate 2.5 % CREA Externally apply 1 Application topically 2 times daily Apply to both arms twice a day   metoprolol tartrate (LOPRESSOR) 50 MG tablet TAKE ONE TABLET BY MOUTH TWICE DAILY   metoprolol tartrate (LOPRESSOR) 50 MG tablet Take 0.5 tablets (25 mg) by mouth 2 times daily   ondansetron (ZOFRAN) 8 MG tablet Take 1 tablet (8 mg) by mouth every 8 hours as needed for nausea   order for DME Machine to dispense the medication   pantoprazole (PROTONIX) 40 MG EC tablet TAKE ONE TABLET BY MOUTH ONCE DAILY 30-60  MINUTES  BEFORE  A  MEAL   simvastatin (ZOCOR) 40 MG tablet Take 1 tablet (40 mg) by mouth At Bedtime   traZODone  (DESYREL) 50 MG tablet Take 1 tablet (50 mg) by mouth nightly as needed for sleep       LABS: Reviewed.   Arterial Blood Gases   No lab results found in last 7 days.  Complete Blood Count   Recent Labs   Lab 02/03/19  0358 02/02/19  0849 02/01/19  1037 01/29/19  0408   WBC 10.4 13.2* 10.4 10.4   HGB 7.5* 7.1* 7.3* 8.1*    347 357 273     Basic Metabolic Panel  Recent Labs   Lab 02/03/19  0358 02/02/19  0849 02/01/19  1037 01/31/19  0357    137 136 135   POTASSIUM 4.6 4.0 3.9 3.6   CHLORIDE 101 101 98 97   CO2 19* 24 23 23   BUN 46* 33* 48* 69*   CR 3.42* 2.50* 3.10* 4.07*   * 106* 123* 99     Liver Function Tests  No lab results found in last 7 days.  Pancreatic Enzymes  No lab results found in last 7 days.  Coagulation Profile  No lab results found in last 7 days.  Lactate  Invalid input(s): LACTATE      RADIOLOGY:   No results found for this or any previous visit (from the past 24 hour(s)).      Patient seen, findings and plan discussed with surgical ICU staff Dr. Rob Chen.    Miguel Reeves MD

## 2019-02-03 NOTE — PLAN OF CARE
Neuro: Unable to assess orientation.  Jerking/twitching/spasticity in BLE, BUE are contracted and stiff.  Roving eye movements noted and patient resists opening right eye.  Tearing noted from both eyes.  Right eye with slight droop.  Does not follow commands and movement does not appear to be purposeful.  CV: SR with rare ectopy.  BPs requiring frequent PRNs to keep SBP <140.  Difficulty with weaning Esmolol gtt with this parameter as goal.  Pulm: Vent settings unchanged.  Frequent coughing with suctioning, trach cares, turns, or oral cares.  Low tolerance for turns with patient desatting at times.  Lung sounds diminished, crackles in bases.  Scant amount of secretions.  Aggressive oral care completed.  GI: NPO with TF left @ 10.  Will speak to team today regarding plan to advance.  Minimal stool overnight, incontinent.  : Small amount of urine at beginning of shift; incontinent and unmeasured.    Skin: PICC line dressing change completed.  Trach cares completed.  Difficulty offloading trach plate due to tight stitches in place.  Area around trach difficult to clean, moisture and oozing noted under trach faceplate.    IV/Gtt: Esmolol infusing ranging 200-250 overnight.

## 2019-02-03 NOTE — PROGRESS NOTES
Nephrology Progress Note  2019         Assessment & Recommendations:     Wendy Rocha is an 78 year-old female with a past medical history that includes Stage 3 CKD, carotid stenosis, GERD, HTN, CAD who had emergency CABG x3 on 1/15; nephrology service was consulted for NATALIA on CKD, with patient starting HD on .       ######  1. NATALIA on CKD-CKD 3, baseline serum creatinine approximately 2.0 and known to have one small (and presumably non-functioning kidney), so that likelihood of regaining independence from RRT is low.  2. RRT-next session tomorrow (Monday), will place orders for 3 hrs HD with 2L UF.  3. Dialysis access: tunneled internal jugular.  4. Na/K/tCO2: 140/4.6/, no changes recommended.  5. Ca/P/M.6 (albumin 1.8)/7.1/2.1, no changes recommended.  ######    Simon Chen MD   [phone]    Interval History :   Nursing and provider notes from last 24 hours reviewed.  Afib, rate now in controlled amiodarone/metoprolol/labetolol.    Review of Systems:   ROS not possible as patient unable to participate.  Physical Exam:   I/O last 3 completed shifts:  In: 1976.28 [I.V.:1198.28; NG/GT:305]  Out: 50 [Emesis/NG output:50]   /77   Pulse 92   Temp 97.8  F (36.6  C) (Axillary)   Resp 29   Wt 78 kg (171 lb 15.3 oz)   SpO2 92%   BMI 30.46 kg/m       GENERAL APPEARANCE: Ventilated  EYES:  no scleral icterus, pupils equal  HENT: mouth without ulcers or lesions  PULM: lungs clear  CV: a.fib, rate ~65     -JVD absent     -edema absent   GI: soft  INTEGUMENT: no cyanosis, norash  NEURO:  Nil lateralizing  Access: tunneled IJ    Labs:   All labs reviewed by me  Electrolytes/Renal -   Recent Labs   Lab Test 19  0358 19  0849 19  1037 19  0357  19  1830    137 136 135   < >  --    POTASSIUM 4.6 4.0 3.9 3.6   < > 3.4   CHLORIDE 101 101 98 97   < >  --    CO2 19* 24 23 23   < >  --    BUN 46* 33* 48* 69*   < >  --    CR 3.42* 2.50* 3.10* 4.07*   < >  --    GLC  102* 106* 123* 99   < >  --    JEFFREY 7.6* 8.2* 8.2* 7.5*   < >  --    MAG 2.1 2.1  --  2.3  --  2.8*   PHOS 7.1* 5.3*  --   --   --  8.3*    < > = values in this interval not displayed.       CBC -   Recent Labs   Lab Test 02/03/19  0358 02/02/19  0849 02/01/19  1037   WBC 10.4 13.2* 10.4   HGB 7.5* 7.1* 7.3*    347 357       LFTs -   Recent Labs   Lab Test 01/27/19  0404 01/26/19  0957 01/26/19  0425   ALKPHOS 76 76 71   BILITOTAL 0.9 0.9 0.8   ALT 14 20 19   AST 30 39 42   PROTTOTAL 5.1* 5.1* 5.1*   ALBUMIN 1.8* 1.9* 1.9*       Iron Panel -   Recent Labs   Lab Test 01/27/15  1218 07/11/12  0804   IRON 74 80   IRONSAT 21 26   ORQUIDEA 40 67       Current Medications:    amiodarone  400 mg Oral or Feeding Tube BID     aspirin  81 mg Oral or Feeding Tube Daily     atorvastatin  40 mg Oral QPM     B and C vitamin Complex with folic acid  5 mL Per Feeding Tube Daily     heparin Lock (1000 units/mL High concentration)  3 mL Intracatheter Once     heparin Lock (1000 units/mL High concentration)  3 mL Intracatheter Once     heparin lock flush  5-10 mL Intracatheter Q24H     heparin  5,000 Units Subcutaneous Q8H FAISAL     insulin aspart  1-6 Units Subcutaneous Q4H     labetalol  10 mg Intravenous Q6H     metoprolol tartrate  12.5 mg Oral TID     pantoprazole (PROTONIX) IV  40 mg Intravenous Daily with breakfast     polyethylene glycol  17 g Oral or Feeding Tube Daily     protein modular  1 packet Per Feeding Tube BID     QUEtiapine  50 mg Oral At Bedtime     senna-docusate  1 tablet Oral or Feeding Tube BID    Or     senna-docusate  2 tablet Oral or Feeding Tube BID     sodium chloride (PF)  3 mL Intracatheter Q8H       IV fluid REPLACEMENT ONLY       IV fluid REPLACEMENT ONLY       Simon Chen MD

## 2019-02-04 NOTE — PLAN OF CARE
PT 4A: PT has been holding, per chart review and discussion with OT patient not following commands, plan for patient to discharge to [facility] when bed available. No acute PT needs at this time, will complete orders.

## 2019-02-04 NOTE — PROGRESS NOTES
Care Coordinator - Discharge Planning    Admission Date/Time:  1/14/2019  Attending MD:  Haseeb Villegas MD     Data  Chart reviewed, discussed with interdisciplinary team.   Patient was admitted for:   1. Acute kidney injury (H)    2. CKD (chronic kidney disease) stage 3, GFR 30-59 ml/min (H)    Pt is s/p CABG X 5 on 1/15     Assessment   Full assessment completed in previous note.    The team reported pt is medically ready to be transfer to LTAC.  Pt has been referred to LTAC and meets criteria for both, [facility] and [facility].   Pt family had expressed they are thinking more toward [facility] but they were planning to visit both facilities over the weekend.  RNCC called pt dtr, Manju to check their preference.   Manju stated they didn't visit [facility] and will talk to her sibling about their preference and will call me back.      Coordination of Care and Referrals: Provided patient/family with options for LTACH.      Plan  Anticipated Discharge Date:  Tomorrow, 2/5 if bed is available at LTAC.  Anticipated Discharge Plan:  LTAC.  Awaiting a call back form pt family regarding their preference.  RNCC will cont to follow plan of care.      Aneta Plunkett RN, PHN, BSN  4A and 4E/ ICU  Care Coordinator  Phone: [phone]  Pager: [phone]

## 2019-02-04 NOTE — PLAN OF CARE
D/I: RR variable overnight, improved with tylenol and/or oxycodone. Moderate tan secretions with suctioning. SR 60's. Esmolol off with BP's controlled with PRN and scheduled meds. Hgb 7.9. Tube feeds at goal. BM times one post suppository. Patient neuro status unchanged. Withdraws on extremities. Stiffens with cares. Bites down on oral suction. Coughs with suctioning. New trach site with redness under flange.  Optifoam placed. Per trach policy, same size and second trach in room with extra suction, ties, obturator, and inner cannula.   P: Continue current cares.

## 2019-02-04 NOTE — PLAN OF CARE
Neuro: Neurologically unchanged.  No purposeful movements noted.  Spontaneously opens left eye, keeps right eye shut most of the time.  Intermittent roving eye movements.  Flexion to pain.  Stiff/contracted in all extremities.    CV: SR on monitor.  Esmolol gtt turned off with current SBP goal <140.  PRN Hydralazine/Labetalol given at beginning of shift with patient hypertensive with SBP in 160s.  Scheduled metoprolol given as ordered.  Afebrile.  Pulm: FiO2 increased from 40% to 50% with patient desatting to 87% on 40% FiO2.  Improvement seen with increased in FiO2 and repositioning.  Lungs sounding more coarse this evening than previous assessments.  Patient also increasingly tachypneic with rate in upper 20s to low 30s.  #6 Shiley remains in place, difficult to offload or complete trach cares with placement of tight sutures.    GI: NPO with TF increased to 30 ml/hour and goal of 40 ml/hour.  Small incontinent smear of stool at beginning of shift.  Bowel sounds now hypoactive.  : Oliguric.  Small amount of incontinent urine at beginning of shift.  Skin: Incisions to chest and left leg open to air.  Prim[place] to incision under left breast.    IV/Gtt: Esmolol turned off at this time.    Report given to Naz who will assume cares at this time.

## 2019-02-04 NOTE — PROGRESS NOTES
Nephrology Progress Note  02/04/2019         Mrs Rocha is an 78 yof w/CKD III, Carotid stenosis, GERD, HTN, CAD who had emergent CABG x3 on 1/15, Nephrology consulted for NATALIA on CKD, started HD on 1/21.       Interval History :   Mrs Rocha was seen on HD today, pulling 3-4L as BP's allow as she does have volume overload and is hypertensive.  Still ~6kg up before HD run, much of her volume overload is 3rd spaced with low albumin but will continue to pull fluid as able with runs to continue to try to optimize.  Likely discharge to TCU soon, has tunneled line and is on MWF schedule.       Assessment & Recommendations:   NATALIA on CKD-CKD 3, baseline Cr of ~2.0 with one atrophic kidney on imaging with no former imaging to compare.  Injury involving hemodynamics with CABG as well as contrast for angiogram prior.  Had 2 sessions of HD without UF and did reasonably well but then had PEA arrest and was placed on CRRT to stabilize electrolytes and prevent worsening volume overload.  Anuric since arrest, recovery unlikely.                             -CRRT stopped 1/25, transitioned to iHD, running MWF               -Appreciate IR placing tunneled line 1/30.      Volume status-Planning HD today, on MWF schedule.  Still with volume overload, is ~6kg up from admit wt but with low albumin much of it is 3rd spaced.      Electrolytes/pH-K 3.3, bicarb 25, no acute issues.       Ca/phos/pth-Ca 9.1 Phos 8.8 last check, running HD today.  -On Nepro TF.      CABG-Was recovering although needed HD for NATALIA but had PEA arrest evening of 1/22, started on CRRT but again transitioned to iHD.  Now with trach/PEG/tunnled line, nearing discharge to TCU depending on GI issues.       Anemia-Hgb 7.9, following.       Nutrition-On Nepro TF but held due to N/V issues yesterday, planning on restarting.       Seen and discussed with Dr Parrish     Recommendations were communicated to primary team via this note.            Quincy Jesus  Clinical Nurse  Specialist  [phone]    Review of Systems:   I reviewed the following systems:  ROS not done due to neuro status.     Physical Exam:   I/O last 3 completed shifts:  In: 1617.67 [I.V.:330.87; Other:66.8; NG/GT:440]  Out: 3700 [Other:3700]   /89   Pulse 92   Temp 99  F (37.2  C) (Axillary)   Resp 25   Wt 78.3 kg (172 lb 9.9 oz)   SpO2 98%   BMI 30.58 kg/m       GENERAL APPEARANCE: Vent via trach, not tracking.   EYES:  No scleral icterus, pupils equal  HENT: mouth without ulcers or lesions  PULM: lungs rhonchi to auscultation  CV: irregular rhythm, normal rate, no rub     -edema +1LE  GI: soft, non-tender, non-distended, bowel sounds are +  MS: no evidence of inflammation in joints, no muscle tenderness  NEURO: Awake but minimally interactive, moving extremities, No asterixis   Lines: 1/30 Tunneled line.     Labs:   All labs reviewed by me  Electrolytes/Renal -   Recent Labs   Lab Test 02/04/19  1556 02/04/19  0313 02/03/19  0358 02/02/19  0849    131* 140 137   POTASSIUM 3.3* 4.8 4.6 4.0   CHLORIDE 97 98 101 101   CO2 25 18* 19* 24   BUN 27 71* 46* 33*   CR 2.11* 4.64* 3.42* 2.50*   GLC 99 108* 102* 106*   JEFFREY 9.1 7.8* 7.6* 8.2*   MAG  --  2.3 2.1 2.1   PHOS  --  8.8* 7.1* 5.3*       CBC -   Recent Labs   Lab Test 02/04/19  0313 02/03/19  0358 02/02/19  0849   WBC 10.3 10.4 13.2*   HGB 7.9* 7.5* 7.1*    352 347       LFTs -   Recent Labs   Lab Test 01/27/19  0404 01/26/19  0957 01/26/19  0425   ALKPHOS 76 76 71   BILITOTAL 0.9 0.9 0.8   ALT 14 20 19   AST 30 39 42   PROTTOTAL 5.1* 5.1* 5.1*   ALBUMIN 1.8* 1.9* 1.9*       Iron Panel -   Recent Labs   Lab Test 01/27/15  1218 07/11/12  0804   IRON 74 80   IRONSAT 21 26   ORQUIDEA 40 67           Current Medications:    [START ON 2/5/2019] amiodarone  200 mg Oral or Feeding Tube Daily     amLODIPine  10 mg Oral Daily     aspirin  81 mg Oral or Feeding Tube Daily     atorvastatin  40 mg Oral QPM     B and C vitamin Complex with folic acid  5 mL Per  Feeding Tube Daily     heparin Lock (1000 units/mL High concentration)  3 mL Intracatheter Once     heparin Lock (1000 units/mL High concentration)  3 mL Intracatheter Once     heparin lock flush  5-10 mL Intracatheter Q24H     heparin  5,000 Units Subcutaneous Q8H FAISAL     insulin aspart  1-6 Units Subcutaneous Q4H     metoprolol tartrate  25 mg Oral or Feeding Tube TID     [START ON 2/5/2019] pantoprazole (PROTONIX) IV  40 mg Intravenous Daily with breakfast     polyethylene glycol  17 g Oral or Feeding Tube Daily     protein modular  1 packet Per Feeding Tube BID     QUEtiapine  50 mg Oral At Bedtime     senna-docusate  1 tablet Oral or Feeding Tube BID    Or     senna-docusate  2 tablet Oral or Feeding Tube BID     sodium chloride (PF)  3 mL Intracatheter Q8H       IV fluid REPLACEMENT ONLY       IV fluid REPLACEMENT ONLY

## 2019-02-04 NOTE — PLAN OF CARE
Discharge Planner OT   Patient plan for discharge: not stated  Current status: Pt intubated and following zero% of commands during OT tx session.  VSS on vent settings CMV, FiO2 50% and PEEP 5. Pt w/ non purposeful BUE/BLE movements and eye movement, looking at OT at times, not tracking.   Barriers to return to prior living situation: medical status  Recommendations for discharge: TCU vs LTC  Rationale for recommendations: to increase ind in ADLS/IADLS       Entered by: Elisha Owusu 02/04/2019 9:35 AM

## 2019-02-04 NOTE — PROGRESS NOTES
CV ICU PROGRESS NOTE  February 4, 2019    CO-MORBIDITIES:   Acute kidney injury (H)  (primary encounter diagnosis)  CKD (chronic kidney disease) stage 3, GFR 30-59 ml/min (H)    Overnight Events:  -Weaned off Esmolol gtt  -Desaturation event  -Increased FIO2 requirements from 30% to 50%  -MRI resulted/EEG resulted, neurology signed off  -Na decreased from 140 to 131 today    Today's Plan:  -Chest Xray is stable  -Hemodynamically stable, neurologically stable  - and placement  -Amiodarone dose decreased to 200 mg daily   -Communicate with CVTS about future plan and placement  -Check BMP this afternoon to follow sodium level  -Protonix PO changed to Protonix IV    PLAN:   Neuro/ pain/ sedation:  -Monitor neurological status. Notify the MD for any acute changes in exam.  -Seroquel QHS     Pulmonary care:   -Chest X ray this morning is stable compared to 2/2     Cardiovascular:    -Monitor hemodynamic status, off vasopressors  -Amiodarone 200 mg daily     GI care:   -Tube feeds held last week because of vomiting and abdominal distension with KUB showing stool in the colon   -She had bowel movements, and tube feeds of Nepro are at 80 ml/hr     Fluids/ Electrolytes/ Nutrition:   -5% Albumin bolus prn for low CVP/low urine output  -Replace electrolytes per ICU protocol K>4, Mg>2, Phos WNL     Renal/ Fluid Balance:    -Urine output is adequate  -Will continue to monitor intake and output.     Endocrine:    -Insulin SSI     ID/ Antibiotics:  -No antibiotics     Heme:     -Hemoglobin stable     Prophylaxis:    -Mechanical prophylaxis for DVT.   -Protonix IV, Heparin TID     Disposition:  -Surgical ICU.      - - - - - - - - - - - - - - - - - - - - - - - - - - - - - - - - - - - - - - - - - - - - - - - - - - - - - - - - - - - - - - - - - - - - - - - -     PRIMARY TEAM: CVTS  REASON FOR CRITICAL CARE ADMISSION: CV/Respiratory ICU care       REVIEW OF SYSTEMS: As noted above. No headache, dizziness. No fever,  chills. No chest pain or shortness of breath. No abdominal pain, nausea, vomiting. No diarrhea or constipation. No urinary difficulties. No muscle or body aches.     PAST MEDICAL HISTORY:   Past Medical History:   Diagnosis Date     Abnormal ECG      ARF (acute renal failure) (H) Aug 2010    Lisinopril was stopped at that time     Carotid stenosis      Carotid stenosis, bilateral      Carotid stenosis, bilateral     Vs Surgeon: dr Kush Mcmillan, Phone: [phone] fax: [phone]     CKD (chronic kidney disease) stage 3, GFR 30-59 ml/min (H)      GERD (gastroesophageal reflux disease)      HTN, goal below 140/90      Hyperlipidemia LDL goal <100      Lung nodule may 2013    needs f/u may 2014     Muscle aches      Perforated duodenal ulcer (H) Aug 2010     Proteinuria      PVD (peripheral vascular disease) (H)      Renal artery stenosis (H)  April 2011    Right side     Vitamin D deficiency disease        SURGICAL HISTORY:   Past Surgical History:   Procedure Laterality Date     ARTHROPLASTY KNEE      left     BYPASS CARDIOPULMONARY N/A 1/15/2019    Procedure: On Cardiopulmonary Bypass;  Surgeon: Haseeb Villegas MD;  Location: [facility] OR     BYPASS GRAFT ARTERY CORONARY MINIMALLY INVASIVE  (NO PUMP) N/A 1/15/2019    Procedure: Median Sternotomy, Coronary Artery Bypass Graft x3, Endovein [place] of Left Greater Saphenous Vein, Left Internal Mammary Artery Takedown;  Surgeon: Haseeb Villegas MD;  [facility]     EYE SURGERY      cataracts     IR CVC TUNNEL PLACEMENT > 5 YRS OF AGE  1/30/2019     LAPAROSCOPIC TUBAL LIGATION       TRACHEOSTOMY N/A 1/30/2019    Procedure: Percutaneous tracheostomy converted to Open Tracheostomy;  Surgeon: Yancy Luo MD;  [facility]     TRANSESOPHAGEAL ECHOCARDIOGRAM INTRAOPERATIVE  1/15/2019    Procedure: Intraoperative Transesophageal Echocardiogram;  Surgeon: Haseeb Villegas MD;  [facility]       FAMILY HISTORY: No family history of problems with  anesthesia    VITAL SIGNS:  Temp:  [97.1  F (36.2  C)-99.5  F (37.5  C)] 97.1  F (36.2  C)  Heart Rate:  [61-70] 63  Resp:  [18-36] 24  BP: ()/() 118/65  FiO2 (%):  [40 %-50 %] 50 %  SpO2:  [84 %-100 %] 94 %  Ventilation Mode: CMV/AC  (Continuous Mandatory Ventilation/ Assist Control)  FiO2 (%): 50 %  Rate Set (breaths/minute): 14 breaths/min  Tidal Volume Set (mL): 450 mL  PEEP (cm H2O): 5 cmH2O  Oxygen Concentration (%): 40 %  Resp: 24      INTAKE/ OUTPUT:   I/O last 3 completed shifts:  In: 1682.29 [I.V.:712.29; NG/GT:400]  Out: -      PHYSICAL EXAMINATION:  General: in no acute distress.  HEENT: Normocephalic, atraumatic, trach site clean  Neck: No cervical lymphadenopathy. No thyromegaly.  Chest wall: Symmetric thorax. No masses or tenderness to palpation.   Respiratory: Non-labored breathing.   Cardiovascular: Regular rate and rhythm.   Gastrointestinal: Abdomen soft, non-distended, non-tender to palpation. No organomegaly or masses appreciated. No inguinal hernias.   Genitourinary: No CVA tenderness.   Extremities: Moving all four extremities. No limb deformities. No pedal edema.   Skin: As noted above. No rashes or lesions appreciated.    ALLERGIES:      Allergies   Allergen Reactions     Lisinopril      She developed ARF       MEDICATIONS:    No current facility-administered medications on file prior to encounter.   Current Outpatient Medications on File Prior to Encounter:  ACE/ARB NOT PRESCRIBED, INTENTIONAL, ACE & ARB not prescribed due to Worsening renal function on ACE/ARB therapy   acetaminophen (TYLENOL) 325 MG tablet Take 2 tablets (650 mg) by mouth every 6 hours as needed for mild pain   amLODIPine (NORVASC) 10 MG tablet TAKE 1 TABLET BY MOUTH ONCE DAILY   ASPIRIN NOT PRESCRIBED (INTENTIONAL) Antiplatelet medication not prescribed intentionally due to Plavix   busPIRone (BUSPAR) 5 MG tablet TAKE 1 TABLET BY MOUTH TWICE DAILY FOR ANXIETY   clopidogrel (PLAVIX) 75 MG tablet TAKE 1 TABLET  BY MOUTH ONCE DAILY   doxazosin (CARDURA) 4 MG tablet TAKE 1 TABLET BY MOUTH AT BEDTIME   Hydrocortisone Acetate 2.5 % CREA Externally apply 1 Application topically 2 times daily Apply to both arms twice a day   metoprolol tartrate (LOPRESSOR) 50 MG tablet TAKE ONE TABLET BY MOUTH TWICE DAILY   metoprolol tartrate (LOPRESSOR) 50 MG tablet Take 0.5 tablets (25 mg) by mouth 2 times daily   ondansetron (ZOFRAN) 8 MG tablet Take 1 tablet (8 mg) by mouth every 8 hours as needed for nausea   order for DME Machine to dispense the medication   pantoprazole (PROTONIX) 40 MG EC tablet TAKE ONE TABLET BY MOUTH ONCE DAILY 30-60  MINUTES  BEFORE  A  MEAL   simvastatin (ZOCOR) 40 MG tablet Take 1 tablet (40 mg) by mouth At Bedtime   traZODone (DESYREL) 50 MG tablet Take 1 tablet (50 mg) by mouth nightly as needed for sleep       LABS: Reviewed.   Arterial Blood Gases   No lab results found in last 7 days.  Complete Blood Count   Recent Labs   Lab 02/04/19 0313 02/03/19 0358 02/02/19 0849 02/01/19  1037   WBC 10.3 10.4 13.2* 10.4   HGB 7.9* 7.5* 7.1* 7.3*    352 347 357     Basic Metabolic Panel  Recent Labs   Lab 02/04/19 0313 02/03/19 0358 02/02/19 0849 02/01/19  1037   * 140 137 136   POTASSIUM 4.8 4.6 4.0 3.9   CHLORIDE 98 101 101 98   CO2 18* 19* 24 23   BUN 71* 46* 33* 48*   CR 4.64* 3.42* 2.50* 3.10*   * 102* 106* 123*     Liver Function Tests  No lab results found in last 7 days.  Pancreatic Enzymes  No lab results found in last 7 days.  Coagulation Profile  No lab results found in last 7 days.  Lactate  Invalid input(s): LACTATE      RADIOLOGY:   No results found for this or any previous visit (from the past 24 hour(s)).      Patient seen, findings and plan discussed with surgical ICU staff Dr. Laz Esquivel.    Lazaro Cantu MD  Adult Cardiothoracic Anesthesia Fellow

## 2019-02-04 NOTE — PROGRESS NOTES
HEMODIALYSIS TREATMENT NOTE    Date: 2/4/2019  Time: 1:48 PM    Data:  Pre Wt:  78.3 kg   Desired Wt: 75.3 kg   Post Wt:  75.5 kg  Weight gain: -2.8  kg   Weight change: 2.8  kg  Ultrafiltration - Post Run Net Total Removed (mL): 3700 mL  Ultrafiltration - Post Run Net Total Gain (mL): 0 mL  Vascular Access Status: Yes, secured and visible  Dialyzer Rinse: Streaked  Total Blood Volume Processed: 72.9  Total Dialysis (Treatment) Time:  3 hours    Lab:   No    Interventions:Assessment:  Pt tolerated tx well. VSS throughout tx. CVC utilized without complication. BFR at 400. Goal increased from 3 L to 4 L as tolerated per NP. Goal reduced to 3.7 d/t rapid BP decrease but still vitally stable. CVC saline locked and clear guard caps applied. Hand off report given to ICU nurse.      Plan:    Per nephrology team.

## 2019-02-05 NOTE — PROGRESS NOTES
EEG CLINICAL NEUROPHYSIOLOGY PRELIMINARY REPORT    First three hours of video-EEG reviewed. Moderate to severe diffuse encephalopathy. Some spontaneous reactivity, little direct reactivity. No normal sleep patterns. No epileptiform discharges or electrographic seizures.    Findings thus far indicate moderate to severe diffuse encephalopathy. Thus far no indications of seizures or seizure tendency. Will continue video-EEG monitoring. Full report to follow.    Lyle Fuentes MD  Pager [phone]

## 2019-02-05 NOTE — PROGRESS NOTES
D/I:  Pt distended and slightly firm, minimum tube feeding residual <30cc since 0700, vss, no N/V noted. MD's notified during rounds and stated for team to continue to monitor abd and TF residual.  At about 1200, pt had dark brownish content on trach foam during trach care. Redness with drainage and swelling at trach site. Optifoam changed few times with trach ties. Inner cannula changed also. mepelix foam applied to chin. CVTS paged and is aware. CVTS order cultures from trach site(sent). SICU/surgery MD at bedside examining trach site.    P:   Conitinuing to monitor, change trach foam dressing prn and notify CVTS with changes

## 2019-02-05 NOTE — PROGRESS NOTES
CV ICU PROGRESS NOTE  February 5, 2019    CO-MORBIDITIES:   Acute kidney injury (H)  (primary encounter diagnosis)  CKD (chronic kidney disease) stage 3, GFR 30-59 ml/min (H)    Overnight Events:  -Weaned FIO2 down to 40%  -Off all sedation for about one week  -CXR this morning shows no acute change  -Received one PRN dose of hydralazine after starting amlodopine  -Seizure-like activity during the day which is new    Today's Plan:  -Try pressure support trial  -Continue BP control  -24 hour video EEG  -Pressure support trial  -Long term care placement - [place] vs. [facility]  -Tracheostomy site culture    PLAN:   Neuro/ pain/ sedation:  -Monitor neurological status. Notify the MD for any acute changes in exam.  -Seroquel at bedtime  -Video EEG     Pulmonary care:   -Chest X ray       Cardiovascular:    -Monitor hemodynamic status, off vasopressors  -Amiodarone 200 mg daily   -Amlodopine started (2/4), BP control improved     GI care:   -Tube feeds held last week because of vomiting and abdominal distension with KUB showing stool in the colon   -She had bowel movements, and tube feeds of Nepro are at 80 ml/hr      Fluids/ Electrolytes/ Nutrition:   -5% Albumin bolus prn for low CVP/low urine output  -Replace electrolytes per ICU protocol K>4, Mg>2, Phos WNL     Renal/ Fluid Balance:    -Hemodialysis continued, MWF  -Will continue to monitor intake and output.      Endocrine:    -Insulin SSI      ID/ Antibiotics:  -No antibiotics  -Culture tracheostomy site  -Surgery to inspect trach site care     Heme:     -Hemoglobin stable      Prophylaxis:    -Mechanical prophylaxis for DVT.   -Protonix IV, Heparin TID      Disposition:  -Surgical ICU.    - - - - - - - - - - - - - - - - - - - - - - - - - - - - - - - - - - - - - - - - - - - - - - - - - - - - - - - - - - - - - - - - - - - - - - - -     PRIMARY TEAM: CVTS  REASON FOR CRITICAL CARE ADMISSION: CV/Respiratory ICU care       REVIEW OF SYSTEMS: As noted above. No  headache, dizziness. No fever, chills. No chest pain or shortness of breath. No abdominal pain, nausea, vomiting. No diarrhea or constipation. No urinary difficulties. No muscle or body aches.     PAST MEDICAL HISTORY:   Past Medical History:   Diagnosis Date     Abnormal ECG      ARF (acute renal failure) (H) Aug 2010    Lisinopril was stopped at that time     Carotid stenosis      Carotid stenosis, bilateral      Carotid stenosis, bilateral     Vs Surgeon: dr Kush Mcmillan, Phone: [phone] fax: [phone]     CKD (chronic kidney disease) stage 3, GFR 30-59 ml/min (H)      GERD (gastroesophageal reflux disease)      HTN, goal below 140/90      Hyperlipidemia LDL goal <100      Lung nodule may 2013    needs f/u may 2014     Muscle aches      Perforated duodenal ulcer (H) Aug 2010     Proteinuria      PVD (peripheral vascular disease) (H)      Renal artery stenosis (H)  April 2011    Right side     Vitamin D deficiency disease        SURGICAL HISTORY:   Past Surgical History:   Procedure Laterality Date     ARTHROPLASTY KNEE      left     BYPASS CARDIOPULMONARY N/A 1/15/2019    Procedure: On Cardiopulmonary Bypass;  Surgeon: Haseeb Villegas MD;  [facility]     BYPASS GRAFT ARTERY CORONARY MINIMALLY INVASIVE  (NO PUMP) N/A 1/15/2019    Procedure: Median Sternotomy, Coronary Artery Bypass Graft x3, Endovein [place] of Left Greater Saphenous Vein, Left Internal Mammary Artery Takedown;  Surgeon: Haseeb Villegas MD;  [facility]     EYE SURGERY      cataracts     IR CVC TUNNEL PLACEMENT > 5 YRS OF AGE  1/30/2019     LAPAROSCOPIC TUBAL LIGATION       TRACHEOSTOMY N/A 1/30/2019    Procedure: Percutaneous tracheostomy converted to Open Tracheostomy;  Surgeon: Yancy Luo MD;  Location:  OR     TRANSESOPHAGEAL ECHOCARDIOGRAM INTRAOPERATIVE  1/15/2019    Procedure: Intraoperative Transesophageal Echocardiogram;  Surgeon: Haseeb Villegas MD;  Location:  OR       FAMILY HISTORY: No family  history of problems with anesthesia    VITAL SIGNS:  Temp:  [97.4  F (36.3  C)-99  F (37.2  C)] 97.4  F (36.3  C)  Heart Rate:  [63-94] 64  Resp:  [17-36] 24  BP: (110-203)/() 116/67  Cuff Mean (mmHg):  [114] 114  FiO2 (%):  [35 %-50 %] 40 %  SpO2:  [89 %-99 %] 95 %  Ventilation Mode: CMV/AC  (Continuous Mandatory Ventilation/ Assist Control)  FiO2 (%): 40 %  Rate Set (breaths/minute): 14 breaths/min  Tidal Volume Set (mL): 450 mL  PEEP (cm H2O): 5 cmH2O  Oxygen Concentration (%): 40 %  Resp: 24      INTAKE/ OUTPUT:   I/O last 3 completed shifts:  In: 1406.8 [Other:66.8; NG/GT:380]  Out: 3700 [Other:3700]     PHYSICAL EXAMINATION:  General: in no acute distress.  HEENT: Normocephalic, atraumatic, trach site exudate  Neck: No cervical lymphadenopathy. No thyromegaly.  Chest wall: Symmetric thorax. No masses or tenderness to palpation.   Respiratory: Non-labored breathing.   Cardiovascular: Regular rate and rhythm.   Gastrointestinal: Abdomen soft, non-distended, non-tender to palpation. No organomegaly or masses appreciated. No inguinal hernias.   Genitourinary: No CVA tenderness.   Extremities: Moving all four extremities. No limb deformities. No pedal edema.   Skin: As noted above. No rashes or lesions appreciated.    ALLERGIES:      Allergies   Allergen Reactions     Lisinopril      She developed ARF       MEDICATIONS:    No current facility-administered medications on file prior to encounter.   Current Outpatient Medications on File Prior to Encounter:  ACE/ARB NOT PRESCRIBED, INTENTIONAL, ACE & ARB not prescribed due to Worsening renal function on ACE/ARB therapy   acetaminophen (TYLENOL) 325 MG tablet Take 2 tablets (650 mg) by mouth every 6 hours as needed for mild pain   amLODIPine (NORVASC) 10 MG tablet TAKE 1 TABLET BY MOUTH ONCE DAILY   ASPIRIN NOT PRESCRIBED (INTENTIONAL) Antiplatelet medication not prescribed intentionally due to Plavix   busPIRone (BUSPAR) 5 MG tablet TAKE 1 TABLET BY MOUTH TWICE  DAILY FOR ANXIETY   clopidogrel (PLAVIX) 75 MG tablet TAKE 1 TABLET BY MOUTH ONCE DAILY   doxazosin (CARDURA) 4 MG tablet TAKE 1 TABLET BY MOUTH AT BEDTIME   Hydrocortisone Acetate 2.5 % CREA Externally apply 1 Application topically 2 times daily Apply to both arms twice a day   metoprolol tartrate (LOPRESSOR) 50 MG tablet TAKE ONE TABLET BY MOUTH TWICE DAILY   metoprolol tartrate (LOPRESSOR) 50 MG tablet Take 0.5 tablets (25 mg) by mouth 2 times daily   ondansetron (ZOFRAN) 8 MG tablet Take 1 tablet (8 mg) by mouth every 8 hours as needed for nausea   order for DME Machine to dispense the medication   pantoprazole (PROTONIX) 40 MG EC tablet TAKE ONE TABLET BY MOUTH ONCE DAILY 30-60  MINUTES  BEFORE  A  MEAL   simvastatin (ZOCOR) 40 MG tablet Take 1 tablet (40 mg) by mouth At Bedtime   traZODone (DESYREL) 50 MG tablet Take 1 tablet (50 mg) by mouth nightly as needed for sleep       LABS: Reviewed.   Arterial Blood Gases   No lab results found in last 7 days.  Complete Blood Count   Recent Labs   Lab 02/05/19  0332 02/04/19  0313 02/03/19  0358 02/02/19  0849   WBC 12.4* 10.3 10.4 13.2*   HGB 8.0* 7.9* 7.5* 7.1*    348 352 347     Basic Metabolic Panel  Recent Labs   Lab 02/05/19  0332 02/04/19 2000 02/04/19  1556 02/04/19 0313 02/03/19  0358     --  135 131* 140   POTASSIUM 3.7 3.4 3.3* 4.8 4.6   CHLORIDE 98  --  97 98 101   CO2 23  --  25 18* 19*   BUN 46*  --  27 71* 46*   CR 2.87*  --  2.11* 4.64* 3.42*   *  --  99 108* 102*     Liver Function Tests  No lab results found in last 7 days.  Pancreatic Enzymes  No lab results found in last 7 days.  Coagulation Profile  No lab results found in last 7 days.  Lactate  Invalid input(s): LACTATE      RADIOLOGY:   Recent Results (from the past 24 hour(s))   XR Chest Port 1 View    Narrative    Exam: XR CHEST PORT 1 VW, 2/4/2019 9:20 AM    Indication: daily    Comparison: Radiograph of the chest 2/2/2018    Findings:   30 degree AP view of the chest.  Postoperative changes of coronary  artery bypass graft. Tracheostomy tube tip projects over the upper  thoracic trachea. Right IJ central venous catheter and left arm PICC  tip projecting over the mid SVC. The cardiomediastinal silhouette is  enlarged, but stable. The pulmonary vasculature is distinct. Dense  retrocardiac opacity and mild right lung base opacity. Left pleural  effusion. No pneumothorax. The upper abdomen is unremarkable.       Impression    Impression:   1.  Left pleural effusion with overlying atelectasis/consolidation.  Superimposed infection is difficult to exclude.  2.  Slightly and postoperative changes of coronary bypass graft.  3.  Support devices are stable.    I have personally reviewed the examination and initial interpretation  and I agree with the findings.    FREMÍN HEAD MD         Patient seen, findings and plan discussed with surgical ICU staff Dr. Laz Esquivel.    Lazaro Cantu MD  Adult Cardiothoracic Anesthesia Fellow

## 2019-02-05 NOTE — PROVIDER NOTIFICATION
1930  D/I: Notified CVTS via page about increasing ectopy and latest K result of 3.3. Pt hemodynamically unchanged.   P: Awaiting assessment of light K replacement and/or redraw electrolytes decision    Updated Dr. Vasquez on lab results. 20 potassium ordered and to continue to monitor ectopy. K draw in morning.

## 2019-02-05 NOTE — PLAN OF CARE
D/I:      Pulm: Weaned FiO2 to 35%, returned to 40% after low SPO2. Suctioning tan/creamy secretions (using 14 Fr suction cath). Patient with less episodes of tachypnea and vent asynchrony overnight. Trach suture on right side are loose. Redness on upper and lower portions of trach. Optifoam for skin protection.       CV: SR 60-70's. Frequent PAC's resolved with potassium dose.       Heme: 8.0, no signs of bleeding      GI: BM times one. Tube feeds at goal of 40 ml with low residuals.       : Oliguric Bladder scanned for 9 ml though may be passing small amounts of urine      Neuro: Moves toward some noxious stimuli with right arm. Wandering pupils. Right pupil slightly smaller than left with sluggish to brisk response. Withdraws in BLE, flexion in BUE, patient very stiff      Skin: Incisions scabbed and healing. Left CT site redressed.       ID: Afebrile. WBC up to 12.4      Gtt's: None      Pain: Tylenol times two for tachypnea, restlessness with effect. No oxycodone given overnight.       Psych: No meaningful responses to interactions.       Social: No family present overnight.     A: No acute events overnight.   P: Possible transfer to [facility] this week.

## 2019-02-05 NOTE — PROGRESS NOTES
Nephrology Progress Note  02/05/2019           Mrs Rocha is an 78 yof w/CKD III, Carotid stenosis, GERD, HTN, CAD who had emergent CABG x3 on 1/15, Nephrology consulted for NATALIA on CKD, started HD on 1/21.       Interval History :   Mrs Rocha had HD yesterday, pulled 3L which made her net negative.  No major change in HD plan, continuing MWF HD.  Wt on downtrend, still ~5kg up from admission wt but making progress, plan for run tomorrow.       Assessment & Recommendations:   NATALIA on CKD-CKD 3, baseline Cr of ~2.0 with one atrophic kidney on imaging with no former imaging to compare.  Injury involving hemodynamics with CABG as well as contrast for angiogram prior.  Had 2 sessions of HD without UF and did reasonably well but then had PEA arrest and was placed on CRRT to stabilize electrolytes and prevent worsening volume overload.  Anuric since arrest, recovery unlikely.                -CRRT stopped 1/25, transitioned to iHD, running MWF               -Appreciate IR placing tunneled line 1/30.      Volume status-Had HD yesterday with 3L of UF, was negative nearly 2L.  Still up ~5kg from admission wt, continuing to challenge with runs.        Electrolytes/pH-K 3.7, bicarb 23, no acute issues.       Ca/phos/pth-Ca 8.2 Phos 4.4 last check, running HD today.  -On Nepro TF.      CABG-Was recovering although needed HD for NATALIA but had PEA arrest evening of 1/22, started on CRRT but again transitioned to iHD.  Now with trach/PEG/tunnled line, nearing discharge to TCU depending on GI issues.       Anemia-Hgb 8.0, following.       Nutrition-On Nepro TF but held due to N/V issues yesterday, planning on restarting.       Seen and discussed with Dr Elizalde     Recommendations were communicated to primary team via this note.     Quincy Jesus  Clinical Nurse Specialist  [phone]    Review of Systems:   I reviewed the following systems:  ROS not done due to neuro status    Physical Exam:   I/O last 3 completed shifts:  In:  1406.8 [Other:66.8; NG/GT:380]  Out: 3700 [Other:3700]   /64   Pulse 92   Temp 98.5  F (36.9  C) (Axillary)   Resp 26   Wt 77.4 kg (170 lb 10.2 oz)   SpO2 97%   BMI 30.23 kg/m       GENERAL APPEARANCE: Vent via trach, not tracking.   EYES:  No scleral icterus, pupils equal  HENT: mouth without ulcers or lesions  PULM: lungs rhonchi to auscultation  CV: irregular rhythm, normal rate, no rub     -edema +1LE  GI: soft, non-tender, non-distended, bowel sounds are +  MS: no evidence of inflammation in joints, no muscle tenderness  NEURO: Awake but minimally interactive, moving extremities, No asterixis   Lines: 1/30 Tunneled line.     Labs:   All labs reviewed by me  Electrolytes/Renal -   Recent Labs   Lab Test 02/05/19 0332 02/04/19 2000 02/04/19  1556 02/04/19  0313 02/03/19  0358     --  135 131* 140   POTASSIUM 3.7 3.4 3.3* 4.8 4.6   CHLORIDE 98  --  97 98 101   CO2 23  --  25 18* 19*   BUN 46*  --  27 71* 46*   CR 2.87*  --  2.11* 4.64* 3.42*   *  --  99 108* 102*   JEFFREY 8.2*  --  9.1 7.8* 7.6*   MAG 2.0 1.9  --  2.3 2.1   PHOS 4.4  --   --  8.8* 7.1*       CBC -   Recent Labs   Lab Test 02/05/19 0332 02/04/19  0313 02/03/19  0358   WBC 12.4* 10.3 10.4   HGB 8.0* 7.9* 7.5*    348 352       LFTs -   Recent Labs   Lab Test 01/27/19  0404 01/26/19  0957 01/26/19  0425   ALKPHOS 76 76 71   BILITOTAL 0.9 0.9 0.8   ALT 14 20 19   AST 30 39 42   PROTTOTAL 5.1* 5.1* 5.1*   ALBUMIN 1.8* 1.9* 1.9*       Iron Panel -   Recent Labs   Lab Test 01/27/15  1218 07/11/12  0804   IRON 74 80   IRONSAT 21 26   ORQUIDEA 40 67           Current Medications:    amiodarone  200 mg Oral or Feeding Tube Daily     amLODIPine  10 mg Oral Daily     aspirin  81 mg Oral or Feeding Tube Daily     atorvastatin  40 mg Oral QPM     B and C vitamin Complex with folic acid  5 mL Per Feeding Tube Daily     heparin Lock (1000 units/mL High concentration)  3 mL Intracatheter Once     heparin Lock (1000 units/mL High  concentration)  3 mL Intracatheter Once     heparin lock flush  5-10 mL Intracatheter Q24H     heparin  5,000 Units Subcutaneous Q8H FAISAL     insulin aspart  1-6 Units Subcutaneous Q4H     metoprolol tartrate  25 mg Oral or Feeding Tube TID     pantoprazole (PROTONIX) IV  40 mg Intravenous Daily with breakfast     polyethylene glycol  17 g Oral or Feeding Tube Daily     protein modular  1 packet Per Feeding Tube BID     QUEtiapine  50 mg Oral At Bedtime     senna-docusate  1 tablet Oral or Feeding Tube BID    Or     senna-docusate  2 tablet Oral or Feeding Tube BID     sodium chloride (PF)  3 mL Intracatheter Q8H       IV fluid REPLACEMENT ONLY       IV fluid REPLACEMENT ONLY

## 2019-02-05 NOTE — PROGRESS NOTES
D/I:    Pt with high output from peg tube (600cc) dark brown. Tube feeding held, HOB elevated, peg tube placed to gravity. CVTS notified. Trach site cleaned, Foam dressing, inner cannula and trach ties changed along with foam to chin.     P:  Orders received and completed. Awaiting KUB. Will continue to monitor and notify team with changes.

## 2019-02-05 NOTE — PROGRESS NOTES
[facility], [place]   Palliative Care Daily Progress Note          Palliative Care was consulted for decisional support and goals of care. At this time goals are restorative; patient had a trach and peg placed and family is comfortable with plan, so we will sign off.     Please feel free to re-consult us if we can be helpful in the future. Thank you for the opportunity to be involved in the care of this patient.    YAZAN Suh CNP  Palliative Care Consult Team  Pager: [phone]    [facility] Inpatient Team Consult pager [phone] (M-F 8-4:30)  After-hours Answering Service [phone]   Palliative Clinic: [phone]

## 2019-02-05 NOTE — PLAN OF CARE
Neuro: Unchanged neuro exam, PERRL, tremors to bilateral lowers extermities, upper extermities with slight contracture, does not follows commands. No tracting noted, pt does stare at times, unclear if looking at RN or not.    CV:  SBP goal achieved with intervention this shift. Prn BP meds given as order. NSR high 60's to low 70's  CVTS aware of BP issues and orders given. SBP goal change to <140 to <160.    Resp: LS coarse diminished at bases, vented with #6 shiley, trach care done. Small creamy secretions via trach.    GI/: HD patient, incont of urine at times, BM X2,  NPO, TF at goal with standard H2O flushes.    Skin: Incision to skin open to air, no changes noted    Plan: Continue to monitor and treat per plan of care. Notify CVTS of changes in pt condition.

## 2019-02-06 NOTE — PROGRESS NOTES
CLINICAL NUTRITION SERVICES - REASSESSMENT NOTE     Nutrition Prescription    RECOMMENDATIONS FOR MDs/PROVIDERS TO ORDER:  Consider extension to GJ tube vs start prokinetic agent if continues w/ gastric TF intolerance    Malnutrition Status:    Severe Malnutrition in the context of acute illness    Recommendations already ordered by Registered Dietitian (RD):  1. Restart Nepro @ 10 mL/hr and advance 10 mL q4h to goal 40 ml/hr (960 ml/day) to provide 1728 kcals, 78 g PRO, 701 ml free H2O, 155 g CHO and 12 g Fiber daily.      2. Continue 1 Pkt ProSource BID (80 kcal, 22 g PRO) for total 1808 kcal (102% MREE vs 32 kcal/kg) and 100 g PRO (1.8 g/kg)    Future/Additional Recommendations:  1. While remains on vent, recommend obtain weekly metabolic cart study to assess approp of energy provisions to avoid over/under feeding.   2. If K+ remains low (and pending phos trends), if needs standard formula - recommend: Nutren 1.5 @ 45 mL/hr to provide 1620 kcals, 73 g PRO, 821 mL H2O, 190 g CHO and no Fiber daily. Increase ProSource to 1 pkt TID.       EVALUATION OF THE PROGRESS TOWARD GOALS   Diet: NPO  Enteral Access: PEG  Nutrition Support: TF turned off yesterday for high GRV. Nepro currently @ 10 mL/hr with goal of 40 ml/hr + 1 Pkt ProSource BID  Intake: Average intakes over past 7 days = 995 kcal (17 kcal/kg) and 57 g CT (1 g/kg). TF held intermittently 2/2 emesis and high GRVs     NEW FINDINGS   -Resp: TF off, will defer metabolic cart at this time.  -Wt: 75.2 kg today from 71.8 kg on admit.   -GI: having BMs, 1-4 daily x past 2 days. High GRV of 1500 mL yesterday per RN note  -Skin: Abisai 14. On nephronex   -Renal: HD. K+ 3.7 and phos 5.6     MALNUTRITION  % Intake: <75% for >7 days (non-severe)  % Weight Loss: None noted  Subcutaneous Fat Loss: facial region, lower arm: Moderate  Muscle Loss: Temporal, Thoracic region (clavicle, acromium bone, deltoid, trapezius, pectoral), Upper arm (bicep, tricep), Lower arm   (forearm), Dorsal hand and Posterior calf: all moderate-severe vs age-related muscle depletion   Fluid Accumulation/Edema: Does not meet criteria  Malnutrition Diagnosis: Severe Malnutrition in the context of acute illness    Previous Goals   Total avg nutritional intake to meet a minimum of 80% MREE (25 kcal/kg) and 1.5 g PRO/kg daily (per dosing wt 57 kg).  Evaluation: Not met    Previous Nutrition Diagnosis  Predicted inadequate vs excessive nutrient intake (kcal) related to reliance on TF to meet needs while vented as evidenced by TF currently exceeding estimated needs w/ propofol, unclear future propofol needs s/p trach, but also with potential for TF interruptions to result in meeting < estimated needs    Evaluation: Declining    CURRENT NUTRITION DIAGNOSIS  Inadequate protein-energy intake related to TF interruptions AEB pt meeting 17 kcal/kg and 1 g/kg PRO over past week    INTERVENTIONS  Implementation  Collaboration with other providers - Discussed TF advancement w/ team  Enteral Nutrition - advance TFs    Goals  Total avg nutritional intake to meet a minimum of 80% MREE (25 kcal/kg) and 1.5 g PRO/kg daily (per dosing wt 57 kg).    Monitoring/Evaluation  Progress toward goals will be monitored and evaluated per protocol.    Jane Prescott RD, LD, CNSC  CVICU Dietitian  Pager: [phone]

## 2019-02-06 NOTE — PROGRESS NOTES
CV ICU PROGRESS NOTE  February 6, 2019      PATIENT: Wendy Rocha  MRN: [id number]  ADMISSION DATE: 1/14/2019  HOSPITAL DAY: 22    DIAGNOSES:   Acute kidney injury (H)  (primary encounter diagnosis)  CKD (chronic kidney disease) stage 3, GFR 30-59 ml/min (H)    ASSESSMENT: Wendy Rocha is a 78 year old female s/p CABG, PEA arrest, ROSC, s/p tracheostomy and PEG, HD stable.     Today's Plan:  -CVVHD  -Add hydralazine 25 mg QID   -Wean ventilator settings  -Restart tube feeds again  -Received prn BP medications overnight, tighten BP control  -Patient placement    PLAN:   Neuro/ pain/ sedation:  #Convulsion-type activity  -Monitor neurological status. Notify the MD for any acute changes in exam.  -Seroquel at bedtime  -Video EEG follow up results     Pulmonary care:   -Chest X ray is unchanged, pulmonary congestion present  -Ventilation Mode: CMV/AC  (Continuous Mandatory Ventilation/ Assist Control)  FiO2 (%): 40 %  Rate Set (breaths/minute): 14 breaths/min  Tidal Volume Set (mL): 450 mL  PEEP (cm H2O): 5 cmH2O  Pressure Support (cm H2O): 12 cmH2O  Oxygen Concentration (%): 40 %  Resp: 30      Cardiovascular:    -Monitor hemodynamic status, off vasopressors  -Amiodarone 200 mg daily   -Amlodopine started (2/4), BP control improved     GI care:   -Tube feeds held last week because of vomiting and abdominal distension with KUB showing stool in the colon   -She had bowel movements, and tube feeds restarted and Nepro to 40 ml/hr      Fluids/ Electrolytes/ Nutrition:   -5% Albumin bolus prn for low CVP/low urine output  -Replace electrolytes per ICU protocol K>4, Mg>2, Phos WNL     Renal/ Fluid Balance:    -Hemodialysis continued, MWF  -Will continue to monitor intake and output.      Endocrine:    -Insulin SSI      ID/ Antibiotics:  -No antibiotics  -Culture tracheostomy site negative to date  -Surgery to inspect trach site care      Heme:     -Hemoglobin stable      Prophylaxis:    -Mechanical prophylaxis for DVT.    -Protonix IV, Heparin TID      Disposition:  -Surgical ICU.    OBJECTIVE:    VITAL SIGNS:  Body mass index is 29.37 kg/m .  71.9 kg (dosing weight)  Temp:  [97.6  F (36.4  C)-99.1  F (37.3  C)] 99.1  F (37.3  C)  Pulse:  [67-77] 74  Heart Rate:  [61-79] 77  Resp:  [18-40] 30  BP: (117-171)/(62-95) 158/81  FiO2 (%):  [40 %] 40 %  SpO2:  [94 %-98 %] 96 %  INTAKE/ OUTPUT:   I/O last 3 completed shifts:  In: 630 [NG/GT:310]  Out: 940 [Emesis/NG output:940]     VENTILATOR SETTINGS:  Ventilation Mode: CMV/AC  (Continuous Mandatory Ventilation/ Assist Control)  FiO2 (%): 40 %  Rate Set (breaths/minute): 14 breaths/min  Tidal Volume Set (mL): 450 mL  PEEP (cm H2O): 5 cmH2O  Pressure Support (cm H2O): 12 cmH2O  Oxygen Concentration (%): 40 %  Resp: 30      LABS:  Arterial Blood Gases   No lab results found in last 7 days.  Complete Blood Count   Recent Labs   Lab 02/06/19  0355 02/05/19  0332 02/04/19  0313 02/03/19  0358   WBC 11.3* 12.4* 10.3 10.4   HGB 8.7* 8.0* 7.9* 7.5*    362 348 352     Basic Metabolic Panel  Recent Labs   Lab 02/06/19  0355 02/05/19  0332 02/04/19 2000 02/04/19  1556 02/04/19  0313    134  --  135 131*   POTASSIUM 4.1 3.7 3.4 3.3* 4.8   CHLORIDE 96 98  --  97 98   CO2 20 23  --  25 18*   BUN 74* 46*  --  27 71*   CR 3.86* 2.87*  --  2.11* 4.64*   GLC 76 100*  --  99 108*       INFUSIONS:    IV fluid REPLACEMENT ONLY       IV fluid REPLACEMENT ONLY         SCHEDULED MEDICATIONS:    sodium chloride 0.9%  250 mL Intravenous Once in dialysis     sodium chloride 0.9%  300 mL Hemodialysis Machine Once     amiodarone  200 mg Oral or Feeding Tube Daily     amLODIPine  10 mg Oral Daily     aspirin  81 mg Oral or Feeding Tube Daily     atorvastatin  40 mg Oral QPM     B and C vitamin Complex with folic acid  5 mL Per Feeding Tube Daily     heparin Lock (1000 units/mL High concentration)  3 mL Intracatheter Once     heparin Lock (1000 units/mL High concentration)  3 mL Intracatheter Once      heparin lock flush  5-10 mL Intracatheter Q24H     heparin  5,000 Units Subcutaneous Q8H FAISAL     insulin aspart  1-6 Units Subcutaneous Q4H     metoprolol tartrate  25 mg Oral or Feeding Tube TID     - MEDICATION INSTRUCTIONS -   Does not apply Once     pantoprazole (PROTONIX) IV  40 mg Intravenous Daily with breakfast     polyethylene glycol  17 g Oral or Feeding Tube Daily     protein modular  1 packet Per Feeding Tube BID     QUEtiapine  50 mg Oral At Bedtime     senna-docusate  1 tablet Oral or Feeding Tube BID    Or     senna-docusate  2 tablet Oral or Feeding Tube BID     sodium chloride (PF)  3 mL Intracatheter Q8H     sodium chloride (PF)  9 mL Intracatheter During Hemodialysis (from stock)     sodium chloride (PF)  9 mL Intracatheter During Hemodialysis (from stock)       PRN MEDICATIONS:  sodium chloride 0.9%, acetaminophen, albuterol, alteplase, alteplase, artificial tears, bisacodyl, IV fluid REPLACEMENT ONLY, IV fluid REPLACEMENT ONLY, glucose **OR** dextrose **OR** glucagon, diphenhydrAMINE **OR** diphenhydrAMINE, heparin lock flush, heparin lock flush, HOLD MEDICATION, hydrALAZINE, insulin aspart, ipratropium - albuterol 0.5 mg/2.5 mg/3 mL, labetalol, lidocaine 4%, lidocaine 4%, lidocaine 4%, lidocaine (buffered or not buffered), lidocaine (buffered or not buffered), magnesium sulfate, magnesium sulfate, metoprolol, naloxone, ondansetron **OR** ondansetron, oxyCODONE, sodium chloride (PF), sodium chloride (PF), sodium chloride (PF), sodium chloride (PF), sodium chloride (PF)    PHYSICAL EXAMINATION:    General: in no acute distress.    HEENT: Normocephalic, atraumatic.    Neck: No thyromegaly.    Chest wall: Symmetric thorax. No masses or tenderness to palpation.     Respiratory: Non-labored breathing.     Cardiovascular: Regular rate and rhythm.     Gastrointestinal: Abdomen soft, non-distended, non-tender to palpation.    Extremities: No limb deformities. No pedal edema.     Skin: As noted above.      REVIEW OF SYSTEMS: As noted above. No headache, dizziness. No fever, chills. No chest pain or shortness of breath. No abdominal pain, nausea, vomiting. No diarrhea or constipation. No urinary difficulties. No muscle or body aches.     PAST MEDICAL HISTORY:   Past Medical History:   Diagnosis Date     Abnormal ECG      ARF (acute renal failure) (H) Aug 2010    Lisinopril was stopped at that time     Carotid stenosis      Carotid stenosis, bilateral      Carotid stenosis, bilateral     [facility] Surgeon: dr Kush Mcmillan, Phone: [phone] fax: [phone]     CKD (chronic kidney disease) stage 3, GFR 30-59 ml/min (H)      GERD (gastroesophageal reflux disease)      HTN, goal below 140/90      Hyperlipidemia LDL goal <100      Lung nodule may 2013    needs f/u may 2014     Muscle aches      Perforated duodenal ulcer (H) Aug 2010     Proteinuria      PVD (peripheral vascular disease) (H)      Renal artery stenosis (H)  April 2011    Right side     Vitamin D deficiency disease        SURGICAL HISTORY:   Past Surgical History:   Procedure Laterality Date     ARTHROPLASTY KNEE      left     BYPASS CARDIOPULMONARY N/A 1/15/2019    Procedure: On Cardiopulmonary Bypass;  Surgeon: Haseeb Villegas MD;  Location: [facility] OR     BYPASS GRAFT ARTERY CORONARY MINIMALLY INVASIVE  (NO PUMP) N/A 1/15/2019    Procedure: Median Sternotomy, Coronary Artery Bypass Graft x3, Endovein Grafton of Left Greater Saphenous Vein, Left Internal Mammary Artery Takedown;  Surgeon: Haseeb Villegas MD;  Location: [facility] OR     EYE SURGERY      cataracts     IR CVC TUNNEL PLACEMENT > 5 YRS OF AGE  1/30/2019     LAPAROSCOPIC TUBAL LIGATION       TRACHEOSTOMY N/A 1/30/2019    Procedure: Percutaneous tracheostomy converted to Open Tracheostomy;  Surgeon: Yancy Luo MD;  Location: [facility] OR     TRANSESOPHAGEAL ECHOCARDIOGRAM INTRAOPERATIVE  1/15/2019    Procedure: Intraoperative Transesophageal Echocardiogram;  Surgeon: Haseeb Villegas MD;   Location: [facility] OR       FAMILY HISTORY: No family history of problems with anesthesia    ALLERGIES:      Allergies   Allergen Reactions     Lisinopril      She developed ARF       MEDICATIONS:    No current facility-administered medications on file prior to encounter.   Current Outpatient Medications on File Prior to Encounter:  ACE/ARB NOT PRESCRIBED, INTENTIONAL, ACE & ARB not prescribed due to Worsening renal function on ACE/ARB therapy   acetaminophen (TYLENOL) 325 MG tablet Take 2 tablets (650 mg) by mouth every 6 hours as needed for mild pain   amLODIPine (NORVASC) 10 MG tablet TAKE 1 TABLET BY MOUTH ONCE DAILY   ASPIRIN NOT PRESCRIBED (INTENTIONAL) Antiplatelet medication not prescribed intentionally due to Plavix   busPIRone (BUSPAR) 5 MG tablet TAKE 1 TABLET BY MOUTH TWICE DAILY FOR ANXIETY   clopidogrel (PLAVIX) 75 MG tablet TAKE 1 TABLET BY MOUTH ONCE DAILY   doxazosin (CARDURA) 4 MG tablet TAKE 1 TABLET BY MOUTH AT BEDTIME   Hydrocortisone Acetate 2.5 % CREA Externally apply 1 Application topically 2 times daily Apply to both arms twice a day   metoprolol tartrate (LOPRESSOR) 50 MG tablet TAKE ONE TABLET BY MOUTH TWICE DAILY   metoprolol tartrate (LOPRESSOR) 50 MG tablet Take 0.5 tablets (25 mg) by mouth 2 times daily   ondansetron (ZOFRAN) 8 MG tablet Take 1 tablet (8 mg) by mouth every 8 hours as needed for nausea   order for DME Machine to dispense the medication   pantoprazole (PROTONIX) 40 MG EC tablet TAKE ONE TABLET BY MOUTH ONCE DAILY 30-60  MINUTES  BEFORE  A  MEAL   simvastatin (ZOCOR) 40 MG tablet Take 1 tablet (40 mg) by mouth At Bedtime   traZODone (DESYREL) 50 MG tablet Take 1 tablet (50 mg) by mouth nightly as needed for sleep       RADIOLOGY:   Recent Results (from the past 24 hour(s))   XR Chest Port 1 View    Narrative    EXAMINATION: XR CHEST PORT 1 VW, 2/5/2019 8:47 AM    INDICATION: daily 78 year-old female with a past medical history that  includes Stage 3 CKD, carotid stenosis, GERD,  HTN, CAD who had  emergency CABG x3 on 1/15      COMPARISON: Chest x-ray 2/4/2019, 2/2/2019    FINDINGS: AP semiupright views of the chest.     Trachea is midline. Tracheostomy tube projects over the mid thoracic  trachea. Postsurgical changes of recent CABG with intact median  sternotomy wires. Right IJ dual-lumen central venous catheter with tip  located at the superior cavoatrial junction. Left upper extremity PICC  line with tip located at the low SVC. Enlarged cardiac silhouette.  Calcification of the aortic arch. Cardiac mediastinal borders are  clear. Mitral annular calcifications. No pneumothorax. Small left  pleural effusion. Multiple mediastinal clips visualized. Left  retrocardiac opacity. No acute osseous abnormalities. Soft tissues are  unremarkable. No acute intra-abdominal findings.        Impression    IMPRESSION:       1. Postsurgical changes of recent CABG with stable enlarged cardiac  silhouette  2. Small left-sided pleural effusion.  3. Unchanged left retrocardiac opacity, likely atelectasis however  cannot rule out infection.     I have personally reviewed the examination and initial interpretation  and I agree with the findings.    FERMÍN HEAD MD   XR Abdomen Port 1 View    Narrative    EXAMINATION:  XR ABDOMEN PORT 1 VW 2/5/2019 3:24 PM.    COMPARISON: Abdominal x-ray 2/1/2019. Same-day chest x-ray.    HISTORY:  Abdominal distension    FINDINGS: Single supine view of the abdomen. Portions of the left  hemiabdomen, the field-of-view. Left upper quadrant percutaneous  gastrostomy. Increasing gaseous distention of the colon. Transverse  colon now measures up to 8.8 cm and appears somewhat featureless and  ahaustral. Chronic stool burden. No portal venous gas and no  pneumatosis. No dilated loops of small bowel. No portal venous gas and  no pneumatosis. No acute bony normality. Density calcified mitral  valve. For additional findings in the lung bases, please see same-day  dedicated chest  x-ray.      Impression    IMPRESSION: New gaseous distention of the transverse colon which is  somewhat featureless and ahaustral.    I have personally reviewed the examination and initial interpretation  and I agree with the findings.    DOMENICO VILCHIS MD         Patient seen, findings and plan discussed with surgical ICU staff attending.    Lazaro Cantu MD  Adult Cardiothoracic Anesthesia Fellow

## 2019-02-06 NOTE — PROGRESS NOTES
Nephrology Progress Note  02/06/2019         Mrs Rocha is an 78 yof w/CKD III, Carotid stenosis, GERD, HTN, CAD who had emergent CABG x3 on 1/15, Nephrology consulted for NATALIA on CKD, started HD on 1/21.       Interval History :   Mrs Rocha was seen on HD today, pulling 2-3L as able, BP's running high.  Stable MWF HD, has tunneled line, planning TCU soon.  Continuing to challenge EDW with runs.      Assessment & Recommendations:   NATALIA on CKD-CKD 3, baseline Cr of ~2.0 with one atrophic kidney on imaging with no former imaging to compare.  Injury involving hemodynamics with CABG as well as contrast for angiogram prior.  Had 2 sessions of HD without UF and did reasonably well but then had PEA arrest and was placed on CRRT to stabilize electrolytes and prevent worsening volume overload.  Anuric since arrest, recovery unlikely.                -CRRT stopped 1/25, transitioned to iHD, running MWF               -Appreciate IR placing tunneled line 1/30.      Volume status-Planning 2-3L of UF, was net even yesterday with GI output.  Still up ~3kg from admission wt, continuing to challenge with runs.        Electrolytes/pH-K 4.1, bicarb 20, no acute issues.       Ca/phos/pth-Ca 7.9 Phos 5.6, running HD today.  -On Nepro TF.      CABG-Was recovering although needed HD for NATALIA but had PEA arrest evening of 1/22, started on CRRT but again transitioned to iHD.  Now with trach/PEG/tunnled line, nearing discharge to TCU depending on GI issues.       Anemia-Hgb 7.9, following.       Nutrition-On Nepro TF but held due to N/V issues yesterday, planning on restarting.       Seen and discussed with Dr Parrish     Recommendations were communicated to primary team via this note.         Quincy Jesus  Clinical Nurse Specialist  [phone]      Review of Systems:   I reviewed the following systems:  ROS not done due to vent/sedation.     Physical Exam:   I/O last 3 completed shifts:  In: 260 [NG/GT:250]  Out: 3315 [Emesis/NG  output:1115; Other:2200]   BP (!) 191/96   Pulse 83   Temp 97.7  F (36.5  C) (Axillary)   Resp 26   Wt 75.2 kg (165 lb 12.6 oz)   SpO2 95%   BMI 29.37 kg/m       GENERAL APPEARANCE: Vent via trach, not tracking.   EYES:  No scleral icterus, pupils equal  HENT: mouth without ulcers or lesions  PULM: lungs rhonchi to auscultation  CV: irregular rhythm, normal rate, no rub     -edema +1LE  GI: soft, non-tender, non-distended, bowel sounds are +  MS: no evidence of inflammation in joints, no muscle tenderness  NEURO: Awake but minimally interactive, moving extremities, No asterixis   Lines: 1/30 Tunneled line.     Labs:   All labs reviewed by me  Electrolytes/Renal -   Recent Labs   Lab Test 02/06/19 0355 02/05/19  0332 02/04/19 2000 02/04/19  1556 02/04/19  0313    134  --  135 131*   POTASSIUM 4.1 3.7 3.4 3.3* 4.8   CHLORIDE 96 98  --  97 98   CO2 20 23  --  25 18*   BUN 74* 46*  --  27 71*   CR 3.86* 2.87*  --  2.11* 4.64*   GLC 76 100*  --  99 108*   JEFFREY 7.9* 8.2*  --  9.1 7.8*   MAG 2.2 2.0 1.9  --  2.3   PHOS 5.6* 4.4  --   --  8.8*       CBC -   Recent Labs   Lab Test 02/06/19 0355 02/05/19 0332 02/04/19  0313   WBC 11.3* 12.4* 10.3   HGB 8.7* 8.0* 7.9*    362 348       LFTs -   Recent Labs   Lab Test 01/27/19  0404 01/26/19  0957 01/26/19  0425   ALKPHOS 76 76 71   BILITOTAL 0.9 0.9 0.8   ALT 14 20 19   AST 30 39 42   PROTTOTAL 5.1* 5.1* 5.1*   ALBUMIN 1.8* 1.9* 1.9*       Iron Panel -   Recent Labs   Lab Test 02/06/19  0355 01/27/15  1218 07/11/12  0804   IRON 41 74 80   IRONSAT 16 21 26   ORQUIDEA 1,283* 40 67           Current Medications:    amiodarone  200 mg Oral or Feeding Tube Daily     amLODIPine  10 mg Oral Daily     aspirin  81 mg Oral or Feeding Tube Daily     atorvastatin  40 mg Oral QPM     B and C vitamin Complex with folic acid  5 mL Per Feeding Tube Daily     heparin lock flush  5-10 mL Intracatheter Q24H     heparin  5,000 Units Subcutaneous Q8H FAISAL     hydrALAZINE  25 mg Oral  or Feeding Tube Q6H     insulin aspart  1-6 Units Subcutaneous Q4H     metoprolol tartrate  25 mg Oral or Feeding Tube TID     pantoprazole (PROTONIX) IV  40 mg Intravenous Daily with breakfast     polyethylene glycol  17 g Oral or Feeding Tube Daily     protein modular  1 packet Per Feeding Tube BID     QUEtiapine  50 mg Oral At Bedtime     senna-docusate  1 tablet Oral or Feeding Tube BID    Or     senna-docusate  2 tablet Oral or Feeding Tube BID     sodium chloride (PF)  3 mL Intracatheter Q8H     sodium chloride (PF)  9 mL Intracatheter During Hemodialysis (from stock)     sodium chloride (PF)  9 mL Intracatheter During Hemodialysis (from stock)       IV fluid REPLACEMENT ONLY 40 mL/hr at 02/06/19 1000

## 2019-02-06 NOTE — PROGRESS NOTES
HEMODIALYSIS TREATMENT NOTE    Date: 2/6/2019  Time: 3:46 PM    Data:  Pre Wt:   75.2 kg  Desired Wt: 72.2 kg   Post Wt:    Weight gain:     Weight change:     Ultrafiltration - Post Run Net Total Removed (mL): 2200 mL  Ultrafiltration - Post Run Net Total Gain (mL): 0 mL  Vascular Access Status: Yes, secured and visible  Dialyzer Rinse: Clear  Total Blood Volume Processed: 67.6 L  Total Dialysis (Treatment) Time:   3 hours    Lab:   No    Assessment/Interventions:  3 hours of HD via tunneled RIJ CVC on K3Ca3 bath. Orders to keep SBP>100 and MAP>60. Patient hypertensive throughout. All other VSS. Hydralazine given per ICU RNs. Attempted to remove 3L of fluid but reduced UF goal 2/2 large volume change per critline and increased RR. 2.2L total removed without complications. CVC saline locked and clear guard caps placed post-treatment. See MAR for medication details. Report given to primary RN.     Plan:    Per renal team.

## 2019-02-06 NOTE — PROGRESS NOTES
"Neuroscience Intensive Care Progress Note    02/06/2019    Wendy Rocha is an 78-year-old-female who was admitted on 1/14/2019 with a history of CKDIII, bilateral carotid stenosis, CHF, GERD, HTN, CAD s/p ACS transferred form [facility] with severe multi-vessel disease s/p emergent CABG x3 on 1/15, required dialysis, then complicated by PEA on 1/23 with ROSC. NCC/vascular re-consulted for convulsive-like movements.     24 hour events:  No acute events overnight    Vital signs:  Temp: 97.7  F (36.5  C) Temp src: Axillary BP: (!) 191/96 Pulse: 83 Heart Rate: 86 Resp: 26 SpO2: 95 % O2 Device: Mechanical Ventilator Oxygen Delivery: 9 LPM   Weight: 75.2 kg (165 lb 12.6 oz)  Estimated body mass index is 29.37 kg/m  as calculated from the following:    Height as of 1/11/19: 1.6 m (5' 3\").    Weight as of this encounter: 75.2 kg (165 lb 12.6 oz).    Ventilator Settings  Ventilation Mode: CMV/AC  (Continuous Mandatory Ventilation/ Assist Control)  FiO2 (%): 40 %  Rate Set (breaths/minute): 14 breaths/min  Tidal Volume Set (mL): 450 mL  PEEP (cm H2O): 5 cmH2O  Pressure Support (cm H2O): 12 cmH2O  Oxygen Concentration (%): 40 %  Resp: 26      Intake/Output Summary (Last 24 hours) at 2/6/2019 1439  Last data filed at 2/6/2019 1325  Gross per 24 hour   Intake 260 ml   Output 3315 ml   Net -3055 ml       BP - Mean:  [] 123    Current Medications:    amiodarone  200 mg Oral or Feeding Tube Daily     amLODIPine  10 mg Oral Daily     aspirin  81 mg Oral or Feeding Tube Daily     atorvastatin  40 mg Oral QPM     B and C vitamin Complex with folic acid  5 mL Per Feeding Tube Daily     heparin lock flush  5-10 mL Intracatheter Q24H     heparin  5,000 Units Subcutaneous Q8H FAISAL     hydrALAZINE  25 mg Oral or Feeding Tube Q6H     insulin aspart  1-6 Units Subcutaneous Q4H     metoprolol tartrate  25 mg Oral or Feeding Tube TID     pantoprazole (PROTONIX) IV  40 mg Intravenous Daily with breakfast     polyethylene glycol  17 g Oral or " Feeding Tube Daily     protein modular  1 packet Per Feeding Tube BID     QUEtiapine  50 mg Oral At Bedtime     senna-docusate  1 tablet Oral or Feeding Tube BID    Or     senna-docusate  2 tablet Oral or Feeding Tube BID     sodium chloride (PF)  3 mL Intracatheter Q8H     sodium chloride (PF)  9 mL Intracatheter During Hemodialysis (from stock)     sodium chloride (PF)  9 mL Intracatheter During Hemodialysis (from stock)       PRN Medications:  sodium chloride 0.9%, acetaminophen, albuterol, alteplase, alteplase, artificial tears, bisacodyl, IV fluid REPLACEMENT ONLY, glucose **OR** dextrose **OR** glucagon, diphenhydrAMINE **OR** diphenhydrAMINE, heparin lock flush, HOLD MEDICATION, hydrALAZINE, ipratropium - albuterol 0.5 mg/2.5 mg/3 mL, labetalol, lidocaine 4%, lidocaine (buffered or not buffered), magnesium sulfate, magnesium sulfate, metoprolol, naloxone, ondansetron **OR** ondansetron, oxyCODONE, sodium chloride (PF), sodium chloride (PF), sodium chloride (PF), sodium chloride (PF)    Infusions:    IV fluid REPLACEMENT ONLY 40 mL/hr at 02/06/19 1000       Allergies   Allergen Reactions     Lisinopril      She developed ARF       Physical Examination:  BP (!) 191/96   Pulse 83   Temp 97.7  F (36.5  C) (Axillary)   Resp 26   Wt 75.2 kg (165 lb 12.6 oz)   SpO2 95%   BMI 29.37 kg/m      Mental status: alert, unable to follow commands, does not squeeze hand, question if she tracks to the right.  Cranial nerves: gaze midline at rest, blinks spontaneously, blinks to threat on R, not really on left, no facial asymmetry noted, spontaneous cough and gag  Motor/Sensory: increased tone on R upper and lower extremities, normal tone on L. Withdraws to noxious stimuli minimally LUE, not movement RUE, withdraws b/l LE. Minimal spontaneous movements BLE.  Reflexes: normal patellar and brachial reflexes, toe up-going on L, equivocal on R. No clonus.   Coordination: unable to assess due to MS  Gait: unable to assess due  to MS    Most Recent 3 CBC's:  Recent Labs   Lab Test 02/06/19  0355 02/05/19  0332 02/04/19  0313   WBC 11.3* 12.4* 10.3   HGB 8.7* 8.0* 7.9*   MCV 90 89 92    362 348     Most Recent 3 BMP's:  Recent Labs   Lab Test 02/06/19  0355 02/05/19  0332 02/04/19 2000 02/04/19  1556    134  --  135   POTASSIUM 4.1 3.7 3.4 3.3*   CHLORIDE 96 98  --  97   CO2 20 23  --  25   BUN 74* 46*  --  27   CR 3.86* 2.87*  --  2.11*   ANIONGAP 16* 13  --  13   JEFFREY 7.9* 8.2*  --  9.1   GLC 76 100*  --  99     Imaging:    Mr Brain W/o Contrast    Result Date: 2/1/2019  MR BRAIN W/O CONTRAST 2/1/2019 7:56 PM Provided History: Altered level of consciousness (LOC), unexplained Comparison:  7/12/2017 Technique: Sagittal T1-weighted and axial T2-weighted, turboFLAIR and diffusion-weighted with ADC map images of the brain were obtained without intravenous contrast. Findings: These images reveal no intracranial mass lesion, mass effect, midline shift or abnormal extraaxial fluid collection. The ventricles and sulci are normal for age. Extensive periventricular deep white matter FLAIR hyperintensities. Not meaningfully changed from 7/12/2017. Old lacunar infarcts in the basal ganglia and sal. Two punctate foci of restricted diffusion in left cerebellar hemisphere. Prominent signal in the central sal on the DWI images, similar to 7/12/2017. Normal intravascular flow voids are identified. Bilateral mastoid effusions. Bilateral pseudophakia.     Impression: 1. Two short linear acute infarctions in the left cerebellar hemisphere. 2. No substantial change in the probable small vessel ischemic disease. [Urgent Result: stroke] Finding was identified on 2/1/2019 7:58 PM. The on call CVTS resident was contacted by Dr. Guzman at 2/1/2019 8:02 PM and verbalized understanding of the urgent finding. I have personally reviewed the examination and initial interpretation and I agree with the findings. AMAURY PALMER MD    Xr Chest Port 1  View    Result Date: 2/1/2019  XR CHEST PORT 1 VW  2/1/2019 1:00 PM  HISTORY: Interval CXR COMPARISON: 2/1/2019 FINDINGS: Portable AP view the chest. Right IJ central venous catheter with tip projecting over the mid SVC. Tracheostomy tube with tip projecting over the trachea. Left upper approach PICC with tip projecting over the high SVC. Unchanged enlargement of the cardiac silhouette. Severe mitral annular calcifications. Left retrocardiac airspace opacities are also not appreciably changed. Small left pleural effusion. No definite pneumothorax.     IMPRESSION: 1. Stable support devices. 2. Unchanged left retrocardiac atelectasis, infection or aspiration. I have personally reviewed the examination and initial interpretation and I agree with the findings. FERMÍN HEAD MD    Xr Chest Port 1 View    Result Date: 1/31/2019  Exam: XR CHEST PORT 1 VW, 1/31/2019 2:01 AM Indication: Interval CXR Comparison: 1/30/2019 x-ray Findings: AP single view of chest. Tip of the tracheostomy tube projects over the upper thoracic trachea. Tip of the double-lumen right IJ central venous catheter projects over the mid SVC. Tip of the left-sided PICC projects over the mid SVC. Postsurgical changes of CABG. Ongoing moderate left-sided pleural effusion with adjacent opacity. Cardia mediastinal silhouettes is enlarged, stable. Again noted the mitral annular calcifications.     Impression: 1. Moderate left-sided pleural effusion with adjacent opacity, likely atelectasis. Cannot rule rule out retrocardiac/left lower lobe infection. 2. Stable lines and tubes. I have personally reviewed the examination and initial interpretation and I agree with the findings. YOVANY GLOVER MD    Xr Chest Port 1 View    Result Date: 1/30/2019  XR CHEST PORT 1 VW  1/30/2019 4:27 PM  HISTORY: Postop CXR COMPARISON: Earlier same day FINDINGS: Tracheostomy. Right IJ central venous catheter tip projecting over the cavoatrial junction. Postsurgical changes of  CABG. Enlarged cardiac silhouette, unchanged. Increased interstitial opacities compared to prior examination. Probable layering pleural effusions. No pneumothorax.     IMPRESSION: 1. Increased pulmonary interstitial opacities, likely pulmonary edema. Probable layering pleural effusions. 2. Postsurgical changes of CABG. No pneumothorax. I have personally reviewed the examination and initial interpretation and I agree with the findings. JACKIE ANTONIO MD    Xr Abdomen Port 1 View    Result Date: 2/1/2019  Exam: XR ABDOMEN PORT 1 VW 2/1/2019 12:59 PM History: Vomiting, potential obstruction Comparison: 1/28/2019 Findings: Single supine view of the abdomen. Left upper quadrant percutaneous gastrostomy tube. Previously seen feeding tube and gastric tube have been removed. Bowel gas pattern is nonobstructive. Prominent loops of gas and stool-filled colon; rectum contains gas. No portal venous gas and no pneumatosis. Vascular calcifications. No acute bony normality. Degenerative changes throughout the spine.     Impression: Nonobstructive bowel gas pattern. DOMENICO VILCHIS MD    Ir Cvc Tunnel Placement > 5 Yrs Of Age    Result Date: 1/30/2019  PRE-PROCEDURE DIAGNOSIS: Dialysis patient POST-PROCEDURE DIAGNOSIS: Same PROCEDURE: Tunneled central venous catheter placement    IMPRESSION: Completed image-guided placement of 14.5 Greenlandic, 19 cm double lumen tunneled central venous catheter via right internal jugular vein. Catheter tip in high right atrium. Aspirates and flushes freely, heparin locked and ready for immediate use. No complication. ---------- CLINICAL HISTORY: Patient requires central venous access for therapy. Tunneled central venous catheter placement requested. PERFORMED BY: Neville Messina PA-C CONSENT: Written informed consent was obtained and is documented in the patient record. MEDICATIONS: A 10:1 volume mixture of 1% lidocaine without epinephrine buffered with 8.4% bicarbonate solution was available for  local anesthesia. The catheter was heparin locked upon completion of placement. NURSING: The patient was placed on continuous vital signs monitoring. Vital signs and sedation were monitored by nursing staff under IR physician staff supervision.  FLUOROSCOPY TIME: 1.1 minutes DESCRIPTION: The right neck and upper chest were prepped and draped in the usual sterile fashion.  Under ultrasound guidance, the right internal jugular vein was identified and the overlying skin was anesthetized and skin dermatotomy was made. Under ultrasound guidance, right internal jugular venipuncture was made with needle. Image saved documenting venipuncture and patency. Needle was exchanged over guidewire for a dilator under fluoroscopic guidance. Length to right atrium was measured with guidewire. Guidewire and inner dilator were removed. Wire was advanced into inferior vena cava under fluoroscopic guidance and secured. The anterior chest skin was anesthetized and incision was made after measurements were taken. A cuffed catheter was subcutaneously tunneled from the anterior chest incision to the internal jugular venipuncture site after path of tunnel was anesthetized. The dilator was exchanged over guidewire for a peel-away sheath. Guidewire was removed. Under fluoroscopic guidance, the catheter was placed through the peel-away sheath. Peel-away sheath was removed.  Final catheter position saved. Both catheter lumens adequately aspirated and flushed. Each lumen was heparin locked. A catheter retaining suture and sterile dressing were applied. The skin dermatotomy site overlying the internal jugular venipuncture was closed with topical adhesive. COMPLICATIONS: No immediate concerns, the patient remained stable throughout the procedure and tolerated it well. ESTIMATED BLOOD LOSS: Minimal SPECIMENS: None HAYDEE LITTLE PA-C    Ct Head W/o Contrast    Result Date: 1/31/2019  CT HEAD W/O CONTRAST 1/31/2019 9:25 PM History: Neuro deficit(s),  subacute ICD-10: Comparison: 1/18/2019. Technique: Using multidetector thin collimation helical acquisition technique, axial, coronal and sagittal CT images from the skull base to the vertex were obtained without intravenous contrast. Findings:  On the noncontrast images of the brain, there is no definite intracranial hemorrhage, mass affect, or midline shift. The ventricles are not enlarged. Low-attenuation in periventricular white matter is not disproportionate to the patient's age. The basal cisterns are patent. There is some limitation due to motion. Diffuse mastoid debris is new. Diffuse severe carotid and basilar atherosclerosis, as previously. Left frontal lobe hypoattenuation likely related to streak artifact and motion.      Impression: 1. Some limitation in evaluation due to motion, but no definite acute intracranial pathology. 2. Severe leukoariosis and some cerebral atrophy, slightly disproportionate to patient's age, unchanged. JIM BECK MD    Assessment/Plan  Wendy Rocha is an 78-year-old-female who was admitted on 1/14/2019 with a history of CKDIII, bilateral carotid stenosis, CHF, GERD, HTN, CAD s[place]p [place] [facility] [place] disease s/p emergent CABG x3 on 1/15, required dialysis, then complicated by PEA on 1/23 with ROSC. NCC/vascular re-consulted due to convulsive-type movements. Previously consulted for similar findings, with EEG that did not demonstrate seizure activity.      #Stroke vs anoxic brain injury vs toxic metabolic encephalopathy  Asymmetric exam, persistent minimally responsive state since admission s/p PEA arrest with multiple medical comorbidities. Anoxic brain injury likely. EEG has not demonstrated electrographic seizures. It has continued to show moderate-severe diffuse encephalopathy.      Recommendations:   - She likely has mild anoxic brain injury as a result of her complicated course, possibly during PEA arrest vs earlier during  hospitalization surrounding initial emergent need for CABG. Would recommend discontinuing vEEG monitoring.   - No intervention necessary. Will sign off, please contact with questions or concerns.     Tri Rivera MD  Neurocritical care fellow

## 2019-02-06 NOTE — PROGRESS NOTES
Care Coordinator - Discharge Planning    Admission Date/Time:  1/14/2019  Attending MD:  Haseeb Villegas MD     Data  Chart reviewed, discussed with interdisciplinary team.   Patient was admitted for:   1. Acute kidney injury (H)    2. CKD (chronic kidney disease) stage 3, GFR 30-59 ml/min (H)         Assessment   Full assessment completed in previous note     RNCC talked to pt dtr., Manju about their LTAC preference.  Manju stated we can check bed availability at [facility] and if no bed there they are ok with [facility] too.  Pt will need prior authorization from her insurance prior transferring.      Coordination of Care and Referrals: Provided patient/family with options for LTACH.    RNCC send request for prior authorization for [facility].  Pt case # is [id number].  Clinical info have been faxed to fax # [phone].  Pt insurance will provide authorization after reviewing the clinical info that have been faxed.      Plan  Anticipated Discharge Date:  Tomorrow, 2/6 if we receive prior authorization from pt insurance.  Anticipated Discharge Plan:   LTAC, [facility].  Awaiting prior authorization from pt insurance.  RNCC will cont to follow plan of care.      Aneta Plunkett RN, PHN, BSN  4A and 4E/ ICU  Care Coordinator  Phone: [phone]  Pager: [phone]

## 2019-02-06 NOTE — PLAN OF CARE
D: No acute events during shift    I/A: Neuro: Neuro exams unchanged from previous shift.     CV: SBP >160 - received two doses of 5mg hydralazine.      Resp: #6 shiley trach, VCAC 40%/14/450/5, LS coarse diminished at bases. Thick tan/dark tan secretions. Patient tachypneic low 30s, MD was notified.      GI/: NPO except medications. TF on hold due to high residual yesterday. Abdomen distended, remains soft. PEG tube to gravity, dark green/coffee sediment output.     P: SBP <160, continue to monitor, contact CVTS ([phone]) with questions/concerns

## 2019-02-06 NOTE — PLAN OF CARE
Neuro: Unchanged neuro exams, see flow sheet, continuous EEG monitoring started.     CV: SBP goal achieved without intervention this shift. Pt remain afebrile with HR high 60's to low 70's    Resp: Patient with a #6 shiley trach, intubated with FIO2 at 40%, LS coarse diminished at bases. Pt Psupported for about an hour. Flipped back to VC-AC mode d/t RR in the low 40's with increase work of breathing. Pt with dark brown trach secretions and redness/open area under trach plate. Optifoam and mepelix applied, see note.    GI/: pt gets HD M-W-F, scant amount of urine at times, bladder scanned showed 20cc. NPO, TF was at goal and is now on hold d/t high residual at about 1500, see flow sheet and note. Zofran 4mg given prn.    Skin: see flow sheet    Plan: Family updated of patient condition.  Continue to monitor and treat per plan of care. Notify [facility]VS of changes in pt condition.

## 2019-02-07 NOTE — PLAN OF CARE
Neuro: Lethargic, does not follow commands, L pupil> R pupil  Resp: Shiley # 6, CMV fio2 40%, peep 5, tachypnea 25-35  Cardio: SB<160, sinus rhythm  : Anuric, bladder scanned for 0 ml  GI: Abdomen distended/rounded, enema given with only a smear resulting, 1 emesis around 1500(MD notified and TF stopped). PEG tube  Skin: Blanchable redness on back of head, hands, coccyx  Access: HD catheter, PICC line (saline locked)  P: Report given to Daniela at [place].  Pt transported by [facility] with vent.  Daughter Manju updated about transport.  Belongings sent with pt and charted.

## 2019-02-07 NOTE — DISCHARGE SUMMARY
[facility]  Discharge Summary  Cardiac Surgery     Wendy Rocha MRN# [id number]   YOB: 1940 Age: 78 year old     Date of Admission:  1/14/2019  Date of Discharge::  2/7/2019  Admitting Physician:  Haseeb Villegas MD  Discharge Physician:  Dr. Esquivel  Primary Care Physician:        Thad Montgomery Gold          Admission Diagnoses:   Coronary artery disease  Chronic Kidney Disease (CKD 3)          Discharge Diagnosis:   Neuro:  Moderate to severe encephalopathy, EEG confirmed no seizures    CV:   CAD s/p CABG x 3  Atrial Fibrillation (rate controlled)    Pulm:  Acute respiratory failure requiring mechanical ventilation   Tracheostomy with ventilator dependance    Gastrointestinal:  Malnutrition requiring PEG    Renal:  NATALIA on CKD, on hemodialysis    Endocrine:  Hyperparathyroidism  Vitamin D deficiency    ID:  Trach site with gram positive cocci.         Procedures:   1/15/2019  1.  Coronary artery bypass grafting x3 (left internal mammary artery, left anterior descending artery, saphenous vein graft to posterior descending artery, saphenous vein graft to first obtuse marginal artery).   2.  Endoscopic vein harvest.     1/30/2019    Percutaneous converted to open tracheostomy and percutaneous endoscopic gastrostomy tube            Consultations:   VASCULAR ACCESS CARE ADULT IP CONSULT  PHARMACY IP CONSULT  NUTRITION SERVICES ADULT IP CONSULT  CARDIAC REHAB IP CONSULT  PHYSICAL THERAPY ADULT IP CONSULT  OCCUPATIONAL THERAPY ADULT IP CONSULT  RESPIRATORY CARE IP CONSULT  NUTRITION SERVICES ADULT IP CONSULT  PHARMACY IP CONSULT  NEPHROLOGY GENERAL ADULT IP CONSULT  SPIRITUAL HEALTH SERVICES IP CONSULT  PHARMACY CRRT IP CONSULT  VASCULAR ACCESS ADULT IP CONSULT  SURGERY GENERAL ADULT IP CONSULT  INTERVENTIONAL RADIOLOGY ADULT/PEDS IP CONSULT  PALLIATIVE CARE ADULT IP CONSULT  NEUROLOGY STROKE ADULT IP CONSULT  NUTRITION SERVICES ADULT IP CONSULT  NEUROLOGY CRITICAL CARE ADULT IP  CONSULT           Medications Prior to Admission:     Medications Prior to Admission   Medication Sig Dispense Refill Last Dose     ACE/ARB NOT PRESCRIBED, INTENTIONAL, ACE & ARB not prescribed due to Worsening renal function on ACE/ARB therapy   Taking     acetaminophen (TYLENOL) 325 MG tablet Take 2 tablets (650 mg) by mouth every 6 hours as needed for mild pain 100 tablet 0 Taking     amLODIPine (NORVASC) 10 MG tablet TAKE 1 TABLET BY MOUTH ONCE DAILY 90 tablet 0      ASPIRIN NOT PRESCRIBED (INTENTIONAL) Antiplatelet medication not prescribed intentionally due to Plavix 0 each 0 Taking     busPIRone (BUSPAR) 5 MG tablet TAKE 1 TABLET BY MOUTH TWICE DAILY FOR ANXIETY 180 tablet 0      clopidogrel (PLAVIX) 75 MG tablet TAKE 1 TABLET BY MOUTH ONCE DAILY 90 tablet 0      doxazosin (CARDURA) 4 MG tablet TAKE 1 TABLET BY MOUTH AT BEDTIME 90 tablet 0      Hydrocortisone Acetate 2.5 % CREA Externally apply 1 Application topically 2 times daily Apply to both arms twice a day 28.4 g 1      metoprolol tartrate (LOPRESSOR) 50 MG tablet TAKE ONE TABLET BY MOUTH TWICE DAILY 180 tablet 0      metoprolol tartrate (LOPRESSOR) 50 MG tablet Take 0.5 tablets (25 mg) by mouth 2 times daily 90 tablet 0      ondansetron (ZOFRAN) 8 MG tablet Take 1 tablet (8 mg) by mouth every 8 hours as needed for nausea 20 tablet 0 Not Taking     order for DME Machine to dispense the medication 1 Device 0 Taking     pantoprazole (PROTONIX) 40 MG EC tablet TAKE ONE TABLET BY MOUTH ONCE DAILY 30-60  MINUTES  BEFORE  A  MEAL 90 tablet 2      simvastatin (ZOCOR) 40 MG tablet Take 1 tablet (40 mg) by mouth At Bedtime 90 tablet 3      traZODone (DESYREL) 50 MG tablet Take 1 tablet (50 mg) by mouth nightly as needed for sleep 30 tablet 1 Not Taking              Discharge Medications:     Current Discharge Medication List      CONTINUE these medications which have NOT CHANGED    Details   ACE/ARB NOT PRESCRIBED, INTENTIONAL, ACE & ARB not prescribed due to  Worsening renal function on ACE/ARB therapy    Associated Diagnoses: Renal artery stenosis (H); Essential hypertension with goal blood pressure less than 140/90      acetaminophen (TYLENOL) 325 MG tablet Take 2 tablets (650 mg) by mouth every 6 hours as needed for mild pain  Qty: 100 tablet, Refills: 0    Associated Diagnoses: Severe episode of recurrent major depressive disorder, with psychotic features (H); Memory loss      amLODIPine (NORVASC) 10 MG tablet TAKE 1 TABLET BY MOUTH ONCE DAILY  Qty: 90 tablet, Refills: 0    Comments: Patient needs appointment Jan - Feb before further refills  Associated Diagnoses: Essential hypertension with goal blood pressure less than 140/90      ASPIRIN NOT PRESCRIBED (INTENTIONAL) Antiplatelet medication not prescribed intentionally due to Plavix  Qty: 0 each, Refills: 0    Associated Diagnoses: Essential hypertension with goal blood pressure less than 140/90; PVD (peripheral vascular disease) (H); Aortic root dilatation (H); Renal artery stenosis (H)      busPIRone (BUSPAR) 5 MG tablet TAKE 1 TABLET BY MOUTH TWICE DAILY FOR ANXIETY  Qty: 180 tablet, Refills: 0    Comments: Patient needs appointment before further refills  Associated Diagnoses: Severe episode of recurrent major depressive disorder, with psychotic features (H)      clopidogrel (PLAVIX) 75 MG tablet TAKE 1 TABLET BY MOUTH ONCE DAILY  Qty: 90 tablet, Refills: 0    Comments: Patient needs appointment Jan - Feb before further refills  Associated Diagnoses: PVD (peripheral vascular disease) (H); Aortic root dilatation (H); Renal artery stenosis (H)      doxazosin (CARDURA) 4 MG tablet TAKE 1 TABLET BY MOUTH AT BEDTIME  Qty: 90 tablet, Refills: 0    Comments: Patient needs appointment Jan - Feb before further refills  Associated Diagnoses: Essential hypertension with goal blood pressure less than 140/90; PVD (peripheral vascular disease) (H); Aortic root dilatation (H); Renal artery stenosis (H)      Hydrocortisone  Acetate 2.5 % CREA Externally apply 1 Application topically 2 times daily Apply to both arms twice a day  Qty: 28.4 g, Refills: 1    Associated Diagnoses: Other eczema      !! metoprolol tartrate (LOPRESSOR) 50 MG tablet TAKE ONE TABLET BY MOUTH TWICE DAILY  Qty: 180 tablet, Refills: 0    Associated Diagnoses: Essential hypertension with goal blood pressure less than 140/90; PVD (peripheral vascular disease) (H); Aortic root dilatation (H); Renal artery stenosis (H)      !! metoprolol tartrate (LOPRESSOR) 50 MG tablet Take 0.5 tablets (25 mg) by mouth 2 times daily  Qty: 90 tablet, Refills: 0    Associated Diagnoses: Essential hypertension with goal blood pressure less than 140/90; PVD (peripheral vascular disease) (H); Aortic root dilatation (H); Renal artery stenosis (H)      ondansetron (ZOFRAN) 8 MG tablet Take 1 tablet (8 mg) by mouth every 8 hours as needed for nausea  Qty: 20 tablet, Refills: 0    Associated Diagnoses: Nausea      order for DME Machine to dispense the medication  Qty: 1 Device, Refills: 0    Associated Diagnoses: Severe episode of recurrent major depressive disorder, with psychotic features (H); Memory loss      pantoprazole (PROTONIX) 40 MG EC tablet TAKE ONE TABLET BY MOUTH ONCE DAILY 30-60  MINUTES  BEFORE  A  MEAL  Qty: 90 tablet, Refills: 2    Associated Diagnoses: Perforated duodenal ulcer (H)      simvastatin (ZOCOR) 40 MG tablet Take 1 tablet (40 mg) by mouth At Bedtime  Qty: 90 tablet, Refills: 3    Associated Diagnoses: Hyperlipidemia LDL goal <100      traZODone (DESYREL) 50 MG tablet Take 1 tablet (50 mg) by mouth nightly as needed for sleep  Qty: 30 tablet, Refills: 1    Associated Diagnoses: Persistent insomnia       !! - Potential duplicate medications found. Please discuss with provider.                     Brief History of Illness:   Wendy Rocha is a 78 year old female with pmh CKDIII, b/l carotid stenosis, GERD, HTN, CAD s/p ACS [place] [facility] [place]  multi-vessel disease and underwent Coronary artery bypass grafting x3 (left internal mammary artery, left anterior descending artery, saphenous vein graft to posterior descending artery, saphenous vein graft to first obtuse marginal artery) on 1/16             Hospital Course:   Postoperative course is as follows:    CV: 1/23, the patient went into PEA, with ROSC s/p compressions, intubation, and epinephrine. Amio PI for atrial fibrillation     Neuro: Delirious postop. Had to be re-intubated after PEA arrest. CT head on 1/18 showed no intracranial pathology. 1/31 Ct head (after PEA arrest) showed severe leukoariosis and some cerebral atrophy. Neuro-critical care consulted with no plans for follow-up     Resp: Mechanically ventilated after PEA arrest. Given no purposeful motor activity, a tracheotomy was performed on 1/31. Minimal vent settings. PST as abl e    Renal: intermittent HD, tunneled HD line     GI/ FEN: TFs@40 ml/hr, continuous    ID: maceration around trach site noted 2/6. Started on keflex x 7days, no fevers     Hem: Hb stable, subcutaneous heparin TID            Day of Discharge Physical Exam:   Blood pressure (!) 122/93, pulse 83, temperature 96.8  F (36  C), temperature source Axillary, resp. rate 25, weight 75.2 kg (165 lb 12.6 oz), SpO2 95 %, not currently breastfeeding.    Gen: Opens eyes to voice, no purposeful movements   Pulm: Non-labored breathing  Abd: Soft, appropriately tender. Incision C/D/I  Ext:  Warm and well-perfused         Final Pathology Result:   No pathology submitted            Home Health Care:   Long term care facility           Discharge Disposition:   [place], [place]      Condition at discharge: Stable      Miguel Reeves MD  General Surgery Resident   PGY3    Pt was seen, examined and plan discussed with Dr. Laz Esquivel

## 2019-02-07 NOTE — PROCEDURES
EEG #-1       DATE OF RECORDING/SERVICE DATE:  2019       SOURCE FILE DURATION:  12 hours 53 minutes 22 seconds.       TYPE OF STUDY:  Inpatient video EEG monitoring.       HISTORY:  Day 1 of video EEG monitoring in patient, Bjorn Rocha, an 78-year-old who is status post extended pulseless electrical arrest 2019 with 30 minutes until return of spontaneous circulation.  She has subsequently been encephalopathic.  She is being evaluated for encephalopathy.  She is intubated in ICU on ventilator        TECHNICAL SUMMARY:  EEG is recorded from scalp electrodes placed according to the 10-20 international system.  Additional electrodes were utilized for referencing, artifact detection, and recording from other cerebral regions.  Video was continuously recorded.  Video was reviewed for clinical correlation and to assist with EEG interpretation.      FINDINGS:  Irregular delta and theta predominate.  Delta has bifrontal distribution.  There are no clear asymmetries.  There are other periods in which a bifrontal theta rhythm predominates.  These do not, however, really appear to be associated with state changes.  Stimulation is performed utilizing ICU protocol at around 11:11 a.m.  This does not induce a clear change in the background activity.  A normal posterior dominant rhythm is not seen at any point.      OTHER INTERICTAL ABNORMALITIES:  No epileptiform discharges.  No periodic discharges.      ICTAL ABNORMALITIES:  No electrographic seizures.      IMPRESSION:  Abnormal, consistent with a moderate to severe diffuse encephalopathy.  This is nonspecific and can be seen in a variety of etiologies including toxic, metabolic, degenerative among others.  Epileptiform activity or seizures were not seen.         PETE DUEÑAS MD             D: 2019   T: 2019   MT: TARIK      Name:     BJORN ROCHA   MRN:      [id number]        Account:        [id number]   :      1940            Procedure Date: 02/05/2019      Document: [id number]

## 2019-02-07 NOTE — PROGRESS NOTES
CV ICU PROGRESS NOTE  February 7, 2019      PATIENT: Wendy Rocha  MRN: [id number]  ADMISSION DATE: 1/14/2019  HOSPITAL DAY: 23    DIAGNOSES:   Acute kidney injury (H)  (primary encounter diagnosis)  CKD (chronic kidney disease) stage 3, GFR 30-59 ml/min (H)    ASSESSMENT: Wendy Rocha is a 78 year old female s/p CABG, PEA arrest, ROSC, s/p tracheostomy and PEG, HD stable.      Today's Plan:  -Wean ventilator settings  -Advance tube feeds  -Did not receive prn BP medications overnight (prn SBP>160 mmHg)  -Patient placement, family desires [facility]  -Video EEG shows no seizure activity  -Sodium low 131 after dialysis, will recheck another sodium level  -Speak with surgical team about removing suture near trach site     PLAN:   Neuro/ pain/ sedation:  #Convulsion-type activity  -Monitor neurological status. Notify the MD for any acute changes in exam.  -Seroquel at bedtime  -Video EEG results confirm no seizure     Pulmonary care:   -Chest X ray is unchanged, pulmonary congestion present     Cardiovascular:    -Monitor hemodynamic status, off vasopressors, HD stable  -Amiodarone 200 mg daily   -Amlodopine started (2/4), BP control improved     GI care:   -Tube feeds held last week because of vomiting and abdominal distension with KUB showing stool in the colon   -She had bowel movements, and tube feeds restarted and Nepro to 40 ml/hr      Fluids/ Electrolytes/ Nutrition:   -5% Albumin bolus prn for low CVP/low urine output  -Replace electrolytes per ICU protocol K>4, Mg>2, Phos WNL     Renal/ Fluid Balance:    -Hemodialysis continued, MWF  -Sodium low 131  -Will continue to monitor intake and output.      Endocrine:    -Insulin SSI      ID/ Antibiotics:  -No antibiotics  -Cultured tracheostomy, yeast present, no antifungals, she is not on immunosupression  -Surgery to inspect trach site suture  -Currently, afebrile, WBC normal, CXR unchanged.      Heme:      -Hemoglobin stable      Prophylaxis:    -Mechanical prophylaxis for DVT.   -Protonix IV, Heparin TID      Disposition:  -Surgical ICU.    OBJECTIVE:  VITAL SIGNS:  Body mass index is 29.37 kg/m .  71.9 kg (dosing weight)  Temp:  [97.5  F (36.4  C)-99.8  F (37.7  C)] 98.1  F (36.7  C)  Pulse:  [69-91] 83  Heart Rate:  [67-86] 67  Resp:  [18-35] 22  BP: (110-191)/() 119/70  Cuff Mean (mmHg):  [100-135] 106  FiO2 (%):  [40 %] 40 %  SpO2:  [91 %-98 %] 93 %     INTAKE/ OUTPUT:   I/O last 3 completed shifts:  In: 490 [I.V.:120; NG/GT:250]  Out: 2675 [Emesis/NG output:475; Other:2200]     VENTILATOR SETTINGS:  Ventilation Mode: CMV/AC  (Continuous Mandatory Ventilation/ Assist Control)  FiO2 (%): 40 %  Rate Set (breaths/minute): 14 breaths/min  Tidal Volume Set (mL): 450 mL  PEEP (cm H2O): 5 cmH2O  Oxygen Concentration (%): 40 %  Resp: 22      LABS:  Video EEG 2/6/19:  Abnormal, consistent with a moderate to severe diffuse encephalopathy.  This is nonspecific and can be seen in a variety of etiologies including toxic, metabolic, degenerative among others.  Epileptiform activity or seizures were not seen.       Complete Blood Count   Recent Labs   Lab 02/07/19 0358 02/06/19 0355 02/05/19 0332 02/04/19 0313   WBC 7.2 11.3* 12.4* 10.3   HGB 8.7* 8.7* 8.0* 7.9*    386 362 348     Basic Metabolic Panel  Recent Labs   Lab 02/07/19  0358 02/06/19  0355 02/05/19 0332 02/04/19 2000 02/04/19  1556   * 133 134  --  135   POTASSIUM 3.7 4.1 3.7 3.4 3.3*   CHLORIDE 94 96 98  --  97   CO2 24 20 23  --  25   BUN 43* 74* 46*  --  27   CR 2.47* 3.86* 2.87*  --  2.11*   * 76 100*  --  99     Tracheosomty site culture: Yeast growth, and gram positive cocci    INFUSIONS:    IV fluid REPLACEMENT ONLY 30 mL/hr at 02/06/19 1558       SCHEDULED MEDICATIONS:    amiodarone  200 mg Oral or Feeding Tube Daily     amLODIPine  10 mg Oral Daily     aspirin  81 mg Oral or Feeding Tube Daily     atorvastatin  40 mg Oral  QPM     B and C vitamin Complex with folic acid  5 mL Per Feeding Tube Daily     heparin lock flush  5-10 mL Intracatheter Q24H     heparin  5,000 Units Subcutaneous Q8H FAISAL     hydrALAZINE  25 mg Oral or Feeding Tube Q6H     insulin aspart  1-6 Units Subcutaneous Q4H     metoprolol tartrate  25 mg Oral or Feeding Tube TID     pantoprazole (PROTONIX) IV  40 mg Intravenous Daily with breakfast     polyethylene glycol  17 g Oral or Feeding Tube Daily     protein modular  1 packet Per Feeding Tube BID     QUEtiapine  50 mg Oral At Bedtime     senna-docusate  1 tablet Oral or Feeding Tube BID    Or     senna-docusate  2 tablet Oral or Feeding Tube BID     sodium chloride (PF)  3 mL Intracatheter Q8H       PRN MEDICATIONS:  acetaminophen, albuterol, artificial tears, bisacodyl, IV fluid REPLACEMENT ONLY, glucose **OR** dextrose **OR** glucagon, diphenhydrAMINE **OR** diphenhydrAMINE, heparin lock flush, HOLD MEDICATION, hydrALAZINE, ipratropium - albuterol 0.5 mg/2.5 mg/3 mL, labetalol, lidocaine 4%, lidocaine (buffered or not buffered), magnesium sulfate, magnesium sulfate, metoprolol, naloxone, ondansetron **OR** ondansetron, oxyCODONE, sodium chloride (PF), sodium chloride (PF)          PHYSICAL EXAMINATION:    General: in no acute distress.    HEENT: Normocephalic, atraumatic.    Neck: No thyromegaly.    Chest wall: Symmetric thorax. No masses or tenderness to palpation.     Respiratory: Non-labored breathing.     Cardiovascular: Regular rate and rhythm.     Gastrointestinal: Abdomen soft, non-distended, non-tender to palpation.    Extremities: No limb deformities. No pedal edema.     Skin: As noted above.     REVIEW OF SYSTEMS: As noted above. No headache, dizziness. No fever, chills. No chest pain or shortness of breath. No abdominal pain, nausea, vomiting. No diarrhea or constipation. No urinary difficulties. No muscle or body aches.     PAST MEDICAL HISTORY:   Past Medical History:   Diagnosis Date     Abnormal  ECG      ARF (acute renal failure) (H) Aug 2010    Lisinopril was stopped at that time     Carotid stenosis      Carotid stenosis, bilateral      Carotid stenosis, bilateral     [facility] Surgeon: dr Kush Mcmillan, Phone: [phone] fax: [phone]     CKD (chronic kidney disease) stage 3, GFR 30-59 ml/min (H)      GERD (gastroesophageal reflux disease)      HTN, goal below 140/90      Hyperlipidemia LDL goal <100      Lung nodule may 2013    needs f/u may 2014     Muscle aches      Perforated duodenal ulcer (H) Aug 2010     Proteinuria      PVD (peripheral vascular disease) (H)      Renal artery stenosis (H) [place] April 2011    Right side     Vitamin D deficiency disease        SURGICAL HISTORY:   Past Surgical History:   Procedure Laterality Date     ARTHROPLASTY KNEE      left     BYPASS CARDIOPULMONARY N/A 1/15/2019    Procedure: On Cardiopulmonary Bypass;  Surgeon: Haseeb Villegas MD;  Location: [facility] OR     BYPASS GRAFT ARTERY CORONARY MINIMALLY INVASIVE  (NO PUMP) N/A 1/15/2019    Procedure: Median Sternotomy, Coronary Artery Bypass Graft x3, Endovein [place] of Left Greater Saphenous Vein, Left Internal Mammary Artery Takedown;  Surgeon: Haseeb Villegas MD;  Location: [facility] OR     EYE SURGERY      cataracts     IR CVC TUNNEL PLACEMENT > 5 YRS OF AGE  1/30/2019     LAPAROSCOPIC TUBAL LIGATION       TRACHEOSTOMY N/A 1/30/2019    Procedure: Percutaneous tracheostomy converted to Open Tracheostomy;  Surgeon: Yancy Luo MD;  [facility]: [facility]     TRANSESOPHAGEAL ECHOCARDIOGRAM INTRAOPERATIVE  1/15/2019    Procedure: Intraoperative Transesophageal Echocardiogram;  Surgeon: Haseeb Villegas MD;  Location:  OR       FAMILY HISTORY: No family history of problems with anesthesia    ALLERGIES:      Allergies   Allergen Reactions     Lisinopril      She developed ARF       MEDICATIONS:    No current facility-administered medications on file prior to encounter.   Current Outpatient Medications on File Prior to  Encounter:  ACE/ARB NOT PRESCRIBED, INTENTIONAL, ACE & ARB not prescribed due to Worsening renal function on ACE/ARB therapy   acetaminophen (TYLENOL) 325 MG tablet Take 2 tablets (650 mg) by mouth every 6 hours as needed for mild pain   amLODIPine (NORVASC) 10 MG tablet TAKE 1 TABLET BY MOUTH ONCE DAILY   ASPIRIN NOT PRESCRIBED (INTENTIONAL) Antiplatelet medication not prescribed intentionally due to Plavix   busPIRone (BUSPAR) 5 MG tablet TAKE 1 TABLET BY MOUTH TWICE DAILY FOR ANXIETY   clopidogrel (PLAVIX) 75 MG tablet TAKE 1 TABLET BY MOUTH ONCE DAILY   doxazosin (CARDURA) 4 MG tablet TAKE 1 TABLET BY MOUTH AT BEDTIME   Hydrocortisone Acetate 2.5 % CREA Externally apply 1 Application topically 2 times daily Apply to both arms twice a day   metoprolol tartrate (LOPRESSOR) 50 MG tablet TAKE ONE TABLET BY MOUTH TWICE DAILY   metoprolol tartrate (LOPRESSOR) 50 MG tablet Take 0.5 tablets (25 mg) by mouth 2 times daily   ondansetron (ZOFRAN) 8 MG tablet Take 1 tablet (8 mg) by mouth every 8 hours as needed for nausea   order for DME Machine to dispense the medication   pantoprazole (PROTONIX) 40 MG EC tablet TAKE ONE TABLET BY MOUTH ONCE DAILY 30-60  MINUTES  BEFORE  A  MEAL   simvastatin (ZOCOR) 40 MG tablet Take 1 tablet (40 mg) by mouth At Bedtime   traZODone (DESYREL) 50 MG tablet Take 1 tablet (50 mg) by mouth nightly as needed for sleep       RADIOLOGY:   No results found for this or any previous visit (from the past 24 hour(s)).      Patient seen, findings and plan discussed with surgical ICU staff attending.    Lazaro Cantu MD  Adult Cardiothoracic Anesthesia Fellow

## 2019-02-07 NOTE — PLAN OF CARE
Neuro: Unchanged neuro exams, see flow sheet, continuous EEG monitoring discharge today.      CV: SBP goal achieved with prn intervention this shift. Pt remain afebrile with HR high 70's to low 80's     Resp: Patient with a #6 shiley trach, intubated with FIO2 at 40%, LS mostly clear, diminished at bases. Small tan secretions via trach.     GI/: HD today, scant amount of urine at times, bladder scanned showed <25cc. NPO, TF  restarted at 10cc/hr with standard H2O flushes. D10 at 30cc/hr     Skin: see flow sheet     Plan: Family updated of patient condition.  Continue to monitor and treat per plan of care. Notify [facility] of changes in pt condition.

## 2019-02-07 NOTE — PLAN OF CARE
Pt on 4A  D/I:  Neuro: Unchaged exam, Moves all extremities, Left pupil larger/sluggish,  patient very stiff  Pulm: #6 Shiley, CMV 40%, moderate/small creamy secretions   Optifoam and Mepilex for skin protection.   CV: SR 60-70's. PRN X1 to maintain SBP <160. Tmax 99.8 PRN tylenol X1.   GI: BM X2. Tube feeds continued @10 overnight increased to 20 @0600 per order confirmed with MD. Multiple orders to hold for residual over 100/500- MD confirmed to continue TF with residuals of 250.  : Oliguric Bladder- incontinent- passing small amounts of urine  Skin: Incisions scabbed and healing.   Gtt's: D10% @20 to supplement decreased TF d/t low blood glucose prior.   Electrolytes replaced per protocol      P: Possible transfer to [facility] today. Notify [facility] with changes/concerns-

## 2019-02-07 NOTE — DISCHARGE SUMMARY
Dialysis Discharge Summary Brief    [facility]  Division of Nephrology  Nephrology Discharge Dialysis Orders  Ph: [phone]  Fax: [phone]    Wendy Rocha  MRN: [id number]  YOB: 1940    Dialysis Unit: [facility]    Date of Admission: 1/14/2019  Date of Discharge: 2/7/19  Discharge Diagnosis: S/P CABG w/HD dependent NATALIA, likely ESRD    Mrs Rocha is an 78 yof with hx of CAD who had CABG for severe CAD on 1/15.  Had NATALIA post surgically, imaging revealed one atrophic kidney and baseline Cr is ~2.2.  Started HD on 1/21 which was supposed to be limited trial to see if some fx returned but had PEA arrest on 1/24 and has been anuric and dependent on HD since.  I have told family that she is extremely unlikely to recover renal fx, tunneled line was placed on 1/30 and she has been stable on HD MWF.  Still ~5kg above likely EDW but have been challenging with 3-4L of UF.  Have not started EPO yet, ferritin was high so did not iron load although sats were low, could recheck in the near future as inflammation improves.  Pager below for ?'s.        [x] New initiation, new dialysis orders below      New Orders (if not applicable put NA):  Estimated Dry Weight 75.2kg today, likely ~70kg.    Dialysis Duration 3h, MWF   Dialysis Access RIJ tunneled line 1/30   Antibiotics (dose per dialysis, end date) N/A           Labs to be drawn at dialysis N/A   Other major changes to dialysis prescription (e.g. Dialysate bath, heparin, blood flow rate, etc)   , . No Heparin, have not started EPO.    Medication changes (also fax the unit a copy of the discharge summary)         N/A     Name of provider completing this form: YAZAN Gordon CNS   [phone]

## 2019-02-07 NOTE — PROGRESS NOTES
RNCC Note:    Received authorization from  insurance for [facility] [place].  RNCC will check bed availability tomorrow morning, 2/7/19.    Aneta Plunkett RN, PHN, BSN  4A and 4E/ ICU  Care Coordinator  Phone: [phone]  Pager: [phone]

## 2019-02-07 NOTE — PROGRESS NOTES
Care Coordinator - Discharge Planning    Admission Date/Time:  1/14/2019  Attending MD:  Haseeb Villegas MD     Data  Chart reviewed, discussed with interdisciplinary team.   Patient was admitted for:   1. Acute kidney injury (H)    2. CKD (chronic kidney disease) stage 3, GFR 30-59 ml/min (H)         Assessment   Full assessment completed in previous note     Pt is medically ready to be transfer to LTAC.     RNCC received Authorization from pt insurance.    Copy of insurance authorization provided to [facility] liaison.  Auth # [id number].  Pt insurance approved pt for 5 days and [facility] will submit a request for extended stay.  Zena reported they do have bed for today and has arranged stretcher transport with vent for today 4:00 pm  time.  RNCC called pt dtrManju # [phone] and provided update.  Manuj agreed with the plan.   time have been shared with CVTS team and bedside RN.   Ambulance PCS form have been completed.    Coordination of Care and Referrals: Provided patient/family with options for LTACH.      Plan  Anticipated Discharge Date:  Today, 2/7/19 at 4:00 pm  Anticipated Discharge Plan:  [place].  [facility] will provide stretcher transport with vent.      Aneta Plunkett, RN, PHN, BSN  4A and 4E/ ICU  Care Coordinator  Phone: [phone]  Pager: [phone]

## 2019-02-07 NOTE — PROGRESS NOTES
[facility]    Wendy Rocha has been accepted for admission to [facility] today.       Ride: 1600     [facility] Unit: [address]. Please call [phone] for nurse to nurse report.before the pt discharges.    Accepting MD: Baltazar Bee. Please contact Dr. Bee at digital pager [phone] for MD to MD report before the pt discharges.    Paperwork needed prior to discharge: discharge summary, discharge orders, imaging CD. I have ordered the xray CD.    Transfer packet: Will need only the discharge summary, discharge orders, MAR, imaging CD. Okay will be able access other clinical information via [employer] [facility] Everywhere. I will complete the transfer packet.     Zena Nieves RN  [facility] Admissions  Ph: [phone]  Fax: [phone]  [email or web address]

## 2019-02-08 NOTE — PLAN OF CARE
Occupational Therapy Discharge Summary    Reason for therapy discharge:    Discharged to long term care facility.    Progress towards therapy goal(s). See goals on Care Plan in [facility] electronic health record for goal details.  Goals partially met.  Barriers to achieving goals:   discharge from facility.    Therapy recommendation(s):    No further therapy is recommended.

## 2019-02-22 NOTE — PROGRESS NOTES
Reviewed patient's chart as she was sent to us from [facility] and was not followed up on.  Called and spoke to Debra Grace, RN Care Coordinator for our [facility] patient's to let her know the current status on this patient.  Debra acknowledged information and will note current status in patient's chart.

## 2019-05-14 NOTE — TELEPHONE ENCOUNTER
Routing refill request to provider for review/approval because:  BP Readings from Last 3 Encounters:   06/18/18 144/78   11/14/17 110/66   10/19/17 130/88     Creatinine   Date Value Ref Range Status   01/05/2018 1.41 (H) 0.52 - 1.04 mg/dL Final              normal (ped)...

## 2020-12-23 NOTE — TELEPHONE ENCOUNTER
"Daughter is seeking AVS  Instructions  from neurologist patient saw today;  States patient denies receiving any but states she has new medications to take and daughter manages her  \"pill boxes\" ;  review of EPIC does not reveal encounter; daughter states it was @ non  facility, [facility];  Advised to contact clinic in morning to  Obtain AVS;     Reason for Disposition    [1] Caller requesting NON-URGENT health information AND [2] PCP's office is the best resource    Protocols used: INFORMATION ONLY CALL-ADULT-  Reema Olsen RN  FNA    "
22-Dec-2020 18:17

## 2021-10-25 NOTE — PATIENT INSTRUCTIONS
Patient Active Problem List   Diagnosis     Major depressive disorder, recurrent episode (H)     Intermittent asthma     Hyperlipidemia LDL goal <100     Chronic nonallergic rhinitis     Diverticulosis     GERD (gastroesophageal reflux disease)     Anxiety     LLOYD (obstructive sleep apnea)- mild (AHI 11)     Intracranial arachnoid cyst     Facet arthritis of cervical region     Acquired hypothyroidism     Bipolar 2 disorder (H)     Chronic midline low back pain without sciatica     Irritable bowel syndrome with diarrhea     B12 deficiency     Essential hypertension with goal blood pressure less than 140/90     Chronic, continuous use of opioids     Ankylosing spondylitis of sacral region (H)     Morbid obesity due to hypertriglyceridemia (H)     Fatty infiltration of liver     DDD (degenerative disc disease), lumbar     Type 2 diabetes mellitus with complication, without long-term current use of insulin (H)     Peripheral polyneuropathy     History of pulmonary embolism     Ingrown toenail     Hypertriglyceridemia     Gastroparesis     Orthostatic dizziness     POTS (postural orthostatic tachycardia syndrome)     PHN (postherpetic neuralgia)     Dysautonomia (H)     Major depressive disorder, recurrent episode, severe (H)     Chronic pain syndrome     MDD (major depressive disorder), recurrent severe, without psychosis (H)       Current Outpatient Medications:      acetaminophen 500 MG CAPS, Take 1,000 mg by mouth 2 times daily , Disp: 60 capsule, Rfl:      albuterol (PROAIR HFA/PROVENTIL HFA/VENTOLIN HFA) 108 (90 Base) MCG/ACT inhaler, Inhale 2 puffs into the lungs every 4 hours as needed for shortness of breath / dyspnea or wheezing, Disp: 8.5 g, Rfl: 11     aspirin (ASA) 81 MG tablet, Take 81 mg by mouth daily , Disp: , Rfl:      cetirizine (ZYRTEC) 10 MG tablet, Take 1 tablet (10 mg) by mouth At Bedtime, Disp: 30 tablet, Rfl: 11     cholecalciferol (D3-50) 1250 mcg (73342 units) capsule, TAKE 1 CAPSULE BY MOUTH  Recommendations from today's MTM visit:                                                      1. Metoprolol 25 mg (1/2 tablet) twice daily      Next MTM visit: 2 months    To schedule another MTM appointment, please call the clinic directly or you may call the MTM scheduling line at [phone] or toll-free at [phone].     My Clinical Pharmacist's contact information:                                                      It was a pleasure seeing you today!  Please feel free to contact me with any questions or concerns you have.      Emelyn Lott , Pharm D  [phone] (phone)  [phone] (pager)  Medication Therapy Management Pharmacist     You may receive a survey about the MTM services you received.  I would appreciate your feedback to help me serve you better in the future. Please fill it out and return it when you can. Your comments will be anonymous.               EVERY 2 WEEKS, Disp: 24 capsule, Rfl: 0     clonazePAM (KLONOPIN) 0.5 MG tablet, Take 0.5 mg by mouth 2 times daily as needed , Disp: , Rfl:      cyanocobalamin (CYANOCOBALAMIN) 1000 MCG/ML injection, INJECT 1 ML INTO THE MUSCLE EVERY 30 DAYS, Disp: 10 mL, Rfl: 0     dronabinol (MARINOL) 5 MG capsule, Take 1 capsule by mouth 2 times daily, Disp: , Rfl:      DULoxetine (CYMBALTA) 60 MG capsule, Take 120 mg by mouth daily , Disp: , Rfl:      EPINEPHrine (ANY BX GENERIC EQUIV) 0.3 MG/0.3ML injection 2-pack, Inject 0.3 mLs (0.3 mg) into the muscle once as needed for anaphylaxis, Disp: 0.6 mL, Rfl: 3     eszopiclone (LUNESTA) 3 MG tablet, Take 3 mg by mouth At Bedtime , Disp: , Rfl:      etanercept (ENBREL SURECLICK) 50 MG/ML autoinjector, Inject 50 mg Subcutaneous once a week . Hold for signs of infection, and seek medical attention., Disp: 4 mL, Rfl: 7     famotidine (PEPCID) 20 MG tablet, Prior to administration of Humira every 2 weeks (Patient taking differently: Take 20 mg by mouth every 7 days Prior to Enbrel Injections to prevent skin reaction), Disp: , Rfl:      fluticasone (FLONASE) 50 MCG/ACT nasal spray, Spray 1 spray into both nostrils daily, Disp: , Rfl:      fluticasone-salmeterol (ADVAIR) 250-50 MCG/DOSE inhaler, Inhale 1 puff into the lungs every 12 hours, Disp: 14 each, Rfl: 11     LATUDA 40 MG TABS tablet, Take 40 mg by mouth daily, Disp: , Rfl:      levothyroxine (SYNTHROID/LEVOTHROID) 75 MCG tablet, TAKE 1 TABLET EVERY MORNING, Disp: 90 tablet, Rfl: 3     lidocaine (XYLOCAINE) 5 % external ointment, APPLY TOPICALLY 4 TIMES DAILY AS NEEDED FOR PAIN , Disp: 50 g, Rfl: 0     melatonin 3 MG tablet, Take 12 mg by mouth nightly as needed , Disp: , Rfl:      methocarbamol (ROBAXIN) 500 MG tablet, Take 1-2 tablets (500-1,000 mg) by mouth 4 times daily as needed for muscle spasms, Disp: 240 tablet, Rfl: 1     metoclopramide (REGLAN) 5 MG tablet, Take 5 mg by mouth 2 times daily, Disp: , Rfl:      metoprolol  succinate ER (TOPROL-XL) 200 MG 24 hr tablet, TAKE ONE TABLET BY MOUTH TWICE DAILY , Disp: 180 tablet, Rfl: 1     montelukast (SINGULAIR) 10 MG tablet, Take 1 tablet (10 mg) by mouth every evening, Disp: 90 tablet, Rfl: 3     nabumetone (RELAFEN) 500 MG tablet, Take 1-2 tablets (500-1,000 mg) by mouth 2 times daily as needed for moderate pain (with food), Disp: 120 tablet, Rfl: 2     naloxone (NARCAN) 4 MG/0.1ML nasal spray, Spray 1 spray (4 mg) into one nostril alternating nostrils as needed for opioid reversal every 2-3 minutes until assistance arrives, Disp: 0.2 mL, Rfl: 0     olopatadine (PATADAY) 0.2 % ophthalmic solution, Place 1 drop into both eyes daily, Disp: 2.5 mL, Rfl: 3     omega-3 acid ethyl esters (LOVAZA) 1 g capsule, TAKE TWO CAPSULES BY MOUTH TWICE DAILY, Disp: 360 capsule, Rfl: 0     omeprazole 20 MG tablet, Take 20 mg by mouth daily , Disp: , Rfl:      ondansetron (ZOFRAN-ODT) 8 MG ODT tab, Take 8 mg by mouth every 8 hours as needed for nausea, Disp: , Rfl:      order for DME, Equipment being ordered: Assure Compression stockings (ANNETTA) Large, closed toe, thigh-high 30-40 compression, Disp: 2 Units, Rfl: 3     order for DME, Equipment being ordered: lumbosacral belt/brace, Disp: 1 Units, Rfl: 0     order for DME, Respironics REMSTAR 60 Series Auto CPAP 9-13 cm H2O, Wisp nasal mask w/a large cushion and a chinstrap, Disp: , Rfl:      oxyCODONE (ROXICODONE) 5 MG tablet, Take 1 tablet (5 mg) by mouth every 6 hours as needed for pain (maximum 6 tablet(s) per day) . Acute on chronic pain, Disp: 35 tablet, Rfl: 0     pregabalin (LYRICA) 150 MG capsule, Take 150 mg by mouth 2 times daily, Disp: , Rfl:      pyridostigmine (MESTINON) 60 MG tablet, Take 1 tablet by mouth 5 times daily, Disp: , Rfl:      ramipril (ALTACE) 10 MG capsule, Take 1 capsule (10 mg) by mouth daily, Disp: 90 capsule, Rfl: 1     rizatriptan (MAXALT-MLT) 5 MG ODT, DISSOLVE 1 TABLET BY MOUTH AT ONSET OF HEADACHE, Disp: 30 tablet, Rfl:  "0     rosuvastatin (CRESTOR) 40 MG tablet, Take 1 tablet (40 mg) by mouth daily, Disp: 90 tablet, Rfl: 2     syringe, disposable, (BD TUBERCULIN SYRINGE) 1 ML MISC, Equipment being ordered: 1 ml tuberculin syringes to be used for Vitamin B12 injections., Disp: 12 each, Rfl: 11     syringe/needle, disp, (BD INTEGRA SYRINGE) 25G X 1\" 3 ML MISC, USE FOR VITAMIN B12 INJECTIONS, Disp: 12 each, Rfl: 0     vitamin B complex with vitamin C (STRESS TAB) tablet, Take 1 tablet by mouth daily, Disp: , Rfl:      ZINC SULFATE-VITAMIN C MT, Take 1 tablet by mouth daily, Disp: , Rfl:   Psychiatry staffing: case discussed  Diagnosis:  As above;  Mood more stable.  "

## 2021-12-10 NOTE — PROGRESS NOTES
Nephrology Progress Note  01/21/2019         Today Recommendations:  - Plan for HD tomorrow, 2 hours, no fluid removal.   - Recommend to avoid hypotension and low normal BP.  - Need Iron level.     Assessment & Recommendations:   Wendy Rocha is a 78 year old female with pmh CKDIII, b/l carotid stenosis, GERD, HTN, CAD s/p ACS transferred from [facility] with severe multi-vessel disease who had tripple vessel CABG surgery on 1/15 complicated by anuric NATALIA.      # Oliguric NATALIA on CKD stage IIIb:  - Patient was transferred with Crea~2.2 and GFR in 20s.  - CABG surgery complicated by anuric NATALIA and Crea has elevated gradually.  - NATALIA etiology: pre-renal azotemia due to hypotension episode during surgery vs renovascular disease vs ATN.  - CKD etiology: likely due to renovascular disease/renal artery stenosis causes left kidney atrophy which compatible with her severe atherosclerosis disease, but still can not rule out congential left kidney atrophy as we do not have previous renal ultrasound to compare.    - First session of HD was done yesterday on 1/20/19, today plan for second session of HD, Blood pressure today is low normal so plan for no fluid removal.   - Plan for HD tomorrow, for 2 hour, flow rate 300 ml/min, dialysate rate 500 ml/min. No fluid removal.   - We recommend to closely monitoring her electrolytes, strict I/Os, avoid nephrotoxic agents.   -  US/Renal with doppler: Left kidney 6.7 cm in length, atrophic. Diffusely increased echogenicity. No focal mass. No hydronephrosis.  Right kidney: 10.4 cm, Parenchyma is of normal thickness and echogenicity. No focal mass. No hydronephrosis.  - Patient still makes some urine, today ~10 ml/hour, decreased today likely due to low normal BP.   - Recommend to avoid hypotension and low normal BP.  - Conue monitoring.      # Blood pressure, volume status:  - BP today is low normal, Recommend to avoid hypotension and low normal BP to keep well perfusion kidney especially in  Pending Prescriptions:                       Disp   Refills    PARoxetine (PAXIL) 20 MG tablet [Pharmacy *90 tab*0        Sig: TAKE 1 TABLET BY MOUTH ONCE DAILY APPOINTMENT  NEEDED           FOR  ADDITIONAL  REFILLS    Routing refill request to provider for review/approval because:  Labs out of range:  PHQ-9 score:    PHQ 10/8/2021   PHQ-9 Total Score 7   Q9: Thoughts of better off dead/self-harm past 2 weeks Not at all                  setting of renal atherosclerosis disease.   - Worsening oliguric, today UOP~10 ml/h likely due to low normal BP.     # Electrolytes abnormalities, Acid-Base:  - K: 3.7 WNL  - Na: 137 WNL  - BiCarb: 22,   - Continue monitoring,      # Anemia:  - Hgb: 8.4, normocytic anemia  - Likely anemia of chronic disease.  - Need Iron level.  - Goal Hgb>8, transfuse by primary team.     # Atherosclerosis, Severe CAD S/P triple vessels CABG surgery:  - Cardiothoracic surgery team on board.  - Patient noticed to have significantly diminished pedis dorsalis, US/Renal shows left kidney atrophy likely due to renal artery atherosclerosis causes renal artery stenosis.       Recommendations were communicated to primary team via verbal.      Seen and discussed with Dr. Cally Schwab MD   Nephrology Fellow  [phone]      Interval History :   Nursing and provider notes from last 24 hours reviewed.  Patient was seen and examined at bedside afternoon. Patient is intubated, off sedation. UOP has declined felipe to low normal BP~10ml/hour, oliguric, light yellow urine was noticed, second session of hemodialysis today.     Review of Systems:   Not able to obtain, patient is intubated.     Physical Exam:   I/O last 3 completed shifts:  In: 1778.35 [I.V.:388.35; NG/GT:510]  Out: 1975 [Urine:215; Emesis/NG output:550; Other:1000; Chest Tube:210]   /76   Pulse 80   Temp 97.9  F (36.6  C) (Axillary)   Resp 20   Wt 71.4 kg (157 lb 6.5 oz)   SpO2 99%   BMI 27.88 kg/m       GENERAL APPEARANCE: Intubated  EYES:  No scleral icterus, pupils equal  HENT: mouth without ulcers or lesions  PULM: Mild rales to lung base B/L  CV: Irregular rhythm, normal rate, no rub, chest tube in place, pedis dorsalis pulse diminished B/L      -JVD mild elevated     -edema +1 to lower extrs  GI: soft, non distended, bowel sounds are positive  INTEGUMENT: no cyanosis, no rash  NEURO: Not able to evaluate     Labs:   All labs reviewed by  me  Electrolytes/Renal -   Recent Labs   Lab Test 01/21/19  1147 01/21/19  0405 01/20/19  1513 01/20/19  0424  01/19/19  0456 01/18/19  2107  01/17/19  1605    137 139 142   < > 143 142   < > 144   POTASSIUM 4.0 3.7 4.0 4.2   < > 4.2 4.4   < > 4.3   CHLORIDE 101 100 102 104   < > 106 105   < > 107   CO2 22 22 20 20   < > 19* 20   < > 23   BUN 92* 86* 128* 118*   < > 96* 90*   < > 69*   CR 5.82* 5.55* 7.67* 7.17*   < > 6.11* 5.92*   < > 4.66*   * 130* 147* 154*   < > 146* 128*   < > 121*   JEFFREY 7.6* 7.9* 7.5* 7.4*   < > 7.7* 7.8*   < > 7.4*   MAG  --   --   --  3.2*  --  3.0*  --   --  2.9*   PHOS  --   --   --  7.4*  --  6.4* 6.9*   < > 6.6*    < > = values in this interval not displayed.       CBC -   Recent Labs   Lab Test 01/21/19  1147 01/21/19  0922 01/21/19  0405   WBC 8.9 7.6 7.8   HGB 8.7* 8.4* 8.9*    163 165       LFTs -   Recent Labs   Lab Test 01/17/19  0400 01/16/19  0350 01/15/19  2146   ALKPHOS 41 40 40   BILITOTAL 0.5 1.0 1.2   ALT 10 22 25   AST 50* 55* 57*   PROTTOTAL 5.3* 5.6* 5.2*   ALBUMIN 2.7* 2.8* 2.9*       Iron Panel -   Recent Labs   Lab Test 01/27/15  1218 07/11/12  0804   IRON 74 80   IRONSAT 21 26   ORQUIDEA 40 67         Imaging:  All imaging studies reviewed by me.     Current Medications:    amiodarone  400 mg Oral or Feeding Tube BID     amLODIPine  5 mg Oral or Feeding Tube Daily     aspirin  325 mg Oral or Feeding Tube Daily     atorvastatin  40 mg Oral QPM     busPIRone  5 mg Oral or Feeding Tube BID     insulin aspart  1-6 Units Subcutaneous Q4H     levofloxacin  500 mg Intravenous Q48H     metoprolol tartrate  25 mg Oral or Feeding Tube BID     multivitamins w/minerals  15 mL Per Feeding Tube Daily     pantoprazole (PROTONIX) IV  40 mg Intravenous BID     polyethylene glycol  17 g Oral or Feeding Tube Daily     protein modular  1 packet Per Feeding Tube Daily     QUEtiapine  50 mg Oral At Bedtime     senna-docusate  1 tablet Oral or Feeding Tube BID    Or      senna-docusate  2 tablet Oral or Feeding Tube BID     sodium chloride (PF)  3 mL Intracatheter Q8H       dexmedetomidine 0.8 mcg/kg/hr (01/21/19 8297)     IV fluid REPLACEMENT ONLY       IV fluid REPLACEMENT ONLY       HEParin 430 Units/hr (01/21/19 1725)     BETA BLOCKER NOT PRESCRIBED       Hany Schwab MD   Nephrology Fellow  Pager: [phone]    I have seen and examined this patient with the resident.  This note reflects our joint assessment and plan.     Basia Alamo MD

## (undated) DEVICE — TUBE TRACH PERC INTRODUCER CIAGLIA BLUE RHINO 8FR G12115

## (undated) DEVICE — PREP CHLORHEXIDINE 4% 4OZ (HIBICLENS) 57504

## (undated) DEVICE — SU STEEL 6 CCS 4X18" M654G

## (undated) DEVICE — SU PROLENE 2-0 SHDA 36" 8523H

## (undated) DEVICE — LINEN TOWEL PACK X6 WHITE 5487

## (undated) DEVICE — DRAIN CHEST TUBE 36FR STR 8036

## (undated) DEVICE — SU PROLENE 3-0 SHDA 36" 8522H

## (undated) DEVICE — PACK NEURO MINOR UMMC SNE32MNMU4

## (undated) DEVICE — SOL NACL 0.9% 10ML VIAL 0409-4888-02

## (undated) DEVICE — BLADE KNIFE SURG 15C 371716

## (undated) DEVICE — CLIP HORIZON SM RED WIDE SLOT 001201

## (undated) DEVICE — ESU HOLSTER PLASTIC DISP E2400

## (undated) DEVICE — SU ETHIBOND 0 TIE 6X30" X306H

## (undated) DEVICE — LINEN TOWEL PACK X30 5481

## (undated) DEVICE — DEFIB PRO-PADZ LVP LQD GEL ADULT 8900-2105-01

## (undated) DEVICE — KIT ENDO VASOVIEW HEMOPRO 2 VH-4000

## (undated) DEVICE — DRSG DRAIN 4X4" 7086

## (undated) DEVICE — SU VICRYL 3-0 SH 27" J316H

## (undated) DEVICE — TUBING INSUFFLATION W/FILTER CPC TO LUER 620-030-301

## (undated) DEVICE — SU SILK 0 TIE 6X30" A306H

## (undated) DEVICE — SU VICRYL 2-0 CT-1 27" UND J259H

## (undated) DEVICE — PROTECTOR ARM ONE-STEP TRENDELENBURG 40418

## (undated) DEVICE — CLIP HORIZON LG ORANGE 004200

## (undated) DEVICE — LINEN TOWEL PACK X5 5464

## (undated) DEVICE — SU VICRYL 3-0 FS-1 27" J442H

## (undated) DEVICE — TUBE GASTROSTOMY PONSKY PULL DELUXE 20FR 000792

## (undated) DEVICE — SU PROLENE 7-0 BV-1DA 4X24" M8702

## (undated) DEVICE — SU PROLENE 6-0 C-1DA 4X24" M8726

## (undated) DEVICE — DRAPE IOBAN INCISE 23X17" 6650EZ

## (undated) DEVICE — SOL NACL 0.9% IRRIG 1000ML BOTTLE 2F7124

## (undated) DEVICE — TAPE MEDIPORE 4"X2YD 2864

## (undated) DEVICE — CANNULA VESSEL DLP  30001

## (undated) DEVICE — BLADE KNIFE BEAVER MICROSHARP GREEN 377515

## (undated) DEVICE — SU PROLENE 5-0 RB-2DA 30" 8710H

## (undated) DEVICE — PUNCH AORTIC 4.0MM LONG AP-540

## (undated) DEVICE — SU SILK 3-0 TIE 12X30" A304H

## (undated) DEVICE — SOL NACL 0.9% IRRIG 3000ML BAG 2B7477

## (undated) DEVICE — DRSG ABDOMINAL 07 1/2X8" 7197D

## (undated) DEVICE — SUCTION MANIFOLD DORNOCH ULTRA CART UL-CL500

## (undated) DEVICE — CLIP HORIZON MED BLUE 002200

## (undated) DEVICE — SU RETRACT-O-TAPE 1041

## (undated) DEVICE — GLOVE PROTEXIS BLUE W/NEU-THERA 7.0  2D73EB70

## (undated) DEVICE — DRAIN CHEST TUBE RIGHT ANGLED 28FR 8128

## (undated) DEVICE — SPECIMEN CONTAINER 5OZ STERILE 2600SA

## (undated) DEVICE — ESU GROUND PAD ADULT W/CORD E7507

## (undated) DEVICE — DRSG TEGADERM 4X10" 1627

## (undated) DEVICE — BLADE KNIFE SURG 15 371115

## (undated) DEVICE — DRSG KERLIX 4 1/2"X4YDS ROLL 6715

## (undated) DEVICE — SU ETHIBOND 3-0 BBDA 36" X588H

## (undated) DEVICE — PREP BRUSH SURG SCRUB PROVIDONE IODINE 9% 20ML

## (undated) DEVICE — DECANTER VIAL 2006S

## (undated) DEVICE — SPONGE LAP 12X12" X8425

## (undated) DEVICE — Device

## (undated) DEVICE — PREP CHLORAPREP 26ML TINTED ORANGE  260815

## (undated) DEVICE — TIES BANDING T50R

## (undated) DEVICE — SPONGE RAY-TEC 4X8" 7318

## (undated) DEVICE — ESU ELEC BLADE 2.75" COATED/INSULATED E1455

## (undated) DEVICE — KIT CONNECTOR FOR OLYMPUS ENDOSCOPES DEFENDO 100310

## (undated) DEVICE — SUCTION CATH AIRLIFE TRI-FLO W/CONTROL PORT 14FR  T60C

## (undated) DEVICE — SUCTION DRY CHEST DRAIN OASIS 3600-100

## (undated) DEVICE — GLOVE PROTEXIS POWDER FREE 7.0 ORTHOPEDIC 2D73ET70

## (undated) DEVICE — SU SILK 2-0 SH 30" K833H

## (undated) DEVICE — DRSG TEGADERM 8X12" 1629

## (undated) DEVICE — WAND SUCTION LP SOFT 15.2CM SU-22702

## (undated) DEVICE — SU DERMABOND ADVANCED .7ML DNX12

## (undated) DEVICE — DRAPE SHEET MED 44X70" 9355

## (undated) DEVICE — SU PROLENE 4-0 SHDA 36" 8521H

## (undated) DEVICE — SU ETHIBOND 2-0 SH-1 36" X763H

## (undated) DEVICE — WIPES FOLEY CARE SURESTEP PROVON DFC100

## (undated) DEVICE — SOL WATER IRRIG 1000ML BOTTLE 2F7114

## (undated) DEVICE — DRSG STERI STRIP 1/2X4" R1547

## (undated) DEVICE — BLADE SAW STERNAL 20X30MM KM-32

## (undated) DEVICE — SU VICRYL 0 CTX 36" J370H

## (undated) DEVICE — GLOVE PROTEXIS POWDER FREE SMT 6.5  2D72PT65X

## (undated) DEVICE — ANTIFOG SOLUTION W/FOAM PAD 31142527

## (undated) DEVICE — SU PROLENE 4-0 RB-1DA 36" 8557H

## (undated) DEVICE — PACK ADULT HEART UMMC PV15CG92D

## (undated) DEVICE — SU SILK 2-0 TIE 12X30" A305H

## (undated) DEVICE — BLADE KNIFE BEAVER MINI STR BEAVER6900

## (undated) DEVICE — EYE PREP BETADINE 5% SOLUTION 30ML 0065-0411-30

## (undated) DEVICE — SU PLEDGET SOFT TFE 3/8"X3/26"X1/16" PCP40

## (undated) DEVICE — JELLY LUBRICATING SURGILUBE 2OZ TUBE

## (undated) RX ORDER — FENTANYL CITRATE 50 UG/ML
INJECTION, SOLUTION INTRAMUSCULAR; INTRAVENOUS
Status: DISPENSED
Start: 2019-01-01

## (undated) RX ORDER — LIDOCAINE HYDROCHLORIDE 10 MG/ML
INJECTION, SOLUTION EPIDURAL; INFILTRATION; INTRACAUDAL; PERINEURAL
Status: DISPENSED
Start: 2019-01-01

## (undated) RX ORDER — LIDOCAINE HYDROCHLORIDE 10 MG/ML
INJECTION, SOLUTION INFILTRATION; PERINEURAL
Status: DISPENSED
Start: 2019-01-01

## (undated) RX ORDER — PHENYLEPHRINE HCL IN 0.9% NACL 1 MG/10 ML
SYRINGE (ML) INTRAVENOUS
Status: DISPENSED
Start: 2019-01-01

## (undated) RX ORDER — HEPARIN SODIUM 1000 [USP'U]/ML
INJECTION, SOLUTION INTRAVENOUS; SUBCUTANEOUS
Status: DISPENSED
Start: 2019-01-01

## (undated) RX ORDER — PROPOFOL 10 MG/ML
INJECTION, EMULSION INTRAVENOUS
Status: DISPENSED
Start: 2019-01-01

## (undated) RX ORDER — LIDOCAINE HYDROCHLORIDE 20 MG/ML
INJECTION, SOLUTION EPIDURAL; INFILTRATION; INTRACAUDAL; PERINEURAL
Status: DISPENSED
Start: 2019-01-01

## (undated) RX ORDER — EPHEDRINE SULFATE 50 MG/ML
INJECTION, SOLUTION INTRAMUSCULAR; INTRAVENOUS; SUBCUTANEOUS
Status: DISPENSED
Start: 2019-01-01

## (undated) RX ORDER — CEFAZOLIN SODIUM 500 MG/2.2ML
INJECTION, POWDER, FOR SOLUTION INTRAMUSCULAR; INTRAVENOUS
Status: DISPENSED
Start: 2019-01-01

## (undated) RX ORDER — PAPAVERINE HYDROCHLORIDE 30 MG/ML
INJECTION INTRAMUSCULAR; INTRAVENOUS
Status: DISPENSED
Start: 2019-01-01

## (undated) RX ORDER — CEFAZOLIN SODIUM 1 G/3ML
INJECTION, POWDER, FOR SOLUTION INTRAMUSCULAR; INTRAVENOUS
Status: DISPENSED
Start: 2019-01-01